# Patient Record
Sex: FEMALE | Race: WHITE | NOT HISPANIC OR LATINO | Employment: OTHER | URBAN - METROPOLITAN AREA
[De-identification: names, ages, dates, MRNs, and addresses within clinical notes are randomized per-mention and may not be internally consistent; named-entity substitution may affect disease eponyms.]

---

## 2017-02-15 ENCOUNTER — TELEPHONE (OUTPATIENT)
Dept: INTERNAL MEDICINE | Facility: CLINIC | Age: 82
End: 2017-02-15

## 2017-02-15 NOTE — TELEPHONE ENCOUNTER
"Pt's daughter needs FMLA paperwork completed for both her parents.    Please see forms in "forms to do box".    Needed ASAP.    Thanks.      "

## 2017-02-16 ENCOUNTER — LAB VISIT (OUTPATIENT)
Dept: LAB | Facility: HOSPITAL | Age: 82
End: 2017-02-16
Attending: INTERNAL MEDICINE
Payer: MEDICARE

## 2017-02-16 ENCOUNTER — TELEPHONE (OUTPATIENT)
Dept: INTERNAL MEDICINE | Facility: CLINIC | Age: 82
End: 2017-02-16

## 2017-02-16 ENCOUNTER — OFFICE VISIT (OUTPATIENT)
Dept: OPTOMETRY | Facility: CLINIC | Age: 82
End: 2017-02-16
Payer: MEDICARE

## 2017-02-16 DIAGNOSIS — R30.0 DYSURIA: Primary | ICD-10-CM

## 2017-02-16 DIAGNOSIS — Z13.5 SCREENING FOR GLAUCOMA: ICD-10-CM

## 2017-02-16 DIAGNOSIS — H52.4 PRESBYOPIA OU: ICD-10-CM

## 2017-02-16 DIAGNOSIS — R30.0 DYSURIA: ICD-10-CM

## 2017-02-16 DIAGNOSIS — H50.05 ESOTROPIA, ALTERNATING: Primary | ICD-10-CM

## 2017-02-16 DIAGNOSIS — Z96.1 PSEUDOPHAKIA OF BOTH EYES: ICD-10-CM

## 2017-02-16 LAB
BACTERIA #/AREA URNS AUTO: ABNORMAL /HPF
BILIRUB UR QL STRIP: NEGATIVE
CLARITY UR REFRACT.AUTO: CLEAR
COLOR UR AUTO: YELLOW
GLUCOSE UR QL STRIP: NEGATIVE
HGB UR QL STRIP: NEGATIVE
HYALINE CASTS UR QL AUTO: 1 /LPF
KETONES UR QL STRIP: NEGATIVE
LEUKOCYTE ESTERASE UR QL STRIP: ABNORMAL
MICROSCOPIC COMMENT: ABNORMAL
NITRITE UR QL STRIP: POSITIVE
PH UR STRIP: 7 [PH] (ref 5–8)
PROT UR QL STRIP: NEGATIVE
RBC #/AREA URNS AUTO: 1 /HPF (ref 0–4)
SP GR UR STRIP: 1.01 (ref 1–1.03)
SQUAMOUS #/AREA URNS AUTO: 1 /HPF
URN SPEC COLLECT METH UR: ABNORMAL
UROBILINOGEN UR STRIP-ACNC: NEGATIVE EU/DL
WBC #/AREA URNS AUTO: 35 /HPF (ref 0–5)

## 2017-02-16 PROCEDURE — 92014 COMPRE OPH EXAM EST PT 1/>: CPT | Mod: S$GLB,,, | Performed by: OPTOMETRIST

## 2017-02-16 PROCEDURE — 99999 PR PBB SHADOW E&M-EST. PATIENT-LVL II: CPT | Mod: PBBFAC,,, | Performed by: OPTOMETRIST

## 2017-02-16 PROCEDURE — 92015 DETERMINE REFRACTIVE STATE: CPT | Mod: S$GLB,,, | Performed by: OPTOMETRIST

## 2017-02-16 NOTE — TELEPHONE ENCOUNTER
----- Message from Carolyn Judd sent at 2/16/2017 10:01 AM CST -----  Contact: self  [phone]  Patient would like to know if nurse can set her up to have a urine sample done on today while she has a 3:15 appointment to see the eye  For a possible UTI

## 2017-02-16 NOTE — PROGRESS NOTES
HPI     DLS: 11/16/2015  Pt states no vision changes. +floaters occasionally  Denies flashes, diplopia, and headaches.    No gtts        Last edited by Sharon Harper on 2/16/2017  2:35 PM.     ROS     Positive for: Musculoskeletal, Cardiovascular, Eyes    Negative for: Constitutional, Gastrointestinal, Neurological, Skin,   Genitourinary, HENT, Endocrine, Respiratory, Psychiatric, Allergic/Imm,   Heme/Lymph    Last edited by Almas Kidd, OD on 2/16/2017  4:05 PM. (History)        Assessment /Plan     For exam results, see Encounter Report.    Esotropia, alternating - Both Eyes    Screening for glaucoma    Presbyopia OU    Pseudophakia of both eyes        1. Alt eso w prism in spex--pt happy w RX (dist only)  2. Mild pco OD sp multifocal iol OU/YAG OS    PLAN:    rtc 1 yr

## 2017-02-20 ENCOUNTER — TELEPHONE (OUTPATIENT)
Dept: INTERNAL MEDICINE | Facility: CLINIC | Age: 82
End: 2017-02-20

## 2017-02-20 LAB — BACTERIA UR CULT: NORMAL

## 2017-02-20 RX ORDER — CEPHALEXIN 500 MG/1
500 CAPSULE ORAL 3 TIMES DAILY
Qty: 15 CAPSULE | Refills: 0 | Status: SHIPPED | OUTPATIENT
Start: 2017-02-20 | End: 2017-02-25

## 2017-02-21 ENCOUNTER — TELEPHONE (OUTPATIENT)
Dept: INTERNAL MEDICINE | Facility: CLINIC | Age: 82
End: 2017-02-21

## 2017-02-21 NOTE — TELEPHONE ENCOUNTER
Advise urine does show infection. We do have culture result, antibiotic sent accordingly (cephalexin)

## 2017-02-21 NOTE — TELEPHONE ENCOUNTER
----- Message from Carolyn Judd sent at 2/21/2017  9:56 AM CST -----  Contact: self- [phone]  Patient would like to get test results.  Name of test (lab, mammo, etc.):  Urine test  Date of test:  2-16  Ordering provider:   Where was the test performed:  [facility]  Comments:

## 2017-03-01 RX ORDER — OMEPRAZOLE 40 MG/1
CAPSULE, DELAYED RELEASE ORAL
Qty: 90 CAPSULE | Refills: 3 | Status: SHIPPED | OUTPATIENT
Start: 2017-03-01 | End: 2017-11-29 | Stop reason: SDUPTHER

## 2017-03-06 ENCOUNTER — TELEPHONE (OUTPATIENT)
Dept: INTERNAL MEDICINE | Facility: CLINIC | Age: 82
End: 2017-03-06

## 2017-03-06 RX ORDER — SULFAMETHOXAZOLE AND TRIMETHOPRIM 800; 160 MG/1; MG/1
1 TABLET ORAL 2 TIMES DAILY
Qty: 2014 TABLET | Refills: 0 | Status: SHIPPED | OUTPATIENT
Start: 2017-03-06 | End: 2017-03-13

## 2017-03-06 NOTE — TELEPHONE ENCOUNTER
Keflex was chosen based on the culture result which showed sensitivity to keflex.  If her sx's are not improving the infection is also sensitive to bactrim griffin we can send that in--sent

## 2017-03-06 NOTE — TELEPHONE ENCOUNTER
----- Message from Ayad Kamara MA sent at 3/6/2017  9:11 AM CST -----  Contact: Lhdh-[phone]  Pt stated that she would like to speak with the Dr or the Assistant regarding the Medication cephALEXin (KEFLEX) 500 MG capsule. She stated that it is not working with her Symptoms. Please advise and call. Thanks!

## 2017-03-06 NOTE — TELEPHONE ENCOUNTER
Pt advises that Keflex is not working as she usually receives bactrim.    Please review and advise.    Thanks.

## 2017-03-07 ENCOUNTER — TELEPHONE (OUTPATIENT)
Dept: INTERNAL MEDICINE | Facility: CLINIC | Age: 82
End: 2017-03-07

## 2017-03-07 NOTE — TELEPHONE ENCOUNTER
Noted that Bactrim has been sent to the pharmacy.    Spoke with pt to advise.    Verbalized understanding.    However, the qty was 2014.    The pharmacy provided her with #10.    Please advise if she is to take for 5 or 10 days, #10 or #20.    Thanks.

## 2017-03-07 NOTE — TELEPHONE ENCOUNTER
----- Message from Concetta Little sent at 3/7/2017  9:09 AM CST -----  Contact: pt [phone]  Pt will like to know the status on a script,she is waiting on a call from the nurse from yesterday,she said the pharmacy called her and the script is not write,she said she want a call from [facility] today and not at the end of day,please advise

## 2017-03-10 ENCOUNTER — TELEPHONE (OUTPATIENT)
Dept: INTERNAL MEDICINE | Facility: CLINIC | Age: 82
End: 2017-03-10

## 2017-03-10 NOTE — TELEPHONE ENCOUNTER
See forms in forms to do box on desk.    Attached are the previous forms for reference.    Please review and advise upon completion.    Thanks.

## 2017-03-10 NOTE — TELEPHONE ENCOUNTER
----- Message from Nisha Mclaughlin sent at 3/10/2017 10:48 AM CST -----  Contact: Home: [phone]   She dropped off some forms to be filled out two weeks ago. She want to know if they have been completed and ready for ?

## 2017-04-11 ENCOUNTER — TELEPHONE (OUTPATIENT)
Dept: INTERNAL MEDICINE | Facility: CLINIC | Age: 82
End: 2017-04-11

## 2017-04-11 DIAGNOSIS — E78.5 DYSLIPIDEMIA: ICD-10-CM

## 2017-04-11 DIAGNOSIS — I10 ESSENTIAL HYPERTENSION: Primary | ICD-10-CM

## 2017-04-11 NOTE — TELEPHONE ENCOUNTER
----- Message from Lore De La Cruz sent at 4/11/2017 11:22 AM CDT -----  Contact: Self  Doctor appointment and lab have been scheduled.  Please link lab orders to the lab appointment.  Date of doctor appointment:  8/15  Physical or EP:  Physical   Date of lab appointment:  8/8  Comments:

## 2017-05-04 RX ORDER — OXYBUTYNIN CHLORIDE 10 MG/1
TABLET, EXTENDED RELEASE ORAL
Qty: 90 TABLET | Refills: 3 | Status: SHIPPED | OUTPATIENT
Start: 2017-05-04 | End: 2018-04-27 | Stop reason: SDUPTHER

## 2017-05-05 DIAGNOSIS — F41.9 ANXIETY: ICD-10-CM

## 2017-05-05 RX ORDER — ESCITALOPRAM OXALATE 5 MG/1
5 TABLET ORAL DAILY
Qty: 30 TABLET | Refills: 5 | Status: SHIPPED | OUTPATIENT
Start: 2017-05-05 | End: 2017-05-11 | Stop reason: SDUPTHER

## 2017-05-05 RX ORDER — ESCITALOPRAM OXALATE 5 MG/1
TABLET ORAL
Refills: 0 | OUTPATIENT
Start: 2017-05-05

## 2017-05-05 NOTE — TELEPHONE ENCOUNTER
----- Message from Angelita Hightower sent at 5/5/2017 11:30 AM CDT -----  Contact: Self [phone]  Pt would like to speak with the nurse regarding her medicine escitalopram oxalate (LEXAPRO) 5 MG  Was denied ,Please call

## 2017-05-09 ENCOUNTER — OFFICE VISIT (OUTPATIENT)
Dept: FAMILY MEDICINE | Facility: CLINIC | Age: 82
End: 2017-05-09
Payer: MEDICARE

## 2017-05-09 VITALS
HEART RATE: 71 BPM | WEIGHT: 147.69 LBS | OXYGEN SATURATION: 96 % | DIASTOLIC BLOOD PRESSURE: 74 MMHG | HEIGHT: 63 IN | BODY MASS INDEX: 26.17 KG/M2 | SYSTOLIC BLOOD PRESSURE: 126 MMHG

## 2017-05-09 DIAGNOSIS — E78.5 HYPERLIPIDEMIA, UNSPECIFIED HYPERLIPIDEMIA TYPE: Chronic | ICD-10-CM

## 2017-05-09 DIAGNOSIS — Z00.00 ENCOUNTER FOR PREVENTIVE HEALTH EXAMINATION: Primary | ICD-10-CM

## 2017-05-09 DIAGNOSIS — M81.0 OSTEOPOROSIS, UNSPECIFIED OSTEOPOROSIS TYPE, UNSPECIFIED PATHOLOGICAL FRACTURE PRESENCE: ICD-10-CM

## 2017-05-09 DIAGNOSIS — F41.9 ANXIETY: ICD-10-CM

## 2017-05-09 DIAGNOSIS — M47.819 ARTHRITIS OF FACET JOINTS AT MULTIPLE VERTEBRAL LEVELS: ICD-10-CM

## 2017-05-09 DIAGNOSIS — I70.0 AORTIC CALCIFICATION: ICD-10-CM

## 2017-05-09 DIAGNOSIS — I10 ESSENTIAL HYPERTENSION: Chronic | ICD-10-CM

## 2017-05-09 PROCEDURE — 99999 PR PBB SHADOW E&M-EST. PATIENT-LVL IV: CPT | Mod: PBBFAC,,, | Performed by: NURSE PRACTITIONER

## 2017-05-09 PROCEDURE — 3078F DIAST BP <80 MM HG: CPT | Mod: S$GLB,,, | Performed by: NURSE PRACTITIONER

## 2017-05-09 PROCEDURE — G0439 PPPS, SUBSEQ VISIT: HCPCS | Mod: S$GLB,,, | Performed by: NURSE PRACTITIONER

## 2017-05-09 PROCEDURE — 3074F SYST BP LT 130 MM HG: CPT | Mod: S$GLB,,, | Performed by: NURSE PRACTITIONER

## 2017-05-09 PROCEDURE — 99499 UNLISTED E&M SERVICE: CPT | Mod: S$GLB,,, | Performed by: NURSE PRACTITIONER

## 2017-05-09 RX ORDER — HYDROCHLOROTHIAZIDE 25 MG/1
TABLET ORAL
Qty: 90 TABLET | Refills: 3 | Status: SHIPPED | OUTPATIENT
Start: 2017-05-09 | End: 2018-04-27 | Stop reason: SDUPTHER

## 2017-05-09 RX ORDER — LOSARTAN POTASSIUM 50 MG/1
TABLET ORAL
Qty: 90 TABLET | Refills: 3 | Status: SHIPPED | OUTPATIENT
Start: 2017-05-09 | End: 2018-04-27 | Stop reason: SDUPTHER

## 2017-05-09 RX ORDER — PRAVASTATIN SODIUM 20 MG/1
TABLET ORAL
Qty: 90 TABLET | Refills: 3 | Status: SHIPPED | OUTPATIENT
Start: 2017-05-09 | End: 2018-04-27 | Stop reason: SDUPTHER

## 2017-05-09 NOTE — MR AVS SNAPSHOT
[facility]   [facility] [place] [address]  Phone: [phone]  Fax: [phone]                  Mine Wilkins   2017 2:00 PM   Office Visit    Description:  Female : 1935   Provider:  Gypsy Rodriguez NP   Department:  [facility]           Reason for Visit     Health Risk Assessment     Back Pain           Diagnoses this Visit        Comments    Encounter for preventive health examination    -  Primary            To Do List           Future Appointments        Provider Department Dept Phone    2017 3:40 PM Almas Quiles DO [facility] [phone]    2017 10:00 AM LAB, [place] [facility] [phone]    2017 10:30 AM SPECIMEN, [place] [facility] [phone]    8/15/2017 1:00 PM ZOEY Oswald MD [facility] [phone]      Goals (5 Years of Data)     None      Follow-Up and Disposition     Return in 3 months (on 8/15/2017) for annual visit with PCP, HRA visit in 1 year.      [facility]     [facility] -  Assistance  Unless otherwise directed by your provider, please contact [facility], our nurse care line that is available for  assistance.     Registered nurses in the [facility] provide: appointment scheduling, clinical advisement, health education, and other advisory services.  Call: [phone] (toll free)               Medications           Message regarding Medications     Verify the changes and/or additions to your medication regime listed below are the same as discussed with your clinician today.  If any of these changes or additions are incorrect, please notify your healthcare provider.        STOP taking these medications     MULTIVITAMIN WITH MINERALS (HAIR,SKIN & NAILS ORAL) Take by mouth once daily.           Verify that the below list of medications is an accurate representation of the  "medications you are currently taking.  If none reported, the list may be blank. If incorrect, please contact your healthcare provider. Carry this list with you in case of emergency.           Current Medications     alendronate (FOSAMAX) 70 MG tablet Take 1 tablet (70 mg total) by mouth every 7 days.    cetirizine (ZYRTEC) 10 MG tablet Take 10 mg by mouth once daily.    cholecalciferol, vitamin D3, 2,000 unit Tab Take by mouth. 1 Tablet Oral Every day    cranberry 400 mg Cap Take by mouth once daily.     escitalopram oxalate (LEXAPRO) 5 MG Tab Take 1 tablet (5 mg total) by mouth once daily.    hydrochlorothiazide (HYDRODIURIL) 25 MG tablet TAKE 1 TABLET ONE TIME DAILY    losartan (COZAAR) 50 MG tablet TAKE 1 TABLET ONE TIME DAILY    omeprazole (PRILOSEC) 40 MG capsule TAKE 1 CAPSULE EVERY MORNING  BEFORE  EATING    oxybutynin (DITROPAN-XL) 10 MG 24 hr tablet TAKE 1 TABLET EVERY DAY    pravastatin (PRAVACHOL) 20 MG tablet TAKE 1 TABLET EVERY EVENING           Clinical Reference Information           Your Vitals Were     BP Pulse Height Weight SpO2 BMI    126/74 71 5' 3" (1.6 m) 67 kg (147 lb 11.3 oz) 96% 26.17 kg/m2      Blood Pressure          Most Recent Value    BP  126/74      Allergies as of 5/9/2017     Ace Inhibitors    Codeine      Immunizations Administered on Date of Encounter - 5/9/2017     None      Instructions      Counseling and Referral of Other Preventative  (Italic type indicates deductible and co-insurance are waived)    Patient Name: Mine Wilkins  Today's Date: 5/9/2017      SERVICE LIMITATIONS RECOMMENDATION    Vaccines    · Pneumococcal (once after 65)    · Influenza (annually)    · Hepatitis B (if medium/high risk)    · Prevnar 13      Hepatitis B medium/high risk factors:       - End-stage renal disease       - Hemophiliacs who received Factor VII or         IX concentrates       - Clients of institutions for the mentally             retarded       - Persons who live in the same house as   "        a HepB carrier       - Homosexual men       - Illicit injectable drug abusers     Pneumococcal: Done, no repeat necessary     Influenza: Done, repeat in one year     Hepatitis B: N/A     Prevnar 13: Done, no repeat necessary    Mammogram (biennial age 50-74)  Annually (age 40 or over)  N/A    Pap (up to age 70 and after 70 if unknown history or abnormal study last 10 years)    N/A     The USPSTF recommends against screening for cervical cancer in women who have had a hysterectomy with removal of the cervix and who do not have a history of a high-grade precancerous lesion (cervical intraepithelial neoplasia [GUS] grade 2 or 3) or cervical cancer.     Colorectal cancer screening (to age 75)    · Fecal occult blood test (annual)  · Flexible sigmoidoscopy (5y)  · Screening colonoscopy (10y)  · Barium enema   Last done 06/09/2015, recommend to repeat every 10  years    Diabetes self-management training (no USPSTF recommendations)  Requires referral by treating physician for patient with diabetes or renal disease. 10 hours of initial DSMT sessions of no less than 30 minutes each in a continuous 12-month period. 2 hours of follow-up DSMT in subsequent years.  N/A    Bone mass measurements (age 65 & older, biennial)  Requires diagnosis related to osteoporosis or estrogen deficiency. Biennial benefit unless patient has history of long-term glucocorticoid  Last done 05/28/15, recommend to repeat every 3  years    Glaucoma screening (no USPSTF recommendation)  Diabetes mellitus, family history   , age 50 or over    American, age 65 or over  Done this year, repeat every year    Medical nutrition therapy for diabetes or renal disease (no recommended schedule)  Requires referral by treating physician for patient with diabetes or renal disease or kidney transplant within the past 3 years.  Can be provided in same year as diabetes self-management training (DSMT), and CMS recommends medical nutrition  therapy take place after DSMT. Up to 3 hours for initial year and 2 hours in subsequent years.  N/A    Cardiovascular screening blood tests (every 5 years)  · Fasting lipid panel  Order as a panel if possible  Done this year, repeat every year    Diabetes screening tests (at least every 3 years, Medicare covers annually or at 6-month intervals for prediabetic patients)  · Fasting blood sugar (FBS) or glucose tolerance test (GTT)  Patient must be diagnosed with one of the following:       - Hypertension       - Dyslipidemia       - Obesity (BMI 30kg/m2)       - Previous elevated impaired FBS or GTT       ... or any two of the following:       - Overweight (BMI 25 but <30)       - Family history of diabetes       - Age 65 or older       - History of gestational diabetes or birth of baby weighing more than 9 pounds  Done this year, repeat every year    Abdominal aortic aneurysm screening (once)  · Sonogram   Limited to patients who meet one of the following criteria:       - Men who are 65-75 years old and have smoked more than 100 cigarette in their lifetime       - Anyone with a family history of abdominal aortic aneurysm       - Anyone recommended for screening by the USPSTF  N/A    HIV screening (annually for increased risk patients)  · HIV-1 and HIV-2 by EIA, or MALORIE, rapid antibody test or oral mucosa transudate  Patients must be at increased risk for HIV infection per USPSTF guidelines or pregnant. Tests covered annually for patient at increased risk or as requested by the patient. Pregnant patients may receive up to 3 tests during pregnancy.  Risks discussed, screening is not recommended    Smoking cessation counseling (up to 8 sessions per year)  Patients must be asymptomatic of tobacco-related conditions to receive as a preventative service.  Non-smoker    Subsequent annual wellness visit  At least 12 months since last AWV  Return in one year     The following information is provided to all patients.  This  information is to help you find resources for any of the problems found today that may be affecting your health:                Living healthy guide: www.[facility].[place].[facility]      Understanding Diabetes: www.diabetes.org      Eating healthy: www.cdc.gov/healthyweight      CDC home safety checklist: www.cdc.gov/steadi/patient.html      Agency on Aging: www.goea.[place].[facility]      Alcoholics anonymous (AA): www.aa.org      Physical Activity: www.aisha.nih.gov/vs0czzv      Tobacco use: www.quitwithusla.org          Language Assistance Services     ATTENTION: Language assistance services are available, free of charge. Please call [phone].      ATENCIÓN: Si janela susie, tiene a lipscomb disposición servicios gratuitos de asistencia lingüística. Llame al [phone].     CHÚ Ý: N?u b?n nói Ti?ng Vi?t, có các d?ch v? h? tr? ngôn ng? mi?n phí dành cho b?n. G?i s? [phone].         [facility] complies with applicable Federal civil rights laws and does not discriminate on the basis of race, color, national origin, age, disability, or sex.

## 2017-05-10 PROBLEM — F41.9 ANXIETY: Status: ACTIVE | Noted: 2017-05-10

## 2017-05-10 NOTE — PROGRESS NOTES
"Mine Wilkins presented for a  Medicare AWV and comprehensive Health Risk Assessment today. The following components were reviewed and updated:    · Medical history  · Family History  · Social history  · Allergies and Current Medications  · Health Risk Assessment  · Health Maintenance  · Care Team     ** See Completed Assessments for Annual Wellness Visit within the encounter summary.**       The following assessments were completed:  · Living Situation  · CAGE  · Depression Screening  · Timed Get Up and Go  · Whisper Test  · Cognitive Function Screening  · Nutrition Screening  · ADL Screening  · PAQ Screening    Vitals:    05/09/17 1409   BP: 126/74   Pulse: 71   SpO2: 96%   Weight: 67 kg (147 lb 11.3 oz)   Height: 5' 3" (1.6 m)     Body mass index is 26.17 kg/(m^2).     Physical Exam   Constitutional: She is oriented to person, place, and time. She appears well-developed and well-nourished. No distress.   HENT:   Head: Normocephalic and atraumatic.   Eyes: EOM are normal. Pupils are equal, round, and reactive to light.   Neck: Neck supple. No JVD present. No tracheal deviation present.   Cardiovascular: Normal rate, regular rhythm, normal heart sounds and intact distal pulses.    No murmur heard.  Pulmonary/Chest: Effort normal and breath sounds normal. No respiratory distress. She has no wheezes. She has no rales.   Abdominal: Soft. Bowel sounds are normal. She exhibits no distension and no mass. There is no tenderness.   Musculoskeletal: Normal range of motion. She exhibits no edema or tenderness.   Neurological: She is alert and oriented to person, place, and time. Coordination normal.   Skin: Skin is warm and dry. No erythema. No pallor.   Psychiatric: She has a normal mood and affect. Her behavior is normal. Judgment and thought content normal. Cognition and memory are normal. She expresses no homicidal and no suicidal ideation.   Nursing note and vitals reviewed.        Diagnoses and health risks " identified today and associated recommendations/orders:    1. Encounter for preventive health examination    2. Essential hypertension  Chronic; stable on medication.  Followed by PCP.    3. Hyperlipidemia, unspecified hyperlipidemia type  Chronic; stable on medication.  Followed by PCP.    4. Aortic calcification  Chronic; stable.  Followed by PCP.    5. Anxiety  Chronic; stable on medication.  Followed by PCP.    6. Osteoporosis, unspecified osteoporosis type, unspecified pathological fracture presence  Chronic; stable on medication.  Followed by PCP.    7. 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  Seen on imaging dated 01/14/16.  Followed by Ortho.    8. BMI 26.0-26.9,adult      Provided Mine with a 5-10 year written screening schedule and personal prevention plan. Recommendations were developed using the USPSTF age appropriate recommendations. Education, counseling, and referrals were provided as needed. After Visit Summary printed and given to patient which includes a list of additional screenings\tests needed.    Return in 3 months (on 8/15/2017) for annual visit with PCP, HRA visit in 1 year.    Gypsy Rodriguez NP

## 2017-05-11 ENCOUNTER — OFFICE VISIT (OUTPATIENT)
Dept: INTERNAL MEDICINE | Facility: CLINIC | Age: 82
End: 2017-05-11
Payer: MEDICARE

## 2017-05-11 VITALS
DIASTOLIC BLOOD PRESSURE: 85 MMHG | BODY MASS INDEX: 26.13 KG/M2 | WEIGHT: 147.5 LBS | HEART RATE: 78 BPM | RESPIRATION RATE: 16 BRPM | SYSTOLIC BLOOD PRESSURE: 116 MMHG | TEMPERATURE: 98 F | HEIGHT: 63 IN

## 2017-05-11 DIAGNOSIS — F41.9 ANXIETY: Primary | ICD-10-CM

## 2017-05-11 PROCEDURE — 1157F ADVNC CARE PLAN IN RCRD: CPT | Mod: S$GLB,,, | Performed by: INTERNAL MEDICINE

## 2017-05-11 PROCEDURE — 99213 OFFICE O/P EST LOW 20 MIN: CPT | Mod: S$GLB,,, | Performed by: INTERNAL MEDICINE

## 2017-05-11 PROCEDURE — 3074F SYST BP LT 130 MM HG: CPT | Mod: S$GLB,,, | Performed by: INTERNAL MEDICINE

## 2017-05-11 PROCEDURE — 3079F DIAST BP 80-89 MM HG: CPT | Mod: S$GLB,,, | Performed by: INTERNAL MEDICINE

## 2017-05-11 PROCEDURE — 1126F AMNT PAIN NOTED NONE PRSNT: CPT | Mod: S$GLB,,, | Performed by: INTERNAL MEDICINE

## 2017-05-11 PROCEDURE — 99999 PR PBB SHADOW E&M-EST. PATIENT-LVL III: CPT | Mod: PBBFAC,,, | Performed by: INTERNAL MEDICINE

## 2017-05-11 PROCEDURE — 1159F MED LIST DOCD IN RCRD: CPT | Mod: S$GLB,,, | Performed by: INTERNAL MEDICINE

## 2017-05-11 PROCEDURE — 1160F RVW MEDS BY RX/DR IN RCRD: CPT | Mod: S$GLB,,, | Performed by: INTERNAL MEDICINE

## 2017-05-11 RX ORDER — ESCITALOPRAM OXALATE 5 MG/1
5 TABLET ORAL DAILY
Qty: 90 TABLET | Refills: 3 | Status: SHIPPED | OUTPATIENT
Start: 2017-05-11 | End: 2018-06-19 | Stop reason: SDUPTHER

## 2017-05-11 NOTE — PROGRESS NOTES
Subjective:       Patient ID: Mine Wilkins is a 82 y.o. female.    Chief Complaint: Medication Refill    HPI   Pt with anxiety is here requesting a refill of her Lexapro which is controlling her symptoms. No SE's from the medciation.   Review of Systems   Constitutional: Negative for activity change, appetite change, chills, diaphoresis, fatigue, fever and unexpected weight change.   HENT: Negative for postnasal drip, rhinorrhea, sinus pressure, sneezing, sore throat, trouble swallowing and voice change.    Respiratory: Negative for cough, shortness of breath and wheezing.    Cardiovascular: Negative for chest pain, palpitations and leg swelling.   Gastrointestinal: Negative for abdominal pain, blood in stool, constipation, diarrhea, nausea and vomiting.   Genitourinary: Negative for dysuria.   Musculoskeletal: Negative for arthralgias and myalgias.   Skin: Negative for rash and wound.   Allergic/Immunologic: Negative for environmental allergies and food allergies.   Hematological: Negative for adenopathy. Does not bruise/bleed easily.   Psychiatric/Behavioral: Negative for self-injury and suicidal ideas. The patient is nervous/anxious.        Objective:      Physical Exam   Constitutional: She is oriented to person, place, and time. She appears well-developed and well-nourished. No distress.   HENT:   Head: Normocephalic and atraumatic.   Nose: Nose normal.   Mouth/Throat: No oropharyngeal exudate.   Eyes: Conjunctivae and EOM are normal. Pupils are equal, round, and reactive to light. Right eye exhibits no discharge. Left eye exhibits no discharge. No scleral icterus.   Neck: Neck supple. No JVD present.   Cardiovascular: Normal rate, regular rhythm, normal heart sounds and intact distal pulses.    Pulmonary/Chest: Effort normal and breath sounds normal. No respiratory distress. She has no wheezes. She has no rales.   Abdominal: Soft. Bowel sounds are normal. There is no tenderness.   Musculoskeletal:  She exhibits no edema.   Lymphadenopathy:     She has no cervical adenopathy.   Neurological: She is alert and oriented to person, place, and time.   Skin: Skin is warm and dry. No rash noted. She is not diaphoretic. No pallor.   Psychiatric: She has a normal mood and affect. Her behavior is normal. Judgment and thought content normal.       Assessment:       1. Anxiety        Plan:    1. Controlled, refill Lexapro 5 mg daily

## 2017-08-10 ENCOUNTER — LAB VISIT (OUTPATIENT)
Dept: LAB | Facility: HOSPITAL | Age: 82
End: 2017-08-10
Attending: INTERNAL MEDICINE
Payer: MEDICARE

## 2017-08-10 DIAGNOSIS — I10 ESSENTIAL HYPERTENSION: ICD-10-CM

## 2017-08-10 DIAGNOSIS — E78.5 DYSLIPIDEMIA: ICD-10-CM

## 2017-08-10 LAB
ALBUMIN SERPL BCP-MCNC: 4 G/DL
ALP SERPL-CCNC: 55 U/L
ALT SERPL W/O P-5'-P-CCNC: 15 U/L
ANION GAP SERPL CALC-SCNC: 9 MMOL/L
AST SERPL-CCNC: 20 U/L
BASOPHILS # BLD AUTO: 0.04 K/UL
BASOPHILS NFR BLD: 0.8 %
BILIRUB SERPL-MCNC: 0.6 MG/DL
BUN SERPL-MCNC: 22 MG/DL
CALCIUM SERPL-MCNC: 10 MG/DL
CHLORIDE SERPL-SCNC: 100 MMOL/L
CHOLEST/HDLC SERPL: 3.4 {RATIO}
CO2 SERPL-SCNC: 26 MMOL/L
CREAT SERPL-MCNC: 0.9 MG/DL
DIFFERENTIAL METHOD: ABNORMAL
EOSINOPHIL # BLD AUTO: 0.1 K/UL
EOSINOPHIL NFR BLD: 2.6 %
ERYTHROCYTE [DISTWIDTH] IN BLOOD BY AUTOMATED COUNT: 14.5 %
EST. GFR  (AFRICAN AMERICAN): >60 ML/MIN/1.73 M^2
EST. GFR  (NON AFRICAN AMERICAN): >60 ML/MIN/1.73 M^2
GLUCOSE SERPL-MCNC: 87 MG/DL
HCT VFR BLD AUTO: 36.7 %
HDL/CHOLESTEROL RATIO: 29 %
HDLC SERPL-MCNC: 186 MG/DL
HDLC SERPL-MCNC: 54 MG/DL
HGB BLD-MCNC: 12.4 G/DL
LDLC SERPL CALC-MCNC: 117.4 MG/DL
LYMPHOCYTES # BLD AUTO: 1.8 K/UL
LYMPHOCYTES NFR BLD: 35 %
MCH RBC QN AUTO: 27.3 PG
MCHC RBC AUTO-ENTMCNC: 33.8 G/DL
MCV RBC AUTO: 81 FL
MONOCYTES # BLD AUTO: 0.5 K/UL
MONOCYTES NFR BLD: 9.1 %
NEUTROPHILS # BLD AUTO: 2.6 K/UL
NEUTROPHILS NFR BLD: 52.3 %
NONHDLC SERPL-MCNC: 132 MG/DL
PLATELET # BLD AUTO: 359 K/UL
PMV BLD AUTO: 10.6 FL
POTASSIUM SERPL-SCNC: 4.2 MMOL/L
PROT SERPL-MCNC: 7.4 G/DL
RBC # BLD AUTO: 4.54 M/UL
SODIUM SERPL-SCNC: 135 MMOL/L
TRIGL SERPL-MCNC: 73 MG/DL
TSH SERPL DL<=0.005 MIU/L-ACNC: 2.6 UIU/ML
WBC # BLD AUTO: 5.05 K/UL

## 2017-08-10 PROCEDURE — 84443 ASSAY THYROID STIM HORMONE: CPT

## 2017-08-10 PROCEDURE — 85025 COMPLETE CBC W/AUTO DIFF WBC: CPT

## 2017-08-10 PROCEDURE — 80061 LIPID PANEL: CPT

## 2017-08-10 PROCEDURE — 80053 COMPREHEN METABOLIC PANEL: CPT

## 2017-08-10 PROCEDURE — 36415 COLL VENOUS BLD VENIPUNCTURE: CPT | Mod: PO

## 2017-08-15 ENCOUNTER — OFFICE VISIT (OUTPATIENT)
Dept: INTERNAL MEDICINE | Facility: CLINIC | Age: 82
End: 2017-08-15
Payer: MEDICARE

## 2017-08-15 VITALS
BODY MASS INDEX: 25.58 KG/M2 | HEART RATE: 71 BPM | DIASTOLIC BLOOD PRESSURE: 82 MMHG | WEIGHT: 144.38 LBS | HEIGHT: 63 IN | TEMPERATURE: 99 F | SYSTOLIC BLOOD PRESSURE: 134 MMHG

## 2017-08-15 DIAGNOSIS — M81.0 OSTEOPOROSIS, UNSPECIFIED OSTEOPOROSIS TYPE, UNSPECIFIED PATHOLOGICAL FRACTURE PRESENCE: ICD-10-CM

## 2017-08-15 DIAGNOSIS — Z00.00 ENCOUNTER FOR PREVENTIVE HEALTH EXAMINATION: Primary | ICD-10-CM

## 2017-08-15 DIAGNOSIS — I35.0 AORTIC STENOSIS, MODERATE: ICD-10-CM

## 2017-08-15 DIAGNOSIS — I10 ESSENTIAL HYPERTENSION: ICD-10-CM

## 2017-08-15 DIAGNOSIS — E78.5 HYPERLIPIDEMIA, UNSPECIFIED HYPERLIPIDEMIA TYPE: ICD-10-CM

## 2017-08-15 PROCEDURE — 99499 UNLISTED E&M SERVICE: CPT | Mod: S$GLB,,, | Performed by: INTERNAL MEDICINE

## 2017-08-15 PROCEDURE — 99397 PER PM REEVAL EST PAT 65+ YR: CPT | Mod: S$GLB,,, | Performed by: INTERNAL MEDICINE

## 2017-08-15 PROCEDURE — 99999 PR PBB SHADOW E&M-EST. PATIENT-LVL III: CPT | Mod: PBBFAC,,, | Performed by: INTERNAL MEDICINE

## 2017-08-15 RX ORDER — ALENDRONATE SODIUM 70 MG/1
70 TABLET ORAL
Qty: 4 TABLET | Refills: 11
Start: 2017-08-15 | End: 2017-10-17 | Stop reason: SDUPTHER

## 2017-08-17 NOTE — PROGRESS NOTES
History of present illness:  82-year-old female who is in today for a general health assessment.    Current medication:  All medications are noted reviewed in the electronic medical record medication lists.    Review of systems:  General: no fever, chills, generalized body aches. No unexpected weight loss.  Eyes:  No visual disturbances.  HEENT:  No hoarseness, dysphagia, ear pain.  Respiratory:  No cough, no shortness of breath.  Cardiovascular: no chest pain, palpitations, cough, exertional limb pain. No edema.  GI: no nausea, vomiting.  No abdominal pain. No change in bowel habits.  No melena, no hematochezia.  : no dysuria. No change in the color or character of the urine. No urinary frequency.  Musculoskeletal: no joint pain or swelling.  Neurologic:  No focal neurological complaints.  No headaches.  Skin:  No rashes or other concerns.  Psych:  No emotional issues    Past medical history, past surgical history, family medical history is social histories are all noted reviewed the electronic medical record history sections.    Health screenings:  Colonoscopy June 2015.  Mammogram May 2015.  Bone densitometry May 2015  Eye exam February 2017.  She has had 23 and 13 Valent pneumococcal vaccines.    Physical examination:  GENERAL:  Alert, appropriately groomed, no acute distress.  VS: Blood pressure taken manually by this examiner 140/80.  EYES: sclerae white ,nonicteric. PERRL.  HEENT:  Normocephalic. Ear canals and tympanic membranes normal. Mouth and pharynx normal. No thyromegaly. Trachea midline and freely mobile.  LUNGS:  Clear to ascultation and normal to percussion.  CARDIOVASCULAR:  Normal heart sounds.  2/6 systolic murmur. Carotids full bilaterally without bruit.  Pedal pulses intact .  No abdominal bruit.  No peripheral extremity edema.  GI: the abdomen is soft, no distension. No masses , tenderness, organomegaly.  .  LYMPHATIC:  No axillary, inguinal , cervical adenopathy.  MUSCULOSKELETAL:  Range of  motion, stability and strength of the right and left upper and lower extremities normal. No swollen or tender joints  NEUROLOGIC:  DTR's normal. No gross motor or sensory deficits apparent, gait normal.  SKIN:  No rashes.   MS:  Alert, oriented , affect and mood all appropriate  Breast exam: Normal no masses or other abnormalities noted.    Data:  Lab data noted and reviewed from August 10, 2017.  Reasonable.    Impression:  82-year-old lady in reasonably good health.  Hypertension which is reasonably controlled.  Dyslipidemia on statin therapy.  Osteoporosis on bisphosphonate therapy since August 2016.  Moderate aortic stenosis asymptomatic  GERD controlled.    Plan:  Update 2-D echo with color-flow Doppler study.  Continue other pharmacologic regimens.  Discussed potential screening mammogram and she prefers to defer at this time.  Return to clinic in 6 months follow-up.  We will update bone densitometry in 2018.

## 2017-08-23 ENCOUNTER — CLINICAL SUPPORT (OUTPATIENT)
Dept: CARDIOLOGY | Facility: CLINIC | Age: 82
End: 2017-08-23
Payer: MEDICARE

## 2017-08-23 DIAGNOSIS — I35.0 AORTIC STENOSIS, MODERATE: ICD-10-CM

## 2017-08-23 LAB
AORTIC VALVE REGURGITATION: ABNORMAL
AORTIC VALVE STENOSIS: ABNORMAL
DIASTOLIC DYSFUNCTION: NO
ESTIMATED PA SYSTOLIC PRESSURE: 23.25
MITRAL VALVE REGURGITATION: ABNORMAL
RETIRED EF AND QEF - SEE NOTES: 55 (ref 55–65)
TRICUSPID VALVE REGURGITATION: ABNORMAL

## 2017-08-23 PROCEDURE — 93306 TTE W/DOPPLER COMPLETE: CPT | Mod: S$GLB,,, | Performed by: INTERNAL MEDICINE

## 2017-10-17 RX ORDER — ALENDRONATE SODIUM 70 MG/1
70 TABLET ORAL
Qty: 4 TABLET | Refills: 11 | Status: ON HOLD | OUTPATIENT
Start: 2017-10-17 | End: 2018-06-11

## 2017-10-17 NOTE — TELEPHONE ENCOUNTER
----- Message from Tete Diez sent at 10/16/2017  1:06 PM CDT -----  Contact: patient [phone]  Patient called [facility] to refill alendronate 70mg. Pt takes 1 weekly. Another doctor approved the last refill so [facility] is requiring a new rx from . Please take care of this tomorrow when  is here.

## 2017-10-19 ENCOUNTER — CLINICAL SUPPORT (OUTPATIENT)
Dept: FAMILY MEDICINE | Facility: CLINIC | Age: 82
End: 2017-10-19
Payer: MEDICARE

## 2017-10-19 DIAGNOSIS — Z23 NEED FOR PROPHYLACTIC VACCINATION AND INOCULATION AGAINST INFLUENZA: Primary | ICD-10-CM

## 2017-10-19 PROCEDURE — 90662 IIV NO PRSV INCREASED AG IM: CPT | Mod: S$GLB,,, | Performed by: FAMILY MEDICINE

## 2017-10-19 PROCEDURE — G0008 ADMIN INFLUENZA VIRUS VAC: HCPCS | Mod: S$GLB,,, | Performed by: FAMILY MEDICINE

## 2017-11-03 ENCOUNTER — HOSPITAL ENCOUNTER (OUTPATIENT)
Dept: RADIOLOGY | Facility: HOSPITAL | Age: 82
Discharge: HOME | End: 2017-11-03
Attending: INTERNAL MEDICINE
Payer: MEDICARE

## 2017-11-03 ENCOUNTER — TELEPHONE (OUTPATIENT)
Dept: INTERNAL MEDICINE | Facility: CLINIC | Age: 82
End: 2017-11-03

## 2017-11-03 ENCOUNTER — OFFICE VISIT (OUTPATIENT)
Dept: INTERNAL MEDICINE | Facility: CLINIC | Age: 82
End: 2017-11-03
Payer: MEDICARE

## 2017-11-03 VITALS
HEART RATE: 74 BPM | RESPIRATION RATE: 16 BRPM | SYSTOLIC BLOOD PRESSURE: 151 MMHG | TEMPERATURE: 99 F | DIASTOLIC BLOOD PRESSURE: 78 MMHG | BODY MASS INDEX: 25.58 KG/M2 | WEIGHT: 144.38 LBS | HEIGHT: 63 IN

## 2017-11-03 DIAGNOSIS — R05.9 COUGH: ICD-10-CM

## 2017-11-03 DIAGNOSIS — R05.9 COUGH: Primary | ICD-10-CM

## 2017-11-03 DIAGNOSIS — I10 HTN (HYPERTENSION), BENIGN: ICD-10-CM

## 2017-11-03 PROCEDURE — 71020 XR CHEST PA AND LATERAL: CPT | Mod: TC,PO

## 2017-11-03 PROCEDURE — 71020 XR CHEST PA AND LATERAL: CPT | Mod: 26,,, | Performed by: RADIOLOGY

## 2017-11-03 PROCEDURE — 99999 PR PBB SHADOW E&M-EST. PATIENT-LVL III: CPT | Mod: PBBFAC,,, | Performed by: INTERNAL MEDICINE

## 2017-11-03 PROCEDURE — 99214 OFFICE O/P EST MOD 30 MIN: CPT | Mod: S$GLB,,, | Performed by: INTERNAL MEDICINE

## 2017-11-03 RX ORDER — FLUTICASONE PROPIONATE 110 UG/1
1 AEROSOL, METERED RESPIRATORY (INHALATION) 2 TIMES DAILY
Qty: 12 G | Refills: 0 | Status: SHIPPED | OUTPATIENT
Start: 2017-11-03 | End: 2018-04-05

## 2017-11-03 NOTE — PROGRESS NOTES
History of present illness:  82-year-old lady in today because of a cough.  The patient reports several months of a persistent mild nonproductive cough.  She feels like there is a tickle in her throat.  Some slight hoarseness off and on.  No shortness of breath.  No wheezing.  No hemoptysis.  No chest pain.  Denies any ongoing sinus congestion and rhinorrhea etc.  No history of reactive airway disease.  No history of heart failure.  He does have history of reflux and is on omeprazole chronically.  She states in the past i.e. 67 years ago she had similar issues which was felt to be due to reflux.  She denies any ongoing reflux symptomatology this time.  No new medications.  She is on losartan and other medications as well.    Current medications:  All medications noted reviewed in the electronic medical record medication list.    Review of systems:  Constitutional: No fever chills body aches or unexpected weight changes.  HEENT: No dysphagia no ear pain.  See history of present illness.  Cardiovascular: No chest pain palpitations syncope presyncope edema or claudication.  GI: No nausea vomiting abdominal pain changes in bowel habits no GERD symptomatology.    Past medical history, past surgical history, family medical history and social histories are all noted reviewed the electronic medical record history section.    Physical examination:  General: Alert pleasant appropriately groomed lady no acute distress.  Vital signs:  HEENT: Normocephalic.  Mouth appears normal.  Ear canals and tympanic membranes normal.  Neck supple no masses no thyromegaly.  Lungs: Clear to auscultation and normal to percussion.  Cardiovascular: Regular rate and rhythm.  2/6 systolic murmur.  No JVD.  No peripheral extremity edema.    Data:  Chest x-ray today unremarkable.    Impression:  This lady has had a persistent mild nonproductive cough for couple of months, possibly inflammatory in nature versus other.    Plan:  Empiric trial of  inhaled corticosteroid for 1-2 weeks and advise if without resolution cough.  Schedule pulmonary function studies

## 2017-11-03 NOTE — TELEPHONE ENCOUNTER
----- Message from Nisha Mclaughlin sent at 11/3/2017  2:46 PM CDT -----  Contact: Cristobal Nye   [phone]  Pharmacy is calling to clarify an RX.  RX name:  Mometasone   What do they need to clarify:  Change it to Flovent   Comments:

## 2017-11-06 ENCOUNTER — HOSPITAL ENCOUNTER (OUTPATIENT)
Dept: PULMONOLOGY | Facility: CLINIC | Age: 82
Discharge: HOME | End: 2017-11-06
Payer: MEDICARE

## 2017-11-06 DIAGNOSIS — R05.9 COUGH: ICD-10-CM

## 2017-11-06 LAB
PRE FEV1 FVC: 80
PRE FEV1: 2.34
PRE FVC: 2.92
PREDICTED FEV1 FVC: 77
PREDICTED FEV1: 1.84
PREDICTED FVC: 2.45

## 2017-11-06 PROCEDURE — 94729 DIFFUSING CAPACITY: CPT | Mod: S$GLB,,, | Performed by: INTERNAL MEDICINE

## 2017-11-06 PROCEDURE — 94010 BREATHING CAPACITY TEST: CPT | Mod: S$GLB,,, | Performed by: INTERNAL MEDICINE

## 2017-11-09 ENCOUNTER — TELEPHONE (OUTPATIENT)
Dept: INTERNAL MEDICINE | Facility: CLINIC | Age: 82
End: 2017-11-09

## 2017-11-09 NOTE — TELEPHONE ENCOUNTER
Spoke with pt who advises that since she began the Flovent, she noted increased post nasal drip, congestion, sneezing, and cough.    Denies fever but noted a slight h/a. Her /84.    Tylenol helped some but the h/a was never alleviated.    Pt is not sure if she should continue the inhaler or if she picked up a virus.    Please review and advise results of PFTs as well.    Thanks.

## 2017-11-09 NOTE — TELEPHONE ENCOUNTER
The inhaler is not causing those sx's, suspect she has developed a viral URI.  Advise stop the inhaler for now.  tx uri sx's otc.  Advise of cough status over the next 2 weeks

## 2017-11-10 NOTE — TELEPHONE ENCOUNTER
Spoke with pt to advise of MD recommendations.    Verbalized understanding.    1. Please review and advise of PFT results.    Thanks.

## 2017-11-20 ENCOUNTER — TELEPHONE (OUTPATIENT)
Dept: INTERNAL MEDICINE | Facility: CLINIC | Age: 82
End: 2017-11-20

## 2017-11-20 DIAGNOSIS — R30.0 DYSURIA: Primary | ICD-10-CM

## 2017-11-20 NOTE — TELEPHONE ENCOUNTER
----- Message from Concetta Little sent at 11/20/2017  9:31 AM CST -----  Contact: pt [phone]  Pt would like a call from the nurse she said the coughing is easing up,but now she has a UTI and would like to do a urine test done on tomorrow in [place],please advise pt

## 2017-11-21 ENCOUNTER — TELEPHONE (OUTPATIENT)
Dept: INTERNAL MEDICINE | Facility: CLINIC | Age: 82
End: 2017-11-21

## 2017-11-21 NOTE — TELEPHONE ENCOUNTER
----- Message from Abel Ha sent at 11/21/2017  9:56 AM CST -----  Contact: Pt at [phone]  Pt said she was supposed to receive a call back regarding a UTI that she has and she never heard from anyone.

## 2017-11-22 ENCOUNTER — TELEPHONE (OUTPATIENT)
Dept: INTERNAL MEDICINE | Facility: CLINIC | Age: 82
End: 2017-11-22

## 2017-11-22 ENCOUNTER — OFFICE VISIT (OUTPATIENT)
Dept: INTERNAL MEDICINE | Facility: CLINIC | Age: 82
End: 2017-11-22
Payer: MEDICARE

## 2017-11-22 VITALS
BODY MASS INDEX: 25.78 KG/M2 | HEART RATE: 69 BPM | WEIGHT: 145.5 LBS | SYSTOLIC BLOOD PRESSURE: 125 MMHG | TEMPERATURE: 98 F | RESPIRATION RATE: 16 BRPM | DIASTOLIC BLOOD PRESSURE: 71 MMHG | HEIGHT: 63 IN

## 2017-11-22 DIAGNOSIS — R31.9 URINARY TRACT INFECTION WITH HEMATURIA, SITE UNSPECIFIED: ICD-10-CM

## 2017-11-22 DIAGNOSIS — R30.0 DYSURIA: Primary | ICD-10-CM

## 2017-11-22 DIAGNOSIS — N39.0 URINARY TRACT INFECTION WITH HEMATURIA, SITE UNSPECIFIED: ICD-10-CM

## 2017-11-22 LAB
BILIRUB SERPL-MCNC: NORMAL MG/DL
BLOOD URINE, POC: 50
COLOR, POC UA: YELLOW
GLUCOSE UR QL STRIP: NORMAL
KETONES UR QL STRIP: NORMAL
LEUKOCYTE ESTERASE URINE, POC: NORMAL
NITRITE, POC UA: NORMAL
PH, POC UA: 8
PROTEIN, POC: NORMAL
SPECIFIC GRAVITY, POC UA: 1005
UROBILINOGEN, POC UA: NORMAL

## 2017-11-22 PROCEDURE — 99212 OFFICE O/P EST SF 10 MIN: CPT | Mod: 25,S$GLB,, | Performed by: INTERNAL MEDICINE

## 2017-11-22 PROCEDURE — 87077 CULTURE AEROBIC IDENTIFY: CPT

## 2017-11-22 PROCEDURE — 87088 URINE BACTERIA CULTURE: CPT

## 2017-11-22 PROCEDURE — 81002 URINALYSIS NONAUTO W/O SCOPE: CPT | Mod: S$GLB,,, | Performed by: INTERNAL MEDICINE

## 2017-11-22 PROCEDURE — 87186 SC STD MICRODIL/AGAR DIL: CPT

## 2017-11-22 PROCEDURE — 99999 PR PBB SHADOW E&M-EST. PATIENT-LVL IV: CPT | Mod: PBBFAC,,, | Performed by: INTERNAL MEDICINE

## 2017-11-22 PROCEDURE — 87086 URINE CULTURE/COLONY COUNT: CPT

## 2017-11-22 RX ORDER — SULFAMETHOXAZOLE AND TRIMETHOPRIM 800; 160 MG/1; MG/1
1 TABLET ORAL 2 TIMES DAILY
Qty: 14 TABLET | Refills: 0 | Status: SHIPPED | OUTPATIENT
Start: 2017-11-22 | End: 2017-11-29

## 2017-11-22 NOTE — TELEPHONE ENCOUNTER
Spoke with pt who advises that she was able to get an appt with Dr. Collins today and her UTI is being taken care of.

## 2017-11-22 NOTE — PATIENT INSTRUCTIONS
1. Will culture the urine and let you know the results by phone.  2. High water intake.  3. Bactrim-DS twice a day for the urine infection.  4. Continue all regular meds.

## 2017-11-22 NOTE — PROGRESS NOTES
Subjective:       Patient ID: Mine Wilkins is a 82 y.o. female.    Chief Complaint: Urinary Tract Infection    Patient presents for urgent visit complaining of acute onset of dysuria associated with strong odor to the urine.  History of UTIs and she is suspicious.  No fever/chills.  No sign of blood in urine to her.  No flank pain.  Recent URI and still has a bit of a cough.      Urinary Tract Infection    Associated symptoms include frequency and urgency. Pertinent negatives include no chills, flank pain, hematuria, nausea or vomiting.     Review of Systems   Constitutional: Negative for chills and fever.   Respiratory: Positive for cough. Negative for chest tightness and shortness of breath.    Gastrointestinal: Negative for abdominal distention, diarrhea, nausea and vomiting.   Genitourinary: Positive for dysuria, frequency and urgency. Negative for difficulty urinating, flank pain and hematuria.       Objective:      Physical Exam   Constitutional: She appears well-developed and well-nourished. No distress.   Cardiovascular: Normal rate, regular rhythm and normal heart sounds.    Pulmonary/Chest: Effort normal and breath sounds normal. No respiratory distress.   Abdominal: Soft. Bowel sounds are normal. She exhibits no distension. There is no tenderness.   Vitals reviewed.        Dip urine in the office is positive for nitrites, leukocytes and trace blood.  Will treat, but culture as well.  Assessment:       1. Dysuria    2. Urinary tract infection with hematuria, site unspecified        Plan:   1. Will call urine culture results.  2. Bactrim-DS BID for 7 days.  3. High oral fluid intake.  4. Continue all regular meds.

## 2017-11-22 NOTE — TELEPHONE ENCOUNTER
----- Message from Beckie Hernandez sent at 11/22/2017 11:13 AM CST -----  Contact: self [phone]  Patient is returning a phone call.  Who left a message for the patient: Karla   Does patient know what this is regarding:  uti   Comments:

## 2017-11-24 ENCOUNTER — TELEPHONE (OUTPATIENT)
Dept: INTERNAL MEDICINE | Facility: CLINIC | Age: 82
End: 2017-11-24

## 2017-11-24 LAB — BACTERIA UR CULT: NORMAL

## 2017-11-24 NOTE — TELEPHONE ENCOUNTER
----- Message from Andrei Collins MD sent at 11/24/2017  2:38 PM CST -----  Please call patient and let her know that the bacteria in the urine culture are very sensitive to the Bactrim (sulfa).  Hopefully, she is feeling better.  She should complete the full course of antibiotic.

## 2017-11-29 RX ORDER — OMEPRAZOLE 40 MG/1
CAPSULE, DELAYED RELEASE ORAL
Qty: 90 CAPSULE | Refills: 3 | Status: SHIPPED | OUTPATIENT
Start: 2017-11-29 | End: 2019-03-12 | Stop reason: SDUPTHER

## 2017-12-07 ENCOUNTER — OFFICE VISIT (OUTPATIENT)
Dept: INTERNAL MEDICINE | Facility: CLINIC | Age: 82
End: 2017-12-07
Payer: MEDICARE

## 2017-12-07 VITALS
BODY MASS INDEX: 26.72 KG/M2 | RESPIRATION RATE: 16 BRPM | WEIGHT: 150.81 LBS | OXYGEN SATURATION: 98 % | TEMPERATURE: 99 F | DIASTOLIC BLOOD PRESSURE: 74 MMHG | HEART RATE: 95 BPM | HEIGHT: 63 IN | SYSTOLIC BLOOD PRESSURE: 128 MMHG

## 2017-12-07 DIAGNOSIS — N30.00 ACUTE CYSTITIS WITHOUT HEMATURIA: Primary | ICD-10-CM

## 2017-12-07 LAB
BILIRUB SERPL-MCNC: ABNORMAL MG/DL
BILIRUB UR QL STRIP: NEGATIVE
BLOOD URINE, POC: ABNORMAL
CLARITY UR REFRACT.AUTO: ABNORMAL
COLOR UR AUTO: YELLOW
COLOR, POC UA: YELLOW
GLUCOSE UR QL STRIP: NEGATIVE
GLUCOSE UR QL STRIP: NORMAL
HGB UR QL STRIP: NEGATIVE
KETONES UR QL STRIP: ABNORMAL
KETONES UR QL STRIP: NEGATIVE
LEUKOCYTE ESTERASE UR QL STRIP: ABNORMAL
LEUKOCYTE ESTERASE URINE, POC: ABNORMAL
MICROSCOPIC COMMENT: ABNORMAL
NITRITE UR QL STRIP: NEGATIVE
NITRITE, POC UA: ABNORMAL
PH UR STRIP: 7 [PH] (ref 5–8)
PH, POC UA: 7
PROT UR QL STRIP: NEGATIVE
PROTEIN, POC: ABNORMAL
SP GR UR STRIP: 1.01 (ref 1–1.03)
SPECIFIC GRAVITY, POC UA: 1
SQUAMOUS #/AREA URNS AUTO: 0 /HPF
URN SPEC COLLECT METH UR: ABNORMAL
UROBILINOGEN UR STRIP-ACNC: NEGATIVE EU/DL
UROBILINOGEN, POC UA: NORMAL
WBC #/AREA URNS AUTO: >100 /HPF (ref 0–5)

## 2017-12-07 PROCEDURE — 81001 URINALYSIS AUTO W/SCOPE: CPT

## 2017-12-07 PROCEDURE — 87088 URINE BACTERIA CULTURE: CPT

## 2017-12-07 PROCEDURE — 99213 OFFICE O/P EST LOW 20 MIN: CPT | Mod: 25,S$GLB,, | Performed by: INTERNAL MEDICINE

## 2017-12-07 PROCEDURE — 81002 URINALYSIS NONAUTO W/O SCOPE: CPT | Mod: S$GLB,,, | Performed by: INTERNAL MEDICINE

## 2017-12-07 PROCEDURE — 87077 CULTURE AEROBIC IDENTIFY: CPT

## 2017-12-07 PROCEDURE — 99999 PR PBB SHADOW E&M-EST. PATIENT-LVL IV: CPT | Mod: PBBFAC,,, | Performed by: INTERNAL MEDICINE

## 2017-12-07 PROCEDURE — 87086 URINE CULTURE/COLONY COUNT: CPT

## 2017-12-07 PROCEDURE — 87186 SC STD MICRODIL/AGAR DIL: CPT

## 2017-12-07 RX ORDER — SULFAMETHOXAZOLE AND TRIMETHOPRIM 800; 160 MG/1; MG/1
1 TABLET ORAL 2 TIMES DAILY
Qty: 14 TABLET | Refills: 0 | Status: SHIPPED | OUTPATIENT
Start: 2017-12-07 | End: 2017-12-11

## 2017-12-07 NOTE — PROGRESS NOTES
Subjective:       Patient ID: Mine Wilkins is a 82 y.o. female.    Chief Complaint: Urinary Tract Infection (poss uti)    HPI     82-year-old female here for evaluation of a possible UTI.  She has to urinate every hour.  She drinks a lot of water.  She usually gets a UTI and has foul smelling urine.  She reports that she feels bad.  She usually goes about every 2-4 hours.  She has no dysuria.  No hematuria.  No fevers or chills.    Review of Systems    Objective:      Physical Exam   Constitutional: She is oriented to person, place, and time. She appears well-developed and well-nourished.   HENT:   Head: Normocephalic and atraumatic.   Mouth/Throat: No oropharyngeal exudate.   Eyes: EOM are normal. Pupils are equal, round, and reactive to light. Right eye exhibits no discharge. Left eye exhibits no discharge. No scleral icterus.   Neck: Normal range of motion. Neck supple. No tracheal deviation present. No thyromegaly present.   Cardiovascular: Normal rate and regular rhythm.  Exam reveals no gallop and no friction rub.    Murmur heard.   Crescendo decrescendo systolic murmur is present with a grade of 3/6   Pulmonary/Chest: Effort normal and breath sounds normal. No respiratory distress. She has no wheezes. She has no rales. She exhibits no tenderness.   Abdominal: Soft. Bowel sounds are normal. She exhibits no distension and no mass. There is no tenderness. There is no rebound and no guarding.   Musculoskeletal: Normal range of motion. She exhibits no edema or tenderness.   Neurological: She is alert and oriented to person, place, and time.   Skin: Skin is warm and dry. No rash noted. No erythema. No pallor.   Psychiatric: She has a normal mood and affect. Her behavior is normal.   Vitals reviewed.      Assessment:       1. Acute cystitis without hematuria        Plan:       1.  POCT urine - + for infection.  Check UA, urine culture.  Bactrim DS BID x 7 days prescribed.

## 2017-12-10 LAB — BACTERIA UR CULT: NORMAL

## 2017-12-11 ENCOUNTER — TELEPHONE (OUTPATIENT)
Dept: INTERNAL MEDICINE | Facility: CLINIC | Age: 82
End: 2017-12-11

## 2017-12-11 RX ORDER — CIPROFLOXACIN 500 MG/1
500 TABLET ORAL EVERY 12 HOURS
Qty: 14 TABLET | Refills: 0 | Status: SHIPPED | OUTPATIENT
Start: 2017-12-11 | End: 2017-12-18

## 2017-12-11 NOTE — TELEPHONE ENCOUNTER
Called pt; informed of RX change from bactrim to cipro bid x 7 days. Informed pt to  new RX at pharmacy.  Pt expressed understanding and had no firther questions or comments.

## 2018-03-13 ENCOUNTER — OFFICE VISIT (OUTPATIENT)
Dept: OPTOMETRY | Facility: CLINIC | Age: 83
End: 2018-03-13
Payer: COMMERCIAL

## 2018-03-13 DIAGNOSIS — Z96.1 PSEUDOPHAKIA OF BOTH EYES: ICD-10-CM

## 2018-03-13 DIAGNOSIS — Z13.5 SCREENING FOR GLAUCOMA: ICD-10-CM

## 2018-03-13 DIAGNOSIS — H52.4 PRESBYOPIA: ICD-10-CM

## 2018-03-13 DIAGNOSIS — H50.05 ESOTROPIA, ALTERNATING: Primary | ICD-10-CM

## 2018-03-13 PROCEDURE — 92014 COMPRE OPH EXAM EST PT 1/>: CPT | Mod: S$GLB,,, | Performed by: OPTOMETRIST

## 2018-03-13 PROCEDURE — 99999 PR PBB SHADOW E&M-EST. PATIENT-LVL II: CPT | Mod: PBBFAC,,, | Performed by: OPTOMETRIST

## 2018-03-13 PROCEDURE — 92015 DETERMINE REFRACTIVE STATE: CPT | Mod: S$GLB,,, | Performed by: OPTOMETRIST

## 2018-03-13 NOTE — PROGRESS NOTES
HPI     DLS: 2/16/2017  Pt states no vision changes. +floaters occasionally.  Esotropia w prism in   spex--reports no diplopia  Denies flashes, diplopia, and headaches.    No gtts     Sp MF PC IOL OU/ YAG OS  Alt eso w prism in spex    Last edited by Almas Kidd, OD on 3/13/2018  1:40 PM. (History)        ROS     Positive for: Eyes (AET/ MFIOL OU)    Negative for: Constitutional, Gastrointestinal, Neurological, Skin,   Genitourinary, Musculoskeletal, HENT, Endocrine, Cardiovascular,   Respiratory, Psychiatric, Allergic/Imm, Heme/Lymph    Last edited by Almas Kidd, OD on 3/13/2018  1:40 PM. (History)        Assessment /Plan     For exam results, see Encounter Report.    Esotropia, alternating - Both Eyes    Pseudophakia of both eyes    Screening for glaucoma    Presbyopia        1. Alt eso w prism in spex--pt happy w RX (dist only)  2. Mild pco OD sp multifocal iol OU/YAG OS    PLAN:    rtc 1 yr

## 2018-04-02 ENCOUNTER — PES CALL (OUTPATIENT)
Dept: ADMINISTRATIVE | Facility: CLINIC | Age: 83
End: 2018-04-02

## 2018-04-05 ENCOUNTER — LAB VISIT (OUTPATIENT)
Dept: LAB | Facility: HOSPITAL | Age: 83
End: 2018-04-05
Attending: INTERNAL MEDICINE
Payer: MEDICARE

## 2018-04-05 ENCOUNTER — OFFICE VISIT (OUTPATIENT)
Dept: INTERNAL MEDICINE | Facility: CLINIC | Age: 83
End: 2018-04-05
Payer: MEDICARE

## 2018-04-05 VITALS
SYSTOLIC BLOOD PRESSURE: 124 MMHG | BODY MASS INDEX: 26.6 KG/M2 | RESPIRATION RATE: 16 BRPM | HEART RATE: 60 BPM | HEIGHT: 63 IN | DIASTOLIC BLOOD PRESSURE: 72 MMHG | WEIGHT: 150.13 LBS | TEMPERATURE: 99 F

## 2018-04-05 DIAGNOSIS — N30.00 ACUTE CYSTITIS WITHOUT HEMATURIA: Primary | ICD-10-CM

## 2018-04-05 DIAGNOSIS — N30.00 ACUTE CYSTITIS WITHOUT HEMATURIA: ICD-10-CM

## 2018-04-05 LAB
BACTERIA #/AREA URNS AUTO: ABNORMAL /HPF
BILIRUB UR QL STRIP: NEGATIVE
CLARITY UR REFRACT.AUTO: CLEAR
COLOR UR AUTO: ABNORMAL
GLUCOSE UR QL STRIP: NEGATIVE
HGB UR QL STRIP: ABNORMAL
KETONES UR QL STRIP: NEGATIVE
LEUKOCYTE ESTERASE UR QL STRIP: ABNORMAL
MICROSCOPIC COMMENT: ABNORMAL
NITRITE UR QL STRIP: NEGATIVE
PH UR STRIP: 6 [PH] (ref 5–8)
PROT UR QL STRIP: NEGATIVE
RBC #/AREA URNS AUTO: 1 /HPF (ref 0–4)
SP GR UR STRIP: 1 (ref 1–1.03)
URN SPEC COLLECT METH UR: ABNORMAL
UROBILINOGEN UR STRIP-ACNC: NEGATIVE EU/DL
WBC #/AREA URNS AUTO: 84 /HPF (ref 0–5)

## 2018-04-05 PROCEDURE — 99213 OFFICE O/P EST LOW 20 MIN: CPT | Mod: S$GLB,,, | Performed by: INTERNAL MEDICINE

## 2018-04-05 PROCEDURE — 87186 SC STD MICRODIL/AGAR DIL: CPT

## 2018-04-05 PROCEDURE — 87086 URINE CULTURE/COLONY COUNT: CPT

## 2018-04-05 PROCEDURE — 87088 URINE BACTERIA CULTURE: CPT

## 2018-04-05 PROCEDURE — 3078F DIAST BP <80 MM HG: CPT | Mod: CPTII,S$GLB,, | Performed by: INTERNAL MEDICINE

## 2018-04-05 PROCEDURE — 87077 CULTURE AEROBIC IDENTIFY: CPT

## 2018-04-05 PROCEDURE — 81001 URINALYSIS AUTO W/SCOPE: CPT

## 2018-04-05 PROCEDURE — 99999 PR PBB SHADOW E&M-EST. PATIENT-LVL III: CPT | Mod: PBBFAC,,, | Performed by: INTERNAL MEDICINE

## 2018-04-05 PROCEDURE — 3074F SYST BP LT 130 MM HG: CPT | Mod: CPTII,S$GLB,, | Performed by: INTERNAL MEDICINE

## 2018-04-05 RX ORDER — PHENAZOPYRIDINE HYDROCHLORIDE 100 MG/1
100 TABLET, FILM COATED ORAL 3 TIMES DAILY PRN
Qty: 9 TABLET | Refills: 0 | Status: SHIPPED | OUTPATIENT
Start: 2018-04-05 | End: 2018-04-13

## 2018-04-05 RX ORDER — CIPROFLOXACIN 500 MG/1
500 TABLET ORAL EVERY 12 HOURS
Qty: 6 TABLET | Refills: 0 | Status: SHIPPED | OUTPATIENT
Start: 2018-04-05 | End: 2018-04-09

## 2018-04-05 NOTE — PROGRESS NOTES
Subjective:       Patient ID: Mine Wilkins is a 82 y.o. female.    Chief Complaint: Urinary Tract Infection    HPI     82-year-old female with aortic calcification, arthritis of lumbar facet joints here for evaluation of a possible UTI.  Patient's recent UTI was Enterococcus faecalis.  It was sensitive to ampicillin, ciprofloxacin, Macrobid, tetracycline, vancomycin.  She can feel a pressure in her bladder.  She has urinary frequency.  No hematuria.  No fevers or chills.  This feels like the last time she had an infection.  She has been drinking a lot of water.  She has bladder spasms right now.    Review of Systems    Objective:      Physical Exam   Constitutional: She is oriented to person, place, and time. She appears well-developed and well-nourished.   HENT:   Head: Normocephalic and atraumatic.   Mouth/Throat: No oropharyngeal exudate.   Eyes: EOM are normal. Pupils are equal, round, and reactive to light. Right eye exhibits no discharge. Left eye exhibits no discharge. No scleral icterus.   Neck: Normal range of motion. Neck supple. No tracheal deviation present. No thyromegaly present.   Cardiovascular: Normal rate, regular rhythm and normal heart sounds.  Exam reveals no gallop and no friction rub.    No murmur heard.  Pulmonary/Chest: Effort normal and breath sounds normal. No respiratory distress. She has no wheezes. She has no rales. She exhibits no tenderness.   Abdominal: Soft. Bowel sounds are normal. She exhibits no distension and no mass. There is no tenderness. There is no rebound and no guarding.   Musculoskeletal: Normal range of motion. She exhibits no edema or tenderness.   Neurological: She is alert and oriented to person, place, and time.   Skin: Skin is warm and dry. No rash noted. No erythema. No pallor.   Psychiatric: She has a normal mood and affect. Her behavior is normal.   Vitals reviewed.      Assessment:       1. Acute cystitis without hematuria        Plan:       1.   Check urinalysis, urine culture.  Cipro 500 mg twice a day ×3 days.

## 2018-04-09 ENCOUNTER — TELEPHONE (OUTPATIENT)
Dept: INTERNAL MEDICINE | Facility: CLINIC | Age: 83
End: 2018-04-09

## 2018-04-09 LAB — BACTERIA UR CULT: NORMAL

## 2018-04-09 RX ORDER — NITROFURANTOIN 25; 75 MG/1; MG/1
100 CAPSULE ORAL 2 TIMES DAILY
Qty: 20 CAPSULE | Refills: 0 | Status: SHIPPED | OUTPATIENT
Start: 2018-04-09 | End: 2018-04-19

## 2018-04-13 ENCOUNTER — OFFICE VISIT (OUTPATIENT)
Dept: FAMILY MEDICINE | Facility: CLINIC | Age: 83
End: 2018-04-13
Payer: MEDICARE

## 2018-04-13 VITALS
HEIGHT: 63 IN | BODY MASS INDEX: 26.57 KG/M2 | SYSTOLIC BLOOD PRESSURE: 130 MMHG | WEIGHT: 149.94 LBS | HEART RATE: 85 BPM | DIASTOLIC BLOOD PRESSURE: 82 MMHG

## 2018-04-13 DIAGNOSIS — M47.819 ARTHRITIS OF FACET JOINTS AT MULTIPLE VERTEBRAL LEVELS: Chronic | ICD-10-CM

## 2018-04-13 DIAGNOSIS — K21.9 GASTROESOPHAGEAL REFLUX DISEASE, ESOPHAGITIS PRESENCE NOT SPECIFIED: ICD-10-CM

## 2018-04-13 DIAGNOSIS — I70.0 AORTIC CALCIFICATION: Chronic | ICD-10-CM

## 2018-04-13 DIAGNOSIS — E66.3 OVERWEIGHT (BMI 25.0-29.9): ICD-10-CM

## 2018-04-13 DIAGNOSIS — F41.9 ANXIETY: ICD-10-CM

## 2018-04-13 DIAGNOSIS — M81.0 OSTEOPOROSIS, UNSPECIFIED OSTEOPOROSIS TYPE, UNSPECIFIED PATHOLOGICAL FRACTURE PRESENCE: ICD-10-CM

## 2018-04-13 DIAGNOSIS — E78.5 HYPERLIPIDEMIA, UNSPECIFIED HYPERLIPIDEMIA TYPE: Chronic | ICD-10-CM

## 2018-04-13 DIAGNOSIS — D75.839 THROMBOCYTOSIS: ICD-10-CM

## 2018-04-13 DIAGNOSIS — Z00.00 ENCOUNTER FOR PREVENTIVE HEALTH EXAMINATION: Primary | ICD-10-CM

## 2018-04-13 DIAGNOSIS — I10 ESSENTIAL HYPERTENSION: Chronic | ICD-10-CM

## 2018-04-13 PROCEDURE — G0439 PPPS, SUBSEQ VISIT: HCPCS | Mod: S$GLB,,, | Performed by: NURSE PRACTITIONER

## 2018-04-13 PROCEDURE — 99999 PR PBB SHADOW E&M-EST. PATIENT-LVL IV: CPT | Mod: PBBFAC,,, | Performed by: NURSE PRACTITIONER

## 2018-04-13 PROCEDURE — 99499 UNLISTED E&M SERVICE: CPT | Mod: S$PBB,,, | Performed by: NURSE PRACTITIONER

## 2018-04-13 PROCEDURE — 3075F SYST BP GE 130 - 139MM HG: CPT | Mod: CPTII,S$GLB,, | Performed by: NURSE PRACTITIONER

## 2018-04-13 PROCEDURE — 3079F DIAST BP 80-89 MM HG: CPT | Mod: CPTII,S$GLB,, | Performed by: NURSE PRACTITIONER

## 2018-04-13 NOTE — PROGRESS NOTES
Mine Wilkins presented for a  Medicare AWV and comprehensive Health Risk Assessment today. The following components were reviewed and updated:    · Medical history  · Family History  · Social history  · Allergies and Current Medications  · Health Risk Assessment  · Health Maintenance  · Care Team     ** See Completed Assessments for Annual Wellness Visit within the encounter summary.**       The following assessments were completed:  · Living Situation  · CAGE  · Depression Screening  · Timed Get Up and Go  · Whisper Test  · Cognitive Function Screening  · Nutrition Screening  · ADL Screening  · PAQ Screening    Vitals:    04/13/18 1309   BP: 130/82   BP Location: Left arm   Patient Position: Sitting   BP Method: Medium (Manual)   Pulse: 85     Body mass index is 26.56 kg/m².     Physical Exam   Constitutional: She is oriented to person, place, and time. She appears well-developed and well-nourished. No distress.   HENT:   Head: Normocephalic and atraumatic.   Eyes: EOM are normal. Pupils are equal, round, and reactive to light.   Neck: Neck supple. No JVD present. No tracheal deviation present.   Cardiovascular: Normal rate, regular rhythm, normal heart sounds and intact distal pulses.    No murmur heard.  Pulmonary/Chest: Effort normal and breath sounds normal. No respiratory distress. She has no wheezes. She has no rales.   Abdominal: Soft. Bowel sounds are normal. She exhibits no distension and no mass. There is no tenderness.   Musculoskeletal: Normal range of motion. She exhibits no edema or tenderness.   Neurological: She is alert and oriented to person, place, and time. Coordination normal.   Skin: Skin is warm and dry. No erythema. No pallor.   Psychiatric: She has a normal mood and affect. Her behavior is normal. Judgment and thought content normal. Cognition and memory are normal. She expresses no homicidal and no suicidal ideation.   Nursing note and vitals reviewed.        Diagnoses and health risks  identified today and associated recommendations/orders:    1. Encounter for preventive health examination    2. Essential hypertension  Chronic; stable on medication.  Followed by PCP.    3. Hyperlipidemia, unspecified hyperlipidemia type  Chronic; stable on medication.  Followed by PCP.    4. Aortic calcification  Chronic; stable.  Followed by PCP.    5. Thrombocytosis  Chronic; stable.  Followed by PCP.    6. Gastroesophageal reflux disease, esophagitis presence not specified  Chronic; stable on medication.  Followed by PCP.    7. 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  Followed by PCP.    8. Osteoporosis, unspecified osteoporosis type, unspecified pathological fracture presence  Chronic; stable on medication.  Followed by PCP.    9. Anxiety  Chronic; stable on medication.  Followed by PCP.    10. Overweight (BMI 25.0-29.9)      Provided Mine with a 5-10 year written screening schedule and personal prevention plan. Recommendations were developed using the USPSTF age appropriate recommendations. Education, counseling, and referrals were provided as needed. After Visit Summary printed and given to patient which includes a list of additional screenings\tests needed.    Follow-up in about 4 months (around 8/15/2018) for annual visit with PCP, Annual Wellness Visit in 1 year.    Gypsy Rodriguez NP

## 2018-04-13 NOTE — PATIENT INSTRUCTIONS
Counseling and Referral of Other Preventative  (Italic type indicates deductible and co-insurance are waived)    Patient Name: Mine Wilkins  Today's Date: 4/13/2018    Health Maintenance       Date Due Completion Date    TETANUS VACCINE 04/16/1953 ---    DEXA SCAN 05/28/2018 5/28/2015    Lipid Panel 08/10/2022 8/10/2017        No orders of the defined types were placed in this encounter.    The following information is provided to all patients.  This information is to help you find resources for any of the problems found today that may be affecting your health:                Living healthy guide: www.[facility].[place].[facility]      Understanding Diabetes: www.diabetes.org      Eating healthy: www.cdc.gov/healthyweight      CDC home safety checklist: www.cdc.gov/steadi/patient.html      Agency on Aging: www.goea.[place].[facility]      Alcoholics anonymous (AA): www.aa.org      Physical Activity: www.aisha.nih.gov/pd6wdjf      Tobacco use: www.quitwithusla.org

## 2018-04-24 ENCOUNTER — TELEPHONE (OUTPATIENT)
Dept: INTERNAL MEDICINE | Facility: CLINIC | Age: 83
End: 2018-04-24

## 2018-04-24 DIAGNOSIS — E78.5 HYPERLIPIDEMIA, UNSPECIFIED HYPERLIPIDEMIA TYPE: ICD-10-CM

## 2018-04-24 DIAGNOSIS — Z00.00 ROUTINE GENERAL MEDICAL EXAMINATION AT A HEALTH CARE FACILITY: Primary | ICD-10-CM

## 2018-04-24 DIAGNOSIS — I10 ESSENTIAL HYPERTENSION, BENIGN: ICD-10-CM

## 2018-04-24 NOTE — TELEPHONE ENCOUNTER
----- Message from Carolyn Judd sent at 4/24/2018 12:31 PM CDT -----  Contact: patient  Doctor appointment and lab have been scheduled.  Please link lab orders to the lab appointment.  Date of doctor appointment:  8-29  Physical or EP:  physical  Date of lab appointment:  8-22  Comments:

## 2018-04-25 ENCOUNTER — OFFICE VISIT (OUTPATIENT)
Dept: DERMATOLOGY | Facility: CLINIC | Age: 83
End: 2018-04-25
Payer: MEDICARE

## 2018-04-25 VITALS — BODY MASS INDEX: 26.39 KG/M2 | WEIGHT: 149 LBS

## 2018-04-25 DIAGNOSIS — L72.0 MILIUM: ICD-10-CM

## 2018-04-25 DIAGNOSIS — D22.9 NEVUS OF MULTIPLE SITES: ICD-10-CM

## 2018-04-25 DIAGNOSIS — L82.1 SEBORRHEIC KERATOSES: Primary | ICD-10-CM

## 2018-04-25 PROCEDURE — 99213 OFFICE O/P EST LOW 20 MIN: CPT | Mod: S$GLB,,, | Performed by: DERMATOLOGY

## 2018-04-25 PROCEDURE — 99999 PR PBB SHADOW E&M-EST. PATIENT-LVL III: CPT | Mod: PBBFAC,,, | Performed by: DERMATOLOGY

## 2018-04-27 RX ORDER — PRAVASTATIN SODIUM 20 MG/1
TABLET ORAL
Qty: 90 TABLET | Refills: 3 | Status: SHIPPED | OUTPATIENT
Start: 2018-04-27 | End: 2019-05-17 | Stop reason: SDUPTHER

## 2018-04-27 RX ORDER — OXYBUTYNIN CHLORIDE 10 MG/1
TABLET, EXTENDED RELEASE ORAL
Qty: 90 TABLET | Refills: 3 | Status: SHIPPED | OUTPATIENT
Start: 2018-04-27 | End: 2019-05-17 | Stop reason: SDUPTHER

## 2018-04-27 RX ORDER — HYDROCHLOROTHIAZIDE 25 MG/1
TABLET ORAL
Qty: 90 TABLET | Refills: 3 | Status: ON HOLD | OUTPATIENT
Start: 2018-04-27 | End: 2018-05-23

## 2018-04-27 RX ORDER — LOSARTAN POTASSIUM 50 MG/1
TABLET ORAL
Qty: 90 TABLET | Refills: 3 | Status: ON HOLD | OUTPATIENT
Start: 2018-04-27 | End: 2018-06-11 | Stop reason: HOSPADM

## 2018-05-19 ENCOUNTER — HOSPITAL ENCOUNTER (INPATIENT)
Facility: HOSPITAL | Age: 83
LOS: 4 days | Discharge: NURSING, REHAB OR LONG-TERM CARE | DRG: 481 | End: 2018-05-23
Attending: HOSPITALIST | Admitting: HOSPITALIST
Payer: MEDICARE

## 2018-05-19 ENCOUNTER — ANESTHESIA EVENT (OUTPATIENT)
Dept: SURGERY | Facility: HOSPITAL | Age: 83
DRG: 481 | End: 2018-05-19
Payer: MEDICARE

## 2018-05-19 DIAGNOSIS — S72.141A DISPLACED INTERTROCHANTERIC FRACTURE OF RIGHT FEMUR, INITIAL ENCOUNTER FOR CLOSED FRACTURE: Primary | ICD-10-CM

## 2018-05-19 DIAGNOSIS — S72.001D ENCOUNTER FOR AFTERCARE FOR HEALING CLOSED TRAUMATIC FRACTURE OF RIGHT HIP: ICD-10-CM

## 2018-05-19 DIAGNOSIS — B95.2 URINARY TRACT INFECTION DUE TO ENTEROCOCCUS: ICD-10-CM

## 2018-05-19 DIAGNOSIS — K21.9 GASTROESOPHAGEAL REFLUX DISEASE WITHOUT ESOPHAGITIS: ICD-10-CM

## 2018-05-19 DIAGNOSIS — N39.0 URINARY TRACT INFECTION DUE TO ENTEROCOCCUS: ICD-10-CM

## 2018-05-19 DIAGNOSIS — I35.0 AORTIC VALVE STENOSIS, MODERATE: Chronic | ICD-10-CM

## 2018-05-19 DIAGNOSIS — F41.9 ANXIETY: ICD-10-CM

## 2018-05-19 DIAGNOSIS — I10 ESSENTIAL HYPERTENSION: ICD-10-CM

## 2018-05-19 DIAGNOSIS — S72.141D CLOSED DISPLACED INTERTROCHANTERIC FRACTURE OF RIGHT FEMUR WITH ROUTINE HEALING: ICD-10-CM

## 2018-05-19 DIAGNOSIS — E78.00 PURE HYPERCHOLESTEROLEMIA: ICD-10-CM

## 2018-05-19 DIAGNOSIS — E83.39 HYPOPHOSPHATEMIA: ICD-10-CM

## 2018-05-19 DIAGNOSIS — Z01.810 PRE-OPERATIVE CARDIOVASCULAR EXAMINATION: ICD-10-CM

## 2018-05-19 DIAGNOSIS — M80.00XD AGE-RELATED OSTEOPOROSIS WITH CURRENT PATHOLOGICAL FRACTURE WITH ROUTINE HEALING: ICD-10-CM

## 2018-05-19 PROBLEM — D75.839 THROMBOCYTOSIS: Status: RESOLVED | Noted: 2018-04-13 | Resolved: 2018-05-19

## 2018-05-19 LAB
25(OH)D3+25(OH)D2 SERPL-MCNC: 56 NG/ML
ABO + RH BLD: NORMAL
ALBUMIN SERPL BCP-MCNC: 3.8 G/DL
ALP SERPL-CCNC: 55 U/L
ALT SERPL W/O P-5'-P-CCNC: 18 U/L
ANION GAP SERPL CALC-SCNC: 11 MMOL/L
AST SERPL-CCNC: 24 U/L
BACTERIA #/AREA URNS AUTO: ABNORMAL /HPF
BASOPHILS # BLD AUTO: 0.05 K/UL
BASOPHILS NFR BLD: 0.4 %
BILIRUB SERPL-MCNC: 0.3 MG/DL
BILIRUB UR QL STRIP: NEGATIVE
BLD GP AB SCN CELLS X3 SERPL QL: NORMAL
BUN SERPL-MCNC: 24 MG/DL
CALCIUM SERPL-MCNC: 9.5 MG/DL
CHLORIDE SERPL-SCNC: 100 MMOL/L
CLARITY UR REFRACT.AUTO: CLEAR
CO2 SERPL-SCNC: 22 MMOL/L
COLOR UR AUTO: YELLOW
CREAT SERPL-MCNC: 0.9 MG/DL
DIFFERENTIAL METHOD: ABNORMAL
EOSINOPHIL # BLD AUTO: 0.2 K/UL
EOSINOPHIL NFR BLD: 1.3 %
ERYTHROCYTE [DISTWIDTH] IN BLOOD BY AUTOMATED COUNT: 14.2 %
EST. GFR  (AFRICAN AMERICAN): >60 ML/MIN/1.73 M^2
EST. GFR  (NON AFRICAN AMERICAN): 59.7 ML/MIN/1.73 M^2
ESTIMATED AVG GLUCOSE: 105 MG/DL
GLUCOSE SERPL-MCNC: 121 MG/DL
GLUCOSE UR QL STRIP: NEGATIVE
GRAM STN SPEC: NORMAL
GRAM STN SPEC: NORMAL
HBA1C MFR BLD HPLC: 5.3 %
HCT VFR BLD AUTO: 34.4 %
HGB BLD-MCNC: 11.5 G/DL
HGB UR QL STRIP: NEGATIVE
IMM GRANULOCYTES # BLD AUTO: 0.1 K/UL
IMM GRANULOCYTES NFR BLD AUTO: 0.8 %
INR PPP: 0.9
INR PPP: 1
KETONES UR QL STRIP: ABNORMAL
LEUKOCYTE ESTERASE UR QL STRIP: ABNORMAL
LYMPHOCYTES # BLD AUTO: 2.4 K/UL
LYMPHOCYTES NFR BLD: 17.9 %
MAGNESIUM SERPL-MCNC: 1.8 MG/DL
MCH RBC QN AUTO: 28 PG
MCHC RBC AUTO-ENTMCNC: 33.4 G/DL
MCV RBC AUTO: 84 FL
MICROSCOPIC COMMENT: ABNORMAL
MONOCYTES # BLD AUTO: 0.7 K/UL
MONOCYTES NFR BLD: 5.3 %
NEUTROPHILS # BLD AUTO: 9.7 K/UL
NEUTROPHILS NFR BLD: 74.3 %
NITRITE UR QL STRIP: NEGATIVE
NRBC BLD-RTO: 0 /100 WBC
PH UR STRIP: 6 [PH] (ref 5–8)
PHOSPHATE SERPL-MCNC: 2.7 MG/DL
PLATELET # BLD AUTO: 296 K/UL
PMV BLD AUTO: 10.6 FL
POTASSIUM SERPL-SCNC: 3.4 MMOL/L
PREALB SERPL-MCNC: 26 MG/DL
PROT SERPL-MCNC: 6.7 G/DL
PROT UR QL STRIP: NEGATIVE
PROTHROMBIN TIME: 10 SEC
PROTHROMBIN TIME: 10.4 SEC
RBC # BLD AUTO: 4.11 M/UL
RBC #/AREA URNS AUTO: 2 /HPF (ref 0–4)
SODIUM SERPL-SCNC: 133 MMOL/L
SP GR UR STRIP: 1.01 (ref 1–1.03)
TRANSFERRIN SERPL-MCNC: 208 MG/DL
URN SPEC COLLECT METH UR: ABNORMAL
UROBILINOGEN UR STRIP-ACNC: NEGATIVE EU/DL
WBC # BLD AUTO: 13.1 K/UL
WBC #/AREA URNS AUTO: 62 /HPF (ref 0–5)
YEAST UR QL AUTO: ABNORMAL

## 2018-05-19 PROCEDURE — 84466 ASSAY OF TRANSFERRIN: CPT

## 2018-05-19 PROCEDURE — 87186 SC STD MICRODIL/AGAR DIL: CPT

## 2018-05-19 PROCEDURE — 25000003 PHARM REV CODE 250: Performed by: PHYSICIAN ASSISTANT

## 2018-05-19 PROCEDURE — 83735 ASSAY OF MAGNESIUM: CPT

## 2018-05-19 PROCEDURE — 93010 ELECTROCARDIOGRAM REPORT: CPT | Mod: ,,, | Performed by: INTERNAL MEDICINE

## 2018-05-19 PROCEDURE — 85025 COMPLETE CBC W/AUTO DIFF WBC: CPT

## 2018-05-19 PROCEDURE — 87077 CULTURE AEROBIC IDENTIFY: CPT

## 2018-05-19 PROCEDURE — 25000003 PHARM REV CODE 250: Performed by: HOSPITALIST

## 2018-05-19 PROCEDURE — 85610 PROTHROMBIN TIME: CPT

## 2018-05-19 PROCEDURE — 63600175 PHARM REV CODE 636 W HCPCS: Performed by: PHYSICIAN ASSISTANT

## 2018-05-19 PROCEDURE — 82306 VITAMIN D 25 HYDROXY: CPT

## 2018-05-19 PROCEDURE — 84134 ASSAY OF PREALBUMIN: CPT

## 2018-05-19 PROCEDURE — 80053 COMPREHEN METABOLIC PANEL: CPT

## 2018-05-19 PROCEDURE — 87086 URINE CULTURE/COLONY COUNT: CPT

## 2018-05-19 PROCEDURE — 99223 1ST HOSP IP/OBS HIGH 75: CPT | Mod: ,,, | Performed by: HOSPITALIST

## 2018-05-19 PROCEDURE — 84100 ASSAY OF PHOSPHORUS: CPT

## 2018-05-19 PROCEDURE — 87088 URINE BACTERIA CULTURE: CPT

## 2018-05-19 PROCEDURE — 63600175 PHARM REV CODE 636 W HCPCS: Performed by: HOSPITALIST

## 2018-05-19 PROCEDURE — 99999 HC NO LEVEL OF SERVICE - ED ONLY: CPT

## 2018-05-19 PROCEDURE — 99223 1ST HOSP IP/OBS HIGH 75: CPT | Mod: GC,,, | Performed by: ORTHOPAEDIC SURGERY

## 2018-05-19 PROCEDURE — 87205 SMEAR GRAM STAIN: CPT

## 2018-05-19 PROCEDURE — 86850 RBC ANTIBODY SCREEN: CPT

## 2018-05-19 PROCEDURE — 25000003 PHARM REV CODE 250: Performed by: STUDENT IN AN ORGANIZED HEALTH CARE EDUCATION/TRAINING PROGRAM

## 2018-05-19 PROCEDURE — 12000002 HC ACUTE/MED SURGE SEMI-PRIVATE ROOM

## 2018-05-19 PROCEDURE — 81001 URINALYSIS AUTO W/SCOPE: CPT

## 2018-05-19 PROCEDURE — 85610 PROTHROMBIN TIME: CPT | Mod: 91

## 2018-05-19 PROCEDURE — 83036 HEMOGLOBIN GLYCOSYLATED A1C: CPT

## 2018-05-19 RX ORDER — CEFTRIAXONE 1 G/1
1 INJECTION, POWDER, FOR SOLUTION INTRAMUSCULAR; INTRAVENOUS
Status: DISCONTINUED | OUTPATIENT
Start: 2018-05-19 | End: 2018-05-22

## 2018-05-19 RX ORDER — ONDANSETRON 4 MG/1
4 TABLET, ORALLY DISINTEGRATING ORAL EVERY 6 HOURS PRN
Status: DISCONTINUED | OUTPATIENT
Start: 2018-05-19 | End: 2018-05-22

## 2018-05-19 RX ORDER — PRAVASTATIN SODIUM 20 MG/1
20 TABLET ORAL NIGHTLY
Status: DISCONTINUED | OUTPATIENT
Start: 2018-05-19 | End: 2018-05-23 | Stop reason: HOSPADM

## 2018-05-19 RX ORDER — ACETAMINOPHEN 10 MG/ML
1000 INJECTION, SOLUTION INTRAVENOUS EVERY 8 HOURS
Status: DISCONTINUED | OUTPATIENT
Start: 2018-05-19 | End: 2018-05-20

## 2018-05-19 RX ORDER — CETIRIZINE HYDROCHLORIDE 10 MG/1
10 TABLET ORAL DAILY
Status: DISCONTINUED | OUTPATIENT
Start: 2018-05-20 | End: 2018-05-23 | Stop reason: HOSPADM

## 2018-05-19 RX ORDER — OXYBUTYNIN CHLORIDE 10 MG/1
10 TABLET, EXTENDED RELEASE ORAL DAILY
Status: DISCONTINUED | OUTPATIENT
Start: 2018-05-20 | End: 2018-05-23 | Stop reason: HOSPADM

## 2018-05-19 RX ORDER — OXYCODONE HYDROCHLORIDE 5 MG/1
10 TABLET ORAL EVERY 6 HOURS PRN
Status: DISCONTINUED | OUTPATIENT
Start: 2018-05-19 | End: 2018-05-23 | Stop reason: HOSPADM

## 2018-05-19 RX ORDER — PANTOPRAZOLE SODIUM 40 MG/1
40 TABLET, DELAYED RELEASE ORAL DAILY
Status: DISCONTINUED | OUTPATIENT
Start: 2018-05-20 | End: 2018-05-23 | Stop reason: HOSPADM

## 2018-05-19 RX ORDER — OXYCODONE HYDROCHLORIDE 5 MG/1
5 TABLET ORAL EVERY 6 HOURS PRN
Status: DISCONTINUED | OUTPATIENT
Start: 2018-05-19 | End: 2018-05-23 | Stop reason: HOSPADM

## 2018-05-19 RX ORDER — LOSARTAN POTASSIUM 50 MG/1
50 TABLET ORAL DAILY
Status: DISCONTINUED | OUTPATIENT
Start: 2018-05-20 | End: 2018-05-23 | Stop reason: HOSPADM

## 2018-05-19 RX ORDER — ESCITALOPRAM OXALATE 5 MG/1
5 TABLET ORAL DAILY
Status: DISCONTINUED | OUTPATIENT
Start: 2018-05-20 | End: 2018-05-23 | Stop reason: HOSPADM

## 2018-05-19 RX ORDER — CHOLECALCIFEROL (VITAMIN D3) 25 MCG
2000 TABLET ORAL DAILY
Status: DISCONTINUED | OUTPATIENT
Start: 2018-05-20 | End: 2018-05-23 | Stop reason: HOSPADM

## 2018-05-19 RX ORDER — MORPHINE SULFATE 4 MG/ML
4 INJECTION, SOLUTION INTRAMUSCULAR; INTRAVENOUS EVERY 6 HOURS PRN
Status: DISCONTINUED | OUTPATIENT
Start: 2018-05-19 | End: 2018-05-23 | Stop reason: HOSPADM

## 2018-05-19 RX ORDER — ACETAMINOPHEN 325 MG/1
650 TABLET ORAL
Status: COMPLETED | OUTPATIENT
Start: 2018-05-19 | End: 2018-05-19

## 2018-05-19 RX ADMIN — CEFTRIAXONE SODIUM 1 G: 1 INJECTION, POWDER, FOR SOLUTION INTRAMUSCULAR; INTRAVENOUS at 08:05

## 2018-05-19 RX ADMIN — PRAVASTATIN SODIUM 20 MG: 20 TABLET ORAL at 08:05

## 2018-05-19 RX ADMIN — OXYCODONE HYDROCHLORIDE 10 MG: 5 TABLET ORAL at 10:05

## 2018-05-19 RX ADMIN — ACETAMINOPHEN 1000 MG: 10 INJECTION, SOLUTION INTRAVENOUS at 10:05

## 2018-05-19 RX ADMIN — ACETAMINOPHEN 650 MG: 325 TABLET ORAL at 06:05

## 2018-05-19 NOTE — ED TRIAGE NOTES
Patient states she fell at the grocery store and has right hip pain.  Patient states she landed on her bottom.  Patient has shortening to right leg.  Patient denies LOC.

## 2018-05-19 NOTE — ED PROVIDER NOTES
Encounter Date: 5/19/2018    SCRIBE #1 NOTE: I, Courtney Zuniga, am scribing for, and in the presence of,  Dr. Saavedra. I have scribed the following portions of the note - the EKG reading and the Resident attestation.       History     Chief Complaint   Patient presents with    Fall     fall from standing, denies LOC and head trauma. Right hip pain     Mine Wilkins is a 83 y.o. female with PMH aortic stenosis, GERD, HTN, HLD presents with right hip pain after fall.  She has other gross so when she fell over landing on her buttock and right hip.  She noticed immediate pain and was unable to stand or bear weight.  She is brought to the ER via EMS for evaluation.  She denies any numbness or paresthesias to her right leg.  She had no antecedent right hip pain. She also notes this pain to the right index finger with some bruising.    Denies head trauma, LOC, or pain/trauma to there extremities.          Review of patient's allergies indicates:   Allergen Reactions    Ace inhibitors      Other reaction(s): cough    Codeine      Other reaction(s): Nausea     Past Medical History:   Diagnosis Date    After-cataract of both eyes - Left Eye 10/11/2012    Anxiety     Aortic valve stenosis, moderate     AR (allergic rhinitis)     Cataract     Cholelithiasis     GERD (gastroesophageal reflux disease)     HTN (hypertension)     Hyperlipidemia     OP (osteoporosis)     Osteoarthritis     Strabismus     UI (urinary incontinence)      Past Surgical History:   Procedure Laterality Date    ADENOIDECTOMY      CATARACT EXTRACTION Bilateral     BOTH EYES MULTIFOCAL    COLONOSCOPY  2015    EYE SURGERY      FRACTURE SURGERY      HYSTERECTOMY      LUMBAR EPIDURAL INJECTION  02/04/2016    L4-l5    PARTIAL HYSTERECTOMY      SKIN GRAFT      left foot     TONSILLECTOMY      WRIST FRACTURE SURGERY Right 2009    YAG CAP      LEFT EYE     Family History   Problem Relation Age of Onset    Colon cancer  Brother     Dementia Brother     Hypertension Brother     Osteoporosis Mother     Hypertension Mother     Macular degeneration Mother     Cataracts Mother     Cancer Father     No Known Problems Daughter     Hypertension Brother     Dementia Brother     No Known Problems Daughter     Hypertension Maternal Grandmother     Stroke Maternal Grandmother     Breast cancer Maternal Grandmother     Melanoma Maternal Grandfather     Anesthesia problems Neg Hx     Broken bones Neg Hx     Clotting disorder Neg Hx     Collagen disease Neg Hx     Diabetes Neg Hx     Dislocations Neg Hx     Rheumatologic disease Neg Hx     Scoliosis Neg Hx     Severe sprains Neg Hx     Ovarian cancer Neg Hx     Amblyopia Neg Hx     Blindness Neg Hx     Glaucoma Neg Hx     Retinal detachment Neg Hx     Strabismus Neg Hx     Psoriasis Neg Hx     Lupus Neg Hx     Eczema Neg Hx     Acne Neg Hx      Social History   Substance Use Topics    Smoking status: Never Smoker    Smokeless tobacco: Never Used    Alcohol use Yes      Comment: maybe 6 drinks per year     Review of Systems   Constitutional: Negative for activity change, chills, diaphoresis, fatigue and fever.   HENT: Negative.    Eyes: Negative for photophobia and visual disturbance.   Respiratory: Negative for cough, chest tightness and shortness of breath.    Cardiovascular: Negative for chest pain and palpitations.   Gastrointestinal: Negative for abdominal pain, constipation, diarrhea, nausea and vomiting.   Genitourinary: Negative.    Musculoskeletal: Positive for arthralgias (right hip, rigth index finger) and gait problem. Negative for back pain, joint swelling, myalgias, neck pain and neck stiffness.   Skin: Negative for color change, pallor, rash and wound.   Neurological: Negative for dizziness, tremors, weakness, light-headedness, numbness and headaches.   Hematological: Negative.    Psychiatric/Behavioral: Negative.        Physical Exam     Initial  Vitals [05/19/18 1804]   BP Pulse Resp Temp SpO2   (!) 146/68 78 16 98.4 °F (36.9 °C) 100 %      MAP       94         Physical Exam    Nursing note and vitals reviewed.  Constitutional: She appears well-developed and well-nourished. She is not diaphoretic. No distress.   HENT:   Head: Normocephalic and atraumatic.   Nose: Nose normal.   Eyes: EOM are normal. Pupils are equal, round, and reactive to light.   Neck: Normal range of motion.   Cardiovascular: Normal rate, regular rhythm and intact distal pulses.   Murmur (systolic murmur) heard.  Pulmonary/Chest: Breath sounds normal. No respiratory distress.   Abdominal: Soft. There is no tenderness.   Musculoskeletal:   Pelvis: No TTP, Stable to direct anterior pressure over ASIS.    MSK:  RUE:  - ecchymoses to DIP of index finger with ttp  - AROM and PROM of the intact  - AIN/PIN/Radial/Median/Ulnar Nerves assessed in isolation without deficit  - SILT throughout  - Radial & Ulnar arteries palpated 2+  - Capillary Refill <3s    RLE:  - shortened and externally rotated  - ttp to greater trochanter  - pain with log roll  - no ROM hip/knee 2/2 hip pain  - TA/EHL/Gastroc/FHL assessed in isolation without deficit  - SILT throughout  - DP and PT palpated  2+  - Capillary Refill <3s     Neurological: She is alert and oriented to person, place, and time. She has normal strength and normal reflexes. She displays normal reflexes. No cranial nerve deficit or sensory deficit.   Skin: Skin is warm. Capillary refill takes less than 2 seconds. No rash noted. No erythema.   Psychiatric: She has a normal mood and affect. Her behavior is normal. Judgment and thought content normal.         ED Course   Procedures  Labs Reviewed   CBC W/ AUTO DIFFERENTIAL - Abnormal; Notable for the following:        Result Value    WBC 13.10 (*)     Hemoglobin 11.5 (*)     Hematocrit 34.4 (*)     Immature Granulocytes 0.8 (*)     Gran # (ANC) 9.7 (*)     Immature Grans (Abs) 0.10 (*)     Gran% 74.3 (*)      Lymph% 17.9 (*)     All other components within normal limits    Narrative:     red used instead of gold    COMPREHENSIVE METABOLIC PANEL - Abnormal; Notable for the following:     Sodium 133 (*)     Potassium 3.4 (*)     CO2 22 (*)     Glucose 121 (*)     BUN, Bld 24 (*)     eGFR if non  59.7 (*)     All other components within normal limits    Narrative:     red used instead of gold    PROTIME-INR    Narrative:     red used instead of gold    PREALBUMIN    Narrative:     red used instead of gold    TRANSFERRIN    Narrative:     red used instead of gold    URINALYSIS   VITAMIN D   HEMOGLOBIN A1C   MAGNESIUM   PHOSPHORUS   PROTIME-INR   URINALYSIS MICROSCOPIC   TYPE & SCREEN     EKG Readings: (Independently Interpreted)   Initial Reading: No STEMI. Rhythm: Normal Sinus Rhythm. Heart Rate: 86.   LVH          Medical Decision Making:   History:   Old Medical Records: I decided to obtain old medical records.  Independently Interpreted Test(s):   I have ordered and independently interpreted EKG Reading(s) - see prior notes  Clinical Tests:   Lab Tests: Ordered and Reviewed  Radiological Study: Ordered and Reviewed  Medical Tests: Ordered and Reviewed       APC / Resident Notes:   Mine Wilkins is a 83 y.o. female with right hip pain after fall from standing height.  She also has pain to the PIP of the right index finger.  Will obtain x-rays of the AP pelvis, right femur and right hand and re-evaluate.  Will give her Tylenol for pain.  Differential diagnosis includes hip fracture, hip contusion, dislocation, right index finger fracture versus contusion.    Update 1911  Xrays reviewed and show right intertrochanteric femur fracture.  Will admit to - and consult orthopedic surgery       Scribe Attestation:   Scribe #1: I performed the above scribed service and the documentation accurately describes the services I performed. I attest to the accuracy of the note.    Attending Attestation:    Physician Attestation Statement for Resident:  As the supervising MD   Physician Attestation Statement: I have personally seen and examined this patient.   I agree with the above history. -: 83 y.o. female who apparently fell. Was not syncopal but now c/o right hip pain. XR's preformed and she has hip fx.   As the supervising MD I agree with the above PE.    As the supervising MD I agree with the above treatment, course, plan, and disposition.   -: Pt was seen by ortho and be admitted to Internal Medicine hip fx service. Pt was stabilized in ED.                       Clinical Impression:   The primary encounter diagnosis was Displaced intertrochanteric fracture of right femur, initial encounter for closed fracture. A diagnosis of Pre-operative cardiovascular examination was also pertinent to this visit.    Disposition:   Disposition: Admitted  Condition: Stable

## 2018-05-20 ENCOUNTER — ANESTHESIA (OUTPATIENT)
Dept: SURGERY | Facility: HOSPITAL | Age: 83
DRG: 481 | End: 2018-05-20
Payer: MEDICARE

## 2018-05-20 LAB
BASOPHILS # BLD AUTO: 0.05 K/UL
BASOPHILS NFR BLD: 0.3 %
DIFFERENTIAL METHOD: ABNORMAL
EOSINOPHIL # BLD AUTO: 0 K/UL
EOSINOPHIL NFR BLD: 0.2 %
ERYTHROCYTE [DISTWIDTH] IN BLOOD BY AUTOMATED COUNT: 14.3 %
HCT VFR BLD AUTO: 31.4 %
HGB BLD-MCNC: 10.3 G/DL
IMM GRANULOCYTES # BLD AUTO: 0.12 K/UL
IMM GRANULOCYTES NFR BLD AUTO: 0.6 %
LYMPHOCYTES # BLD AUTO: 1.4 K/UL
LYMPHOCYTES NFR BLD: 7.1 %
MCH RBC QN AUTO: 27.2 PG
MCHC RBC AUTO-ENTMCNC: 32.8 G/DL
MCV RBC AUTO: 83 FL
MONOCYTES # BLD AUTO: 1.2 K/UL
MONOCYTES NFR BLD: 5.9 %
NEUTROPHILS # BLD AUTO: 16.7 K/UL
NEUTROPHILS NFR BLD: 85.9 %
NRBC BLD-RTO: 0 /100 WBC
PLATELET # BLD AUTO: 249 K/UL
PMV BLD AUTO: 10.5 FL
RBC # BLD AUTO: 3.79 M/UL
WBC # BLD AUTO: 19.41 K/UL

## 2018-05-20 PROCEDURE — 25000003 PHARM REV CODE 250: Performed by: HOSPITALIST

## 2018-05-20 PROCEDURE — 12000002 HC ACUTE/MED SURGE SEMI-PRIVATE ROOM

## 2018-05-20 PROCEDURE — D9220A PRA ANESTHESIA: Mod: CRNA,,, | Performed by: NURSE ANESTHETIST, CERTIFIED REGISTERED

## 2018-05-20 PROCEDURE — 37000008 HC ANESTHESIA 1ST 15 MINUTES: Performed by: ORTHOPAEDIC SURGERY

## 2018-05-20 PROCEDURE — 36415 COLL VENOUS BLD VENIPUNCTURE: CPT

## 2018-05-20 PROCEDURE — 76942 ECHO GUIDE FOR BIOPSY: CPT | Mod: 26,,, | Performed by: ANESTHESIOLOGY

## 2018-05-20 PROCEDURE — 36000711: Performed by: ORTHOPAEDIC SURGERY

## 2018-05-20 PROCEDURE — 27245 TREAT THIGH FRACTURE: CPT | Mod: RT,,, | Performed by: ORTHOPAEDIC SURGERY

## 2018-05-20 PROCEDURE — 63600175 PHARM REV CODE 636 W HCPCS

## 2018-05-20 PROCEDURE — 25000003 PHARM REV CODE 250: Performed by: STUDENT IN AN ORGANIZED HEALTH CARE EDUCATION/TRAINING PROGRAM

## 2018-05-20 PROCEDURE — 37000009 HC ANESTHESIA EA ADD 15 MINS: Performed by: ORTHOPAEDIC SURGERY

## 2018-05-20 PROCEDURE — 63600175 PHARM REV CODE 636 W HCPCS: Performed by: ANESTHESIOLOGY

## 2018-05-20 PROCEDURE — C1713 ANCHOR/SCREW BN/BN,TIS/BN: HCPCS | Performed by: ORTHOPAEDIC SURGERY

## 2018-05-20 PROCEDURE — 3E0T3BZ INTRODUCTION OF ANESTHETIC AGENT INTO PERIPHERAL NERVES AND PLEXI, PERCUTANEOUS APPROACH: ICD-10-PCS | Performed by: ORTHOPAEDIC SURGERY

## 2018-05-20 PROCEDURE — 27800517 HC TRAY,EPIDURAL-CONTINUOUS: Performed by: ANESTHESIOLOGY

## 2018-05-20 PROCEDURE — 36000710: Performed by: ORTHOPAEDIC SURGERY

## 2018-05-20 PROCEDURE — 25000003 PHARM REV CODE 250: Performed by: NURSE ANESTHETIST, CERTIFIED REGISTERED

## 2018-05-20 PROCEDURE — 63600175 PHARM REV CODE 636 W HCPCS: Performed by: STUDENT IN AN ORGANIZED HEALTH CARE EDUCATION/TRAINING PROGRAM

## 2018-05-20 PROCEDURE — 63600175 PHARM REV CODE 636 W HCPCS: Performed by: PHYSICIAN ASSISTANT

## 2018-05-20 PROCEDURE — 63600175 PHARM REV CODE 636 W HCPCS: Performed by: NURSE ANESTHETIST, CERTIFIED REGISTERED

## 2018-05-20 PROCEDURE — 76942 ECHO GUIDE FOR BIOPSY: CPT | Performed by: ANESTHESIOLOGY

## 2018-05-20 PROCEDURE — 99233 SBSQ HOSP IP/OBS HIGH 50: CPT | Mod: ,,, | Performed by: HOSPITALIST

## 2018-05-20 PROCEDURE — 27201423 OPTIME MED/SURG SUP & DEVICES STERILE SUPPLY: Performed by: ORTHOPAEDIC SURGERY

## 2018-05-20 PROCEDURE — 64999 UNLISTED PX NERVOUS SYSTEM: CPT | Mod: 59,RT,, | Performed by: ANESTHESIOLOGY

## 2018-05-20 PROCEDURE — 64448 NJX AA&/STRD FEM NRV NFS IMG: CPT | Performed by: ANESTHESIOLOGY

## 2018-05-20 PROCEDURE — 63600175 PHARM REV CODE 636 W HCPCS: Performed by: HOSPITALIST

## 2018-05-20 PROCEDURE — 71000033 HC RECOVERY, INTIAL HOUR: Performed by: ORTHOPAEDIC SURGERY

## 2018-05-20 PROCEDURE — C1769 GUIDE WIRE: HCPCS | Performed by: ORTHOPAEDIC SURGERY

## 2018-05-20 PROCEDURE — 71000039 HC RECOVERY, EACH ADD'L HOUR: Performed by: ORTHOPAEDIC SURGERY

## 2018-05-20 PROCEDURE — 0QS636Z REPOSITION RIGHT UPPER FEMUR WITH INTRAMEDULLARY INTERNAL FIXATION DEVICE, PERCUTANEOUS APPROACH: ICD-10-PCS | Performed by: ORTHOPAEDIC SURGERY

## 2018-05-20 PROCEDURE — 85025 COMPLETE CBC W/AUTO DIFF WBC: CPT

## 2018-05-20 PROCEDURE — 25000003 PHARM REV CODE 250: Performed by: PHYSICIAN ASSISTANT

## 2018-05-20 PROCEDURE — D9220A PRA ANESTHESIA: Mod: ANES,,, | Performed by: ANESTHESIOLOGY

## 2018-05-20 DEVICE — IMPLANTABLE DEVICE: Type: IMPLANTABLE DEVICE | Site: FEMUR | Status: FUNCTIONAL

## 2018-05-20 DEVICE — NAIL IM CANN 130 DEG 11X380 R: Type: IMPLANTABLE DEVICE | Site: FEMUR | Status: FUNCTIONAL

## 2018-05-20 DEVICE — ROD TFNA HELI BLADE 105MM STRL: Type: IMPLANTABLE DEVICE | Site: FEMUR | Status: FUNCTIONAL

## 2018-05-20 DEVICE — SCREW STRDRV REC T25 5X44 TTNM: Type: IMPLANTABLE DEVICE | Site: FEMUR | Status: FUNCTIONAL

## 2018-05-20 RX ORDER — FENTANYL CITRATE 50 UG/ML
INJECTION, SOLUTION INTRAMUSCULAR; INTRAVENOUS
Status: DISCONTINUED | OUTPATIENT
Start: 2018-05-20 | End: 2018-05-20

## 2018-05-20 RX ORDER — MIDAZOLAM HYDROCHLORIDE 1 MG/ML
1 INJECTION INTRAMUSCULAR; INTRAVENOUS EVERY 5 MIN PRN
Status: DISCONTINUED | OUTPATIENT
Start: 2018-05-20 | End: 2018-05-22

## 2018-05-20 RX ORDER — CEFAZOLIN SODIUM 1 G/3ML
2 INJECTION, POWDER, FOR SOLUTION INTRAMUSCULAR; INTRAVENOUS
Status: DISCONTINUED | OUTPATIENT
Start: 2018-05-20 | End: 2018-05-22

## 2018-05-20 RX ORDER — BISACODYL 10 MG
10 SUPPOSITORY, RECTAL RECTAL DAILY PRN
Status: DISCONTINUED | OUTPATIENT
Start: 2018-05-20 | End: 2018-05-23 | Stop reason: HOSPADM

## 2018-05-20 RX ORDER — CEFAZOLIN SODIUM 1 G/3ML
2 INJECTION, POWDER, FOR SOLUTION INTRAMUSCULAR; INTRAVENOUS
Status: DISCONTINUED | OUTPATIENT
Start: 2018-05-20 | End: 2018-05-20

## 2018-05-20 RX ORDER — RAMELTEON 8 MG/1
8 TABLET ORAL NIGHTLY PRN
Status: DISCONTINUED | OUTPATIENT
Start: 2018-05-20 | End: 2018-05-23 | Stop reason: HOSPADM

## 2018-05-20 RX ORDER — SODIUM CHLORIDE 9 MG/ML
3 INJECTION, SOLUTION INTRAMUSCULAR; INTRAVENOUS; SUBCUTANEOUS EVERY 8 HOURS PRN
Status: DISCONTINUED | OUTPATIENT
Start: 2018-05-20 | End: 2018-05-23 | Stop reason: HOSPADM

## 2018-05-20 RX ORDER — PREGABALIN 75 MG/1
75 CAPSULE ORAL NIGHTLY
Status: DISCONTINUED | OUTPATIENT
Start: 2018-05-20 | End: 2018-05-23 | Stop reason: HOSPADM

## 2018-05-20 RX ORDER — SODIUM CHLORIDE 9 MG/ML
INJECTION, SOLUTION INTRAVENOUS CONTINUOUS
Status: ACTIVE | OUTPATIENT
Start: 2018-05-20 | End: 2018-05-21

## 2018-05-20 RX ORDER — ROPIVACAINE HYDROCHLORIDE 2 MG/ML
10 INJECTION, SOLUTION EPIDURAL; INFILTRATION; PERINEURAL CONTINUOUS
Status: DISCONTINUED | OUTPATIENT
Start: 2018-05-20 | End: 2018-05-23 | Stop reason: HOSPADM

## 2018-05-20 RX ORDER — POLYETHYLENE GLYCOL 3350 17 G/17G
17 POWDER, FOR SOLUTION ORAL DAILY
Status: DISCONTINUED | OUTPATIENT
Start: 2018-05-20 | End: 2018-05-23 | Stop reason: HOSPADM

## 2018-05-20 RX ORDER — MUPIROCIN 20 MG/G
1 OINTMENT TOPICAL
Status: DISCONTINUED | OUTPATIENT
Start: 2018-05-20 | End: 2018-05-22

## 2018-05-20 RX ORDER — EPHEDRINE SULFATE 50 MG/ML
INJECTION, SOLUTION INTRAVENOUS
Status: DISCONTINUED | OUTPATIENT
Start: 2018-05-20 | End: 2018-05-20

## 2018-05-20 RX ORDER — METHOCARBAMOL 500 MG/1
1000 TABLET, FILM COATED ORAL EVERY 6 HOURS PRN
Status: DISCONTINUED | OUTPATIENT
Start: 2018-05-20 | End: 2018-05-23 | Stop reason: HOSPADM

## 2018-05-20 RX ORDER — PROPOFOL 10 MG/ML
VIAL (ML) INTRAVENOUS
Status: DISCONTINUED | OUTPATIENT
Start: 2018-05-20 | End: 2018-05-20

## 2018-05-20 RX ORDER — SUCCINYLCHOLINE CHLORIDE 20 MG/ML
INJECTION INTRAMUSCULAR; INTRAVENOUS
Status: DISCONTINUED | OUTPATIENT
Start: 2018-05-20 | End: 2018-05-20

## 2018-05-20 RX ORDER — ONDANSETRON 4 MG/1
4 TABLET, ORALLY DISINTEGRATING ORAL EVERY 12 HOURS PRN
Status: DISCONTINUED | OUTPATIENT
Start: 2018-05-20 | End: 2018-05-22

## 2018-05-20 RX ORDER — PHENYLEPHRINE HYDROCHLORIDE 10 MG/ML
INJECTION INTRAVENOUS
Status: DISCONTINUED | OUTPATIENT
Start: 2018-05-20 | End: 2018-05-20

## 2018-05-20 RX ORDER — ROPIVACAINE HYDROCHLORIDE 5 MG/ML
INJECTION, SOLUTION EPIDURAL; INFILTRATION; PERINEURAL
Status: DISPENSED
Start: 2018-05-20 | End: 2018-05-20

## 2018-05-20 RX ORDER — LIDOCAINE HYDROCHLORIDE 10 MG/ML
1 INJECTION, SOLUTION EPIDURAL; INFILTRATION; INTRACAUDAL; PERINEURAL
Status: DISCONTINUED | OUTPATIENT
Start: 2018-05-20 | End: 2018-05-22

## 2018-05-20 RX ORDER — ENOXAPARIN SODIUM 100 MG/ML
40 INJECTION SUBCUTANEOUS EVERY 24 HOURS
Status: DISCONTINUED | OUTPATIENT
Start: 2018-05-20 | End: 2018-05-23 | Stop reason: HOSPADM

## 2018-05-20 RX ORDER — ACETAMINOPHEN 500 MG
1000 TABLET ORAL EVERY 8 HOURS
Status: DISCONTINUED | OUTPATIENT
Start: 2018-05-20 | End: 2018-05-23 | Stop reason: HOSPADM

## 2018-05-20 RX ORDER — FENTANYL CITRATE 50 UG/ML
25 INJECTION, SOLUTION INTRAMUSCULAR; INTRAVENOUS EVERY 5 MIN PRN
Status: DISCONTINUED | OUTPATIENT
Start: 2018-05-20 | End: 2018-05-22

## 2018-05-20 RX ORDER — ONDANSETRON 2 MG/ML
4 INJECTION INTRAMUSCULAR; INTRAVENOUS EVERY 12 HOURS PRN
Status: DISCONTINUED | OUTPATIENT
Start: 2018-05-20 | End: 2018-05-20

## 2018-05-20 RX ORDER — ACETAMINOPHEN 500 MG
1000 TABLET ORAL EVERY 8 HOURS
Status: DISCONTINUED | OUTPATIENT
Start: 2018-05-20 | End: 2018-05-20

## 2018-05-20 RX ORDER — ROCURONIUM BROMIDE 10 MG/ML
INJECTION, SOLUTION INTRAVENOUS
Status: DISCONTINUED | OUTPATIENT
Start: 2018-05-20 | End: 2018-05-20

## 2018-05-20 RX ORDER — AMOXICILLIN 250 MG
1 CAPSULE ORAL 2 TIMES DAILY
Status: DISCONTINUED | OUTPATIENT
Start: 2018-05-20 | End: 2018-05-23 | Stop reason: HOSPADM

## 2018-05-20 RX ORDER — ACETAMINOPHEN 500 MG
1000 TABLET ORAL EVERY 6 HOURS
Status: DISCONTINUED | OUTPATIENT
Start: 2018-05-21 | End: 2018-05-20

## 2018-05-20 RX ORDER — ACETAMINOPHEN 10 MG/ML
1000 INJECTION, SOLUTION INTRAVENOUS ONCE
Status: COMPLETED | OUTPATIENT
Start: 2018-05-20 | End: 2018-05-20

## 2018-05-20 RX ORDER — CEFAZOLIN SODIUM 1 G/3ML
INJECTION, POWDER, FOR SOLUTION INTRAMUSCULAR; INTRAVENOUS
Status: DISCONTINUED | OUTPATIENT
Start: 2018-05-20 | End: 2018-05-20

## 2018-05-20 RX ORDER — LIDOCAINE HCL/PF 100 MG/5ML
SYRINGE (ML) INTRAVENOUS
Status: DISCONTINUED | OUTPATIENT
Start: 2018-05-20 | End: 2018-05-20

## 2018-05-20 RX ORDER — SODIUM CHLORIDE 0.9 % (FLUSH) 0.9 %
3 SYRINGE (ML) INJECTION
Status: DISCONTINUED | OUTPATIENT
Start: 2018-05-20 | End: 2018-05-22

## 2018-05-20 RX ORDER — ROPIVACAINE HYDROCHLORIDE 2 MG/ML
INJECTION, SOLUTION EPIDURAL; INFILTRATION; PERINEURAL
Status: COMPLETED
Start: 2018-05-20 | End: 2018-05-20

## 2018-05-20 RX ORDER — ONDANSETRON 8 MG/1
8 TABLET, ORALLY DISINTEGRATING ORAL EVERY 8 HOURS PRN
Status: DISCONTINUED | OUTPATIENT
Start: 2018-05-20 | End: 2018-05-23 | Stop reason: HOSPADM

## 2018-05-20 RX ADMIN — ROPIVACAINE HYDROCHLORIDE 10 ML/HR: 2 INJECTION, SOLUTION EPIDURAL; INFILTRATION at 11:05

## 2018-05-20 RX ADMIN — PROPOFOL 50 MG: 10 INJECTION, EMULSION INTRAVENOUS at 09:05

## 2018-05-20 RX ADMIN — ACETAMINOPHEN 1000 MG: 500 TABLET ORAL at 09:05

## 2018-05-20 RX ADMIN — PRAVASTATIN SODIUM 20 MG: 20 TABLET ORAL at 09:05

## 2018-05-20 RX ADMIN — CETIRIZINE HYDROCHLORIDE 10 MG: 10 TABLET, FILM COATED ORAL at 12:05

## 2018-05-20 RX ADMIN — PHENYLEPHRINE HYDROCHLORIDE 100 MCG: 10 INJECTION INTRAVENOUS at 08:05

## 2018-05-20 RX ADMIN — PHENYLEPHRINE HYDROCHLORIDE 100 MCG: 10 INJECTION INTRAVENOUS at 09:05

## 2018-05-20 RX ADMIN — FENTANYL CITRATE 25 MCG: 50 INJECTION, SOLUTION INTRAMUSCULAR; INTRAVENOUS at 08:05

## 2018-05-20 RX ADMIN — OXYCODONE HYDROCHLORIDE 5 MG: 5 TABLET ORAL at 06:05

## 2018-05-20 RX ADMIN — PHENYLEPHRINE HYDROCHLORIDE 100 MCG: 10 INJECTION INTRAVENOUS at 10:05

## 2018-05-20 RX ADMIN — EPHEDRINE SULFATE 5 MG: 50 INJECTION, SOLUTION INTRAMUSCULAR; INTRAVENOUS; SUBCUTANEOUS at 08:05

## 2018-05-20 RX ADMIN — PROPOFOL 40 MG: 10 INJECTION, EMULSION INTRAVENOUS at 10:05

## 2018-05-20 RX ADMIN — ACETAMINOPHEN 1000 MG: 10 INJECTION, SOLUTION INTRAVENOUS at 02:05

## 2018-05-20 RX ADMIN — EPHEDRINE SULFATE 5 MG: 50 INJECTION, SOLUTION INTRAMUSCULAR; INTRAVENOUS; SUBCUTANEOUS at 10:05

## 2018-05-20 RX ADMIN — CEFTRIAXONE SODIUM 1 G: 1 INJECTION, POWDER, FOR SOLUTION INTRAMUSCULAR; INTRAVENOUS at 11:05

## 2018-05-20 RX ADMIN — SODIUM CHLORIDE, SODIUM GLUCONATE, SODIUM ACETATE, POTASSIUM CHLORIDE, MAGNESIUM CHLORIDE, SODIUM PHOSPHATE, DIBASIC, AND POTASSIUM PHOSPHATE: .53; .5; .37; .037; .03; .012; .00082 INJECTION, SOLUTION INTRAVENOUS at 10:05

## 2018-05-20 RX ADMIN — PROPOFOL 120 MG: 10 INJECTION, EMULSION INTRAVENOUS at 08:05

## 2018-05-20 RX ADMIN — CEFAZOLIN 2 G: 330 INJECTION, POWDER, FOR SOLUTION INTRAMUSCULAR; INTRAVENOUS at 08:05

## 2018-05-20 RX ADMIN — FENTANYL CITRATE 50 MCG: 50 INJECTION, SOLUTION INTRAMUSCULAR; INTRAVENOUS at 10:05

## 2018-05-20 RX ADMIN — SUCCINYLCHOLINE CHLORIDE 120 MG: 20 INJECTION, SOLUTION INTRAMUSCULAR; INTRAVENOUS at 08:05

## 2018-05-20 RX ADMIN — ROCURONIUM BROMIDE 5 MG: 10 INJECTION, SOLUTION INTRAVENOUS at 08:05

## 2018-05-20 RX ADMIN — PHENYLEPHRINE HYDROCHLORIDE 200 MCG: 10 INJECTION INTRAVENOUS at 08:05

## 2018-05-20 RX ADMIN — STANDARDIZED SENNA CONCENTRATE AND DOCUSATE SODIUM 1 TABLET: 8.6; 5 TABLET, FILM COATED ORAL at 09:05

## 2018-05-20 RX ADMIN — ACETAMINOPHEN 1000 MG: 10 INJECTION, SOLUTION INTRAVENOUS at 06:05

## 2018-05-20 RX ADMIN — FENTANYL CITRATE 50 MCG: 50 INJECTION, SOLUTION INTRAMUSCULAR; INTRAVENOUS at 11:05

## 2018-05-20 RX ADMIN — OXYCODONE HYDROCHLORIDE 5 MG: 5 TABLET ORAL at 05:05

## 2018-05-20 RX ADMIN — PHENYLEPHRINE HYDROCHLORIDE 50 MCG: 10 INJECTION INTRAVENOUS at 09:05

## 2018-05-20 RX ADMIN — SODIUM CHLORIDE: 0.9 INJECTION, SOLUTION INTRAVENOUS at 06:05

## 2018-05-20 RX ADMIN — PANTOPRAZOLE SODIUM 40 MG: 40 TABLET, DELAYED RELEASE ORAL at 12:05

## 2018-05-20 RX ADMIN — SODIUM CHLORIDE 100 ML/HR: 0.9 INJECTION, SOLUTION INTRAVENOUS at 11:05

## 2018-05-20 RX ADMIN — EPHEDRINE SULFATE 5 MG: 50 INJECTION, SOLUTION INTRAMUSCULAR; INTRAVENOUS; SUBCUTANEOUS at 09:05

## 2018-05-20 RX ADMIN — PREGABALIN 75 MG: 75 CAPSULE ORAL at 09:05

## 2018-05-20 RX ADMIN — ROPIVACAINE HYDROCHLORIDE 10 ML/HR: 2 INJECTION, SOLUTION EPIDURAL; INFILTRATION at 09:05

## 2018-05-20 RX ADMIN — FENTANYL CITRATE 50 MCG: 50 INJECTION, SOLUTION INTRAMUSCULAR; INTRAVENOUS at 08:05

## 2018-05-20 RX ADMIN — EPHEDRINE SULFATE 25 MG: 50 INJECTION, SOLUTION INTRAMUSCULAR; INTRAVENOUS; SUBCUTANEOUS at 08:05

## 2018-05-20 RX ADMIN — SODIUM CHLORIDE, SODIUM GLUCONATE, SODIUM ACETATE, POTASSIUM CHLORIDE, MAGNESIUM CHLORIDE, SODIUM PHOSPHATE, DIBASIC, AND POTASSIUM PHOSPHATE: .53; .5; .37; .037; .03; .012; .00082 INJECTION, SOLUTION INTRAVENOUS at 08:05

## 2018-05-20 RX ADMIN — ENOXAPARIN SODIUM 40 MG: 100 INJECTION SUBCUTANEOUS at 05:05

## 2018-05-20 RX ADMIN — LIDOCAINE HYDROCHLORIDE 100 MG: 20 INJECTION, SOLUTION INTRAVENOUS at 08:05

## 2018-05-20 NOTE — ANESTHESIA PREPROCEDURE EVALUATION
05/19/2018  Pre-operative evaluation for Procedure(s) (LRB):  IM NAILING OF FEMUR- RIGHT- SYNTHES- LARGE C ARM DOOR SIDE- HANA TABLE  (Right)    Mine Wilkins is a 83 y.o. female with PMH of aortic stenosis (ERENDIRA = 1.26 cm2), HTN, GERD, osteoporosis, strabismus and HLD who is being evaluated for the procedure secondary to right intertrochanteric fracture.     LDA: LFA 18G  mayers    Prev airway: none on file     Drips: none     Patient Active Problem List   Diagnosis    Esotropia, alternating - Both Eyes    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)    Cystocele    Urethral hypermobility    Osteoarthritis    AR (allergic rhinitis)    HTN (hypertension)    Hyperlipidemia    OP (osteoporosis)    Aortic valve stenosis, moderate    Right-sided low back pain without sciatica    Lumbar radiculopathy    Nondisplaced fracture of proximal end of humerus    Aortic calcification    Left ventricular diastolic dysfunction with preserved systolic function    Decreased ROM of right shoulder    Arthritis of facet joints at multiple vertebral levels    Anxiety    Overweight (BMI 25.0-29.9)    GERD (gastroesophageal reflux disease)    Displaced intertrochanteric fracture of right femur, initial encounter for closed fracture       Review of patient's allergies indicates:   Allergen Reactions    Ace inhibitors      Other reaction(s): cough    Codeine      Other reaction(s): Nausea        No current facility-administered medications on file prior to encounter.      Current Outpatient Prescriptions on File Prior to Encounter   Medication Sig Dispense Refill    alendronate (FOSAMAX) 70 MG tablet Take 1 tablet (70 mg total) by mouth every 7 days. 4 tablet 11    cetirizine (ZYRTEC) 10 MG tablet Take 10 mg by mouth once daily.      cholecalciferol, vitamin D3, 2,000 unit Tab Take by mouth. 1  Tablet Oral Every day      cranberry 400 mg Cap Take by mouth once daily.       escitalopram oxalate (LEXAPRO) 5 MG Tab Take 1 tablet (5 mg total) by mouth once daily. 90 tablet 3    hydroCHLOROthiazide (HYDRODIURIL) 25 MG tablet TAKE 1 TABLET EVERY DAY 90 tablet 3    losartan (COZAAR) 50 MG tablet TAKE 1 TABLET EVERY DAY 90 tablet 3    omeprazole (PRILOSEC) 40 MG capsule TAKE 1 CAPSULE EVERY MORNING  BEFORE  EATING 90 capsule 3    oxybutynin (DITROPAN-XL) 10 MG 24 hr tablet TAKE 1 TABLET EVERY DAY 90 tablet 3    pravastatin (PRAVACHOL) 20 MG tablet TAKE 1 TABLET EVERY EVENING 90 tablet 3       Past Surgical History:   Procedure Laterality Date    ADENOIDECTOMY      CATARACT EXTRACTION Bilateral     BOTH EYES MULTIFOCAL    COLONOSCOPY  2015    EYE SURGERY      FRACTURE SURGERY      HYSTERECTOMY      LUMBAR EPIDURAL INJECTION  02/04/2016    L4-l5    PARTIAL HYSTERECTOMY      SKIN GRAFT      left foot     TONSILLECTOMY      WRIST FRACTURE SURGERY Right 2009    YAG CAP      LEFT EYE       Social History     Social History    Marital status:      Spouse name: N/A    Number of children: N/A    Years of education: N/A     Occupational History    Retired      Social History Main Topics    Smoking status: Never Smoker    Smokeless tobacco: Never Used    Alcohol use Yes      Comment: maybe 6 drinks per year    Drug use: No    Sexual activity: No     Other Topics Concern    Are You Pregnant Or Think You May Be? No    Breast-Feeding No     Social History Narrative    . Two grown daughters         Vital Signs Range (Last 24H):  Temp:  [36.9 °C (98.4 °F)]   Pulse:  [78-87]   Resp:  [16-24]   BP: (141-164)/(67-74)   SpO2:  [98 %-100 %]       CBC:   Recent Labs      05/19/18 1829   WBC  13.10*   RBC  4.11   HGB  11.5*   HCT  34.4*   PLT  296   MCV  84   MCH  28.0   MCHC  33.4       CMP:   Recent Labs      05/19/18 1829 05/19/18 1942   NA  133*   --    K  3.4*   --    CL  100   --     CO2  22*   --    BUN  24*   --    CREATININE  0.9   --    GLU  121*   --    MG   --   1.8   PHOS   --   2.7   CALCIUM  9.5   --    ALBUMIN  3.8   --    PROT  6.7   --    ALKPHOS  55   --    ALT  18   --    AST  24   --    BILITOT  0.3   --        INR  Recent Labs      05/19/18   1829  05/19/18   1942   INR  0.9  1.0           Diagnostic Studies:      EKG:  Normal sinus rhythm  Minimal voltage criteria for LVH, may be normal variant  Nonspecific ST abnormality  Prolonged QT  Abnormal ECG  When compared with ECG of 22-OCT-2008 09:05,  Premature ventricular complexes are no longer Present  T wave inversion now evident in Inferior leads  Nonspecific T wave abnormality now evident in Lateral leads    2D Echo:  CONCLUSIONS     1 - Normal left ventricular systolic function (EF 55-60%).     2 - Normal left ventricular diastolic function.     3 - Normal right ventricular systolic function .     4 - The estimated PA systolic pressure is 23 mmHg.     5 - Moderate aortic stenosis, ERENDIRA = 1.26 cm2, peak velocity = 3.64 m/s, mean gradient = 32 mmHg.     6 - Mild to moderate aortic regurgitation.     7 - Mild mitral regurgitation.     8 - Trivial to mild tricuspid regurgitation.     9 - No wall motion abnormalities.     This document has been electronically    SIGNED BY: Namita Noyola MD On: 08/23/2017 16:40        Anesthesia Evaluation    I have reviewed the Patient Summary Reports.     I have reviewed the Medications.     Review of Systems  Anesthesia Hx:  No problems with previous Anesthesia  History of prior surgery of interest to airway management or planning: Previous anesthesia: General Denies Family Hx of Anesthesia complications.   Denies Personal Hx of Anesthesia complications.   Social:  Non-Smoker    Cardiovascular:   Hypertension, well controlled    Pulmonary:  Pulmonary Normal    Hepatic/GI:   GERD    Endocrine:  Endocrine Normal        Physical Exam  General:  Well nourished    Airway/Jaw/Neck:  Airway  Findings: Mouth Opening: Small, but > 3cm Tongue: Normal  General Airway Assessment: Adult  Mallampati: III  Improves to II with phonation.  TM Distance: Normal, at least 6 cm  Jaw/Neck Findings:     Neck ROM: Normal ROM      Dental:  Dental Findings: In tact   Chest/Lungs:  Chest/Lungs Findings: Clear to auscultation, Normal Respiratory Rate     Heart/Vascular:  Heart Findings: Rate: Normal  Rhythm: Regular Rhythm  Sounds: Normal             Anesthesia Plan  Type of Anesthesia, risks & benefits discussed:  Anesthesia Type:  general, regional  Patient's Preference:   Intra-op Monitoring Plan: standard ASA monitors  Intra-op Monitoring Plan Comments:   Post Op Pain Control Plan: peripheral nerve block and multimodal analgesia  Post Op Pain Control Plan Comments:   Induction:   IV  Beta Blocker:  Patient is not currently on a Beta-Blocker (No further documentation required).       Informed Consent: Patient understands risks and agrees with Anesthesia plan.  Questions answered. Anesthesia consent signed with patient.  ASA Score: 3     Day of Surgery Review of History & Physical:    H&P update referred to the surgeon.         Ready For Surgery From Anesthesia Perspective.

## 2018-05-20 NOTE — ASSESSMENT & PLAN NOTE
--Comfortable overnight   --To OR today for operative fixation of right intertrochanteric femur fracture   --Pt marked and consented, case booked  --Pre-operative labs and imaging obtained in ED (UA, Type and Cross, PT-INR, CBC, BMP, PreAlbumin, CXR, EKG)   --Bed rest, mayers, NPO     Risks and complications were discussed including but not limited to the risks of anesthetic complications, infection, wound healing complications, non-union, mal-union, hardware failure, pain, stiffness, DVT, pulmonary embolism, perioperative medical risks (cardiac, pulmonary, renal, neurologic), and death among others were discussed. No guarantees were made and the patient and family elect to proceed. They fully understand the reported 30% mortality risk within the first year of surgery.

## 2018-05-20 NOTE — SUBJECTIVE & OBJECTIVE
Past Medical History:   Diagnosis Date    After-cataract of both eyes - Left Eye 10/11/2012    Anxiety     Aortic valve stenosis, moderate     AR (allergic rhinitis)     Cataract     Cholelithiasis     GERD (gastroesophageal reflux disease)     HTN (hypertension)     Hyperlipidemia     OP (osteoporosis)     Osteoarthritis     Strabismus     UI (urinary incontinence)        Past Surgical History:   Procedure Laterality Date    ADENOIDECTOMY      CATARACT EXTRACTION Bilateral     BOTH EYES MULTIFOCAL    COLONOSCOPY  2015    EYE SURGERY      FRACTURE SURGERY      HYSTERECTOMY      LUMBAR EPIDURAL INJECTION  02/04/2016    L4-l5    PARTIAL HYSTERECTOMY      SKIN GRAFT      left foot     TONSILLECTOMY      WRIST FRACTURE SURGERY Right 2009    YAG CAP      LEFT EYE       Review of patient's allergies indicates:   Allergen Reactions    Ace inhibitors      Other reaction(s): cough    Codeine      Other reaction(s): Nausea       Current Facility-Administered Medications   Medication    cefTRIAXone injection 1 g    [START ON 5/20/2018] cetirizine tablet 10 mg    [START ON 5/20/2018] escitalopram oxalate tablet 5 mg    [START ON 5/20/2018] losartan tablet 50 mg    [START ON 5/20/2018] oxybutynin 24 hr tablet 10 mg    [START ON 5/20/2018] pantoprazole EC tablet 40 mg    pravastatin tablet 20 mg    [START ON 5/20/2018] vitamin D 1000 units tablet 2,000 Units     Current Outpatient Prescriptions   Medication Sig    alendronate (FOSAMAX) 70 MG tablet Take 1 tablet (70 mg total) by mouth every 7 days.    cetirizine (ZYRTEC) 10 MG tablet Take 10 mg by mouth once daily.    cholecalciferol, vitamin D3, 2,000 unit Tab Take by mouth. 1 Tablet Oral Every day    cranberry 400 mg Cap Take by mouth once daily.     escitalopram oxalate (LEXAPRO) 5 MG Tab Take 1 tablet (5 mg total) by mouth once daily.    hydroCHLOROthiazide (HYDRODIURIL) 25 MG tablet TAKE 1 TABLET EVERY DAY    losartan (COZAAR) 50 MG  tablet TAKE 1 TABLET EVERY DAY    omeprazole (PRILOSEC) 40 MG capsule TAKE 1 CAPSULE EVERY MORNING  BEFORE  EATING    oxybutynin (DITROPAN-XL) 10 MG 24 hr tablet TAKE 1 TABLET EVERY DAY    pravastatin (PRAVACHOL) 20 MG tablet TAKE 1 TABLET EVERY EVENING     Family History     Problem Relation (Age of Onset)    Breast cancer Maternal Grandmother    Cancer Father    Cataracts Mother    Colon cancer Brother    Dementia Brother, Brother    Hypertension Brother, Mother, Brother, Maternal Grandmother    Macular degeneration Mother    Melanoma Maternal Grandfather    No Known Problems Daughter, Daughter    Osteoporosis Mother    Stroke Maternal Grandmother        Social History Main Topics    Smoking status: Never Smoker    Smokeless tobacco: Never Used    Alcohol use Yes      Comment: maybe 6 drinks per year    Drug use: No    Sexual activity: No     ROS     Constitutional: Negative for activity change, chills, diaphoresis, fatigue and fever.   HENT: Negative.    Eyes: Negative for photophobia and visual disturbance.   Respiratory: Negative for cough, chest tightness and shortness of breath.    Cardiovascular: Negative for chest pain and palpitations.   Gastrointestinal: Negative for abdominal pain, constipation, diarrhea, nausea and vomiting.   Genitourinary: Negative.    Musculoskeletal: Positive for arthralgias (right hip, rigth index finger) and gait problem. Negative for back pain, joint swelling, myalgias, neck pain and neck stiffness.   Skin: Negative for color change, pallor, rash and wound.   Neurological: Negative for dizziness, tremors, weakness, light-headedness, numbness and headaches.   Hematological: Negative.    Psychiatric/Behavioral: Negative.       Objective:     Vital Signs (Most Recent):  Temp: 98.4 °F (36.9 °C) (05/19/18 1917)  Pulse: 87 (05/19/18 2002)  Resp: 20 (05/19/18 2002)  BP: (!) 164/74 (05/19/18 2002)  SpO2: 99 % (05/19/18 2002) Vital Signs (24h Range):  Temp:  [98.4 °F (36.9 °C)]  "98.4 °F (36.9 °C)  Pulse:  [78-87] 87  Resp:  [16-20] 20  SpO2:  [99 %-100 %] 99 %  BP: (146-164)/(68-74) 164/74     Weight: 63.5 kg (140 lb)  Height: 5' 4" (162.6 cm)  Body mass index is 24.03 kg/m².    No intake or output data in the 24 hours ending 05/19/18 2029    Ortho/SPM Exam     Gen:  No acute distress  CV:  Peripherally well-perfused.  Pulses 2+ bilaterally.  Lungs:  Normal respiratory effort.  Abdomen:  Soft, non-tender, non-distended  Head/Neck:  Normocephalic.  Atraumatic. No TTP, AROM and PROM intact without pain  Neuro:  CN intact without deficit, SILT throughout B/L Upper & Lower Extremities    MSK:  RUE:  - ecchymoses to DIP over right index finger with ttp  - AROM and PROM of shoulder elbow and wrist intact  - AIN/PIN/Radial/Median/Ulnar Nerves assessed in isolation without deficit  - SILT throughout  - Radial & Ulnar arteries palpated 2+  - Capillary Refill <3s    RLE:  - shortened and externally rotated with ttp at right hip  - AROM and PROM not assessed due to fracture  - TA/EHL/Gastroc/FHL assessed in isolation without deficit  - SILT throughout  - DP and PT palpated  2+  - Capillary Refill <3s        Significant Labs:   CBC:   Recent Labs  Lab 05/19/18  1829   WBC 13.10*   HGB 11.5*   HCT 34.4*        All pertinent labs within the past 24 hours have been reviewed.    Significant Imaging: I have reviewed and interpreted all pertinent imaging results/findings.     Right displaced and comminuted intertrochanteric femur fracture   "

## 2018-05-20 NOTE — SUBJECTIVE & OBJECTIVE
"Principal Problem:Displaced intertrochanteric fracture of right femur, initial encounter for closed fracture    Principal Orthopedic Problem: Same    Interval History: Patient seen and examined at bedside.  No acute events overnight.  Pain controlled.  Ready for OR today.      Review of patient's allergies indicates:   Allergen Reactions    Ace inhibitors      Other reaction(s): cough    Codeine      Other reaction(s): Nausea       Current Facility-Administered Medications   Medication    0.9%  NaCl infusion    acetaminophen (10 mg/mL) injection 1,000 mg    acetaminophen tablet 1,000 mg    Followed by    [START ON 5/21/2018] acetaminophen tablet 1,000 mg    bisacodyl suppository 10 mg    cefTRIAXone injection 1 g    cetirizine tablet 10 mg    escitalopram oxalate tablet 5 mg    losartan tablet 50 mg    morphine injection 4 mg    ondansetron disintegrating tablet 4 mg    ondansetron injection 4 mg    oxybutynin 24 hr tablet 10 mg    oxyCODONE immediate release tablet 10 mg    oxyCODONE immediate release tablet 5 mg    pantoprazole EC tablet 40 mg    pravastatin tablet 20 mg    pregabalin capsule 75 mg    ramelteon tablet 8 mg    sodium chloride 0.9% injection 3 mL    vitamin D 1000 units tablet 2,000 Units     Objective:     Vital Signs (Most Recent):  Temp: 96 °F (35.6 °C) (05/20/18 0400)  Pulse: 70 (05/20/18 0400)  Resp: 18 (05/20/18 0400)  BP: (!) 149/71 (05/20/18 0400)  SpO2: 98 % (05/20/18 0400) Vital Signs (24h Range):  Temp:  [96 °F (35.6 °C)-99.2 °F (37.3 °C)] 96 °F (35.6 °C)  Pulse:  [70-87] 70  Resp:  [16-24] 18  SpO2:  [98 %-100 %] 98 %  BP: (138-179)/(64-77) 149/71     Weight: 63.5 kg (140 lb)  Height: 5' 4" (162.6 cm)  Body mass index is 24.03 kg/m².      Intake/Output Summary (Last 24 hours) at 05/20/18 0715  Last data filed at 05/20/18 0600   Gross per 24 hour   Intake                0 ml   Output              800 ml   Net             -800 ml       Ortho/SPM Exam   CV:  " Peripherally well-perfused.  Pulses 2+ bilaterally.  Lungs:  Normal respiratory effort.  Abdomen:  Soft, non-tender, non-distended  Head/Neck:  Normocephalic.  Atraumatic. No TTP, AROM and PROM intact without pain  Neuro:  CN intact without deficit, SILT throughout B/L Upper & Lower Extremities     MSK:  RUE:  - ecchymoses to DIP over right index finger with ttp  - AROM and PROM of shoulder elbow and wrist intact  - AIN/PIN/Radial/Median/Ulnar Nerves assessed in isolation without deficit  - SILT throughout  - Radial & Ulnar arteries palpated 2+  - Capillary Refill <3s     RLE:  - shortened and externally rotated with ttp at right hip  - AROM and PROM not assessed due to fracture  - TA/EHL/Gastroc/FHL assessed in isolation without deficit  - SILT throughout  - DP and PT palpated  2+  - Capillary Refill <3s         Significant Labs:   CBC:   Recent Labs  Lab 05/19/18  1829   WBC 13.10*   HGB 11.5*   HCT 34.4*        All pertinent labs within the past 24 hours have been reviewed.    Significant Imaging: I have reviewed and interpreted all pertinent imaging results/findings.

## 2018-05-20 NOTE — PROGRESS NOTES
Escort still not here to get patient so I took patient to room with surgery jayy De La Torre and notified patient's nurse upon return to patient's room no complaints from patient no distress noted.

## 2018-05-20 NOTE — ASSESSMENT & PLAN NOTE
--Admit to medicine hip fracture service for pre-operative clearance and medical evaluation  --To OR tomorrow for operative fixation of right intertrochanteric femur fracture   --Pt marked and consented, case booked  --Pre-operative labs and imaging obtained in ED (UA, Type and Cross, PT-INR, CBC, BMP, PreAlbumin, CXR, EKG)   --Bed rest, mayers, NPO at midnight     Risks and complications were discussed including but not limited to the risks of anesthetic complications, infection, wound healing complications, non-union, mal-union, hardware failure, pain, stiffness, DVT, pulmonary embolism, perioperative medical risks (cardiac, pulmonary, renal, neurologic), and death among others were discussed. No guarantees were made and the patient and family elect to proceed. They fully understand the reported 30% mortality risk within the first year of surgery.

## 2018-05-20 NOTE — ANESTHESIA PROCEDURE NOTES
Suprainguinal Fascia Iliaca Catheter    Patient location during procedure: pre-op   Block not for primary anesthetic.  Reason for block: at surgeon's request and post-op pain management   Post-op Pain Location: right hip pain  Start time: 5/21/2018 10:53 AM  Timeout: 5/21/2018 10:49 AM   End time: 5/21/2018 11:01 AM  Staffing  Anesthesiologist: ULISES MONTEJO  Performed: anesthesiologist   Preanesthetic Checklist  Completed: patient identified, site marked, surgical consent, pre-op evaluation, timeout performed, IV checked, risks and benefits discussed and monitors and equipment checked  Peripheral Block  Patient position: supine  Prep: ChloraPrep  Patient monitoring: cardiac monitor, heart rate, continuous pulse ox, continuous capnometry and frequent blood pressure checks  Block type: fascia iliaca (Supra Inguinal )  Laterality: right  Injection technique: continuous  Needle  Needle type: Tuohy   Needle gauge: 17 G  Needle length: 2 in  Needle localization: ultrasound guidance and anatomical landmarks   -ultrasound image captured on disc.  Assessment  Injection assessment: local visualized surrounding nerve, negative parasthesia and negative aspiration  Paresthesia pain: none  Heart rate change: no  Slow fractionated injection: yes  Medications:  Bolus administered: 40 mL of 0.25 ropivacaine  Epinephrine added: 3.75 mcg/mL (1/300,000)  Additional Notes  VSS. See [facility] nurse for vitals. Patient tolerated the block well.

## 2018-05-20 NOTE — PROGRESS NOTES
Patient admitted to recovery see [facility] for complete assessment pacu bcg's maintained safety measures verified patient instructed on pain scale. Also called for patient's xray and alea patient's blood also called patient's daughter and updated on patient location. Also carrying out orders. Yuliana cardoza called dr. Sood to clarify perineural order and she does want it at 10cc/hr will follow orders and monitor.

## 2018-05-20 NOTE — HPI
Mine Wilkins is a 83 y.o. female with PMH aortic stenosis who presents with right hip pain after fall.  She was buying groceries at the grocery store when she tripped and fell.  She noticed immediate pain and was unable to stand or bear weight.  She is brought to the ER via EMS for evaluation.  She denies any numbness or paresthesias to her right leg.  She had no antecedent right hip pain. She also notes this pain to the right index finger with some bruising.     Denies head trauma, LOC, or pain anywhere else.

## 2018-05-20 NOTE — PROGRESS NOTES
[facility]  Orthopedics  Progress Note    Patient Name: Mine Wilkins  MRN: [id number]  Admission Date: 5/19/2018  Hospital Length of Stay: 1 days  Attending Provider: Oscar zIaguirre MD  Primary Care Provider: MARVA Oswald MD  Follow-up For: Procedure(s) (LRB):  IM NAILING OF FEMUR- RIGHT- SYNTHES- LARGE C ARM DOOR SIDE- HANA TABLE  (Right)    Post-Operative Day:    Subjective:     Principal Problem:Displaced intertrochanteric fracture of right femur, initial encounter for closed fracture    Principal Orthopedic Problem: Same    Interval History: Patient seen and examined at bedside.  No acute events overnight.  Pain controlled.  Ready for OR today.      Review of patient's allergies indicates:   Allergen Reactions    Ace inhibitors      Other reaction(s): cough    Codeine      Other reaction(s): Nausea       Current Facility-Administered Medications   Medication    0.9%  NaCl infusion    acetaminophen (10 mg/mL) injection 1,000 mg    acetaminophen tablet 1,000 mg    Followed by    [START ON 5/21/2018] acetaminophen tablet 1,000 mg    bisacodyl suppository 10 mg    cefTRIAXone injection 1 g    cetirizine tablet 10 mg    escitalopram oxalate tablet 5 mg    losartan tablet 50 mg    morphine injection 4 mg    ondansetron disintegrating tablet 4 mg    ondansetron injection 4 mg    oxybutynin 24 hr tablet 10 mg    oxyCODONE immediate release tablet 10 mg    oxyCODONE immediate release tablet 5 mg    pantoprazole EC tablet 40 mg    pravastatin tablet 20 mg    pregabalin capsule 75 mg    ramelteon tablet 8 mg    sodium chloride 0.9% injection 3 mL    vitamin D 1000 units tablet 2,000 Units     Objective:     Vital Signs (Most Recent):  Temp: 96 °F (35.6 °C) (05/20/18 0400)  Pulse: 70 (05/20/18 0400)  Resp: 18 (05/20/18 0400)  BP: (!) 149/71 (05/20/18 0400)  SpO2: 98 % (05/20/18 0400) Vital Signs (24h Range):  Temp:  [96 °F (35.6 °C)-99.2 °F (37.3 °C)] 96 °F (35.6  "°C)  Pulse:  [70-87] 70  Resp:  [16-24] 18  SpO2:  [98 %-100 %] 98 %  BP: (138-179)/(64-77) 149/71     Weight: 63.5 kg (140 lb)  Height: 5' 4" (162.6 cm)  Body mass index is 24.03 kg/m².      Intake/Output Summary (Last 24 hours) at 05/20/18 0715  Last data filed at 05/20/18 0600   Gross per 24 hour   Intake                0 ml   Output              800 ml   Net             -800 ml       Ortho/SPM Exam   CV:  Peripherally well-perfused.  Pulses 2+ bilaterally.  Lungs:  Normal respiratory effort.  Abdomen:  Soft, non-tender, non-distended  Head/Neck:  Normocephalic.  Atraumatic. No TTP, AROM and PROM intact without pain  Neuro:  CN intact without deficit, SILT throughout B/L Upper & Lower Extremities     MSK:  RUE:  - ecchymoses to DIP over right index finger with ttp  - AROM and PROM of shoulder elbow and wrist intact  - AIN/PIN/Radial/Median/Ulnar Nerves assessed in isolation without deficit  - SILT throughout  - Radial & Ulnar arteries palpated 2+  - Capillary Refill <3s     RLE:  - shortened and externally rotated with ttp at right hip  - AROM and PROM not assessed due to fracture  - TA/EHL/Gastroc/FHL assessed in isolation without deficit  - SILT throughout  - DP and PT palpated  2+  - Capillary Refill <3s         Significant Labs:   CBC:   Recent Labs  Lab 05/19/18  1829   WBC 13.10*   HGB 11.5*   HCT 34.4*        All pertinent labs within the past 24 hours have been reviewed.    Significant Imaging: I have reviewed and interpreted all pertinent imaging results/findings.    Assessment/Plan:     * Displaced intertrochanteric fracture of right femur, initial encounter for closed fracture    --Comfortable overnight   --Received ceftriaxone for leukocytosis with dirty UA no growth on cultures to date  --To OR today for operative fixation of right intertrochanteric femur fracture   --Pt marked and consented, case booked  --Pre-operative labs and imaging obtained in ED (UA, Type and Cross, PT-INR, CBC, BMP, " PreAlbumin, CXR, EKG)   --Bed rest, mayers, NPO     Risks and complications were discussed including but not limited to the risks of anesthetic complications, infection, wound healing complications, non-union, mal-union, hardware failure, pain, stiffness, DVT, pulmonary embolism, perioperative medical risks (cardiac, pulmonary, renal, neurologic), and death among others were discussed. No guarantees were made and the patient and family elect to proceed. They fully understand the reported 30% mortality risk within the first year of surgery.                 Quique Bernal MD  Orthopedics  [facility]

## 2018-05-20 NOTE — H&P
Hospital Medicine  History and Physical      Patient Name: Mine Wilkins  MRN:  [id number]  Hospital Medicine Team: [facility] MED  Yisel Hernandez MD  Date of Admission:  5/19/2018     Principal Problem:  Displaced intertrochanteric fracture of right femur, initial encounter for closed fracture   Primary care Physician: MARVA Oswald MD      History of Present Illness:    Ms. Mine Wilkins is a 83 y.o. female with moderate aortic stenosis and osteoporosis on PO Vitamin D who presents to the ED via EMS after having a fall at [employer].  She was standing in the market line when she lost her balance and fell on her left hip.  She doesn't think she hit her head, because it doesn't hurt.  After the event, she was unable to stand or bear weight on the right leg.  She denies using any blood thinners.  She denies any chest pain or SOB.  She claims to be compliant with all of her medications.  XR done in the ED showed a displaced intertrochanteric closed fracture of right femur.  She was evaluated by Ortho who plan to take her to the OR allison for operative fixation.    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  Needs assistance in finding a short term placement or sitter for the  while the patient gets treatment for her fracture.    Review of Systems   Constitutional: Negative for chills, fatigue, fever.   HENT: Negative for sore throat, trouble swallowing.    Eyes: Negative for photophobia, visual disturbance.   Respiratory: Negative for cough, shortness of breath.    Cardiovascular: Negative for chest pain, palpitations, leg swelling.   Gastrointestinal: Negative for abdominal pain, constipation, diarrhea, nausea, vomiting.   Endocrine: Negative for cold intolerance, heat intolerance.   Genitourinary: Negative for dysuria, frequency.   Musculoskeletal: + hip pain  Skin: Negative for rash, wound, erythema    Neurological: Negative for dizziness, syncope, weakness, light-headedness.   Psychiatric/Behavioral: Negative for confusion, hallucinations, anxiety  All other systems reviewed and are negative.      Past Medical History: Patient has a past medical history of After-cataract of both eyes - Left Eye (10/11/2012); Anxiety; Aortic valve stenosis, moderate; AR (allergic rhinitis); Cataract; Cholelithiasis; GERD (gastroesophageal reflux disease); HTN (hypertension); Hyperlipidemia; OP (osteoporosis); Osteoarthritis; Strabismus; and UI (urinary incontinence).    Past Surgical History: Patient has a past surgical history that includes YAG CAP; Tonsillectomy; Partial hysterectomy; Cataract extraction (Bilateral); Skin graft; Eye surgery; Hysterectomy; Colonoscopy (2015); Lumbar epidural injection (02/04/2016); Wrist fracture surgery (Right, 2009); Adenoidectomy; and Fracture surgery.    Social History: Patient reports that she has never smoked. She has never used smokeless tobacco. She reports that she drinks alcohol. She reports that she does not use drugs.    Family History: family history includes Breast cancer in her maternal grandmother; Cancer in her father; Cataracts in her mother; Colon cancer in her brother; Dementia in her brother and brother; Hypertension in her brother, brother, maternal grandmother, and mother; Macular degeneration in her mother; Melanoma in her maternal grandfather; No Known Problems in her daughter and daughter; Osteoporosis in her mother; Stroke in her maternal grandmother.    Medications: Scheduled Meds:   [START ON 5/20/2018] cetirizine  10 mg Oral Daily    [START ON 5/20/2018] escitalopram oxalate  5 mg Oral Daily    [START ON 5/20/2018] losartan  50 mg Oral Daily    [START ON 5/20/2018] oxybutynin  10 mg Oral Daily    [START ON 5/20/2018] pantoprazole  40 mg Oral Daily    pravastatin  20 mg Oral QHS    [START ON 5/20/2018] vitamin D  2,000 Units Oral Daily     Continuous  Infusions:  PRN Meds:.    Allergies: Patient is allergic to ace inhibitors and codeine.    Physical Exam:    Vital Signs (Most Recent):  Temp: 98.4 °F (36.9 °C) (05/19/18 1917)  Pulse: 87 (05/19/18 2002)  Resp: 20 (05/19/18 2002)  BP: (!) 164/74 (05/19/18 2002)  SpO2: 99 % (05/19/18 2002) Vital Signs Range (Last 24H):  Temp:  [98.4 °F (36.9 °C)]   Pulse:  [78-87]   Resp:  [16-20]   BP: (146-164)/(68-74)   SpO2:  [99 %-100 %]    Body mass index is 24.03 kg/m².     Constitutional: Appears well-developed and well-nourished.   Head: Normocephalic and atraumatic.   Mouth/Throat: Oropharynx is clear and moist.   Eyes: EOM are normal. Pupils are equal, round, and reactive to light. No scleral icterus.   Neck: Normal range of motion. Neck supple.   Cardiovascular: Normal rate and regular rhythm. Systolic ejection murmur  Pulmonary/Chest: Effort normal and breath sounds normal. No respiratory distress. No wheezes, rales, or rhonchi  Abdominal: Soft. Bowel sounds are normal.  No distension or tenderness  Musculoskeletal: RLE shortened and externally rotated.  Sensation intact  Neurological: Alert and oriented to person, place, and time.   Skin: Skin is warm and dry.   Psychiatric: Normal mood and affect. Behavior is normal.   Vitals reviewed.      Recent Labs  Lab 05/19/18 1829   WBC 13.10*   HGB 11.5*   HCT 34.4*            Recent Labs  Lab 05/19/18 1829   *   K 3.4*      CO2 22*   BUN 24*   CREATININE 0.9   *   CALCIUM 9.5       Recent Labs  Lab 05/19/18 1829 05/19/18 1942   ALKPHOS 55  --    ALT 18  --    AST 24  --    ALBUMIN 3.8  --    PROT 6.7  --    BILITOT 0.3  --    INR 0.9 1.0      No results for input(s): POCTGLUCOSE in the last 168 hours.      Surgical Risk Assessment:    Active Cardiac Issues:  Active decompensated heart failure? No   Unstable angina?  No   Significant uncontrolled arrhythmias? No   Severe valvular heart disease-Aortic or Mitral Stenosis? No   Recent MI or coronary  revascularization < 30 days? No     Cardiac Risk Factors:  High risk surgery? No   History of CAD/ischemic heart disease? No   History of cerebrovascular disease? No   History of compensated heart failure? No   Type 2 diabetes requiring insulin? No   Serum Creatinine > 2? No   Total cardiac risk factors 0     Functional mets >4    < 4 METs -unable to walk > 2 blocks on level ground without stopping due to symptoms  - eating, dressing, toileting, walking indoors, light housework. POOR   > 4 METs -climbing > 1 flight of stairs without stopping  -walking up hill > 1-2 blocks  -scrubbing floors  -moving furniture  - golf, bowling, dancing or tennis  -running short distance MODERATE to EXCELLENT       Assessment and Plan:    Ms. Mine Wilkins is a 83 y.o. female who presented to [facility] on 5/19/2018 with a right femur fracture.    Active Hospital Problems    Diagnosis  POA    *Displaced intertrochanteric fracture of right femur, initial encounter for closed fracture [S72.141A]  Yes    GERD (gastroesophageal reflux disease) [K21.9]  Yes    Aortic valve stenosis, moderate [I35.0]  Yes     Chronic    Hyperlipidemia [E78.5]  Yes     Chronic    HTN (hypertension) [I10]  Yes     Chronic    OP (osteoporosis) [M81.0]  Yes      Resolved Hospital Problems    Diagnosis Date Resolved POA   No resolved problems to display.       Displaced intertrochanteric closed fracture of right femur  · Orthopedics consulted.  Plan to go to the OR on 5/20.  Ok to proceed to Surgery.  · DVT prophylaxis for 4 weeks post-op  · PT/OT to start on POD 1  · Villavicencio to be removed on POD 2  · Pain control    Perioperative Risk Assessment:  Patient is at low risk for perioperative cardiopulmonary complications.  Recommend proceed to surgery as planned.     Moderate Aortic Stenosis  · Chronic and stable  · Last echo with moderate AS  · No reports of chest pain or SOB    Osteoporosis  · Chronic issue  · Continue Vitamin D    Essential  HTN  · Chronic and stable  · Hold HCTZ for electrolyte disturbances  · Continue Losartan 50mg PO daily    HLD  · Chronic and stable  · Continue Pravastatin 20mg PO daily    GERD  · Chronic and stable  · Continue Pantoprazole 40mg PO daily    Acute Cystitis without Hematuria  · UA dirty  · F/u urine gram stain and culture - likely 2/2 E. Coli, Klebsiella, Enterobactericeae, Enterococcus  · Start Ceftriaxone 1g IV q24h (start date 5/19)    Anxiety  · Chronic and stable  · Continue Escitalopram 5mg PO daily     Diet:  NPO for surgery  GI PPx:    DVT PPx:   No pharmacologic prophylaxis for now since upcoming surgery; SCDs; start Lovenox/heparin/home anticoagulation on POD 1 as above  Goals of Care:  Full Code    Disposition:  Placement after surgery when ok with Ortho  Discharge Needs:  SW to assist in finding short term placement for  who is unable to care for himself - and patient is the primary caretaker.    Yisel Hernandez MD  [facility]  Cell:  [phone]  Spectra:  [phone]  Pager:  [phone]

## 2018-05-20 NOTE — OP NOTE
[facility]   OPERATIVE REPORT   ORTHOPAEDIC SURGERY   PROVIDER: DR. VALERIE MCGHEE    PATIENT INFORMATION   Mine Wilkins 83 y.o. female 1935   MRN: [id number]   LOCATION: [facility]     DATE OF PROCEDURE: 5/20/2018     PREOPERATIVE DIAGNOSES:   Right femur intertrochanteric fracture with reverse oblique extension, unstable    POSTOPERATIVE DIAGNOSES:   Right femur intertrochanteric fracture with reverse oblique extension, unstable    PROCEDURES PERFORMED:   Right femur cephalomedullary nailing    Surgeon(s) and Role:     * Jason Waggoner MD - Resident - Assisting     * Michael Joseph Casale, MD - Resident - Assisting     * Loi Blanco MD - Resident - Observing     * Quique Bernal MD - Resident - Assisting     * SIDNEY Mcghee MD - Primary    ANESTHESIA: General    ESTIMATED BLOOD LOSS: 200 mL    IMPLANTS:   Implant Name Type Inv. Item Serial No.  Lot No. LRB No. Used   NAIL IM ANN-MARIE 130 DEG [id number] R - [id number]  NAIL IM ANN-MARIE 130 DEG [id number] R  [employer] [id number] Right 1   WIRE GUIDE 3.2MM 400MM - [id number]  WIRE GUIDE 3.2MM 400MM  [id number]  Right 2   LAURE TFNA MAMADOU BLADE 105MM STRL - [id number]  LAURE TFNA MAMADOU BLADE 105MM STRL  [employer] [id number] Right 1   SCREW STRDRV REC T25 5X44 TTNM - [id number]  SCREW STRDRV REC T25 5X44 TTNM  SYNTHES [id number] Right 1   5.0mm Ti Locking Screw w/T25 Stardrive 52mm       SYNTHES [id number] Right 1      SPECIMENS:   Specimen (12h ago through future)    None        COMPLICATIONS: None.     INTRAOPERATIVE COUNTS: Correct.     PROPHYLACTIC IV ANTIBIOTICS: Given per [facility] Protocol.    INDICATIONS:  The patient is a 83 y.o. who fell previously.  Clinical evaluation was consistent with hip pathology and radiographs revealed an unstable intertrochanteric hip fracture.  She was admitted to the hospital where preoperative medical clearance was obtained and it was recommended at this time to proceed with intramedullary nailing.   Risks and complications were discussed including, but not limited to risks of anesthetic complications, infections, wound healing complications, nonunion, malunion, hardware failure, pain, stiffness, DVT, pulmonary embolism and death among others and she elected to proceed.    DESCRIPTION OF PROCEDURE:  The patient was taken Operating Room where anesthesia was administered by the Anesthesia Department.  She was then placed in the fracture table and all superficial neurovascular structures were well padded.  The right lower extremity was then sterilely prepped and draped in the normal fashion after fluroscopy was used to confirm adequate reduction. Appropriate femoral rotation was noted clinically and with assessment of the medial calcar cortical profile.     A 4 cm longitudinal incision was made just proximal to the greater trochanter.  The subcutaneous tissue and gluteal fascia were incised.  A threaded guide pin was then placed in the tip of the greater trochanter and extended and introduced into the proximal femur under AP and lateral fluoroscopy.  The Synthes one-step reamer was then used to ream proximally. A ball-tipped guide jose was then placed through the entry site down to the physeal scar of the femur.  This was measured at 380 mm and then was reamed with a 12 mm reamer.  A Synthes long TFNA size 380 mm x 11 mm was then passed over the guidewire, which was subsequently removed.  The proximal interlocking device was then placed and a 3-cm longitudinal incision was made over the lateral aspect of the proximal thigh and the fascia pamella was incised.  A threaded guide pin was then placed through the lateral cortex of the femur through the femoral neck and into the femoral head and measured between a 95 and 100 mm and over reamed.  A 100 mm helical blade was then placed under fluoroscopy and satisfactory position was noted on AP and lateral fluoroscopy and the setscrew was then set. The proximal interlocking  device was then removed.    Distal interlocking was done with a single lateral to medial interlocking screw through a 1 cm incision under fluoroscopic guidance. An additional distal interlock was placed in the mid dynamization oblong hole.  All wounds were then thoroughly irrigated.  The proximal IT band layer was closed with 0 Vicryl. Subcutaneous tissues were closed with interrupted inverted of #3-0 Vicryl.  Skin was approximated using 3-0 Nylon.  Sterile dressings were applied.  Anesthesia was reversed and she was returned to the Postanesthesia Care Unit in stable condition.    As the attending surgeon I was physically present for the key/critical portions of the procedure.

## 2018-05-20 NOTE — TRANSFER OF CARE
"Anesthesia Transfer of Care Note    Patient: Mine Wilkins    Procedure(s) Performed: Procedure(s) (LRB):  IM NAILING OF FEMUR- RIGHT  (Right)    Patient location: PACU    Transport from OR: Transported from OR on 6-10 L/min O2 by face mask with adequate spontaneous ventilation    Post pain: adequate analgesia    Post assessment: no apparent anesthetic complications    Post vital signs: stable    Level of consciousness: awake    Nausea/Vomiting: no nausea/vomiting    Complications: none    Transfer of care protocol was followed      Last vitals:   Visit Vitals  /76 (BP Location: Right arm, Patient Position: Lying)   Pulse 75   Temp 36.4 °C (97.5 °F) (Oral)   Resp 16   Ht 5' 4" (1.626 m)   Wt 63.5 kg (140 lb)   SpO2 95%   Breastfeeding? No   BMI 24.03 kg/m²     "

## 2018-05-20 NOTE — CONSULTS
[facility]  Orthopedics  Consult Note    Patient Name: Mine Wilkins  MRN: [id number]  Admission Date: 5/19/2018  Hospital Length of Stay: 0 days  Attending Provider: Oscar Izaguirre MD  Primary Care Provider: MARVA Oswald MD    Patient information was obtained from patient and ER records.     Inpatient consult to Orthopedic Surgery  Consult performed by: RAMA TRACY  Consult ordered by: HERIBRETO SNELL        Subjective:     Principal Problem:Displaced intertrochanteric fracture of right femur, initial encounter for closed fracture    Chief Complaint:   Chief Complaint   Patient presents with    Fall     fall from standing, denies LOC and head trauma. Right hip pain        HPI: Mine Wilkins is a 83 y.o. female with PMH aortic stenosis who presents with right hip pain after fall.  She was buying groceries at the grocery store when she tripped and fell.  She noticed immediate pain and was unable to stand or bear weight.  She is brought to the ER via EMS for evaluation.  She denies any numbness or paresthesias to her right leg.  She had no antecedent right hip pain. She also notes this pain to the right index finger with some bruising.     Denies head trauma, LOC, or pain anywhere else.        Past Medical History:   Diagnosis Date    After-cataract of both eyes - Left Eye 10/11/2012    Anxiety     Aortic valve stenosis, moderate     AR (allergic rhinitis)     Cataract     Cholelithiasis     GERD (gastroesophageal reflux disease)     HTN (hypertension)     Hyperlipidemia     OP (osteoporosis)     Osteoarthritis     Strabismus     UI (urinary incontinence)        Past Surgical History:   Procedure Laterality Date    ADENOIDECTOMY      CATARACT EXTRACTION Bilateral     BOTH EYES MULTIFOCAL    COLONOSCOPY  2015    EYE SURGERY      FRACTURE SURGERY      HYSTERECTOMY      LUMBAR EPIDURAL INJECTION  02/04/2016    L4-l5    PARTIAL HYSTERECTOMY      SKIN  GRAFT      left foot     TONSILLECTOMY      WRIST FRACTURE SURGERY Right 2009    YAG CAP      LEFT EYE       Review of patient's allergies indicates:   Allergen Reactions    Ace inhibitors      Other reaction(s): cough    Codeine      Other reaction(s): Nausea       Current Facility-Administered Medications   Medication    cefTRIAXone injection 1 g    [START ON 5/20/2018] cetirizine tablet 10 mg    [START ON 5/20/2018] escitalopram oxalate tablet 5 mg    [START ON 5/20/2018] losartan tablet 50 mg    [START ON 5/20/2018] oxybutynin 24 hr tablet 10 mg    [START ON 5/20/2018] pantoprazole EC tablet 40 mg    pravastatin tablet 20 mg    [START ON 5/20/2018] vitamin D 1000 units tablet 2,000 Units     Current Outpatient Prescriptions   Medication Sig    alendronate (FOSAMAX) 70 MG tablet Take 1 tablet (70 mg total) by mouth every 7 days.    cetirizine (ZYRTEC) 10 MG tablet Take 10 mg by mouth once daily.    cholecalciferol, vitamin D3, 2,000 unit Tab Take by mouth. 1 Tablet Oral Every day    cranberry 400 mg Cap Take by mouth once daily.     escitalopram oxalate (LEXAPRO) 5 MG Tab Take 1 tablet (5 mg total) by mouth once daily.    hydroCHLOROthiazide (HYDRODIURIL) 25 MG tablet TAKE 1 TABLET EVERY DAY    losartan (COZAAR) 50 MG tablet TAKE 1 TABLET EVERY DAY    omeprazole (PRILOSEC) 40 MG capsule TAKE 1 CAPSULE EVERY MORNING  BEFORE  EATING    oxybutynin (DITROPAN-XL) 10 MG 24 hr tablet TAKE 1 TABLET EVERY DAY    pravastatin (PRAVACHOL) 20 MG tablet TAKE 1 TABLET EVERY EVENING     Family History     Problem Relation (Age of Onset)    Breast cancer Maternal Grandmother    Cancer Father    Cataracts Mother    Colon cancer Brother    Dementia Brother, Brother    Hypertension Brother, Mother, Brother, Maternal Grandmother    Macular degeneration Mother    Melanoma Maternal Grandfather    No Known Problems Daughter, Daughter    Osteoporosis Mother    Stroke Maternal Grandmother        Social History Main  "Topics    Smoking status: Never Smoker    Smokeless tobacco: Never Used    Alcohol use Yes      Comment: maybe 6 drinks per year    Drug use: No    Sexual activity: No     ROS     Constitutional: Negative for activity change, chills, diaphoresis, fatigue and fever.   HENT: Negative.    Eyes: Negative for photophobia and visual disturbance.   Respiratory: Negative for cough, chest tightness and shortness of breath.    Cardiovascular: Negative for chest pain and palpitations.   Gastrointestinal: Negative for abdominal pain, constipation, diarrhea, nausea and vomiting.   Genitourinary: Negative.    Musculoskeletal: Positive for arthralgias (right hip, rigth index finger) and gait problem. Negative for back pain, joint swelling, myalgias, neck pain and neck stiffness.   Skin: Negative for color change, pallor, rash and wound.   Neurological: Negative for dizziness, tremors, weakness, light-headedness, numbness and headaches.   Hematological: Negative.    Psychiatric/Behavioral: Negative.       Objective:     Vital Signs (Most Recent):  Temp: 98.4 °F (36.9 °C) (05/19/18 1917)  Pulse: 87 (05/19/18 2002)  Resp: 20 (05/19/18 2002)  BP: (!) 164/74 (05/19/18 2002)  SpO2: 99 % (05/19/18 2002) Vital Signs (24h Range):  Temp:  [98.4 °F (36.9 °C)] 98.4 °F (36.9 °C)  Pulse:  [78-87] 87  Resp:  [16-20] 20  SpO2:  [99 %-100 %] 99 %  BP: (146-164)/(68-74) 164/74     Weight: 63.5 kg (140 lb)  Height: 5' 4" (162.6 cm)  Body mass index is 24.03 kg/m².    No intake or output data in the 24 hours ending 05/19/18 2029    Ortho/SPM Exam     Gen:  No acute distress  CV:  Peripherally well-perfused.  Pulses 2+ bilaterally.  Lungs:  Normal respiratory effort.  Abdomen:  Soft, non-tender, non-distended  Head/Neck:  Normocephalic.  Atraumatic. No TTP, AROM and PROM intact without pain  Neuro:  CN intact without deficit, SILT throughout B/L Upper & Lower Extremities    MSK:  RUE:  - ecchymoses to DIP over right index finger with ttp  - AROM " and PROM of shoulder elbow and wrist intact  - AIN/PIN/Radial/Median/Ulnar Nerves assessed in isolation without deficit  - SILT throughout  - Radial & Ulnar arteries palpated 2+  - Capillary Refill <3s    RLE:  - shortened and externally rotated with ttp at right hip  - AROM and PROM not assessed due to fracture  - TA/EHL/Gastroc/FHL assessed in isolation without deficit  - SILT throughout  - DP and PT palpated  2+  - Capillary Refill <3s        Significant Labs:   CBC:   Recent Labs  Lab 05/19/18  1829   WBC 13.10*   HGB 11.5*   HCT 34.4*        All pertinent labs within the past 24 hours have been reviewed.    Significant Imaging: I have reviewed and interpreted all pertinent imaging results/findings.     Right displaced and comminuted intertrochanteric femur fracture     Assessment/Plan:     * Displaced intertrochanteric fracture of right femur, initial encounter for closed fracture    --Admit to medicine hip fracture service for pre-operative clearance and medical evaluation  --To OR tomorrow for operative fixation of right intertrochanteric femur fracture   --Pt marked and consented, case booked  --Pre-operative labs and imaging obtained in ED (UA, Type and Cross, PT-INR, CBC, BMP, PreAlbumin, CXR, EKG)   --Bed rest, mayers, NPO at midnight     Risks and complications were discussed including but not limited to the risks of anesthetic complications, infection, wound healing complications, non-union, mal-union, hardware failure, pain, stiffness, DVT, pulmonary embolism, perioperative medical risks (cardiac, pulmonary, renal, neurologic), and death among others were discussed. No guarantees were made and the patient and family elect to proceed. They fully understand the reported 30% mortality risk within the first year of surgery.               Thank you for your consult. I will follow-up with patient. Please contact us if you have any additional questions.    Quique Bernal MD  Orthopedics  [facility]

## 2018-05-20 NOTE — NURSING TRANSFER
Nursing Transfer Note      5/20/2018     Transfer To: 541b    Transfer via bed    Transfer with escort    Transported by escort    Medicines sent: iv fluids and perineural    Chart send with patient: Yes    Notified: daughter and floor nurse    Patient reassessed at: see epic (date, time)

## 2018-05-20 NOTE — NURSING
Spoke with YOEL Castillo to inform him that patient doesn't have any VTE orders.  He stated that patient is pre-op and that she doesn't need anything that could cause more bleeding during surgery.  Nurse recommended FCDs/SCDs but was told that the oncoming could make that decision.

## 2018-05-21 ENCOUNTER — TELEPHONE (OUTPATIENT)
Dept: SPORTS MEDICINE | Facility: CLINIC | Age: 83
End: 2018-05-21

## 2018-05-21 LAB
ANION GAP SERPL CALC-SCNC: 7 MMOL/L
BASOPHILS # BLD AUTO: 0.02 K/UL
BASOPHILS NFR BLD: 0.2 %
BUN SERPL-MCNC: 16 MG/DL
CALCIUM SERPL-MCNC: 8.2 MG/DL
CHLORIDE SERPL-SCNC: 100 MMOL/L
CO2 SERPL-SCNC: 25 MMOL/L
CREAT SERPL-MCNC: 0.8 MG/DL
DIFFERENTIAL METHOD: ABNORMAL
EOSINOPHIL # BLD AUTO: 0 K/UL
EOSINOPHIL NFR BLD: 0.1 %
ERYTHROCYTE [DISTWIDTH] IN BLOOD BY AUTOMATED COUNT: 14.6 %
EST. GFR  (AFRICAN AMERICAN): >60 ML/MIN/1.73 M^2
EST. GFR  (NON AFRICAN AMERICAN): >60 ML/MIN/1.73 M^2
GLUCOSE SERPL-MCNC: 136 MG/DL
HCT VFR BLD AUTO: 25.1 %
HGB BLD-MCNC: 8.3 G/DL
IMM GRANULOCYTES # BLD AUTO: 0.04 K/UL
IMM GRANULOCYTES NFR BLD AUTO: 0.4 %
LYMPHOCYTES # BLD AUTO: 0.9 K/UL
LYMPHOCYTES NFR BLD: 8.7 %
MAGNESIUM SERPL-MCNC: 1.8 MG/DL
MCH RBC QN AUTO: 27.6 PG
MCHC RBC AUTO-ENTMCNC: 33.1 G/DL
MCV RBC AUTO: 83 FL
MONOCYTES # BLD AUTO: 0.7 K/UL
MONOCYTES NFR BLD: 6.9 %
NEUTROPHILS # BLD AUTO: 8.2 K/UL
NEUTROPHILS NFR BLD: 83.7 %
NRBC BLD-RTO: 0 /100 WBC
PHOSPHATE SERPL-MCNC: 2 MG/DL
PLATELET # BLD AUTO: 209 K/UL
PMV BLD AUTO: 11.2 FL
POTASSIUM SERPL-SCNC: 3.7 MMOL/L
RBC # BLD AUTO: 3.01 M/UL
SODIUM SERPL-SCNC: 132 MMOL/L
WBC # BLD AUTO: 9.85 K/UL

## 2018-05-21 PROCEDURE — 97535 SELF CARE MNGMENT TRAINING: CPT

## 2018-05-21 PROCEDURE — 25000003 PHARM REV CODE 250: Performed by: STUDENT IN AN ORGANIZED HEALTH CARE EDUCATION/TRAINING PROGRAM

## 2018-05-21 PROCEDURE — 63600175 PHARM REV CODE 636 W HCPCS: Performed by: STUDENT IN AN ORGANIZED HEALTH CARE EDUCATION/TRAINING PROGRAM

## 2018-05-21 PROCEDURE — 63600175 PHARM REV CODE 636 W HCPCS: Performed by: HOSPITALIST

## 2018-05-21 PROCEDURE — 36415 COLL VENOUS BLD VENIPUNCTURE: CPT

## 2018-05-21 PROCEDURE — 85025 COMPLETE CBC W/AUTO DIFF WBC: CPT

## 2018-05-21 PROCEDURE — 12000002 HC ACUTE/MED SURGE SEMI-PRIVATE ROOM

## 2018-05-21 PROCEDURE — 25000003 PHARM REV CODE 250: Performed by: HOSPITALIST

## 2018-05-21 PROCEDURE — 99231 SBSQ HOSP IP/OBS SF/LOW 25: CPT | Mod: ,,, | Performed by: ANESTHESIOLOGY

## 2018-05-21 PROCEDURE — 86580 TB INTRADERMAL TEST: CPT | Performed by: HOSPITALIST

## 2018-05-21 PROCEDURE — 84100 ASSAY OF PHOSPHORUS: CPT

## 2018-05-21 PROCEDURE — 99233 SBSQ HOSP IP/OBS HIGH 50: CPT | Mod: ,,, | Performed by: HOSPITALIST

## 2018-05-21 PROCEDURE — 97161 PT EVAL LOW COMPLEX 20 MIN: CPT

## 2018-05-21 PROCEDURE — 97116 GAIT TRAINING THERAPY: CPT

## 2018-05-21 PROCEDURE — 63600175 PHARM REV CODE 636 W HCPCS: Performed by: ANESTHESIOLOGY

## 2018-05-21 PROCEDURE — 83735 ASSAY OF MAGNESIUM: CPT

## 2018-05-21 PROCEDURE — 25000003 PHARM REV CODE 250: Performed by: PHYSICIAN ASSISTANT

## 2018-05-21 PROCEDURE — 80048 BASIC METABOLIC PNL TOTAL CA: CPT

## 2018-05-21 PROCEDURE — 97165 OT EVAL LOW COMPLEX 30 MIN: CPT

## 2018-05-21 RX ORDER — OXYCODONE HYDROCHLORIDE 5 MG/1
15 TABLET ORAL ONCE
Status: COMPLETED | OUTPATIENT
Start: 2018-05-21 | End: 2018-05-21

## 2018-05-21 RX ADMIN — ACETAMINOPHEN 1000 MG: 500 TABLET ORAL at 10:05

## 2018-05-21 RX ADMIN — OXYCODONE HYDROCHLORIDE 5 MG: 5 TABLET ORAL at 12:05

## 2018-05-21 RX ADMIN — CEFTRIAXONE SODIUM 1 G: 1 INJECTION, POWDER, FOR SOLUTION INTRAMUSCULAR; INTRAVENOUS at 10:05

## 2018-05-21 RX ADMIN — ENOXAPARIN SODIUM 40 MG: 100 INJECTION SUBCUTANEOUS at 06:05

## 2018-05-21 RX ADMIN — ROPIVACAINE HYDROCHLORIDE 10 ML/HR: 2 INJECTION, SOLUTION EPIDURAL; INFILTRATION at 06:05

## 2018-05-21 RX ADMIN — OXYCODONE HYDROCHLORIDE 10 MG: 5 TABLET ORAL at 05:05

## 2018-05-21 RX ADMIN — PANTOPRAZOLE SODIUM 40 MG: 40 TABLET, DELAYED RELEASE ORAL at 09:05

## 2018-05-21 RX ADMIN — OXYCODONE HYDROCHLORIDE 5 MG: 5 TABLET ORAL at 09:05

## 2018-05-21 RX ADMIN — ACETAMINOPHEN 1000 MG: 500 TABLET ORAL at 05:05

## 2018-05-21 RX ADMIN — CETIRIZINE HYDROCHLORIDE 10 MG: 10 TABLET, FILM COATED ORAL at 09:05

## 2018-05-21 RX ADMIN — Medication 5 UNITS: at 11:05

## 2018-05-21 RX ADMIN — PREGABALIN 75 MG: 75 CAPSULE ORAL at 09:05

## 2018-05-21 RX ADMIN — PRAVASTATIN SODIUM 20 MG: 20 TABLET ORAL at 09:05

## 2018-05-21 RX ADMIN — ESCITALOPRAM 5 MG: 5 TABLET, FILM COATED ORAL at 09:05

## 2018-05-21 RX ADMIN — OXYBUTYNIN CHLORIDE 10 MG: 10 TABLET, FILM COATED, EXTENDED RELEASE ORAL at 09:05

## 2018-05-21 RX ADMIN — STANDARDIZED SENNA CONCENTRATE AND DOCUSATE SODIUM 1 TABLET: 8.6; 5 TABLET, FILM COATED ORAL at 09:05

## 2018-05-21 RX ADMIN — ACETAMINOPHEN 1000 MG: 500 TABLET ORAL at 02:05

## 2018-05-21 RX ADMIN — VITAMIN D, TAB 1000IU (100/BT) 2000 UNITS: 25 TAB at 09:05

## 2018-05-21 RX ADMIN — OXYCODONE HYDROCHLORIDE 15 MG: 5 TABLET ORAL at 03:05

## 2018-05-21 RX ADMIN — ROPIVACAINE HYDROCHLORIDE 10 ML/HR: 2 INJECTION, SOLUTION EPIDURAL; INFILTRATION at 07:05

## 2018-05-21 NOTE — PROGRESS NOTES
Progress Note   Hospital Medicine         Patient Name: Mine Wilkins  MRN:  [id number]  [facility] Medicine Team: [facility] Oscar Izaguirre MD  Date of Admission:  5/19/2018     Length of Stay:  LOS: 1 day   Expected Discharge Date: 5/21/2018  Principal Problem:  Displaced intertrochanteric fracture of right femur, initial encounter for closed fracture       Subjective:     Interval History/Overnight Events:  No acute issues over night; plan for OR today    Review of Systems   Constitutional: Negative for chills, fatigue, fever.   HENT: Negative for sore throat, trouble swallowing.    Eyes: Negative for photophobia, visual disturbance.   Respiratory: Negative for cough, shortness of breath.    Cardiovascular: Negative for chest pain, palpitations, leg swelling.   Gastrointestinal: Negative for abdominal pain, constipation, diarrhea, nausea, vomiting.   Endocrine: Negative for cold intolerance, heat intolerance.   Genitourinary: Negative for dysuria, frequency.   Musculoskeletal: Negative for arthralgias, myalgias.   Skin: Negative for rash, wound, erythema   Neurological: Negative for dizziness, syncope, weakness, light-headedness.   Psychiatric/Behavioral: Negative for confusion, hallucinations, anxiety  All other systems reviewed and are negative.    Objective:     Temp:  [96 °F (35.6 °C)-99.2 °F (37.3 °C)]   Pulse:  [70-89]   Resp:  [13-24]   BP: (109-179)/(55-77)   SpO2:  [92 %-100 %]       Physical Exam:  Constitutional: Appears well-developed and well-nourished.   Head: Normocephalic and atraumatic.   Mouth/Throat: Oropharynx is clear and moist.   Eyes: EOM are normal. Pupils are equal, round, and reactive to light. No scleral icterus.   Neck: Normal range of motion. Neck supple.   Cardiovascular: Normal rate and regular rhythm.  No murmur heard.  Pulmonary/Chest: Effort normal and breath sounds normal. No respiratory distress. No wheezes, rales, or rhonchi  Abdominal: Soft. Bowel sounds are normal.   No distension or tenderness  Musculoskeletal: Normal range of motion. No edema.   Neurological: Alert and oriented to person, place, and time.   Skin: Skin is warm and dry.   Psychiatric: Normal mood and affect. Behavior is normal.       Recent Labs  Lab 05/19/18 1829 05/20/18  1054   WBC 13.10* 19.41*   HGB 11.5* 10.3*   HCT 34.4* 31.4*    249       Recent Labs  Lab 05/19/18 1829 05/19/18 1942   *  --    K 3.4*  --      --    CO2 22*  --    BUN 24*  --    CREATININE 0.9  --    *  --    CALCIUM 9.5  --    MG  --  1.8   PHOS  --  2.7       Recent Labs  Lab 05/19/18 1829 05/19/18 1942   ALKPHOS 55  --    ALT 18  --    AST 24  --    ALBUMIN 3.8  --    PROT 6.7  --    BILITOT 0.3  --    INR 0.9 1.0     No results for input(s): POCTGLUCOSE in the last 168 hours.     acetaminophen  1,000 mg Oral Q8H    cefTRIAXone (ROCEPHIN) IVPB  1 g Intravenous Q24H    cetirizine  10 mg Oral Daily    enoxaparin  40 mg Subcutaneous Daily    escitalopram oxalate  5 mg Oral Daily    losartan  50 mg Oral Daily    oxybutynin  10 mg Oral Daily    pantoprazole  40 mg Oral Daily    polyethylene glycol  17 g Oral Daily    pravastatin  20 mg Oral QHS    pregabalin  75 mg Oral QHS    senna-docusate 8.6-50 mg  1 tablet Oral BID    vitamin D  2,000 Units Oral Daily       Assessment and Plan     Ms. Mine Wilkins is a 83 y.o. female who presented to [facility] on 5/19/2018 with     Hospital Course:    Ms. Mine Wilkins was admitted to Hospital Medicine for management of     Active Hospital Problems    Diagnosis  POA    *Displaced intertrochanteric fracture of right femur, initial encounter for closed fracture [S72.141A]  Yes    GERD (gastroesophageal reflux disease) [K21.9]  Yes    Anxiety [F41.9]  Yes    Aortic valve stenosis, moderate [I35.0]  Yes     Chronic    Hyperlipidemia [E78.5]  Yes     Chronic    HTN (hypertension) [I10]  Yes     Chronic    OP (osteoporosis) [M81.0]  Yes       Resolved Hospital Problems    Diagnosis Date Resolved POA   No resolved problems to display.        Displaced intertrochanteric closed fracture of right femur  · Orthopedics consulted.  Plan to go to the OR on 5/20.  Ok to proceed to Surgery.  · DVT prophylaxis for 4 weeks post-op  · PT/OT to start on POD 1  · Villavicencio to be removed on POD 2  · Pain control        Moderate Aortic Stenosis  · Chronic and stable  · Last echo with moderate AS  · No reports of chest pain or SOB     Osteoporosis  · Chronic issue  · Continue Vitamin D     Essential HTN  · Chronic and stable  · Hold HCTZ for electrolyte disturbances  · Continue Losartan 50mg PO daily     HLD  · Chronic and stable  · Continue Pravastatin 20mg PO daily     GERD  · Chronic and stable  · Continue Pantoprazole 40mg PO daily     Acute Cystitis without Hematuria  · UA dirty  · F/u urine gram stain and culture - likely 2/2 E. Coli, Klebsiella, Enterobactericeae, Enterococcus  · Start Ceftriaxone 1g IV q24h (start date 5/19)     Anxiety  · Chronic and stable  · Continue Escitalopram 5mg PO daily     Diet:  regular diet  GI PPx:    DVT PPx:   No pharmacologic prophylaxis for now since upcoming surgery; SCDs; start Lovenox/heparin/home anticoagulation on POD 1 as above  Goals of Care:  Full Code     Disposition:  Placement after surgery when ok with Ortho  Discharge Needs:  SW to assist in finding short term placement for  who is unable to care for himself - and patient is the primary caretaker.       Oscar Izaguirre MD  Medical Director [facility] Medicine  Spectra:  [phone]  Pager: [phone]

## 2018-05-21 NOTE — PLAN OF CARE
POD 1 s/p IMN femur. PT/OT ordered to eval and treat. PT/OT recs pending. Patient currently lives with her spouse. Patient's daughter is present at the bedside. Patient has good family support. CM completed discharge assessment and planning with patient. Patient and family verbalized understanding. All questions and concerns addressed. SW and CM will continue to follow for any additional needs. Plan A to discharge to SNF as soon as medically stable. Plan B to discharge home with home health.     Patient requested OSNF.    PCP: MARVA Oswald MD    Pharmacy:   [facility] - [place], [place] - [address] [place] [address]  [address] [place] [address]  [place] [address]  Phone: [phone] Fax: [phone]    [facility] - [place], [place] - [address]  [address] [address]  Phone: [phone] Fax: [phone]    Payor: [employer] MANAGED MEDICARE / Plan: [employer] MEDICARE HMO / Product Type: Capitation /      05/21/18 1005   Discharge Assessment   Assessment Type Discharge Planning Assessment   Confirmed/corrected address and phone number on facesheet? Yes   Assessment information obtained from? Patient;Caregiver;Medical Record   Expected Length of Stay (days) 4   Communicated expected length of stay with patient/caregiver yes   Prior to hospitilization cognitive status: Alert/Oriented   Prior to hospitalization functional status: Independent   Current cognitive status: Alert/Oriented   Current Functional Status: Assistive Equipment;Needs Assistance   Lives With spouse   Able to Return to Prior Arrangements yes   Is patient able to care for self after discharge? Yes   Who are your caregiver(s) and their phone number(s)? daughter- Justine Lucas [phone]; relative- Raffi Pulliam [phone]   Patient's perception of discharge disposition skilled nursing facility   Readmission Within The Last 30 Days no previous admission in last 30 days   Patient currently being followed by outpatient  case management? No   Patient currently receives any other outside agency services? No   Equipment Currently Used at Home none   Do you have any problems affording any of your prescribed medications? No   Is the patient taking medications as prescribed? yes   Does the patient have transportation home? Yes   Transportation Available family or friend will provide   Does the patient receive services at the [facility]? No   Discharge Plan A Skilled Nursing Facility   Discharge Plan B Home Health;Home with family   Patient/Family In Agreement With Plan yes

## 2018-05-21 NOTE — PLAN OF CARE
Problem: Occupational Therapy Goal  Goal: Occupational Therapy Goal  Goals to be met by: 7 days      Patient will increase functional independence with ADLs by performing:    UE Dressing with Supervision.  LE Dressing with Minimal Assistance and Assistive Devices as needed.  Grooming while standing at sink with Supervision.  Toileting from toilet with Stand-by Assistance for hygiene and clothing management.   Supine to sit with Stand-by Assistance.  Stand pivot transfers with Supervision.  Toilet transfer to toilet with Supervision.    Outcome: Ongoing (interventions implemented as appropriate)  OT eval complete. Pt tolerated session well. Pt's functional outcomes established today based on her presenting functional level    Comments: Cont OT POC

## 2018-05-21 NOTE — PLAN OF CARE
Problem: Patient Care Overview  Goal: Plan of Care Review  Outcome: Ongoing (interventions implemented as appropriate)  Pt aaox4, vs stable, no falls or injuries. Fall precautions in place w/ personal items near by. Pain level being monitor and control by medication. No signs of infection or distress. Worked with PT/OT. Call light in reach; will continue to monitor pt.

## 2018-05-21 NOTE — PROGRESS NOTES
[facility]  Orthopedics  Progress Note    Patient Name: Mine Wilkins  MRN: [id number]  Admission Date: 5/19/2018  Hospital Length of Stay: 2 days  Attending Provider: Oscar Izaguirre MD  Primary Care Provider: MARVA Oswald MD  Follow-up For: Procedure(s) (LRB):  IM NAILING OF FEMUR- RIGHT  (Right)    Post-Operative Day: 1 Day Post-Op  Subjective:     Principal Problem:Displaced intertrochanteric fracture of right femur, initial encounter for closed fracture    Principal Orthopedic Problem: same    Interval History: The patient was seen and examined this morning at the bedside. Patient reports no acute issues overnight.  Pain well controlled.  Has yet to mobilize since surgery yesterday.     Review of patient's allergies indicates:   Allergen Reactions    Ace inhibitors      Other reaction(s): cough    Codeine      Other reaction(s): Nausea       Current Facility-Administered Medications   Medication    0.9%  NaCl infusion    acetaminophen tablet 1,000 mg    bisacodyl suppository 10 mg    ceFAZolin injection 2 g    cefTRIAXone injection 1 g    cetirizine tablet 10 mg    enoxaparin injection 40 mg    escitalopram oxalate tablet 5 mg    fentaNYL injection 25 mcg    fentaNYL injection 25 mcg    lidocaine (PF) 10 mg/ml (1%) injection 10 mg    losartan tablet 50 mg    methocarbamol tablet 1,000 mg    midazolam (VERSED) 1 mg/mL injection 1 mg    morphine injection 4 mg    mupirocin 2 % ointment 1 g    ondansetron disintegrating tablet 4 mg    ondansetron disintegrating tablet 4 mg    ondansetron disintegrating tablet 8 mg    oxybutynin 24 hr tablet 10 mg    oxyCODONE immediate release tablet 10 mg    oxyCODONE immediate release tablet 5 mg    pantoprazole EC tablet 40 mg    polyethylene glycol packet 17 g    pravastatin tablet 20 mg    pregabalin capsule 75 mg    ramelteon tablet 8 mg    ropivacaine (PF) 2 mg/ml (0.2%) infusion    senna-docusate 8.6-50 mg per  "tablet 1 tablet    sodium chloride 0.9% flush 3 mL    sodium chloride 0.9% injection 3 mL    tranexamic acid (CYKLOKAPRON) 3,000 mg in sodium chloride 0.9% 100 mL    vitamin D 1000 units tablet 2,000 Units     Objective:     Vital Signs (Most Recent):  Temp: 99.4 °F (37.4 °C) (05/21/18 0500)  Pulse: 84 (05/21/18 0500)  Resp: 17 (05/21/18 0500)  BP: (!) 94/53 (05/21/18 0500)  SpO2: 96 % (05/21/18 0500) Vital Signs (24h Range):  Temp:  [97.2 °F (36.2 °C)-99.4 °F (37.4 °C)] 99.4 °F (37.4 °C)  Pulse:  [72-89] 84  Resp:  [13-18] 17  SpO2:  [92 %-100 %] 96 %  BP: ()/(53-76) 94/53     Weight: 63.5 kg (140 lb)  Height: 5' 4" (162.6 cm)  Body mass index is 24.03 kg/m².      Intake/Output Summary (Last 24 hours) at 05/21/18 0624  Last data filed at 05/21/18 0430   Gross per 24 hour   Intake          3344.83 ml   Output             2075 ml   Net          1269.83 ml       Ortho/SPM Exam    Awake/alert/oriented x3, No acute distress, Afebrile, Vital signs stable  Good inspiratory effort with unlaboured breathing  Dressings c/d/i  NVI in operative limb      Significant Labs:   BMP:   Recent Labs  Lab 05/21/18  0333   *   *   K 3.7      CO2 25   BUN 16   CREATININE 0.8   CALCIUM 8.2*   MG 1.8     CBC:   Recent Labs  Lab 05/19/18  1829 05/20/18  1054 05/21/18  0333   WBC 13.10* 19.41* 9.85   HGB 11.5* 10.3* 8.3*   HCT 34.4* 31.4* 25.1*    249 209     All pertinent labs within the past 24 hours have been reviewed.      Assessment/Plan:     * Displaced intertrochanteric fracture of right femur, initial encounter for closed fracture    Mine Wilkins is a 83 y.o. female s/p r CMN 5/20/2018    - PT/OT: WBAT  - post op abx  - DVT ppx: lovenox  - dc riana Lanier MD  Orthopedics  [facility]  "

## 2018-05-21 NOTE — SUBJECTIVE & OBJECTIVE
Principal Problem:Displaced intertrochanteric fracture of right femur, initial encounter for closed fracture    Principal Orthopedic Problem: same    Interval History: The patient was seen and examined this morning at the bedside. Patient reports no acute issues overnight.  Pain well controlled.  Has yet to mobilize since surgery yesterday.     Review of patient's allergies indicates:   Allergen Reactions    Ace inhibitors      Other reaction(s): cough    Codeine      Other reaction(s): Nausea       Current Facility-Administered Medications   Medication    0.9%  NaCl infusion    acetaminophen tablet 1,000 mg    bisacodyl suppository 10 mg    ceFAZolin injection 2 g    cefTRIAXone injection 1 g    cetirizine tablet 10 mg    enoxaparin injection 40 mg    escitalopram oxalate tablet 5 mg    fentaNYL injection 25 mcg    fentaNYL injection 25 mcg    lidocaine (PF) 10 mg/ml (1%) injection 10 mg    losartan tablet 50 mg    methocarbamol tablet 1,000 mg    midazolam (VERSED) 1 mg/mL injection 1 mg    morphine injection 4 mg    mupirocin 2 % ointment 1 g    ondansetron disintegrating tablet 4 mg    ondansetron disintegrating tablet 4 mg    ondansetron disintegrating tablet 8 mg    oxybutynin 24 hr tablet 10 mg    oxyCODONE immediate release tablet 10 mg    oxyCODONE immediate release tablet 5 mg    pantoprazole EC tablet 40 mg    polyethylene glycol packet 17 g    pravastatin tablet 20 mg    pregabalin capsule 75 mg    ramelteon tablet 8 mg    ropivacaine (PF) 2 mg/ml (0.2%) infusion    senna-docusate 8.6-50 mg per tablet 1 tablet    sodium chloride 0.9% flush 3 mL    sodium chloride 0.9% injection 3 mL    tranexamic acid (CYKLOKAPRON) 3,000 mg in sodium chloride 0.9% 100 mL    vitamin D 1000 units tablet 2,000 Units     Objective:     Vital Signs (Most Recent):  Temp: 99.4 °F (37.4 °C) (05/21/18 0500)  Pulse: 84 (05/21/18 0500)  Resp: 17 (05/21/18 0500)  BP: (!) 94/53 (05/21/18  "0500)  SpO2: 96 % (05/21/18 0500) Vital Signs (24h Range):  Temp:  [97.2 °F (36.2 °C)-99.4 °F (37.4 °C)] 99.4 °F (37.4 °C)  Pulse:  [72-89] 84  Resp:  [13-18] 17  SpO2:  [92 %-100 %] 96 %  BP: ()/(53-76) 94/53     Weight: 63.5 kg (140 lb)  Height: 5' 4" (162.6 cm)  Body mass index is 24.03 kg/m².      Intake/Output Summary (Last 24 hours) at 05/21/18 0624  Last data filed at 05/21/18 0430   Gross per 24 hour   Intake          3344.83 ml   Output             2075 ml   Net          1269.83 ml       Ortho/SPM Exam    Awake/alert/oriented x3, No acute distress, Afebrile, Vital signs stable  Good inspiratory effort with unlaboured breathing  Dressings c/d/i  NVI in operative limb      Significant Labs:   BMP:   Recent Labs  Lab 05/21/18  0333   *   *   K 3.7      CO2 25   BUN 16   CREATININE 0.8   CALCIUM 8.2*   MG 1.8     CBC:   Recent Labs  Lab 05/19/18  1829 05/20/18  1054 05/21/18  0333   WBC 13.10* 19.41* 9.85   HGB 11.5* 10.3* 8.3*   HCT 34.4* 31.4* 25.1*    249 209     All pertinent labs within the past 24 hours have been reviewed.    "

## 2018-05-21 NOTE — ASSESSMENT & PLAN NOTE
Mine Shasha Wilkins is a 83 y.o. female s/p r CMN 5/20/2018    - PT/OT: WBAT  - post op abx  - DVT ppx: lovenox  - hien staples

## 2018-05-21 NOTE — TELEPHONE ENCOUNTER
LVM. Scheduled pt f/u appt and instructed pt to call us back if date does not work for her.     ----- Message from Bertha Leone sent at 5/21/2018  2:51 PM CDT -----  F/u with Sonia    ----- Message -----  From: Jason Waggoner MD  Sent: 5/20/2018  11:14 AM  To: [facility] Mcclellan Staff    Please schedule patient 2 week follow-up for R hip fx s/p CMN on 5/20/18 with Dr Scott or a sports PA. Thank you.

## 2018-05-21 NOTE — ANESTHESIA POST-OP PAIN MANAGEMENT
Acute Pain Service Progress Note    Mine Wilkins is a 83 y.o., female, [id number].    Surgery:  IM NAILING OF FEMUR- RIGHT  (Right)    Post Op Day #: 1    Catheter type: perineural  SIFI    Infusion type: Ropivacaine 0.2%  10 basal    Problem List:    Active Hospital Problems    Diagnosis  POA    *Displaced intertrochanteric fracture of right femur, initial encounter for closed fracture [S72.141A]  Yes    GERD (gastroesophageal reflux disease) [K21.9]  Yes    Anxiety [F41.9]  Yes    Aortic valve stenosis, moderate [I35.0]  Yes     Chronic    Hyperlipidemia [E78.5]  Yes     Chronic    HTN (hypertension) [I10]  Yes     Chronic    OP (osteoporosis) [M81.0]  Yes      Resolved Hospital Problems    Diagnosis Date Resolved POA   No resolved problems to display.       Subjective:     General appearance of alert, oriented, no complaints   Pain with rest: 1    Numbers   Pain with movement: NA- has not worked with PT yet   Side Effects    1. Pruritis No    2. Nausea No    3. Motor Blockade No, 1=Ability to bend knees and ankles    4. Sedation No, 1=awake and alert    Objective:     Catheter site clean, dry, intact        Vitals   Vitals:    05/21/18 0500   BP: (!) 94/53   Pulse: 84   Resp: 17   Temp: 37.4 °C (99.4 °F)        Labs    Admission on 05/19/2018   Component Date Value Ref Range Status    WBC 05/19/2018 13.10* 3.90 - 12.70 K/uL Final    RBC 05/19/2018 4.11  4.00 - 5.40 M/uL Final    Hemoglobin 05/19/2018 11.5* 12.0 - 16.0 g/dL Final    Hematocrit 05/19/2018 34.4* 37.0 - 48.5 % Final    MCV 05/19/2018 84  82 - 98 fL Final    MCH 05/19/2018 28.0  27.0 - 31.0 pg Final    MCHC 05/19/2018 33.4  32.0 - 36.0 g/dL Final    RDW 05/19/2018 14.2  11.5 - 14.5 % Final    Platelets 05/19/2018 296  150 - 350 K/uL Final    MPV 05/19/2018 10.6  9.2 - 12.9 fL Final    Immature Granulocytes 05/19/2018 0.8* 0.0 - 0.5 % Final    Gran # (ANC) 05/19/2018 9.7* 1.8 - 7.7 K/uL Final    Immature Grans (Abs)  05/19/2018 0.10* 0.00 - 0.04 K/uL Final    Lymph # 05/19/2018 2.4  1.0 - 4.8 K/uL Final    Mono # 05/19/2018 0.7  0.3 - 1.0 K/uL Final    Eos # 05/19/2018 0.2  0.0 - 0.5 K/uL Final    Baso # 05/19/2018 0.05  0.00 - 0.20 K/uL Final    nRBC 05/19/2018 0  0 /100 WBC Final    Gran% 05/19/2018 74.3* 38.0 - 73.0 % Final    Lymph% 05/19/2018 17.9* 18.0 - 48.0 % Final    Mono% 05/19/2018 5.3  4.0 - 15.0 % Final    Eosinophil% 05/19/2018 1.3  0.0 - 8.0 % Final    Basophil% 05/19/2018 0.4  0.0 - 1.9 % Final    Differential Method 05/19/2018 Automated   Final    Sodium 05/19/2018 133* 136 - 145 mmol/L Final    Potassium 05/19/2018 3.4* 3.5 - 5.1 mmol/L Final    Chloride 05/19/2018 100  95 - 110 mmol/L Final    CO2 05/19/2018 22* 23 - 29 mmol/L Final    Glucose 05/19/2018 121* 70 - 110 mg/dL Final    BUN, Bld 05/19/2018 24* 8 - 23 mg/dL Final    Creatinine 05/19/2018 0.9  0.5 - 1.4 mg/dL Final    Calcium 05/19/2018 9.5  8.7 - 10.5 mg/dL Final    Total Protein 05/19/2018 6.7  6.0 - 8.4 g/dL Final    Albumin 05/19/2018 3.8  3.5 - 5.2 g/dL Final    Total Bilirubin 05/19/2018 0.3  0.1 - 1.0 mg/dL Final    Alkaline Phosphatase 05/19/2018 55  55 - 135 U/L Final    AST 05/19/2018 24  10 - 40 U/L Final    ALT 05/19/2018 18  10 - 44 U/L Final    Anion Gap 05/19/2018 11  8 - 16 mmol/L Final    eGFR if African American 05/19/2018 >60.0  >60 mL/min/1.73 m^2 Final    eGFR if non  05/19/2018 59.7* >60 mL/min/1.73 m^2 Final    Prothrombin Time 05/19/2018 10.0  9.0 - 12.5 sec Final    INR 05/19/2018 0.9  0.8 - 1.2 Final    Prealbumin 05/19/2018 26  20 - 43 mg/dL Final    Transferrin 05/19/2018 208  200 - 375 mg/dL Final    Specimen UA 05/19/2018 Urine, Catheterized   Final    Color, UA 05/19/2018 Yellow  Yellow, Straw, [place] Final    Appearance, UA 05/19/2018 Clear  Clear Final    pH, UA 05/19/2018 6.0  5.0 - 8.0 Final    Specific Gravity, UA 05/19/2018 1.010  1.005 - 1.030 Final     Protein, UA 05/19/2018 Negative  Negative Final    Glucose, UA 05/19/2018 Negative  Negative Final    Ketones, UA 05/19/2018 Trace* Negative Final    Bilirubin (UA) 05/19/2018 Negative  Negative Final    Occult Blood UA 05/19/2018 Negative  Negative Final    Nitrite, UA 05/19/2018 Negative  Negative Final    Urobilinogen, UA 05/19/2018 Negative  <2.0 EU/dL Final    Leukocytes, UA 05/19/2018 2+* Negative Final    Group & Rh 05/19/2018 A POS   Final    Indirect Marjorie 05/19/2018 NEG   Final    Vit D, 25-Hydroxy 05/19/2018 56  30 - 96 ng/mL Final    Hemoglobin A1C 05/19/2018 5.3  4.0 - 5.6 % Final    Estimated Avg Glucose 05/19/2018 105  68 - 131 mg/dL Final    Magnesium 05/19/2018 1.8  1.6 - 2.6 mg/dL Final    Phosphorus 05/19/2018 2.7  2.7 - 4.5 mg/dL Final    Prothrombin Time 05/19/2018 10.4  9.0 - 12.5 sec Final    INR 05/19/2018 1.0  0.8 - 1.2 Final    RBC, UA 05/19/2018 2  0 - 4 /hpf Final    WBC, UA 05/19/2018 62* 0 - 5 /hpf Final    Bacteria, UA 05/19/2018 Few* None-Occ /hpf Final    Yeast, UA 05/19/2018 Few* None Final    Microscopic Comment 05/19/2018 SEE COMMENT   Final    Gram Stain Result 05/19/2018 Rare WBC's   Final    Gram Stain Result 05/19/2018 Rare Gram positive cocci   Final    Urine Culture, Routine 05/19/2018    Preliminary                    Value:ENTEROCOCCUS SPECIES  >100,000 cfu/ml  Identification and susceptibility pending      WBC 05/20/2018 19.41* 3.90 - 12.70 K/uL Final    RBC 05/20/2018 3.79* 4.00 - 5.40 M/uL Final    Hemoglobin 05/20/2018 10.3* 12.0 - 16.0 g/dL Final    Hematocrit 05/20/2018 31.4* 37.0 - 48.5 % Final    MCV 05/20/2018 83  82 - 98 fL Final    MCH 05/20/2018 27.2  27.0 - 31.0 pg Final    MCHC 05/20/2018 32.8  32.0 - 36.0 g/dL Final    RDW 05/20/2018 14.3  11.5 - 14.5 % Final    Platelets 05/20/2018 249  150 - 350 K/uL Final    MPV 05/20/2018 10.5  9.2 - 12.9 fL Final    Immature Granulocytes 05/20/2018 0.6* 0.0 - 0.5 % Final    Gran #  (ANC) 05/20/2018 16.7* 1.8 - 7.7 K/uL Final    Immature Grans (Abs) 05/20/2018 0.12* 0.00 - 0.04 K/uL Final    Lymph # 05/20/2018 1.4  1.0 - 4.8 K/uL Final    Mono # 05/20/2018 1.2* 0.3 - 1.0 K/uL Final    Eos # 05/20/2018 0.0  0.0 - 0.5 K/uL Final    Baso # 05/20/2018 0.05  0.00 - 0.20 K/uL Final    nRBC 05/20/2018 0  0 /100 WBC Final    Gran% 05/20/2018 85.9* 38.0 - 73.0 % Final    Lymph% 05/20/2018 7.1* 18.0 - 48.0 % Final    Mono% 05/20/2018 5.9  4.0 - 15.0 % Final    Eosinophil% 05/20/2018 0.2  0.0 - 8.0 % Final    Basophil% 05/20/2018 0.3  0.0 - 1.9 % Final    Differential Method 05/20/2018 Automated   Final    WBC 05/21/2018 9.85  3.90 - 12.70 K/uL Final    RBC 05/21/2018 3.01* 4.00 - 5.40 M/uL Final    Hemoglobin 05/21/2018 8.3* 12.0 - 16.0 g/dL Final    Hematocrit 05/21/2018 25.1* 37.0 - 48.5 % Final    MCV 05/21/2018 83  82 - 98 fL Final    MCH 05/21/2018 27.6  27.0 - 31.0 pg Final    MCHC 05/21/2018 33.1  32.0 - 36.0 g/dL Final    RDW 05/21/2018 14.6* 11.5 - 14.5 % Final    Platelets 05/21/2018 209  150 - 350 K/uL Final    MPV 05/21/2018 11.2  9.2 - 12.9 fL Final    Immature Granulocytes 05/21/2018 0.4  0.0 - 0.5 % Final    Gran # (ANC) 05/21/2018 8.2* 1.8 - 7.7 K/uL Final    Immature Grans (Abs) 05/21/2018 0.04  0.00 - 0.04 K/uL Final    Lymph # 05/21/2018 0.9* 1.0 - 4.8 K/uL Final    Mono # 05/21/2018 0.7  0.3 - 1.0 K/uL Final    Eos # 05/21/2018 0.0  0.0 - 0.5 K/uL Final    Baso # 05/21/2018 0.02  0.00 - 0.20 K/uL Final    nRBC 05/21/2018 0  0 /100 WBC Final    Gran% 05/21/2018 83.7* 38.0 - 73.0 % Final    Lymph% 05/21/2018 8.7* 18.0 - 48.0 % Final    Mono% 05/21/2018 6.9  4.0 - 15.0 % Final    Eosinophil% 05/21/2018 0.1  0.0 - 8.0 % Final    Basophil% 05/21/2018 0.2  0.0 - 1.9 % Final    Differential Method 05/21/2018 Automated   Final    Sodium 05/21/2018 132* 136 - 145 mmol/L Final    Potassium 05/21/2018 3.7  3.5 - 5.1 mmol/L Final    Chloride 05/21/2018 100   95 - 110 mmol/L Final    CO2 05/21/2018 25  23 - 29 mmol/L Final    Glucose 05/21/2018 136* 70 - 110 mg/dL Final    BUN, Bld 05/21/2018 16  8 - 23 mg/dL Final    Creatinine 05/21/2018 0.8  0.5 - 1.4 mg/dL Final    Calcium 05/21/2018 8.2* 8.7 - 10.5 mg/dL Final    Anion Gap 05/21/2018 7* 8 - 16 mmol/L Final    eGFR if African American 05/21/2018 >60.0  >60 mL/min/1.73 m^2 Final    eGFR if non African American 05/21/2018 >60.0  >60 mL/min/1.73 m^2 Final    Magnesium 05/21/2018 1.8  1.6 - 2.6 mg/dL Final    Phosphorus 05/21/2018 2.0* 2.7 - 4.5 mg/dL Final        Meds   Current Facility-Administered Medications   Medication Dose Route Frequency Provider Last Rate Last Dose    acetaminophen tablet 1,000 mg  1,000 mg Oral Q8H Jason Waggoner MD   1,000 mg at 05/21/18 0558    bisacodyl suppository 10 mg  10 mg Rectal Daily PRN Quique Bernal MD        ceFAZolin injection 2 g  2 g Intravenous On Call Procedure Quique Bernal MD        cefTRIAXone injection 1 g  1 g Intravenous Q24H Yisel Hernandez MD   1 g at 05/20/18 2317    cetirizine tablet 10 mg  10 mg Oral Daily Yisel Hernandez MD   10 mg at 05/20/18 1219    enoxaparin injection 40 mg  40 mg Subcutaneous Daily Jason Waggoner MD   40 mg at 05/20/18 1700    escitalopram oxalate tablet 5 mg  5 mg Oral Daily Yisel Hernandez MD        fentaNYL injection 25 mcg  25 mcg Intravenous Q5 Min PRN Quique Bernal MD        fentaNYL injection 25 mcg  25 mcg Intravenous Q5 Min PRN Sandra Sood MD        lidocaine (PF) 10 mg/ml (1%) injection 10 mg  1 mL Intradermal On Call Procedure Quique Bernal MD        losartan tablet 50 mg  50 mg Oral Daily Yisel Hernandez MD        methocarbamol tablet 1,000 mg  1,000 mg Oral Q6H PRN Quique Bernal MD        midazolam (VERSED) 1 mg/mL injection 1 mg  1 mg Intravenous Q5 Min PRN Quique Bernal MD        morphine injection 4 mg  4 mg Intravenous Q6H PRN Shahram Corea PA-C         mupirocin 2 % ointment 1 g  1 g Nasal On Call Procedure Quique Bernal MD        ondansetron disintegrating tablet 4 mg  4 mg Oral Q6H PRN Shahram Corea PA-C        ondansetron disintegrating tablet 4 mg  4 mg Oral Q12H PRN Teresa Gaffney MD        ondansetron disintegrating tablet 8 mg  8 mg Oral Q8H PRN Quique Bernal MD        oxybutynin 24 hr tablet 10 mg  10 mg Oral Daily Yisel Hernandez MD        oxyCODONE immediate release tablet 10 mg  10 mg Oral Q6H PRN Shahram Corea PA-C   10 mg at 05/21/18 0558    oxyCODONE immediate release tablet 5 mg  5 mg Oral Q6H PRN Shahram Corea PA-C   5 mg at 05/20/18 1720    pantoprazole EC tablet 40 mg  40 mg Oral Daily Yisel Hernandez MD   40 mg at 05/20/18 1218    polyethylene glycol packet 17 g  17 g Oral Daily Quique Bernal MD        pravastatin tablet 20 mg  20 mg Oral QHS Yisel Hernandez MD   20 mg at 05/20/18 2155    pregabalin capsule 75 mg  75 mg Oral QHS Quique Bernal MD   75 mg at 05/20/18 2155    ramelteon tablet 8 mg  8 mg Oral Nightly PRN Quique Bernal MD        ropivacaine (PF) 2 mg/ml (0.2%) infusion  10 mL/hr Perineural Continuous Sandra Sood MD 10 mL/hr at 05/20/18 2155 10 mL/hr at 05/20/18 2155    senna-docusate 8.6-50 mg per tablet 1 tablet  1 tablet Oral BID Quique Bernal MD   1 tablet at 05/20/18 2154    sodium chloride 0.9% flush 3 mL  3 mL Intravenous PRN Sandra Sood MD        sodium chloride 0.9% injection 3 mL  3 mL Intravenous Q8H PRN Quique Bernal MD        tranexamic acid (CYKLOKAPRON) 3,000 mg in sodium chloride 0.9% 100 mL  3,000 mg Irrigation On Call Procedure Quique Bernal MD        vitamin D 1000 units tablet 2,000 Units  2,000 Units Oral Daily Yisel Hernandez MD            Anticoagulant dose:lovenox 40    Assessment:     Pain control adequate    Plan:     Patient doing well, continue present treatment.   Pain very well controled; received oxy 10 this AM in  anticipation of working with PT. Occasionally has very brief spasm like sensation that passes quickly and has not required medication.   Continue PNC; Dispo pending working with PT    Evaluator Beverley Knox

## 2018-05-21 NOTE — PLAN OF CARE
Problem: Physical Therapy Goal  Goal: Physical Therapy Goal  Goals to be met by: 18     Patient will increase functional independence with mobility by performin. Supine to sit with Set-up [place]  2. Sit to supine with Set-up [place]  3. Sit to stand transfer with Supervision  4. Bed to chair transfer with Stand-by Assistance using Rolling Walker  5. Gait  x 100 feet with CGA using Rolling Walker.   6. Lower extremity exercise program x20-30 reps per handout, with assistance as needed.       Outcome: Ongoing (interventions implemented as appropriate)  PT eval complete. Pt tolerated session well today and demonstrates appropriate mobility s/p IM nailing of (R) femur. Pt with no LOB during ambulation and was able to walk to door and back using RW. Pt tolerated sitting exercises well with no c/o pain or discomfort. Pt will cont to benefit from skilled therapy services and is unsafe to return home at this time as she has no assistance. Pt appropriate for SNF placement upon d/c.

## 2018-05-21 NOTE — PLAN OF CARE
12:55 PM  SW received notification that pt will need SNF. Pt's preference is [facility]. Faxed referral. CM placed consult. Will f/u as needed.      Merced Baker LMSW   [facility]  Ext [phone]

## 2018-05-21 NOTE — PT/OT/SLP EVAL
Occupational Therapy   Evaluation    Name: Mine Wilkins  MRN: [id number]  Admitting Diagnosis:  Displaced intertrochanteric fracture of right femur, initial encounter for closed fracture 1 Day Post-Op    Recommendations:     Discharge Recommendations: nursing facility, skilled  Discharge Equipment Recommendations:  walker, rolling  Barriers to discharge:  Decreased caregiver support    History:     Occupational Profile:  Living Environment: Pt lives with her  in a [facility] w/ no RAFAT; pt uses a tub/shower combo. 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     Past Medical History:   Diagnosis Date    After-cataract of both eyes - Left Eye 10/11/2012    Anxiety     Aortic valve stenosis, moderate     AR (allergic rhinitis)     Cataract     Cholelithiasis     GERD (gastroesophageal reflux disease)     HTN (hypertension)     Hyperlipidemia     OP (osteoporosis)     Osteoarthritis     Strabismus     UI (urinary incontinence)        Past Surgical History:   Procedure Laterality Date    ADENOIDECTOMY      CATARACT EXTRACTION Bilateral     BOTH EYES MULTIFOCAL    COLONOSCOPY  2015    EYE SURGERY      FRACTURE SURGERY      HYSTERECTOMY      LUMBAR EPIDURAL INJECTION  02/04/2016    L4-l5    PARTIAL HYSTERECTOMY      SKIN GRAFT      left foot     TONSILLECTOMY      WRIST FRACTURE SURGERY Right 2009    YAG CAP      LEFT EYE       Subjective     Chief Complaint: limited mobility and endurance  Patient/Family stated goals: to return to PLOF  Communicated with: RN prior to session.  Pain/Comfort:  · Pain Rating  1: 0/10  · Location - Side 1: Right  · Location - Orientation 1: generalized  · Location 1: hip  · Pain Addressed 1: Reposition, Distraction, Cessation of Activity  · Pain Rating Post-Intervention 1: 3/10    Patients cultural, spiritual, Mu-ism conflicts given the current situation: none stated     Objective:     Patient found with: FCD, perineural catheter    General Precautions: Standard, fall   Orthopedic Precautions:RLE weight bearing as tolerated   Braces: N/A     Occupational Performance:    Bed Mobility:    · Patient completed Scooting/Bridging with stand by assistance  · Patient completed Supine to Sit with minimum assistance and with leg lift    Functional Mobility/Transfers:  · Patient completed Sit <> Stand Transfer with minimum assistance  with  rolling walker   · Patient completed Bed <> Chair Transfer using Stand Pivot technique with contact guard assistance with rolling walker  · Functional Mobility: Pt ambulated household distance w/ CGA and RW; no signs of LOB or SOB noted. Pt required verbal cues for RW management and gait sequence. Refer to PT note for further mobility details.     Activities of Daily Living:  · UB Dressing: minimum assistance to don back gown 2/2 limited R shoulder ROM  · LB Dressing: total assistance to don socks sitting EOB    Cognitive/Visual Perceptual:  Cognitive/Psychosocial Skills:     -       Oriented to: Person, Place, Time and Situation   -       Follows Commands/attention:Follows multistep  commands  -       Communication: clear/fluent    Physical Exam:  Upper Extremity Range of Motion:     -       Right Upper Extremity: WFL except shoulder flexion limited by previous injury   -       Left Upper Extremity: WFL  Upper Extremity Strength:    -       Right Upper Extremity: WFL  -       Left Upper Extremity: WFL   Strength:    -       Right Upper Extremity: WFL  -       Left Upper Extremity: WFL  Fine Motor Coordination:    -       Intact    Patient left up in  "chair with all lines intact and call button in reach    [facility] 6 Click: Self-care  [facility] Total Score: 16    Treatment & Education:  - OT role/POC  - Importance of OOB activity to maximize recovery   - Safety w/ functional mobility   Education:    Assessment:     Mine Wilkins is a 83 y.o. female with a medical diagnosis of Displaced intertrochanteric fracture of right femur, initial encounter for closed fracture.  She presents with the following performance deficits affecting function: weakness, impaired endurance, impaired self care skills, impaired functional mobilty, gait instability, decreased lower extremity function, pain, orthopedic precautions, decreased ROM, impaired balance.      OT eval complete. Pt tolerated session well. Pt is motivated and participates well w/ therapy. Pt has good BUE strength and ROM w/ limited R shoulder flexion. Pt reports her only limitations w/ her RUE is carrying heavy objects. Pt reported a slight pain increase w/ functional mobility but had notable limited endurance. Pt is a good candidate to continue her therapy at SNF 2/2 decreased caregiver support at home. Pt will continue to benefit from acute OT to address the above listed impairments.     Rehab Prognosis:  Good; patient would benefit from acute skilled OT services to address these deficits and reach maximum level of function.         Clinical Decision Makin.  OT Low:  "Pt evaluation falls under low complexity for evaluation coding due to performance deficits noted in 1-3 areas as stated above and 0 co-morbities affecting current functional status. Data obtained from problem focused assessments. No modifications or assistance was required for completion of evaluation. Only brief occupational profile and history review completed."     Plan:     Patient to be seen daily to address the above listed problems via self-care/home management, therapeutic activities, therapeutic exercises  · Plan of Care Expires: " 06/20/18  · Plan of Care Reviewed with: patient    This Plan of care has been discussed with the patient who was involved in its development and understands and is in agreement with the identified goals and treatment plan    GOALS:    Occupational Therapy Goals        Problem: Occupational Therapy Goal    Goal Priority Disciplines Outcome Interventions   Occupational Therapy Goal     OT, PT/OT Ongoing (interventions implemented as appropriate)    Description:  Goals to be met by: 7 days      Patient will increase functional independence with ADLs by performing:    UE Dressing with Supervision.  LE Dressing with Minimal Assistance and Assistive Devices as needed.  Grooming while standing at sink with Supervision.  Toileting from toilet with Stand-by Assistance for hygiene and clothing management.   Supine to sit with Stand-by Assistance.  Stand pivot transfers with Supervision.  Toilet transfer to toilet with Supervision.                      Time Tracking:     OT Date of Treatment: 05/21/18  OT Start Time: 0912  OT Stop Time: 0939  OT Total Time (min): 27 min    Billable Minutes:Evaluation 10  Self Care/Home Management 17    Maria Isabel Linares OT  5/21/2018

## 2018-05-21 NOTE — PT/OT/SLP EVAL
"Physical Therapy Evaluation    Patient Name:  Mine Wilkins   MRN:  [id number]    Recommendations:     Discharge Recommendations:  nursing facility, skilled   Discharge Equipment Recommendations: walker, rolling   Barriers to discharge: Decreased caregiver support    Assessment:     Mine Wilkins is a 83 y.o. female admitted with a medical diagnosis of Displaced intertrochanteric fracture of right femur, initial encounter for closed fracture.  She presents with the following impairments/functional limitations:  weakness, impaired endurance, impaired self care skills, impaired balance, gait instability, impaired functional mobilty, decreased lower extremity function, pain, impaired joint extensibility.    -PT eval complete. Pt tolerated session well today and demonstrates appropriate mobility s/p IM nailing of (R) femur. Pt with no LOB during ambulation and was able to walk to door and back using RW. Pt tolerated sitting exercises well with no c/o pain or discomfort. Pt will cont to benefit from skilled therapy services and is unsafe to return home at this time as she has no assistance. Pt appropriate for SNF placement upon d/c.    Rehab Prognosis:  Good; patient would benefit from acute skilled PT services to address these deficits and reach maximum level of function.      Recent Surgery: Procedure(s) (LRB):  IM NAILING OF FEMUR- RIGHT  (Right) 1 Day Post-Op    Plan:     During this hospitalization, patient to be seen daily to address the above listed problems via gait training, therapeutic activities, neuromuscular re-education, therapeutic exercises  · Plan of Care Expires:  06/21/18   Plan of Care Reviewed with: patient    Subjective     Communicated with nsg prior to session.  Patient found supine upon PT entry to room, agreeable to evaluation.      Chief Complaint: impaired functional mobility and (R) LE weakness  Patient comments/goals: "I love exercises"  Pain/Comfort:  · Pain Rating 1: " 0/10  · Location - Side 1: Right  · Location - Orientation 1: generalized  · Location 1: hip  · Pain Addressed 1: Reposition, Distraction, Cessation of Activity  · Pain Rating Post-Intervention 1: 3/10    Patients cultural, spiritual, Buddhist conflicts given the current situation: none stated    Living Environment:  -Pt lives with her  in a H with no RAFAT. Pt has a tub/shower combo with a shower chair. Pt is the caregiver for her  who is disabled and pt has no 24/7 (A).  Prior to admission, patients level of function was (I).  Patient has the following equipment: shower chair, bedside commode, hospital bed, walker, rolling.  DME owned (not currently used): .  Upon discharge, patient will have assistance from undetermined.    Objective:     Patient found with: FCD, perineural catheter     General Precautions: Standard, fall   Orthopedic Precautions:RLE weight bearing as tolerated   Braces: N/A     Exams:  · Sensation:    · -       Intact  light/touch (B) LE  · RLE ROM: WFL except limits into end-range knee ext and hip flexion 2/2 pain  · RLE Strength: Deficits: 4/5 at knee, hip  · LLE ROM: WFL  · LLE Strength: WFL    Functional Mobility:  · Bed Mobility:     · Scooting: contact guard assistance  · Supine to Sit: contact guard assistance  · Transfers:     · Sit to Stand:  minimum assistance x1 trial from EOB with rolling walker  · Gait: 25' x1 trial using RW with CGA. Pt cued for upright posture and gait sequencing.    AM-PAC 6 CLICK MOBILITY  Total Score:18       Therapeutic Activities and Exercises:   -Pt performed THR protocol exercises x30 reps of:  AAlexandra RIOS  B. GS  C. QS    -Pt educated on:  A. PT POC and role of PT  B. Importance of OOB activity to improve functional outcomes after surgery  C. Maintaining WBing status on affected LE during mobility  D. DME mgmt and t/f sequencing  E. Gait sequencing  F. Performing HEP to reduce the risk of developing blood clots       Patient left up in chair  with all lines intact, call button in reach and nsg notified.    GOALS:    Physical Therapy Goals        Problem: Physical Therapy Goal    Goal Priority Disciplines Outcome Goal Variances Interventions   Physical Therapy Goal     PT/OT, PT Ongoing (interventions implemented as appropriate)     Description:  Goals to be met by: 18     Patient will increase functional independence with mobility by performin. Supine to sit with Set-up Dickey  2. Sit to supine with Set-up Dickey  3. Sit to stand transfer with Supervision  4. Bed to chair transfer with Stand-by Assistance using Rolling Walker  5. Gait  x 100 feet with CGA using Rolling Walker.   6. Lower extremity exercise program x20-30 reps per handout, with assistance as needed.                        History:     Past Medical History:   Diagnosis Date    After-cataract of both eyes - Left Eye 10/11/2012    Anxiety     Aortic valve stenosis, moderate     AR (allergic rhinitis)     Cataract     Cholelithiasis     GERD (gastroesophageal reflux disease)     HTN (hypertension)     Hyperlipidemia     OP (osteoporosis)     Osteoarthritis     Strabismus     UI (urinary incontinence)        Past Surgical History:   Procedure Laterality Date    ADENOIDECTOMY      CATARACT EXTRACTION Bilateral     BOTH EYES MULTIFOCAL    COLONOSCOPY  2015    EYE SURGERY      FRACTURE SURGERY      HYSTERECTOMY      LUMBAR EPIDURAL INJECTION  2016    L4-l5    PARTIAL HYSTERECTOMY      SKIN GRAFT      left foot     TONSILLECTOMY      WRIST FRACTURE SURGERY Right 2009    YAG CAP      LEFT EYE       Clinical Decision Making:     History  Co-morbidities and personal factors that may impact the plan of care Examination  Body Structures and Functions, activity limitations and participation restrictions that may impact the plan of care Clinical Presentation   Decision Making/ Complexity Score   Co-morbidities:   [] Time since onset of injury /  illness / exacerbation  [] Status of current condition  []Patient's cognitive status and safety concerns    [] Multiple Medical Problems (see med hx)  Personal Factors:   [] Patient's age  [] Prior Level of function   [] Patient's home situation (environment and family support)  [] Patient's level of motivation  [] Expected progression of patient      HISTORY:(criteria)    [x] 16175 - no personal factors/history    [] 75679 - has 1-2 personal factor/comorbidity     [] 04918 - has >3 personal factor/comorbidity     Body Regions:  [] Objective examination findings  [] Head     []  Neck  [] Trunk   [] Upper Extremity  [x] Lower Extremity    Body Systems:  [] For communication ability, affect, cognition, language, and learning style: the assessment of the ability to make needs known, consciousness, orientation (person, place, and time), expected emotional /behavioral responses, and learning preferences (eg, learning barriers, education  needs)  [x] For the neuromuscular system: a general assessment of gross coordinated movement (eg, balance, gait, locomotion, transfers, and transitions) and motor function  (motor control and motor learning)  [x] For the musculoskeletal system: the assessment of gross symmetry, gross range of motion, gross strength, height, and weight  [x] For the integumentary system: the assessment of pliability(texture), presence of scar formation, skin color, and skin integrity  [] For cardiovascular/pulmonary system: the assessment of heart rate, respiratory rate, blood pressure, and edema     Activity limitations:    [] Patient's cognitive status and saf ety concerns          [] Status of current condition      [] Weight bearing restriction  [] Cardiopulmunary Restriction    Participation Restrictions:   [] Goals and goal agreement with the patient     [] Rehab potential (prognosis) and probable outcome      Examination of Body System: (criteria)    [] 43672 - addressing 1-2 elements    [x] 09021 -  addressing a total of 3 or more elements     [] 33218 -  Addressing a total of 4 or more elements         Clinical Presentation: (criteria)  Stable - 37002     On examination of body system using standardized tests and measures patient presents with 1-2 elements from any of the following: body structures and functions, activity limitations, and/or participation restrictions.  Leading to a clinical presentation that is considered stable and/or uncomplicated                              Clinical Decision Making  (Eval Complexity):  Low- 28278     Time Tracking:     PT Received On: 05/21/18  PT Start Time: 0912     PT Stop Time: 0940  PT Total Time (min): 28 min     Billable Minutes: Evaluation 15 and Gait Training 13      Hadley Santana, PT  05/21/2018

## 2018-05-22 PROBLEM — N39.0 URINARY TRACT INFECTION DUE TO ENTEROCOCCUS: Status: ACTIVE | Noted: 2018-05-22

## 2018-05-22 PROBLEM — B95.2 URINARY TRACT INFECTION DUE TO ENTEROCOCCUS: Status: ACTIVE | Noted: 2018-05-22

## 2018-05-22 PROBLEM — S72.001D ENCOUNTER FOR AFTERCARE FOR HEALING CLOSED TRAUMATIC FRACTURE OF RIGHT HIP: Status: ACTIVE | Noted: 2018-05-22

## 2018-05-22 PROBLEM — S72.141D CLOSED DISPLACED INTERTROCHANTERIC FRACTURE OF RIGHT FEMUR WITH ROUTINE HEALING: Status: ACTIVE | Noted: 2018-05-19

## 2018-05-22 PROBLEM — E83.39 HYPOPHOSPHATEMIA: Status: ACTIVE | Noted: 2018-05-22

## 2018-05-22 LAB
ANION GAP SERPL CALC-SCNC: 7 MMOL/L
BACTERIA UR CULT: NORMAL
BASOPHILS # BLD AUTO: 0.05 K/UL
BASOPHILS NFR BLD: 0.5 %
BUN SERPL-MCNC: 13 MG/DL
CALCIUM SERPL-MCNC: 8.6 MG/DL
CHLORIDE SERPL-SCNC: 103 MMOL/L
CO2 SERPL-SCNC: 24 MMOL/L
CREAT SERPL-MCNC: 0.7 MG/DL
DIFFERENTIAL METHOD: ABNORMAL
EOSINOPHIL # BLD AUTO: 0.2 K/UL
EOSINOPHIL NFR BLD: 2.3 %
ERYTHROCYTE [DISTWIDTH] IN BLOOD BY AUTOMATED COUNT: 14.6 %
EST. GFR  (AFRICAN AMERICAN): >60 ML/MIN/1.73 M^2
EST. GFR  (NON AFRICAN AMERICAN): >60 ML/MIN/1.73 M^2
GLUCOSE SERPL-MCNC: 109 MG/DL
HCT VFR BLD AUTO: 27 %
HGB BLD-MCNC: 9 G/DL
IMM GRANULOCYTES # BLD AUTO: 0.06 K/UL
IMM GRANULOCYTES NFR BLD AUTO: 0.6 %
LYMPHOCYTES # BLD AUTO: 1 K/UL
LYMPHOCYTES NFR BLD: 9.5 %
MAGNESIUM SERPL-MCNC: 1.9 MG/DL
MCH RBC QN AUTO: 28 PG
MCHC RBC AUTO-ENTMCNC: 33.3 G/DL
MCV RBC AUTO: 84 FL
MONOCYTES # BLD AUTO: 0.8 K/UL
MONOCYTES NFR BLD: 7.7 %
NEUTROPHILS # BLD AUTO: 8.2 K/UL
NEUTROPHILS NFR BLD: 79.4 %
NRBC BLD-RTO: 0 /100 WBC
PHOSPHATE SERPL-MCNC: 1.7 MG/DL
PLATELET # BLD AUTO: 234 K/UL
PMV BLD AUTO: 11.1 FL
POTASSIUM SERPL-SCNC: 3.8 MMOL/L
RBC # BLD AUTO: 3.21 M/UL
SODIUM SERPL-SCNC: 134 MMOL/L
WBC # BLD AUTO: 10.26 K/UL

## 2018-05-22 PROCEDURE — 25000003 PHARM REV CODE 250: Performed by: HOSPITALIST

## 2018-05-22 PROCEDURE — 84100 ASSAY OF PHOSPHORUS: CPT

## 2018-05-22 PROCEDURE — 83735 ASSAY OF MAGNESIUM: CPT

## 2018-05-22 PROCEDURE — 99232 SBSQ HOSP IP/OBS MODERATE 35: CPT | Mod: ,,, | Performed by: INTERNAL MEDICINE

## 2018-05-22 PROCEDURE — 25000003 PHARM REV CODE 250: Performed by: INTERNAL MEDICINE

## 2018-05-22 PROCEDURE — 25000003 PHARM REV CODE 250: Performed by: STUDENT IN AN ORGANIZED HEALTH CARE EDUCATION/TRAINING PROGRAM

## 2018-05-22 PROCEDURE — 25000003 PHARM REV CODE 250: Performed by: PHYSICIAN ASSISTANT

## 2018-05-22 PROCEDURE — 97116 GAIT TRAINING THERAPY: CPT

## 2018-05-22 PROCEDURE — 97110 THERAPEUTIC EXERCISES: CPT

## 2018-05-22 PROCEDURE — 80048 BASIC METABOLIC PNL TOTAL CA: CPT

## 2018-05-22 PROCEDURE — 99231 SBSQ HOSP IP/OBS SF/LOW 25: CPT | Mod: ,,, | Performed by: ANESTHESIOLOGY

## 2018-05-22 PROCEDURE — 63600175 PHARM REV CODE 636 W HCPCS: Performed by: STUDENT IN AN ORGANIZED HEALTH CARE EDUCATION/TRAINING PROGRAM

## 2018-05-22 PROCEDURE — 36415 COLL VENOUS BLD VENIPUNCTURE: CPT

## 2018-05-22 PROCEDURE — 12000002 HC ACUTE/MED SURGE SEMI-PRIVATE ROOM

## 2018-05-22 PROCEDURE — 97535 SELF CARE MNGMENT TRAINING: CPT

## 2018-05-22 PROCEDURE — 63600175 PHARM REV CODE 636 W HCPCS: Performed by: ANESTHESIOLOGY

## 2018-05-22 PROCEDURE — 85025 COMPLETE CBC W/AUTO DIFF WBC: CPT

## 2018-05-22 RX ORDER — SODIUM,POTASSIUM PHOSPHATES 280-250MG
2 POWDER IN PACKET (EA) ORAL ONCE
Status: COMPLETED | OUTPATIENT
Start: 2018-05-22 | End: 2018-05-22

## 2018-05-22 RX ORDER — CIPROFLOXACIN 500 MG/1
500 TABLET ORAL EVERY 12 HOURS
Status: DISCONTINUED | OUTPATIENT
Start: 2018-05-22 | End: 2018-05-23 | Stop reason: HOSPADM

## 2018-05-22 RX ADMIN — ENOXAPARIN SODIUM 40 MG: 100 INJECTION SUBCUTANEOUS at 05:05

## 2018-05-22 RX ADMIN — CETIRIZINE HYDROCHLORIDE 10 MG: 10 TABLET, FILM COATED ORAL at 09:05

## 2018-05-22 RX ADMIN — OXYBUTYNIN CHLORIDE 10 MG: 10 TABLET, FILM COATED, EXTENDED RELEASE ORAL at 09:05

## 2018-05-22 RX ADMIN — ACETAMINOPHEN 1000 MG: 500 TABLET ORAL at 06:05

## 2018-05-22 RX ADMIN — ROPIVACAINE HYDROCHLORIDE 10 ML/HR: 2 INJECTION, SOLUTION EPIDURAL; INFILTRATION at 02:05

## 2018-05-22 RX ADMIN — VITAMIN D, TAB 1000IU (100/BT) 2000 UNITS: 25 TAB at 09:05

## 2018-05-22 RX ADMIN — STANDARDIZED SENNA CONCENTRATE AND DOCUSATE SODIUM 1 TABLET: 8.6; 5 TABLET, FILM COATED ORAL at 09:05

## 2018-05-22 RX ADMIN — PREGABALIN 75 MG: 75 CAPSULE ORAL at 09:05

## 2018-05-22 RX ADMIN — CIPROFLOXACIN HYDROCHLORIDE 500 MG: 500 TABLET, FILM COATED ORAL at 09:05

## 2018-05-22 RX ADMIN — POLYETHYLENE GLYCOL 3350 17 G: 17 POWDER, FOR SOLUTION ORAL at 09:05

## 2018-05-22 RX ADMIN — POTASSIUM & SODIUM PHOSPHATES POWDER PACK 280-160-250 MG 2 PACKET: 280-160-250 PACK at 05:05

## 2018-05-22 RX ADMIN — PANTOPRAZOLE SODIUM 40 MG: 40 TABLET, DELAYED RELEASE ORAL at 09:05

## 2018-05-22 RX ADMIN — ACETAMINOPHEN 1000 MG: 500 TABLET ORAL at 02:05

## 2018-05-22 RX ADMIN — LOSARTAN POTASSIUM 50 MG: 50 TABLET, FILM COATED ORAL at 09:05

## 2018-05-22 RX ADMIN — ACETAMINOPHEN 1000 MG: 500 TABLET ORAL at 10:05

## 2018-05-22 RX ADMIN — PRAVASTATIN SODIUM 20 MG: 20 TABLET ORAL at 09:05

## 2018-05-22 RX ADMIN — ROPIVACAINE HYDROCHLORIDE 10 ML/HR: 2 INJECTION, SOLUTION EPIDURAL; INFILTRATION at 05:05

## 2018-05-22 RX ADMIN — OXYCODONE HYDROCHLORIDE 5 MG: 5 TABLET ORAL at 06:05

## 2018-05-22 RX ADMIN — ESCITALOPRAM 5 MG: 5 TABLET, FILM COATED ORAL at 09:05

## 2018-05-22 NOTE — ADDENDUM NOTE
Addendum  created 05/22/18 0914 by Jaquelin Henriquez MD    Charge Capture section accepted, Cosign clinical note with attestation

## 2018-05-22 NOTE — PROGRESS NOTES
Progress Note   Hospital Medicine         Patient Name: Mine Wilkins  MRN:  [id number]  [facility] Medicine Team: [facility] MED  Oscar Izaguirre MD  Date of Admission:  5/19/2018     Length of Stay:  LOS: 2 days   Expected Discharge Date: 5/24/2018  Principal Problem:  Displaced intertrochanteric fracture of right femur, initial encounter for closed fracture       Subjective:     Interval History/Overnight Events:  Patient is POD 1, complaining of uncontrolled pain of right hip, will give an extra dose of oxycodone 15 mg now; PT recs SNF;     Review of Systems   Constitutional: Negative for chills, fatigue, fever.   HENT: Negative for sore throat, trouble swallowing.    Eyes: Negative for photophobia, visual disturbance.   Respiratory: Negative for cough, shortness of breath.    Cardiovascular: Negative for chest pain, palpitations, leg swelling.   Gastrointestinal: Negative for abdominal pain, constipation, diarrhea, nausea, vomiting.   Endocrine: Negative for cold intolerance, heat intolerance.   Genitourinary: Negative for dysuria, frequency.   Musculoskeletal: Negative for arthralgias, myalgias.   Skin: Negative for rash, wound, erythema   Neurological: Negative for dizziness, syncope, weakness, light-headedness.   Psychiatric/Behavioral: Negative for confusion, hallucinations, anxiety  All other systems reviewed and are negative.    Objective:     Temp:  [97.9 °F (36.6 °C)-99.5 °F (37.5 °C)]   Pulse:  [80-84]   Resp:  [14-18]   BP: ()/(53-67)   SpO2:  [90 %-96 %]       Physical Exam:  Constitutional: Appears well-developed and well-nourished.   Head: Normocephalic and atraumatic.   Mouth/Throat: Oropharynx is clear and moist.   Eyes: EOM are normal. Pupils are equal, round, and reactive to light. No scleral icterus.   Neck: Normal range of motion. Neck supple.   Cardiovascular: Normal rate and regular rhythm.  No murmur heard.  Pulmonary/Chest: Effort normal and breath sounds normal. No respiratory  distress. No wheezes, rales, or rhonchi  Abdominal: Soft. Bowel sounds are normal.  No distension or tenderness  Musculoskeletal: Normal range of motion. No edema.   Neurological: Alert and oriented to person, place, and time.   Skin: Skin is warm and dry.   Psychiatric: Normal mood and affect. Behavior is normal.       Recent Labs  Lab 05/19/18 1829 05/20/18  1054 05/21/18  0333   WBC 13.10* 19.41* 9.85   HGB 11.5* 10.3* 8.3*   HCT 34.4* 31.4* 25.1*    249 209       Recent Labs  Lab 05/19/18 1829 05/19/18 1942 05/21/18  0333   *  --  132*   K 3.4*  --  3.7     --  100   CO2 22*  --  25   BUN 24*  --  16   CREATININE 0.9  --  0.8   *  --  136*   CALCIUM 9.5  --  8.2*   MG  --  1.8 1.8   PHOS  --  2.7 2.0*       Recent Labs  Lab 05/19/18 1829 05/19/18 1942   ALKPHOS 55  --    ALT 18  --    AST 24  --    ALBUMIN 3.8  --    PROT 6.7  --    BILITOT 0.3  --    INR 0.9 1.0     No results for input(s): POCTGLUCOSE in the last 168 hours.     acetaminophen  1,000 mg Oral Q8H    cefTRIAXone (ROCEPHIN) IVPB  1 g Intravenous Q24H    cetirizine  10 mg Oral Daily    enoxaparin  40 mg Subcutaneous Daily    escitalopram oxalate  5 mg Oral Daily    losartan  50 mg Oral Daily    oxybutynin  10 mg Oral Daily    pantoprazole  40 mg Oral Daily    polyethylene glycol  17 g Oral Daily    pravastatin  20 mg Oral QHS    pregabalin  75 mg Oral QHS    senna-docusate 8.6-50 mg  1 tablet Oral BID    vitamin D  2,000 Units Oral Daily       Assessment and Plan     Ms. Mine Wilkins is a 83 y.o. female who presented to [facility] on 5/19/2018 with     Hospital Course:    Ms. Mine Wilkins was admitted to Hospital Medicine for management of     Active Hospital Problems    Diagnosis  POA    *Displaced intertrochanteric fracture of right femur, initial encounter for closed fracture [S72.141A]  Yes    GERD (gastroesophageal reflux disease) [K21.9]  Yes    Anxiety [F41.9]  Yes    Aortic  valve stenosis, moderate [I35.0]  Yes     Chronic    Hyperlipidemia [E78.5]  Yes     Chronic    HTN (hypertension) [I10]  Yes     Chronic    OP (osteoporosis) [M81.0]  Yes      Resolved Hospital Problems    Diagnosis Date Resolved POA   No resolved problems to display.        Displaced intertrochanteric closed fracture of right femur  · POD # 1  · DVT prophylaxis for 4 weeks post-op to start today  · PT/OT - recs SNF  · Villavicencio to be removed on POD 2  · Pain control        Moderate Aortic Stenosis  · Chronic and stable  · Last echo with moderate AS  · No reports of chest pain or SOB     Osteoporosis  · Chronic issue  · Continue Vitamin D     Essential HTN  · Chronic and stable  · Hold HCTZ for electrolyte disturbances  · Continue Losartan 50mg PO daily     HLD  · Chronic and stable  · Continue Pravastatin 20mg PO daily     GERD  · Chronic and stable  · Continue Pantoprazole 40mg PO daily     Acute Cystitis without Hematuria  · UA dirty  · F/u urine gram stain and culture - likely 2/2 E. Coli, Klebsiella, Enterobactericeae, Enterococcus  · Start Ceftriaxone 1g IV q24h (start date 5/19)     Anxiety  · Chronic and stable  · Continue Escitalopram 5mg PO daily     Diet:  regular diet  GI PPx:    DVT PPx:   lovenox  Goals of Care:  Full Code     Disposition:  PT recs SNF, likely in the next two days       Oscar Izaguirre MD  Medical Director [facility] Medicine  Spectra:  [id number]  Pager: [phone]

## 2018-05-22 NOTE — ASSESSMENT & PLAN NOTE
Mine Shasha Franky is a 83 y.o. female s/p r CMN 5/20/2018    - PT/OT: WBAT  - post op abx - dc'd  - DVT ppx: lovenox    Dispo: leave aquacel dressings in place for 2 weeks until clinic visit.  Please call with questions

## 2018-05-22 NOTE — ANESTHESIA POSTPROCEDURE EVALUATION
"Anesthesia Post Evaluation    Patient: Mine Wilkins    Procedure(s) Performed: Procedure(s) (LRB):  IM NAILING OF FEMUR- RIGHT  (Right)    Final Anesthesia Type: general  Patient location during evaluation: floor  Patient participation: Yes- Able to Participate  Level of consciousness: awake and alert  Post-procedure vital signs: reviewed and stable  Pain management: adequate  Airway patency: patent  PONV status at discharge: No PONV  Anesthetic complications: no      Cardiovascular status: blood pressure returned to baseline  Respiratory status: unassisted  Hydration status: euvolemic  Follow-up not needed.        Visit Vitals  /63 (BP Location: Right arm, Patient Position: Lying)   Pulse 87   Temp (!) 38.1 °C (100.5 °F)   Resp 18   Ht 5' 4" (1.626 m)   Wt 63.5 kg (139 lb 15.9 oz)   SpO2 (!) 93%   Breastfeeding? No   BMI 24.03 kg/m²       Pain/Dorene Score: Pain Assessment Performed: Yes (5/21/2018  8:27 PM)  Presence of Pain: denies (5/21/2018  8:27 PM)  Pain Rating Prior to Med Admin: 4 (5/21/2018 10:12 PM)  Dorene Score: 10 (5/20/2018  4:28 PM)      "

## 2018-05-22 NOTE — ASSESSMENT & PLAN NOTE
Encounter for aftercare for healing closed traumatic fracture of right hip  Patient is POD # 2.    · Patient reports no pain in right hip.   · Plan is to continue PT/OT for gait training and strengthening and restoration of ADL's. Patient is FWB/WBAT: right lower extremity as per Orthopedic recommendations.   · Plan to continue Lovenox 40 mg subcutaneous daily for total of 28 days post-op for DVT prophylaxis after hip fracture surgery.   · Orthopedics is following and managing surgical site.   · Perineural pain catheter in place for pain control and being managed by Anesthesia Pain Service.   · PT/OT recommending short term Skilled nursing facility and patient wants [facility] and referral sent. Patient medically ready for discharge on 5/23 so plan on discharge to [facility] on 5/23 if bed available.

## 2018-05-22 NOTE — HOSPITAL COURSE
5/19 - Patient admitted to [facility] Medicine Team H: Hip Fracture team and started on Hip Fracture Pathway with Orthopedic surgery consult for hip fracture. Patient was seen and evaluated by Orthopedic surgery who recommended operative repair of hip fracture. Patient noted to have positive U/A on admit consistent with UTI and started on IV Rocephin 1 gram daily to treat and urine culture sent.  5/20 - Patient was medically optimized prior to surgery and was taken to OR after optimization on 5/20/2018. Patient underwent right hip cephalomedullary nail fixation by Dr. Shahram Scott. Post-op patient WBAT to the right lower extremity as per Orthopedics recommendation. Patient placed on Lovenox 40 mg subcutaneous daily and ELKIN/SCD's for DVT prophylaxis post-op and will need for total of 28 days. Perineural pain catheter placed by Anesthesia Pain Service with continuous infusion of Ropivacaine to help with pain control post-op and Anesthesia Pain Service managing while patient in the hospital. PT/OT consulted post-op and evaluated patient.   5/21 - Patient progressing well with PT and OT and recommended skilled nursing facility placement. Patient wishes [facility] and consult placed.   5/22 - Patient with mild fever on 5/21 up to 100.5 F. Urine culture from admit grew > 100,000 Enterococcus Faecalis resistant to IV Rocephin and patient switched from Rocephin to oral Cipro 500 mg po BID for 7 days to treat UTI as Cipro was sensitive. Fever likely related to fact that IV Rocephin not effective in treating UTI as not sensitive to E. Faecalis.   5/23/18 - Patient discharged to [facility] in stable condition. She had bowel movement prior to discharge. Patient to follow up with Ortho in clinic as scheduled.

## 2018-05-22 NOTE — PROGRESS NOTES
[facility] Hospital Medicine  Progress Note    Patient Name: Mine Wilkins  MRN: [id number]  [facility] Medicine Service: H  Admission Date: 5/19/2018  Length of Stay: 3 days  Expected Discharge Date: 5/24/2018  Attending Physician: Meena La MD   Primary Care Provider: MARVA Oswald MD    Subjective:     2 Days Post-Op from right hip cephalomedullary nail fixation for fracture     Orthopedic Surgeon: Dr. Shahram Scott  Weight Bearing Status: WBAT right lower extremity    Follow-up Visit For: Closed displaced intertrochanteric fracture of right femur with routine healing    HPI:  Ms. Mine Wilkins is a 83 y.o. female with moderate aortic stenosis, frequent UTIs and osteoporosis on oral Vitamin D and Fosamax to treat who presents to the ED via EMS after having a fall at [employer].  She was standing in the market line when she lost her balance and fell on her left hip.  She doesn't think she hit her head, because it doesn't hurt.  After the event, she was unable to stand or bear weight on the right leg.  She denies using any blood thinners.  She denies any chest pain or SOB.  She claims to be compliant with all of her medications.  XR done in the ED showed a displaced intertrochanteric closed fracture of right femur.  She was evaluated by Ortho who plan to take her to the OR [facility] for operative fixation.     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 Needs assistance in finding a short term placement or sitter for the  while the patient gets treatment for her fracture.    Interval History: Patient with mild fever to 100.5 F yesterday afternoon. Patient denies any cough, SOB, runny nose or sinus or chest congestion. Patient reports pain in right leg well controlled. Patient reports she ambulated 54 feet with PT this am. Patient hoping to be discharged to [facility] for rehab.      Review of Systems   Constitutional: Positive for fever. Negative for chills.   Respiratory: Negative for cough and shortness of breath.    Cardiovascular: Negative for chest pain and leg swelling.   Gastrointestinal: Negative for abdominal pain, constipation, nausea and vomiting.   Genitourinary: Positive for urgency. Negative for dysuria.   Musculoskeletal: Negative for arthralgias and back pain.   Skin: Negative for rash.   Neurological: Negative for dizziness and light-headedness.   Psychiatric/Behavioral: Negative for confusion.     Objective:     Vital Signs (Most Recent):  Temp: 98.6 °F (37 °C) (05/22/18 1207)  Pulse: 81 (05/22/18 1207)  Resp: 16 (05/22/18 1207)  BP: 138/73 (05/22/18 1207)  SpO2: 96 % (05/22/18 1207) Vital Signs (24h Range):  Temp:  [98.6 °F (37 °C)-100.5 °F (38.1 °C)] 98.6 °F (37 °C)  Pulse:  [80-87] 81  Resp:  [16-18] 16  SpO2:  [90 %-96 %] 96 %  BP: (122-160)/(63-80) 138/73     Weight: 63.5 kg (139 lb 15.9 oz)  Body mass index is 24.03 kg/m².    Intake/Output Summary (Last 24 hours) at 05/22/18 1557  Last data filed at 05/22/18 0700   Gross per 24 hour   Intake              120 ml   Output              500 ml   Net             -380 ml      Physical Exam   Constitutional: She is oriented to person, place, and time. She appears well-developed and well-nourished. No distress.   HENT:   Mouth/Throat: Oropharynx is clear and moist.   Eyes: Conjunctivae are normal.   Cardiovascular: Normal rate and regular rhythm.  Exam reveals no gallop.    Murmur heard.   Crescendo decrescendo systolic murmur is present with a grade of 3/6   Pulmonary/Chest: Effort normal and breath sounds normal. She has no wheezes.   Abdominal: Soft. Bowel sounds are normal. She exhibits no distension. There is no tenderness. There is no guarding.   Musculoskeletal: She exhibits no edema.   Neurological: She is alert and oriented to person, place, and time.   Skin: Skin is warm. No erythema.   Surgical dressing to right  hip and thigh area C/D/I   Psychiatric: She has a normal mood and affect. Her behavior is normal. Thought content normal.   Nursing note and vitals reviewed.      Significant Labs:   CBC:   Recent Labs  Lab 05/21/18  0333 05/22/18  0504   WBC 9.85 10.26   HGB 8.3* 9.0*   HCT 25.1* 27.0*    234     CMP:   Recent Labs  Lab 05/21/18  0333 05/22/18  0504   * 134*   K 3.7 3.8    103   CO2 25 24   * 109   BUN 16 13   CREATININE 0.8 0.7   CALCIUM 8.2* 8.6*   ANIONGAP 7* 7*   EGFRNONAA >60.0 >60.0     Urine Culture, Routine   Date Value Ref Range Status   05/19/2018 ENTEROCOCCUS FAECALIS  >100,000 cfu/ml    Final      Lab Results   Component Value Date    [id number] 56 05/19/2018     Significant Imaging: I have reviewed all pertinent imaging results/findings within the past 24 hours.    ASSESSMENT/PLAN:     * Closed displaced intertrochanteric fracture of right femur with routine healing s/p IM nail on 5/20/2018    Encounter for aftercare for healing closed traumatic fracture of right hip  Patient is POD # 2.    · Patient reports no pain in right hip.   · Plan is to continue PT/OT for gait training and strengthening and restoration of ADL's. Patient is FWB/WBAT: right lower extremity as per Orthopedic recommendations.   · Plan to continue Lovenox 40 mg subcutaneous daily for total of 28 days post-op for DVT prophylaxis after hip fracture surgery.   · Orthopedics is following and managing surgical site.   · Perineural pain catheter in place for pain control and being managed by Anesthesia Pain Service.   · PT/OT recommending short term Skilled nursing facility and patient wants [facility] and referral sent. Patient medically ready for discharge on 5/23 so plan on discharge to [facility] on 5/23 if bed available.            Urinary tract infection due to Enterococcus faecalis    · Present on admit. Patient received IV Rocephin 1 gram daily since admit on 5/19.   · Final Urine culture grew >  100,000 with Enterococcus faecalis on 5/22 and IV Rocephin not sensitive so patient switched to oral Cipro 500 mg po BID on 5/22 and plan to treat for 7 days with end date of 5/29/2018.   · Patient currently with no urinary symptoms but history of frequent UTIs.           Essential hypertension    · Patient's blood pressure is controlled here in the hospital over past 24 hours.   · Goal for blood pressure is SBP < 150 and DBP < 90 as patient > or = 60 years of age with no diabetes or advanced kidney disease based on JNC 8 guidelines.   · Continue current treatment regimen of Losartan 50 mg po daily (home med) to treat.  · Plan is to monitor patient's blood pressure routinely while patient is hospitalized.             Age-related osteoporosis with current pathological fracture with routine healing    Patient admitted with hip fracture related to osteoporosis.   · Patient needs treatment of osteoporosis as patient has fragility fracture of hip that is associated with osteoporosis.  · Patient's Vitamin D level is adequate so no additional Vitamin D replacement therapy is needed.  · Patient will need outpatient DEXA scan and further evaluation for additional treatment of osteoporosis.   · Will refer patient to Orthopedic Fracture [facility] on discharge for further evaluation and management of osteoporosis.           Aortic valve stenosis, moderate    Patient asymptomatic. Patient followed by her primary care physician and gets 2 D echo every 2 years to follow and EF and ERENDIRA ha been stable.           Hypophosphatemia    Will treat with Neutra-Phos 2 packets po for 1 dose(s) today and recheck level in the am to monitor.        Gastroesophageal reflux disease without esophagitis    Controlled. Patient on Omeprazole as outpatient and will switch to Pantoprazole 40 mg po daily while in hospital to treat.           Pure hypercholesterolemia    Controlled. Continue Pravachol 20 mg po daily to treat as patient on as outpatient.            Anxiety    Controlled. Continue Lexapro 5 mg po daily to treat as patient taking as outpatient.               Anticipated Disposition: Skilled Nursing Facility    Time spent in care of patient (Greater than 1/2 spent in direct face-to-face contact): 20 minutes      Meena La MD  Department of Hospital Medicine   [facility]

## 2018-05-22 NOTE — PLAN OF CARE
Problem: Occupational Therapy Goal  Goal: Occupational Therapy Goal  Goals to be met by: 7 days      Patient will increase functional independence with ADLs by performing:    UE Dressing with Supervision.  LE Dressing with Minimal Assistance and Assistive Devices as needed. - In progress  Grooming while standing at sink with Supervision.  - Met (5/22)  Toileting from toilet with Stand-by Assistance for hygiene and clothing management.  - Met (5/22)  Supine to sit with Stand-by Assistance.  Stand pivot transfers with Supervision.   - Met (5/22)  Toilet transfer to toilet with Supervision.   - Met (5/22)     Outcome: Ongoing (interventions implemented as appropriate)  Pt is currently progressing well towards all acute functional outcomes     Comments: Cont OT POC

## 2018-05-22 NOTE — ASSESSMENT & PLAN NOTE
Controlled. Patient on Omeprazole as outpatient and will switch to Pantoprazole 40 mg po daily while in hospital to treat.

## 2018-05-22 NOTE — PT/OT/SLP PROGRESS
"Physical Therapy Treatment    Patient Name:  Mine Wilkins   MRN:  [id number]    Recommendations:     Discharge Recommendations:  nursing facility, skilled   Discharge Equipment Recommendations: walker, rolling   Barriers to discharge: Decreased caregiver support    Assessment:     Mine Wilkins is a 83 y.o. female admitted with a medical diagnosis of Closed displaced intertrochanteric fracture of right femur with routine healing.  She presents with the following impairments/functional limitations:  weakness, impaired functional mobilty, impaired self care skills, impaired balance, gait instability, decreased lower extremity function, pain, edema, impaired joint extensibility.    -Pt tolerated session well today with no complications. Pt with no LOB during ambulation and was able to increase her gait distance. Pt also able to tolerate more exercises with less pain. Pt will cont to benefit from skilled therapy services and remains appropriate for SNF placement to maximize functional independence.    Rehab Prognosis:  Good; patient would benefit from acute skilled PT services to address these deficits and reach maximum level of function.      Recent Surgery: Procedure(s) (LRB):  IM NAILING OF FEMUR- RIGHT  (Right) 2 Days Post-Op    Plan:     During this hospitalization, patient to be seen daily to address the above listed problems via gait training, therapeutic activities, therapeutic exercises, neuromuscular re-education  · Plan of Care Expires:  06/21/18   Plan of Care Reviewed with: patient    Subjective     Communicated with nsg prior to session.  Patient found supine upon PT entry to room, agreeable to treatment.      Chief Complaint: impaired functional mobility  Patient comments/goals: "I did my exercises last night in bed"  Pain/Comfort:  · Pain Rating 1: 3/10  · Location - Side 1: Right  · Location - Orientation 1: generalized  · Location 1: hip  · Pain Addressed 1: Pre-medicate for " activity, Cessation of Activity  · Pain Rating Post-Intervention 1: 0/10    Patients cultural, spiritual, Uatsdin conflicts given the current situation: none stated    Objective:     Patient found with: FCD, perineural catheter     General Precautions: Standard, fall   Orthopedic Precautions:RLE weight bearing as tolerated   Braces: N/A     Functional Mobility:  · Bed Mobility:     · Scooting: stand by assistance  · Supine to Sit: contact guard assistance  · Transfers:     · Sit to Stand:  contact guard assistance with rolling walker  · Gait: 56' x1 trial using RW with CGA. Pt cued to keep head upright.      AM-PAC 6 CLICK MOBILITY  Turning over in bed (including adjusting bedclothes, sheets and blankets)?: 3  Sitting down on and standing up from a chair with arms (e.g., wheelchair, bedside commode, etc.): 3  Moving from lying on back to sitting on the side of the bed?: 3  Moving to and from a bed to a chair (including a wheelchair)?: 3  Need to walk in hospital room?: 3  Climbing 3-5 steps with a railing?: 3  Total Score: 18       Therapeutic Activities and Exercises:   -Pt performed TKR protocol exercises x15 reps of:  A. AP  B. GS  C. QS  D. Heel slides-AAROM  E. Hip abd/add-AAROM  F. SLR-AAROM    -Pt educated on:  A. PT POC and role of PT  B. Importance of OOB activity to improve functional outcomes after surgery  C. Maintaining WBing status on affected LE during mobility  D. DME mgmt and t/f sequencing  E. Gait sequencing  F. Performing HEP to reduce the risk of developing blood clots     Patient left up in chair with all lines intact, call button in reach and nsg notified..    GOALS:    Physical Therapy Goals        Problem: Physical Therapy Goal    Goal Priority Disciplines Outcome Goal Variances Interventions   Physical Therapy Goal     PT/OT, PT Ongoing (interventions implemented as appropriate)     Description:  Goals to be met by: 5/21/18     Patient will increase functional independence with mobility  by performin. Supine to sit with Set-up [place]  2. Sit to supine with Set-up [place]  3. Sit to stand transfer with Supervision  4. Bed to chair transfer with Stand-by Assistance using Rolling Walker  5. Gait  x 100 feet with CGA using Rolling Walker.   6. Lower extremity exercise program x20-30 reps per handout, with assistance as needed.                        Time Tracking:     PT Received On: 18  PT Start Time: 1032     PT Stop Time: 1100  PT Total Time (min): 28 min     Billable Minutes: Gait Training 14 and Therapeutic Exercise 14    Treatment Type: Treatment  PT/PTA: PT           Hadley Santana, PT  2018

## 2018-05-22 NOTE — PLAN OF CARE
Problem: Physical Therapy Goal  Goal: Physical Therapy Goal  Goals to be met by: 18     Patient will increase functional independence with mobility by performin. Supine to sit with Set-up [place]  2. Sit to supine with Set-up [place]  3. Sit to stand transfer with Supervision  4. Bed to chair transfer with Stand-by Assistance using Rolling Walker  5. Gait  x 100 feet with CGA using Rolling Walker.   6. Lower extremity exercise program x20-30 reps per handout, with assistance as needed.       Outcome: Ongoing (interventions implemented as appropriate)  Pt tolerated session well today with no complications. Pt with no LOB during ambulation and was able to increase her gait distance. Pt also able to tolerate more exercises with less pain. Pt will cont to benefit from skilled therapy services and remains appropriate for SNF placement to maximize functional independence.

## 2018-05-22 NOTE — ASSESSMENT & PLAN NOTE
Patient admitted with hip fracture related to osteoporosis.   · Patient needs treatment of osteoporosis as patient has fragility fracture of hip that is associated with osteoporosis.  · Patient's Vitamin D level is adequate so no additional Vitamin D replacement therapy is needed.  · Patient will need outpatient DEXA scan and further evaluation for additional treatment of osteoporosis.   · Will refer patient to Orthopedic Fracture Clinic on discharge for further evaluation and management of osteoporosis.

## 2018-05-22 NOTE — ASSESSMENT & PLAN NOTE
Patient asymptomatic. Patient followed by her primary care physician and gets 2 D echo every 2 years to follow and EF and ERENDIRA ha been stable.

## 2018-05-22 NOTE — SUBJECTIVE & OBJECTIVE
Interval History: Patient with mild fever to 100.5 F yesterday afternoon. Patient denies any cough, SOB, runny nose or sinus or chest congestion. Patient reports pain in right leg well controlled. Patient reports she ambulated 54 feet with PT this am. Patient hoping to be discharged to [facility] for rehab.     Review of Systems   Constitutional: Positive for fever. Negative for chills.   Respiratory: Negative for cough and shortness of breath.    Cardiovascular: Negative for chest pain and leg swelling.   Gastrointestinal: Negative for abdominal pain, constipation, nausea and vomiting.   Genitourinary: Positive for urgency. Negative for dysuria.   Musculoskeletal: Negative for arthralgias and back pain.   Skin: Negative for rash.   Neurological: Negative for dizziness and light-headedness.   Psychiatric/Behavioral: Negative for confusion.     Objective:     Vital Signs (Most Recent):  Temp: 98.6 °F (37 °C) (05/22/18 1207)  Pulse: 81 (05/22/18 1207)  Resp: 16 (05/22/18 1207)  BP: 138/73 (05/22/18 1207)  SpO2: 96 % (05/22/18 1207) Vital Signs (24h Range):  Temp:  [98.6 °F (37 °C)-100.5 °F (38.1 °C)] 98.6 °F (37 °C)  Pulse:  [80-87] 81  Resp:  [16-18] 16  SpO2:  [90 %-96 %] 96 %  BP: (122-160)/(63-80) 138/73     Weight: 63.5 kg (139 lb 15.9 oz)  Body mass index is 24.03 kg/m².    Intake/Output Summary (Last 24 hours) at 05/22/18 1557  Last data filed at 05/22/18 0700   Gross per 24 hour   Intake              120 ml   Output              500 ml   Net             -380 ml      Physical Exam   Constitutional: She is oriented to person, place, and time. She appears well-developed and well-nourished. No distress.   HENT:   Mouth/Throat: Oropharynx is clear and moist.   Eyes: Conjunctivae are normal.   Cardiovascular: Normal rate and regular rhythm.  Exam reveals no gallop.    Murmur heard.   Crescendo decrescendo systolic murmur is present with a grade of 3/6   Pulmonary/Chest: Effort normal and breath sounds normal. She  has no wheezes.   Abdominal: Soft. Bowel sounds are normal. She exhibits no distension. There is no tenderness. There is no guarding.   Musculoskeletal: She exhibits no edema.   Neurological: She is alert and oriented to person, place, and time.   Skin: Skin is warm. No erythema.   Surgical dressing to right hip and thigh area C/D/I   Psychiatric: She has a normal mood and affect. Her behavior is normal. Thought content normal.   Nursing note and vitals reviewed.      Significant Labs:   CBC:   Recent Labs  Lab 05/21/18  0333 05/22/18  0504   WBC 9.85 10.26   HGB 8.3* 9.0*   HCT 25.1* 27.0*    234     CMP:   Recent Labs  Lab 05/21/18  0333 05/22/18  0504   * 134*   K 3.7 3.8    103   CO2 25 24   * 109   BUN 16 13   CREATININE 0.8 0.7   CALCIUM 8.2* 8.6*   ANIONGAP 7* 7*   EGFRNONAA >60.0 >60.0     Urine Culture, Routine   Date Value Ref Range Status   05/19/2018 ENTEROCOCCUS FAECALIS  >100,000 cfu/ml    Final      Lab Results   Component Value Date    [id number] 56 05/19/2018     Significant Imaging: I have reviewed all pertinent imaging results/findings within the past 24 hours.

## 2018-05-22 NOTE — PROGRESS NOTES
[facility]  Orthopedics  Progress Note    Patient Name: Mine Wilkins  MRN: [id number]  Admission Date: 5/19/2018  Hospital Length of Stay: 3 days  Attending Provider: Oscar Izaguirre MD  Primary Care Provider: MARVA Oswald MD  Follow-up For: Procedure(s) (LRB):  IM NAILING OF FEMUR- RIGHT  (Right)    Post-Operative Day: 2 Days Post-Op  Subjective:     Principal Problem:Displaced intertrochanteric fracture of right femur, initial encounter for closed fracture    Principal Orthopedic Problem: same    Interval History: The patient was seen and examined this morning at the bedside. Patient reports no acute issues overnight.  Pain well controlled.  Worked with PT and mobilized well.    Review of patient's allergies indicates:   Allergen Reactions    Ace inhibitors      Other reaction(s): cough    Codeine      Other reaction(s): Nausea       Current Facility-Administered Medications   Medication    acetaminophen tablet 1,000 mg    bisacodyl suppository 10 mg    ceFAZolin injection 2 g    cefTRIAXone injection 1 g    cetirizine tablet 10 mg    enoxaparin injection 40 mg    escitalopram oxalate tablet 5 mg    fentaNYL injection 25 mcg    fentaNYL injection 25 mcg    lidocaine (PF) 10 mg/ml (1%) injection 10 mg    losartan tablet 50 mg    methocarbamol tablet 1,000 mg    midazolam (VERSED) 1 mg/mL injection 1 mg    morphine injection 4 mg    mupirocin 2 % ointment 1 g    ondansetron disintegrating tablet 4 mg    ondansetron disintegrating tablet 4 mg    ondansetron disintegrating tablet 8 mg    oxybutynin 24 hr tablet 10 mg    oxyCODONE immediate release tablet 10 mg    oxyCODONE immediate release tablet 5 mg    pantoprazole EC tablet 40 mg    polyethylene glycol packet 17 g    pravastatin tablet 20 mg    pregabalin capsule 75 mg    ramelteon tablet 8 mg    ropivacaine (PF) 2 mg/ml (0.2%) infusion    senna-docusate 8.6-50 mg per tablet 1 tablet    sodium chloride  "0.9% flush 3 mL    sodium chloride 0.9% injection 3 mL    tranexamic acid (CYKLOKAPRON) 3,000 mg in sodium chloride 0.9% 100 mL    vitamin D 1000 units tablet 2,000 Units     Objective:     Vital Signs (Most Recent):  Temp: 98.6 °F (37 °C) (05/22/18 0429)  Pulse: 80 (05/22/18 0429)  Resp: 18 (05/22/18 0429)  BP: (!) 160/80 (05/22/18 0429)  SpO2: (!) 92 % (05/22/18 0429) Vital Signs (24h Range):  Temp:  [97.9 °F (36.6 °C)-100.5 °F (38.1 °C)] 98.6 °F (37 °C)  Pulse:  [80-87] 80  Resp:  [14-18] 18  SpO2:  [90 %-93 %] 92 %  BP: (101-160)/(58-80) 160/80     Weight: 63.5 kg (139 lb 15.9 oz)  Height: 5' 4" (162.6 cm)  Body mass index is 24.03 kg/m².      Intake/Output Summary (Last 24 hours) at 05/22/18 0613  Last data filed at 05/21/18 1500   Gross per 24 hour   Intake              720 ml   Output              200 ml   Net              520 ml       Ortho/SPM Exam      Awake/alert/oriented x3, No acute distress, Afebrile, Vital signs stable  Good inspiratory effort with unlaboured breathing  Dressings c/d/i  NVI in operative limb      Significant Labs:   BMP:     Recent Labs  Lab 05/21/18  0333   *   *   K 3.7      CO2 25   BUN 16   CREATININE 0.8   CALCIUM 8.2*   MG 1.8     CBC:     Recent Labs  Lab 05/20/18  1054 05/21/18  0333 05/22/18  0504   WBC 19.41* 9.85 10.26   HGB 10.3* 8.3* 9.0*   HCT 31.4* 25.1* 27.0*    209 234     All pertinent labs within the past 24 hours have been reviewed.      Assessment/Plan:     * Displaced intertrochanteric fracture of right femur, initial encounter for closed fracture    Mine Wilkins is a 83 y.o. female s/p r CMN 5/20/2018    - PT/OT: WBAT  - post op abx - dc'd  - DVT ppx: lovenox    Dispo: leave aquacel dressings in place for 2 weeks until clinic visit.  Please call with questions              Almas Lanier MD  Orthopedics  [facility]  "

## 2018-05-22 NOTE — SUBJECTIVE & OBJECTIVE
Principal Problem:Displaced intertrochanteric fracture of right femur, initial encounter for closed fracture    Principal Orthopedic Problem: same    Interval History: The patient was seen and examined this morning at the bedside. Patient reports no acute issues overnight.  Pain well controlled.  Worked with PT and mobilized well.    Review of patient's allergies indicates:   Allergen Reactions    Ace inhibitors      Other reaction(s): cough    Codeine      Other reaction(s): Nausea       Current Facility-Administered Medications   Medication    acetaminophen tablet 1,000 mg    bisacodyl suppository 10 mg    ceFAZolin injection 2 g    cefTRIAXone injection 1 g    cetirizine tablet 10 mg    enoxaparin injection 40 mg    escitalopram oxalate tablet 5 mg    fentaNYL injection 25 mcg    fentaNYL injection 25 mcg    lidocaine (PF) 10 mg/ml (1%) injection 10 mg    losartan tablet 50 mg    methocarbamol tablet 1,000 mg    midazolam (VERSED) 1 mg/mL injection 1 mg    morphine injection 4 mg    mupirocin 2 % ointment 1 g    ondansetron disintegrating tablet 4 mg    ondansetron disintegrating tablet 4 mg    ondansetron disintegrating tablet 8 mg    oxybutynin 24 hr tablet 10 mg    oxyCODONE immediate release tablet 10 mg    oxyCODONE immediate release tablet 5 mg    pantoprazole EC tablet 40 mg    polyethylene glycol packet 17 g    pravastatin tablet 20 mg    pregabalin capsule 75 mg    ramelteon tablet 8 mg    ropivacaine (PF) 2 mg/ml (0.2%) infusion    senna-docusate 8.6-50 mg per tablet 1 tablet    sodium chloride 0.9% flush 3 mL    sodium chloride 0.9% injection 3 mL    tranexamic acid (CYKLOKAPRON) 3,000 mg in sodium chloride 0.9% 100 mL    vitamin D 1000 units tablet 2,000 Units     Objective:     Vital Signs (Most Recent):  Temp: 98.6 °F (37 °C) (05/22/18 0429)  Pulse: 80 (05/22/18 0429)  Resp: 18 (05/22/18 0429)  BP: (!) 160/80 (05/22/18 0429)  SpO2: (!) 92 % (05/22/18 0429) Vital  "Signs (24h Range):  Temp:  [97.9 °F (36.6 °C)-100.5 °F (38.1 °C)] 98.6 °F (37 °C)  Pulse:  [80-87] 80  Resp:  [14-18] 18  SpO2:  [90 %-93 %] 92 %  BP: (101-160)/(58-80) 160/80     Weight: 63.5 kg (139 lb 15.9 oz)  Height: 5' 4" (162.6 cm)  Body mass index is 24.03 kg/m².      Intake/Output Summary (Last 24 hours) at 05/22/18 0613  Last data filed at 05/21/18 1500   Gross per 24 hour   Intake              720 ml   Output              200 ml   Net              520 ml       Ortho/SPM Exam      Awake/alert/oriented x3, No acute distress, Afebrile, Vital signs stable  Good inspiratory effort with unlaboured breathing  Dressings c/d/i  NVI in operative limb      Significant Labs:   BMP:     Recent Labs  Lab 05/21/18  0333   *   *   K 3.7      CO2 25   BUN 16   CREATININE 0.8   CALCIUM 8.2*   MG 1.8     CBC:     Recent Labs  Lab 05/20/18  1054 05/21/18  0333 05/22/18  0504   WBC 19.41* 9.85 10.26   HGB 10.3* 8.3* 9.0*   HCT 31.4* 25.1* 27.0*    209 234     All pertinent labs within the past 24 hours have been reviewed.    "

## 2018-05-22 NOTE — PT/OT/SLP PROGRESS
Occupational Therapy   Treatment    Name: Mine Wilkins  MRN: [id number]  Admitting Diagnosis:  Closed displaced intertrochanteric fracture of right femur with routine healing  2 Days Post-Op    Recommendations:     Discharge Recommendations: nursing facility, skilled  Discharge Equipment Recommendations:  walker, rolling  Barriers to discharge:  Decreased caregiver support    Subjective     Communicated with: RN prior to session.  Pain/Comfort:  · Pain Rating 1: 0/10  · Location - Side 1: Right  · Location - Orientation 1: generalized  · Location 1: hip  · Pain Rating Post-Intervention 1: 0/10    Patients cultural, spiritual, [employer] conflicts given the current situation: none stated     Objective:     Patient found with: FCD, perineural catheter    General Precautions: Standard, fall   Orthopedic Precautions:RLE weight bearing as tolerated   Braces: N/A     Occupational Performance:    Bed Mobility:    · Pt UIC    Functional Mobility/Transfers:  · Patient completed Sit <> Stand Transfer with supervision  with  rolling walker   · Patient completed Toilet Transfer Stand Pivot technique with supervision with  rolling walker  · Functional Mobility: Pt ambulated household distance w/ Supervision and RW for increased stability - no signs of LOB or SOB noted.     Activities of Daily Living:  · Grooming: modified independence brushing teeth and combing hair while standing at sink w/ RW  · Bathing: supervision standing at sink bathing LB and buttocks region   · LB Dressing: stand by assistance and moderate assistance : SBA to doff B socks but Mod A to don B socks   · Toileting: supervision for sharri-hygiene and clothing management at toilet    Patient left up in chair with all lines intact and call button in reach    [facility] 6 Click: Self-care  [facility] Total Score: 21    Treatment & Education:  - OT POC  - Importance of OOB activity to maximize recovery   - Safety w/ functional mobility   - Compensatory strategies to  facilitate performance and completion of LB dressing   Education:    Assessment:     Mine Wilkins is a 83 y.o. female with a medical diagnosis of Closed displaced intertrochanteric fracture of right femur with routine healing.  She presents with the following performance deficits affecting function:  weakness, impaired endurance, impaired self care skills, impaired balance, impaired functional mobilty, gait instability, decreased lower extremity function, orthopedic precautions.       Pt tolerated session well. Pt remains highly motivated to maximize her recovery and participate well w/ therapy. Pt continues to have minimal difficulty w/ functional mobility and LB self-care but presents with improved functional skills achieving multiple acute OT goals during session. Pt will benefit from education on the utilization of AD to facilitate LB dressing in order to increase her functional independence. Pt w/ improved standing tolerance while performing self-care activities for prolonged period of time w/out c/o pain or SOB. Pt will continue to benefit from acute OT to address the above listed impairments.    Rehab Prognosis:  Good; patient would benefit from acute skilled OT services to address these deficits and reach maximum level of function.       Plan:     Patient to be seen daily to address the above listed problems via self-care/home management, therapeutic activities, therapeutic exercises  · Plan of Care Expires: 06/20/18  · Plan of Care Reviewed with: patient    This Plan of care has been discussed with the patient who was involved in its development and understands and is in agreement with the identified goals and treatment plan    GOALS:    Occupational Therapy Goals        Problem: Occupational Therapy Goal    Goal Priority Disciplines Outcome Interventions   Occupational Therapy Goal     OT, PT/OT Ongoing (interventions implemented as appropriate)    Description:  Goals to be met by: 7 days       Patient will increase functional independence with ADLs by performing:    UE Dressing with Supervision.  LE Dressing with Minimal Assistance and Assistive Devices as needed. - In progress  Grooming while standing at sink with Supervision.  - Met (5/22)  Toileting from toilet with Stand-by Assistance for hygiene and clothing management.  - Met (5/22)  Supine to sit with Stand-by Assistance.  Stand pivot transfers with Supervision.   - Met (5/22)  Toilet transfer to toilet with Supervision.   - Met (5/22)                       Time Tracking:     OT Date of Treatment: 05/22/18  OT Start Time: 1139  OT Stop Time: 1204  OT Total Time (min): 25 min    Billable Minutes:Self Care/Home Management 25    Maria Isabel Linares OT  5/22/2018

## 2018-05-22 NOTE — ASSESSMENT & PLAN NOTE
Will treat with Neutra-Phos 2 packets po for 1 dose(s) today and recheck level in the am to monitor.

## 2018-05-22 NOTE — ASSESSMENT & PLAN NOTE
· Patient's blood pressure is controlled here in the hospital over past 24 hours.   · Goal for blood pressure is SBP < 150 and DBP < 90 as patient > or = 60 years of age with no diabetes or advanced kidney disease based on JNC 8 guidelines.   · Continue current treatment regimen of Losartan 50 mg po daily (home med) to treat.  · Plan is to monitor patient's blood pressure routinely while patient is hospitalized.

## 2018-05-22 NOTE — HPI
Ms. Mine Wilkins is a 83 y.o. female with moderate aortic stenosis, frequent UTIs and osteoporosis on oral Vitamin D and Fosamax to treat who presents to the ED via EMS after having a fall at [email or web address].  She was standing in the market line when she lost her balance and fell on her left hip.  She doesn't think she hit her head, because it doesn't hurt.  After the event, she was unable to stand or bear weight on the right leg.  She denies using any blood thinners.  She denies any chest pain or SOB.  She claims to be compliant with all of her medications.  XR done in the ED showed a displaced intertrochanteric closed fracture of right femur.  She was evaluated by Ortho who plan to take her to the OR allison for operative fixation.     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 Needs assistance in finding a short term placement or sitter for the  while the patient gets treatment for her fracture.

## 2018-05-22 NOTE — ASSESSMENT & PLAN NOTE
· Present on admit. Patient received IV Rocephin 1 gram daily since admit on 5/19.   · Final Urine culture grew > 100,000 with Enterococcus faecalis on 5/22 and IV Rocephin not sensitive so patient switched to oral Cipro 500 mg po BID on 5/22 and plan to treat for 7 days with end date of 5/29/2018.   · Patient currently with no urinary symptoms but history of frequent UTIs.

## 2018-05-22 NOTE — ANESTHESIA POST-OP PAIN MANAGEMENT
Acute Pain Service Progress Note    Mine Wilkins is a 83 y.o., female, [id number].  Surgery:  IM NAILING OF FEMUR- RIGHT  (Right)     Post Op Day #: 2     Catheter type: perineural  SIFI     Infusion type: Ropivacaine 0.2%  10 basal    Problem List:    Active Hospital Problems    Diagnosis  POA    *Closed displaced intertrochanteric fracture of right femur with routine healing s/p IM nail on 5/20/2018 [S72.141D]  Not Applicable    Encounter for aftercare for healing closed traumatic fracture of right hip [S72.001D]  Not Applicable    Gastroesophageal reflux disease without esophagitis [K21.9]  Yes    Anxiety [F41.9]  Yes    Aortic valve stenosis, moderate [I35.0]  Yes     Chronic    Pure hypercholesterolemia [E78.00]  Yes    Essential hypertension [I10]  Yes    Age-related osteoporosis with current pathological fracture with routine healing [M80.00XD]  Not Applicable      Resolved Hospital Problems    Diagnosis Date Resolved POA   No resolved problems to display.       Subjective:                 General appearance of alert, oriented, no complaints              Pain with rest: 1    Numbers              Pain with movement:5 Numbers               Side Effects                          1. Pruritis No                          2. Nausea No                          3. Motor Blockade No, 1=Ability to bend knees and ankles                          4. Sedation No, 1=awake and alert     Objective:                 Catheter site clean, dry, intact      Vitals   Vitals:    05/22/18 0429   BP: (!) 160/80   Pulse: 80   Resp: 18   Temp: 37 °C (98.6 °F)        Labs    Admission on 05/19/2018   Component Date Value Ref Range Status    WBC 05/19/2018 13.10* 3.90 - 12.70 K/uL Final    RBC 05/19/2018 4.11  4.00 - 5.40 M/uL Final    Hemoglobin 05/19/2018 11.5* 12.0 - 16.0 g/dL Final    Hematocrit 05/19/2018 34.4* 37.0 - 48.5 % Final    MCV 05/19/2018 84  82 - 98 fL Final    MCH 05/19/2018 28.0  27.0 - 31.0 pg Final     MCHC 05/19/2018 33.4  32.0 - 36.0 g/dL Final    RDW 05/19/2018 14.2  11.5 - 14.5 % Final    Platelets 05/19/2018 296  150 - 350 K/uL Final    MPV 05/19/2018 10.6  9.2 - 12.9 fL Final    Immature Granulocytes 05/19/2018 0.8* 0.0 - 0.5 % Final    Gran # (ANC) 05/19/2018 9.7* 1.8 - 7.7 K/uL Final    Immature Grans (Abs) 05/19/2018 0.10* 0.00 - 0.04 K/uL Final    Lymph # 05/19/2018 2.4  1.0 - 4.8 K/uL Final    Mono # 05/19/2018 0.7  0.3 - 1.0 K/uL Final    Eos # 05/19/2018 0.2  0.0 - 0.5 K/uL Final    Baso # 05/19/2018 0.05  0.00 - 0.20 K/uL Final    nRBC 05/19/2018 0  0 /100 WBC Final    Gran% 05/19/2018 74.3* 38.0 - 73.0 % Final    Lymph% 05/19/2018 17.9* 18.0 - 48.0 % Final    Mono% 05/19/2018 5.3  4.0 - 15.0 % Final    Eosinophil% 05/19/2018 1.3  0.0 - 8.0 % Final    Basophil% 05/19/2018 0.4  0.0 - 1.9 % Final    Differential Method 05/19/2018 Automated   Final    Sodium 05/19/2018 133* 136 - 145 mmol/L Final    Potassium 05/19/2018 3.4* 3.5 - 5.1 mmol/L Final    Chloride 05/19/2018 100  95 - 110 mmol/L Final    CO2 05/19/2018 22* 23 - 29 mmol/L Final    Glucose 05/19/2018 121* 70 - 110 mg/dL Final    BUN, Bld 05/19/2018 24* 8 - 23 mg/dL Final    Creatinine 05/19/2018 0.9  0.5 - 1.4 mg/dL Final    Calcium 05/19/2018 9.5  8.7 - 10.5 mg/dL Final    Total Protein 05/19/2018 6.7  6.0 - 8.4 g/dL Final    Albumin 05/19/2018 3.8  3.5 - 5.2 g/dL Final    Total Bilirubin 05/19/2018 0.3  0.1 - 1.0 mg/dL Final    Alkaline Phosphatase 05/19/2018 55  55 - 135 U/L Final    AST 05/19/2018 24  10 - 40 U/L Final    ALT 05/19/2018 18  10 - 44 U/L Final    Anion Gap 05/19/2018 11  8 - 16 mmol/L Final    eGFR if African American 05/19/2018 >60.0  >60 mL/min/1.73 m^2 Final    eGFR if non  05/19/2018 59.7* >60 mL/min/1.73 m^2 Final    Prothrombin Time 05/19/2018 10.0  9.0 - 12.5 sec Final    INR 05/19/2018 0.9  0.8 - 1.2 Final    Prealbumin 05/19/2018 26  20 - 43 mg/dL Final     Transferrin 05/19/2018 208  200 - 375 mg/dL Final    Specimen UA 05/19/2018 Urine, Catheterized   Final    Color, UA 05/19/2018 Yellow  Yellow, Straw, [place] Final    Appearance, UA 05/19/2018 Clear  Clear Final    pH, UA 05/19/2018 6.0  5.0 - 8.0 Final    Specific Gravity, UA 05/19/2018 1.010  1.005 - 1.030 Final    Protein, UA 05/19/2018 Negative  Negative Final    Glucose, UA 05/19/2018 Negative  Negative Final    Ketones, UA 05/19/2018 Trace* Negative Final    Bilirubin (UA) 05/19/2018 Negative  Negative Final    Occult Blood UA 05/19/2018 Negative  Negative Final    Nitrite, UA 05/19/2018 Negative  Negative Final    Urobilinogen, UA 05/19/2018 Negative  <2.0 EU/dL Final    Leukocytes, UA 05/19/2018 2+* Negative Final    Group & Rh 05/19/2018 A POS   Final    Indirect Marjorie 05/19/2018 NEG   Final    Vit D, 25-Hydroxy 05/19/2018 56  30 - 96 ng/mL Final    Hemoglobin A1C 05/19/2018 5.3  4.0 - 5.6 % Final    Estimated Avg Glucose 05/19/2018 105  68 - 131 mg/dL Final    Magnesium 05/19/2018 1.8  1.6 - 2.6 mg/dL Final    Phosphorus 05/19/2018 2.7  2.7 - 4.5 mg/dL Final    Prothrombin Time 05/19/2018 10.4  9.0 - 12.5 sec Final    INR 05/19/2018 1.0  0.8 - 1.2 Final    RBC, UA 05/19/2018 2  0 - 4 /hpf Final    WBC, UA 05/19/2018 62* 0 - 5 /hpf Final    Bacteria, UA 05/19/2018 Few* None-Occ /hpf Final    Yeast, UA 05/19/2018 Few* None Final    Microscopic Comment 05/19/2018 SEE COMMENT   Final    Gram Stain Result 05/19/2018 Rare WBC's   Final    Gram Stain Result 05/19/2018 Rare Gram positive cocci   Final    Urine Culture, Routine 05/19/2018    Final                    Value:ENTEROCOCCUS FAECALIS  >100,000 cfu/ml      WBC 05/20/2018 19.41* 3.90 - 12.70 K/uL Final    RBC 05/20/2018 3.79* 4.00 - 5.40 M/uL Final    Hemoglobin 05/20/2018 10.3* 12.0 - 16.0 g/dL Final    Hematocrit 05/20/2018 31.4* 37.0 - 48.5 % Final    MCV 05/20/2018 83  82 - 98 fL Final    MCH 05/20/2018 27.2  27.0 -  31.0 pg Final    MCHC 05/20/2018 32.8  32.0 - 36.0 g/dL Final    RDW 05/20/2018 14.3  11.5 - 14.5 % Final    Platelets 05/20/2018 249  150 - 350 K/uL Final    MPV 05/20/2018 10.5  9.2 - 12.9 fL Final    Immature Granulocytes 05/20/2018 0.6* 0.0 - 0.5 % Final    Gran # (ANC) 05/20/2018 16.7* 1.8 - 7.7 K/uL Final    Immature Grans (Abs) 05/20/2018 0.12* 0.00 - 0.04 K/uL Final    Lymph # 05/20/2018 1.4  1.0 - 4.8 K/uL Final    Mono # 05/20/2018 1.2* 0.3 - 1.0 K/uL Final    Eos # 05/20/2018 0.0  0.0 - 0.5 K/uL Final    Baso # 05/20/2018 0.05  0.00 - 0.20 K/uL Final    nRBC 05/20/2018 0  0 /100 WBC Final    Gran% 05/20/2018 85.9* 38.0 - 73.0 % Final    Lymph% 05/20/2018 7.1* 18.0 - 48.0 % Final    Mono% 05/20/2018 5.9  4.0 - 15.0 % Final    Eosinophil% 05/20/2018 0.2  0.0 - 8.0 % Final    Basophil% 05/20/2018 0.3  0.0 - 1.9 % Final    Differential Method 05/20/2018 Automated   Final    WBC 05/21/2018 9.85  3.90 - 12.70 K/uL Final    RBC 05/21/2018 3.01* 4.00 - 5.40 M/uL Final    Hemoglobin 05/21/2018 8.3* 12.0 - 16.0 g/dL Final    Hematocrit 05/21/2018 25.1* 37.0 - 48.5 % Final    MCV 05/21/2018 83  82 - 98 fL Final    MCH 05/21/2018 27.6  27.0 - 31.0 pg Final    MCHC 05/21/2018 33.1  32.0 - 36.0 g/dL Final    RDW 05/21/2018 14.6* 11.5 - 14.5 % Final    Platelets 05/21/2018 209  150 - 350 K/uL Final    MPV 05/21/2018 11.2  9.2 - 12.9 fL Final    Immature Granulocytes 05/21/2018 0.4  0.0 - 0.5 % Final    Gran # (ANC) 05/21/2018 8.2* 1.8 - 7.7 K/uL Final    Immature Grans (Abs) 05/21/2018 0.04  0.00 - 0.04 K/uL Final    Lymph # 05/21/2018 0.9* 1.0 - 4.8 K/uL Final    Mono # 05/21/2018 0.7  0.3 - 1.0 K/uL Final    Eos # 05/21/2018 0.0  0.0 - 0.5 K/uL Final    Baso # 05/21/2018 0.02  0.00 - 0.20 K/uL Final    nRBC 05/21/2018 0  0 /100 WBC Final    Gran% 05/21/2018 83.7* 38.0 - 73.0 % Final    Lymph% 05/21/2018 8.7* 18.0 - 48.0 % Final    Mono% 05/21/2018 6.9  4.0 - 15.0 % Final     Eosinophil% 05/21/2018 0.1  0.0 - 8.0 % Final    Basophil% 05/21/2018 0.2  0.0 - 1.9 % Final    Differential Method 05/21/2018 Automated   Final    Sodium 05/21/2018 132* 136 - 145 mmol/L Final    Potassium 05/21/2018 3.7  3.5 - 5.1 mmol/L Final    Chloride 05/21/2018 100  95 - 110 mmol/L Final    CO2 05/21/2018 25  23 - 29 mmol/L Final    Glucose 05/21/2018 136* 70 - 110 mg/dL Final    BUN, Bld 05/21/2018 16  8 - 23 mg/dL Final    Creatinine 05/21/2018 0.8  0.5 - 1.4 mg/dL Final    Calcium 05/21/2018 8.2* 8.7 - 10.5 mg/dL Final    Anion Gap 05/21/2018 7* 8 - 16 mmol/L Final    eGFR if African American 05/21/2018 >60.0  >60 mL/min/1.73 m^2 Final    eGFR if non African American 05/21/2018 >60.0  >60 mL/min/1.73 m^2 Final    Magnesium 05/21/2018 1.8  1.6 - 2.6 mg/dL Final    Phosphorus 05/21/2018 2.0* 2.7 - 4.5 mg/dL Final    WBC 05/22/2018 10.26  3.90 - 12.70 K/uL Final    RBC 05/22/2018 3.21* 4.00 - 5.40 M/uL Final    Hemoglobin 05/22/2018 9.0* 12.0 - 16.0 g/dL Final    Hematocrit 05/22/2018 27.0* 37.0 - 48.5 % Final    MCV 05/22/2018 84  82 - 98 fL Final    MCH 05/22/2018 28.0  27.0 - 31.0 pg Final    MCHC 05/22/2018 33.3  32.0 - 36.0 g/dL Final    RDW 05/22/2018 14.6* 11.5 - 14.5 % Final    Platelets 05/22/2018 234  150 - 350 K/uL Final    MPV 05/22/2018 11.1  9.2 - 12.9 fL Final    Immature Granulocytes 05/22/2018 0.6* 0.0 - 0.5 % Final    Gran # (ANC) 05/22/2018 8.2* 1.8 - 7.7 K/uL Final    Immature Grans (Abs) 05/22/2018 0.06* 0.00 - 0.04 K/uL Final    Lymph # 05/22/2018 1.0  1.0 - 4.8 K/uL Final    Mono # 05/22/2018 0.8  0.3 - 1.0 K/uL Final    Eos # 05/22/2018 0.2  0.0 - 0.5 K/uL Final    Baso # 05/22/2018 0.05  0.00 - 0.20 K/uL Final    nRBC 05/22/2018 0  0 /100 WBC Final    Gran% 05/22/2018 79.4* 38.0 - 73.0 % Final    Lymph% 05/22/2018 9.5* 18.0 - 48.0 % Final    Mono% 05/22/2018 7.7  4.0 - 15.0 % Final    Eosinophil% 05/22/2018 2.3  0.0 - 8.0 % Final    Basophil%  05/22/2018 0.5  0.0 - 1.9 % Final    Differential Method 05/22/2018 Automated   Final    Sodium 05/22/2018 134* 136 - 145 mmol/L Final    Potassium 05/22/2018 3.8  3.5 - 5.1 mmol/L Final    Chloride 05/22/2018 103  95 - 110 mmol/L Final    CO2 05/22/2018 24  23 - 29 mmol/L Final    Glucose 05/22/2018 109  70 - 110 mg/dL Final    BUN, Bld 05/22/2018 13  8 - 23 mg/dL Final    Creatinine 05/22/2018 0.7  0.5 - 1.4 mg/dL Final    Calcium 05/22/2018 8.6* 8.7 - 10.5 mg/dL Final    Anion Gap 05/22/2018 7* 8 - 16 mmol/L Final    eGFR if African American 05/22/2018 >60.0  >60 mL/min/1.73 m^2 Final    eGFR if non African American 05/22/2018 >60.0  >60 mL/min/1.73 m^2 Final    Magnesium 05/22/2018 1.9  1.6 - 2.6 mg/dL Final    Phosphorus 05/22/2018 1.7* 2.7 - 4.5 mg/dL Final        Meds   Current Facility-Administered Medications   Medication Dose Route Frequency Provider Last Rate Last Dose    acetaminophen tablet 1,000 mg  1,000 mg Oral Q8H Jason Waggoner MD   1,000 mg at 05/22/18 0625    bisacodyl suppository 10 mg  10 mg Rectal Daily PRN Quique Bernal MD        ceFAZolin injection 2 g  2 g Intravenous On Call Procedure Quique Bernal MD        cefTRIAXone injection 1 g  1 g Intravenous Q24H Yisel Hernandez MD   1 g at 05/21/18 2207    cetirizine tablet 10 mg  10 mg Oral Daily Yisel Hernandez MD   10 mg at 05/21/18 0901    enoxaparin injection 40 mg  40 mg Subcutaneous Daily Jason Waggoner MD   40 mg at 05/21/18 1851    escitalopram oxalate tablet 5 mg  5 mg Oral Daily Yisel Hernandez MD   5 mg at 05/21/18 0900    fentaNYL injection 25 mcg  25 mcg Intravenous Q5 Min PRN Quique Bernal MD        fentaNYL injection 25 mcg  25 mcg Intravenous Q5 Min PRN Sandra Sood MD        lidocaine (PF) 10 mg/ml (1%) injection 10 mg  1 mL Intradermal On Call Procedure Quique Bernal MD        losartan tablet 50 mg  50 mg Oral Daily Yisel Hernandez MD   Stopped at 05/21/18  0902    methocarbamol tablet 1,000 mg  1,000 mg Oral Q6H PRN Quique Bernal MD        midazolam (VERSED) 1 mg/mL injection 1 mg  1 mg Intravenous Q5 Min PRN Quique Bernal MD        morphine injection 4 mg  4 mg Intravenous Q6H PRN Shahram Corea PA-C        mupirocin 2 % ointment 1 g  1 g Nasal On Call Procedure Quique Bernal MD        ondansetron disintegrating tablet 4 mg  4 mg Oral Q6H PRN Shahram Corea PA-C        ondansetron disintegrating tablet 4 mg  4 mg Oral Q12H PRN Teresa Gaffney MD        ondansetron disintegrating tablet 8 mg  8 mg Oral Q8H PRN Quique Bernal MD        oxybutynin 24 hr tablet 10 mg  10 mg Oral Daily Yisel Hernandez MD   10 mg at 05/21/18 0902    oxyCODONE immediate release tablet 10 mg  10 mg Oral Q6H PRN Shahram Corea PA-C   10 mg at 05/21/18 0558    oxyCODONE immediate release tablet 5 mg  5 mg Oral Q6H PRN Shahram Corea PA-C   5 mg at 05/22/18 0626    pantoprazole EC tablet 40 mg  40 mg Oral Daily Yisel Hernandez MD   40 mg at 05/21/18 0902    polyethylene glycol packet 17 g  17 g Oral Daily Quique Bernal MD        pravastatin tablet 20 mg  20 mg Oral QHS Yisel Hernandez MD   20 mg at 05/21/18 2138    pregabalin capsule 75 mg  75 mg Oral QHS Quique Bernal MD   75 mg at 05/21/18 2138    ramelteon tablet 8 mg  8 mg Oral Nightly PRN Quique Bernal MD        ropivacaine (PF) 2 mg/ml (0.2%) infusion  10 mL/hr Perineural Continuous Sandra Sood MD 10 mL/hr at 05/22/18 0524 10 mL/hr at 05/22/18 0524    senna-docusate 8.6-50 mg per tablet 1 tablet  1 tablet Oral BID Quique Bernal MD   1 tablet at 05/21/18 2138    sodium chloride 0.9% flush 3 mL  3 mL Intravenous PRN Sandra Sood MD        sodium chloride 0.9% injection 3 mL  3 mL Intravenous Q8H PRN Quique Bernal MD        tranexamic acid (CYKLOKAPRON) 3,000 mg in sodium chloride 0.9% 100 mL  3,000 mg Irrigation On Call Procedure Quique Bernal MD         vitamin D 1000 units tablet 2,000 Units  2,000 Units Oral Daily Yisel Hernandez MD   2,000 Units at 05/21/18 0901        Anticoagulant dose lovenox 40    Assessment:     Pain control adequate    Plan:     Patient doing well, continue present treatment.   Doing well; worked with PT yesterday with some pain but tolerable. Planning for SNF this week, likely [facility]. Not requiring many PRNs. Will continue PNC today; can possibly go to OSNF with [facility] pending timing.    Evaluator Beverley Knox

## 2018-05-23 ENCOUNTER — HOSPITAL ENCOUNTER (INPATIENT)
Facility: HOSPITAL | Age: 83
LOS: 19 days | Discharge: HOME HEALTH | DRG: 560 | End: 2018-06-11
Attending: INTERNAL MEDICINE | Admitting: INTERNAL MEDICINE
Payer: MEDICARE

## 2018-05-23 VITALS
BODY MASS INDEX: 23.9 KG/M2 | SYSTOLIC BLOOD PRESSURE: 126 MMHG | RESPIRATION RATE: 17 BRPM | OXYGEN SATURATION: 96 % | DIASTOLIC BLOOD PRESSURE: 59 MMHG | WEIGHT: 140 LBS | HEART RATE: 83 BPM | TEMPERATURE: 99 F | HEIGHT: 64 IN

## 2018-05-23 DIAGNOSIS — K11.7 XEROSTOMIA: ICD-10-CM

## 2018-05-23 DIAGNOSIS — I51.89 LEFT VENTRICULAR DIASTOLIC DYSFUNCTION WITH PRESERVED SYSTOLIC FUNCTION: ICD-10-CM

## 2018-05-23 DIAGNOSIS — I10 ESSENTIAL HYPERTENSION: ICD-10-CM

## 2018-05-23 DIAGNOSIS — S72.141D CLOSED DISPLACED INTERTROCHANTERIC FRACTURE OF RIGHT FEMUR WITH ROUTINE HEALING: ICD-10-CM

## 2018-05-23 DIAGNOSIS — N39.0 URINARY TRACT INFECTION DUE TO ENTEROCOCCUS: ICD-10-CM

## 2018-05-23 DIAGNOSIS — S72.141D CLOSED DISPLACED INTERTROCHANTERIC FRACTURE OF RIGHT FEMUR WITH ROUTINE HEALING: Primary | ICD-10-CM

## 2018-05-23 DIAGNOSIS — E78.00 PURE HYPERCHOLESTEROLEMIA: ICD-10-CM

## 2018-05-23 DIAGNOSIS — I35.0 AORTIC VALVE STENOSIS, MODERATE: Chronic | ICD-10-CM

## 2018-05-23 DIAGNOSIS — B95.2 URINARY TRACT INFECTION DUE TO ENTEROCOCCUS: ICD-10-CM

## 2018-05-23 DIAGNOSIS — J30.9 ALLERGIC RHINITIS, UNSPECIFIED SEASONALITY, UNSPECIFIED TRIGGER: ICD-10-CM

## 2018-05-23 DIAGNOSIS — N39.46 MIXED INCONTINENCE URGE AND STRESS (MALE)(FEMALE): ICD-10-CM

## 2018-05-23 DIAGNOSIS — M80.00XD AGE-RELATED OSTEOPOROSIS WITH CURRENT PATHOLOGICAL FRACTURE WITH ROUTINE HEALING: ICD-10-CM

## 2018-05-23 DIAGNOSIS — S72.141A DISPLACED INTERTROCHANTERIC FRACTURE OF RIGHT FEMUR, INITIAL ENCOUNTER FOR CLOSED FRACTURE: ICD-10-CM

## 2018-05-23 DIAGNOSIS — K21.9 GASTROESOPHAGEAL REFLUX DISEASE WITHOUT ESOPHAGITIS: ICD-10-CM

## 2018-05-23 DIAGNOSIS — E83.39 HYPOPHOSPHATEMIA: ICD-10-CM

## 2018-05-23 LAB
ANION GAP SERPL CALC-SCNC: 7 MMOL/L
BASOPHILS # BLD AUTO: 0.04 K/UL
BASOPHILS NFR BLD: 0.4 %
BUN SERPL-MCNC: 11 MG/DL
CALCIUM SERPL-MCNC: 8.6 MG/DL
CHLORIDE SERPL-SCNC: 103 MMOL/L
CO2 SERPL-SCNC: 25 MMOL/L
CREAT SERPL-MCNC: 0.7 MG/DL
DIFFERENTIAL METHOD: ABNORMAL
EOSINOPHIL # BLD AUTO: 0.4 K/UL
EOSINOPHIL NFR BLD: 4.6 %
ERYTHROCYTE [DISTWIDTH] IN BLOOD BY AUTOMATED COUNT: 14.6 %
EST. GFR  (AFRICAN AMERICAN): >60 ML/MIN/1.73 M^2
EST. GFR  (NON AFRICAN AMERICAN): >60 ML/MIN/1.73 M^2
GLUCOSE SERPL-MCNC: 103 MG/DL
HCT VFR BLD AUTO: 25.2 %
HGB BLD-MCNC: 8.2 G/DL
IMM GRANULOCYTES # BLD AUTO: 0.05 K/UL
IMM GRANULOCYTES NFR BLD AUTO: 0.5 %
LYMPHOCYTES # BLD AUTO: 1.5 K/UL
LYMPHOCYTES NFR BLD: 15.9 %
MAGNESIUM SERPL-MCNC: 2.1 MG/DL
MCH RBC QN AUTO: 27.5 PG
MCHC RBC AUTO-ENTMCNC: 32.5 G/DL
MCV RBC AUTO: 85 FL
MONOCYTES # BLD AUTO: 0.8 K/UL
MONOCYTES NFR BLD: 8.2 %
NEUTROPHILS # BLD AUTO: 6.5 K/UL
NEUTROPHILS NFR BLD: 70.4 %
NRBC BLD-RTO: 0 /100 WBC
PHOSPHATE SERPL-MCNC: 2.1 MG/DL
PLATELET # BLD AUTO: 268 K/UL
PMV BLD AUTO: 10.4 FL
POTASSIUM SERPL-SCNC: 4.1 MMOL/L
RBC # BLD AUTO: 2.98 M/UL
SODIUM SERPL-SCNC: 135 MMOL/L
TB INDURATION 48 - 72 HR READ: 0 MM
WBC # BLD AUTO: 9.27 K/UL

## 2018-05-23 PROCEDURE — 25000003 PHARM REV CODE 250: Performed by: HOSPITALIST

## 2018-05-23 PROCEDURE — 94799 UNLISTED PULMONARY SVC/PX: CPT

## 2018-05-23 PROCEDURE — 11000004 HC SNF PRIVATE

## 2018-05-23 PROCEDURE — 97110 THERAPEUTIC EXERCISES: CPT

## 2018-05-23 PROCEDURE — 83735 ASSAY OF MAGNESIUM: CPT

## 2018-05-23 PROCEDURE — 25000003 PHARM REV CODE 250: Performed by: STUDENT IN AN ORGANIZED HEALTH CARE EDUCATION/TRAINING PROGRAM

## 2018-05-23 PROCEDURE — 97530 THERAPEUTIC ACTIVITIES: CPT

## 2018-05-23 PROCEDURE — 97116 GAIT TRAINING THERAPY: CPT

## 2018-05-23 PROCEDURE — 99239 HOSP IP/OBS DSCHRG MGMT >30: CPT | Mod: ,,, | Performed by: INTERNAL MEDICINE

## 2018-05-23 PROCEDURE — 63600175 PHARM REV CODE 636 W HCPCS: Performed by: PHYSICIAN ASSISTANT

## 2018-05-23 PROCEDURE — 99231 SBSQ HOSP IP/OBS SF/LOW 25: CPT | Mod: ,,, | Performed by: ANESTHESIOLOGY

## 2018-05-23 PROCEDURE — 80048 BASIC METABOLIC PNL TOTAL CA: CPT

## 2018-05-23 PROCEDURE — 25000003 PHARM REV CODE 250: Performed by: PHYSICIAN ASSISTANT

## 2018-05-23 PROCEDURE — 84100 ASSAY OF PHOSPHORUS: CPT

## 2018-05-23 PROCEDURE — 36415 COLL VENOUS BLD VENIPUNCTURE: CPT

## 2018-05-23 PROCEDURE — 97535 SELF CARE MNGMENT TRAINING: CPT

## 2018-05-23 PROCEDURE — 25000003 PHARM REV CODE 250: Performed by: INTERNAL MEDICINE

## 2018-05-23 PROCEDURE — 85025 COMPLETE CBC W/AUTO DIFF WBC: CPT

## 2018-05-23 PROCEDURE — 63600175 PHARM REV CODE 636 W HCPCS: Performed by: ANESTHESIOLOGY

## 2018-05-23 RX ORDER — SODIUM,POTASSIUM PHOSPHATES 280-250MG
2 POWDER IN PACKET (EA) ORAL ONCE
Status: COMPLETED | OUTPATIENT
Start: 2018-05-23 | End: 2018-05-23

## 2018-05-23 RX ORDER — BISACODYL 10 MG
10 SUPPOSITORY, RECTAL RECTAL ONCE
Status: DISCONTINUED | OUTPATIENT
Start: 2018-05-23 | End: 2018-05-23 | Stop reason: HOSPADM

## 2018-05-23 RX ORDER — LOSARTAN POTASSIUM 50 MG/1
50 TABLET ORAL DAILY
Status: DISCONTINUED | OUTPATIENT
Start: 2018-05-24 | End: 2018-06-04

## 2018-05-23 RX ORDER — BISACODYL 10 MG
10 SUPPOSITORY, RECTAL RECTAL DAILY PRN
Status: CANCELLED | OUTPATIENT
Start: 2018-05-23

## 2018-05-23 RX ORDER — OXYCODONE HYDROCHLORIDE 5 MG/1
5 TABLET ORAL EVERY 6 HOURS PRN
Status: CANCELLED | OUTPATIENT
Start: 2018-05-23

## 2018-05-23 RX ORDER — AMOXICILLIN 250 MG
1 CAPSULE ORAL 2 TIMES DAILY
Status: CANCELLED | OUTPATIENT
Start: 2018-05-23

## 2018-05-23 RX ORDER — ENOXAPARIN SODIUM 100 MG/ML
40 INJECTION SUBCUTANEOUS EVERY 24 HOURS
Status: CANCELLED | OUTPATIENT
Start: 2018-05-23

## 2018-05-23 RX ORDER — PRAVASTATIN SODIUM 20 MG/1
20 TABLET ORAL NIGHTLY
Status: DISCONTINUED | OUTPATIENT
Start: 2018-05-23 | End: 2018-06-11 | Stop reason: HOSPADM

## 2018-05-23 RX ORDER — OXYBUTYNIN CHLORIDE 10 MG/1
10 TABLET, EXTENDED RELEASE ORAL DAILY
Status: DISCONTINUED | OUTPATIENT
Start: 2018-05-24 | End: 2018-06-11 | Stop reason: HOSPADM

## 2018-05-23 RX ORDER — CALCIUM CARBONATE 200(500)MG
500 TABLET,CHEWABLE ORAL 2 TIMES DAILY PRN
Status: DISCONTINUED | OUTPATIENT
Start: 2018-05-23 | End: 2018-06-11 | Stop reason: HOSPADM

## 2018-05-23 RX ORDER — ONDANSETRON 8 MG/1
8 TABLET, ORALLY DISINTEGRATING ORAL EVERY 8 HOURS PRN
Status: DISCONTINUED | OUTPATIENT
Start: 2018-05-23 | End: 2018-06-11 | Stop reason: HOSPADM

## 2018-05-23 RX ORDER — CIPROFLOXACIN 500 MG/1
500 TABLET ORAL EVERY 12 HOURS
Status: CANCELLED | OUTPATIENT
Start: 2018-05-23 | End: 2018-05-29

## 2018-05-23 RX ORDER — ENOXAPARIN SODIUM 100 MG/ML
40 INJECTION SUBCUTANEOUS EVERY 24 HOURS
Status: DISCONTINUED | OUTPATIENT
Start: 2018-05-23 | End: 2018-06-11 | Stop reason: HOSPADM

## 2018-05-23 RX ORDER — CIPROFLOXACIN 500 MG/1
500 TABLET ORAL EVERY 12 HOURS
Status: COMPLETED | OUTPATIENT
Start: 2018-05-23 | End: 2018-05-29

## 2018-05-23 RX ORDER — OXYCODONE HYDROCHLORIDE 5 MG/1
5 TABLET ORAL EVERY 6 HOURS PRN
Status: DISCONTINUED | OUTPATIENT
Start: 2018-05-23 | End: 2018-06-11 | Stop reason: HOSPADM

## 2018-05-23 RX ORDER — LOSARTAN POTASSIUM 50 MG/1
50 TABLET ORAL DAILY
Status: CANCELLED | OUTPATIENT
Start: 2018-05-24

## 2018-05-23 RX ORDER — POLYETHYLENE GLYCOL 3350 17 G/17G
17 POWDER, FOR SOLUTION ORAL DAILY
Status: DISCONTINUED | OUTPATIENT
Start: 2018-05-24 | End: 2018-06-11 | Stop reason: HOSPADM

## 2018-05-23 RX ORDER — CETIRIZINE HYDROCHLORIDE 10 MG/1
10 TABLET ORAL DAILY
Status: CANCELLED | OUTPATIENT
Start: 2018-05-24

## 2018-05-23 RX ORDER — RAMELTEON 8 MG/1
8 TABLET ORAL NIGHTLY PRN
Status: DISCONTINUED | OUTPATIENT
Start: 2018-05-23 | End: 2018-06-11 | Stop reason: HOSPADM

## 2018-05-23 RX ORDER — ACETAMINOPHEN 325 MG/1
650 TABLET ORAL EVERY 6 HOURS PRN
Status: CANCELLED | OUTPATIENT
Start: 2018-05-23

## 2018-05-23 RX ORDER — PREGABALIN 75 MG/1
75 CAPSULE ORAL NIGHTLY
Status: CANCELLED | OUTPATIENT
Start: 2018-05-23

## 2018-05-23 RX ORDER — AMOXICILLIN 250 MG
1 CAPSULE ORAL 2 TIMES DAILY
Status: DISCONTINUED | OUTPATIENT
Start: 2018-05-23 | End: 2018-06-11 | Stop reason: HOSPADM

## 2018-05-23 RX ORDER — ESCITALOPRAM OXALATE 5 MG/1
5 TABLET ORAL DAILY
Status: CANCELLED | OUTPATIENT
Start: 2018-05-24

## 2018-05-23 RX ORDER — PRAVASTATIN SODIUM 20 MG/1
20 TABLET ORAL NIGHTLY
Status: CANCELLED | OUTPATIENT
Start: 2018-05-23

## 2018-05-23 RX ORDER — CHOLECALCIFEROL (VITAMIN D3) 25 MCG
2000 TABLET ORAL DAILY
Status: CANCELLED | OUTPATIENT
Start: 2018-05-24

## 2018-05-23 RX ORDER — ACETAMINOPHEN 325 MG/1
650 TABLET ORAL EVERY 6 HOURS PRN
Status: DISCONTINUED | OUTPATIENT
Start: 2018-05-23 | End: 2018-05-23 | Stop reason: SDUPTHER

## 2018-05-23 RX ORDER — ONDANSETRON 8 MG/1
8 TABLET, ORALLY DISINTEGRATING ORAL EVERY 8 HOURS PRN
Status: CANCELLED | OUTPATIENT
Start: 2018-05-23

## 2018-05-23 RX ORDER — RAMELTEON 8 MG/1
8 TABLET ORAL NIGHTLY PRN
Status: CANCELLED | OUTPATIENT
Start: 2018-05-23

## 2018-05-23 RX ORDER — CHOLECALCIFEROL (VITAMIN D3) 25 MCG
2000 TABLET ORAL DAILY
Status: DISCONTINUED | OUTPATIENT
Start: 2018-05-24 | End: 2018-06-11 | Stop reason: HOSPADM

## 2018-05-23 RX ORDER — ACETAMINOPHEN 325 MG/1
650 TABLET ORAL EVERY 6 HOURS PRN
Status: DISCONTINUED | OUTPATIENT
Start: 2018-05-23 | End: 2018-06-11 | Stop reason: HOSPADM

## 2018-05-23 RX ORDER — BISACODYL 10 MG
10 SUPPOSITORY, RECTAL RECTAL DAILY PRN
Status: DISCONTINUED | OUTPATIENT
Start: 2018-05-23 | End: 2018-06-11 | Stop reason: HOSPADM

## 2018-05-23 RX ORDER — PANTOPRAZOLE SODIUM 40 MG/1
40 TABLET, DELAYED RELEASE ORAL DAILY
Status: CANCELLED | OUTPATIENT
Start: 2018-05-24

## 2018-05-23 RX ORDER — PANTOPRAZOLE SODIUM 40 MG/1
40 TABLET, DELAYED RELEASE ORAL DAILY
Status: DISCONTINUED | OUTPATIENT
Start: 2018-05-24 | End: 2018-06-11 | Stop reason: HOSPADM

## 2018-05-23 RX ORDER — CETIRIZINE HYDROCHLORIDE 5 MG/1
10 TABLET ORAL DAILY
Status: DISCONTINUED | OUTPATIENT
Start: 2018-05-24 | End: 2018-06-11 | Stop reason: HOSPADM

## 2018-05-23 RX ORDER — PREGABALIN 75 MG/1
75 CAPSULE ORAL NIGHTLY
Status: DISCONTINUED | OUTPATIENT
Start: 2018-05-23 | End: 2018-06-11 | Stop reason: HOSPADM

## 2018-05-23 RX ORDER — OXYBUTYNIN CHLORIDE 10 MG/1
10 TABLET, EXTENDED RELEASE ORAL DAILY
Status: CANCELLED | OUTPATIENT
Start: 2018-05-24

## 2018-05-23 RX ORDER — CALCIUM CARBONATE 200(500)MG
500 TABLET,CHEWABLE ORAL 2 TIMES DAILY PRN
Status: CANCELLED | OUTPATIENT
Start: 2018-05-23

## 2018-05-23 RX ORDER — ESCITALOPRAM OXALATE 5 MG/1
5 TABLET ORAL DAILY
Status: DISCONTINUED | OUTPATIENT
Start: 2018-05-24 | End: 2018-05-24

## 2018-05-23 RX ORDER — POLYETHYLENE GLYCOL 3350 17 G/17G
17 POWDER, FOR SOLUTION ORAL DAILY
Status: CANCELLED | OUTPATIENT
Start: 2018-05-24

## 2018-05-23 RX ADMIN — OXYBUTYNIN CHLORIDE 10 MG: 10 TABLET, FILM COATED, EXTENDED RELEASE ORAL at 10:05

## 2018-05-23 RX ADMIN — PANTOPRAZOLE SODIUM 40 MG: 40 TABLET, DELAYED RELEASE ORAL at 10:05

## 2018-05-23 RX ADMIN — VITAMIN D, TAB 1000IU (100/BT) 2000 UNITS: 25 TAB at 10:05

## 2018-05-23 RX ADMIN — POLYETHYLENE GLYCOL 3350 17 G: 17 POWDER, FOR SOLUTION ORAL at 10:05

## 2018-05-23 RX ADMIN — CETIRIZINE HYDROCHLORIDE 10 MG: 10 TABLET, FILM COATED ORAL at 10:05

## 2018-05-23 RX ADMIN — PRAVASTATIN SODIUM 20 MG: 20 TABLET ORAL at 09:05

## 2018-05-23 RX ADMIN — ACETAMINOPHEN 1000 MG: 500 TABLET ORAL at 06:05

## 2018-05-23 RX ADMIN — ENOXAPARIN SODIUM 40 MG: 100 INJECTION SUBCUTANEOUS at 06:05

## 2018-05-23 RX ADMIN — CIPROFLOXACIN HYDROCHLORIDE 500 MG: 500 TABLET, FILM COATED ORAL at 09:05

## 2018-05-23 RX ADMIN — CIPROFLOXACIN HYDROCHLORIDE 500 MG: 500 TABLET, FILM COATED ORAL at 10:05

## 2018-05-23 RX ADMIN — OXYCODONE HYDROCHLORIDE 5 MG: 5 TABLET ORAL at 07:05

## 2018-05-23 RX ADMIN — PREGABALIN 75 MG: 75 CAPSULE ORAL at 09:05

## 2018-05-23 RX ADMIN — POTASSIUM & SODIUM PHOSPHATES POWDER PACK 280-160-250 MG 2 PACKET: 280-160-250 PACK at 10:05

## 2018-05-23 RX ADMIN — ACETAMINOPHEN 1000 MG: 500 TABLET ORAL at 01:05

## 2018-05-23 RX ADMIN — ROPIVACAINE HYDROCHLORIDE 10 ML/HR: 2 INJECTION, SOLUTION EPIDURAL; INFILTRATION at 02:05

## 2018-05-23 RX ADMIN — LOSARTAN POTASSIUM 50 MG: 50 TABLET, FILM COATED ORAL at 10:05

## 2018-05-23 NOTE — PLAN OF CARE
12:47 PM  SW received notification that pt has been approved for OSNF. Informed PA.     Merced Baker, LIV   [facility]  Ext [phone]

## 2018-05-23 NOTE — ADDENDUM NOTE
Addendum  created 05/23/18 0954 by Jaquelin Henriquez MD    Charge Capture section accepted, Cosign clinical note with attestation

## 2018-05-23 NOTE — ASSESSMENT & PLAN NOTE
- Patient asymptomatic. Patient followed by her primary care physician and gets 2 D echo every 2 years to follow and EF and ERENDIRA ha been stable.

## 2018-05-23 NOTE — ASSESSMENT & PLAN NOTE
- Controlled. Patient on Omeprazole as outpatient and will switch to Pantoprazole 40 mg po daily while in hospital to treat.

## 2018-05-23 NOTE — PLAN OF CARE
2:02 PM  SW received notification from Ayana with OSNF that nurse can call report to [phone].     Arranged transportation with [facility]. [facility] will arrive at 3:00PM.     Informed RN, Janeth.     Merced Baker, LIV   [facility]  Ext [phone]

## 2018-05-23 NOTE — PROGRESS NOTES
Called OS[facility] to give report on pt. [facility] nurse will call when ready for report. Informed that  is at 3:00pm.    1451- [facility] nurse called & report given. Will continue to monitor pt until transport arrives.

## 2018-05-23 NOTE — ADDENDUM NOTE
Addendum  created 05/23/18 1056 by Beverley Knox MD    Anesthesia Event edited, Anesthesia Intra LDAs edited, LDA properties accepted

## 2018-05-23 NOTE — PLAN OF CARE
Problem: Patient Care Overview  Goal: Plan of Care Review  Outcome: Ongoing (interventions implemented as appropriate)  Pt is AAOx4. VSS. No falls/injury as pt calls for assistance when needed. Currently sitting up in chair. No s/s of skin breakdown noted. No complaints of pain from patient- scheduled tylenol given. PNC discontinued this AM. Cipro antibiotic given. Bed in lowest position. Call light within reach. Will continue to monitor.

## 2018-05-23 NOTE — ASSESSMENT & PLAN NOTE
· Patient's blood pressure is controlled here in the hospital over past 24 hours.   · Goal for blood pressure is SBP < 150 and DBP < 90 as patient > or = 60 years of age with no diabetes or advanced kidney disease based on JNC 8 guidelines.   · Continue current treatment regimen of Losartan 50 mg po daily (home med) to treat.  · Patient previously taking HCTZ and held on admission with controlled BP well in hospital. Recommend to discontinue HCTZ on discharge as patient endorses tendency to have dehydration. Follow up with PCP regarding ongoing BP control.   · Plan is to monitor patient's blood pressure routinely while patient is hospitalized.

## 2018-05-23 NOTE — ASSESSMENT & PLAN NOTE
· Present on admit. Patient received IV Rocephin 1 gram daily since admit on 5/19.   · Final Urine culture grew > 100,000 with Enterococcus faecalis on 5/22 and IV Rocephin not sensitive so patient switched to oral Cipro 500 mg po BID on 5/22 and plan to treat for 7 days with end date of 5/29/2018.   · Patient currently with no urinary symptoms but history of frequent UTIs.   · Per patient, she has recurrent UTI and has discussed with PCP discontinuation of HCTZ to prevent dehydration that may be contributing. (See plan for HTN).

## 2018-05-23 NOTE — ANESTHESIA POST-OP PAIN MANAGEMENT
Acute Pain Service Progress Note    Mine Wilkins is a 83 y.o., female, [id number].    Surgery:  IM NAILING OF FEMUR- RIGHT  (Right)     Post Op Day #: 3     Catheter type: perineural  SIFI     Infusion type: Ropivacaine 0.2%  10 basal    Problem List:    Active Hospital Problems    Diagnosis  POA    *Closed displaced intertrochanteric fracture of right femur with routine healing s/p IM nail on 5/20/2018 [S72.141D]  Not Applicable    Encounter for aftercare for healing closed traumatic fracture of right hip [S72.001D]  Not Applicable    Urinary tract infection due to Enterococcus faecalis [N39.0, B95.2]  Yes    Hypophosphatemia [E83.39]  Yes    Gastroesophageal reflux disease without esophagitis [K21.9]  Yes    Anxiety [F41.9]  Yes    Aortic valve stenosis, moderate [I35.0]  Yes     Chronic    Pure hypercholesterolemia [E78.00]  Yes    Essential hypertension [I10]  Yes    Age-related osteoporosis with current pathological fracture with routine healing [M80.00XD]  Not Applicable      Resolved Hospital Problems    Diagnosis Date Resolved POA   No resolved problems to display.     Subjective:                 General appearance of alert, oriented, no complaints              Pain with rest: 1    Numbers              Pain with movement:5 Numbers               Side Effects                          1. Pruritis No                          2. Nausea No                          3. Motor Blockade No, 1=Ability to bend knees and ankles                          4. Sedation No, 1=awake and alert     Objective:                 Catheter site clean, dry, intact      Vitals   Vitals:    05/23/18 0623   BP: (!) 178/84   Pulse: 85   Resp: 18   Temp: 37 °C (98.6 °F)        Labs    Admission on 05/19/2018   No results displayed because visit has over 200 results.           Meds   Current Facility-Administered Medications   Medication Dose Route Frequency Provider Last Rate Last Dose    acetaminophen tablet 1,000 mg   1,000 mg Oral Q8H Jason Waggoner MD   1,000 mg at 05/23/18 0620    bisacodyl suppository 10 mg  10 mg Rectal Daily PRN Quique Bernal MD        cetirizine tablet 10 mg  10 mg Oral Daily Yisel Hernandez MD   10 mg at 05/22/18 0936    ciprofloxacin HCl tablet 500 mg  500 mg Oral Q12H Meena La MD   500 mg at 05/22/18 2145    enoxaparin injection 40 mg  40 mg Subcutaneous Daily Jason Waggoner MD   40 mg at 05/22/18 1725    escitalopram oxalate tablet 5 mg  5 mg Oral Daily Yisel Hernandez MD   5 mg at 05/22/18 0936    losartan tablet 50 mg  50 mg Oral Daily Yiesl Hernandez MD   50 mg at 05/22/18 0936    methocarbamol tablet 1,000 mg  1,000 mg Oral Q6H PRN Quique Bernal MD        morphine injection 4 mg  4 mg Intravenous Q6H PRN Shahram Corea PA-C        ondansetron disintegrating tablet 8 mg  8 mg Oral Q8H PRN Quique Bernal MD        oxybutynin 24 hr tablet 10 mg  10 mg Oral Daily Yisel Hernandez MD   10 mg at 05/22/18 0936    oxyCODONE immediate release tablet 10 mg  10 mg Oral Q6H PRN Shahram Corea PA-C   10 mg at 05/21/18 0558    oxyCODONE immediate release tablet 5 mg  5 mg Oral Q6H PRN Shahram Corea PA-C   5 mg at 05/23/18 0707    pantoprazole EC tablet 40 mg  40 mg Oral Daily Yisel Hernandez MD   40 mg at 05/22/18 0936    polyethylene glycol packet 17 g  17 g Oral Daily Quique Bernal MD   17 g at 05/22/18 0900    pravastatin tablet 20 mg  20 mg Oral QHS Yisel Hernandez MD   20 mg at 05/22/18 2145    pregabalin capsule 75 mg  75 mg Oral QHS Quique Bernal MD   75 mg at 05/22/18 2145    ramelteon tablet 8 mg  8 mg Oral Nightly PRN Quique Bernal MD        ropivacaine (PF) 2 mg/ml (0.2%) infusion  10 mL/hr Perineural Continuous Sandra Sood MD 10 mL/hr at 05/23/18 0203 10 mL/hr at 05/23/18 0203    senna-docusate 8.6-50 mg per tablet 1 tablet  1 tablet Oral BID Quique Bernal MD   1 tablet at 05/22/18 2179    sodium chloride  0.9% injection 3 mL  3 mL Intravenous Q8H PRN Quique Bernal MD        vitamin D 1000 units tablet 2,000 Units  2,000 Units Oral Daily Yisel Hernandez MD   2,000 Units at 05/22/18 0936        Anticoagulant dose lovenox 40    Assessment:     Pain control adequate    Plan:     Patient doing well, continue present treatment.   PNC paused this AM; pain continues to be excellently controlled. Given Oxy 5 this morning in preparation of PNC pause and PT. Plan to re-evaluate later this AM and possibly pull PNC.      Evaluator Beverley Knox

## 2018-05-23 NOTE — CONSULTS
OSNF consult approved for admit to SNF today Will need discharge re-admit Gen SNF orders prior to transfer. Spoke to daughter concerning patient going home to take care of spouse . Informed her that patient would not be able to take care of him at discharge. Daughter in agreement will have neighbor who is taking care of him now there. Daughter will go to home everyday after work to check on them. Daughter states her father does not need as much care as her mother was doing. Father is accutually independent with all ADL and ambulating with walker. Neighbor will be cooking for them and making sure  takes his medication. Daughter  in agreement with OSNF admit today.

## 2018-05-23 NOTE — PROGRESS NOTES
Patient admitted via w/c , aaox4, ndn, respirations unlabored at room air.vital signs stable, oral temperature 99.3, patient transfered to bed per this nurse. Patient instructed on fall precautions, call light, this unit, pt's rights and to always call prn to prevent falls or injuries, patient verbalized understanding. Call light in reach. Rt hip x3 incisions covered with aquacel dressing intact, only midle aquacel has a dot of sanguineous drainage visible to Aquacel dressing. And mild bruising present to rue and rt hip. Sacral and heels intact.

## 2018-05-23 NOTE — ASSESSMENT & PLAN NOTE
Encounter for aftercare for healing closed traumatic fracture of right hip  Patient is POD # 3  · Patient reports no pain in right hip.   · Plan is to continue PT/OT for gait training and strengthening and restoration of ADL's. Patient is FWB/WBAT: right lower extremity as per Orthopedic recommendations.   · Plan to continue Lovenox 40 mg subcutaneous daily for total of 28 days post-op for DVT prophylaxis after hip fracture surgery.   · Orthopedics is following and managing surgical site. Will arrange outpatient follow up in clinic.   · PT/OT recommending short term Skilled nursing facility and patient wants [facility] and referral sent. Patient discharged to [facility] 5/23/18 in stable condition.

## 2018-05-23 NOTE — PT/OT/SLP PROGRESS
Physical Therapy Treatment    Patient Name:  Mine Wilkins   MRN:  [id number]    Recommendations:     Discharge Recommendations:  nursing facility, skilled   Discharge Equipment Recommendations: walker, rolling   Barriers to discharge: Decreased caregiver support    Assessment:     Mine Wilkins is a 83 y.o. female admitted with a medical diagnosis of Closed displaced intertrochanteric fracture of right femur with routine healing.  She presents with the following impairments/functional limitations:  weakness, impaired self care skills, gait instability, impaired balance, impaired functional mobilty, decreased lower extremity function, decreased ROM, edema, pain, impaired joint extensibility.    -Pt tolerated session well today with no complications. Pt demonstrated increased gait distance and improved tolerance to supine exercises. Pt with no LOB during ambulation and t/f's using RW. Pt remains appropriate for SNF placement 2/2 decreased assistance at home.    Rehab Prognosis:  Good; patient would benefit from acute skilled PT services to address these deficits and reach maximum level of function.      Recent Surgery: Procedure(s) (LRB):  IM NAILING OF FEMUR- RIGHT  (Right) 3 Days Post-Op    Plan:     During this hospitalization, patient to be seen daily to address the above listed problems via gait training, therapeutic activities, therapeutic exercises, neuromuscular re-education  · Plan of Care Expires:  06/21/18   Plan of Care Reviewed with: patient    Subjective     Communicated with nsg prior to session.  Patient found supine upon PT entry to room, agreeable to treatment.      Chief Complaint: impaired functional mobility and (R) LE weakness  Patient comments/goals: return home to [facility]  Pain/Comfort:  · Pain Rating 1: 2/10  · Location - Side 1: Right  · Location - Orientation 1: generalized  · Location 1: hip  · Pain Addressed 1: Pre-medicate for activity, Cessation of Activity  · Pain Rating  Post-Intervention 1: 0/10    Patients cultural, spiritual, Rastafarian conflicts given the current situation: none stated    Objective:     Patient found with: FCD, perineural catheter     General Precautions: Standard, fall   Orthopedic Precautions:RLE weight bearing as tolerated   Braces: N/A     Functional Mobility:  · Bed Mobility:     · Scooting: stand by assistance  · Supine to Sit: stand by assistance  · Sit to Supine: contact guard assistance  · Transfers:     · Sit to Stand:  contact guard assistance with rolling walker  · Gait: 85' x1 trial using RW with CGA. Pt cued for upright posture.      AM-PAC 6 CLICK MOBILITY  Turning over in bed (including adjusting bedclothes, sheets and blankets)?: 4  Sitting down on and standing up from a chair with arms (e.g., wheelchair, bedside commode, etc.): 4  Moving from lying on back to sitting on the side of the bed?: 4  Moving to and from a bed to a chair (including a wheelchair)?: 4  Need to walk in hospital room?: 3  Climbing 3-5 steps with a railing?: 3  Total Score: 22       Therapeutic Activities and Exercises:   -Pt performed TKR protocol exercises x15 reps of:  A. AP  B. GS  C. QS  D. SAQ-AAROM  E. Heel slides-AAROM  F. LAQ-AAROM    -Pt educated on:  A. PT POC and role of PT  B. Importance of OOB activity to improve functional outcomes after surgery  C. Maintaining WBing status on affected LE during mobility  D. DME mgmt and t/f sequencing  E. Gait sequencing  F. Performing HEP to reduce the risk of developing blood clots         Patient left up in chair with all lines intact, call button in reach and nsg notified..    GOALS:    Physical Therapy Goals        Problem: Physical Therapy Goal    Goal Priority Disciplines Outcome Goal Variances Interventions   Physical Therapy Goal     PT/OT, PT Ongoing (interventions implemented as appropriate)     Description:  Goals to be met by: 5/21/18     Patient will increase functional independence with mobility by  performin. Supine to sit with Set-up [place]  2. Sit to supine with Set-up [place]  3. Sit to stand transfer with Supervision  4. Bed to chair transfer with Stand-by Assistance using Rolling Walker  5. Gait  x 100 feet with CGA using Rolling Walker.   6. Lower extremity exercise program x20-30 reps per handout, with assistance as needed.                        Time Tracking:     PT Received On: 18  PT Start Time: 909     PT Stop Time: 948  PT Total Time (min): 39 min     Billable Minutes: Gait Training 15, Therapeutic Activity 9 and Therapeutic Exercise 15    Treatment Type: Treatment  PT/PTA: PT           Hadley Santana, PT  2018

## 2018-05-23 NOTE — PT/OT/SLP PROGRESS
Occupational Therapy   Treatment/ Discharge Summary    Name: Mine Wilkins  MRN: [id number]  Admitting Diagnosis:  Closed displaced intertrochanteric fracture of right femur with routine healing  3 Days Post-Op    Recommendations:     Discharge Recommendations: nursing facility, skilled  Discharge Equipment Recommendations:  walker, rolling  Barriers to discharge:  Decreased caregiver support    Subjective     Communicated with: RN prior to session.  Pain/Comfort:  · Pain Rating 1: 0/10  · Location - Side 1: Right  · Location - Orientation 1: generalized  · Location 1: hip  · Pain Rating Post-Intervention 1: 0/10    Patients cultural, spiritual, Hoahaoism conflicts given the current situation: none stated     Objective:     Patient found with: FCD    General Precautions: Standard, fall   Orthopedic Precautions:RLE weight bearing as tolerated   Braces: N/A     Occupational Performance:    Bed Mobility:    · Pt UIC      Functional Mobility/Transfers:  · Patient completed Sit <> Stand Transfer with supervision  with  rolling walker   · Patient completed Toilet Transfer Stand Pivot technique with supervision with  rolling walker  · Functional Mobility: Pt ambulated household distance w/ Supervision and RW - no signs of LOB or SOB noted. Pt maintains good WB thru RLE and RW management     Activities of Daily Living:  · Grooming: modified independence brushing teeth and combing hair standing at sink   · Bathing: supervision cleaning and drying self standing at sink. Pt utilized sponge cloth to bathe self  · UB Dressing: modified independence donning pullover shirt in standing   · LB Dressing: stand by assistance donning underwear, pj pants, and socks sitting UIC then progressing to standing to complete tasks. Pt w/ou AD for pants but utilized AD for sock  · Toileting: modified independence for sharri-hygiene and clothing management at toilet    Patient left up in chair with all lines intact and call button in  reach    [facility] 6 Click: Self-care   [facility] Total Score: 24    Treatment & Education:  - OT POC & d/c from acute OT services - pt verbalized understanding and expressed no further questions or concerns  - Safe hand placement for functional transfers  - Compensatory strategy for LB dressing to facilitate task and promote functional independence  - Deep breathing during activity   Education:    Assessment:     Mine Wilkins is a 83 y.o. female with a medical diagnosis of Closed displaced intertrochanteric fracture of right femur with routine healing.  She presents with thw following performance deficits affecting function:  impaired endurance, impaired self care skills, impaired functional mobilty, gait instability, orthopedic precautions, decreased lower extremity function.      Pt tolerated session well. Pt has successfully achieved all functional outcomes and is safe to d/c from acute OT. Pt is motivated to optimize her recovery and works well w/ therapy. Pt will benefit from continued therapy at SNF to increase her strength/endurance and maximize her functional independence for a safe return home.     Rehab Prognosis:  Good; patient would benefit from acute skilled OT services to address these deficits and reach maximum level of function.       Plan:      D/c from acute OT - please reconsult if anything changes    · Plan of Care Expires: 06/20/18  · Plan of Care Reviewed with: patient    This Plan of care has been discussed with the patient who was involved in its development and understands and is in agreement with the identified goals and treatment plan    GOALS:    Occupational Therapy Goals     Not on file          Multidisciplinary Problems (Resolved)        Problem: Occupational Therapy Goal    Goal Priority Disciplines Outcome Interventions   Occupational Therapy Goal   (Resolved)     OT, PT/OT Outcome(s) achieved    Description:  Goals to be met by: 7 days      Patient will increase functional  independence with ADLs by performing:    UE Dressing with Supervision. - Met (5/23)  LE Dressing with Minimal Assistance and Assistive Devices as needed. -- Met (5/23)  Grooming while standing at sink with Supervision.  - Met (5/22)  Toileting from toilet with Stand-by Assistance for hygiene and clothing management.  - Met (5/22)  Supine to sit with Stand-by Assistance.  Stand pivot transfers with Supervision.   - Met (5/22)  Toilet transfer to toilet with Supervision.   - Met (5/22)                        Time Tracking:     OT Date of Treatment: 05/23/18  OT Start Time: 1335  OT Stop Time: 1413  OT Total Time (min): 38 min    Billable Minutes:Self Care/Home Management 30  Therapeutic Activity 8    Maria Isabel Linares, OT  5/23/2018

## 2018-05-23 NOTE — ASSESSMENT & PLAN NOTE
- phos 2.1 on 5/23/18. Will treat with Neutra-Phos 2 packets po for 1 dose(s) today  - patient encouraged to increase PO intake  - follow up routine lab monitoring on discharge

## 2018-05-23 NOTE — PLAN OF CARE
Problem: Occupational Therapy Goal  Goal: Occupational Therapy Goal  Goals to be met by: 7 days      Patient will increase functional independence with ADLs by performing:    UE Dressing with Supervision. - Met (5/23)  LE Dressing with Minimal Assistance and Assistive Devices as needed. -- Met (5/23)  Grooming while standing at sink with Supervision.  - Met (5/22)  Toileting from toilet with Stand-by Assistance for hygiene and clothing management.  - Met (5/22)  Supine to sit with Stand-by Assistance.  Stand pivot transfers with Supervision.   - Met (5/22)  Toilet transfer to toilet with Supervision.   - Met (5/22)      Outcome: Outcome(s) achieved Date Met: 05/23/18  Pt has successfully achieved all functional outcomes and is safe to d/c from acute OT at this time. Pt will benefit from continuing therapy at SNF once d/c'ed from hospital.     Comments: D/c from acute OT

## 2018-05-23 NOTE — PLAN OF CARE
POD 3 s/p IMN femur. PT/OT recommended SNF placement. Plans to discharge patient to [facility] today. Patient verbalized understanding. All questions and concerns addressed. SW and CM will continue to follow for any additional needs.    Future Appointments  Date Time Provider Department Center   6/5/2018 8:45 AM SIDNEY Scott MD [facility] SPORTS [place]   8/22/2018 9:30 AM LAB, STEFAN KENH LAB [place]   8/22/2018 10:00 AM SPEC[facility] SPECLAB [place]   8/29/2018 11:00 AM ZOEY Oswald MD [facility] [place]      05/23/18 9945   Final Note   Assessment Type Final Discharge Note   Discharge Disposition SNF  ([facility])   Hospital Follow Up  Appt(s) scheduled? Yes   Discharge plans and expectations educations in teach back method with documentation complete? Yes

## 2018-05-23 NOTE — DISCHARGE SUMMARY
[facility] Hospital Medicine  Discharge Summary      Patient Name: Mine Wilkins  MRN: [id number]  Admission Date: 5/19/2018  Hospital Length of Stay: 4 days  Discharge Date and Time:  05/23/2018 2:29 PM  Attending Physician: Meena La MD   Discharging Provider: Tammi Rodriguez PA-C  Primary Care Provider: MARVA Oswald MD  [facility] Medicine Team: [facility]OS[facility] MED H Tammi Rodriguez PA-C    HPI:   Ms. Mine Wilkins is a 83 y.o. female with moderate aortic stenosis, frequent UTIs and osteoporosis on oral Vitamin D and Fosamax to treat who presents to the ED via EMS after having a fall at [employer].  She was standing in the market line when she lost her balance and fell on her left hip.  She doesn't think she hit her head, because it doesn't hurt.  After the event, she was unable to stand or bear weight on the right leg.  She denies using any blood thinners.  She denies any chest pain or SOB.  She claims to be compliant with all of her medications.  XR done in the ED showed a displaced intertrochanteric closed fracture of right femur.  She was evaluated by Ortho who plan to take her to the OR allison for operative fixation.     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 Needs assistance in finding a short term placement or sitter for the  while the patient gets treatment for her fracture.    Procedure(s) (LRB):  IM NAILING OF FEMUR- RIGHT  (Right)      Hospital Course:   5/19 - Patient admitted to [facility] Hospital Medicine Team H: Hip Fracture team and started on Hip Fracture Pathway with Orthopedic surgery consult for hip fracture. Patient was seen and evaluated by Orthopedic surgery who recommended operative repair of hip fracture. Patient noted to have positive U/A on admit consistent with UTI and started on IV Rocephin 1 gram daily to treat and urine culture sent.  5/20 - Patient was  medically optimized prior to surgery and was taken to OR after optimization on 5/20/2018. Patient underwent right hip cephalomedullary nail fixation by Dr. Shahram Scott. Post-op patient WBAT to the right lower extremity as per Orthopedics recommendation. Patient placed on Lovenox 40 mg subcutaneous daily and ELKIN/SCD's for DVT prophylaxis post-op and will need for total of 28 days. Perineural pain catheter placed by Anesthesia Pain Service with continuous infusion of Ropivacaine to help with pain control post-op and Anesthesia Pain Service managing while patient in the hospital. PT/OT consulted post-op and evaluated patient.   5/21 - Patient progressing well with PT and OT and recommended skilled nursing facility placement. Patient wishes [facility] and consult placed.   5/22 - Patient with mild fever on 5/21 up to 100.5 F. Urine culture from admit grew > 100,000 Enterococcus Faecalis resistant to IV Rocephin and patient switched from Rocephin to oral Cipro 500 mg po BID for 7 days to treat UTI as Cipro was sensitive. Fever likely related to fact that IV Rocephin not effective in treating UTI as not sensitive to E. Faecalis.   5/23/18 - Patient discharged to [facility] in stable condition. She had bowel movement prior to discharge. Patient to follow up with Ortho in clinic as scheduled.      Consults:   Consults         Status Ordering Provider     Inpatient consult to Orthopedic Surgery  Once     Provider:  (Not yet assigned)    Completed HERIBERTO SNELL     Inpatient consult to [facility]  Once     Provider:  (Not yet assigned)    Acknowledged BEVERLY JORDAN     Inpatient consult to Social Work/Case Management  Once     Provider:  (Not yet assigned)    Acknowledged RAMA TRACY Closed displaced intertrochanteric fracture of right femur with routine healing s/p IM nail on 5/20/2018    Encounter for aftercare for healing closed traumatic fracture of right hip  Patient is POD # 3  · Patient  reports no pain in right hip.   · Plan is to continue PT/OT for gait training and strengthening and restoration of ADL's. Patient is FWB/WBAT: right lower extremity as per Orthopedic recommendations.   · Plan to continue Lovenox 40 mg subcutaneous daily for total of 28 days post-op for DVT prophylaxis after hip fracture surgery.   · Orthopedics is following and managing surgical site. Will arrange outpatient follow up in clinic.   · PT/OT recommending short term Skilled nursing facility and patient wants [facility] and referral sent. Patient discharged to [facility] 5/23/18 in stable condition.        Hypophosphatemia    - phos 2.1 on 5/23/18. Will treat with Neutra-Phos 2 packets po for 1 dose(s) today  - patient encouraged to increase PO intake  - follow up routine lab monitoring on discharge        Urinary tract infection due to Enterococcus faecalis    · Present on admit. Patient received IV Rocephin 1 gram daily since admit on 5/19.   · Final Urine culture grew > 100,000 with Enterococcus faecalis on 5/22 and IV Rocephin not sensitive so patient switched to oral Cipro 500 mg po BID on 5/22 and plan to treat for 7 days with end date of 5/29/2018.   · Patient currently with no urinary symptoms but history of frequent UTIs.   · Per patient, she has recurrent UTI and has discussed with PCP discontinuation of HCTZ to prevent dehydration that may be contributing. (See plan for HTN).         Gastroesophageal reflux disease without esophagitis    - Controlled. Patient on Omeprazole as outpatient and will switch to Pantoprazole 40 mg po daily while in hospital to treat.         Anxiety    - Controlled. Continue Lexapro 5 mg po daily to treat as patient taking as outpatient.         Aortic valve stenosis, moderate    - Patient asymptomatic. Patient followed by her primary care physician and gets 2 D echo every 2 years to follow and EF and ERENDIRA ha been stable.         Age-related osteoporosis with current pathological  fracture with routine healing    Patient admitted with hip fracture related to osteoporosis.   · Patient needs treatment of osteoporosis as patient has fragility fracture of hip that is associated with osteoporosis.  · Patient's Vitamin D level is adequate so no additional Vitamin D replacement therapy is needed.  · Patient will need outpatient DEXA scan and further evaluation for additional treatment of osteoporosis.   · Will refer patient to Orthopedic Fracture [facility] on discharge for further evaluation and management of osteoporosis.         Pure hypercholesterolemia    - Controlled. Continue Pravachol 20 mg po daily to treat as patient on as outpatient.         Essential hypertension    · Patient's blood pressure is controlled here in the hospital over past 24 hours.   · Goal for blood pressure is SBP < 150 and DBP < 90 as patient > or = 60 years of age with no diabetes or advanced kidney disease based on JNC 8 guidelines.   · Continue current treatment regimen of Losartan 50 mg po daily (home med) to treat.  · Patient previously taking HCTZ and held on admission with controlled BP well in hospital. Recommend to discontinue HCTZ on discharge as patient endorses tendency to have dehydration. Follow up with PCP regarding ongoing BP control.   · Plan is to monitor patient's blood pressure routinely while patient is hospitalized.           Final Active Diagnoses:    Diagnosis Date Noted POA    PRINCIPAL PROBLEM:  Closed displaced intertrochanteric fracture of right femur with routine healing s/p IM nail on 5/20/2018 [S72.141D] 05/19/2018 Not Applicable    Encounter for aftercare for healing closed traumatic fracture of right hip [S72.001D] 05/22/2018 Not Applicable    Urinary tract infection due to Enterococcus faecalis [N39.0, B95.2] 05/22/2018 Yes    Hypophosphatemia [E83.39] 05/22/2018 Yes    Gastroesophageal reflux disease without esophagitis [K21.9] 04/13/2018 Yes    Anxiety [F41.9] 05/10/2017 Yes     Aortic valve stenosis, moderate [I35.0]  Yes     Chronic    Pure hypercholesterolemia [E78.00]  Yes    Essential hypertension [I10]  Yes    Age-related osteoporosis with current pathological fracture with routine healing [M80.00XD]  Not Applicable      Problems Resolved During this Admission:    Diagnosis Date Noted Date Resolved POA       Discharged Condition: good    Disposition: Skilled Nursing Facility    Follow Up:     Future Appointments  Date Time Provider Department Center   6/5/2018 8:45 AM SIDNEY Scott MD [facility] SPORTS [place]   8/22/2018 9:30 AM LAB, STEFAN GAL LAB [place]   8/22/2018 10:00 AM SPECIMEN, [place] GAL SPECLAB [place]   8/29/2018 11:00 AM ZOEY Oswald MD [facility] [place]        Patient Instructions:   No discharge procedures on file.    Significant Diagnostic Studies: Labs:   BMP:   Recent Labs  Lab 05/22/18  0504 05/23/18  0501    103   * 135*   K 3.8 4.1    103   CO2 24 25   BUN 13 11   CREATININE 0.7 0.7   CALCIUM 8.6* 8.6*   MG 1.9 2.1   , CBC   Recent Labs  Lab 05/22/18  0504 05/23/18  0501   WBC 10.26 9.27   HGB 9.0* 8.2*   HCT 27.0* 25.2*    268    and All labs within the past 24 hours have been reviewed  Microbiology:   Urine Culture    Lab Results   Component Value Date    LABURIN ENTEROCOCCUS FAECALIS  >100,000 cfu/ml   05/19/2018       Pending Diagnostic Studies:     None         Medications:  Transfer Medications (for Discharge Readmit only):   Current Facility-Administered Medications   Medication Dose Route Frequency Provider Last Rate Last Dose    acetaminophen tablet 1,000 mg  1,000 mg Oral Q8H Jason Waggoner MD   1,000 mg at 05/23/18 1314    bisacodyl suppository 10 mg  10 mg Rectal Daily PRN Quique Bernal MD        bisacodyl suppository 10 mg  10 mg Rectal Once Tammi Rodriguez PA-C        cetirizine tablet 10 mg  10 mg Oral Daily Yisel Hernandez MD   10 mg at 05/23/18 1005    ciprofloxacin HCl tablet 500 mg   500 mg Oral Q12H Meena La MD   500 mg at 05/23/18 1006    enoxaparin injection 40 mg  40 mg Subcutaneous Daily Jason Clifton Waggoner MD   40 mg at 05/22/18 1725    escitalopram oxalate tablet 5 mg  5 mg Oral Daily Yisel Hernandez MD   5 mg at 05/22/18 0936    losartan tablet 50 mg  50 mg Oral Daily Yisel Hernandez MD   50 mg at 05/23/18 1006    methocarbamol tablet 1,000 mg  1,000 mg Oral Q6H PRN Quique Bernal MD        morphine injection 4 mg  4 mg Intravenous Q6H PRN Shahram Corea PA-C        ondansetron disintegrating tablet 8 mg  8 mg Oral Q8H PRN Quique Bernal MD        oxybutynin 24 hr tablet 10 mg  10 mg Oral Daily Yisel Hernandez MD   10 mg at 05/23/18 1006    oxyCODONE immediate release tablet 10 mg  10 mg Oral Q6H PRN Shahram Corea PA-C   10 mg at 05/21/18 0558    oxyCODONE immediate release tablet 5 mg  5 mg Oral Q6H PRN Shahram Corea PA-C   5 mg at 05/23/18 0707    pantoprazole EC tablet 40 mg  40 mg Oral Daily Yisel Hernandez MD   40 mg at 05/23/18 1006    polyethylene glycol packet 17 g  17 g Oral Daily Quique Bernal MD   17 g at 05/23/18 1004    pravastatin tablet 20 mg  20 mg Oral QHS Yisel Hernandez MD   20 mg at 05/22/18 2145    pregabalin capsule 75 mg  75 mg Oral QHS Quique Bernal MD   75 mg at 05/22/18 2145    ramelteon tablet 8 mg  8 mg Oral Nightly PRN Quique Bernal MD        ropivacaine (PF) 2 mg/ml (0.2%) infusion  10 mL/hr Perineural Continuous Sandra Sood MD 10 mL/hr at 05/23/18 0203 10 mL/hr at 05/23/18 0203    senna-docusate 8.6-50 mg per tablet 1 tablet  1 tablet Oral BID Quique Bernal MD   1 tablet at 05/22/18 2145    sodium chloride 0.9% injection 3 mL  3 mL Intravenous Q8H PRN Quique Bernal MD        vitamin D 1000 units tablet 2,000 Units  2,000 Units Oral Daily Yisel Hernandez MD   2,000 Units at 05/23/18 1006       Indwelling Lines/Drains at time of discharge:   Lines/Drains/Airways          No matching  active lines, drains, or airways          Time spent on the discharge of patient: 34 minutes  Patient was seen and examined on the date of discharge and determined to be suitable for discharge.         Tammi Rodriguez PA-C  Department of Hospital Medicine  [facility]

## 2018-05-24 ENCOUNTER — PATIENT OUTREACH (OUTPATIENT)
Dept: ADMINISTRATIVE | Facility: CLINIC | Age: 83
End: 2018-05-24

## 2018-05-24 PROBLEM — K11.7 XEROSTOMIA: Status: ACTIVE | Noted: 2018-05-24

## 2018-05-24 LAB
ANION GAP SERPL CALC-SCNC: 8 MMOL/L
BASOPHILS # BLD AUTO: 0.04 K/UL
BASOPHILS NFR BLD: 0.4 %
BUN SERPL-MCNC: 12 MG/DL
CALCIUM SERPL-MCNC: 8.8 MG/DL
CHLORIDE SERPL-SCNC: 103 MMOL/L
CO2 SERPL-SCNC: 25 MMOL/L
CREAT SERPL-MCNC: 0.7 MG/DL
DIFFERENTIAL METHOD: ABNORMAL
EOSINOPHIL # BLD AUTO: 0.2 K/UL
EOSINOPHIL NFR BLD: 2 %
ERYTHROCYTE [DISTWIDTH] IN BLOOD BY AUTOMATED COUNT: 14.7 %
EST. GFR  (AFRICAN AMERICAN): >60 ML/MIN/1.73 M^2
EST. GFR  (NON AFRICAN AMERICAN): >60 ML/MIN/1.73 M^2
GLUCOSE SERPL-MCNC: 104 MG/DL
HCT VFR BLD AUTO: 25.6 %
HGB BLD-MCNC: 8.4 G/DL
IMM GRANULOCYTES # BLD AUTO: 0.05 K/UL
IMM GRANULOCYTES NFR BLD AUTO: 0.5 %
LYMPHOCYTES # BLD AUTO: 1.4 K/UL
LYMPHOCYTES NFR BLD: 15.2 %
MAGNESIUM SERPL-MCNC: 1.9 MG/DL
MCH RBC QN AUTO: 27.5 PG
MCHC RBC AUTO-ENTMCNC: 32.8 G/DL
MCV RBC AUTO: 84 FL
MONOCYTES # BLD AUTO: 0.9 K/UL
MONOCYTES NFR BLD: 9.3 %
NEUTROPHILS # BLD AUTO: 6.8 K/UL
NEUTROPHILS NFR BLD: 72.6 %
NRBC BLD-RTO: 0 /100 WBC
PHOSPHATE SERPL-MCNC: 2.5 MG/DL
PLATELET # BLD AUTO: 328 K/UL
PMV BLD AUTO: 10.6 FL
POTASSIUM SERPL-SCNC: 3.6 MMOL/L
RBC # BLD AUTO: 3.06 M/UL
SODIUM SERPL-SCNC: 136 MMOL/L
WBC # BLD AUTO: 9.43 K/UL

## 2018-05-24 PROCEDURE — 25000003 PHARM REV CODE 250: Performed by: PHYSICIAN ASSISTANT

## 2018-05-24 PROCEDURE — 97165 OT EVAL LOW COMPLEX 30 MIN: CPT

## 2018-05-24 PROCEDURE — 11000004 HC SNF PRIVATE

## 2018-05-24 PROCEDURE — 36415 COLL VENOUS BLD VENIPUNCTURE: CPT

## 2018-05-24 PROCEDURE — 80048 BASIC METABOLIC PNL TOTAL CA: CPT

## 2018-05-24 PROCEDURE — 63600175 PHARM REV CODE 636 W HCPCS: Performed by: PHYSICIAN ASSISTANT

## 2018-05-24 PROCEDURE — 97530 THERAPEUTIC ACTIVITIES: CPT

## 2018-05-24 PROCEDURE — 85025 COMPLETE CBC W/AUTO DIFF WBC: CPT

## 2018-05-24 PROCEDURE — 84100 ASSAY OF PHOSPHORUS: CPT

## 2018-05-24 PROCEDURE — 97116 GAIT TRAINING THERAPY: CPT

## 2018-05-24 PROCEDURE — 83735 ASSAY OF MAGNESIUM: CPT

## 2018-05-24 PROCEDURE — 25000003 PHARM REV CODE 250: Performed by: INTERNAL MEDICINE

## 2018-05-24 PROCEDURE — 97535 SELF CARE MNGMENT TRAINING: CPT

## 2018-05-24 PROCEDURE — 97802 MEDICAL NUTRITION INDIV IN: CPT

## 2018-05-24 PROCEDURE — 97110 THERAPEUTIC EXERCISES: CPT

## 2018-05-24 PROCEDURE — 97161 PT EVAL LOW COMPLEX 20 MIN: CPT

## 2018-05-24 PROCEDURE — 99305 1ST NF CARE MODERATE MDM 35: CPT | Mod: AI,,, | Performed by: INTERNAL MEDICINE

## 2018-05-24 RX ORDER — POTASSIUM CHLORIDE 750 MG/1
30 CAPSULE, EXTENDED RELEASE ORAL ONCE
Status: COMPLETED | OUTPATIENT
Start: 2018-05-24 | End: 2018-05-24

## 2018-05-24 RX ORDER — ESCITALOPRAM OXALATE 5 MG/1
5 TABLET ORAL NIGHTLY
Status: DISCONTINUED | OUTPATIENT
Start: 2018-05-25 | End: 2018-06-11 | Stop reason: HOSPADM

## 2018-05-24 RX ORDER — SODIUM,POTASSIUM PHOSPHATES 280-250MG
1 POWDER IN PACKET (EA) ORAL
Status: DISPENSED | OUTPATIENT
Start: 2018-05-24 | End: 2018-05-25

## 2018-05-24 RX ADMIN — OXYCODONE HYDROCHLORIDE 5 MG: 5 TABLET ORAL at 09:05

## 2018-05-24 RX ADMIN — POTASSIUM & SODIUM PHOSPHATES POWDER PACK 280-160-250 MG 1 PACKET: 280-160-250 PACK at 01:05

## 2018-05-24 RX ADMIN — POTASSIUM & SODIUM PHOSPHATES POWDER PACK 280-160-250 MG 1 PACKET: 280-160-250 PACK at 04:05

## 2018-05-24 RX ADMIN — PANTOPRAZOLE SODIUM 40 MG: 40 TABLET, DELAYED RELEASE ORAL at 09:05

## 2018-05-24 RX ADMIN — POTASSIUM CHLORIDE 30 MEQ: 750 CAPSULE, EXTENDED RELEASE ORAL at 01:05

## 2018-05-24 RX ADMIN — PRAVASTATIN SODIUM 20 MG: 20 TABLET ORAL at 10:05

## 2018-05-24 RX ADMIN — CIPROFLOXACIN HYDROCHLORIDE 500 MG: 500 TABLET, FILM COATED ORAL at 10:05

## 2018-05-24 RX ADMIN — ENOXAPARIN SODIUM 40 MG: 100 INJECTION SUBCUTANEOUS at 04:05

## 2018-05-24 RX ADMIN — PREGABALIN 75 MG: 75 CAPSULE ORAL at 10:05

## 2018-05-24 RX ADMIN — Medication 2000 UNITS: at 09:05

## 2018-05-24 RX ADMIN — LOSARTAN POTASSIUM 50 MG: 50 TABLET, FILM COATED ORAL at 09:05

## 2018-05-24 RX ADMIN — CIPROFLOXACIN HYDROCHLORIDE 500 MG: 500 TABLET, FILM COATED ORAL at 09:05

## 2018-05-24 RX ADMIN — OXYBUTYNIN CHLORIDE 10 MG: 10 TABLET, FILM COATED, EXTENDED RELEASE ORAL at 09:05

## 2018-05-24 RX ADMIN — CETIRIZINE HYDROCHLORIDE 10 MG: 5 TABLET, FILM COATED ORAL at 09:05

## 2018-05-24 NOTE — ASSESSMENT & PLAN NOTE
Likely due to oxybutynin which has been effective in controlling urinary symptoms and patient wishes to continue  Will order artificial saliva before meals

## 2018-05-24 NOTE — ASSESSMENT & PLAN NOTE
Chronic and stable  Continue therapy with losartan  -will continue to monitor and adjust regimen as necessary

## 2018-05-24 NOTE — PLAN OF CARE
Problem: Occupational Therapy Goal  Goal: Occupational Therapy Goal  Goals to be met by: 14 days     Patient will increase functional independence with ADLs by performing:    UE Dressing with Modified [place].  LE Dressing with Modified [place] /c AE.  Grooming while standing at sink with Modified [place].  Toileting from bedside commode with Modified [place] for hygiene and clothing management.   Bathing with Stand-by Assistance.  Supine to sit with Modified [place] /c HOB flat and no handrails.  Stand pivot transfers with Modified [place].  Toilet transfer to bedside commode with Modified [place].    Outcome: Ongoing (interventions implemented as appropriate)  Patient's goals are set.   SLAVA Yarbrough  5/24/2018

## 2018-05-24 NOTE — SUBJECTIVE & OBJECTIVE
Past Medical History:   Diagnosis Date    After-cataract of both eyes - Left Eye 10/11/2012    Anxiety     Aortic calcification 8/10/2016    Aortic valve stenosis, moderate     AR (allergic rhinitis)     Arthritis of facet joints at multiple vertebral levels 1/14/2016    Seen on lumbar MRI dated 01/14/16    Cataract     Cholelithiasis     Closed displaced intertrochanteric fracture of right femur with routine healing s/p IM nail on 5/20/2018 5/19/2018    Cystocele 12/1/2013    Esotropia, alternating - Both Eyes 10/11/2012    Essential hypertension     Gastroesophageal reflux disease without esophagitis 4/13/2018    Left ventricular diastolic dysfunction with preserved systolic function 8/10/2016    Lumbar radiculopathy 1/25/2016    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    Nondisplaced fracture of proximal end of humerus 8/7/2016    Osteoarthritis     Overweight (BMI 25.0-29.9) 4/13/2018    Pure hypercholesterolemia     Right-sided low back pain without sciatica 12/10/2015    Strabismus     Urethral hypermobility 12/1/2013       Past Surgical History:   Procedure Laterality Date    ADENOIDECTOMY      CATARACT EXTRACTION Bilateral     BOTH EYES MULTIFOCAL    COLONOSCOPY  2015    EYE SURGERY      FRACTURE SURGERY      HYSTERECTOMY      INTERTROCHANTERIC HIP FRACTURE SURGERY Right 05/20/2018    IM nail    LUMBAR EPIDURAL INJECTION  02/04/2016    L4-l5    PARTIAL HYSTERECTOMY      SKIN GRAFT      left foot     TONSILLECTOMY      WRIST FRACTURE SURGERY Right 2009    YAG CAP      LEFT EYE       Review of patient's allergies indicates:   Allergen Reactions    Ace inhibitors      Other reaction(s): cough    Codeine      Other reaction(s): Nausea       Current Facility-Administered Medications on File Prior to Encounter   Medication    [DISCONTINUED] acetaminophen tablet 1,000 mg    [DISCONTINUED] bisacodyl suppository 10 mg    [DISCONTINUED] bisacodyl suppository  10 mg    [DISCONTINUED] cetirizine tablet 10 mg    [DISCONTINUED] ciprofloxacin HCl tablet 500 mg    [DISCONTINUED] enoxaparin injection 40 mg    [DISCONTINUED] escitalopram oxalate tablet 5 mg    [DISCONTINUED] losartan tablet 50 mg    [DISCONTINUED] methocarbamol tablet 1,000 mg    [DISCONTINUED] morphine injection 4 mg    [DISCONTINUED] ondansetron disintegrating tablet 8 mg    [DISCONTINUED] oxybutynin 24 hr tablet 10 mg    [DISCONTINUED] oxyCODONE immediate release tablet 10 mg    [DISCONTINUED] oxyCODONE immediate release tablet 5 mg    [DISCONTINUED] pantoprazole EC tablet 40 mg    [DISCONTINUED] polyethylene glycol packet 17 g    [DISCONTINUED] pravastatin tablet 20 mg    [DISCONTINUED] pregabalin capsule 75 mg    [DISCONTINUED] ramelteon tablet 8 mg    [DISCONTINUED] ropivacaine (PF) 2 mg/ml (0.2%) infusion    [DISCONTINUED] senna-docusate 8.6-50 mg per tablet 1 tablet    [DISCONTINUED] sodium chloride 0.9% injection 3 mL    [DISCONTINUED] vitamin D 1000 units tablet 2,000 Units     Current Outpatient Prescriptions on File Prior to Encounter   Medication Sig    alendronate (FOSAMAX) 70 MG tablet Take 1 tablet (70 mg total) by mouth every 7 days.    cetirizine (ZYRTEC) 10 MG tablet Take 10 mg by mouth once daily.    cholecalciferol, vitamin D3, 2,000 unit Tab Take by mouth. 1 Tablet Oral Every day    cranberry 400 mg Cap Take by mouth once daily.     escitalopram oxalate (LEXAPRO) 5 MG Tab Take 1 tablet (5 mg total) by mouth once daily.    losartan (COZAAR) 50 MG tablet TAKE 1 TABLET EVERY DAY    omeprazole (PRILOSEC) 40 MG capsule TAKE 1 CAPSULE EVERY MORNING  BEFORE  EATING    oxybutynin (DITROPAN-XL) 10 MG 24 hr tablet TAKE 1 TABLET EVERY DAY    pravastatin (PRAVACHOL) 20 MG tablet TAKE 1 TABLET EVERY EVENING     Family History     Problem Relation (Age of Onset)    Breast cancer Maternal Grandmother    Cancer Father    Cataracts Mother    Colon cancer Brother    Dementia  Brother, Brother    Hypertension Brother, Mother, Brother, Maternal Grandmother    Macular degeneration Mother    Melanoma Maternal Grandfather    No Known Problems Daughter, Daughter    Osteoporosis Mother    Stroke Maternal Grandmother        Social History Main Topics    Smoking status: Never Smoker    Smokeless tobacco: Never Used    Alcohol use Yes      Comment: maybe 6 drinks per year    Drug use: No    Sexual activity: No     Review of Systems   Constitutional: no fever or chills  Eyes: no visual changes  ENT: no nasal congestion or sore throat  Respiratory: no cough or shortness of breath  Cardiovascular: no chest pain or palpitations  Gastrointestinal: no nausea or vomiting, no abdominal pain; last BM: 5/24  Genitourinary: no hematuria or dysuria  Integument/Breast: no rash or pruritis  Hematologic/Lymphatic: no easy bruising or lymphadenopathy  Allergy/Immunology: no postnasal drip  Musculoskeletal: + right hip pain  Neurological: no seizures or tremors  Behavioral/Psych: no auditory or visual hallucinations  Endocrine: no heat or cold intolerance      Objective:     Vital Signs (Most Recent):  Temp: 99.4 °F (37.4 °C) (05/24/18 0732)  Pulse: 89 (05/24/18 0732)  Resp: 19 (05/24/18 0732)  BP: 124/83 (05/24/18 0732)  SpO2: 98 % (05/24/18 0732) Vital Signs (24h Range):  Temp:  [98.3 °F (36.8 °C)-99.4 °F (37.4 °C)] 99.4 °F (37.4 °C)  Pulse:  [70-89] 89  Resp:  [17-19] 19  SpO2:  [96 %-98 %] 98 %  BP: (124-136)/(59-83) 124/83     Weight: 69.3 kg (152 lb 12.5 oz)  Body mass index is 27.06 kg/m².    Physical Exam  General: well developed, well nourished, no distress  HENT: Head:normocephalic, atraumatic. Ears:bilateral external ear canals normal. Nose: Nares normal. Septum midline. Mucosa normal. Throat: lips, mucosa, and tongue normal and no throat erythema.  Eyes: conjunctivae/corneas clear.  EOM normal.  Neck: supple, symmetrical, trachea midline, no JVD and thyroid not enlarged.   Lungs:  clear to  auscultation bilaterally and normal respiratory effort  Cardiovascular: Heart: regular rate and rhythm, S1, S2 normal, 3/6 systolic murmur, no click, rub or gallop. Chest Wall: no tenderness. Extremities: no cyanosis, no edema, no clubbing. Pulses: 2+ and symmetric.  Abdomen/Rectal: Abdomen: soft, non-tender non-distended; bowel sounds normal; no masses,  no organomegaly.   Skin: Skin color, texture, turgor normal. Aquacel to right hip X2 both with scant drainage and adherent.   Musculoskeletal:no clubbing, cyanosis  Lymph Nodes: No cervical or supraclavicular adenopathy  Neurologic: Normal strength and tone except right left not tested. No focal numbness or weakness  Psych/Behavioral:  Alert and oriented, appropriate affect.    Significant Labs:     Recent Labs  Lab 05/22/18  0504 05/23/18  0501 05/24/18  0537   WBC 10.26 9.27 9.43   HGB 9.0* 8.2* 8.4*   HCT 27.0* 25.2* 25.6*    268 328       Recent Labs  Lab 05/19/18  1829  05/22/18  0504 05/23/18  0501 05/24/18  0536   *  < > 134* 135* 136   K 3.4*  < > 3.8 4.1 3.6     < > 103 103 103   CO2 22*  < > 24 25 25   BUN 24*  < > 13 11 12   CREATININE 0.9  < > 0.7 0.7 0.7   CALCIUM 9.5  < > 8.6* 8.6* 8.8   PROT 6.7  --   --   --   --    BILITOT 0.3  --   --   --   --    ALKPHOS 55  --   --   --   --    ALT 18  --   --   --   --    AST 24  --   --   --   --    < > = values in this interval not displayed.

## 2018-05-24 NOTE — ASSESSMENT & PLAN NOTE
Patient will need to f/u with Ortho fracture clinic  Continue therapy with Vit D supplement and dietary calcium

## 2018-05-24 NOTE — ASSESSMENT & PLAN NOTE
Continue supplementation with phos packets  Potassium at low normal so will supplement as well  Will continue to monitor electrolytes with twice weekly labs

## 2018-05-24 NOTE — HOSPITAL COURSE
The patient was admitted at [facility] from 5/19 to 5/23/2018.    5/25/18  Patient seen at bedside, she reports she has a tightness across her chest which is worse with movement and deep breathing.  She denies n/v, sob or pain radiation, no diaphoresis.  She reports her pain medication helps to control this pain.  She has minimal pain to her right leg.  She reports she lives with her spouse who depends on her.  She has 2 daughters and will likely go to live with one of them while she recovers and put her  into a NH for that time.  She reports she is eating, drinking, voiding and having BM's without issues.    5/30/18  Patient seen at bedside, she reports cough is better today.  Still with pain across her chest, worse with movement and taking deep breaths.  Pain is also reproducible when palpating the chest.  Patient denies trauma to her chest.  She admits she is not using her IS as previously instructed or TCDB and explained the importance of doing so.  She is asking if she can have Aleve to help with her pain as she found it helped better than Tylenol.  Told her would call Ortho team to see if they are ok due to risk of bleed with NSAIDs.  Called Ortho office, message left with .    6/7/18  Patient seen at bedside, she has no complaints, reports pain is well controlled.  States cough at night has improved with nasal spray and the MS pain in her chest is improving.  She is looking forward to being able to shower.    6/11/18  Patient seen prior to discharge, she reports feeling good, pain is well controlled.  States she is ready to go home.

## 2018-05-24 NOTE — PT/OT/SLP EVAL
PhysicalTherapy   Evaluation    Mine Wilkins   MRN: [id number]     PT Received On: 05/24/18  PT Start Time: 1310     PT Stop Time: 1405    PT Total Time (min): 55 min       Billable Minutes:  Evaluation 15, Gait Training 15, Therapeutic Activity 10 and Therapeutic Exercise 15=eval+40    Diagnosis: Closed displaced intertrochanteric fracture of right femur with routine healing  Past Medical History:   Diagnosis Date    After-cataract of both eyes - Left Eye 10/11/2012    Anxiety     Aortic calcification 8/10/2016    Aortic valve stenosis, moderate     AR (allergic rhinitis)     Arthritis of facet joints at multiple vertebral levels 1/14/2016    Seen on lumbar MRI dated 01/14/16    Cataract     Cholelithiasis     Closed displaced intertrochanteric fracture of right femur with routine healing s/p IM nail on 5/20/2018 5/19/2018    Cystocele 12/1/2013    Esotropia, alternating - Both Eyes 10/11/2012    Essential hypertension     Gastroesophageal reflux disease without esophagitis 4/13/2018    Left ventricular diastolic dysfunction with preserved systolic function 8/10/2016    Lumbar radiculopathy 1/25/2016    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    Nondisplaced fracture of proximal end of humerus 8/7/2016    Osteoarthritis     Overweight (BMI 25.0-29.9) 4/13/2018    Pure hypercholesterolemia     Right-sided low back pain without sciatica 12/10/2015    Strabismus     Urethral hypermobility 12/1/2013      Past Surgical History:   Procedure Laterality Date    ADENOIDECTOMY      CATARACT EXTRACTION Bilateral     BOTH EYES MULTIFOCAL    COLONOSCOPY  2015    EYE SURGERY      FRACTURE SURGERY      HYSTERECTOMY      INTERTROCHANTERIC HIP FRACTURE SURGERY Right 05/20/2018    IM nail    LUMBAR EPIDURAL INJECTION  02/04/2016    L4-l5    PARTIAL HYSTERECTOMY      SKIN GRAFT      left foot     TONSILLECTOMY      WRIST FRACTURE SURGERY Right 2009    YAG CAP      LEFT  EYE         General Precautions: Standard, fall (delirium)  Orthopedic Precautions: RLE weight bearing as tolerated   Braces: N/A         Patient History:  Lives With: spouse  Living Arrangements: house  Home Accessibility: stairs to enter home  Number of Stairs to Enter Home: 1  Transportation Available: family or friend will provide  Living Environment Comment: Lives w/ spouse in a 1 SH w/ th RAFAT. Pt is CG to disabled house (dementia). PTA independent  Equipment Currently Used at Home: none (spouse has DME including bath bench pt could use.)  DME owned (not currently used): bath bench    Previous Level of Function:  Ambulation Skills: independent  Transfer Skills: independent  ADL Skills: independent  Work/Leisure Activity: independent    Subjective:  Communicated with patient prior to session.  Agreeable to therapy  Chief Complaint: concern about her 's care, making plans for him  Patient goals: return home to [facility] and be able to care for  again    Pain/Comfort  Pain Rating 1: 2/10  Location - Side 1: Right  Location - Orientation 1: generalized  Location 1: hip  Pain Addressed 1: Pre-medicate for activity, Reposition, Distraction  Pain Rating Post-Intervention 1: 3/10  Pain Rating 2: 1/10  Location - Orientation 2: anterior  Location 2: chest  Pain Addressed 2: Distraction (pt describes as burning)  Pain Rating Post-Intervention 2: 0/10    Objective:  Patient found seated in bedside chair with      Cognitive Exam:  Oriented to: Person, Place, Time and Situation  Follows Commands/attention: Follows two-step commands  Communication: clear/fluent  Safety awareness/insight to disability: intact    Physical Exam:  Postural examination/scapula alignment:    -       No postural abnormalities identified    Skin integrity: Visible skin intact  Edema: Mild RLE      Sensation:      -       Intactlight touch LEs    Upper Extremity Range of Motion:  Right Upper Extremity: WFL  Left Upper Extremity: WFL    Upper  Extremity Strength:  Right Upper Extremity: WFL  Left Upper Extremity: WFL    Lower Extremity Range of Motion:  Right Lower Extremity: WFL except hip limited s/p sx  Left Lower Extremity: WFL    Lower Extremity Strength:  Right Lower Extremity: Deficits: HF2-/5, knee 4-/5, ankle 4-/5  Left Lower Extremity: WFL     Fine motor coordination:      Gross motor coordination: WFL    Functional Status:  MDS G  Bed Mobility Functional Status: mod(A)-Max(A)  Transfer Functional Status: CGA-Min (A)  Walk in Room Functional Status: CGA-Min (A)  Walk in Corridor Functional Status: CGA-Min (A)  Locomotion on Unit Functional Status: CGA-Min (A)  Locomotion Off Unit Functional Status: total(A)  Eval Only: Number of L/E limb <4/5 MMT: 1  Moving from seated to standing position: Not steady, only able to stabilize with staff assistance  Walking (with assistive device if used): Not steady, only able to stabilize with staff assistance  Turning around and facing the opposite direction while walking: Not steady, only able to stabilize with staff assistance  Surface-to-surface transfer (transfer between bed and chair or wheelchair): Not steady, only able to stabilize with staff assistance         AM-PAC 6 CLICK MOBILITY  Total Score:16    Bed Mobility:  Sit>Supine:mod assist for BLEs into bed; min assist RLE onto mat  Supine>Sit: w/ min assist for RLE off mat, cues for technique, trunk elevation    Transfers:  Sit<>Stand: w/ RW and CGA from chair, w/c x2 trials, mat  Stand Pivot Transfer: w/c<>mat w/ RW and CGA; w/c>bed w/ rW and CGA  Cues for technique. WBAT RLE    Gait:  Amb w/ RW and CGA 15 feet in room to w/c; after seated rest break, amb w/ RW and CGA 78 feet, slow pace, antalgic gait w/ decreased weight acceptance on RLE and step-to gait pattern.       Advanced Gait:  Stairs: not performed  Curb Step: NPT    Wheelchair Mobility:  Patient propels w/c w/ BUEs 55 feet w/ SBA, limited by fatigue     Therex:  Quad sets,   Glute sets,    Ankle  Pumps,   hip abduction/adduction,  heelslides,   SAQ,   LAQ   x20 reps w/ assist as needed      Balance:  Sits EOM, EOB w/ SBA  Stands w/ RW and SBA    Additional Treatment:  Patient educated on PT POC  TRansfer and gait technique and safety w/ RW  LE exercises    Patient left supine with call button in reach.    Assessment:  Mine Wilkins is a 83 y.o. female with a medical diagnosis of Closed displaced intertrochanteric fracture of right femur with routine healing. She is WBAT RLE. Patient is progressing well and overall CGA for gait and transfers w/ RW. She required mod assist for bed mobility. 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 Patient's dtr will be able to provide light assistance to patient at time of discharge home. .    Rehab identified problem list/impairments: weakness, impaired endurance, impaired functional mobilty, gait instability, impaired balance, decreased lower extremity function, pain, edema, orthopedic precautions    Rehab potential is good.    Activity tolerance: Good    Discharge recommendations: home with home health     Barriers to discharge: Decreased caregiver support, Inaccessible home environment    Equipment recommendations: walker, rolling, bedside commode     GOALS:    Physical Therapy Goals        Problem: Physical Therapy Goal    Goal Priority Disciplines Outcome Goal Variances Interventions   Physical Therapy Goal     PT/OT, PT      Description:  Goals to be met by: 14 days     Patient will increase functional independence with mobility by performin. Supine to sit with Modified [place]  2. Sit to supine with Modified [place]  3. Sit to stand transfer with Supervision  4. Bed to chair transfer with Supervision using Rolling Walker  5. Gait  x 150 feet with Supervision using Rolling Walker.   6. Wheelchair propulsion x100 feet with Supervision using  bilateral upper extremities  7. Ascend/Descend 4 inch curb step with Stand-by Assistance using Rolling Walker.  8. Stand for 3 minutes with Stand-by Assistance using Rolling Walker and perform an activity  9. Lower extremity exercise program x20 reps per handout, with assistance as needed                      PLAN:    Patient to be seen 5 x/week  to address the above listed problems via gait training, therapeutic exercises, therapeutic activities, wheelchair management/training  Plan of Care Expires: 06/23/18    Sari Contreras, PT 5/24/2018

## 2018-05-24 NOTE — PT/OT/SLP EVAL
Occupational Therapy  Evaluation/Treatment    Mine Wilkins   MRN: [id number]   Admitting Diagnosis: Closed displaced intertrochanteric fracture of right femur with routine healing     OT Date of Treatment: 05/24/18   OT Start Time: 1030  OT Stop Time: 1115  OT Total Time (min): 45 min    Billable Minutes:  Evaluation 15  Self Care/Home Management 30    Diagnosis: Closed displaced intertrochanteric fracture of right femur with routine healing    Past Medical History:   Diagnosis Date    After-cataract of both eyes - Left Eye 10/11/2012    Anxiety     Aortic calcification 8/10/2016    Aortic valve stenosis, moderate     AR (allergic rhinitis)     Arthritis of facet joints at multiple vertebral levels 1/14/2016    Seen on lumbar MRI dated 01/14/16    Cataract     Cholelithiasis     Closed displaced intertrochanteric fracture of right femur with routine healing s/p IM nail on 5/20/2018 5/19/2018    Cystocele 12/1/2013    Esotropia, alternating - Both Eyes 10/11/2012    Essential hypertension     Gastroesophageal reflux disease without esophagitis 4/13/2018    Left ventricular diastolic dysfunction with preserved systolic function 8/10/2016    Lumbar radiculopathy 1/25/2016    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    Nondisplaced fracture of proximal end of humerus 8/7/2016    Osteoarthritis     Overweight (BMI 25.0-29.9) 4/13/2018    Pure hypercholesterolemia     Right-sided low back pain without sciatica 12/10/2015    Strabismus     Urethral hypermobility 12/1/2013      Past Surgical History:   Procedure Laterality Date    ADENOIDECTOMY      CATARACT EXTRACTION Bilateral     BOTH EYES MULTIFOCAL    COLONOSCOPY  2015    EYE SURGERY      FRACTURE SURGERY      HYSTERECTOMY      INTERTROCHANTERIC HIP FRACTURE SURGERY Right 05/20/2018    IM nail    LUMBAR EPIDURAL INJECTION  02/04/2016    L4-l5    PARTIAL HYSTERECTOMY      SKIN GRAFT      left foot      TONSILLECTOMY      WRIST FRACTURE SURGERY Right 2009    YAG CAP      LEFT EYE         General Precautions: Standard, fall  Orthopedic Precautions: RLE weight bearing as tolerated  Braces: N/A          Patient History:  Lives With: spouse  Living Arrangements: house  Home Accessibility: stairs to enter home  Number of Stairs to Enter Home: 1  Transportation Available: family or friend will provide  Living Environment Comment: Patient lives with spouse in a 1 SH with 1 RAFAT to enter. Patient has a tub/shower combo and does have a bench (patient did not use it). 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 Patient reports being (I) PTA for ADLs and functional mobility  Equipment Currently Used at Home: none (Patient has equipment for spouse, but none for herself)    Prior level of function:   Bed Mobility/Transfers: independent  Grooming: independent  Bathing: independent  Upper Body Dressing: independent  Lower Body Dressing: independent  Toileting: independent  Home Management Skills: independent        Dominant hand: right    Subjective:  Communicated with patient prior to session.    Chief Complaint: no c/o at this time  Patient/Family stated goals: to go home    Pain/Comfort  Pain Rating 1: 4/10  Location - Side 1: Right  Location - Orientation 1: generalized  Location 1: hip  Pain Addressed 1: Reposition, Distraction  Pain Rating Post-Intervention 1: 4/10    Objective:        Cognitive Exam:  Oriented to: Person, Place, Time and Situation  Follows Commands/attention: Follows multistep  commands  Communication: clear/fluent  Memory:  No Deficits noted  Safety awareness/insight to disability: intact  Coping skills/emotional control: Appropriate to situation    Visual/perceptual:  Intact    Physical Exam:  Postural examination/scapula alignment:    -       No postural abnormalities identified  Skin integrity: Visible skin intact  Edema: None noted      Sensation:      -       Intact    Upper Extremity  Range of Motion:  Right Upper Extremity: limited shoulder flexion  Left Upper Extremity: WFL    Upper Extremity Strength:  Right Upper Extremity: WFL  Left Upper Extremity: WFL   Strength: WFL    Fine motor coordination:      -       Intact    Gross motor coordination: WFL    Functional Status:  MDS G  Bed Mobility Functional Status: CGA supine>sit  Transfer Functional Status: CGA bed>toilet using RW with functional ambulation; toilet>w/c using RW; w/c>bedside chair using RW /c functional ambulation  Walk in Room Functional Status: CGA using RW  Dressing Functional Status: [place] and donning pullover shirt with setup; Donning pants with max A   Eating Functional Status: (I)  Toilet Use Functional Status: Min (A)   Personal Hygiene Functional Status: S oral care and washing face sitting in w/c at sink  Bathing Functional Status: min A for spongebath at sink from w/c level  Eval Only: Number of U/E limb <4/5 MMT: 0         AMPAC 6 Click:  AMPAC Total Score: 18    Additional Treatment:  Safety, ADL retraining, functional mobility training, discharge planning    Patient left up in chair with call button in reach and all needs met.     Assessment:  Mine Wilkins is a 83 y.o. female with a medical diagnosis of Closed displaced intertrochanteric fracture of right femur with routine healing and presents with the deficits listed below. Patient will benefit from skilled OT services to achieve maximal independence prior to discharge. Low complexity evaluation due to impaired self care skills, impaired functional mobilty, decreased lower extremity function.    Rehab identified problem list/impairments: weakness, impaired endurance, impaired self care skills, impaired functional mobilty, impaired balance, decreased lower extremity function, orthopedic precautions, pain    Rehab potential is good    Activity tolerance: Good    Discharge recommendations: nursing facility, skilled     Barriers to discharge:   Decreased caregiver support    Equipment recommendations: walker, rolling     GOALS:    Occupational Therapy Goals        Problem: Occupational Therapy Goal    Goal Priority Disciplines Outcome Interventions   Occupational Therapy Goal     OT, PT/OT Ongoing (interventions implemented as appropriate)    Description:  Goals to be met by: 8 days     Patient will increase functional independence with ADLs by performing:    UE Dressing with Modified [place].  LE Dressing with Modified [place] /c AE.  Grooming while standing at sink with Modified [place].  Toileting from bedside commode with Modified [place] for hygiene and clothing management.   Bathing with Stand-by Assistance.  Supine to sit with Modified [place] /c HOB flat and no handrails.  Stand pivot transfers with Modified [place].  Toilet transfer to bedside commode with Modified [place].                      PLAN: Patient to be seen 5 x/week to address the above listed problems via self-care/home management, therapeutic activities, therapeutic exercises  Plan of Care expires: 06/24/18  Plan of Care reviewed with: patient    SLAVA Yarbrough  05/24/2018

## 2018-05-24 NOTE — PLAN OF CARE
Problem: Physical Therapy Goal  Goal: Physical Therapy Goal  Goals to be met by: 14 days     Patient will increase functional independence with mobility by performin. Supine to sit with Modified [place]  2. Sit to supine with Modified [place]  3. Sit to stand transfer with Supervision  4. Bed to chair transfer with Supervision using Rolling Walker  5. Gait  x 150 feet with Supervision using Rolling Walker.   6. Wheelchair propulsion x100 feet with Supervision using bilateral upper extremities  7. Ascend/Descend 4 inch curb step with Stand-by Assistance using Rolling Walker.  8. Stand for 3 minutes with Stand-by Assistance using Rolling Walker and perform an activity  9. Lower extremity exercise program x20 reps per handout, with assistance as needed    PT jose carney. Sari Contreras, PT 2018

## 2018-05-24 NOTE — PLAN OF CARE
Problem: Patient Care Overview  Goal: Plan of Care Review  Outcome: Ongoing (interventions implemented as appropriate)   05/24/18 0016   Coping/Psychosocial   Plan Of Care Reviewed With patient       Problem: Fall Risk (Adult)  Goal: Identify Related Risk Factors and Signs and Symptoms  Related risk factors and signs and symptoms are identified upon initiation of Human Response Clinical Practice Guideline (CPG)   Outcome: Ongoing (interventions implemented as appropriate)   05/24/18 0016   Fall Risk   Related Risk Factors (Fall Risk) fatigue/slow reaction;fear of falling

## 2018-05-24 NOTE — H&P
[facility] [place] [facility] - Skilled Nursing Care  Department of [facility] Medicine  History & Physical    Patient Name: Mine Wilkins  MRN: [id number]  Code Status: Prior  Admission Date: 5/23/2018  Attending Physician: Charissa Robin MD   Primary Care Provider: MARVA Oswald MD    Subjective:     Principal Problem:Closed displaced intertrochanteric fracture of right femur with routine healing     HPI: Chief Complaint/Reason for Admission: Closed displaced intertrochanteric fracture of right femur with routine healing    History of Present Illness:  Patient is a 83 y.o. female with osteoporosis, moderate AS, HTN who presents to SNF after hospitalization for fall with resultant right hip fracture requiring right CMN by Dr. Scott on 5/20. The patient was treated for UTI with cipro.  She complains of pain to her right hip rating it as 3/10, exacerbated by walking and relieved with oxycodone.  She has also been experiencing chest soreness with movement and denies pressure, dyspnea or radiation of pain.  She also complains of long-standing dry mouth.  HCTZ was discontinued with no change in symptoms.  She has tried OTC mouthwashes without relief.       The patient has been admitted to SNF for ongoing PT/OT due to insufficient progress to go home safely from the hospital.    Records from last hospital stay reviewed and summarized above.     Past Medical History:   Diagnosis Date    After-cataract of both eyes - Left Eye 10/11/2012    Anxiety     Aortic calcification 8/10/2016    Aortic valve stenosis, moderate     AR (allergic rhinitis)     Arthritis of facet joints at multiple vertebral levels 1/14/2016    Seen on lumbar MRI dated 01/14/16    Cataract     Cholelithiasis     Closed displaced intertrochanteric fracture of right femur with routine healing s/p IM nail on 5/20/2018 5/19/2018    Cystocele 12/1/2013    Esotropia, alternating - Both Eyes 10/11/2012    Essential hypertension     Gastroesophageal reflux  disease without esophagitis 4/13/2018    Left ventricular diastolic dysfunction with preserved systolic function 8/10/2016    Lumbar radiculopathy 1/25/2016    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    Nondisplaced fracture of proximal end of humerus 8/7/2016    Osteoarthritis     Overweight (BMI 25.0-29.9) 4/13/2018    Pure hypercholesterolemia     Right-sided low back pain without sciatica 12/10/2015    Strabismus     Urethral hypermobility 12/1/2013       Past Surgical History:   Procedure Laterality Date    ADENOIDECTOMY      CATARACT EXTRACTION Bilateral     BOTH EYES MULTIFOCAL    COLONOSCOPY  2015    EYE SURGERY      FRACTURE SURGERY      HYSTERECTOMY      INTERTROCHANTERIC HIP FRACTURE SURGERY Right 05/20/2018    IM nail    LUMBAR EPIDURAL INJECTION  02/04/2016    L4-l5    PARTIAL HYSTERECTOMY      SKIN GRAFT      left foot     TONSILLECTOMY      WRIST FRACTURE SURGERY Right 2009    YAG CAP      LEFT EYE       Review of patient's allergies indicates:   Allergen Reactions    Ace inhibitors      Other reaction(s): cough    Codeine      Other reaction(s): Nausea       Current Facility-Administered Medications on File Prior to Encounter   Medication    [DISCONTINUED] acetaminophen tablet 1,000 mg    [DISCONTINUED] bisacodyl suppository 10 mg    [DISCONTINUED] bisacodyl suppository 10 mg    [DISCONTINUED] cetirizine tablet 10 mg    [DISCONTINUED] ciprofloxacin HCl tablet 500 mg    [DISCONTINUED] enoxaparin injection 40 mg    [DISCONTINUED] escitalopram oxalate tablet 5 mg    [DISCONTINUED] losartan tablet 50 mg    [DISCONTINUED] methocarbamol tablet 1,000 mg    [DISCONTINUED] morphine injection 4 mg    [DISCONTINUED] ondansetron disintegrating tablet 8 mg    [DISCONTINUED] oxybutynin 24 hr tablet 10 mg    [DISCONTINUED] oxyCODONE immediate release tablet 10 mg    [DISCONTINUED] oxyCODONE immediate release tablet 5 mg    [DISCONTINUED] pantoprazole EC  tablet 40 mg    [DISCONTINUED] polyethylene glycol packet 17 g    [DISCONTINUED] pravastatin tablet 20 mg    [DISCONTINUED] pregabalin capsule 75 mg    [DISCONTINUED] ramelteon tablet 8 mg    [DISCONTINUED] ropivacaine (PF) 2 mg/ml (0.2%) infusion    [DISCONTINUED] senna-docusate 8.6-50 mg per tablet 1 tablet    [DISCONTINUED] sodium chloride 0.9% injection 3 mL    [DISCONTINUED] vitamin D 1000 units tablet 2,000 Units     Current Outpatient Prescriptions on File Prior to Encounter   Medication Sig    alendronate (FOSAMAX) 70 MG tablet Take 1 tablet (70 mg total) by mouth every 7 days.    cetirizine (ZYRTEC) 10 MG tablet Take 10 mg by mouth once daily.    cholecalciferol, vitamin D3, 2,000 unit Tab Take by mouth. 1 Tablet Oral Every day    cranberry 400 mg Cap Take by mouth once daily.     escitalopram oxalate (LEXAPRO) 5 MG Tab Take 1 tablet (5 mg total) by mouth once daily.    losartan (COZAAR) 50 MG tablet TAKE 1 TABLET EVERY DAY    omeprazole (PRILOSEC) 40 MG capsule TAKE 1 CAPSULE EVERY MORNING  BEFORE  EATING    oxybutynin (DITROPAN-XL) 10 MG 24 hr tablet TAKE 1 TABLET EVERY DAY    pravastatin (PRAVACHOL) 20 MG tablet TAKE 1 TABLET EVERY EVENING     Family History     Problem Relation (Age of Onset)    Breast cancer Maternal Grandmother    Cancer Father    Cataracts Mother    Colon cancer Brother    Dementia Brother, Brother    Hypertension Brother, Mother, Brother, Maternal Grandmother    Macular degeneration Mother    Melanoma Maternal Grandfather    No Known Problems Daughter, Daughter    Osteoporosis Mother    Stroke Maternal Grandmother        Social History Main Topics    Smoking status: Never Smoker    Smokeless tobacco: Never Used    Alcohol use Yes      Comment: maybe 6 drinks per year    Drug use: No    Sexual activity: No     Review of Systems   Constitutional: no fever or chills  Eyes: no visual changes  ENT: no nasal congestion or sore throat  Respiratory: no cough or  shortness of breath  Cardiovascular: no chest pain or palpitations  Gastrointestinal: no nausea or vomiting, no abdominal pain; last BM: 5/24  Genitourinary: no hematuria or dysuria  Integument/Breast: no rash or pruritis  Hematologic/Lymphatic: no easy bruising or lymphadenopathy  Allergy/Immunology: no postnasal drip  Musculoskeletal: + right hip pain  Neurological: no seizures or tremors  Behavioral/Psych: no auditory or visual hallucinations  Endocrine: no heat or cold intolerance      Objective:     Vital Signs (Most Recent):  Temp: 99.4 °F (37.4 °C) (05/24/18 0732)  Pulse: 89 (05/24/18 0732)  Resp: 19 (05/24/18 0732)  BP: 124/83 (05/24/18 0732)  SpO2: 98 % (05/24/18 0732) Vital Signs (24h Range):  Temp:  [98.3 °F (36.8 °C)-99.4 °F (37.4 °C)] 99.4 °F (37.4 °C)  Pulse:  [70-89] 89  Resp:  [17-19] 19  SpO2:  [96 %-98 %] 98 %  BP: (124-136)/(59-83) 124/83     Weight: 69.3 kg (152 lb 12.5 oz)  Body mass index is 27.06 kg/m².    Physical Exam  General: well developed, well nourished, no distress  HENT: Head:normocephalic, atraumatic. Ears:bilateral external ear canals normal. Nose: Nares normal. Septum midline. Mucosa normal. Throat: lips, mucosa, and tongue normal and no throat erythema.  Eyes: conjunctivae/corneas clear.  EOM normal.  Neck: supple, symmetrical, trachea midline, no JVD and thyroid not enlarged.   Lungs:  clear to auscultation bilaterally and normal respiratory effort  Cardiovascular: Heart: regular rate and rhythm, S1, S2 normal, 3/6 systolic murmur, no click, rub or gallop. Chest Wall: no tenderness. Extremities: no cyanosis, no edema, no clubbing. Pulses: 2+ and symmetric.  Abdomen/Rectal: Abdomen: soft, non-tender non-distended; bowel sounds normal; no masses,  no organomegaly.   Skin: Skin color, texture, turgor normal. Aquacel to right hip X2 both with scant drainage and adherent.   Musculoskeletal:no clubbing, cyanosis  Lymph Nodes: No cervical or supraclavicular adenopathy  Neurologic:  Normal strength and tone except right left not tested. No focal numbness or weakness  Psych/Behavioral:  Alert and oriented, appropriate affect.    Significant Labs:     Recent Labs  Lab 05/22/18  0504 05/23/18  0501 05/24/18  0537   WBC 10.26 9.27 9.43   HGB 9.0* 8.2* 8.4*   HCT 27.0* 25.2* 25.6*    268 328       Recent Labs  Lab 05/19/18  1829  05/22/18  0504 05/23/18  0501 05/24/18  0536   *  < > 134* 135* 136   K 3.4*  < > 3.8 4.1 3.6     < > 103 103 103   CO2 22*  < > 24 25 25   BUN 24*  < > 13 11 12   CREATININE 0.9  < > 0.7 0.7 0.7   CALCIUM 9.5  < > 8.6* 8.6* 8.8   PROT 6.7  --   --   --   --    BILITOT 0.3  --   --   --   --    ALKPHOS 55  --   --   --   --    ALT 18  --   --   --   --    AST 24  --   --   --   --    < > = values in this interval not displayed.      Assessment/Plan:     * Closed displaced intertrochanteric fracture of right femur with routine healing s/p IM nail on 5/20/2018    -continue PT/OT to increase ambulation, ADL perfomance and endurance  -continue WBAT  -continue oxycodone for pain control prn  -continue lovenox for DVT prophylaxis for 4 weeks after joint surgery  -Ortho NP to assess surgical wound and remove dressing as indicated; will notify Ortho team for any leakage or excessive drainage evident on Aquacel dressing  -continue pregabalin to control pain; discontinue on discharge from SNF  -continue senokot-s and miralax to prevent constipation; hold for > 3 stools in 24 hours or loose stools  -keep appointment with ortho as scheduled    Future Appointments  Date Time Provider Department Center   6/5/2018 8:45 AM SIDNEY Scott MD [facility] [place]   8/22/2018 9:30 AM LAB, STEFAN KENH LAB [place]   8/22/2018 10:00 AM SPECIMEN, MARGO SANTO SPECLAB [place]   8/29/2018 11:00 AM ZOEY Oswald MD [facility] [place]               Age-related osteoporosis with current pathological fracture with routine healing    Patient will need to f/u with Ortho  fracture clinic  Continue therapy with Vit D supplement and dietary calcium        Urinary tract infection due to Enterococcus faecalis    Complete therapy with cipro to end date of 5/29        Xerostomia    Likely due to oxybutynin which has been effective in controlling urinary symptoms and patient wishes to continue  Will order artificial saliva before meals        Hypophosphatemia    Continue supplementation with phos packets  Potassium at low normal so will supplement as well  Will continue to monitor electrolytes with twice weekly labs        Gastroesophageal reflux disease without esophagitis    Continue therapy with pantoprazole  Chronic and stable        Left ventricular diastolic dysfunction with preserved systolic function    Continue losartan therapy  Low Na diet   monitor daily weights and diurese for 3-4 pound gain or symptoms indicative of volume overload        Aortic valve stenosis, moderate    Chronic and stable  Avoid hypotension and monitor for volume overload.         Pure hypercholesterolemia    Chronic and stable  Continue therapy with pravastatin        Essential hypertension    Chronic and stable  Continue therapy with losartan  -will continue to monitor and adjust regimen as necessary        AR (allergic rhinitis)    Continue chronic therapy with cetirizine  Symptoms currently controlled.         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)    Chronic with effective therapy with oxybutynin which will be continued          I certify that SNF services are required to be given on an inpatient basis because Mine Wilkins's needs for skilled nursing care and/or skilled rehabilitation are required on a daily basis and such services can only practically be provided in a skilled nursing facility setting and are for an ongoing condition for which she received inpatient care in the hospital.     Future Appointments  Date Time Provider Department Center   6/5/2018 8:45 AM SIDNEY Omalley  MD [facility] [place]   8/22/2018 9:30 AM LABSTEFAN LAB [place]   8/22/2018 10:00 AM SPECIMEN, MARGO SANTO SPECLAB [place]   8/29/2018 11:00 AM [facility]       Patient's care plan and discharge planning will be discussed by the SNF team in IDT meeting weekly. Medications to be reviewed and discussed with the SNF unit clinical pharmacist.      Charissa Robin MD  Department of Hospital Medicine  [facility] [place] [facility] - Skilled Nursing Care

## 2018-05-24 NOTE — CLINICAL REVIEW
Clinical Pharmacy Chart Review Note      Admit Date: 5/23/2018   LOS: 1 day       Mine Wilkins is a 83 y.o. female admitted to SNF for PT/OT after hospitalization for closed displaced intertrochanteric fracture of right femur with routine healing s/p IM nail on 5/20/2018.    Active Hospital Problems    Diagnosis  POA    *Closed displaced intertrochanteric fracture of right femur with routine healing s/p IM nail on 5/20/2018 [S72.141D]  Not Applicable    Xerostomia [R68.2]  Yes    Hypophosphatemia [E83.39]  Yes    Age-related osteoporosis with current pathological fracture with routine healing [M80.00XD]  Not Applicable    Aortic valve stenosis, moderate [I35.0]  Yes     Chronic    AR (allergic rhinitis) [J30.9]  Yes      Resolved Hospital Problems    Diagnosis Date Resolved POA   No resolved problems to display.     Review of patient's allergies indicates:   Allergen Reactions    Ace inhibitors      Other reaction(s): cough    Codeine      Other reaction(s): Nausea     Patient Active Problem List    Diagnosis Date Noted    Xerostomia 05/24/2018    Displaced intertrochanteric fracture of right femur, initial encounter for closed fracture 05/23/2018    Encounter for aftercare for healing closed traumatic fracture of right hip 05/22/2018    Urinary tract infection due to Enterococcus faecalis 05/22/2018    Hypophosphatemia 05/22/2018    Closed displaced intertrochanteric fracture of right femur with routine healing s/p IM nail on 5/20/2018 05/19/2018    Overweight (BMI 25.0-29.9) 04/13/2018    Gastroesophageal reflux disease without esophagitis 04/13/2018    Anxiety 05/10/2017    Aortic calcification 08/10/2016    Left ventricular diastolic dysfunction with preserved systolic function 08/10/2016    Lumbar radiculopathy 01/25/2016    Arthritis of facet joints at multiple vertebral levels 01/14/2016    Right-sided low back pain without sciatica 12/10/2015    Osteoarthritis     AR  (allergic rhinitis)     Essential hypertension     Pure hypercholesterolemia     Age-related osteoporosis with current pathological fracture with routine healing     Aortic valve stenosis, moderate     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) 12/01/2013    Cystocele 12/01/2013    Urethral hypermobility 12/01/2013    Esotropia, alternating - Both Eyes 10/11/2012       Scheduled Meds:    cetirizine  10 mg Oral Daily    ciprofloxacin HCl  500 mg Oral Q12H    enoxaparin  40 mg Subcutaneous Daily    escitalopram oxalate  5 mg Oral Daily    losartan  50 mg Oral Daily    oxybutynin  10 mg Oral Daily    pantoprazole  40 mg Oral Daily    polyethylene glycol  17 g Oral Daily    potassium, sodium phosphates  1 packet Oral QID (AC & HS)    pravastatin  20 mg Oral QHS    pregabalin  75 mg Oral QHS    senna-docusate 8.6-50 mg  1 tablet Oral BID    vitamin D  2,000 Units Oral Daily     Continuous Infusions:   PRN Meds: acetaminophen, bisacodyl, calcium carbonate, ondansetron, oxyCODONE, ramelteon    OBJECTIVE:     Vital Signs (Last 24H)  Temp:  [98.3 °F (36.8 °C)-99.4 °F (37.4 °C)]   Pulse:  [70-89]   Resp:  [18-19]   BP: (124-136)/(62-83)   SpO2:  [96 %-98 %]     Laboratory:  CBC:   Recent Labs  Lab 05/22/18  0504 05/23/18  0501 05/24/18  0537   WBC 10.26 9.27 9.43   RBC 3.21* 2.98* 3.06*   HGB 9.0* 8.2* 8.4*   HCT 27.0* 25.2* 25.6*    268 328   MCV 84 85 84   MCH 28.0 27.5 27.5   MCHC 33.3 32.5 32.8     BMP:   Recent Labs  Lab 05/22/18  0504 05/23/18  0501 05/24/18  0536    103 104   * 135* 136   K 3.8 4.1 3.6    103 103   CO2 24 25 25   BUN 13 11 12   CREATININE 0.7 0.7 0.7   CALCIUM 8.6* 8.6* 8.8   MG 1.9 2.1 1.9     CMP:   Recent Labs  Lab 05/19/18  1829  05/22/18  0504 05/23/18  0501 05/24/18  0536   *  < > 109 103 104   CALCIUM 9.5  < > 8.6* 8.6* 8.8   ALBUMIN 3.8  --   --   --   --    PROT 6.7  --   --   --   --    *  < > 134* 135* 136   K 3.4*  < > 3.8  4.1 3.6   CO2 22*  < > 24 25 25     < > 103 103 103   BUN 24*  < > 13 11 12   CREATININE 0.9  < > 0.7 0.7 0.7   ALKPHOS 55  --   --   --   --    ALT 18  --   --   --   --    AST 24  --   --   --   --    BILITOT 0.3  --   --   --   --    < > = values in this interval not displayed.  LFTs:   Recent Labs  Lab 05/19/18 1829   ALT 18   AST 24   ALKPHOS 55   BILITOT 0.3   PROT 6.7   ALBUMIN 3.8     Coagulation:   Recent Labs  Lab 05/19/18 1829 05/19/18 1942   INR 0.9 1.0     Cardiac markers: No results for input(s): CKMB, TROPONINT, MYOGLOBIN in the last 168 hours.  ABGs: No results for input(s): PH, PCO2, PO2, HCO3, POCSATURATED, BE in the last 168 hours.  Microbiology Results (last 7 days)     ** No results found for the last 168 hours. **        Specimen (12h ago through future)    None          Recent Labs  Lab 05/19/18 1942   COLORU Yellow   SPECGRAV 1.010   PHUR 6.0   PROTEINUA Negative   BACTERIA Few*   NITRITE Negative   LEUKOCYTESUR 2+*   UROBILINOGEN Negative     Others:   Recent Labs  Lab 05/19/18 1942   [id number] 56         ASSESSMENT/PLAN:     Active Hospital Problems    Diagnosis    *Closed displaced intertrochanteric fracture of right femur with routine healing s/p IM nail on 5/20/2018   --PT/OT: Pregabalin 75 mg nightly; APAP 650 mg q6hrs prn pain 1-3/10; Oxycodone 5 mg q6hrs prn pain 4-6/10  --bowel regimen for constipation; hold for loose or frequent stools: Miralax daily; Senna-docusate twice daily; Bisacodyl 10 mg supp daily prn  --DVT prophylaxis: Lovenox 40 mg daily  5/24/2018 Estimated Creatinine Clearance: 57.9 mL/min (based on SCr of 0.7 mg/dL).       Xerostomia  --Likely due to Oxybutynin, pt wishes to continue  --Saliva substitute three times daily before meal and prn mouth dyness      Hypophosphatemia   --Potassium, sodium phosphates packet before meals and nightly  Lab Results   Component Value Date    CALCIUM 8.8 05/24/2018    PHOS 2.5 (L) 05/24/2018         Age-related  osteoporosis with current pathological fracture with routine healing   --Vitamin D 2000u daily and dietary calcium      Left ventricular diastolic dysfunction with preserved systolic function  **Monitor weights  --Low Na diet  --Diurese for 3-4 pounds gain or symptoms indicative of volume overload  Wt Readings from Last 1 Encounters:   05/28/18 0500 67.7 kg (149 lb 4 oz)   05/27/18 0623 67.9 kg (149 lb 12.8 oz)   05/24/18 1957 69.3 kg (152 lb 12.5 oz)   05/23/18 1600 69.3 kg (152 lb 12.5 oz)           AR (allergic rhinitis)   --Cetirizine 10 mg daily      Essential Hypertension  **Monitor BP  --Losartan 50 mg daily  (monitor BMP)  BP Readings from Last 3 Encounters:   05/24/18 124/83   05/23/18 (!) 126/59   04/13/18 130/82      BMP  Lab Results   Component Value Date     05/24/2018    K 3.6 05/24/2018     05/24/2018    CO2 25 05/24/2018    BUN 12 05/24/2018    CREATININE 0.7 05/24/2018    CALCIUM 8.8 05/24/2018    ANIONGAP 8 05/24/2018    ESTGFRAFRICA >60.0 05/24/2018    EGFRNONAA >60.0 05/24/2018              Anxiety  --Escitalopram 5 mg daily  Monitor: mental status for depression, suicide ideation, anxiety, serotonin syndrome, hyponatremia         Mixed urge and stress incontinence  --Oxybutynin ER 10 mg daily         Hypercholesterolemia  --Pravastatin 40 mg daily  Lab Results   Component Value Date    CHOL 186 08/10/2017    CHOL 181 06/13/2016    CHOL 166 05/13/2015     Lab Results   Component Value Date    HDL 54 08/10/2017    HDL 59 06/13/2016    HDL 55 05/13/2015     Lab Results   Component Value Date    LDLCALC 117.4 08/10/2017    LDLCALC 108.4 06/13/2016    LDLCALC 100.0 05/13/2015     Lab Results   Component Value Date    TRIG 73 08/10/2017    TRIG 68 06/13/2016    TRIG 55 05/13/2015     Lab Results   Component Value Date    CHOLHDL 29.0 08/10/2017    CHOLHDL 32.6 06/13/2016    CHOLHDL 33.1 05/13/2015     Lab Results   Component Value Date    ALT 18 05/19/2018    AST 24 05/19/2018     ALKPHOS 55 05/19/2018    BILITOT 0.3 05/19/2018            Gastroesophageal reflux disease  --Pantoprazole 40 mg daily             I have reviewed the medications in compliance with CMS Regulation F329 of the [place] Appendix PP.      Josefina Maynard, Pharm. D.  Clinical Pharmacist  [facility]

## 2018-05-24 NOTE — ASSESSMENT & PLAN NOTE
-continue PT/OT to increase ambulation, ADL perfomance and endurance  -continue WBAT  -continue oxycodone for pain control prn  -continue lovenox for DVT prophylaxis for 4 weeks after joint surgery  -Ortho NP to assess surgical wound and remove dressing as indicated; will notify Ortho team for any leakage or excessive drainage evident on Aquacel dressing  -continue pregabalin to control pain; discontinue on discharge from SNF  -continue senokot-s and miralax to prevent constipation; hold for > 3 stools in 24 hours or loose stools  -keep appointment with ortho as scheduled    Future Appointments  Date Time Provider Department Center   6/5/2018 8:45 AM SIDNEY Scott MD [facility] [place]   8/22/2018 9:30 AM LAB, STEFAN KEN[facility] LAB [place]   8/22/2018 10:00 AM MARGO KAPADIA SPECLAB [place]   8/29/2018 11:00 AM ZOEY Oswald MD [facility] [place]

## 2018-05-24 NOTE — ASSESSMENT & PLAN NOTE
Continue losartan therapy  Low Na diet   monitor daily weights and diurese for 3-4 pound gain or symptoms indicative of volume overload

## 2018-05-25 ENCOUNTER — TELEPHONE (OUTPATIENT)
Dept: INTERNAL MEDICINE | Facility: CLINIC | Age: 83
End: 2018-05-25

## 2018-05-25 PROBLEM — R07.89 MUSCULAR CHEST PAIN: Status: ACTIVE | Noted: 2018-05-25

## 2018-05-25 LAB — TROPONIN I SERPL DL<=0.01 NG/ML-MCNC: 0.01 NG/ML

## 2018-05-25 PROCEDURE — 97116 GAIT TRAINING THERAPY: CPT

## 2018-05-25 PROCEDURE — 99309 SBSQ NF CARE MODERATE MDM 30: CPT | Mod: ,,, | Performed by: NURSE PRACTITIONER

## 2018-05-25 PROCEDURE — 63600175 PHARM REV CODE 636 W HCPCS: Performed by: PHYSICIAN ASSISTANT

## 2018-05-25 PROCEDURE — A9155 ARTIFICIAL SALIVA: HCPCS | Performed by: INTERNAL MEDICINE

## 2018-05-25 PROCEDURE — 97535 SELF CARE MNGMENT TRAINING: CPT

## 2018-05-25 PROCEDURE — 36415 COLL VENOUS BLD VENIPUNCTURE: CPT

## 2018-05-25 PROCEDURE — 97110 THERAPEUTIC EXERCISES: CPT

## 2018-05-25 PROCEDURE — 84484 ASSAY OF TROPONIN QUANT: CPT

## 2018-05-25 PROCEDURE — 11000004 HC SNF PRIVATE

## 2018-05-25 PROCEDURE — 63600175 PHARM REV CODE 636 W HCPCS: Performed by: INTERNAL MEDICINE

## 2018-05-25 PROCEDURE — 25000003 PHARM REV CODE 250: Performed by: PHYSICIAN ASSISTANT

## 2018-05-25 PROCEDURE — 25000003 PHARM REV CODE 250: Performed by: INTERNAL MEDICINE

## 2018-05-25 PROCEDURE — 97530 THERAPEUTIC ACTIVITIES: CPT

## 2018-05-25 RX ADMIN — Medication 10 ML: at 04:05

## 2018-05-25 RX ADMIN — Medication 10 ML: at 12:05

## 2018-05-25 RX ADMIN — PREGABALIN 75 MG: 75 CAPSULE ORAL at 09:05

## 2018-05-25 RX ADMIN — OXYCODONE HYDROCHLORIDE 5 MG: 5 TABLET ORAL at 12:05

## 2018-05-25 RX ADMIN — PANTOPRAZOLE SODIUM 40 MG: 40 TABLET, DELAYED RELEASE ORAL at 09:05

## 2018-05-25 RX ADMIN — OXYBUTYNIN CHLORIDE 10 MG: 10 TABLET, FILM COATED, EXTENDED RELEASE ORAL at 09:05

## 2018-05-25 RX ADMIN — OXYCODONE HYDROCHLORIDE 5 MG: 5 TABLET ORAL at 09:05

## 2018-05-25 RX ADMIN — ACETAMINOPHEN 650 MG: 325 TABLET ORAL at 12:05

## 2018-05-25 RX ADMIN — CETIRIZINE HYDROCHLORIDE 10 MG: 5 TABLET, FILM COATED ORAL at 09:05

## 2018-05-25 RX ADMIN — ACETAMINOPHEN 650 MG: 325 TABLET ORAL at 09:05

## 2018-05-25 RX ADMIN — ESCITALOPRAM 5 MG: 5 TABLET, FILM COATED ORAL at 09:05

## 2018-05-25 RX ADMIN — STANDARDIZED SENNA CONCENTRATE AND DOCUSATE SODIUM 1 TABLET: 8.6; 5 TABLET, FILM COATED ORAL at 09:05

## 2018-05-25 RX ADMIN — PRAVASTATIN SODIUM 20 MG: 20 TABLET ORAL at 09:05

## 2018-05-25 RX ADMIN — LOSARTAN POTASSIUM 50 MG: 50 TABLET, FILM COATED ORAL at 09:05

## 2018-05-25 RX ADMIN — CIPROFLOXACIN HYDROCHLORIDE 500 MG: 500 TABLET, FILM COATED ORAL at 09:05

## 2018-05-25 RX ADMIN — POTASSIUM & SODIUM PHOSPHATES POWDER PACK 280-160-250 MG 1 PACKET: 280-160-250 PACK at 06:05

## 2018-05-25 RX ADMIN — Medication 2000 UNITS: at 09:05

## 2018-05-25 RX ADMIN — ENOXAPARIN SODIUM 40 MG: 100 INJECTION SUBCUTANEOUS at 04:05

## 2018-05-25 NOTE — PLAN OF CARE
Problem: Patient Care Overview  Goal: Plan of Care Review  Outcome: Revised  Repositions independently, no new skin breakdowns noted. Rt hip x 3 surgical aquacel drsg dry and intact. Afebrile. Ciprofloxacin PO scheduled. Monitored for pain and safety. Safety maintained. Pain meds effective

## 2018-05-25 NOTE — ASSESSMENT & PLAN NOTE
Continue supplementation with phos packets  Potassium at low normal so will supplement as well  Will continue to monitor electrolytes with twice weekly labs    5/25/18  Phos level on labs obtained yesterday was low.  MD replacing with neutra phos.  Repeat labs on Monday.

## 2018-05-25 NOTE — CONSULTS
"  [facility] - Skilled Nursing Care  Adult Nutrition  Consult Note    SUMMARY     Recommendations    Recommendation/Intervention: Cotinue cardiac diet. Pt wishes her diet changed to Regular, patient refuses oral supplement but agrees to order milk for meals or for HS snack. RD to follow    Goals: PO to meet 85% of EEN and EPN in one week  Nutrition Goal Status: new  Communication of RD Recs: other (comment) (care plans)  Reason for Assessment    Reason for Assessment: consult  Diagnosis:  (sp femur fx with pinning)  Relevant Medical History: GERD, UTI's , Osteoporosis, HTN  Interdisciplinary Rounds: attended  General Information Comments: PO 50-75% does not like meat much, no yogurt, no sausage gerardo ,no tuna  Nutrition Discharge Planning: DC on     Nutrition Risk Screen    Nutrition Risk Screen: no indicators present    Nutrition/Diet History    Patient Reported Diet/Restrictions/Preferences: general  Food Preferences: no yogurt  refuses oral supplements, does not like sweets  Do you have any cultural, spiritual, Scientologist conflicts, given your current situation?: none  Food Allergies: NKFA    Anthropometrics      Height Method: Stated  Height: 5' 3" (160 cm)  Height (inches): 63 in  Weight Method: Standard Scale  Weight: 69.3 kg (152 lb 12.5 oz)  Weight (lb): 152.78 lb  Ideal Body Weight (IBW), Female: 115 lb  % Ideal Body Weight, Female (lb): 132.85 lb  BMI (Calculated): 27.1  BMI Grade: 25 - 29.9 - overweight  Weight Loss:  (denies wt changes)       Lab/Procedures/Meds    Pertinent Labs Reviewed: reviewed  Pertinent Labs Comments: PO4 2.5, Mg 1.9  Pertinent Medications Reviewed: reviewed  Pertinent Medications Comments: statin, Abx, pantoprazole, senna-docusate, KPO4, KCl, Vit D,     Physical Findings/Assessment    Overall Physical Appearance: nourished, loss of muscle mass, mild  Oral/Mouth Cavity: WDL  Skin: incision(s)    Estimated/Assessed Needs  Pt reports stable wieght  Weight Used For Calorie Calculations: " 69.3 kg (152 lb 12.5 oz)  Energy Calorie Requirements (kcal): 1458  Energy Need Method: Atkins-St Jeor (x 1.3 (PAL))  Protein Requirements: 84  Weight Used For Protein Calculations: 69.3 kg (152 lb 12.5 oz) (x 1.2kg)  Fluid Requirements (mL): or per MD  Fluid Need Method: RDA Method  RDA Method (mL): 1458         Nutrition Prescription Ordered  Cardiac  Current Diet Order: Cardiac  Nutrition Order Comments: no tuna no sausage gerardo   Oral Nutrition Supplement: none    Evaluation of Received Nutrient/Fluid Intake    Energy Calories Required: meeting needs  Protein Required: meeting needs  Fluid Required: meeting needs  Tolerance: tolerating  % Intake of Estimated Energy Needs: 50 - 75 %  % Meal Intake: 50 - 75 %    Nutrition Risk  mild  Level of Risk/Frequency of Follow-up: low     Assessment and Plan    No nutrition diagnosis at this time    Monitor and Evaluation    Food and Nutrient Intake: food and beverage intake  Food and Nutrient Adminstration: diet order  Anthropometric Measurements: weight change  Biochemical Data, Medical Tests and Procedures: electrolyte and renal panel, gastrointestinal profile, inflammatory profile, glucose/endocrine profile  Nutrition-Focused Physical Findings: overall appearance     Nutrition Follow-Up    RD Follow-up?: Yes

## 2018-05-25 NOTE — ASSESSMENT & PLAN NOTE
Continue chronic therapy with cetirizine  Symptoms currently controlled.     5/25/18  Chronic, no complaints, continue cetirizine as ordered.

## 2018-05-25 NOTE — ASSESSMENT & PLAN NOTE
Chronic and stable  Continue therapy with losartan  -will continue to monitor and adjust regimen as necessary    5/25/18  BP Readings from Last 3 Encounters:   05/25/18 134/63   05/23/18 (!) 126/59   04/13/18 130/82     BP is stable, continue losartan and monitor.

## 2018-05-25 NOTE — TELEPHONE ENCOUNTER
----- Message from Nisha Mclaughlin sent at 5/23/2018 10:02 AM CDT -----  Contact: Self Call  [phone]  Patient is admitted to [facility] but she would like to speak with you about her medication HCTZ.

## 2018-05-25 NOTE — ASSESSMENT & PLAN NOTE
Complete therapy with cipro to end date of 5/29 5/25/18  No reported issues today.  Continue Cipro therapy as ordered.

## 2018-05-25 NOTE — ASSESSMENT & PLAN NOTE
Chronic and stable  Avoid hypotension and monitor for volume overload.     5/25/18  No acute issues, continue to monitor.

## 2018-05-25 NOTE — ASSESSMENT & PLAN NOTE
Patient will need to f/u with Ortho fracture clinic  Continue therapy with Vit D supplement and dietary calcium.    5/25/18  Pain is adequately controlled.  Continue OxyIR PRN as ordered for pain and Lyrica for neuropathy.  Continue Calcium and vitamin D and follow up with Ortho in fracture clinic.

## 2018-05-25 NOTE — ASSESSMENT & PLAN NOTE
5/25/18  Patient reports tightness across her chest which is worse with movement and deep breathing.  She has no N/V, SOB or diaphoresis.  Patient is reproducible with arm activity.  Pain is likely MS and not Cardiac in nature.  Will check troponin x 1 but in meantime encouraged patient to use her IS, and TCDB.  She can use Tylenol as ordered for pain control.

## 2018-05-25 NOTE — ASSESSMENT & PLAN NOTE
Likely due to oxybutynin which has been effective in controlling urinary symptoms and patient wishes to continue  Will order artificial saliva before meals    5/25/18  No acute issues today.  Agree, Oxybutynin could be a factor.  Continue saliva substitute.

## 2018-05-25 NOTE — ASSESSMENT & PLAN NOTE
Chronic and stable  Continue therapy with pravastatin.    5/25/18  Chronic, continue home statin therapy.

## 2018-05-25 NOTE — PROGRESS NOTES
05/25/2018  11:02 AM  Discharge Planning Assessment     Payor: [employer] MEDICARE / Plan: [employer] MEDICARE HMO / Product Type: Capitation /     Expected length of stay:  [] 7 days   [] 10 days  [x] 14 days   [] 21 days   [] > 30 days    Communicated expected length of stay with patient/caregiver:  [x] Yes   [] No    Anticipated discharge date:  6/5/2018    Assessment information obtained from:  [x] Patient   [] Caregiver     Arrived from:   [x] Home   [] Assisted Living    [] Nursing Home   [] SNF   [] Rehab  [] LTACH   [] Group Home   [] Foster Care   [] Psych   [] Shelter   [] Homeless   [] Transfer  [] Correctional Facility  [] Name of Facility:      Patient currently lives with:    [] Alone   [x] Spouse   [] Daughter   [] Son   [] Grandparents   []  Parents   [] Siblings   [] Friends   [] Domestic Partner   [] Facility Resident      [] Foster Home    [] Other:       Extended Emergency Contact Information  Primary Emergency Contact: Justine Lucas   [facility]  Home Phone: [phone]  Mobile Phone: [phone]  Relation: Daughter  Secondary Emergency Contact: Raffi Pulliam   [facility]  Home Phone: [phone]  Mobile Phone: [phone]  Relation: Relative     Prior to hospitalization cognitive status:   [x] Alert/Oriented  [] No Deficits [] Risk of Harm to Self/Others   [] Not Oriented to Person   [] Not Oriented to Place   [] Not Oriented to Time   [] Coma/Sedated/Intubated  [] Judgement Impaired    []  Unable to Assess   [] Inappropriate Behavior  [] Infant/Toddler    Prior to hospitalization functional status:   [x] Independent   [] Assistive Equipment   [] Assistive Person    [] Completely Dependent  [] Infant/Toddler/Child Appropriate   [] Infant/Toddler/Child Delayed    []  Adolescent     Current cognitive status:   [x] Alert/Oriented  [] No Deficits [] Risk of Harm to Self/Others   [] Not Oriented to Person   [] Not Oriented to Place   [] Not Oriented to Time   []  Coma/Sedated/Intubated  [] Judgement Impaired    []  Unable to Assess   [] Inappropriate Behavior  [] Infant/Toddler    Current functional status:     [] Independent   [x] Assistive Equipment   [x] Assistive Person     [] Completely Dependent   [] Infant/Toddler/Child Appropriate   [] Infant/Toddler/Child Delayed     [] Adolescent     Capacity to Care for Self:   Is patient able to return to prior living arrangements after discharge: [x] Yes  [] No     Is patient able to care for self after discharge?   [] Yes   [x] No     [] Pediatric     Does the patient have family/friends to assist after discharge?:  [x] Yes   [] No    [] N/A   Comments:  Daughter      Patient/caregiver perception of discharge disposition:   [] Home   [x] Home with Family  [x] Home Health   [] SNF   [] Rehab   [] LTAC    []  New Nursing Home Placement  [] Return to Nursing Home    [] Shelter     [] Assisted Living  [] Foster Home   [] Other:      Readmit:   Has patient doug in the hospital in the last 30 days? [] Yes   [x] No     If YES, was patient admitted for the same reason?  [] Yes   [] No       Home Health:   Patient currently receives home health services?:   [] Yes   [x] No     Patient previously received home health services and would like to resume services if necessary   [] Yes   [x] No     DME:   Patient currently uses DME:   [] Yes   [x] No       List of equipment currently used:     [] Wheelchair   [] Standard Walker  [] Rolling Walker  []  Rollator    [] Oxygen    [] Portable oxygen   [] Nebulizer    [] Apnea Monitor    [] Crutches  [] Hospital Bed   []  Lift Device   [] Scooter [] Cane     [] Prosthesis   []  BSC   [] Tub Bench   [] Catheter Supplies    [] Ostomy Supplies   [] Trach Supplies     [] Suction Machine        [] Home Vent    [] Bipap   [] Other:         Medications:    Can the patient afford all prescribed medications?  [x] Yes   [] No     If NO, what medication:       Is the patient taking medications as prescribed?     [x] Yes   [] No    Financial Concerns:   Does the patient have any financial concerns?   [] Yes   [x] No      If YES, what are the concerns:      Transportation:   Does the patient have transportation to healthcare appointments?   [x] Yes   [] No     If YES, what means of transportation does the patient have?   [] Car   [x] Family/Friend  [] Bicycle   [] Motorcycle   [] Public Transportation [] Ambulance[] Wheelchair van   [] Name of Provider    Dialysis:   Does the patient currently receive dialysis?   [] Yes   [x] No     P Miguel Oswald MD   [address]  [place] [address]  [phone]         APS/CPS involved in the case:  [] Yes   [x] No   If YES, name of :     If YES, phone number of :        Discharge Plan A:  [x] Home   [] Home with Family  [x] Home Health   [] SNF   [] Rehab   [] LTAC   []  New Nursing Home Placement  [] Return to Nursing Home    [] Assisted Living    [] Shelter     [] Private Duty Nursing   [] Foster Home    [] Psych    [] Early Steps  [] WIC       [] Home Hospice   [] Inpatient Hospice   [] Other    Discharge Plan B:  [] Home   [x] Home with Family  [x] Home Health   [] SNF   [] Rehab   [] LTAC  []  New Nursing Home Placement   [] Return to  Nursing Home    [] Assisted Living   [] Shelter  [] Private Duty Nursing   [] Foster Home     [] Psych     [] Early Steps  [] WIC    [] Home Hospice     [] Inpatient Hospice   [] Other     [x] Patient and family in agreement with discharge plan.    Rounded with ANGEL Madsen. Patient AAO. Discharge plan A is to return home alone. Discharge plan B is to stay with daughter in [place]. Informed patient therapy recommends a 2 week SNF stay and that discharge date would be 6/5/2018. Patient stated understanding to discharge date. Discharge date written on dry erase board in room. Patient stated her  used [facility] and she wants to use them also. CM and SW will continue to follow for any additional needs.  Radha  Joana PARTIDA,  Skilled  [id number]

## 2018-05-25 NOTE — TELEPHONE ENCOUNTER
Spoke with pt who advises that when she was inpatient, she was advised the recurrent UTIs may have been due to the HCTZ.    But a summary was to be sent to Dr. Oswald to advise.    Pt is currently inpatient rehab.

## 2018-05-25 NOTE — ASSESSMENT & PLAN NOTE
Continue therapy with pantoprazole  Chronic and stable    5/25/18  Chronic, continue PPI as ordered.

## 2018-05-25 NOTE — ASSESSMENT & PLAN NOTE
Continue losartan therapy  Low Na diet   monitor daily weights and diurese for 3-4 pound gain or symptoms indicative of volume overload    5/25/18  Appears compensated, no CP or SOB.  Continue ARB as ordered.  Monitor weights and treat with diuretics for weight gain.  Wt Readings from Last 1 Encounters:   05/24/18 1957 69.3 kg (152 lb 12.5 oz)   05/23/18 1600 69.3 kg (152 lb 12.5 oz)

## 2018-05-25 NOTE — PROGRESS NOTES
[facility]  Department of Hospital Medicine  Progress Note    Patient Name: Mine Wilkins  MRN: [id number]  Code Status: Prior  Admission Date: 5/23/2018  Length of Stay: 2 days  Attending Physician: Charissa Robin MD  Primary Care Provider: MARVA Oswald MD    Subjective:     Principal Problem:Closed displaced intertrochanteric fracture of right femur with routine healing    HPI:  Chief Complaint/Reason for Admission: Closed displaced intertrochanteric fracture of right femur with routine healing    History of Present Illness:  Patient is a 83 y.o. female with osteoporosis, moderate AS, HTN who presents to SNF after hospitalization for fall with resultant right hip fracture requiring right CMN by Dr. Scott on 5/20. The patient was treated for UTI with cipro.  She complains of pain to her right hip rating it as 3/10, exacerbated by walking and relieved with oxycodone.  She has also been experiencing chest soreness with movement and denies pressure, dyspnea or radiation of pain.  She also complains of long-standing dry mouth.  HCTZ was discontinued with no change in symptoms.  She has tried OTC mouthwashes without relief.       The patient has been admitted to SNF for ongoing PT/OT due to insufficient progress to go home safely from the hospital.    Records from last hospital stay reviewed and summarized above.     Interval History:   [facility]    5/25/18  Patient seen at bedside, she reports she has a tightness across her chest which is worse with movement and deep breathing.  She denies n/v, sob or pain radiation, no diaphoresis.  She reports her pain medication helps to control this pain.  She has minimal pain to her right leg.  She reports she lives with her spouse who depends on her.  She has 2 daughters and will likely go to live with one of them while she recovers and put her  into a NH for that time.  She reports  she is eating, drinking, voiding and having BM's without issues.        Review of Systems   Constitutional: Negative for appetite change, fatigue and fever.   Respiratory: Negative for cough and shortness of breath.    Cardiovascular: Negative for chest pain, palpitations and leg swelling.   Gastrointestinal: Negative for abdominal pain, constipation, diarrhea, nausea and vomiting.   Endocrine: Negative for polydipsia, polyphagia and polyuria.   Genitourinary: Negative for dysuria and frequency.   Musculoskeletal: Positive for arthralgias and myalgias.   Skin: Negative for rash.   Psychiatric/Behavioral: Negative for confusion and hallucinations.     Objective:     Vital Signs (Most Recent):  Temp: 98.6 °F (37 °C) (05/25/18 0836)  Pulse: 85 (05/25/18 0836)  Resp: 18 (05/25/18 0836)  BP: 134/63 (05/25/18 0836)  SpO2: 98 % (05/25/18 0836) Vital Signs (24h Range):  Temp:  [97.9 °F (36.6 °C)-98.6 °F (37 °C)] 98.6 °F (37 °C)  Pulse:  [73-85] 85  Resp:  [18] 18  SpO2:  [95 %-98 %] 98 %  BP: (134)/(60-63) 134/63     Weight: 69.3 kg (152 lb 12.5 oz)  Body mass index is 27.06 kg/m².    Intake/Output Summary (Last 24 hours) at 05/25/18 1125  Last data filed at 05/25/18 0830   Gross per 24 hour   Intake              720 ml   Output                0 ml   Net              720 ml      Physical Exam   Constitutional: She is oriented to person, place, and time. She appears well-developed and well-nourished.   Cardiovascular: Normal rate, regular rhythm and intact distal pulses.    Murmur heard.  Pulmonary/Chest: Effort normal and breath sounds normal. No respiratory distress.   Abdominal: Soft. Normal appearance and bowel sounds are normal. She exhibits no distension. There is no tenderness.   Musculoskeletal: Normal range of motion.   Neurological: She is alert and oriented to person, place, and time. She has normal strength.   Skin: Skin is warm, dry and intact. Capillary refill takes less than 2 seconds. No erythema.   Surgical  aquacel dressing to her right hip, lateral aspect of right knee.  There is no redness or drainage present.   Psychiatric: She has a normal mood and affect.       Significant Labs: All pertinent labs within the past 24 hours have been reviewed.    Significant Imaging: n/a    Assessment/Plan:      * Closed displaced intertrochanteric fracture of right femur with routine healing s/p IM nail on 5/20/2018    -continue PT/OT to increase ambulation, ADL perfomance and endurance  -continue WBAT  -continue oxycodone for pain control prn  -continue lovenox for DVT prophylaxis for 4 weeks after joint surgery  -Ortho NP to assess surgical wound and remove dressing as indicated; will notify Ortho team for any leakage or excessive drainage evident on Aquacel dressing  -continue pregabalin to control pain; discontinue on discharge from [facility]  -continue senokot-s and miralax to prevent constipation; hold for > 3 stools in 24 hours or loose stools  -keep appointment with ortho as scheduled    Future Appointments  Date Time Provider Department Center   6/5/2018 8:45 AM SIDNEY cSott MD [facility] SPORTS [place]   8/22/2018 9:30 AM LAB, STEFAN KENH LAB [place]   8/22/2018 10:00 AM SPECIMEN, MANUELAButler GAL SPECLAB [place]   8/29/2018 11:00 AM ZOEY Oswald MD [facility] IM [place]     5/25/18  As above.  Pain well controlled.  Continue Lovenox for DVT prevention.  Follow up with Ortho.          Muscular chest pain    5/25/18  Patient reports tightness across her chest which is worse with movement and deep breathing.  She has no N/V, SOB or diaphoresis.  Patient is reproducible with arm activity.  Pain is likely MS and not Cardiac in nature.  Will check troponin x 1 but in meantime encouraged patient to use her IS, and TCDB.  She can use Tylenol as ordered for pain control.        Xerostomia    Likely due to oxybutynin which has been effective in controlling urinary symptoms and patient wishes to continue  Will order artificial saliva  before meals    5/25/18  No acute issues today.  Agree, Oxybutynin could be a factor.  Continue saliva substitute.        Hypophosphatemia    Continue supplementation with phos packets  Potassium at low normal so will supplement as well  Will continue to monitor electrolytes with twice weekly labs    5/25/18  Phos level on labs obtained yesterday was low.  MD replacing with neutra phos.  Repeat labs on Monday.        Urinary tract infection due to Enterococcus faecalis    Complete therapy with cipro to end date of 5/29 5/25/18  No reported issues today.  Continue Cipro therapy as ordered.        Gastroesophageal reflux disease without esophagitis    Continue therapy with pantoprazole  Chronic and stable    5/25/18  Chronic, continue PPI as ordered.        Left ventricular diastolic dysfunction with preserved systolic function    Continue losartan therapy  Low Na diet   monitor daily weights and diurese for 3-4 pound gain or symptoms indicative of volume overload    5/25/18  Appears compensated, no CP or SOB.  Continue ARB as ordered.  Monitor weights and treat with diuretics for weight gain.  Wt Readings from Last 1 Encounters:   05/24/18 1957 69.3 kg (152 lb 12.5 oz)   05/23/18 1600 69.3 kg (152 lb 12.5 oz)             Aortic valve stenosis, moderate    Chronic and stable  Avoid hypotension and monitor for volume overload.     5/25/18  No acute issues, continue to monitor.        Age-related osteoporosis with current pathological fracture with routine healing    Patient will need to f/u with Ortho fracture clinic  Continue therapy with Vit D supplement and dietary calcium.    5/25/18  Pain is adequately controlled.  Continue OxyIR PRN as ordered for pain and Lyrica for neuropathy.  Continue Calcium and vitamin D and follow up with Ortho in fracture clinic.        Pure hypercholesterolemia    Chronic and stable  Continue therapy with pravastatin.    5/25/18  Chronic, continue home statin therapy.        Essential  hypertension    Chronic and stable  Continue therapy with losartan  -will continue to monitor and adjust regimen as necessary    5/25/18  BP Readings from Last 3 Encounters:   05/25/18 134/63   05/23/18 (!) 126/59   04/13/18 130/82     BP is stable, continue losartan and monitor.        AR (allergic rhinitis)    Continue chronic therapy with cetirizine  Symptoms currently controlled.     5/25/18  Chronic, no complaints, continue cetirizine as ordered.        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            Cale Mcgregor NP  Department of Hospital Medicine  [facility]

## 2018-05-25 NOTE — SUBJECTIVE & OBJECTIVE
Interval History:   The patient was admitted at [facility] from 5/19 to 5/23/2018.    5/25/18  Patient seen at bedside, she reports she has a tightness across her chest which is worse with movement and deep breathing.  She denies n/v, sob or pain radiation, no diaphoresis.  She reports her pain medication helps to control this pain.  She has minimal pain to her right leg.  She reports she lives with her spouse who depends on her.  She has 2 daughters and will likely go to live with one of them while she recovers and put her  into a NH for that time.  She reports she is eating, drinking, voiding and having BM's without issues.        Review of Systems   Constitutional: Negative for appetite change, fatigue and fever.   Respiratory: Negative for cough and shortness of breath.    Cardiovascular: Negative for chest pain, palpitations and leg swelling.   Gastrointestinal: Negative for abdominal pain, constipation, diarrhea, nausea and vomiting.   Endocrine: Negative for polydipsia, polyphagia and polyuria.   Genitourinary: Negative for dysuria and frequency.   Musculoskeletal: Positive for arthralgias and myalgias.   Skin: Negative for rash.   Psychiatric/Behavioral: Negative for confusion and hallucinations.     Objective:     Vital Signs (Most Recent):  Temp: 98.6 °F (37 °C) (05/25/18 0836)  Pulse: 85 (05/25/18 0836)  Resp: 18 (05/25/18 0836)  BP: 134/63 (05/25/18 0836)  SpO2: 98 % (05/25/18 0836) Vital Signs (24h Range):  Temp:  [97.9 °F (36.6 °C)-98.6 °F (37 °C)] 98.6 °F (37 °C)  Pulse:  [73-85] 85  Resp:  [18] 18  SpO2:  [95 %-98 %] 98 %  BP: (134)/(60-63) 134/63     Weight: 69.3 kg (152 lb 12.5 oz)  Body mass index is 27.06 kg/m².    Intake/Output Summary (Last 24 hours) at 05/25/18 1125  Last data filed at 05/25/18 0830   Gross per 24 hour   Intake              720 ml   Output                0 ml   Net              720 ml      Physical Exam   Constitutional: She is oriented to person, place, and time.  She appears well-developed and well-nourished.   Cardiovascular: Normal rate, regular rhythm and intact distal pulses.    Murmur heard.  Pulmonary/Chest: Effort normal and breath sounds normal. No respiratory distress.   Abdominal: Soft. Normal appearance and bowel sounds are normal. She exhibits no distension. There is no tenderness.   Musculoskeletal: Normal range of motion.   Neurological: She is alert and oriented to person, place, and time. She has normal strength.   Skin: Skin is warm, dry and intact. Capillary refill takes less than 2 seconds. No erythema.   Surgical aquacel dressing to her right hip, lateral aspect of right knee.  There is no redness or drainage present.   Psychiatric: She has a normal mood and affect.       Significant Labs: All pertinent labs within the past 24 hours have been reviewed.    Significant Imaging: n/a

## 2018-05-25 NOTE — PLAN OF CARE
Problem: Occupational Therapy Goal  Goal: Occupational Therapy Goal  Goals to be met by: 14 days     Patient will increase functional independence with ADLs by performing:    UE Dressing with Modified [place].  LE Dressing with Modified [place] /c AE.  Grooming while standing at sink with Modified [place].  Toileting from bedside commode with Modified [place] for hygiene and clothing management.   Bathing with Stand-by Assistance.  Supine to sit with Modified [place] /c HOB flat and no handrails.  Stand pivot transfers with Modified [place].  Toilet transfer to bedside commode with Modified [place].      Outcome: Ongoing (interventions implemented as appropriate)  Patient's goals are appropriate.   SLAVA Yarbrough  5/25/2018

## 2018-05-25 NOTE — PT/OT/SLP PROGRESS
"Physical Therapy  Treatment    Mine Wilkins   MRN: [id number]   Admitting Diagnosis: Closed displaced intertrochanteric fracture of right femur with routine healing    PT Received On: 05/25/18  Total Time (min): 45       Billable Minutes:45    Gait Training 15, Therapeutic Activity 15 and Therapeutic Exercise 15    Treatment Type: Treatment  PT/PTA: PTA     PTA Visit Number: 1       General Precautions: Standard, fall (delirium)  Orthopedic Precautions: RLE weight bearing as tolerated   Braces: N/A         Subjective:  "I can try" pt agreeable to therapy earlier than scheduled time      Pain/Comfort  Pain Rating 1: 6/10 (0 at rest, 6 with mobility)  Location - Side 1: Right  Location - Orientation 1: generalized  Location 1: hip  Pain Addressed 1: Reposition, Distraction, Cessation of Activity, Nurse notified (med requested)  Pain Rating Post-Intervention 1: 6/10    Objective:  Patient found with:  (in bed)       Functional Status:  MDS G  Bed Mobility Functional Status: CGA-Min (A)  Transfer Functional Status: CGA-Min (A)  Walk in Corridor Functional Status: CGA-Min (A)  Locomotion on Unit Functional Status: S-SBA          AM-PAC 6 CLICK MOBILITY  Total Score:17    Bed Mobility:  Supine>Sit: min A with     Transfers:  Sit<>Stand: CGA with RW  Stand Pivot Transfer: CGA with RW EOB>WC>BSC post session    Gait:  Amb with RW CGA ~ 85 ft slow ailyn, step to gait pattern     Wheelchair Mobility:  Patient propels w/c ~85 ft with SBA vcs for tech negotiating turns    Therex:  2x10 reps AP,GS,SAQ,hip flex    Patient left up in chair with call button in reach, nsg present and belongings in reach.    Assessment:  Mine Wilkins is a 83 y.o. female with a medical diagnosis of Closed displaced intertrochanteric fracture of right femur with routine healing.  Pt tolerated well, pt would continue to benefit from skilled PT services to improve overall functional mobility, strength and endurance.  .    Rehab " identified problem list/impairments: weakness, impaired endurance, impaired functional mobilty, gait instability, impaired balance, decreased lower extremity function, pain, edema, orthopedic precautions    Rehab potential is good.    Activity tolerance: Fair    Discharge recommendations: home with home health     Barriers to discharge: Decreased caregiver support, Inaccessible home environment    Equipment recommendations: walker, rolling, bedside commode     GOALS:    Physical Therapy Goals        Problem: Physical Therapy Goal    Goal Priority Disciplines Outcome Goal Variances Interventions   Physical Therapy Goal     PT/OT, PT Ongoing (interventions implemented as appropriate)     Description:  Goals to be met by: 14 days     Patient will increase functional independence with mobility by performin. Supine to sit with Modified [place]  2. Sit to supine with Modified [place]  3. Sit to stand transfer with Supervision  4. Bed to chair transfer with Supervision using Rolling Walker  5. Gait  x 150 feet with Supervision using Rolling Walker.   6. Wheelchair propulsion x100 feet with Supervision using bilateral upper extremities  7. Ascend/Descend 4 inch curb step with Stand-by Assistance using Rolling Walker.  8. Stand for 3 minutes with Stand-by Assistance using Rolling Walker and perform an activity  9. Lower extremity exercise program x20 reps per handout, with assistance as needed                      PLAN:    Patient to be seen 5 x/week  to address the above listed problems via gait training, therapeutic exercises, therapeutic activities, wheelchair management/training  Plan of Care expires: 18  Plan of Care reviewed with: patient    Valerie Jessica, PTA  2018

## 2018-05-25 NOTE — ASSESSMENT & PLAN NOTE
-continue PT/OT to increase ambulation, ADL perfomance and endurance  -continue WBAT  -continue oxycodone for pain control prn  -continue lovenox for DVT prophylaxis for 4 weeks after joint surgery  -Ortho NP to assess surgical wound and remove dressing as indicated; will notify Ortho team for any leakage or excessive drainage evident on Aquacel dressing  -continue pregabalin to control pain; discontinue on discharge from SNF  -continue senokot-s and miralax to prevent constipation; hold for > 3 stools in 24 hours or loose stools  -keep appointment with ortho as scheduled    Future Appointments  Date Time Provider Department Center   6/5/2018 8:45 AM SIDNEY Scott MD [facility] SPORTS [place]   8/22/2018 9:30 AM LAB, STEFAN KENH LAB [place]   8/22/2018 10:00 AM MARGO KAPADIA SPECLAB [place]   8/29/2018 11:00 AM ZOEY Oswald MD [facility] [place]     5/25/18  As above.  Pain well controlled.  Continue Lovenox for DVT prevention.  Follow up with Ortho.

## 2018-05-25 NOTE — ASSESSMENT & PLAN NOTE
Chronic with effective therapy with oxybutynin which will be continued    5/25/18  Chronic, no issues, continue Oxybutynin as ordered.

## 2018-05-25 NOTE — PLAN OF CARE
Problem: Physical Therapy Goal  Goal: Physical Therapy Goal  Goals to be met by: 14 days     Patient will increase functional independence with mobility by performin. Supine to sit with Modified [place]  2. Sit to supine with Modified [place]  3. Sit to stand transfer with Supervision  4. Bed to chair transfer with Supervision using Rolling Walker  5. Gait  x 150 feet with Supervision using Rolling Walker.   6. Wheelchair propulsion x100 feet with Supervision using bilateral upper extremities  7. Ascend/Descend 4 inch curb step with Stand-by Assistance using Rolling Walker.  8. Stand for 3 minutes with Stand-by Assistance using Rolling Walker and perform an activity  9. Lower extremity exercise program x20 reps per handout, with assistance as needed     Outcome: Ongoing (interventions implemented as appropriate)  Goals remain appropriate

## 2018-05-25 NOTE — PT/OT/SLP PROGRESS
Occupational Therapy  Treatment    Mine Wilkins   MRN: [id number]   Admitting Diagnosis: Closed displaced intertrochanteric fracture of right femur with routine healing    OT Date of Treatment: 05/25/18  Total Time (min): 45 min    Billable Minutes:  Self Care/Home Management 30 and Therapeutic Exercise 15    General Precautions: Standard, fall  Orthopedic Precautions: RLE weight bearing as tolerated  Braces: N/A         Subjective:  Communicated with patient prior to session.    Pain/Comfort  Pain Rating 1: 6/10  Location - Side 1: Right  Location - Orientation 1: generalized  Location 1: hip  Pain Addressed 1: Reposition, Distraction, Cessation of Activity  Pain Rating Post-Intervention 1: 6/10    Objective:       Functional Status:  MDS G  Bed Mobility Functional Status: mod(A) sit>supine  Transfer Functional Status: CGA bedside chair>BSC over toilet using RW /c functional ambulation; BSC over toilet>w/c stand pivot using RW; w/c>bed stand pivot  Walk in Room Functional Status: CGA  Toilet Use Functional Status: CGA  Personal Hygiene Functional Status: S oral care and washing face       AMPA 6 Click:  AMPA Total Score: 18    OT Exercises: UE Ergometer 15 min for improving endurance to increase independence with ADLs.    Patient left HOB elevated in bed with call button in reach and all needs met.     ASSESSMENT:  Mine Wilkins is a 83 y.o. female with a medical diagnosis of Closed displaced intertrochanteric fracture of right femur with routine healing and presents with the deficits listed below. Patient tolerated treatment session and was motivated to complete tasks. Patient continues to benefit from skilled OT services to achieve maximal independence.    Rehab identified problem list/impairments: weakness, impaired endurance, impaired self care skills, impaired functional mobilty, impaired balance, decreased lower extremity function, orthopedic precautions, pain    Rehab potential is  good    Activity tolerance: Good    Discharge recommendations: nursing facility, skilled     Barriers to discharge:  Decreased Caregiver support     Equipment recommendations: walker, rolling     GOALS:    Occupational Therapy Goals        Problem: Occupational Therapy Goal    Goal Priority Disciplines Outcome Interventions   Occupational Therapy Goal     OT, PT/OT Ongoing (interventions implemented as appropriate)    Description:  Goals to be met by: 14 days     Patient will increase functional independence with ADLs by performing:    UE Dressing with Modified [place].  LE Dressing with Modified [place] /c AE.  Grooming while standing at sink with Modified [place].  Toileting from bedside commode with Modified [place] for hygiene and clothing management.   Bathing with Stand-by Assistance.  Supine to sit with Modified [place] /c HOB flat and no handrails.  Stand pivot transfers with Modified [place].  Toilet transfer to bedside commode with Modified [place].                       Plan:  Patient to be seen 5 x/week to address the above listed problems via self-care/home management, therapeutic activities, therapeutic exercises  Plan of Care expires: 06/24/18  Plan of Care reviewed with: patient    SLAVA Yarbrough  05/25/2018

## 2018-05-26 PROCEDURE — 25000003 PHARM REV CODE 250: Performed by: PHYSICIAN ASSISTANT

## 2018-05-26 PROCEDURE — 97110 THERAPEUTIC EXERCISES: CPT

## 2018-05-26 PROCEDURE — 11000004 HC SNF PRIVATE

## 2018-05-26 PROCEDURE — A9155 ARTIFICIAL SALIVA: HCPCS | Performed by: INTERNAL MEDICINE

## 2018-05-26 PROCEDURE — 63600175 PHARM REV CODE 636 W HCPCS: Performed by: INTERNAL MEDICINE

## 2018-05-26 PROCEDURE — 63600175 PHARM REV CODE 636 W HCPCS: Performed by: PHYSICIAN ASSISTANT

## 2018-05-26 PROCEDURE — 97530 THERAPEUTIC ACTIVITIES: CPT

## 2018-05-26 PROCEDURE — 97535 SELF CARE MNGMENT TRAINING: CPT

## 2018-05-26 PROCEDURE — 25000003 PHARM REV CODE 250: Performed by: INTERNAL MEDICINE

## 2018-05-26 RX ADMIN — PRAVASTATIN SODIUM 20 MG: 20 TABLET ORAL at 08:05

## 2018-05-26 RX ADMIN — OXYCODONE HYDROCHLORIDE 5 MG: 5 TABLET ORAL at 10:05

## 2018-05-26 RX ADMIN — PREGABALIN 75 MG: 75 CAPSULE ORAL at 08:05

## 2018-05-26 RX ADMIN — CIPROFLOXACIN HYDROCHLORIDE 500 MG: 500 TABLET, FILM COATED ORAL at 08:05

## 2018-05-26 RX ADMIN — ESCITALOPRAM 5 MG: 5 TABLET, FILM COATED ORAL at 08:05

## 2018-05-26 RX ADMIN — LOSARTAN POTASSIUM 50 MG: 50 TABLET, FILM COATED ORAL at 08:05

## 2018-05-26 RX ADMIN — ENOXAPARIN SODIUM 40 MG: 100 INJECTION SUBCUTANEOUS at 05:05

## 2018-05-26 RX ADMIN — Medication 10 ML: at 11:05

## 2018-05-26 RX ADMIN — PANTOPRAZOLE SODIUM 40 MG: 40 TABLET, DELAYED RELEASE ORAL at 08:05

## 2018-05-26 RX ADMIN — Medication 2000 UNITS: at 08:05

## 2018-05-26 RX ADMIN — Medication 10 ML: at 06:05

## 2018-05-26 RX ADMIN — OXYBUTYNIN CHLORIDE 10 MG: 10 TABLET, FILM COATED, EXTENDED RELEASE ORAL at 08:05

## 2018-05-26 RX ADMIN — STANDARDIZED SENNA CONCENTRATE AND DOCUSATE SODIUM 1 TABLET: 8.6; 5 TABLET, FILM COATED ORAL at 08:05

## 2018-05-26 RX ADMIN — CETIRIZINE HYDROCHLORIDE 10 MG: 5 TABLET, FILM COATED ORAL at 08:05

## 2018-05-26 RX ADMIN — Medication 10 ML: at 04:05

## 2018-05-26 NOTE — PROGRESS NOTES
Dr. Robin paged per charge and results given to her per charge nurse.pt. legs elevated as ordered per charge.will continue to monitor pt.

## 2018-05-26 NOTE — PLAN OF CARE
Problem: Fall Risk (Adult)  Goal: Absence of Falls  Patient will demonstrate the desired outcomes by discharge/transition of care.   Outcome: Ongoing (interventions implemented as appropriate)  Pt. had no falls at this time.will continue to monitor.

## 2018-05-26 NOTE — PLAN OF CARE
Problem: Occupational Therapy Goal  Goal: Occupational Therapy Goal  Goals to be met by: 14 days     Patient will increase functional independence with ADLs by performing:    UE Dressing with Modified [place].  LE Dressing with Modified [place] /c AE.  Grooming while standing at sink with Modified [place].  Toileting from bedside commode with Modified [place] for hygiene and clothing management.   Bathing with Stand-by Assistance.  Supine to sit with Modified [place] /c HOB flat and no handrails.  Stand pivot transfers with Modified [place].  Toilet transfer to bedside commode with Modified [place].      Outcome: Ongoing (interventions implemented as appropriate)  Nadeem Jackson OTR/L      5/26/2018

## 2018-05-26 NOTE — PROGRESS NOTES
While in therapy pt. c/o of being light headed therapist took pt. b/p it was 89/52  and he waited  and repeated b/p it was 92/47.he returned pt. to her room .charge nurse in to see pt.pt. b/p 82/50  p 86 bwhile up in chair taken per charge.pt. assisted back in bed per charge nurse and she repeated pt. b/p 94/50 p 80.charge nurse will notify DR. Robin.will continue to monitor pt.

## 2018-05-26 NOTE — PT/OT/SLP PROGRESS
Occupational Therapy  Treatment    Mine Wilkins   MRN: [id number]   Admitting Diagnosis: Closed displaced intertrochanteric fracture of right femur with routine healing    OT Date of Treatment: 05/26/18  Total Time (min): 58 min    Billable Minutes:  Self Care/Home Management 20, Therapeutic Activity 15 and Therapeutic Exercise 23    General Precautions: Standard, fall  Orthopedic Precautions: RLE weight bearing as tolerated  Braces: N/A         Subjective:  Communicated with nurse prior to session.      Pain/Comfort  Pain Rating 1: 4/10  Location - Side 1: Right  Location - Orientation 1: generalized  Location 1: hip  Pain Addressed 1: Reposition, Distraction  Pain Rating Post-Intervention 1: 4/10    Objective:  Patient found with:  (no lines)    Functional Status:  MDS G  Bed Mobility Functional Status: CGA- (supine to sitting EOB)    Transfer Functional Status: S-SBA (sit to standing transition performed with SBA EOB and W/C to W. SPT from EOB to W/C SBA with RW to steady.)    Walk in Corridor Functional Status: S-SBA (Pt ambulated from her room toward therapy gym 101 with RW )    Dressing Functional Status:  S-SBA (UBD needing set up only. LBD donning pants and socks needing sock aide and reacher and SBA with RW to stand.)    Personal Hygiene Functional Status: Mod(I) (seated to groom.)      Moving from seated to standing position: Not steady, but able to stabilize without staff assistance  Walking (with assistive device if used): Not steady, only able to stabilize with staff assistance  Turning around and facing the opposite direction while walking: Not steady, only able to stabilize with staff assistance  Surface-to-surface transfer (transfer between bed and chair or wheelchair): Not steady, only able to stabilize with staff assistance           AMPA 6 Click:  AMPAC Total Score: 19    OT Exercises: UE Ergometer performed UE UBE with mod resistance for 15 minutes. UE exercises performed to increase  functional endurance and strength.  Strengthening required in order increase independence when performing self care tasks, functional ambulation , functional standing activities as well as when performing functional tasks.      Additional Treatment:  - Pt completed functional mobility, transfers and self care tasks as listed above.  - OT received POC with Patient    Pt worked on functional standing activity consisting of standing with RW  while reaching in all planes , crossing of midline and reaching to varying heights to facilitate (B) wt shifting and stability in standing to increase (I)ce when performing self care, and functional activities with standing component.  Pt tolerated up to 3 Min. 59 sec In standing with  SBA and RW  to steady.    Patient left up in chair with call button in reach, nurse notified and nurse present    ASSESSMENT:  Mine Wilkins is a 83 y.o. female with a medical diagnosis of Closed displaced intertrochanteric fracture of right femur with routine healing .Pt tolerated Tx but experienced episode of light headedness while standing and after sitting BP was taken in (L) UE measuring 89/52 and 92/47 in (R) UE. Pt reported decrease in lightheadedness after sitting for ~ 5 minutes with nurse alerted and checking Pt's BP again. Otherwise, Pt tolerated Tx performing self care tasks and exercises well.  Pt would continue to benefit from OT intervention to further her functional (I)ce.    Rehab identified problem list/impairments: weakness, impaired endurance, impaired self care skills, impaired functional mobilty, impaired balance, decreased lower extremity function, orthopedic precautions, pain    Rehab potential is good    Activity tolerance: Good    Discharge recommendations: nursing facility, skilled     Barriers to discharge:       Equipment recommendations: walker, rolling     GOALS:    Occupational Therapy Goals        Problem: Occupational Therapy Goal    Goal Priority  Disciplines Outcome Interventions   Occupational Therapy Goal     OT, PT/OT Ongoing (interventions implemented as appropriate)    Description:  Goals to be met by: 14 days     Patient will increase functional independence with ADLs by performing:    UE Dressing with Modified [place].  LE Dressing with Modified [place] /c AE.  Grooming while standing at sink with Modified [place].  Toileting from bedside commode with Modified [place] for hygiene and clothing management.   Bathing with Stand-by Assistance.  Supine to sit with Modified [place] /c HOB flat and no handrails.  Stand pivot transfers with Modified [place].  Toilet transfer to bedside commode with Modified [place].                       Plan:  Patient to be seen 5 x/week to address the above listed problems via self-care/home management, therapeutic activities, therapeutic exercises  Plan of Care expires: 06/24/18  Plan of Care reviewed with: patient    Nadeem Jackson OTR/L  05/26/2018

## 2018-05-27 PROCEDURE — 25000003 PHARM REV CODE 250: Performed by: INTERNAL MEDICINE

## 2018-05-27 PROCEDURE — 63600175 PHARM REV CODE 636 W HCPCS: Performed by: INTERNAL MEDICINE

## 2018-05-27 PROCEDURE — 97116 GAIT TRAINING THERAPY: CPT

## 2018-05-27 PROCEDURE — 63600175 PHARM REV CODE 636 W HCPCS: Performed by: PHYSICIAN ASSISTANT

## 2018-05-27 PROCEDURE — 11000004 HC SNF PRIVATE

## 2018-05-27 PROCEDURE — 97530 THERAPEUTIC ACTIVITIES: CPT

## 2018-05-27 PROCEDURE — 97110 THERAPEUTIC EXERCISES: CPT

## 2018-05-27 PROCEDURE — 25000003 PHARM REV CODE 250: Performed by: PHYSICIAN ASSISTANT

## 2018-05-27 PROCEDURE — A9155 ARTIFICIAL SALIVA: HCPCS | Performed by: INTERNAL MEDICINE

## 2018-05-27 RX ADMIN — CIPROFLOXACIN HYDROCHLORIDE 500 MG: 500 TABLET, FILM COATED ORAL at 08:05

## 2018-05-27 RX ADMIN — STANDARDIZED SENNA CONCENTRATE AND DOCUSATE SODIUM 1 TABLET: 8.6; 5 TABLET, FILM COATED ORAL at 09:05

## 2018-05-27 RX ADMIN — POLYETHYLENE GLYCOL 3350 17 G: 17 POWDER, FOR SOLUTION ORAL at 09:05

## 2018-05-27 RX ADMIN — PRAVASTATIN SODIUM 20 MG: 20 TABLET ORAL at 08:05

## 2018-05-27 RX ADMIN — PREGABALIN 75 MG: 75 CAPSULE ORAL at 08:05

## 2018-05-27 RX ADMIN — Medication 10 ML: at 11:05

## 2018-05-27 RX ADMIN — PANTOPRAZOLE SODIUM 40 MG: 40 TABLET, DELAYED RELEASE ORAL at 09:05

## 2018-05-27 RX ADMIN — STANDARDIZED SENNA CONCENTRATE AND DOCUSATE SODIUM 1 TABLET: 8.6; 5 TABLET, FILM COATED ORAL at 08:05

## 2018-05-27 RX ADMIN — ESCITALOPRAM 5 MG: 5 TABLET, FILM COATED ORAL at 08:05

## 2018-05-27 RX ADMIN — LOSARTAN POTASSIUM 50 MG: 50 TABLET, FILM COATED ORAL at 09:05

## 2018-05-27 RX ADMIN — ENOXAPARIN SODIUM 40 MG: 100 INJECTION SUBCUTANEOUS at 05:05

## 2018-05-27 RX ADMIN — Medication 2000 UNITS: at 09:05

## 2018-05-27 RX ADMIN — CETIRIZINE HYDROCHLORIDE 10 MG: 5 TABLET, FILM COATED ORAL at 09:05

## 2018-05-27 RX ADMIN — Medication 10 ML: at 05:05

## 2018-05-27 RX ADMIN — CIPROFLOXACIN HYDROCHLORIDE 500 MG: 500 TABLET, FILM COATED ORAL at 09:05

## 2018-05-27 RX ADMIN — ACETAMINOPHEN 650 MG: 325 TABLET ORAL at 10:05

## 2018-05-27 RX ADMIN — OXYBUTYNIN CHLORIDE 10 MG: 10 TABLET, FILM COATED, EXTENDED RELEASE ORAL at 09:05

## 2018-05-27 NOTE — PLAN OF CARE
Problem: Patient Care Overview  Goal: Plan of Care Review  Outcome: Ongoing (interventions implemented as appropriate)   05/26/18 231   Coping/Psychosocial   Plan Of Care Reviewed With patient       Problem: Fall Risk (Adult)  Goal: Identify Related Risk Factors and Signs and Symptoms  Related risk factors and signs and symptoms are identified upon initiation of Human Response Clinical Practice Guideline (CPG)   Outcome: Ongoing (interventions implemented as appropriate)   05/26/18 6318   Fall Risk   Related Risk Factors (Fall Risk) fatigue/slow reaction;fear of falling;gait/mobility problems

## 2018-05-27 NOTE — PLAN OF CARE
Problem: Physical Therapy Goal  Goal: Physical Therapy Goal  Goals to be met by: 14 days     Patient will increase functional independence with mobility by performin. Supine to sit with Modified [place]  2. Sit to supine with Modified [place]  3. Sit to stand transfer with Supervision  4. Bed to chair transfer with Supervision using Rolling Walker  5. Gait  x 150 feet with Supervision using Rolling Walker.   6. Wheelchair propulsion x100 feet with Supervision using bilateral upper extremities  7. Ascend/Descend 4 inch curb step with Stand-by Assistance using Rolling Walker.  8. Stand for 3 minutes with Stand-by Assistance using Rolling Walker and perform an activity  9. Lower extremity exercise program x20 reps per handout, with assistance as needed     Outcome: Ongoing (interventions implemented as appropriate)  Goal remain appropriate

## 2018-05-27 NOTE — PT/OT/SLP PROGRESS
"Physical Therapy  Treatment    Mine Wilkins   MRN: [id number]   Admitting Diagnosis: Closed displaced intertrochanteric fracture of right femur with routine healing    PT Received On: 05/27/18  Total Time (min): 47       Billable Minutes:47    Gait Training 15, Therapeutic Activity 15 and Therapeutic Exercise 17    Treatment Type: Treatment  PT/PTA: PTA     PTA Visit Number: 2       General Precautions: Standard, fall (delirium)  Orthopedic Precautions: RLE weight bearing as tolerated   Braces: N/A         Subjective:  "much better today" issues with BP yesterday, ed on S/S of low BP, strategies to A with low BP      Pain/Comfort  Pain Rating 1: 5/10 (0 at rest, 4-5 with mobility)  Location - Side 1: Right  Location - Orientation 1: generalized  Location 1: hip  Pain Addressed 1: Reposition, Distraction, Cessation of Activity, Nurse notified (decline tylenol untill after session)  Pain Rating Post-Intervention 1: 5/10    Objective:  Patient found  in bed       Functional Status:  MDS G  Bed Mobility Functional Status: S-SBA  Transfer Functional Status: S-SBA  Walk in Corridor Functional Status: S-SBA          AM-PAC 6 CLICK MOBILITY  Total Score:17    Bed Mobility:  Supine>Sit: SBA with HOB elev and rail, rest break at EOB    Transfers:  Sit<>Stand: SBA with RW  Stand Pivot Transfer: SBA with RW EOB>WC    Gait:  Amb with RW SBA ~ 95 ft and 58 ft with seated rest break, vcs for tech to normalize gait pattern       Therex:  2x10 reps AP,GS,LAQ,hip flex    Patient left up in bed side chair with call button in reach and belonging in reach.    Assessment:  Mine Wilkins is a 83 y.o. female with a medical diagnosis of Closed displaced intertrochanteric fracture of right femur with routine healing.  Pt tolerated well, progressing well with overall functional mobility, pt would continue to benefit from skilled PT services to improve overall functional mobility, strength and endurance.  .    Rehab " identified problem list/impairments: weakness, impaired endurance, impaired functional mobilty, gait instability, impaired balance, decreased lower extremity function, pain, edema, orthopedic precautions    Rehab potential is good.    Activity tolerance: Fair    Discharge recommendations: home with home health     Barriers to discharge: Decreased caregiver support, Inaccessible home environment    Equipment recommendations: walker, rolling, bedside commode     GOALS:    Physical Therapy Goals        Problem: Physical Therapy Goal    Goal Priority Disciplines Outcome Goal Variances Interventions   Physical Therapy Goal     PT/OT, PT Ongoing (interventions implemented as appropriate)     Description:  Goals to be met by: 14 days     Patient will increase functional independence with mobility by performin. Supine to sit with Modified [place]  2. Sit to supine with Modified [place]  3. Sit to stand transfer with Supervision  4. Bed to chair transfer with Supervision using Rolling Walker  5. Gait  x 150 feet with Supervision using Rolling Walker.   6. Wheelchair propulsion x100 feet with Supervision using bilateral upper extremities  7. Ascend/Descend 4 inch curb step with Stand-by Assistance using Rolling Walker.  8. Stand for 3 minutes with Stand-by Assistance using Rolling Walker and perform an activity  9. Lower extremity exercise program x20 reps per handout, with assistance as needed                      PLAN:    Patient to be seen 5 x/week  to address the above listed problems via gait training, therapeutic exercises, therapeutic activities, wheelchair management/training  Plan of Care expires: 18  Plan of Care reviewed with: patient    Valerie Rodriguezjose, PTA  2018

## 2018-05-27 NOTE — PLAN OF CARE
Problem: Fall Risk (Adult)  Goal: Absence of Falls  Patient will demonstrate the desired outcomes by discharge/transition of care.   Outcome: Ongoing (interventions implemented as appropriate)  PT had no falls this  shift.will continue to monitor

## 2018-05-28 LAB
ANION GAP SERPL CALC-SCNC: 7 MMOL/L
BASOPHILS # BLD AUTO: 0.06 K/UL
BASOPHILS NFR BLD: 0.6 %
BUN SERPL-MCNC: 17 MG/DL
CALCIUM SERPL-MCNC: 9.3 MG/DL
CHLORIDE SERPL-SCNC: 102 MMOL/L
CO2 SERPL-SCNC: 24 MMOL/L
CREAT SERPL-MCNC: 0.7 MG/DL
DIFFERENTIAL METHOD: ABNORMAL
EOSINOPHIL # BLD AUTO: 0.3 K/UL
EOSINOPHIL NFR BLD: 2.6 %
ERYTHROCYTE [DISTWIDTH] IN BLOOD BY AUTOMATED COUNT: 15.1 %
EST. GFR  (AFRICAN AMERICAN): >60 ML/MIN/1.73 M^2
EST. GFR  (NON AFRICAN AMERICAN): >60 ML/MIN/1.73 M^2
GLUCOSE SERPL-MCNC: 103 MG/DL
HCT VFR BLD AUTO: 26.4 %
HGB BLD-MCNC: 8.6 G/DL
IMM GRANULOCYTES # BLD AUTO: 0.1 K/UL
IMM GRANULOCYTES NFR BLD AUTO: 1 %
LYMPHOCYTES # BLD AUTO: 1.7 K/UL
LYMPHOCYTES NFR BLD: 17 %
MAGNESIUM SERPL-MCNC: 1.9 MG/DL
MCH RBC QN AUTO: 27.3 PG
MCHC RBC AUTO-ENTMCNC: 32.6 G/DL
MCV RBC AUTO: 84 FL
MONOCYTES # BLD AUTO: 1 K/UL
MONOCYTES NFR BLD: 9.7 %
NEUTROPHILS # BLD AUTO: 6.8 K/UL
NEUTROPHILS NFR BLD: 69.1 %
NRBC BLD-RTO: 0 /100 WBC
PHOSPHATE SERPL-MCNC: 3.3 MG/DL
PLATELET # BLD AUTO: 515 K/UL
PMV BLD AUTO: 10 FL
POTASSIUM SERPL-SCNC: 4.5 MMOL/L
RBC # BLD AUTO: 3.15 M/UL
SODIUM SERPL-SCNC: 133 MMOL/L
WBC # BLD AUTO: 9.89 K/UL

## 2018-05-28 PROCEDURE — 63600175 PHARM REV CODE 636 W HCPCS: Performed by: PHYSICIAN ASSISTANT

## 2018-05-28 PROCEDURE — 11000004 HC SNF PRIVATE

## 2018-05-28 PROCEDURE — 25000003 PHARM REV CODE 250: Performed by: INTERNAL MEDICINE

## 2018-05-28 PROCEDURE — 97530 THERAPEUTIC ACTIVITIES: CPT

## 2018-05-28 PROCEDURE — 83735 ASSAY OF MAGNESIUM: CPT

## 2018-05-28 PROCEDURE — 97116 GAIT TRAINING THERAPY: CPT

## 2018-05-28 PROCEDURE — 97110 THERAPEUTIC EXERCISES: CPT

## 2018-05-28 PROCEDURE — 63600175 PHARM REV CODE 636 W HCPCS: Performed by: INTERNAL MEDICINE

## 2018-05-28 PROCEDURE — 84100 ASSAY OF PHOSPHORUS: CPT

## 2018-05-28 PROCEDURE — 80048 BASIC METABOLIC PNL TOTAL CA: CPT

## 2018-05-28 PROCEDURE — 85025 COMPLETE CBC W/AUTO DIFF WBC: CPT

## 2018-05-28 PROCEDURE — 25000003 PHARM REV CODE 250: Performed by: PHYSICIAN ASSISTANT

## 2018-05-28 PROCEDURE — 36415 COLL VENOUS BLD VENIPUNCTURE: CPT

## 2018-05-28 PROCEDURE — A9155 ARTIFICIAL SALIVA: HCPCS | Performed by: INTERNAL MEDICINE

## 2018-05-28 RX ADMIN — ACETAMINOPHEN 650 MG: 325 TABLET ORAL at 10:05

## 2018-05-28 RX ADMIN — Medication 10 ML: at 04:05

## 2018-05-28 RX ADMIN — PREGABALIN 75 MG: 75 CAPSULE ORAL at 09:05

## 2018-05-28 RX ADMIN — Medication 10 ML: at 11:05

## 2018-05-28 RX ADMIN — OXYBUTYNIN CHLORIDE 10 MG: 10 TABLET, FILM COATED, EXTENDED RELEASE ORAL at 08:05

## 2018-05-28 RX ADMIN — CIPROFLOXACIN HYDROCHLORIDE 500 MG: 500 TABLET, FILM COATED ORAL at 09:05

## 2018-05-28 RX ADMIN — Medication 2000 UNITS: at 08:05

## 2018-05-28 RX ADMIN — ESCITALOPRAM 5 MG: 5 TABLET, FILM COATED ORAL at 09:05

## 2018-05-28 RX ADMIN — Medication 10 ML: at 05:05

## 2018-05-28 RX ADMIN — STANDARDIZED SENNA CONCENTRATE AND DOCUSATE SODIUM 1 TABLET: 8.6; 5 TABLET, FILM COATED ORAL at 08:05

## 2018-05-28 RX ADMIN — PANTOPRAZOLE SODIUM 40 MG: 40 TABLET, DELAYED RELEASE ORAL at 08:05

## 2018-05-28 RX ADMIN — ENOXAPARIN SODIUM 40 MG: 100 INJECTION SUBCUTANEOUS at 04:05

## 2018-05-28 RX ADMIN — POLYETHYLENE GLYCOL 3350 17 G: 17 POWDER, FOR SOLUTION ORAL at 08:05

## 2018-05-28 RX ADMIN — CETIRIZINE HYDROCHLORIDE 10 MG: 5 TABLET, FILM COATED ORAL at 08:05

## 2018-05-28 RX ADMIN — CIPROFLOXACIN HYDROCHLORIDE 500 MG: 500 TABLET, FILM COATED ORAL at 08:05

## 2018-05-28 RX ADMIN — LOSARTAN POTASSIUM 50 MG: 50 TABLET, FILM COATED ORAL at 08:05

## 2018-05-28 RX ADMIN — PRAVASTATIN SODIUM 20 MG: 20 TABLET ORAL at 09:05

## 2018-05-28 NOTE — PLAN OF CARE
Problem: Patient Care Overview  Goal: Plan of Care Review  Outcome: Ongoing (interventions implemented as appropriate)   05/28/18 0039   Coping/Psychosocial   Plan Of Care Reviewed With patient       Problem: Fall Risk (Adult)  Goal: Identify Related Risk Factors and Signs and Symptoms  Related risk factors and signs and symptoms are identified upon initiation of Human Response Clinical Practice Guideline (CPG)   Outcome: Ongoing (interventions implemented as appropriate)   05/28/18 0039   Fall Risk   Related Risk Factors (Fall Risk) fatigue/slow reaction;fear of falling;gait/mobility problems

## 2018-05-28 NOTE — PT/OT/SLP PROGRESS
Occupational Therapy  Treatment    Mine Wilkins   MRN: [id number]   Admitting Diagnosis: Closed displaced intertrochanteric fracture of right femur with routine healing    OT Date of Treatment: 05/28/18  Total Time (min): 45 min    Billable Minutes:  Therapeutic Activity 15 and Therapeutic Exercise 30    General Precautions: Standard, fall  Orthopedic Precautions: RLE weight bearing as tolerated  Braces: N/A         Subjective:  Communicated with patient prior to session.    Pain/Comfort  Pain Rating 1:  (patient did not rate)  Location - Side 1: Right  Location - Orientation 1: generalized  Location 1: hip  Pain Addressed 1: Reposition, Distraction  Pain Rating Post-Intervention 1:  (patient did not rate)    Objective:       Functional Status:  MDS G  Bed Mobility Functional Status: Min (A) sit>supine  Transfer Functional Status: S w/c>bed stand pivot       [facility] 6 Click:  [facility] Total Score: 19    OT Exercises: UE Ergometer 15 min for improving endurance to increase independence with ADLs.     Patient performed B UE ROM Exercises using 2# dowel jose 3 x 10 for improving strength and endurance to increase independence with ADLs.     Additional Treatment:  Patient performed a functional bimanual tasks folding cloths with FM component using clothespins while standing with S for activity tolerance and balance in order to stand for household tasks.      Patient left HOB elevated with call button in reach and all needs met.     ASSESSMENT:  Mine Wilkins is a 83 y.o. female with a medical diagnosis of Closed displaced intertrochanteric fracture of right femur with routine healing and presents with the deficits listed below. Patient tolerated treatment session and was motivated to complete tasks. Patient continues to benefit from skilled OT services to achieve maximal independence.    Rehab identified problem list/impairments: weakness, impaired endurance, impaired self care skills, impaired functional  mobilty, impaired balance, decreased lower extremity function, orthopedic precautions, pain    Rehab potential is good    Activity tolerance: Good    Discharge recommendations: nursing facility, skilled     Barriers to discharge:  Decreased Caregiver support    Equipment recommendations: walker, rolling     GOALS:    Occupational Therapy Goals        Problem: Occupational Therapy Goal    Goal Priority Disciplines Outcome Interventions   Occupational Therapy Goal     OT, PT/OT Ongoing (interventions implemented as appropriate)    Description:  Goals to be met by: 14 days     Patient will increase functional independence with ADLs by performing:    UE Dressing with Modified [place].  LE Dressing with Modified [place] /c AE.  Grooming while standing at sink with Modified [place].  Toileting from bedside commode with Modified [place] for hygiene and clothing management.   Bathing with Stand-by Assistance.  Supine to sit with Modified [place] /c HOB flat and no handrails.  Stand pivot transfers with Modified [place].  Toilet transfer to bedside commode with Modified [place].                       Plan:  Patient to be seen 5 x/week to address the above listed problems via self-care/home management, therapeutic activities, therapeutic exercises  Plan of Care expires: 06/24/18  Plan of Care reviewed with: patient    SLAVA Yarbrough  05/28/2018

## 2018-05-28 NOTE — PT/OT/SLP PROGRESS
"Physical Therapy  Treatment    Mine Wilkins   MRN: [id number]   Admitting Diagnosis: Closed displaced intertrochanteric fracture of right femur with routine healing    PT Received On: 05/28/18  Total Time (min): 45       Billable Minutes:45    Gait Training 20, Therapeutic Activity 10 and Therapeutic Exercise 15    Treatment Type: Treatment  PT/PTA: PTA     PTA Visit Number: 3       General Precautions: Standard, fall (delirium)  Orthopedic Precautions: RLE weight bearing as tolerated   Braces: N/A         Subjective:  "I'm ready, feeling good"      Pain/Comfort  Pain Rating 1: 5/10 (2 at rest 5 with mobility)  Location - Side 1: Right  Location - Orientation 1: generalized  Location 1: hip  Pain Addressed 1: Reposition, Distraction, Cessation of Activity, Nurse notified (requested tylenol post session)    Objective:  Patient found in wc    Functional Status:  MDS G  Transfer Functional Status: S-SBA  Walk in Corridor Functional Status: S-SBA          AM-PAC 6 CLICK MOBILITY  Total Score:17    Transfers:  Sit<>Stand: SBA with RW  Stand Pivot Transfer: SBA with RW BSC<>WC ~ 5 ft      Gait:  Amb with RW SBA ~ 107 ft and 123 ft with seated rest break vcs for tech to normalize gait pattern, step through gait pattern with slight pause, dec WB RLE/antalgic gait    Advanced Gait:  Curb Step: asc/amber 4" curb with RW CGA vcs for safety/tech    Therex:  2x15 reps AP,GS,LAQ,hip flex    Patient left up in chair with call button in reach, nsg notified for tylenol and belongings in reach, BLE elev in recliner.    Assessment:  Mine Wilkins is a 83 y.o. female with a medical diagnosis of Closed displaced intertrochanteric fracture of right femur with routine healing. Pt tolerated well, able to perform curb today with CGA and inc gait distance/endurance, pt would continue to benefit from skilled PT services to improve overall functional mobility, strength and endurance.      Rehab identified problem " list/impairments: weakness, impaired endurance, impaired functional mobilty, gait instability, impaired balance, decreased lower extremity function, pain, edema, orthopedic precautions    Rehab potential is good.    Activity tolerance: Fair    Discharge recommendations: home with home health     Barriers to discharge: Decreased caregiver support, Inaccessible home environment    Equipment recommendations: walker, rolling, bedside commode     GOALS:    Physical Therapy Goals        Problem: Physical Therapy Goal    Goal Priority Disciplines Outcome Goal Variances Interventions   Physical Therapy Goal     PT/OT, PT Ongoing (interventions implemented as appropriate)     Description:  Goals to be met by: 14 days     Patient will increase functional independence with mobility by performin. Supine to sit with Modified [place]  2. Sit to supine with Modified [place]  3. Sit to stand transfer with Supervision  4. Bed to chair transfer with Supervision using Rolling Walker  5. Gait  x 150 feet with Supervision using Rolling Walker.   6. Wheelchair propulsion x100 feet with Supervision using bilateral upper extremities  7. Ascend/Descend 4 inch curb step with Stand-by Assistance using Rolling Walker.  8. Stand for 3 minutes with Stand-by Assistance using Rolling Walker and perform an activity  9. Lower extremity exercise program x20 reps per handout, with assistance as needed                      PLAN:    Patient to be seen 5 x/week  to address the above listed problems via gait training, therapeutic exercises, therapeutic activities, wheelchair management/training  Plan of Care expires: 18  Plan of Care reviewed with: patient    Valerie Jessica, PTA  2018

## 2018-05-28 NOTE — PLAN OF CARE
Problem: Occupational Therapy Goal  Goal: Occupational Therapy Goal  Goals to be met by: 14 days     Patient will increase functional independence with ADLs by performing:    UE Dressing with Modified [place].  LE Dressing with Modified [place] /c AE.  Grooming while standing at sink with Modified [place].  Toileting from bedside commode with Modified [place] for hygiene and clothing management.   Bathing with Stand-by Assistance.  Supine to sit with Modified [place] /c HOB flat and no handrails.  Stand pivot transfers with Modified [place].  Toilet transfer to bedside commode with Modified [place].      Outcome: Ongoing (interventions implemented as appropriate)  Patient's goals are appropriate.   SLAVA Yarbrough  5/28/2018

## 2018-05-28 NOTE — PLAN OF CARE
Problem: Physical Therapy Goal  Goal: Physical Therapy Goal  Goals to be met by: 14 days     Patient will increase functional independence with mobility by performin. Supine to sit with Modified [place]  2. Sit to supine with Modified [place]  3. Sit to stand transfer with Supervision  4. Bed to chair transfer with Supervision using Rolling Walker  5. Gait  x 150 feet with Supervision using Rolling Walker.   6. Wheelchair propulsion x100 feet with Supervision using bilateral upper extremities  7. Ascend/Descend 4 inch curb step with Stand-by Assistance using Rolling Walker.  8. Stand for 3 minutes with Stand-by Assistance using Rolling Walker and perform an activity  9. Lower extremity exercise program x20 reps per handout, with assistance as needed     Outcome: Ongoing (interventions implemented as appropriate)  Goals remain appropriate

## 2018-05-29 PROCEDURE — 97116 GAIT TRAINING THERAPY: CPT

## 2018-05-29 PROCEDURE — 25000003 PHARM REV CODE 250: Performed by: PHYSICIAN ASSISTANT

## 2018-05-29 PROCEDURE — 63600175 PHARM REV CODE 636 W HCPCS: Performed by: PHYSICIAN ASSISTANT

## 2018-05-29 PROCEDURE — 25000242 PHARM REV CODE 250 ALT 637 W/ HCPCS: Performed by: NURSE PRACTITIONER

## 2018-05-29 PROCEDURE — 63600175 PHARM REV CODE 636 W HCPCS: Performed by: INTERNAL MEDICINE

## 2018-05-29 PROCEDURE — 11000004 HC SNF PRIVATE

## 2018-05-29 PROCEDURE — 97110 THERAPEUTIC EXERCISES: CPT

## 2018-05-29 PROCEDURE — 97530 THERAPEUTIC ACTIVITIES: CPT

## 2018-05-29 PROCEDURE — 25000003 PHARM REV CODE 250: Performed by: INTERNAL MEDICINE

## 2018-05-29 PROCEDURE — 25000003 PHARM REV CODE 250: Performed by: NURSE PRACTITIONER

## 2018-05-29 PROCEDURE — A9155 ARTIFICIAL SALIVA: HCPCS | Performed by: INTERNAL MEDICINE

## 2018-05-29 PROCEDURE — 97803 MED NUTRITION INDIV SUBSEQ: CPT | Performed by: DIETITIAN, REGISTERED

## 2018-05-29 RX ORDER — FLUTICASONE PROPIONATE 50 MCG
2 SPRAY, SUSPENSION (ML) NASAL DAILY
Status: DISCONTINUED | OUTPATIENT
Start: 2018-05-29 | End: 2018-06-11 | Stop reason: HOSPADM

## 2018-05-29 RX ORDER — GUAIFENESIN 600 MG/1
600 TABLET, EXTENDED RELEASE ORAL 2 TIMES DAILY
Status: DISCONTINUED | OUTPATIENT
Start: 2018-05-29 | End: 2018-06-11 | Stop reason: HOSPADM

## 2018-05-29 RX ADMIN — PRAVASTATIN SODIUM 20 MG: 20 TABLET ORAL at 08:05

## 2018-05-29 RX ADMIN — FLUTICASONE PROPIONATE 100 MCG: 50 SPRAY, METERED NASAL at 04:05

## 2018-05-29 RX ADMIN — OXYBUTYNIN CHLORIDE 10 MG: 10 TABLET, FILM COATED, EXTENDED RELEASE ORAL at 09:05

## 2018-05-29 RX ADMIN — CETIRIZINE HYDROCHLORIDE 10 MG: 5 TABLET, FILM COATED ORAL at 09:05

## 2018-05-29 RX ADMIN — ACETAMINOPHEN 650 MG: 325 TABLET ORAL at 10:05

## 2018-05-29 RX ADMIN — Medication 10 ML: at 11:05

## 2018-05-29 RX ADMIN — CIPROFLOXACIN HYDROCHLORIDE 500 MG: 500 TABLET, FILM COATED ORAL at 09:05

## 2018-05-29 RX ADMIN — Medication 10 ML: at 04:05

## 2018-05-29 RX ADMIN — PREGABALIN 75 MG: 75 CAPSULE ORAL at 08:05

## 2018-05-29 RX ADMIN — PANTOPRAZOLE SODIUM 40 MG: 40 TABLET, DELAYED RELEASE ORAL at 09:05

## 2018-05-29 RX ADMIN — LOSARTAN POTASSIUM 50 MG: 50 TABLET, FILM COATED ORAL at 09:05

## 2018-05-29 RX ADMIN — GUAIFENESIN 600 MG: 600 TABLET, EXTENDED RELEASE ORAL at 08:05

## 2018-05-29 RX ADMIN — ESCITALOPRAM 5 MG: 5 TABLET, FILM COATED ORAL at 08:05

## 2018-05-29 RX ADMIN — Medication 2000 UNITS: at 09:05

## 2018-05-29 RX ADMIN — ENOXAPARIN SODIUM 40 MG: 100 INJECTION SUBCUTANEOUS at 04:05

## 2018-05-29 NOTE — PLAN OF CARE
Problem: Physical Therapy Goal  Goal: Physical Therapy Goal  Goals to be met by: 14 days     Patient will increase functional independence with mobility by performin. Supine to sit with Modified [place]  2. Sit to supine with Modified [place]  3. Sit to stand transfer with Supervision met  4. Bed to chair transfer with Supervision using Rolling Walker met  5. Gait  x 150 feet with Supervision using Rolling Walker.   6. Wheelchair propulsion x100 feet with Supervision using bilateral upper extremities  7. Ascend/Descend 4 inch curb step with Stand-by Assistance using Rolling Walker.  8. Stand for 3 minutes with Stand-by Assistance using Rolling Walker and perform an activity met  9. Lower extremity exercise program x20 reps per handout, with assistance as needed     Outcome: Ongoing (interventions implemented as appropriate)  Goals remain appropriate

## 2018-05-29 NOTE — PROGRESS NOTES
05/29/2018  9:50 AM    Discharge Planning-Met with patient in room to inform of new discharge date of 6/11/2018. Discharge date written on dry erase board in room. Patient stated understanding to discharge date.  Radha Bernard RN, CM Skilled  [id number]

## 2018-05-29 NOTE — PLAN OF CARE
Problem: Patient Care Overview  Goal: Plan of Care Review  Outcome: Ongoing (interventions implemented as appropriate)   05/29/18 0012   Coping/Psychosocial   Plan Of Care Reviewed With patient       Problem: Fall Risk (Adult)  Goal: Identify Related Risk Factors and Signs and Symptoms  Related risk factors and signs and symptoms are identified upon initiation of Human Response Clinical Practice Guideline (CPG)   Outcome: Ongoing (interventions implemented as appropriate)   05/29/18 0012   Fall Risk   Related Risk Factors (Fall Risk) fatigue/slow reaction;fear of falling;gait/mobility problems

## 2018-05-29 NOTE — PT/OT/SLP PROGRESS
Occupational Therapy  Treatment    Mine Wilkins   MRN: [id number]   Admitting Diagnosis: Closed displaced intertrochanteric fracture of right femur with routine healing    OT Date of Treatment: 05/29/18  Total Time (min): 55 min    Billable Minutes:  Therapeutic Activity 30 and Therapeutic Exercise 25    General Precautions: Standard, fall  Orthopedic Precautions: RLE weight bearing as tolerated  Braces: N/A         Subjective:  Communicated with patient prior to session.    Pain/Comfort  Pain Rating 1: 4/10  Location - Side 1: Right  Location - Orientation 1: generalized  Location 1: hip  Pain Addressed 1: Distraction, Reposition  Pain Rating Post-Intervention 1: 4/10    Objective:       Functional Status:  MDS G  Bed Mobility Functional Status: Min (A) sit>supine  Transfer Functional Status: S w/c>bed stand pivot           AMPA 6 Click:  [facility] Total Score: 19    OT Exercises: Patient performed B UE ROM exercises using 2# dowel jose 2 x 10 in all planes and joints focusing to improve strength and endurance to increase independence with ADLs.     Additional Treatment:  Patient performed a visual perception activity using spatial relations board while standing for activity tolerance /c S in order to perform functional household tasks.    Patient performed a visual perception activity using small pegs and pegboard while standing with S for activity tolerance in order to increase independence and perform dressing and personal hygiene tasks in standing.    Patient left HOB elevated with call button in reach and all needs met.     ASSESSMENT:  Mine Wilkins is a 83 y.o. female with a medical diagnosis of Closed displaced intertrochanteric fracture of right femur with routine healing and presents the deficits listed below. Patient tolerated treatment session and was motivated to complete tasks. Patient continues to benefit from skilled OT services to achieve maximal independence.    Rehab identified  problem list/impairments: weakness, impaired endurance, impaired self care skills, impaired functional mobilty, impaired balance, decreased lower extremity function, orthopedic precautions, pain    Rehab potential is good    Activity tolerance: Good    Discharge recommendations: nursing facility, skilled     Barriers to discharge:       Equipment recommendations: walker, rolling     GOALS:    Occupational Therapy Goals        Problem: Occupational Therapy Goal    Goal Priority Disciplines Outcome Interventions   Occupational Therapy Goal     OT, PT/OT Ongoing (interventions implemented as appropriate)    Description:  Goals to be met by: 14 days     Patient will increase functional independence with ADLs by performing:    UE Dressing with Modified [place].  LE Dressing with Modified [place] /c AE.  Grooming while standing at sink with Modified [place].  Toileting from bedside commode with Modified [place] for hygiene and clothing management.   Bathing with Stand-by Assistance.  Supine to sit with Modified [place] /c HOB flat and no handrails.  Stand pivot transfers with Modified [place].  Toilet transfer to bedside commode with Modified [place].                       Plan:  Patient to be seen 5 x/week to address the above listed problems via self-care/home management, therapeutic activities, therapeutic exercises  Plan of Care expires: 06/24/18  Plan of Care reviewed with: patient    SLAVA Yarbrough  05/29/2018

## 2018-05-29 NOTE — PLAN OF CARE
Problem: Occupational Therapy Goal  Goal: Occupational Therapy Goal  Goals to be met by: 14 days     Patient will increase functional independence with ADLs by performing:    UE Dressing with Modified [place].  LE Dressing with Modified [place] /c AE.  Grooming while standing at sink with Modified [place].  Toileting from bedside commode with Modified [place] for hygiene and clothing management.   Bathing with Stand-by Assistance.  Supine to sit with Modified [place] /c HOB flat and no handrails.  Stand pivot transfers with Modified [place].  Toilet transfer to bedside commode with Modified [place].      Outcome: Ongoing (interventions implemented as appropriate)  Patient's goals are appropriate.   SLAVA Yarbrough  5/29/2018

## 2018-05-29 NOTE — PROGRESS NOTES
" [facility] - Skilled Nursing Care  Adult Nutrition  Progress Note    SUMMARY       Recommendations    Recommendation/Intervention: continue cardiac diet, encouraged intakes  Goals: PO to meet 85% of EEN and EPN in one week  Nutrition Goal Status: progressing towards goal  Communication of RD Recs: other (comment) (care plans)    Reason for Assessment    Reason for Assessment: RD follow-up  Diagnosis:  (sp femur fx with pinning)  Relevant Medical History: GERD, UTI's , Osteoporosis, HTN  Interdisciplinary Rounds: did not attend  General Information Comments: Pt reports appetite better and bowels are regulating  Nutrition Discharge Planning: DC on     Nutrition/Diet History    Patient Reported Diet/Restrictions/Preferences: general  Food Preferences: no yogurt  refuses oral supplements, does not like sweets  Do you have any cultural, spiritual, Mormon conflicts, given your current situation?: none  Food Allergies: NKFA    Anthropometrics    Temp: 98.3 °F (36.8 °C)  Height Method: Stated  Height: 5' 3" (160 cm)  Height (inches): 63 in  Weight Method: Standard Scale  Weight: 67.7 kg (149 lb 4 oz)  Weight (lb): 149.25 lb  Ideal Body Weight (IBW), Female: 115 lb  % Ideal Body Weight, Female (lb): 132.85 lb  BMI (Calculated): 27.1  BMI Grade: 25 - 29.9 - overweight  Weight Loss:  (denies wt changes)       Lab/Procedures/Meds    Pertinent Labs Reviewed: reviewed  Pertinent Labs Comments: na 133  Pertinent Medications Reviewed: reviewed  Pertinent Medications Comments: pantoprazole, senna, vit D    Physical Findings/Assessment    Overall Physical Appearance: nourished, loss of muscle mass  Oral/Mouth Cavity: WDL  Skin: incision(s)    Estimated/Assessed Needs    Weight Used For Calorie Calculations: 69.3 kg (152 lb 12.5 oz)  Energy Calorie Requirements (kcal): 1458  Energy Need Method: Alexander-St Jeor (x 1.3 (PAL))  Protein Requirements: 84  Weight Used For Protein Calculations: 69.3 kg (152 lb 12.5 oz) (x 1.2kg)  Fluid " Requirements (mL): or per MD  Fluid Need Method: RDA Method  RDA Method (mL): 1458         Nutrition Prescription Ordered    Current Diet Order: Cardiac  Nutrition Order Comments: no tuna no sausage gerardo   Oral Nutrition Supplement: none    Evaluation of Received Nutrient/Fluid Intake    Energy Calories Required: meeting needs  Protein Required: meeting needs  Fluid Required: meeting needs  Tolerance: tolerating  % Intake of Estimated Energy Needs: 75 - 100 %  % Meal Intake: 75 - 100 %    Nutrition Risk    Level of Risk/Frequency of Follow-up:  (follow-up x1 wk)     Assessment and Plan  No nutrition dx at this time - pt eating well, wt stable     Monitor and Evaluation    Food and Nutrient Intake: food and beverage intake  Food and Nutrient Adminstration: diet order  Anthropometric Measurements: weight change  Biochemical Data, Medical Tests and Procedures: electrolyte and renal panel, gastrointestinal profile, inflammatory profile, glucose/endocrine profile  Nutrition-Focused Physical Findings: overall appearance     Nutrition Follow-Up    RD Follow-up?: Yes

## 2018-05-29 NOTE — PT/OT/SLP PROGRESS
"Physical Therapy  Treatment    Mine Wilkins   MRN: [id number]   Admitting Diagnosis: Closed displaced intertrochanteric fracture of right femur with routine healing    PT Received On: 05/29/18  Total Time (min): 54       Billable Minutes:54    Gait Training 15, Therapeutic Activity 24 and Therapeutic Exercise 15    Treatment Type: Treatment  PT/PTA: PTA     PTA Visit Number: 4       General Precautions: Standard, fall (delirium)  Orthopedic Precautions: RLE weight bearing as tolerated   Braces: N/A         Subjective:  Communicated with nsg post session re pts request for tylenol  "feel OK" rough morning, bathroom,breakfast issues " my chest ms are still sore, NP checked  Me its not my heart, its the ms"    Pain/Comfort  Pain Rating 1: 4/10 (not much at rest, 4 with mobility and stiffness)  Location - Side 1: Right  Location - Orientation 1: generalized  Location 1: hip  Pain Addressed 1: Reposition, Distraction, Cessation of Activity, Nurse notified (requested tylenol)  Pain Rating Post-Intervention 1: 4/10    Objective:  Patient found in bed     Functional Status:  MDS G  Bed Mobility Functional Status: S-SBA  Transfer Functional Status: S-SBA  Walk in Corridor Functional Status: S-SBA          AM-PAC 6 CLICK MOBILITY  Total Score:17    Bed Mobility:  Sit>Supine:S with vcs inc time HOB flat no rail pt manages RLE with UEs  Supine>Sit: S with vcs for tech, inc time HOB flat no rails pt manage RLE with UE    Transfers:  Sit<>Stand: with RW S  Stand Pivot Transfer: S with RW EOB>WC> to BSC post session ~ 8 ft    Gait:  Amb with RW SBA/S ~115 ft no LOB step through gait pattern with slight pause in advancing RW    Advanced Gait:  Curb Step:asc/amber 4" curb with RW vcs for safety/tech, asc with CGA, amber with SBA    Therex:  2x15 reps AP,GS,LAQ,hip flex    Balance:  Dyn standing bal act with uni lat/no UE support tossing bean bags into small basket place ~ 8 ft away no LOB ~ 3:33 sec    Patient left up in chair " with call button in reach and belonging in reach.    Assessment:  Mine Wilkins is a 83 y.o. female with a medical diagnosis of Closed displaced intertrochanteric fracture of right femur with routine healing.  Pt tolerated well, pt would continue to benefit from skilled PT services to improve overall functional mobility, strength and endurance.  .    Rehab identified problem list/impairments: weakness, impaired endurance, impaired functional mobilty, gait instability, impaired balance, decreased lower extremity function, pain, edema, orthopedic precautions    Rehab potential is good.    Activity tolerance: Fair    Discharge recommendations: home with home health     Barriers to discharge: Decreased caregiver support, Inaccessible home environment    Equipment recommendations: walker, rolling, bedside commode     GOALS:    Physical Therapy Goals        Problem: Physical Therapy Goal    Goal Priority Disciplines Outcome Goal Variances Interventions   Physical Therapy Goal     PT/OT, PT Ongoing (interventions implemented as appropriate)     Description:  Goals to be met by: 14 days     Patient will increase functional independence with mobility by performin. Supine to sit with Modified [place]  2. Sit to supine with Modified [place]  3. Sit to stand transfer with Supervision met  4. Bed to chair transfer with Supervision using Rolling Walker met  5. Gait  x 150 feet with Supervision using Rolling Walker.   6. Wheelchair propulsion x100 feet with Supervision using bilateral upper extremities  7. Ascend/Descend 4 inch curb step with Stand-by Assistance using Rolling Walker.  8. Stand for 3 minutes with Stand-by Assistance using Rolling Walker and perform an activity met  9. Lower extremity exercise program x20 reps per handout, with assistance as needed                       PLAN:    Patient to be seen 5 x/week  to address the above listed problems via gait training, therapeutic exercises,  therapeutic activities, wheelchair management/training  Plan of Care expires: 06/23/18  Plan of Care reviewed with: patient    Valerie Lopez, PTA  05/29/2018

## 2018-05-30 PROBLEM — N39.0 URINARY TRACT INFECTION DUE TO ENTEROCOCCUS: Status: RESOLVED | Noted: 2018-05-22 | Resolved: 2018-05-30

## 2018-05-30 PROBLEM — E83.39 HYPOPHOSPHATEMIA: Status: RESOLVED | Noted: 2018-05-22 | Resolved: 2018-05-30

## 2018-05-30 PROBLEM — B95.2 URINARY TRACT INFECTION DUE TO ENTEROCOCCUS: Status: RESOLVED | Noted: 2018-05-22 | Resolved: 2018-05-30

## 2018-05-30 PROCEDURE — 25000003 PHARM REV CODE 250: Performed by: PHYSICIAN ASSISTANT

## 2018-05-30 PROCEDURE — 97530 THERAPEUTIC ACTIVITIES: CPT

## 2018-05-30 PROCEDURE — 25000003 PHARM REV CODE 250: Performed by: INTERNAL MEDICINE

## 2018-05-30 PROCEDURE — 97110 THERAPEUTIC EXERCISES: CPT

## 2018-05-30 PROCEDURE — 11000004 HC SNF PRIVATE

## 2018-05-30 PROCEDURE — 99900058 HC 022 PAID UNDER SNF PPS

## 2018-05-30 PROCEDURE — 97116 GAIT TRAINING THERAPY: CPT

## 2018-05-30 PROCEDURE — 63600175 PHARM REV CODE 636 W HCPCS: Performed by: INTERNAL MEDICINE

## 2018-05-30 PROCEDURE — 63600175 PHARM REV CODE 636 W HCPCS: Performed by: PHYSICIAN ASSISTANT

## 2018-05-30 PROCEDURE — A9155 ARTIFICIAL SALIVA: HCPCS | Performed by: INTERNAL MEDICINE

## 2018-05-30 PROCEDURE — 99309 SBSQ NF CARE MODERATE MDM 30: CPT | Mod: ,,, | Performed by: NURSE PRACTITIONER

## 2018-05-30 PROCEDURE — 25000003 PHARM REV CODE 250: Performed by: NURSE PRACTITIONER

## 2018-05-30 RX ADMIN — Medication 10 ML: at 12:05

## 2018-05-30 RX ADMIN — OXYBUTYNIN CHLORIDE 10 MG: 10 TABLET, FILM COATED, EXTENDED RELEASE ORAL at 09:05

## 2018-05-30 RX ADMIN — FLUTICASONE PROPIONATE 100 MCG: 50 SPRAY, METERED NASAL at 09:05

## 2018-05-30 RX ADMIN — Medication 10 ML: at 04:05

## 2018-05-30 RX ADMIN — STANDARDIZED SENNA CONCENTRATE AND DOCUSATE SODIUM 1 TABLET: 8.6; 5 TABLET, FILM COATED ORAL at 09:05

## 2018-05-30 RX ADMIN — Medication 2000 UNITS: at 09:05

## 2018-05-30 RX ADMIN — PANTOPRAZOLE SODIUM 40 MG: 40 TABLET, DELAYED RELEASE ORAL at 09:05

## 2018-05-30 RX ADMIN — PRAVASTATIN SODIUM 20 MG: 20 TABLET ORAL at 09:05

## 2018-05-30 RX ADMIN — LOSARTAN POTASSIUM 50 MG: 50 TABLET, FILM COATED ORAL at 09:05

## 2018-05-30 RX ADMIN — ESCITALOPRAM 5 MG: 5 TABLET, FILM COATED ORAL at 09:05

## 2018-05-30 RX ADMIN — PREGABALIN 75 MG: 75 CAPSULE ORAL at 09:05

## 2018-05-30 RX ADMIN — GUAIFENESIN 600 MG: 600 TABLET, EXTENDED RELEASE ORAL at 09:05

## 2018-05-30 RX ADMIN — ENOXAPARIN SODIUM 40 MG: 100 INJECTION SUBCUTANEOUS at 04:05

## 2018-05-30 RX ADMIN — ACETAMINOPHEN 650 MG: 325 TABLET ORAL at 09:05

## 2018-05-30 RX ADMIN — CETIRIZINE HYDROCHLORIDE 10 MG: 5 TABLET, FILM COATED ORAL at 09:05

## 2018-05-30 NOTE — PROGRESS NOTES
[facility] [place] - Skilled Nursing Care  Department of Hospital Medicine  Progress Note    Patient Name: Mine Wilkins  MRN: [id number]  Code Status: Full Code  Admission Date: 5/23/2018  Length of Stay: 7 days  Attending Physician: Armen Hansen MD  Primary Care Provider: MARVA Oswald MD    Subjective:     Principal Problem:Closed displaced intertrochanteric fracture of right femur with routine healing    HPI:  Chief Complaint/Reason for Admission: Closed displaced intertrochanteric fracture of right femur with routine healing    History of Present Illness:  Patient is a 83 y.o. female with osteoporosis, moderate AS, HTN who presents to SNF after hospitalization for fall with resultant right hip fracture requiring right CMN by Dr. Scott on 5/20. The patient was treated for UTI with cipro.  She complains of pain to her right hip rating it as 3/10, exacerbated by walking and relieved with oxycodone.  She has also been experiencing chest soreness with movement and denies pressure, dyspnea or radiation of pain.  She also complains of long-standing dry mouth.  HCTZ was discontinued with no change in symptoms.  She has tried OTC mouthwashes without relief.       The patient has been admitted to SNF for ongoing PT/OT due to insufficient progress to go home safely from the hospital.    Records from last hospital stay reviewed and summarized above.     Interval History:   [facility]    5/25/18  Patient seen at bedside, she reports she has a tightness across her chest which is worse with movement and deep breathing.  She denies n/v, sob or pain radiation, no diaphoresis.  She reports her pain medication helps to control this pain.  She has minimal pain to her right leg.  She reports she lives with her spouse who depends on her.  She has 2 daughters and will likely go to live with one of them while she recovers and put her  into a NH for that time.  She reports  she is eating, drinking, voiding and having BM's without issues.    5/30/18  Patient seen at bedside, she reports cough is better today.  Still with pain across her chest, worse with movement and taking deep breaths.  Pain is also reproducible when palpating the chest.  Patient denies trauma to her chest.  She admits she is not using her IS as previously instructed or TCDB and explained the importance of doing so.  She is asking if she can have Aleve to help with her pain as she found it helped better than Tylenol.  Told her would call Ortho team to see if they are ok due to risk of bleed with NSAIDs.  Called Ortho office, message left with .        Review of Systems   Constitutional: Negative for appetite change and fever.   Respiratory: Positive for cough. Negative for shortness of breath.    Cardiovascular: Negative for chest pain, palpitations and leg swelling.   Gastrointestinal: Negative for abdominal pain, constipation, diarrhea, nausea and vomiting.   Endocrine: Negative for polyphagia.   Genitourinary: Negative for dysuria.   Musculoskeletal: Positive for arthralgias and myalgias.   Skin: Negative for rash.     Objective:     Vital Signs (Most Recent):  Temp: 97.9 °F (36.6 °C) (05/30/18 0740)  Pulse: 77 (05/30/18 0740)  Resp: 18 (05/30/18 0740)  BP: 113/78 (05/30/18 0740)  SpO2: 98 % (05/30/18 0740) Vital Signs (24h Range):  Temp:  [97 °F (36.1 °C)-97.9 °F (36.6 °C)] 97.9 °F (36.6 °C)  Pulse:  [77-78] 77  Resp:  [18] 18  SpO2:  [97 %-98 %] 98 %  BP: (113)/(56-78) 113/78     Weight: 67.7 kg (149 lb 4 oz)  Body mass index is 26.44 kg/m².  No intake or output data in the 24 hours ending 05/30/18 1117   Physical Exam   Constitutional: She is oriented to person, place, and time. She appears well-developed and well-nourished.   Cardiovascular: Normal rate, regular rhythm and intact distal pulses.    Murmur heard.  Pulmonary/Chest: Effort normal and breath sounds normal. No respiratory distress.    Abdominal: Soft. Normal appearance and bowel sounds are normal. She exhibits no distension. There is no tenderness.   Musculoskeletal: Normal range of motion.   TTP of rib cage   Neurological: She is alert and oriented to person, place, and time. She has normal strength.   Skin: Skin is warm, dry and intact. Capillary refill takes less than 2 seconds. No erythema.   Surgical aquacel dressing to her right hip, lateral aspect of right knee.  There is no redness or drainage present.       Significant Labs: BMP: No results for input(s): GLU, NA, K, CL, CO2, BUN, CREATININE, CALCIUM, MG in the last 48 hours.  CBC: No results for input(s): WBC, HGB, HCT, PLT in the last 48 hours.    Significant Imaging: n/a    Assessment/Plan:      * Closed displaced intertrochanteric fracture of right femur with routine healing s/p IM nail on 5/20/2018    -continue PT/OT to increase ambulation, ADL perfomance and endurance  -continue WBAT  -continue oxycodone for pain control prn  -continue lovenox for DVT prophylaxis for 4 weeks after joint surgery  -Ortho NP to assess surgical wound and remove dressing as indicated; will notify Ortho team for any leakage or excessive drainage evident on Aquacel dressing  -continue pregabalin to control pain; discontinue on discharge from SNF  -continue senokot-s and miralax to prevent constipation; hold for > 3 stools in 24 hours or loose stools  -keep appointment with ortho as scheduled    Future Appointments  Date Time Provider Department Center   6/5/2018 8:45 AM SIDNEY Scott MD [facility] SPORTS [place]   8/22/2018 9:30 AM LAB, STEFAN KEN[facility] LAB [place]   8/22/2018 10:00 AM AMRGO KAPADIA SPECLAB [place]   8/29/2018 11:00 AM ZOEY Oswald MD [facility] IM [place]     5/25/18  As above.  Pain well controlled.  Continue Lovenox for DVT prevention.  Follow up with Ortho.    5/30/18  Plan as above, follow up with Ortho.  Ortho called to see if ok to use Aleve to aid in pain management.     Continue Lovenox for dvt ppx.          Muscular chest pain    5/25/18  Patient reports tightness across her chest which is worse with movement and deep breathing.  She has no N/V, SOB or diaphoresis.  Patient is reproducible with arm activity.  Pain is likely MS and not Cardiac in nature.  Will check troponin x 1 but in meantime encouraged patient to use her IS, and TCDB.  She can use Tylenol as ordered for pain control.    5/30/18  Troponin was normal.  Pain is reproducible.  Encourage use of IS and TCBD.  Has Tylenol ordered which she states helps.  Called Ortho to see if ok to use Aleve, message left await call back.        Gastroesophageal reflux disease without esophagitis    Continue therapy with pantoprazole  Chronic and stable    5/25/18  Chronic, continue PPI as ordered.    5/30/18  Currently on PPI.  Has a cough but feel this is more post nasal drip as cough improved with flonase.        Left ventricular diastolic dysfunction with preserved systolic function    Continue losartan therapy  Low Na diet   monitor daily weights and diurese for 3-4 pound gain or symptoms indicative of volume overload    5/25/18  Appears compensated, no CP or SOB.  Continue ARB as ordered.  Monitor weights and treat with diuretics for weight gain.  Wt Readings from Last 1 Encounters:   05/28/18 0500 67.7 kg (149 lb 4 oz)   05/27/18 0623 67.9 kg (149 lb 12.8 oz)   05/24/18 1957 69.3 kg (152 lb 12.5 oz)   05/23/18 1600 69.3 kg (152 lb 12.5 oz)     5/30/18  No weights in last 2 days.  Currently on ARB.  No edema present, Breath sounds clear.  Appears compensated.  Will ask nursing team to weigh in AM.        Age-related osteoporosis with current pathological fracture with routine healing    Patient will need to f/u with Ortho fracture clinic  Continue therapy with Vit D supplement and dietary calcium.    5/25/18  Pain is adequately controlled.  Continue OxyIR PRN as ordered for pain and Lyrica for neuropathy.  Continue Calcium and  vitamin D and follow up with Ortho in fracture clinic.    5/30/18  Pain to hip area is controlled with OxyIR PRN and Lyrica, continue as ordered.  Continue calcium and vitamin D supplements.        Essential hypertension    Chronic and stable  Continue therapy with losartan  -will continue to monitor and adjust regimen as necessary    5/25/18  BP Readings from Last 3 Encounters:   05/30/18 113/78   05/23/18 (!) 126/59   04/13/18 130/82     BP is stable, continue losartan and monitor.    5/30/18  BP is stable, continue losartan as ordered.        AR (allergic rhinitis)    Continue chronic therapy with cetirizine  Symptoms currently controlled.     5/25/18  Chronic, no complaints, continue cetirizine as ordered.    5/30/18  Cough has improved with addition of Mucinex and fluticasone.    Continue cetirizine as ordered.            Cale Mcgregor NP  Department of Hospital Medicine  [facility] - [facility]illed Nursing Care

## 2018-05-30 NOTE — ASSESSMENT & PLAN NOTE
Continue losartan therapy  Low Na diet   monitor daily weights and diurese for 3-4 pound gain or symptoms indicative of volume overload    5/25/18  Appears compensated, no CP or SOB.  Continue ARB as ordered.  Monitor weights and treat with diuretics for weight gain.  Wt Readings from Last 1 Encounters:   05/28/18 0500 67.7 kg (149 lb 4 oz)   05/27/18 0623 67.9 kg (149 lb 12.8 oz)   05/24/18 1957 69.3 kg (152 lb 12.5 oz)   05/23/18 1600 69.3 kg (152 lb 12.5 oz)     5/30/18  No weights in last 2 days.  Currently on ARB.  No edema present, Breath sounds clear.  Appears compensated.  Will ask nursing team to weigh in AM.

## 2018-05-30 NOTE — PLAN OF CARE
Problem: Occupational Therapy Goal  Goal: Occupational Therapy Goal  Goals to be met by: 14 days     Patient will increase functional independence with ADLs by performing:    UE Dressing with Modified [place].  LE Dressing with Modified [place] /c AE.  Grooming while standing at sink with Modified [place].  Toileting from bedside commode with Modified [place] for hygiene and clothing management.   Bathing with Stand-by Assistance.  Supine to sit with Modified [place] /c HOB flat and no handrails.  Stand pivot transfers with Modified [place].  Toilet transfer to bedside commode with Modified [place].      Outcome: Ongoing (interventions implemented as appropriate)  .

## 2018-05-30 NOTE — PT/OT/SLP PROGRESS
"Physical Therapy  Treatment    Mine Wilkins   MRN: [id number]   Admitting Diagnosis: Closed displaced intertrochanteric fracture of right femur with routine healing    PT Received On: 05/30/18  Total Time (min): 53       Billable Minutes:53    Gait Training 15, Therapeutic Activity 23 and Therapeutic Exercise 15    Treatment Type: Treatment  PT/PTA: PTA     PTA Visit Number: 5       General Precautions: Standard, fall (delirium)  Orthopedic Precautions: RLE weight bearing as tolerated   Braces: N/A         Subjective:  "tired of sitting here, stiff"      Pain/Comfort  Pain Rating 1: 4/10 (1 at rest 4 with mobiltiy)  Location - Side 1: Right  Location - Orientation 1: generalized  Location 1: hip  Pain Addressed 1: Reposition, Distraction, Cessation of Activity, Nurse notified (requested meds at start of session, nsg arrived post session) ed on importance of pain control and to try and take tylenol prior to session vs post to improve tolerance, pt agreeable  Pain Rating Post-Intervention 1: 4/10    Objective:   Patient found with:  (in wc)       Functional Status:  MDS G  Transfer Functional Status: S-SBA  Walk in Corridor Functional Status: S-SBA  Locomotion on Unit Functional Status: S-SBA          AM-PAC 6 CLICK MOBILITY  Total Score:17      Transfers:  Sit<>Stand: with RW S  Stand Pivot Transfer: S with RW      Gait:  Amb with RW SBA/S ~ 120 ft x 2 trials seated rest break, step through gait pattern however slight pause with advancing    Advanced Gait:  Curb Step: asc/amber 4" curb with RW CGA to asc (vcs for tech to inc BUE support to off load RLE) amber SBA    Wheelchair Mobility:  Patient propels w/c 120 ft S    Therex:  2x15 reps AP,GS,LAQ,hip flex    Balance:  Dyn standing bal/aditi activity tossing bean bags with RUE, uni lat support on RW into target holes on board ~8 ft away for 1:52 sec    Patient left up in chair with call button in reach and belonging in reach.    Assessment:  Mine Wilkins " "is a 83 y.o. female with a medical diagnosis of Closed displaced intertrochanteric fracture of right femur with routine healing.  Pt tolerated well, continues to have some difficulty ascending 4" curb 2* to limited acceptance on RLE during transition, pt would continue to benefit from skilled PT services to improve overall functional mobility, strength and endurance.  .    Rehab identified problem list/impairments: weakness, impaired endurance, impaired functional mobilty, gait instability, impaired balance, decreased lower extremity function, pain, edema, orthopedic precautions    Rehab potential is good.    Activity tolerance: Fair    Discharge recommendations: home with home health     Barriers to discharge: Decreased caregiver support, Inaccessible home environment    Equipment recommendations: walker, rolling, bedside commode     GOALS:    Physical Therapy Goals        Problem: Physical Therapy Goal    Goal Priority Disciplines Outcome Goal Variances Interventions   Physical Therapy Goal     PT/OT, PT Ongoing (interventions implemented as appropriate)     Description:  Goals to be met by: 14 days     Patient will increase functional independence with mobility by performin. Supine to sit with Modified [place]  2. Sit to supine with Modified [place]  3. Sit to stand transfer with Supervision met  4. Bed to chair transfer with Supervision using Rolling Walker met  5. Gait  x 150 feet with Supervision using Rolling Walker.   6. Wheelchair propulsion x100 feet with Supervision using bilateral upper extremities  7. Ascend/Descend 4 inch curb step with Stand-by Assistance using Rolling Walker.  8. Stand for 3 minutes with Stand-by Assistance using Rolling Walker and perform an activity met  9. Lower extremity exercise program x20 reps per handout, with assistance as needed                       PLAN:    Patient to be seen 5 x/week  to address the above listed problems via gait training, therapeutic " exercises, therapeutic activities, wheelchair management/training  Plan of Care expires: 06/23/18  Plan of Care reviewed with: patient    Valeriejazz Rodriguezjose, PTA  05/30/2018

## 2018-05-30 NOTE — ASSESSMENT & PLAN NOTE
Continue therapy with pantoprazole  Chronic and stable    5/25/18  Chronic, continue PPI as ordered.    5/30/18  Currently on PPI.  Has a cough but feel this is more post nasal drip as cough improved with flonase.

## 2018-05-30 NOTE — PLAN OF CARE
Problem: Patient Care Overview  Goal: Plan of Care Review  Outcome: Ongoing (interventions implemented as appropriate)   05/30/18 1545   Coping/Psychosocial   Plan Of Care Reviewed With patient       Problem: Fall Risk (Adult)  Goal: Absence of Falls  Patient will demonstrate the desired outcomes by discharge/transition of care.   Outcome: Ongoing (interventions implemented as appropriate)   05/30/18 1545   Fall Risk (Adult)   Absence of Falls making progress toward outcome       Problem: Pressure Ulcer Risk (Quintin Scale) (Adult,Obstetrics,Pediatric)  Goal: Skin Integrity  Patient will demonstrate the desired outcomes by discharge/transition of care.   Outcome: Ongoing (interventions implemented as appropriate)   05/30/18 1545   Pressure Ulcer Risk (Quintin Scale) (Adult,Obstetrics,Pediatric)   Skin Integrity making progress toward outcome       Comments: Patient monitored every 1 to 2 hours for pain and safety.  Safety maintained.  Patient instructed to call for assistance.Call  Light and persoanl items in reach.

## 2018-05-30 NOTE — PLAN OF CARE
Problem: Physical Therapy Goal  Goal: Physical Therapy Goal  Goals to be met by: 14 days     Patient will increase functional independence with mobility by performin. Supine to sit with Modified [place]  2. Sit to supine with Modified [place]  3. Sit to stand transfer with Supervision met  4. Bed to chair transfer with Supervision using Rolling Walker met  5. Gait  x 150 feet with Supervision using Rolling Walker.   6. Wheelchair propulsion x100 feet with Supervision using bilateral upper extremities  7. Ascend/Descend 4 inch curb step with Stand-by Assistance using Rolling Walker.  8. Stand for 3 minutes with Stand-by Assistance using Rolling Walker and perform an activity met  9. Lower extremity exercise program x20 reps per handout, with assistance as needed      Outcome: Ongoing (interventions implemented as appropriate)  Goals remain appropriate

## 2018-05-30 NOTE — ASSESSMENT & PLAN NOTE
Continue chronic therapy with cetirizine  Symptoms currently controlled.     5/25/18  Chronic, no complaints, continue cetirizine as ordered.    5/30/18  Cough has improved with addition of Mucinex and fluticasone.    Continue cetirizine as ordered.

## 2018-05-30 NOTE — PT/OT/SLP PROGRESS
Occupational Therapy  Treatment    Mine Wilkins   MRN: [id number]   Admitting Diagnosis: Closed displaced intertrochanteric fracture of right femur with routine healing    OT Date of Treatment: 05/30/18  Total Time (min): 53 min    Billable Minutes:  Therapeutic Activity 43 and Therapeutic Exercise 10    General Precautions: Standard, fall  Orthopedic Precautions: RLE weight bearing as tolerated  Braces: N/A         Subjective:  Communicated with nsg prior to session  My chest is sore and hip in hurting a little bit    Pain/Comfort  Pain Rating 1: 4/10  Location - Side 1: Bilateral  Location - Orientation 1: generalized  Location 1:  (hip and chest area)  Pain Addressed 1: Reposition, Distraction  Pain Rating Post-Intervention 1: 4/10    Objective:   Pt. Seated up in recliner chair    Functional Status:  MDS G  Transfer Functional Status: S-SBA recliner <> w/c with SPT and from w/c with AD for bal and cues for safety  Dressing Functional Status: 1: S-SBA with use of AE for LB dressing with pants/socks with donning/doffing  Moving from seated to standing position: Not steady, but able to stabilize without staff assistance  Walking (with assistive device if used): Not steady, but able to stabilize without staff assistance  Turning around and facing the opposite direction while walking: Not steady, but able to stabilize without staff assistance  Moving on and off the toilet: Not steady, but able to stabilize without staff assistance  Surface-to-surface transfer (transfer between bed and chair or wheelchair): Not steady, only able to stabilize with staff assistance           AMPA 6 Click:  AMPAC Total Score: 19    Additional Treatment:  Pt. With standing act on this day with task. Pt. With CGA/SBA for balance aspects with task with no AD Pt. With visual perception task on this day Pt. Able to stand for 10 min to complete task with no seated rest break inbetween minor complaints on pain during standing  Pt. With  1# dowel activity with shd flex, bicep curls horz adb/add and forward flex motion to increase BUE ROM and strength. Pt. Stated she has soreness from general movement but np and already aware and blood work already performed       Patient left up in chair with all lines intact and call button in reach    ASSESSMENT:  Mine Wilkins is a 83 y.o. female with a medical diagnosis of Closed displaced intertrochanteric fracture of right femur with routine healing Pt. participated well with session on this day.Pt demos physical deficits with balance  functional mobility, UB strength, endurance  level of functional indep with daily tasks and activities and selfcare skills .Pt. Will continue to benefit from continued OT to progress towards goals      Rehab identified problem list/impairments: weakness, impaired endurance, impaired self care skills, impaired functional mobilty, impaired balance, decreased lower extremity function, orthopedic precautions, pain    Rehab potential is fair    Activity tolerance: Fair    Discharge recommendations: nursing facility, skilled     Barriers to discharge:       Equipment recommendations: walker, rolling     GOALS:    Occupational Therapy Goals        Problem: Occupational Therapy Goal    Goal Priority Disciplines Outcome Interventions   Occupational Therapy Goal     OT, PT/OT Ongoing (interventions implemented as appropriate)    Description:  Goals to be met by: 14 days     Patient will increase functional independence with ADLs by performing:    UE Dressing with Modified [place].  LE Dressing with Modified [place] /c AE.  Grooming while standing at sink with Modified [place].  Toileting from bedside commode with Modified [place] for hygiene and clothing management.   Bathing with Stand-by Assistance.  Supine to sit with Modified [place] /c HOB flat and no handrails.  Stand pivot transfers with Modified [place].  Toilet transfer to bedside commode  with Modified [place].                       Plan:  Patient to be seen 5 x/week to address the above listed problems via self-care/home management, therapeutic activities, therapeutic exercises  Plan of Care expires: 06/24/18  Plan of Care reviewed with: patient  The [facility] and [facility] have collaborated and discussed the patient's status, treatment plan and progress toward established goals.   SARAH Pearce 5/30/2018

## 2018-05-30 NOTE — ASSESSMENT & PLAN NOTE
5/25/18  Patient reports tightness across her chest which is worse with movement and deep breathing.  She has no N/V, SOB or diaphoresis.  Patient is reproducible with arm activity.  Pain is likely MS and not Cardiac in nature.  Will check troponin x 1 but in meantime encouraged patient to use her IS, and TCDB.  She can use Tylenol as ordered for pain control.    5/30/18  Troponin was normal.  Pain is reproducible.  Encourage use of IS and TCBD.  Has Tylenol ordered which she states helps.  Called Ortho to see if ok to use Aleve, message left await call back.

## 2018-05-30 NOTE — ASSESSMENT & PLAN NOTE
Chronic and stable  Continue therapy with losartan  -will continue to monitor and adjust regimen as necessary    5/25/18  BP Readings from Last 3 Encounters:   05/30/18 113/78   05/23/18 (!) 126/59   04/13/18 130/82     BP is stable, continue losartan and monitor.    5/30/18  BP is stable, continue losartan as ordered.

## 2018-05-30 NOTE — PLAN OF CARE
Problem: Patient Care Overview  Goal: Plan of Care Review  FALL PRECAUTION MAINTAINED. AQUACEL DRESSING INTACT TO RIGHT LEG.

## 2018-05-30 NOTE — ASSESSMENT & PLAN NOTE
Patient will need to f/u with Ortho fracture clinic  Continue therapy with Vit D supplement and dietary calcium.    5/25/18  Pain is adequately controlled.  Continue OxyIR PRN as ordered for pain and Lyrica for neuropathy.  Continue Calcium and vitamin D and follow up with Ortho in fracture clinic.    5/30/18  Pain to hip area is controlled with OxyIR PRN and Lyrica, continue as ordered.  Continue calcium and vitamin D supplements.

## 2018-05-30 NOTE — PLAN OF CARE
Problem: Patient Care Overview  Goal: Plan of Care Review  Outcome: Ongoing (interventions implemented as appropriate)   05/30/18 0001   Coping/Psychosocial   Plan Of Care Reviewed With patient       Problem: Fall Risk (Adult)  Goal: Identify Related Risk Factors and Signs and Symptoms  Related risk factors and signs and symptoms are identified upon initiation of Human Response Clinical Practice Guideline (CPG)   Outcome: Ongoing (interventions implemented as appropriate)   05/30/18 0001   Fall Risk   Related Risk Factors (Fall Risk) fatigue/slow reaction;fear of falling;gait/mobility problems

## 2018-05-30 NOTE — SUBJECTIVE & OBJECTIVE
Interval History:   The patient was admitted at [facility] from 5/19 to 5/23/2018.    5/25/18  Patient seen at bedside, she reports she has a tightness across her chest which is worse with movement and deep breathing.  She denies n/v, sob or pain radiation, no diaphoresis.  She reports her pain medication helps to control this pain.  She has minimal pain to her right leg.  She reports she lives with her spouse who depends on her.  She has 2 daughters and will likely go to live with one of them while she recovers and put her  into a NH for that time.  She reports she is eating, drinking, voiding and having BM's without issues.    5/30/18  Patient seen at bedside, she reports cough is better today.  Still with pain across her chest, worse with movement and taking deep breaths.  Pain is also reproducible when palpating the chest.  Patient denies trauma to her chest.  She admits she is not using her IS as previously instructed or TCDB and explained the importance of doing so.  She is asking if she can have Aleve to help with her pain as she found it helped better than Tylenol.  Told her would call Ortho team to see if they are ok due to risk of bleed with NSAIDs.  Called Ortho office, message left with .        Review of Systems   Constitutional: Negative for appetite change and fever.   Respiratory: Positive for cough. Negative for shortness of breath.    Cardiovascular: Negative for chest pain, palpitations and leg swelling.   Gastrointestinal: Negative for abdominal pain, constipation, diarrhea, nausea and vomiting.   Endocrine: Negative for polyphagia.   Genitourinary: Negative for dysuria.   Musculoskeletal: Positive for arthralgias and myalgias.   Skin: Negative for rash.     Objective:     Vital Signs (Most Recent):  Temp: 97.9 °F (36.6 °C) (05/30/18 0740)  Pulse: 77 (05/30/18 0740)  Resp: 18 (05/30/18 0740)  BP: 113/78 (05/30/18 0740)  SpO2: 98 % (05/30/18 0740) Vital Signs (24h  Range):  Temp:  [97 °F (36.1 °C)-97.9 °F (36.6 °C)] 97.9 °F (36.6 °C)  Pulse:  [77-78] 77  Resp:  [18] 18  SpO2:  [97 %-98 %] 98 %  BP: (113)/(56-78) 113/78     Weight: 67.7 kg (149 lb 4 oz)  Body mass index is 26.44 kg/m².  No intake or output data in the 24 hours ending 05/30/18 1117   Physical Exam   Constitutional: She is oriented to person, place, and time. She appears well-developed and well-nourished.   Cardiovascular: Normal rate, regular rhythm and intact distal pulses.    Murmur heard.  Pulmonary/Chest: Effort normal and breath sounds normal. No respiratory distress.   Abdominal: Soft. Normal appearance and bowel sounds are normal. She exhibits no distension. There is no tenderness.   Musculoskeletal: Normal range of motion.   TTP of rib cage   Neurological: She is alert and oriented to person, place, and time. She has normal strength.   Skin: Skin is warm, dry and intact. Capillary refill takes less than 2 seconds. No erythema.   Surgical aquacel dressing to her right hip, lateral aspect of right knee.  There is no redness or drainage present.       Significant Labs: BMP: No results for input(s): GLU, NA, K, CL, CO2, BUN, CREATININE, CALCIUM, MG in the last 48 hours.  CBC: No results for input(s): WBC, HGB, HCT, PLT in the last 48 hours.    Significant Imaging: n/a

## 2018-05-30 NOTE — ASSESSMENT & PLAN NOTE
-continue PT/OT to increase ambulation, ADL perfomance and endurance  -continue WBAT  -continue oxycodone for pain control prn  -continue lovenox for DVT prophylaxis for 4 weeks after joint surgery  -Ortho NP to assess surgical wound and remove dressing as indicated; will notify Ortho team for any leakage or excessive drainage evident on Aquacel dressing  -continue pregabalin to control pain; discontinue on discharge from SNF  -continue senokot-s and miralax to prevent constipation; hold for > 3 stools in 24 hours or loose stools  -keep appointment with ortho as scheduled    Future Appointments  Date Time Provider Department Center   6/5/2018 8:45 AM SIDNEY Scott MD [facility] SPORTS [place]   8/22/2018 9:30 AM LAB, STEFAN [facility] LAB [place]   8/22/2018 10:00 AM SPE[facility] SPECLAB [place]   8/29/2018 11:00 AM ZOEY Oswald MD [facility] IM [place]     5/25/18  As above.  Pain well controlled.  Continue Lovenox for DVT prevention.  Follow up with Ortho.    5/30/18  Plan as above, follow up with Ortho.  Ortho called to see if ok to use Aleve to aid in pain management.    Continue Lovenox for dvt ppx.

## 2018-05-31 LAB
ANION GAP SERPL CALC-SCNC: 7 MMOL/L
BASOPHILS # BLD AUTO: 0.06 K/UL
BASOPHILS NFR BLD: 0.7 %
BUN SERPL-MCNC: 22 MG/DL
CALCIUM SERPL-MCNC: 9.1 MG/DL
CHLORIDE SERPL-SCNC: 104 MMOL/L
CO2 SERPL-SCNC: 24 MMOL/L
CREAT SERPL-MCNC: 0.8 MG/DL
DIFFERENTIAL METHOD: ABNORMAL
EOSINOPHIL # BLD AUTO: 0.2 K/UL
EOSINOPHIL NFR BLD: 2 %
ERYTHROCYTE [DISTWIDTH] IN BLOOD BY AUTOMATED COUNT: 15.7 %
EST. GFR  (AFRICAN AMERICAN): >60 ML/MIN/1.73 M^2
EST. GFR  (NON AFRICAN AMERICAN): >60 ML/MIN/1.73 M^2
GLUCOSE SERPL-MCNC: 104 MG/DL
HCT VFR BLD AUTO: 25.8 %
HGB BLD-MCNC: 8.5 G/DL
IMM GRANULOCYTES # BLD AUTO: 0.08 K/UL
IMM GRANULOCYTES NFR BLD AUTO: 0.9 %
LYMPHOCYTES # BLD AUTO: 1.5 K/UL
LYMPHOCYTES NFR BLD: 17.9 %
MAGNESIUM SERPL-MCNC: 2.2 MG/DL
MCH RBC QN AUTO: 28.1 PG
MCHC RBC AUTO-ENTMCNC: 32.9 G/DL
MCV RBC AUTO: 85 FL
MONOCYTES # BLD AUTO: 0.9 K/UL
MONOCYTES NFR BLD: 10.3 %
NEUTROPHILS # BLD AUTO: 5.7 K/UL
NEUTROPHILS NFR BLD: 68.2 %
NRBC BLD-RTO: 0 /100 WBC
PHOSPHATE SERPL-MCNC: 3.5 MG/DL
PLATELET # BLD AUTO: 566 K/UL
PMV BLD AUTO: 9.4 FL
POTASSIUM SERPL-SCNC: 4.3 MMOL/L
RBC # BLD AUTO: 3.03 M/UL
SODIUM SERPL-SCNC: 135 MMOL/L
WBC # BLD AUTO: 8.43 K/UL

## 2018-05-31 PROCEDURE — A9155 ARTIFICIAL SALIVA: HCPCS | Performed by: INTERNAL MEDICINE

## 2018-05-31 PROCEDURE — 80048 BASIC METABOLIC PNL TOTAL CA: CPT

## 2018-05-31 PROCEDURE — 63600175 PHARM REV CODE 636 W HCPCS: Performed by: PHYSICIAN ASSISTANT

## 2018-05-31 PROCEDURE — 97530 THERAPEUTIC ACTIVITIES: CPT

## 2018-05-31 PROCEDURE — 97116 GAIT TRAINING THERAPY: CPT

## 2018-05-31 PROCEDURE — 25000003 PHARM REV CODE 250: Performed by: NURSE PRACTITIONER

## 2018-05-31 PROCEDURE — 84100 ASSAY OF PHOSPHORUS: CPT

## 2018-05-31 PROCEDURE — 11000004 HC SNF PRIVATE

## 2018-05-31 PROCEDURE — 25000003 PHARM REV CODE 250: Performed by: INTERNAL MEDICINE

## 2018-05-31 PROCEDURE — 97110 THERAPEUTIC EXERCISES: CPT

## 2018-05-31 PROCEDURE — 63600175 PHARM REV CODE 636 W HCPCS: Performed by: INTERNAL MEDICINE

## 2018-05-31 PROCEDURE — 36415 COLL VENOUS BLD VENIPUNCTURE: CPT

## 2018-05-31 PROCEDURE — 85025 COMPLETE CBC W/AUTO DIFF WBC: CPT

## 2018-05-31 PROCEDURE — 83735 ASSAY OF MAGNESIUM: CPT

## 2018-05-31 PROCEDURE — 25000003 PHARM REV CODE 250: Performed by: PHYSICIAN ASSISTANT

## 2018-05-31 RX ADMIN — ENOXAPARIN SODIUM 40 MG: 100 INJECTION SUBCUTANEOUS at 04:05

## 2018-05-31 RX ADMIN — Medication 2000 UNITS: at 09:05

## 2018-05-31 RX ADMIN — STANDARDIZED SENNA CONCENTRATE AND DOCUSATE SODIUM 1 TABLET: 8.6; 5 TABLET, FILM COATED ORAL at 09:05

## 2018-05-31 RX ADMIN — Medication 10 ML: at 09:05

## 2018-05-31 RX ADMIN — GUAIFENESIN 600 MG: 600 TABLET, EXTENDED RELEASE ORAL at 09:05

## 2018-05-31 RX ADMIN — PANTOPRAZOLE SODIUM 40 MG: 40 TABLET, DELAYED RELEASE ORAL at 09:05

## 2018-05-31 RX ADMIN — Medication 10 ML: at 11:05

## 2018-05-31 RX ADMIN — CETIRIZINE HYDROCHLORIDE 10 MG: 5 TABLET, FILM COATED ORAL at 09:05

## 2018-05-31 RX ADMIN — PREGABALIN 75 MG: 75 CAPSULE ORAL at 09:05

## 2018-05-31 RX ADMIN — ESCITALOPRAM 5 MG: 5 TABLET, FILM COATED ORAL at 09:05

## 2018-05-31 RX ADMIN — ACETAMINOPHEN 650 MG: 325 TABLET ORAL at 05:05

## 2018-05-31 RX ADMIN — ACETAMINOPHEN 650 MG: 325 TABLET ORAL at 12:05

## 2018-05-31 RX ADMIN — OXYBUTYNIN CHLORIDE 10 MG: 10 TABLET, FILM COATED, EXTENDED RELEASE ORAL at 09:05

## 2018-05-31 RX ADMIN — FLUTICASONE PROPIONATE 100 MCG: 50 SPRAY, METERED NASAL at 09:05

## 2018-05-31 RX ADMIN — PRAVASTATIN SODIUM 20 MG: 20 TABLET ORAL at 09:05

## 2018-05-31 NOTE — PLAN OF CARE
Problem: Occupational Therapy Goal  Goal: Occupational Therapy Goal  Goals to be met by: 14 days     Patient will increase functional independence with ADLs by performing:    UE Dressing with Modified [place].  LE Dressing with Modified [place] /c AE.  Grooming while standing at sink with Modified [place].  Toileting from bedside commode with Modified [place] for hygiene and clothing management.   Bathing with Stand-by Assistance.  Supine to sit with Modified [place] /c HOB flat and no handrails.  Stand pivot transfers with Modified [place].  Toilet transfer to bedside commode with Modified [place].      Outcome: Ongoing (interventions implemented as appropriate)  .

## 2018-05-31 NOTE — PT/OT/SLP PROGRESS
Physical Therapy  Treatment    Mine Wilkins   MRN: [id number]   Admitting Diagnosis: Closed displaced intertrochanteric fracture of right femur with routine healing    PT Received On: 05/31/18  Total Time (min): 60  Billable Minutes:  Gait Training 15, Therapeutic Activity 15 and Therapeutic Exercise 30    Treatment Type: Treatment  PT/PTA: PT     PTA Visit Number: 0       General Precautions: Standard, fall  Orthopedic Precautions: RLE weight bearing as tolerated   Braces: N/A         Subjective:  Communicated with pt prior to session.   Agreeable to PT services.    Pain/Comfort  Pain Rating 1: 3/10  Location - Side 1: Right  Location - Orientation 1: lateral  Location 1: hip  Pain Addressed 1: Reposition  Pain Rating Post-Intervention 1: 3/10    Objective:  Patient found seated in bedside chair. with         Functional Status:  MDS G  Bed Mobility Functional Status: CGA-Min (A)  Transfer Functional Status: S-SBA  Walk in Room Functional Status: S-SBA  Walk in Corridor Functional Status: S-SBA  Locomotion on Unit Functional Status: S-SBA  Moving from seated to standing position: Not steady, but able to stabilize without staff assistance  Walking (with assistive device if used): Not steady, but able to stabilize without staff assistance  Turning around and facing the opposite direction while walking: Not steady, but able to stabilize without staff assistance  Moving on and off the toilet: Not steady, but able to stabilize without staff assistance  Surface-to-surface transfer (transfer between bed and chair or wheelchair): Not steady, but able to stabilize without staff assistance          AM-PAC 6 CLICK MOBILITY  Total Score:17    Bed Mobility:  Sit>Supine:Ge for mgmt of RLE  Supine>Sit: CGA    Transfers:  Sit<>Stand: SBA  Stand Pivot Transfer: SBA    Gait:  Amb 180 feet with use of a rolling with step-to gait pattern for first half and step-through gait pattern for second half- required supervision. Uses  her rolling walker substantially for UE support due to pain in RLE.    Therex (x20 reps):  LAQ  Hip flexion  Ankle pumps  Hip adduction isometrics with TA isometric contraction  Hip abduction with red theraband   SAQ  Hip abduction  Heel slides  Quad sets  Gluteal sets  SLR      Balance:  Requires UE support on stable surface and SBA for standing balance    Additional Treatment:  NuStep to improve Le strength/endurance (work load 1).    Patient left up in chair with call button in reach.    Assessment:  Mine Wilkins is a 83 y.o. female with a medical diagnosis of Closed displaced intertrochanteric fracture of right femur with routine healing.  Ms. Wilkins was able to ambulate 180 feet with supervision using a rolling walker but definitely requires UE support on a stable surface while standing (especially during transfers). Thus she will benefit from cnoitnued PT services.    Rehab identified problem list/impairments: weakness, impaired endurance, impaired functional mobilty, gait instability, impaired balance, decreased lower extremity function, pain, edema, orthopedic precautions    Rehab potential is good.    Activity tolerance: Good    Discharge recommendations: home with home health     Barriers to discharge: Decreased caregiver support, Inaccessible home environment    Equipment recommendations: walker, rolling, bedside commode     GOALS:    Physical Therapy Goals        Problem: Physical Therapy Goal    Goal Priority Disciplines Outcome Goal Variances Interventions   Physical Therapy Goal     PT/OT, PT Ongoing (interventions implemented as appropriate)     Description:  Goals to be met by: 14 days     Patient will increase functional independence with mobility by performin. Supine to sit with Modified [place]  2. Sit to supine with Modified [place]  3. Sit to stand transfer with Supervision met  4. Bed to chair transfer with Supervision using Rolling Walker met  5. Gait  x 150  feet with Supervision using Rolling Walker. Met (5/31/2018)  6. Wheelchair propulsion x100 feet with Supervision using bilateral upper extremities  7. Ascend/Descend 4 inch curb step with Stand-by Assistance using Rolling Walker.  8. Stand for 3 minutes with Stand-by Assistance using Rolling Walker and perform an activity met  9. Lower extremity exercise program x20 reps per handout, with assistance as needed                        PLAN:    Patient to be seen 5 x/week  to address the above listed problems via gait training, therapeutic exercises, therapeutic activities, wheelchair management/training  Plan of Care expires: 06/23/18  Plan of Care reviewed with: patient    Francia J Annie, PT  05/31/2018

## 2018-05-31 NOTE — PLAN OF CARE
Problem: Patient Care Overview  Goal: Plan of Care Review  Outcome: Ongoing (interventions implemented as appropriate)   05/31/18 0305   Coping/Psychosocial   Plan Of Care Reviewed With patient       Problem: Fall Risk (Adult)  Goal: Identify Related Risk Factors and Signs and Symptoms  Related risk factors and signs and symptoms are identified upon initiation of Human Response Clinical Practice Guideline (CPG)   Outcome: Ongoing (interventions implemented as appropriate)   05/31/18 0305   Fall Risk   Related Risk Factors (Fall Risk) age-related changes;gait/mobility problems;fear of falling

## 2018-05-31 NOTE — PLAN OF CARE
Problem: Physical Therapy Goal  Goal: Physical Therapy Goal  Goals to be met by: 14 days     Patient will increase functional independence with mobility by performin. Supine to sit with Modified [place]  2. Sit to supine with Modified [place]  3. Sit to stand transfer with Supervision met  4. Bed to chair transfer with Supervision using Rolling Walker met  5. Gait  x 150 feet with Supervision using Rolling Walker. Met (2018)  6. Wheelchair propulsion x100 feet with Supervision using bilateral upper extremities  7. Ascend/Descend 4 inch curb step with Stand-by Assistance using Rolling Walker.  8. Stand for 3 minutes with Stand-by Assistance using Rolling Walker and perform an activity met  9. Lower extremity exercise program x20 reps per handout, with assistance as needed      Outcome: Ongoing (interventions implemented as appropriate)  Met one goal today.

## 2018-05-31 NOTE — PLAN OF CARE
Problem: Patient Care Overview  Goal: Plan of Care Review  Outcome: Ongoing (interventions implemented as appropriate)  POC reviewed with patient.  Interventions documented on Flow sheet and on MAR.

## 2018-05-31 NOTE — PT/OT/SLP PROGRESS
Occupational Therapy  Treatment    Mine Wilkins   MRN: [id number]   Admitting Diagnosis: Closed displaced intertrochanteric fracture of right femur with routine healing    OT Date of Treatment: 05/31/18  Total Time (min): 53 min    Billable Minutes:  Self Care/Home Management 23 and Therapeutic Activity 30    General Precautions: Standard, fall  Orthopedic Precautions: RLE weight bearing as tolerated  Braces: N/A         Subjective:  Communicated with nsg prior to session.  I am felling better today    Pain/Comfort  Pain Rating 1: 4/10  Location - Side 1: Bilateral  Location - Orientation 1: generalized    Objective:   Pt. Supine on arrival    Functional Status:  MDS G  Bed Mobility Functional Status: CGA-Min (A) supine to sit  Transfer Functional Status: S-SBA from EOB with RW   Walk in Room Functional Status: S-SBA to inroom bath and back to room  Dressing Functional Status: 1: S-SBA foe UE dressing with robe management and retrival  Eating Functional Status: mod(I) - (I)   Toilet Use Functional Status: S-SBA from standard toilet and use of grab bars  Personal Hygiene Functional Status: mod(I) - (I) standing at sink for hand/hair and oral care  Moving from seated to standing position: Not steady, but able to stabilize without staff assistance  Walking (with assistive device if used): Not steady, but able to stabilize without staff assistance  Turning around and facing the opposite direction while walking: Not steady, but able to stabilize without staff assistance  Moving on and off the toilet: Not steady, but able to stabilize without staff assistance  Surface-to-surface transfer (transfer between bed and chair or wheelchair): Not steady, but able to stabilize without staff assistance           AMPA 6 Click:  AMPAC Total Score: 19    OT Exercises: UE Ergometer 10 min    Additional Treatment:  Pt. With standing act on this day with task. Pt. With  CGA/SBA for balance aspects with task with no AD to  increase standing bal and tolerance   Pt. With 1# dowel activity with shd flex, bicep curls horz adb/add and forward flex motion to increase BUE ROM and strength.  Pt. Also with w/c propulsion gym<> room with (S)      Patient left up in chair with all lines intact and call button in reach    ASSESSMENT:  Mine Wilkins is a 83 y.o. female with a medical diagnosis of Closed displaced intertrochanteric fracture of right femur with routine healing Pt. participated well with session on this day.Pt demos physical deficits with balance  functional mobility, UB strength, endurance  level of functional indep with daily tasks and activities and selfcare skills .Pt. Will continue to benefit from continued OT to progress towards goals  .    Rehab identified problem list/impairments: weakness, impaired endurance, impaired self care skills, impaired functional mobilty, impaired balance, decreased lower extremity function, orthopedic precautions, pain    Rehab potential is fair    Activity tolerance: Fair    Discharge recommendations: nursing facility, skilled     Barriers to discharge:       Equipment recommendations: walker, rolling     GOALS:    Occupational Therapy Goals        Problem: Occupational Therapy Goal    Goal Priority Disciplines Outcome Interventions   Occupational Therapy Goal     OT, PT/OT Ongoing (interventions implemented as appropriate)    Description:  Goals to be met by: 14 days     Patient will increase functional independence with ADLs by performing:    UE Dressing with Modified [place].  LE Dressing with Modified [place] /c AE.  Grooming while standing at sink with Modified [place].  Toileting from bedside commode with Modified [place] for hygiene and clothing management.   Bathing with Stand-by Assistance.  Supine to sit with Modified [place] /c HOB flat and no handrails.  Stand pivot transfers with Modified [place].  Toilet transfer to bedside commode with  Modified [place].                   Plan:  Patient to be seen 5 x/week to address the above listed problems via self-care/home management, therapeutic activities, therapeutic exercises  Plan of Care expires: 06/24/18  Plan of Care reviewed with: patient    SARAH Pearce  05/31/2018

## 2018-06-01 PROCEDURE — 25000003 PHARM REV CODE 250: Performed by: INTERNAL MEDICINE

## 2018-06-01 PROCEDURE — 97530 THERAPEUTIC ACTIVITIES: CPT

## 2018-06-01 PROCEDURE — 97116 GAIT TRAINING THERAPY: CPT

## 2018-06-01 PROCEDURE — 11000004 HC SNF PRIVATE

## 2018-06-01 PROCEDURE — 25000003 PHARM REV CODE 250: Performed by: NURSE PRACTITIONER

## 2018-06-01 PROCEDURE — 25000003 PHARM REV CODE 250: Performed by: PHYSICIAN ASSISTANT

## 2018-06-01 PROCEDURE — A9155 ARTIFICIAL SALIVA: HCPCS | Performed by: INTERNAL MEDICINE

## 2018-06-01 PROCEDURE — 97110 THERAPEUTIC EXERCISES: CPT

## 2018-06-01 PROCEDURE — 63600175 PHARM REV CODE 636 W HCPCS: Performed by: INTERNAL MEDICINE

## 2018-06-01 PROCEDURE — 63600175 PHARM REV CODE 636 W HCPCS: Performed by: PHYSICIAN ASSISTANT

## 2018-06-01 RX ADMIN — Medication 10 ML: at 04:06

## 2018-06-01 RX ADMIN — GUAIFENESIN 600 MG: 600 TABLET, EXTENDED RELEASE ORAL at 08:06

## 2018-06-01 RX ADMIN — PANTOPRAZOLE SODIUM 40 MG: 40 TABLET, DELAYED RELEASE ORAL at 08:06

## 2018-06-01 RX ADMIN — ACETAMINOPHEN 650 MG: 325 TABLET ORAL at 05:06

## 2018-06-01 RX ADMIN — Medication 2000 UNITS: at 08:06

## 2018-06-01 RX ADMIN — ESCITALOPRAM 5 MG: 5 TABLET, FILM COATED ORAL at 08:06

## 2018-06-01 RX ADMIN — OXYBUTYNIN CHLORIDE 10 MG: 10 TABLET, FILM COATED, EXTENDED RELEASE ORAL at 08:06

## 2018-06-01 RX ADMIN — CETIRIZINE HYDROCHLORIDE 10 MG: 5 TABLET, FILM COATED ORAL at 08:06

## 2018-06-01 RX ADMIN — ACETAMINOPHEN 650 MG: 325 TABLET ORAL at 07:06

## 2018-06-01 RX ADMIN — PRAVASTATIN SODIUM 20 MG: 20 TABLET ORAL at 08:06

## 2018-06-01 RX ADMIN — STANDARDIZED SENNA CONCENTRATE AND DOCUSATE SODIUM 1 TABLET: 8.6; 5 TABLET, FILM COATED ORAL at 08:06

## 2018-06-01 RX ADMIN — Medication 10 ML: at 12:06

## 2018-06-01 RX ADMIN — PREGABALIN 75 MG: 75 CAPSULE ORAL at 08:06

## 2018-06-01 RX ADMIN — FLUTICASONE PROPIONATE 100 MCG: 50 SPRAY, METERED NASAL at 08:06

## 2018-06-01 RX ADMIN — ACETAMINOPHEN 650 MG: 325 TABLET ORAL at 12:06

## 2018-06-01 RX ADMIN — ENOXAPARIN SODIUM 40 MG: 100 INJECTION SUBCUTANEOUS at 04:06

## 2018-06-01 NOTE — PT/OT/SLP PROGRESS
Physical Therapy  Treatment    Mine Wilkins   MRN: [id number]   Admitting Diagnosis: Closed displaced intertrochanteric fracture of right femur with routine healing    PT Received On: 06/01/18  Total Time (min): 53       Billable Minutes:  Gait Training 25, Therapeutic Activity 15 and Therapeutic Exercise 13    Treatment Type: Treatment  PT/PTA: PTA     PTA Visit Number: 1       General Precautions: Standard, fall  Orthopedic Precautions: RLE weight bearing as tolerated   Braces: N/A         Subjective:  Communicated with NSG prior to session.  Pt agreeable to therapy.     Pain/Comfort  Pain Rating 1: 3/10  Location - Side 1: Right  Location - Orientation 1: lateral  Location 1: hip  Pain Addressed 1: Pre-medicate for activity, Reposition, Distraction  Pain Rating Post-Intervention 1: 3/10    Objective:  Patient found sitting up in bedside chair in room.          Functional Status:  MDS G  Bed Mobility Functional Status: CGA-Min (A)  Transfer Functional Status: S-SBA  Walk in Room Functional Status: S-SBA  Walk in Corridor Functional Status: S-SBA  Locomotion on Unit Functional Status: S-SBA  Moving from seated to standing position: Not steady, but able to stabilize without staff assistance  Walking (with assistive device if used): Not steady, but able to stabilize without staff assistance  Turning around and facing the opposite direction while walking: Not steady, but able to stabilize without staff assistance  Surface-to-surface transfer (transfer between bed and chair or wheelchair): Not steady, but able to stabilize without staff assistance          AM-PAC 6 CLICK MOBILITY  Total Score:17    Bed Mobility:  Sit>Supine: Min A for RLE mgmt on mat. Pt performs multiple trials and can lay to supine with SBA and use of leg .   Supine>Sit: SBA on mat.     Transfers:  Sit<>Stand: Supervision with and without RW, from bedside chair, w/c, and mat.   Stand Pivot Transfer: SBA with RW, bedside chair <> w/c and  "w/c <> mat.     Gait:  Amb: Pt ambulated 185 ft, 200 ft, and 50 x 2 trials with RW and SBA. Pt ambulates with step-to pattern and self corrects  To step-through pattern. Pt given occasional cues to prompt continued step-through pattern and improved mgmt of RW to facilitate improved pattern. Seated rest after all trials.     Advanced Gait:  Curb Step: Pt asc/dec 4" curb x 2 trials with RW and SBA. Pt cued for proper sequence and safety.     Wheelchair Mobility:  Patient propels w/c 120 ft with Supervision.      Therex:  Pt performs nu-step x 13 minutes with BUE/BLE and device set to pts weight and workload of 3.     Balance:  Pt with no LOB with all mobility this session.     Additional Treatment:  Pt educated on method to improve patient independence with bed mobility.   Pt educated on improving gait mechanics and reducing weight bearing through BUE.     Patient left up in chair in room with call button in reach.    Assessment:  Mine Wilkins is a 83 y.o. female with a medical diagnosis of Closed displaced intertrochanteric fracture of right femur with routine healing.  Pt tolerated tx well with focus on gait training, transfers, bed mobility and therex. Pt progressing well and is at a SBA level for most OOB mobility. Pt remains limited with bed mobility d/t difficulty elevating RLE to bed/mat surface. Pt remains highly motivated and focus on improving impairments. Pt will continue to benefit from skilled therapy to improve impairments listed below.     Rehab identified problem list/impairments: weakness, impaired endurance, impaired functional mobilty, gait instability, impaired balance, decreased lower extremity function, pain, edema, orthopedic precautions    Rehab potential is good.    Activity tolerance: Good    Discharge recommendations: home with home health     Barriers to discharge: Decreased caregiver support, Inaccessible home environment    Equipment recommendations: walker, rolling, bedside " commode     GOALS:    Physical Therapy Goals        Problem: Physical Therapy Goal    Goal Priority Disciplines Outcome Goal Variances Interventions   Physical Therapy Goal     PT/OT, PT Ongoing (interventions implemented as appropriate)     Description:  Goals to be met by: 14 days     Patient will increase functional independence with mobility by performin. Supine to sit with Modified [place]  2. Sit to supine with Modified [place]  3. Sit to stand transfer with Supervision met  4. Bed to chair transfer with Supervision using Rolling Walker met  5. Gait  x 150 feet with Supervision using Rolling Walker. Met (2018)  6. Wheelchair propulsion x100 feet with Supervision using bilateral upper extremities - met   7. Ascend/Descend 4 inch curb step with Stand-by Assistance using Rolling Walker. -   8. Stand for 3 minutes with Stand-by Assistance using Rolling Walker and perform an activity met  9. Lower extremity exercise program x20 reps per handout, with assistance as needed                         PLAN:    Patient to be seen 5 x/week  to address the above listed problems via gait training, therapeutic exercises, therapeutic activities, wheelchair management/training  Plan of Care expires: 18  Plan of Care reviewed with: patient    Ozzie Banks, PTA  2018

## 2018-06-01 NOTE — PLAN OF CARE
Problem: Physical Therapy Goal  Goal: Physical Therapy Goal  Goals to be met by: 14 days     Patient will increase functional independence with mobility by performin. Supine to sit with Modified [place]  2. Sit to supine with Modified [place]  3. Sit to stand transfer with Supervision met  4. Bed to chair transfer with Supervision using Rolling Walker met  5. Gait  x 150 feet with Supervision using Rolling Walker. Met (2018)  6. Wheelchair propulsion x100 feet with Supervision using bilateral upper extremities - met   7. Ascend/Descend 4 inch curb step with Stand-by Assistance using Rolling Walker. -   8. Stand for 3 minutes with Stand-by Assistance using Rolling Walker and perform an activity met  9. Lower extremity exercise program x20 reps per handout, with assistance as needed       Outcome: Ongoing (interventions implemented as appropriate)  2 goals met this day. Goals remain appropriate.

## 2018-06-01 NOTE — PLAN OF CARE
Problem: Occupational Therapy Goal  Goal: Occupational Therapy Goal  Goals to be met by: 14 days     Patient will increase functional independence with ADLs by performing:    UE Dressing with Modified [place].  LE Dressing with Modified [place] /c AE.  Grooming while standing at sink with Modified [place].  Toileting from bedside commode with Modified [place] for hygiene and clothing management.   Bathing with Stand-by Assistance.  Supine to sit with Modified [place] /c HOB flat and no handrails.  Stand pivot transfers with Modified [place].  Toilet transfer to bedside commode with Modified [place].      Outcome: Ongoing (interventions implemented as appropriate)  Patient's goals are appropriate.   SLAVA Yarbrough  6/1/2018

## 2018-06-01 NOTE — PT/OT/SLP PROGRESS
Occupational Therapy  Treatment    Mine Wilkins   MRN: [id number]   Admitting Diagnosis: Closed displaced intertrochanteric fracture of right femur with routine healing    OT Date of Treatment: 06/01/18  Total Time (min): 45 min    Billable Minutes:  Therapeutic Activity 23 and Therapeutic Exercise 22    General Precautions: Standard, fall  Orthopedic Precautions: RLE weight bearing as tolerated  Braces: N/A         Subjective:  Communicated with patient prior to session.    Pain/Comfort  Pain Rating 1: 3/10  Location - Side 1: Right  Location - Orientation 1: generalized  Location 1: hip  Pain Addressed 1: Reposition, Distraction, Cessation of Activity  Pain Rating Post-Intervention 1: 3/10    Objective:       Functional Status:  MDS G  Transfer Functional Status: S bedside chair>w/c stand pivot       AMPA 6 Click:  [facility] Total Score: 19    OT Exercises: Patient performed B UE ROM exercises using 2# dowel jose 3 x 10 in all planes and joints focusing to improve strength and endurance to increase independence with ADLs.     Additional Treatment:  Patient performed a visual motor/visual perception activity for eye hand coordination, reaching, crossing midline while standing with CGA for activity tolerance and balance using a balloon.     Patient performed a functional bimanual task folding towels standing with S at table top for activity tolerance in order to complete household task.    Patient left up in chair with  eating lunch.    ASSESSMENT:  Mine Wilkins is a 83 y.o. female with a medical diagnosis of Closed displaced intertrochanteric fracture of right femur with routine healing and presents with the deficits listed below. Patient tolerated treatment session and was motivated to complete tasks. Patient did need seated rest breaks during standing tasks. Patient continues to benefit from skilled OT services to achieve maximal independence.    Rehab identified problem  list/impairments: weakness, impaired endurance, impaired self care skills, impaired functional mobilty, impaired balance, decreased lower extremity function, orthopedic precautions, pain    Rehab potential is good    Activity tolerance: Good    Discharge recommendations: nursing facility, skilled     Barriers to discharge:  Decreased caregiver support     Equipment recommendations: walker, rolling     GOALS:    Occupational Therapy Goals        Problem: Occupational Therapy Goal    Goal Priority Disciplines Outcome Interventions   Occupational Therapy Goal     OT, PT/OT Ongoing (interventions implemented as appropriate)    Description:  Goals to be met by: 14 days     Patient will increase functional independence with ADLs by performing:    UE Dressing with Modified [place].  LE Dressing with Modified [place] /c AE.  Grooming while standing at sink with Modified [place].  Toileting from bedside commode with Modified [place] for hygiene and clothing management.   Bathing with Stand-by Assistance.  Supine to sit with Modified [place] /c HOB flat and no handrails.  Stand pivot transfers with Modified [place].  Toilet transfer to bedside commode with Modified [place].                       Plan:  Patient to be seen 5 x/week to address the above listed problems via self-care/home management, therapeutic activities, therapeutic exercises  Plan of Care expires: 06/24/18  Plan of Care reviewed with: patient    SLAVA Yarbrough  06/01/2018

## 2018-06-01 NOTE — PLAN OF CARE
Problem: Patient Care Overview  Goal: Plan of Care Review  Outcome: Ongoing (interventions implemented as appropriate)   06/01/18 0415   Coping/Psychosocial   Plan Of Care Reviewed With patient       Problem: Fall Risk (Adult)  Goal: Identify Related Risk Factors and Signs and Symptoms  Related risk factors and signs and symptoms are identified upon initiation of [employer] Clinical Practice Guideline (CPG)   Outcome: Ongoing (interventions implemented as appropriate)   06/01/18 0415   Fall Risk   Related Risk Factors (Fall Risk) gait/mobility problems;fear of falling

## 2018-06-02 PROCEDURE — 97110 THERAPEUTIC EXERCISES: CPT

## 2018-06-02 PROCEDURE — 25000003 PHARM REV CODE 250: Performed by: NURSE PRACTITIONER

## 2018-06-02 PROCEDURE — 97530 THERAPEUTIC ACTIVITIES: CPT

## 2018-06-02 PROCEDURE — 25000003 PHARM REV CODE 250: Performed by: INTERNAL MEDICINE

## 2018-06-02 PROCEDURE — 11000004 HC SNF PRIVATE

## 2018-06-02 PROCEDURE — A9155 ARTIFICIAL SALIVA: HCPCS | Performed by: INTERNAL MEDICINE

## 2018-06-02 PROCEDURE — 63600175 PHARM REV CODE 636 W HCPCS: Performed by: INTERNAL MEDICINE

## 2018-06-02 PROCEDURE — 63600175 PHARM REV CODE 636 W HCPCS: Performed by: PHYSICIAN ASSISTANT

## 2018-06-02 PROCEDURE — 25000003 PHARM REV CODE 250: Performed by: PHYSICIAN ASSISTANT

## 2018-06-02 PROCEDURE — 97116 GAIT TRAINING THERAPY: CPT

## 2018-06-02 RX ADMIN — CETIRIZINE HYDROCHLORIDE 10 MG: 5 TABLET, FILM COATED ORAL at 09:06

## 2018-06-02 RX ADMIN — ACETAMINOPHEN 650 MG: 325 TABLET ORAL at 05:06

## 2018-06-02 RX ADMIN — FLUTICASONE PROPIONATE 100 MCG: 50 SPRAY, METERED NASAL at 09:06

## 2018-06-02 RX ADMIN — GUAIFENESIN 600 MG: 600 TABLET, EXTENDED RELEASE ORAL at 09:06

## 2018-06-02 RX ADMIN — ESCITALOPRAM 5 MG: 5 TABLET, FILM COATED ORAL at 10:06

## 2018-06-02 RX ADMIN — PANTOPRAZOLE SODIUM 40 MG: 40 TABLET, DELAYED RELEASE ORAL at 09:06

## 2018-06-02 RX ADMIN — PRAVASTATIN SODIUM 20 MG: 20 TABLET ORAL at 10:06

## 2018-06-02 RX ADMIN — Medication 2000 UNITS: at 09:06

## 2018-06-02 RX ADMIN — ACETAMINOPHEN 650 MG: 325 TABLET ORAL at 09:06

## 2018-06-02 RX ADMIN — Medication 10 ML: at 05:06

## 2018-06-02 RX ADMIN — ENOXAPARIN SODIUM 40 MG: 100 INJECTION SUBCUTANEOUS at 05:06

## 2018-06-02 RX ADMIN — GUAIFENESIN 600 MG: 600 TABLET, EXTENDED RELEASE ORAL at 10:06

## 2018-06-02 RX ADMIN — STANDARDIZED SENNA CONCENTRATE AND DOCUSATE SODIUM 1 TABLET: 8.6; 5 TABLET, FILM COATED ORAL at 09:06

## 2018-06-02 RX ADMIN — Medication 10 ML: at 11:06

## 2018-06-02 RX ADMIN — OXYBUTYNIN CHLORIDE 10 MG: 10 TABLET, FILM COATED, EXTENDED RELEASE ORAL at 09:06

## 2018-06-02 RX ADMIN — Medication 10 ML: at 06:06

## 2018-06-02 RX ADMIN — STANDARDIZED SENNA CONCENTRATE AND DOCUSATE SODIUM 1 TABLET: 8.6; 5 TABLET, FILM COATED ORAL at 10:06

## 2018-06-02 RX ADMIN — LOSARTAN POTASSIUM 50 MG: 50 TABLET, FILM COATED ORAL at 09:06

## 2018-06-02 RX ADMIN — PREGABALIN 75 MG: 75 CAPSULE ORAL at 10:06

## 2018-06-02 NOTE — PT/OT/SLP PROGRESS
Physical Therapy  Treatment    Mine Wilkins   MRN: [id number]   Admitting Diagnosis: Closed displaced intertrochanteric fracture of right femur with routine healing    PT Received On: 06/02/18  Total Time (min): 55       Billable Minutes:  Gait Training 25, Therapeutic Activity 15 and Therapeutic Exercise 15    Treatment Type: Treatment  PT/PTA: PTA     PTA Visit Number: 2       General Precautions: Standard, fall  Orthopedic Precautions: RLE weight bearing as tolerated   Braces: N/A         Subjective:  Communicated with NSG prior to session.  Pt agreeable to therapy.     Pain/Comfort  Pain Rating 1: 1/10  Location - Side 1: Right  Location - Orientation 1: generalized  Location 1: hip  Pain Addressed 1: Reposition, Distraction  Pain Rating Post-Intervention 1: 1/10    Objective:  Patient found sitting up in bedside chair in room.          Functional Status:  MDS G  Bed Mobility Functional Status: S-SBA  Transfer Functional Status: S-SBA  Walk in Room Functional Status: S-SBA  Walk in Corridor Functional Status: S-SBA  Moving from seated to standing position: Not steady, but able to stabilize without staff assistance  Walking (with assistive device if used): Not steady, but able to stabilize without staff assistance  Turning around and facing the opposite direction while walking: Not steady, but able to stabilize without staff assistance  Surface-to-surface transfer (transfer between bed and chair or wheelchair): Not steady, but able to stabilize without staff assistance          AM-PAC 6 CLICK MOBILITY  Total Score:17    Bed Mobility:  Sit>Supine: SBA with pt managing RLE by assisting with LLE. Pt provided cues to improve assist technique. Performed x 3 trials.   Supine>Sit: Mod-I, increased time.     Transfers:  Sit<>Stand: Supervision with RW, from bedside chair, w/c, mat, and nu-step  Stand Pivot Transfer: Supervision with RW, bedside chair <> w/c, nu-step>w/c, and w/c <> mat.     Gait:  Amb: Pt  "ambulated 200 ft x 2 trials and 50 ft and 35 ft with RW and SBA. Pt with step-through gait and slightly shortened step length. Pt cued for improved RW use to facilitate gait pattern and mechanics.  Pt cued to focus on mechanics rather than increasing ailyn/distance.     Advanced Gait:  Curb Step: Pt asc/dec 4" curb x 2 trials with RW and SBA. Pt cued for sequence and AD mgmt.     Therex:  Pt performs nu-step x 15 minutes with BUE/BLE and device set to pts weight and workload 3.     Balance:  Pt with no LOB with all mobility this session.     Patient left up in chair with call button in reach.    Assessment:  Mine Wilkins is a 83 y.o. female with a medical diagnosis of Closed displaced intertrochanteric fracture of right femur with routine healing.  Pt tolerated tx well with focus on gait training, transfers, therex. Pt progressing well and performs sit>supine with no physical assistance needed this day. Pt will continue to benefit from skilled therapy to improve impairments listed below.     Rehab identified problem list/impairments: weakness, impaired endurance, impaired functional mobilty, gait instability, impaired balance, decreased lower extremity function, pain, edema, orthopedic precautions    Rehab potential is good.    Activity tolerance: Good    Discharge recommendations: home with home health     Barriers to discharge: Decreased caregiver support, Inaccessible home environment    Equipment recommendations: walker, rolling, bedside commode     GOALS:    Physical Therapy Goals        Problem: Physical Therapy Goal    Goal Priority Disciplines Outcome Goal Variances Interventions   Physical Therapy Goal     PT/OT, PT Ongoing (interventions implemented as appropriate)     Description:  Goals to be met by: 14 days     Patient will increase functional independence with mobility by performin. Supine to sit with Modified [place]  2. Sit to supine with Modified [place]  3. Sit to " stand transfer with Supervision met  4. Bed to chair transfer with Supervision using Rolling Walker met  5. Gait  x 150 feet with Supervision using Rolling Walker. Met (5/31/2018)  6. Wheelchair propulsion x100 feet with Supervision using bilateral upper extremities - met 6/1  7. Ascend/Descend 4 inch curb step with Stand-by Assistance using Rolling Walker. - 6/1  8. Stand for 3 minutes with Stand-by Assistance using Rolling Walker and perform an activity met  9. Lower extremity exercise program x20 reps per handout, with assistance as needed                         PLAN:    Patient to be seen 5 x/week  to address the above listed problems via gait training, therapeutic exercises, therapeutic activities, wheelchair management/training  Plan of Care expires: 06/23/18  Plan of Care reviewed with: patient    Ozzie Banks, PTA  06/02/2018

## 2018-06-02 NOTE — PLAN OF CARE
Problem: Patient Care Overview  Goal: Plan of Care Review  FALL PRECAUTION MAINTAINED.  PAIN MEDICATION ADMINISTERED AS REQUESTED BY PATIENT WITH EFFECTIVE RESULTS.

## 2018-06-02 NOTE — PLAN OF CARE
Problem: Occupational Therapy Goal  Goal: Occupational Therapy Goal  Goals to be met by: 14 days     Patient will increase functional independence with ADLs by performing:    UE Dressing with Modified [place].  LE Dressing with Modified [place] /c AE.  Grooming while standing at sink with Modified [place].  Toileting from bedside commode with Modified [place] for hygiene and clothing management.   Bathing with Stand-by Assistance.  Supine to sit with Modified [place] /c HOB flat and no handrails.  Stand pivot transfers with Modified [place].  Toilet transfer to bedside commode with Modified [place].      Outcome: Ongoing (interventions implemented as appropriate)  Patient's goals are appropriate.   SLAVA Yarbrough  6/2/2018

## 2018-06-02 NOTE — PLAN OF CARE
Problem: Physical Therapy Goal  Goal: Physical Therapy Goal  Goals to be met by: 14 days     Patient will increase functional independence with mobility by performin. Supine to sit with Modified [place]  2. Sit to supine with Modified [place]  3. Sit to stand transfer with Supervision met  4. Bed to chair transfer with Supervision using Rolling Walker met  5. Gait  x 150 feet with Supervision using Rolling Walker. Met (2018)  6. Wheelchair propulsion x100 feet with Supervision using bilateral upper extremities - met   7. Ascend/Descend 4 inch curb step with Stand-by Assistance using Rolling Walker. -   8. Stand for 3 minutes with Stand-by Assistance using Rolling Walker and perform an activity met  9. Lower extremity exercise program x20 reps per handout, with assistance as needed        Outcome: Ongoing (interventions implemented as appropriate)  Goals remain appropriate.

## 2018-06-02 NOTE — PLAN OF CARE
Problem: Fall Risk (Adult)  Goal: Absence of Falls  Patient will demonstrate the desired outcomes by discharge/transition of care.   Outcome: Ongoing (interventions implemented as appropriate)   06/01/18 2000   Fall Risk (Adult)   Absence of Falls making progress toward outcome

## 2018-06-03 PROCEDURE — A9155 ARTIFICIAL SALIVA: HCPCS | Performed by: INTERNAL MEDICINE

## 2018-06-03 PROCEDURE — 25000003 PHARM REV CODE 250: Performed by: INTERNAL MEDICINE

## 2018-06-03 PROCEDURE — 11000004 HC SNF PRIVATE

## 2018-06-03 PROCEDURE — 25000003 PHARM REV CODE 250: Performed by: NURSE PRACTITIONER

## 2018-06-03 PROCEDURE — 25000003 PHARM REV CODE 250: Performed by: PHYSICIAN ASSISTANT

## 2018-06-03 PROCEDURE — 63600175 PHARM REV CODE 636 W HCPCS: Performed by: PHYSICIAN ASSISTANT

## 2018-06-03 PROCEDURE — 63600175 PHARM REV CODE 636 W HCPCS: Performed by: INTERNAL MEDICINE

## 2018-06-03 RX ADMIN — ESCITALOPRAM 5 MG: 5 TABLET, FILM COATED ORAL at 09:06

## 2018-06-03 RX ADMIN — Medication 10 ML: at 04:06

## 2018-06-03 RX ADMIN — LOSARTAN POTASSIUM 50 MG: 50 TABLET, FILM COATED ORAL at 09:06

## 2018-06-03 RX ADMIN — ACETAMINOPHEN 650 MG: 325 TABLET ORAL at 06:06

## 2018-06-03 RX ADMIN — OXYBUTYNIN CHLORIDE 10 MG: 10 TABLET, FILM COATED, EXTENDED RELEASE ORAL at 09:06

## 2018-06-03 RX ADMIN — PREGABALIN 75 MG: 75 CAPSULE ORAL at 09:06

## 2018-06-03 RX ADMIN — CETIRIZINE HYDROCHLORIDE 10 MG: 5 TABLET, FILM COATED ORAL at 09:06

## 2018-06-03 RX ADMIN — ACETAMINOPHEN 650 MG: 325 TABLET ORAL at 04:06

## 2018-06-03 RX ADMIN — GUAIFENESIN 600 MG: 600 TABLET, EXTENDED RELEASE ORAL at 09:06

## 2018-06-03 RX ADMIN — STANDARDIZED SENNA CONCENTRATE AND DOCUSATE SODIUM 1 TABLET: 8.6; 5 TABLET, FILM COATED ORAL at 09:06

## 2018-06-03 RX ADMIN — PRAVASTATIN SODIUM 20 MG: 20 TABLET ORAL at 09:06

## 2018-06-03 RX ADMIN — Medication 10 ML: at 11:06

## 2018-06-03 RX ADMIN — Medication 10 ML: at 06:06

## 2018-06-03 RX ADMIN — FLUTICASONE PROPIONATE 100 MCG: 50 SPRAY, METERED NASAL at 09:06

## 2018-06-03 RX ADMIN — ENOXAPARIN SODIUM 40 MG: 100 INJECTION SUBCUTANEOUS at 04:06

## 2018-06-03 RX ADMIN — PANTOPRAZOLE SODIUM 40 MG: 40 TABLET, DELAYED RELEASE ORAL at 01:06

## 2018-06-03 RX ADMIN — Medication 2000 UNITS: at 09:06

## 2018-06-03 NOTE — PLAN OF CARE
Problem: Patient Care Overview  Goal: Plan of Care Review  Outcome: Revised  Repositions independently, no new skin breakdowns noted. Rt hip surgical aquacel dry and in place x 3 sites. Afebrile. Monitored for pain and safety. Safety maintained. Denies pain.

## 2018-06-04 LAB
ANION GAP SERPL CALC-SCNC: 9 MMOL/L
BASOPHILS # BLD AUTO: 0.04 K/UL
BASOPHILS NFR BLD: 0.6 %
BUN SERPL-MCNC: 19 MG/DL
CALCIUM SERPL-MCNC: 9.2 MG/DL
CHLORIDE SERPL-SCNC: 105 MMOL/L
CO2 SERPL-SCNC: 22 MMOL/L
CREAT SERPL-MCNC: 0.7 MG/DL
DIFFERENTIAL METHOD: ABNORMAL
EOSINOPHIL # BLD AUTO: 0.2 K/UL
EOSINOPHIL NFR BLD: 2.4 %
ERYTHROCYTE [DISTWIDTH] IN BLOOD BY AUTOMATED COUNT: 15.5 %
EST. GFR  (AFRICAN AMERICAN): >60 ML/MIN/1.73 M^2
EST. GFR  (NON AFRICAN AMERICAN): >60 ML/MIN/1.73 M^2
GLUCOSE SERPL-MCNC: 98 MG/DL
HCT VFR BLD AUTO: 28.6 %
HGB BLD-MCNC: 9 G/DL
IMM GRANULOCYTES # BLD AUTO: 0.06 K/UL
IMM GRANULOCYTES NFR BLD AUTO: 1 %
LYMPHOCYTES # BLD AUTO: 1.4 K/UL
LYMPHOCYTES NFR BLD: 22.3 %
MAGNESIUM SERPL-MCNC: 2.2 MG/DL
MCH RBC QN AUTO: 27.4 PG
MCHC RBC AUTO-ENTMCNC: 31.5 G/DL
MCV RBC AUTO: 87 FL
MONOCYTES # BLD AUTO: 0.5 K/UL
MONOCYTES NFR BLD: 8.2 %
NEUTROPHILS # BLD AUTO: 4.1 K/UL
NEUTROPHILS NFR BLD: 65.5 %
NRBC BLD-RTO: 0 /100 WBC
PHOSPHATE SERPL-MCNC: 3.2 MG/DL
PLATELET # BLD AUTO: 659 K/UL
PMV BLD AUTO: 9.5 FL
POTASSIUM SERPL-SCNC: 4.2 MMOL/L
RBC # BLD AUTO: 3.28 M/UL
SODIUM SERPL-SCNC: 136 MMOL/L
WBC # BLD AUTO: 6.22 K/UL

## 2018-06-04 PROCEDURE — 97110 THERAPEUTIC EXERCISES: CPT

## 2018-06-04 PROCEDURE — 97116 GAIT TRAINING THERAPY: CPT

## 2018-06-04 PROCEDURE — 36415 COLL VENOUS BLD VENIPUNCTURE: CPT

## 2018-06-04 PROCEDURE — 97535 SELF CARE MNGMENT TRAINING: CPT

## 2018-06-04 PROCEDURE — 84100 ASSAY OF PHOSPHORUS: CPT

## 2018-06-04 PROCEDURE — 85025 COMPLETE CBC W/AUTO DIFF WBC: CPT

## 2018-06-04 PROCEDURE — 63600175 PHARM REV CODE 636 W HCPCS: Performed by: INTERNAL MEDICINE

## 2018-06-04 PROCEDURE — 25000003 PHARM REV CODE 250: Performed by: NURSE PRACTITIONER

## 2018-06-04 PROCEDURE — A9155 ARTIFICIAL SALIVA: HCPCS | Performed by: INTERNAL MEDICINE

## 2018-06-04 PROCEDURE — 11000004 HC SNF PRIVATE

## 2018-06-04 PROCEDURE — 63600175 PHARM REV CODE 636 W HCPCS: Performed by: PHYSICIAN ASSISTANT

## 2018-06-04 PROCEDURE — 25000003 PHARM REV CODE 250: Performed by: PHYSICIAN ASSISTANT

## 2018-06-04 PROCEDURE — 25000003 PHARM REV CODE 250: Performed by: INTERNAL MEDICINE

## 2018-06-04 PROCEDURE — 97530 THERAPEUTIC ACTIVITIES: CPT

## 2018-06-04 PROCEDURE — 80048 BASIC METABOLIC PNL TOTAL CA: CPT

## 2018-06-04 PROCEDURE — 83735 ASSAY OF MAGNESIUM: CPT

## 2018-06-04 RX ORDER — LOSARTAN POTASSIUM 25 MG/1
25 TABLET ORAL DAILY
Status: DISCONTINUED | OUTPATIENT
Start: 2018-06-05 | End: 2018-06-11 | Stop reason: HOSPADM

## 2018-06-04 RX ADMIN — OXYBUTYNIN CHLORIDE 10 MG: 10 TABLET, FILM COATED, EXTENDED RELEASE ORAL at 09:06

## 2018-06-04 RX ADMIN — ACETAMINOPHEN 650 MG: 325 TABLET ORAL at 06:06

## 2018-06-04 RX ADMIN — ACETAMINOPHEN 650 MG: 325 TABLET ORAL at 08:06

## 2018-06-04 RX ADMIN — ACETAMINOPHEN 650 MG: 325 TABLET ORAL at 12:06

## 2018-06-04 RX ADMIN — PANTOPRAZOLE SODIUM 40 MG: 40 TABLET, DELAYED RELEASE ORAL at 09:06

## 2018-06-04 RX ADMIN — GUAIFENESIN 600 MG: 600 TABLET, EXTENDED RELEASE ORAL at 09:06

## 2018-06-04 RX ADMIN — FLUTICASONE PROPIONATE 100 MCG: 50 SPRAY, METERED NASAL at 09:06

## 2018-06-04 RX ADMIN — Medication 2000 UNITS: at 09:06

## 2018-06-04 RX ADMIN — Medication 10 ML: at 06:06

## 2018-06-04 RX ADMIN — ESCITALOPRAM 5 MG: 5 TABLET, FILM COATED ORAL at 08:06

## 2018-06-04 RX ADMIN — ENOXAPARIN SODIUM 40 MG: 100 INJECTION SUBCUTANEOUS at 04:06

## 2018-06-04 RX ADMIN — PRAVASTATIN SODIUM 20 MG: 20 TABLET ORAL at 08:06

## 2018-06-04 RX ADMIN — Medication 10 ML: at 12:06

## 2018-06-04 RX ADMIN — STANDARDIZED SENNA CONCENTRATE AND DOCUSATE SODIUM 1 TABLET: 8.6; 5 TABLET, FILM COATED ORAL at 08:06

## 2018-06-04 RX ADMIN — Medication 10 ML: at 04:06

## 2018-06-04 RX ADMIN — PREGABALIN 75 MG: 75 CAPSULE ORAL at 09:06

## 2018-06-04 RX ADMIN — GUAIFENESIN 600 MG: 600 TABLET, EXTENDED RELEASE ORAL at 08:06

## 2018-06-04 RX ADMIN — CETIRIZINE HYDROCHLORIDE 10 MG: 5 TABLET, FILM COATED ORAL at 09:06

## 2018-06-04 NOTE — PLAN OF CARE
Problem: Physical Therapy Goal  Goal: Physical Therapy Goal  Goals to be met by: 14 days     Patient will increase functional independence with mobility by performin. Supine to sit with Modified [place]  2. Sit to supine with Modified [place]  3. Sit to stand transfer with Supervision met  4. Bed to chair transfer with Supervision using Rolling Walker met  5. Gait  x 150 feet with Supervision using Rolling Walker. Met (2018)  6. Wheelchair propulsion x100 feet with Supervision using bilateral upper extremities - met   7. Ascend/Descend 4 inch curb step with Stand-by Assistance using Rolling Walker. -   8. Stand for 3 minutes with Stand-by Assistance using Rolling Walker and perform an activity met  9. Lower extremity exercise program x20 reps per handout, with assistance as needed        Outcome: Ongoing (interventions implemented as appropriate)  Goals remain appropriate.

## 2018-06-04 NOTE — PT/OT/SLP PROGRESS
Physical Therapy  Treatment    Mine Wilkins   MRN: [id number]   Admitting Diagnosis: Closed displaced intertrochanteric fracture of right femur with routine healing    PT Received On: 06/04/18  Total Time (min): 45       Billable Minutes:  Gait Training 15, Therapeutic Activity 15 and Therapeutic Exercise 15    Treatment Type: Treatment  PT/PTA: PTA     PTA Visit Number: 3       General Precautions: Standard, fall  Orthopedic Precautions: RLE weight bearing as tolerated   Braces: N/A         Subjective:  Communicated with NSG prior to session.  Pt agreeable to therapy.     Pain/Comfort  Pain Rating 1: 4/10  Location - Side 1: Right  Location - Orientation 1: generalized  Location 1: hip  Pain Addressed 1: Reposition, Distraction  Pain Rating Post-Intervention 1: 4/10    Objective:  Patient found sitting up in w/c in lunch group.        Functional Status:  MDS G  Bed Mobility Functional Status: S-SBA  Transfer Functional Status: S-SBA  Walk in Room Functional Status: S-SBA  Walk in Corridor Functional Status: S-SBA  Locomotion on Unit Functional Status: S-SBA  Locomotion Off Unit Functional Status: S-SBA  Moving from seated to standing position: Not steady, but able to stabilize without staff assistance  Walking (with assistive device if used): Not steady, but able to stabilize without staff assistance  Turning around and facing the opposite direction while walking: Not steady, but able to stabilize without staff assistance  Surface-to-surface transfer (transfer between bed and chair or wheelchair): Not steady, but able to stabilize without staff assistance          AM-PAC 6 CLICK MOBILITY  Total Score:17    Bed Mobility:  Sit<>Supine: Supervision with pt managing on RLE with LLE assist, performed on pts hospital bed with HOB flat, rails lowered, and bed in lowest position.     Transfers:  Sit<>Stand: Supervision with RW, from w/c and nu-step  Stand Pivot Transfer: Supervision with RW, w/c > bedside chair and  "w/c <> nu-step.     Gait:  Amb: Pt ambulated 200 ft, 50 ft,  and 165 ft with RW and SBA. Pt cued for step-through pattern and performs this pattern consistently after first 50 ft of her first gait trial. Pt with seated rests after each trial.     Advanced Gait:  Curb Step: Pt asc/dec 4" curb x 2 trials with RW and SBA. Pt cued for proper sequence and safety.     Wheelchair Mobility:  Patient propels w/c 150 ft with BUE and Supervision.      Therex:  Pt performs nu-step x 15 minutes with BUE/BLE and device set to pts weight and workload 3. Pt encouraged to increase SPM for increased cardiovascular challenge.     Balance:  Pt with no LOB with all mobility this session.       Patient left up in chair with call button in reach.    Assessment:  Mine Wilkins is a 83 y.o. female with a medical diagnosis of Closed displaced intertrochanteric fracture of right femur with routine healing.  Pt tolerated session well with focus on gait training, transfers, and therex. Pt progressing well with all mobility and tolerating increased activity prior to fatigue. Pt will continue to benefit from skilled therapy to improve impairments listed below.     Rehab identified problem list/impairments: weakness, impaired endurance, impaired functional mobilty, gait instability, impaired balance, decreased lower extremity function, pain, edema, orthopedic precautions    Rehab potential is good.    Activity tolerance: Good    Discharge recommendations: home with home health     Barriers to discharge: Decreased caregiver support, Inaccessible home environment    Equipment recommendations: walker, rolling, bedside commode     GOALS:    Physical Therapy Goals        Problem: Physical Therapy Goal    Goal Priority Disciplines Outcome Goal Variances Interventions   Physical Therapy Goal     PT/OT, PT Ongoing (interventions implemented as appropriate)     Description:  Goals to be met by: 14 days     Patient will increase functional " independence with mobility by performin. Supine to sit with Modified [place]  2. Sit to supine with Modified [place]  3. Sit to stand transfer with Supervision met  4. Bed to chair transfer with Supervision using Rolling Walker met  5. Gait  x 150 feet with Supervision using Rolling Walker. Met (2018)  6. Wheelchair propulsion x100 feet with Supervision using bilateral upper extremities - met   7. Ascend/Descend 4 inch curb step with Stand-by Assistance using Rolling Walker. -   8. Stand for 3 minutes with Stand-by Assistance using Rolling Walker and perform an activity met  9. Lower extremity exercise program x20 reps per handout, with assistance as needed                         PLAN:    Patient to be seen 5 x/week  to address the above listed problems via gait training, therapeutic exercises, therapeutic activities, wheelchair management/training  Plan of Care expires: 18  Plan of Care reviewed with: patient    Ozzie Banks, PTA  2018

## 2018-06-04 NOTE — PLAN OF CARE
Problem: Occupational Therapy Goal  Goal: Occupational Therapy Goal  Goals to be met by: 14 days     Patient will increase functional independence with ADLs by performing:    UE Dressing with Modified [place].  LE Dressing with Modified [place] /c AE.  Grooming while standing at sink with Modified [place].  Toileting from bedside commode with Modified [place] for hygiene and clothing management.   Bathing with Stand-by Assistance.  Supine to sit with Modified [place] /c HOB flat and no handrails.  Stand pivot transfers with Modified [place].  Toilet transfer to bedside commode with Modified [place].      Outcome: Ongoing (interventions implemented as appropriate)  Progressing. SLAVA Duong  6/4/2018

## 2018-06-04 NOTE — PT/OT/SLP PROGRESS
Occupational Therapy  Treatment/BID    Mine Wilkins   MRN: [id number]   Admitting Diagnosis: Closed displaced intertrochanteric fracture of right femur with routine healing    OT Date of Treatment: 06/04/18  Total Time (min): 85 min    Billable Minutes:  Self Care/Home Management 10, Therapeutic Activity 45 and Therapeutic Exercise 30    General Precautions: Standard, fall  Orthopedic Precautions: RLE weight bearing as tolerated  Braces: N/A         Subjective:  Communicated with pt prior to session.      Pain/Comfort  Pain Rating 1: 3/10  Location - Side 1: Right  Location 1: hip  Pain Addressed 1: Cessation of Activity, Reposition  Pain Rating Post-Intervention 1: 4/10    Objective:  Patient found with:  (seated in bedside chair)    Functional Status:  MDS G  Bed Mobility Functional Status: S-SBA (supine to and from sit on mat)  Transfer Functional Status: Sup to stand from bedside chair with RW 2x and walk across room to w/c with sup; pt stood from w/c several times throughout sessions; pt t/f to and from w/c and mat  Walk in Room Functional Status: S-SBA  Locomotion Off Unit Functional Status: S-SBA  Dressing Functional Status: 1: S-SBA--pt doffed and donned socks seated in w/c--increased time and attempts   Moving from seated to standing position: Not steady, but able to stabilize without staff assistance  Walking (with assistive device if used): Not steady, but able to stabilize without staff assistance  Surface-to-surface transfer (transfer between bed and chair or wheelchair): Not steady, but able to stabilize without staff assistance           AMPA 6 Click:  AMPAC Total Score: 20    OT Exercises: UE Ergometer 15 min to increase functional endurance for ADLs    Additional Treatment:  Pt performed 1# dumbels x 15 reps through all planes and joints seated  Pt stood at table x 10 min with sup to increase standing tolerance for household tasks  Pt propelled w/c to and from room and gym 2x with sup for  direction  Pt performed bridging on mat 15 x 2  Pt performed lateral trunk flexion seated on mat to increase core strength for ADls 10reps x 2  Pt performed sit to stand from w/c 5x with RW    Patient left up in chair  In lunch room    ASSESSMENT:  Pt tolerated tx and demonstrated increased indep with LBD, t/f;s functional mobiltiy in room and on unit. Pt cont to have increased pain;however, pt cont to progress toward goals and is very motivated.  Pt cont to benefit from OT to address limitations and increase functional indep and safety to return to PLOF    Rehab identified problem list/impairments: weakness, impaired endurance, impaired self care skills, impaired functional mobilty, impaired balance, decreased lower extremity function, orthopedic precautions, pain    Rehab potential is good    Activity tolerance: Good    Discharge recommendations: nursing facility, skilled     Barriers to discharge:       Equipment recommendations: walker, rolling     GOALS:    Occupational Therapy Goals        Problem: Occupational Therapy Goal    Goal Priority Disciplines Outcome Interventions   Occupational Therapy Goal     OT, PT/OT Ongoing (interventions implemented as appropriate)    Description:  Goals to be met by: 14 days     Patient will increase functional independence with ADLs by performing:    UE Dressing with Modified [place].  LE Dressing with Modified [place] /c AE.  Grooming while standing at sink with Modified [place].  Toileting from bedside commode with Modified [place] for hygiene and clothing management.   Bathing with Stand-by Assistance.  Supine to sit with Modified [place] /c HOB flat and no handrails.  Stand pivot transfers with Modified [place].  Toilet transfer to bedside commode with Modified [place].                       Plan:  Patient to be seen 5 x/week to address the above listed problems via self-care/home management, therapeutic activities, therapeutic  exercises  Plan of Care expires: 06/24/18  Plan of Care reviewed with: patient    SLAVA Duong  06/04/2018

## 2018-06-05 ENCOUNTER — OFFICE VISIT (OUTPATIENT)
Dept: SPORTS MEDICINE | Facility: CLINIC | Age: 83
End: 2018-06-05
Payer: MEDICARE

## 2018-06-05 VITALS
SYSTOLIC BLOOD PRESSURE: 114 MMHG | BODY MASS INDEX: 25.69 KG/M2 | HEIGHT: 63 IN | DIASTOLIC BLOOD PRESSURE: 67 MMHG | WEIGHT: 145 LBS | HEART RATE: 88 BPM

## 2018-06-05 DIAGNOSIS — Z47.89 SURGICAL AFTERCARE, MUSCULOSKELETAL SYSTEM: Primary | ICD-10-CM

## 2018-06-05 PROCEDURE — 97110 THERAPEUTIC EXERCISES: CPT

## 2018-06-05 PROCEDURE — 25000003 PHARM REV CODE 250: Performed by: NURSE PRACTITIONER

## 2018-06-05 PROCEDURE — 99900058 HC 022 PAID UNDER SNF PPS

## 2018-06-05 PROCEDURE — 11000004 HC SNF PRIVATE

## 2018-06-05 PROCEDURE — 97530 THERAPEUTIC ACTIVITIES: CPT

## 2018-06-05 PROCEDURE — 99999 PR PBB SHADOW E&M-EST. PATIENT-LVL III: CPT | Mod: PBBFAC,,, | Performed by: ORTHOPAEDIC SURGERY

## 2018-06-05 PROCEDURE — 97803 MED NUTRITION INDIV SUBSEQ: CPT | Performed by: DIETITIAN, REGISTERED

## 2018-06-05 PROCEDURE — 25000003 PHARM REV CODE 250: Performed by: INTERNAL MEDICINE

## 2018-06-05 PROCEDURE — 99024 POSTOP FOLLOW-UP VISIT: CPT | Mod: S$GLB,,, | Performed by: ORTHOPAEDIC SURGERY

## 2018-06-05 PROCEDURE — 97116 GAIT TRAINING THERAPY: CPT

## 2018-06-05 PROCEDURE — 25000003 PHARM REV CODE 250: Performed by: PHYSICIAN ASSISTANT

## 2018-06-05 PROCEDURE — 63600175 PHARM REV CODE 636 W HCPCS: Performed by: PHYSICIAN ASSISTANT

## 2018-06-05 PROCEDURE — A9155 ARTIFICIAL SALIVA: HCPCS | Performed by: INTERNAL MEDICINE

## 2018-06-05 PROCEDURE — 63600175 PHARM REV CODE 636 W HCPCS: Performed by: INTERNAL MEDICINE

## 2018-06-05 RX ADMIN — Medication 2000 UNITS: at 11:06

## 2018-06-05 RX ADMIN — Medication 10 ML: at 11:06

## 2018-06-05 RX ADMIN — ESCITALOPRAM 5 MG: 5 TABLET, FILM COATED ORAL at 08:06

## 2018-06-05 RX ADMIN — ACETAMINOPHEN 650 MG: 325 TABLET ORAL at 02:06

## 2018-06-05 RX ADMIN — GUAIFENESIN 600 MG: 600 TABLET, EXTENDED RELEASE ORAL at 08:06

## 2018-06-05 RX ADMIN — FLUTICASONE PROPIONATE 100 MCG: 50 SPRAY, METERED NASAL at 11:06

## 2018-06-05 RX ADMIN — ACETAMINOPHEN 650 MG: 325 TABLET ORAL at 08:06

## 2018-06-05 RX ADMIN — Medication 10 ML: at 05:06

## 2018-06-05 RX ADMIN — OXYBUTYNIN CHLORIDE 10 MG: 10 TABLET, FILM COATED, EXTENDED RELEASE ORAL at 11:06

## 2018-06-05 RX ADMIN — LOSARTAN POTASSIUM 25 MG: 25 TABLET, FILM COATED ORAL at 11:06

## 2018-06-05 RX ADMIN — CETIRIZINE HYDROCHLORIDE 10 MG: 5 TABLET, FILM COATED ORAL at 11:06

## 2018-06-05 RX ADMIN — POLYETHYLENE GLYCOL 3350 17 G: 17 POWDER, FOR SOLUTION ORAL at 11:06

## 2018-06-05 RX ADMIN — ACETAMINOPHEN 650 MG: 325 TABLET ORAL at 05:06

## 2018-06-05 RX ADMIN — STANDARDIZED SENNA CONCENTRATE AND DOCUSATE SODIUM 1 TABLET: 8.6; 5 TABLET, FILM COATED ORAL at 09:06

## 2018-06-05 RX ADMIN — ENOXAPARIN SODIUM 40 MG: 100 INJECTION SUBCUTANEOUS at 05:06

## 2018-06-05 RX ADMIN — PRAVASTATIN SODIUM 20 MG: 20 TABLET ORAL at 08:06

## 2018-06-05 RX ADMIN — PANTOPRAZOLE SODIUM 40 MG: 40 TABLET, DELAYED RELEASE ORAL at 11:06

## 2018-06-05 RX ADMIN — PREGABALIN 75 MG: 75 CAPSULE ORAL at 08:06

## 2018-06-05 RX ADMIN — GUAIFENESIN 600 MG: 600 TABLET, EXTENDED RELEASE ORAL at 11:06

## 2018-06-05 NOTE — PROGRESS NOTES
Pt. Returned to facility from ortho appt per [facility] [facility] via w/c.pt. sutures to rt, hip removed per MD.

## 2018-06-05 NOTE — PROGRESS NOTES
" [facility] - Skilled Nursing Care  Adult Nutrition  Progress Note    SUMMARY       Recommendations    Recommendation/Intervention: Continue cardiac diet, encouraged intakes, took more food preferences  Goals: PO to meet 85% of EEN and EPN in one week  Nutrition Goal Status: progressing towards goal  Communication of RD Recs: other (comment) (care plans)    Reason for Assessment    Reason for Assessment: RD follow-up  Diagnosis:  (sp femur fx with pinning)  Relevant Medical History: GERD, UTI's , Osteoporosis, HTN  Interdisciplinary Rounds: did not attend  General Information Comments: PT reports appetite fairly good  Nutrition Discharge Planning: DC on cardiac diet    Nutrition/Diet History    Patient Reported Diet/Restrictions/Preferences: general  Food Preferences: no yogurt, likes coffee with all meals  Do you have any cultural, spiritual, Protestant conflicts, given your current situation?: none  Food Allergies: NKFA    Anthropometrics    Temp: 98.9 °F (37.2 °C)  Height Method: Stated  Height: 5' 3" (160 cm)  Height (inches): 63 in  Weight Method: Standard Scale  Weight: 66.1 kg (145 lb 11.6 oz)  Weight (lb): 145.73 lb  Ideal Body Weight (IBW), Female: 115 lb  % Ideal Body Weight, Female (lb): 125.39 lb  BMI (Calculated): 25.6  BMI Grade: 25 - 29.9 - overweight  Weight Loss:  (denies wt changes)       Lab/Procedures/Meds    Pertinent Labs Reviewed: reviewed  Pertinent Labs Comments: H/H 9/28.6  Pertinent Medications Reviewed: reviewed  Pertinent Medications Comments: pantoprazole, senna, vit D    Physical Findings/Assessment    Overall Physical Appearance: nourished, loss of muscle mass  Tubes:  (n/a)  Oral/Mouth Cavity: WDL  Skin: incision(s)    Estimated/Assessed Needs    Weight Used For Calorie Calculations: 69.3 kg (152 lb 12.5 oz)  Energy Calorie Requirements (kcal): 1458  Energy Need Method: Ford-St Jeor (x 1.3 (PAL))  Protein Requirements: 84  Weight Used For Protein Calculations: 69.3 kg (152 lb 12.5 " oz) (x 1.2kg)  Fluid Requirements (mL): or per MD  Fluid Need Method: RDA Method  RDA Method (mL): 1458      Nutrition Prescription Ordered    Current Diet Order: Cardiac  Nutrition Order Comments: no tuna no sausage gerardo   Oral Nutrition Supplement: none    Evaluation of Received Nutrient/Fluid Intake    I/O: LBM: 6/3  Energy Calories Required: meeting needs  Protein Required: meeting needs  Fluid Required: meeting needs  Tolerance: tolerating  % Intake of Estimated Energy Needs: 75 - 100 %  % Meal Intake: 50 - 75 %    Nutrition Risk    Level of Risk/Frequency of Follow-up:  (follow-up x1 wk)     Assessment and Plan    No Nutrition dx at this time - pt eating well on avg, wt stable, took more food preferences     Monitor and Evaluation    Food and Nutrient Intake: food and beverage intake  Food and Nutrient Adminstration: diet order  Anthropometric Measurements: weight change  Biochemical Data, Medical Tests and Procedures: electrolyte and renal panel, gastrointestinal profile, inflammatory profile, glucose/endocrine profile  Nutrition-Focused Physical Findings: overall appearance     Nutrition Follow-Up    RD Follow-up?: Yes

## 2018-06-05 NOTE — PLAN OF CARE
Problem: Occupational Therapy Goal  Goal: Occupational Therapy Goal  Goals to be met by: 14 days     Patient will increase functional independence with ADLs by performing:    UE Dressing with Modified [place].  LE Dressing with Modified [place] /c AE.  Grooming while standing at sink with Modified [place].  Toileting from bedside commode with Modified [place] for hygiene and clothing management.   Bathing with Stand-by Assistance.  Supine to sit with Modified [place] /c HOB flat and no handrails.  Stand pivot transfers with Modified [place].  Toilet transfer to bedside commode with Modified [place].      Outcome: Ongoing (interventions implemented as appropriate)  Progressing. SLAVA Duong  6/5/2018

## 2018-06-05 NOTE — PROGRESS NOTES
S:Mine Wilkins presents for post-operative evaluation.     DATE OF PROCEDURE: 5/20/2018     PROCEDURES PERFORMED:   Right femur cephalomedullary nailing    Mine Wilkins reports to be doing well 2wk s/p the above mentioned procedure. Denies fevers, chills, night sweats, chest pain, difficulty breathing, calf pain or tenderness. Taking Lovenox daily for DVT prophylaxis. Currently inpatient at [facility], plans return home on 6/11/18. PT daily and reports that she is progressing well. Ambulating with a walker at the facility. States no significant pain. Has d/c'd pain medication. Taking tylenol as needed. Pleased thus far.      O: RLE The incisions are healing well. No signs of infection. Sutures were removed. No significant pain or unusual tenderness. Negative log roll. Passive flexion to 90 degrees with mild pain. No sig blade prominence laterally on exam. Non-tender over blade incision.    Radiographs: s/p CMN. Interval varus collapse and settling at the fracture site. Blade has backed out from IM nail with collapse of fx; no cut out.     A/P: The patient is doing well clinically. Fx appears to have settled to a stable position. Discussed continued physical therapy and weightbearing to tolerance.  Lovenox for a full 6 weeks postop for DVT prophylaxis.  All questions answered.  I'll see her back in 4 weeks for repeat clinical and radiographic check.

## 2018-06-05 NOTE — PLAN OF CARE
Problem: Fall Risk (Adult)  Goal: Absence of Falls  Patient will demonstrate the desired outcomes by discharge/transition of care.   Outcome: Ongoing (interventions implemented as appropriate)  Pt had no falls at this time .will continue to monitor.

## 2018-06-05 NOTE — PT/OT/SLP PROGRESS
"Physical Therapy  Treatment    Mine Wilkins   MRN: [id number]   Admitting Diagnosis: Closed displaced intertrochanteric fracture of right femur with routine healing    PT Received On: 06/05/18  Total Time (min): 106       Billable Minutes:  Gait Training 38, Therapeutic Activity 32 and Therapeutic Exercise 36    Treatment Type: Treatment  PT/PTA: PTA     PTA Visit Number: 4       General Precautions: Standard, fall  Orthopedic Precautions: RLE weight bearing as tolerated   Braces: N/A         Subjective:  Communicated with NSG prior to session.  Pt agreeable to therapy. "I know I missed my morning session, but I'll work hard to make it up after lunch today if I can."    Pain/Comfort  Pain Rating 1: 4/10  Location - Side 1: Right  Location - Orientation 1: generalized  Location 1: hip  Pain Addressed 1: Reposition, Distraction  Pain Rating Post-Intervention 1: 4/10    Objective:  Pt seen for BID sessions both in PM with short break and OT session between.   Patient found sitting up in w/c in lunch group for first session and up in w/c in gym with OT for second session.      Pt noted with some fatigue and c/o of soreness this day. Pt was away at appts all morning and had therapy for most of the afternoon.        Functional Status:  MDS G  Bed Mobility Functional Status: S-SBA  Transfer Functional Status: S-SBA  Walk in Room Functional Status: S-SBA  Walk in Corridor Functional Status: S-SBA  Locomotion on Unit Functional Status: S-SBA  Locomotion Off Unit Functional Status: S-SBA  Moving from seated to standing position: Not steady, but able to stabilize without staff assistance  Walking (with assistive device if used): Not steady, but able to stabilize without staff assistance  Turning around and facing the opposite direction while walking: Not steady, but able to stabilize without staff assistance  Surface-to-surface transfer (transfer between bed and chair or wheelchair): Not steady, but able to stabilize " "without staff assistance          AM-PAC 6 CLICK MOBILITY  Total Score:17    Bed Mobility: performed x 3 trials on mat with small wedge for comfort to elevate pts head and torso.   Sit<>Supine: Supervision with pt assisting RLE to/from mat with LLE assist.     Transfers:  Sit<>Stand: Supervision with RW, from w/c, nu-step, and mat.  Stand Pivot Transfer: Supervision with RW, from w/c <> mat, w/c>toilet, and w/c > bedside chair.    Gait:  Amb: Pt ambulates 200 ft x 3 trials, 50 ft, 20 ft x 2 trials and 180 ft with RW and Supervision. Pt stable throughout gait and manages RW well. Pt with seated rests after all gait trials. Pt continues to improve with step-through gait pattern, with pt requiring occasional cues to continue as pt begins to shorten steps as she fatigues.     Advanced Gait:  Curb Step: Pt asc/dec 4" curb x 2 trials with RW and SBA. Pt manages walker well and remains stable. Pt closely guarded d/t pacing with pt taking her time with step.     Wheelchair Mobility:  Patient propels w/c 150 ft with BUE and Mod-I.      Therex:  Pt performs seated/supine BLE therex: AP, GS, HS, QS, Hip ABD/ADD, SAQ, LAQ, Seated Marching x 30 reps. Increased rest breaks with pt fatigued/sore in late session.   Pt performs nu-step x 15 minutes with BUE/BLE and device set to pts weight and workload 3.     Balance:  Pt stands for dynamic standing activities, first throwing bean bags with RUE and maintaining balance without RW support for ~ 10 minutes. Pt challenged to throwq bean bags at 3 separate distances to challenge balance with increased UE movement. Pt then stands and catches/throws a volleyball with help of assistant and PTA guarding pt. RW in front of pt but not used. Purpose of task was to test pts stepping strategy to catch the volleyball when it was thrown out of reach. Pt not stepping at this time and reports she doesn't feel stable/strong enough step without support of UE's and RW.     Additional Treatment:  Pt " educated on improving gait mechanics, posture, and overall safety.   Pt assisted to commode where pt manages clothing and brief, sitting to commode in pts bathroom. Pt is left with call bell string and instructed to call for assistance when finished, with PCT notified to assist pt.     Patient left on commode as noted above for first session, and left sitting in bedside chair after second session.     Assessment:  Mine Wilkins is a 83 y.o. female with a medical diagnosis of Closed displaced intertrochanteric fracture of right femur with routine healing.  Pt tolerated session well for BID treatments, both in PM. Tx focused on gait, balance, transfers, and therex. Pt tolerates sessions well but is limited in second session with pt c/o fatigue and some soreness in RLE. Pt will continue to benefit from skilled therapy to improve impairments listed below.     Rehab identified problem list/impairments: weakness, impaired endurance, impaired functional mobilty, gait instability, impaired balance, decreased lower extremity function, pain, edema, orthopedic precautions    Rehab potential is good.    Activity tolerance: Good    Discharge recommendations: home with home health     Barriers to discharge: Decreased caregiver support, Inaccessible home environment    Equipment recommendations: walker, rolling, bedside commode     GOALS:    Physical Therapy Goals        Problem: Physical Therapy Goal    Goal Priority Disciplines Outcome Goal Variances Interventions   Physical Therapy Goal     PT/OT, PT Ongoing (interventions implemented as appropriate)     Description:  Goals to be met by: 14 days     Patient will increase functional independence with mobility by performin. Supine to sit with Modified [place]  2. Sit to supine with Modified [place]  3. Sit to stand transfer with Supervision met  4. Bed to chair transfer with Supervision using Rolling Walker met  5. Gait  x 150 feet with Supervision  using Rolling Walker. Met (5/31/2018)  6. Wheelchair propulsion x100 feet with Supervision using bilateral upper extremities - met 6/1  7. Ascend/Descend 4 inch curb step with Stand-by Assistance using Rolling Walker. - 6/1  8. Stand for 3 minutes with Stand-by Assistance using Rolling Walker and perform an activity met  9. Lower extremity exercise program x20 reps per handout, with assistance as needed                         PLAN:    Patient to be seen 5 x/week  to address the above listed problems via gait training, therapeutic exercises, therapeutic activities, wheelchair management/training  Plan of Care expires: 06/23/18  Plan of Care reviewed with: patient    Ozzie Banks, PTA  06/05/2018

## 2018-06-05 NOTE — PT/OT/SLP PROGRESS
"Occupational Therapy  Treatment    Mine Wilkins   MRN: [id number]   Admitting Diagnosis: Closed displaced intertrochanteric fracture of right femur with routine healing    OT Date of Treatment: 06/05/18  Total Time (min): 53 min    Billable Minutes:  Therapeutic Activity 23 and Therapeutic Exercise 30    General Precautions: Standard, fall  Orthopedic Precautions: RLE weight bearing as tolerated  Braces: N/A         Subjective:  Communicated with pt prior to session.  "I can take a shower tomorrow"    Pain/Comfort  Pain Rating 1: 4/10  Location - Side 1: Right  Location 1: hip  Pain Addressed 1: Cessation of Activity, Nurse notified, Reposition  Pain Rating Post-Intervention 1: 6/10    Objective:  Patient found with:  (seated in w/c)    Functional Status:  MDS G  Transfer Functional Status: S-SBA  Locomotion Off Unit Functional Status: S-SBA           AMPA 6 Click:  AMPA Total Score: 21    OT Exercises: UE Ergometer 15 min to increase functional endurance for ADLs  Rowing red theraband 15reps x 3 sets to increase UE strength and posture for ADLs    Additional Treatment:  Pt performed 1# dumb bells seated with sup for technique x 15 reps x 2 sets through all planes and joints  Pt stood from w/c with sup at counter to perform a FM counter task and then to reach and place objects in and out of upper cabinets and lower drawers to simulate household tasks x 10 min    Pt propelled w/c with B LEs forward and reverse to increase strength of hamstrings and quads for standing tasks and endurance x 10 min    Patient left up in chair with PT in gym    ASSESSMENT:  Mine Wilkins is a 83 y.o. female with a medical diagnosis of Closed displaced intertrochanteric fracture of right femur with routine healing and tolerated tx;however, with increased pain this date. Pt is very motivated and participates with all tasks. Pt is progressing toward goals and cont to benefit from OT to address limitaitons and increase " functional indep and safety to return to PLOF.    Rehab identified problem list/impairments: weakness, impaired endurance, impaired self care skills, impaired functional mobilty, impaired balance, decreased lower extremity function, orthopedic precautions, pain    Rehab potential is good    Activity tolerance: Good    Discharge recommendations: nursing facility, skilled     Barriers to discharge:       Equipment recommendations: walker, rolling     GOALS:    Occupational Therapy Goals        Problem: Occupational Therapy Goal    Goal Priority Disciplines Outcome Interventions   Occupational Therapy Goal     OT, PT/OT Ongoing (interventions implemented as appropriate)    Description:  Goals to be met by: 14 days     Patient will increase functional independence with ADLs by performing:    UE Dressing with Modified [place].  LE Dressing with Modified [place] /c AE.  Grooming while standing at sink with Modified [place].  Toileting from bedside commode with Modified [place] for hygiene and clothing management.   Bathing with Stand-by Assistance.  Supine to sit with Modified [place] /c HOB flat and no handrails.  Stand pivot transfers with Modified [place].  Toilet transfer to bedside commode with Modified [place].                       Plan:  Patient to be seen 5 x/week to address the above listed problems via self-care/home management, therapeutic activities, therapeutic exercises  Plan of Care expires: 06/24/18  Plan of Care reviewed with: patient    SLAVA Duong  06/05/2018

## 2018-06-05 NOTE — PLAN OF CARE
Problem: Patient Care Overview  Goal: Plan of Care Review  Outcome: Ongoing (interventions implemented as appropriate)   06/05/18 0106   Coping/Psychosocial   Plan Of Care Reviewed With patient       Problem: Fall Risk (Adult)  Intervention: Patient Rounds   06/05/18 0106   Safety Interventions   Patient Rounds bed in low position;bed wheels locked;call light in reach;ID band on;clutter free environment maintained;placement of personal items at bedside;toileting offered;visualized patient       Goal: Identify Related Risk Factors and Signs and Symptoms  Related risk factors and signs and symptoms are identified upon initiation of [facility] [employer] Clinical Practice Guideline (CPG)   Outcome: Ongoing (interventions implemented as appropriate)   06/05/18 0106   Fall Risk   Related Risk Factors (Fall Risk) age-related changes;history of falls;fatigue/slow reaction;gait/mobility problems   Signs and Symptoms (Fall Risk) presence of risk factors

## 2018-06-05 NOTE — PLAN OF CARE
Problem: Physical Therapy Goal  Goal: Physical Therapy Goal  Goals to be met by: 14 days     Patient will increase functional independence with mobility by performin. Supine to sit with Modified [place]  2. Sit to supine with Modified [place]  3. Sit to stand transfer with Supervision met  4. Bed to chair transfer with Supervision using Rolling Walker met  5. Gait  x 150 feet with Supervision using Rolling Walker. Met (2018)  6. Wheelchair propulsion x100 feet with Supervision using bilateral upper extremities - met   7. Ascend/Descend 4 inch curb step with Stand-by Assistance using Rolling Walker. -   8. Stand for 3 minutes with Stand-by Assistance using Rolling Walker and perform an activity met  9. Lower extremity exercise program x20 reps per handout, with assistance as needed        Outcome: Ongoing (interventions implemented as appropriate)  Goals remain appropriate.

## 2018-06-06 PROCEDURE — 97116 GAIT TRAINING THERAPY: CPT

## 2018-06-06 PROCEDURE — A9155 ARTIFICIAL SALIVA: HCPCS | Performed by: INTERNAL MEDICINE

## 2018-06-06 PROCEDURE — 63600175 PHARM REV CODE 636 W HCPCS: Performed by: PHYSICIAN ASSISTANT

## 2018-06-06 PROCEDURE — 97110 THERAPEUTIC EXERCISES: CPT

## 2018-06-06 PROCEDURE — 25000003 PHARM REV CODE 250: Performed by: NURSE PRACTITIONER

## 2018-06-06 PROCEDURE — 63600175 PHARM REV CODE 636 W HCPCS: Performed by: INTERNAL MEDICINE

## 2018-06-06 PROCEDURE — 25000003 PHARM REV CODE 250: Performed by: PHYSICIAN ASSISTANT

## 2018-06-06 PROCEDURE — 25000003 PHARM REV CODE 250: Performed by: INTERNAL MEDICINE

## 2018-06-06 PROCEDURE — 97530 THERAPEUTIC ACTIVITIES: CPT

## 2018-06-06 PROCEDURE — 11000004 HC SNF PRIVATE

## 2018-06-06 RX ADMIN — ACETAMINOPHEN 650 MG: 325 TABLET ORAL at 05:06

## 2018-06-06 RX ADMIN — STANDARDIZED SENNA CONCENTRATE AND DOCUSATE SODIUM 1 TABLET: 8.6; 5 TABLET, FILM COATED ORAL at 09:06

## 2018-06-06 RX ADMIN — ESCITALOPRAM 5 MG: 5 TABLET, FILM COATED ORAL at 09:06

## 2018-06-06 RX ADMIN — Medication 2000 UNITS: at 09:06

## 2018-06-06 RX ADMIN — POLYETHYLENE GLYCOL 3350 17 G: 17 POWDER, FOR SOLUTION ORAL at 09:06

## 2018-06-06 RX ADMIN — GUAIFENESIN 600 MG: 600 TABLET, EXTENDED RELEASE ORAL at 09:06

## 2018-06-06 RX ADMIN — Medication 10 ML: at 04:06

## 2018-06-06 RX ADMIN — Medication 10 ML: at 10:06

## 2018-06-06 RX ADMIN — PRAVASTATIN SODIUM 20 MG: 20 TABLET ORAL at 09:06

## 2018-06-06 RX ADMIN — LOSARTAN POTASSIUM 25 MG: 25 TABLET, FILM COATED ORAL at 09:06

## 2018-06-06 RX ADMIN — OXYBUTYNIN CHLORIDE 10 MG: 10 TABLET, FILM COATED, EXTENDED RELEASE ORAL at 09:06

## 2018-06-06 RX ADMIN — ACETAMINOPHEN 650 MG: 325 TABLET ORAL at 11:06

## 2018-06-06 RX ADMIN — FLUTICASONE PROPIONATE 100 MCG: 50 SPRAY, METERED NASAL at 09:06

## 2018-06-06 RX ADMIN — PANTOPRAZOLE SODIUM 40 MG: 40 TABLET, DELAYED RELEASE ORAL at 09:06

## 2018-06-06 RX ADMIN — CETIRIZINE HYDROCHLORIDE 10 MG: 5 TABLET, FILM COATED ORAL at 09:06

## 2018-06-06 RX ADMIN — PREGABALIN 75 MG: 75 CAPSULE ORAL at 09:06

## 2018-06-06 RX ADMIN — ENOXAPARIN SODIUM 40 MG: 100 INJECTION SUBCUTANEOUS at 04:06

## 2018-06-06 RX ADMIN — Medication 10 ML: at 06:06

## 2018-06-06 NOTE — PT/OT/SLP PROGRESS
Physical Therapy  Treatment    Mine Wilkins   MRN: [id number]   Admitting Diagnosis: Closed displaced intertrochanteric fracture of right femur with routine healing    PT Received On: 06/06/18  Total Time (min): 53       Billable Minutes:  Gait Training 23 and Therapeutic Exercise 30    Treatment Type: Treatment  PT/PTA: PT     PTA Visit Number: 0       General Precautions: Standard, fall  Orthopedic Precautions: RLE weight bearing as tolerated   Braces: N/A  Subjective:  Communicated with nurse prior to session.  Pt states she cares for her  at home.   Pain/Comfort  Pain Rating 1: 3/10  Location - Side 1: Right  Location - Orientation 1: generalized  Location 1: hip  Pain Addressed 1: Reposition  Pain Rating Post-Intervention 1: 4/10    Objective:  Patient found with:  (sitting in bedside chair in room)     Functional Status:  MDS G  Bed Mobility Functional Status: S-SBA  Transfer Functional Status: S-SBA  Walk in Room Functional Status: S-SBA  Walk in Corridor Functional Status: S-SBA  Locomotion on Unit Functional Status: S-SBA  Locomotion Off Unit Functional Status: S-SBA  Eval Only: Number of L/E limb <4/5 MMT: 1  Moving from seated to standing position: Not steady, but able to stabilize without staff assistance  Walking (with assistive device if used): Not steady, but able to stabilize without staff assistance  Turning around and facing the opposite direction while walking: Not steady, but able to stabilize without staff assistance  Surface-to-surface transfer (transfer between bed and chair or wheelchair): Not steady, but able to stabilize without staff assistance    AM-PAC 6 CLICK MOBILITY  Total Score:18    Bed Mobility:  Sit>Supine:supervision; used L LE to lift the R LE  Supine>Sit: supervision; used L LE to lift the R LE    Transfers:  Sit<>Stand: supervision; 3 trials from w/c and 1 trial from mat  Stand Pivot Transfer: w/c to bedside chair with no AD with SBA    Gait:  Amb 110ft x 2  trials then 200ft with RW with supervision with mild flexed trunk and step to gait.     Advanced Gait:  Stairs: up/down 4 steps with B UE rail support with CGA  Curb Step: up/down 4 inch step with RW with SBA x 2 trials    Wheelchair Mobility:  Patient propels w/c 60ft with B UE on level surface for B UE strengthening with supervision     Therex:  Pt educated in and given copy of HEP. Pt performed B LE exs : (in supine) abd/add, heel slides, ankle pumps, short arc quads, and quad sets x 15 reps.(in sitting) knee extension and hip flexion x 15 reps. Pt stood for 2 min 20 sec with RW in front (pt did not touch RW during activity) and hit the beach ball which was thrown in different planes to R/L/up/down    Patient left up in chair with call button in reach and nurse notified.    Assessment:  Mine Wilkins is a 83 y.o. female with a medical diagnosis of Closed displaced intertrochanteric fracture of right femur with routine healing.  Pt is progressing with functional mobility. Pt limited by pain and fatigue. Pt is motivated to progress with mobility.    Rehab identified problem list/impairments: weakness, impaired endurance, impaired functional mobilty, gait instability, impaired balance, decreased lower extremity function, pain, edema, orthopedic precautions    Rehab potential is good.    Activity tolerance: Good    Discharge recommendations: home with home health     Barriers to discharge: Decreased caregiver support, Inaccessible home environment    Equipment recommendations: walker, rolling, bedside commode     GOALS:    Physical Therapy Goals        Problem: Physical Therapy Goal    Goal Priority Disciplines Outcome Goal Variances Interventions   Physical Therapy Goal     PT/OT, PT Ongoing (interventions implemented as appropriate)     Description:  Goals to be met by: 14 days     Patient will increase functional independence with mobility by performin. Supine to sit with Modified [place]  2.  Sit to supine with Modified [place]  3. Sit to stand transfer with Supervision met  Revised goal: sit to stand with RW with mod independent-not met  4. Bed to chair transfer with Supervision using Rolling Walker met  5. Gait  x 150 feet with Supervision using Rolling Walker. Met (5/31/2018)  Revised goal: gait x 250ft with RW with mod independent-not met  6. Wheelchair propulsion x100 feet with Supervision using bilateral upper extremities - met 6/1  7. Ascend/Descend 4 inch curb step with Stand-by Assistance using Rolling Walker. - met 6/1  Revised goal: up/down 4 inch step with RW with supervision-not met  8. Stand for 3 minutes with Stand-by Assistance using Rolling Walker and perform an activity- met  9. Lower extremity exercise program x20 reps per handout, with assistance as needed                          PLAN:    Patient to be seen 5 x/week  to address the above listed problems via gait training, therapeutic exercises, therapeutic activities, wheelchair management/training  Plan of Care expires: 06/23/18  Plan of Care reviewed with: patient    Josefina Leong, PT  06/06/2018

## 2018-06-06 NOTE — PLAN OF CARE
Problem: Physical Therapy Goal  Goal: Physical Therapy Goal  Goals to be met by: 14 days     Patient will increase functional independence with mobility by performin. Supine to sit with Modified [place]  2. Sit to supine with Modified [place]  3. Sit to stand transfer with Supervision met  Revised goal: sit to stand with RW with mod independent-not met  4. Bed to chair transfer with Supervision using Rolling Walker met  5. Gait  x 150 feet with Supervision using Rolling Walker. Met (2018)  Revised goal: gait x 250ft with RW with mod independent-not met  6. Wheelchair propulsion x100 feet with Supervision using bilateral upper extremities - met   7. Ascend/Descend 4 inch curb step with Stand-by Assistance using Rolling Walker. - met   Revised goal: up/down 4 inch step with RW with supervision-not met  8. Stand for 3 minutes with Stand-by Assistance using Rolling Walker and perform an activity- met  9. Lower extremity exercise program x20 reps per handout, with assistance as needed        Outcome: Ongoing (interventions implemented as appropriate)  Pt's goals revised as needed and pt will continue to benefit from skilled PT services to work towards improved functional mobility including: bed mobility, transfers, up/down steps, and gait.   Josefina Leong, PT  2018

## 2018-06-06 NOTE — PLAN OF CARE
Problem: Occupational Therapy Goal  Goal: Occupational Therapy Goal  Goals to be met by: 14 days     Patient will increase functional independence with ADLs by performing:    UE Dressing with Modified [place].  LE Dressing with Modified [place] /c AE.  Grooming while standing at sink with Modified [place].  Toileting from bedside commode with Modified [place] for hygiene and clothing management. --met  Bathing with Stand-by Assistance.  Supine to sit with Modified [place] /c HOB flat and no handrails.  Stand pivot transfers with Modified [place].--met  Toilet transfer to bedside commode with Modified [place].--met      Outcome: Ongoing (interventions implemented as appropriate)  Progressing. SLAVA Duong  6/6/2018

## 2018-06-06 NOTE — PT/OT/SLP PROGRESS
"Occupational Therapy  Treatment    Mine Wilkins   MRN: [id number]   Admitting Diagnosis: Closed displaced intertrochanteric fracture of right femur with routine healing    OT Date of Treatment: 06/06/18  Total Time (min): 55 min    Billable Minutes:  Therapeutic Activity 15 and Therapeutic Exercise 40    General Precautions: Standard, fall  Orthopedic Precautions: RLE weight bearing as tolerated  Braces: N/A         Subjective:  Communicated with pt prior to session.  "I need  A shower tomorrow"    Pain/Comfort  Pain Rating 1: 4/10  Location - Side 1: Right  Location 1: hip  Pain Addressed 1: Pre-medicate for activity, Reposition  Pain Rating Post-Intervention 1: 4/10    Objective:  Patient found with:  (seated in w/c)    Functional Status:  MDS G  Transfer Functional Status: mod(I) - from w/c with RW  Walk in Corridor Functional Status: Sup with RW  Locomotion on Unit Functional Status: Sup to ambulate around gym with RW-->200ft           AMPA 6 Click:  [facility] Total Score: 21    OT Exercises: UE Ergometer 15 min to increase functional endurance for ADLs    Additional Treatment:  Pt performed Nustep to increase functional endurance and LE strength for ADLs  Pt performed functional mobility tasks with RW and dynamic standing tasks moving objects from the table to a lower surface to increase standing tolerance and balance for household tasks     Patient left up in chair with call button in reach    ASSESSMENT:  Pt tolerated tx and demonstrated increased indep with standing tasks, balance and endurance. Pt with tolerable pain and increased motivation to participate. Pt cont to benefit from OT to address limitations and increase functional indep and safety to return to PLOF    Rehab identified problem list/impairments: weakness, impaired endurance, impaired self care skills, impaired functional mobilty, impaired balance, decreased lower extremity function, orthopedic precautions, pain    Rehab potential is " good    Activity tolerance: Good    Discharge recommendations: nursing facility, skilled     Barriers to discharge:   None    Equipment recommendations: walker, rolling     GOALS:    Occupational Therapy Goals        Problem: Occupational Therapy Goal    Goal Priority Disciplines Outcome Interventions   Occupational Therapy Goal     OT, PT/OT Ongoing (interventions implemented as appropriate)    Description:  Goals to be met by: 14 days     Patient will increase functional independence with ADLs by performing:    UE Dressing with Modified [place].  LE Dressing with Modified [place] /c AE.  Grooming while standing at sink with Modified [place].  Toileting from bedside commode with Modified [place] for hygiene and clothing management. --met  Bathing with Stand-by Assistance.  Supine to sit with Modified [place] /c HOB flat and no handrails.  Stand pivot transfers with Modified [place].--met  Toilet transfer to bedside commode with Modified [place].--met                        Plan:  Patient to be seen 5 x/week to address the above listed problems via self-care/home management, therapeutic activities, therapeutic exercises  Plan of Care expires: 06/24/18  Plan of Care reviewed with: patient    SLAVA Duong  06/06/2018

## 2018-06-07 PROBLEM — R07.89 MUSCULAR CHEST PAIN: Status: RESOLVED | Noted: 2018-05-25 | Resolved: 2018-06-07

## 2018-06-07 LAB
ANION GAP SERPL CALC-SCNC: 6 MMOL/L
BASOPHILS # BLD AUTO: 0.04 K/UL
BASOPHILS NFR BLD: 0.7 %
BUN SERPL-MCNC: 17 MG/DL
CALCIUM SERPL-MCNC: 9.5 MG/DL
CHLORIDE SERPL-SCNC: 106 MMOL/L
CO2 SERPL-SCNC: 25 MMOL/L
CREAT SERPL-MCNC: 0.7 MG/DL
DIFFERENTIAL METHOD: ABNORMAL
EOSINOPHIL # BLD AUTO: 0.2 K/UL
EOSINOPHIL NFR BLD: 2.6 %
ERYTHROCYTE [DISTWIDTH] IN BLOOD BY AUTOMATED COUNT: 15.4 %
EST. GFR  (AFRICAN AMERICAN): >60 ML/MIN/1.73 M^2
EST. GFR  (NON AFRICAN AMERICAN): >60 ML/MIN/1.73 M^2
GLUCOSE SERPL-MCNC: 99 MG/DL
HCT VFR BLD AUTO: 27.2 %
HGB BLD-MCNC: 8.8 G/DL
IMM GRANULOCYTES # BLD AUTO: 0.02 K/UL
IMM GRANULOCYTES NFR BLD AUTO: 0.4 %
LYMPHOCYTES # BLD AUTO: 1.5 K/UL
LYMPHOCYTES NFR BLD: 27 %
MAGNESIUM SERPL-MCNC: 2.1 MG/DL
MCH RBC QN AUTO: 27.7 PG
MCHC RBC AUTO-ENTMCNC: 32.4 G/DL
MCV RBC AUTO: 86 FL
MONOCYTES # BLD AUTO: 0.6 K/UL
MONOCYTES NFR BLD: 11 %
NEUTROPHILS # BLD AUTO: 3.3 K/UL
NEUTROPHILS NFR BLD: 58.3 %
NRBC BLD-RTO: 0 /100 WBC
PHOSPHATE SERPL-MCNC: 3.2 MG/DL
PLATELET # BLD AUTO: 610 K/UL
PMV BLD AUTO: 9.9 FL
POTASSIUM SERPL-SCNC: 4.3 MMOL/L
RBC # BLD AUTO: 3.18 M/UL
SODIUM SERPL-SCNC: 137 MMOL/L
WBC # BLD AUTO: 5.71 K/UL

## 2018-06-07 PROCEDURE — 85025 COMPLETE CBC W/AUTO DIFF WBC: CPT

## 2018-06-07 PROCEDURE — 97110 THERAPEUTIC EXERCISES: CPT

## 2018-06-07 PROCEDURE — 25000003 PHARM REV CODE 250: Performed by: NURSE PRACTITIONER

## 2018-06-07 PROCEDURE — 97535 SELF CARE MNGMENT TRAINING: CPT

## 2018-06-07 PROCEDURE — A9155 ARTIFICIAL SALIVA: HCPCS | Performed by: INTERNAL MEDICINE

## 2018-06-07 PROCEDURE — 97116 GAIT TRAINING THERAPY: CPT

## 2018-06-07 PROCEDURE — 25000003 PHARM REV CODE 250: Performed by: PHYSICIAN ASSISTANT

## 2018-06-07 PROCEDURE — 80048 BASIC METABOLIC PNL TOTAL CA: CPT

## 2018-06-07 PROCEDURE — 84100 ASSAY OF PHOSPHORUS: CPT

## 2018-06-07 PROCEDURE — 99307 SBSQ NF CARE SF MDM 10: CPT | Mod: ,,, | Performed by: NURSE PRACTITIONER

## 2018-06-07 PROCEDURE — 25000003 PHARM REV CODE 250: Performed by: INTERNAL MEDICINE

## 2018-06-07 PROCEDURE — 63600175 PHARM REV CODE 636 W HCPCS: Performed by: PHYSICIAN ASSISTANT

## 2018-06-07 PROCEDURE — 11000004 HC SNF PRIVATE

## 2018-06-07 PROCEDURE — 63600175 PHARM REV CODE 636 W HCPCS: Performed by: INTERNAL MEDICINE

## 2018-06-07 PROCEDURE — 83735 ASSAY OF MAGNESIUM: CPT

## 2018-06-07 PROCEDURE — 36415 COLL VENOUS BLD VENIPUNCTURE: CPT

## 2018-06-07 RX ADMIN — CETIRIZINE HYDROCHLORIDE 10 MG: 5 TABLET, FILM COATED ORAL at 08:06

## 2018-06-07 RX ADMIN — ENOXAPARIN SODIUM 40 MG: 100 INJECTION SUBCUTANEOUS at 05:06

## 2018-06-07 RX ADMIN — FLUTICASONE PROPIONATE 100 MCG: 50 SPRAY, METERED NASAL at 08:06

## 2018-06-07 RX ADMIN — GUAIFENESIN 600 MG: 600 TABLET, EXTENDED RELEASE ORAL at 08:06

## 2018-06-07 RX ADMIN — ACETAMINOPHEN 650 MG: 325 TABLET ORAL at 10:06

## 2018-06-07 RX ADMIN — STANDARDIZED SENNA CONCENTRATE AND DOCUSATE SODIUM 1 TABLET: 8.6; 5 TABLET, FILM COATED ORAL at 10:06

## 2018-06-07 RX ADMIN — Medication 10 ML: at 05:06

## 2018-06-07 RX ADMIN — Medication 2000 UNITS: at 08:06

## 2018-06-07 RX ADMIN — PRAVASTATIN SODIUM 20 MG: 20 TABLET ORAL at 10:06

## 2018-06-07 RX ADMIN — Medication 10 ML: at 11:06

## 2018-06-07 RX ADMIN — POLYETHYLENE GLYCOL 3350 17 G: 17 POWDER, FOR SOLUTION ORAL at 08:06

## 2018-06-07 RX ADMIN — ACETAMINOPHEN 650 MG: 325 TABLET ORAL at 06:06

## 2018-06-07 RX ADMIN — Medication 10 ML: at 06:06

## 2018-06-07 RX ADMIN — ESCITALOPRAM 5 MG: 5 TABLET, FILM COATED ORAL at 10:06

## 2018-06-07 RX ADMIN — PREGABALIN 75 MG: 75 CAPSULE ORAL at 10:06

## 2018-06-07 RX ADMIN — GUAIFENESIN 600 MG: 600 TABLET, EXTENDED RELEASE ORAL at 10:06

## 2018-06-07 RX ADMIN — STANDARDIZED SENNA CONCENTRATE AND DOCUSATE SODIUM 1 TABLET: 8.6; 5 TABLET, FILM COATED ORAL at 08:06

## 2018-06-07 RX ADMIN — OXYBUTYNIN CHLORIDE 10 MG: 10 TABLET, FILM COATED, EXTENDED RELEASE ORAL at 08:06

## 2018-06-07 RX ADMIN — PANTOPRAZOLE SODIUM 40 MG: 40 TABLET, DELAYED RELEASE ORAL at 08:06

## 2018-06-07 RX ADMIN — ACETAMINOPHEN 650 MG: 325 TABLET ORAL at 02:06

## 2018-06-07 NOTE — PT/OT/SLP PROGRESS
"Physical Therapy  Treatment    Mine Wilkins   MRN: [id number]   Admitting Diagnosis: Closed displaced intertrochanteric fracture of right femur with routine healing    PT Received On: 06/07/18  Total Time (min): 56       Billable Minutes:  Gait Training 15 and Therapeutic Exercise 41    Treatment Type: Treatment  PT/PTA: PT     PTA Visit Number: 0       General Precautions: Standard, fall  Orthopedic Precautions: RLE weight bearing as tolerated   Braces: N/A     Subjective:  Communicated with nurse prior to session. "I'm in a little pain today"    Pain/Comfort  Pain Rating 1: 7/10  Location - Side 1: Right  Location - Orientation 1: anterior  Location 1: hip  Pain Addressed 1: Pre-medicate for activity, Cessation of Activity, Reposition  Pain Rating Post-Intervention 1: 7/10    Objective:  Patient found with:  (up in w/c in activity room)     Functional Status:  MDS G  Bed Mobility Functional Status: S-SBA  Transfer Functional Status: S-SBA  Walk in Room Functional Status: S-SBA  Walk in Corridor Functional Status: S-SBA  Locomotion on Unit Functional Status: S-SBA  Locomotion Off Unit Functional Status: S-SBA  Moving from seated to standing position: Not steady, but able to stabilize without staff assistance  Walking (with assistive device if used): Not steady, but able to stabilize without staff assistance  Turning around and facing the opposite direction while walking: Not steady, but able to stabilize without staff assistance  Surface-to-surface transfer (transfer between bed and chair or wheelchair): Not steady, but able to stabilize without staff assistance      AM-PAC 6 CLICK MOBILITY  Total Score:18    Bed Mobility: on mat  Sit>Supine: mod I (increased time)  Supine>Sit: min A; at the end of the session, the pt c/o pain and needed assist to lift her trunk from the mat; nurse notified and states she will bring pt pain meds    Transfers:  Sit<>Stand: SBA x 3 trials with RW; to/from w/c, to/from mat, " and to/from x-ride    Gait:  Amb 126 ft then 97 ft with SBA with RW. Pt ambulated decreased step length, decreased gait speed, and impaired foot clearance in swing phase.    Advanced Gait:  Stairs: up/down 4 steps with B handrails with SBA  Curb Step: up/down 4 inch curb step with SBA with RW     Therex:  Sitting edge of mat (x20 reps):  Hip flexion  Knee extension  Ankle pumps    Supine on mat (x 20 reps):  Heel slides  Abduction/adduction  SAQ over bolster  Bridges over bolster    X-ride (seated elliptical) x 15 minutes     Patient left up in chair with call button in reach.    Assessment:  Mine Wilkins is a 83 y.o. female with a medical diagnosis of Closed displaced intertrochanteric fracture of right femur with routine healing.  Pt presents with impaired R LE ROM and strength, pain with exercise, and decreased functional mobility.    Rehab identified problem list/impairments: weakness, impaired endurance, impaired functional mobilty, gait instability, impaired self care skills, decreased upper extremity function, pain    Rehab potential is good.    Activity tolerance: Good    Discharge recommendations: home health PT     Barriers to discharge: Decreased caregiver support    Equipment recommendations: walker, rolling     GOALS:    Physical Therapy Goals        Problem: Physical Therapy Goal    Goal Priority Disciplines Outcome Goal Variances Interventions   Physical Therapy Goal     PT/OT, PT Ongoing (interventions implemented as appropriate)     Description:  Goals to be met by: 14 days     Patient will increase functional independence with mobility by performin. Supine to sit with Modified [place]  2. Sit to supine with Modified [place]  3. Sit to stand transfer with Supervision met  Revised goal: sit to stand with RW with mod independent-not met  4. Bed to chair transfer with Supervision using Rolling Walker met  5. Gait  x 150 feet with Supervision using Rolling Walker. Met  (5/31/2018)  Revised goal: gait x 250ft with RW with mod independent-not met  6. Wheelchair propulsion x100 feet with Supervision using bilateral upper extremities - met 6/1  7. Ascend/Descend 4 inch curb step with Stand-by Assistance using Rolling Walker. - met 6/1  Revised goal: up/down 4 inch step with RW with supervision-not met  8. Stand for 3 minutes with Stand-by Assistance using Rolling Walker and perform an activity- met  9. Lower extremity exercise program x20 reps per handout, with assistance as needed                        PLAN:    Patient to be seen  (5-6x/wk)  to address the above listed problems via gait training, therapeutic activities, therapeutic exercises  Plan of Care expires: 06/23/18  Plan of Care reviewed with: patient    Zev Farmer, [facility]  06/07/2018   I certify that I was present in the room directing the student in service delivery and guiding them using my skilled judgment. As the co-signing therapist I have reviewed the students documentation and am responsible for the treatment, assessment, and plan.     Josefina Leong PT 6/7/18

## 2018-06-07 NOTE — ASSESSMENT & PLAN NOTE
Continue losartan therapy  Low Na diet   monitor daily weights and diurese for 3-4 pound gain or symptoms indicative of volume overload    5/25/18  Appears compensated, no CP or SOB.  Continue ARB as ordered.  Monitor weights and treat with diuretics for weight gain.  Wt Readings from Last 1 Encounters:   06/06/18 0529 66.6 kg (146 lb 13.2 oz)   06/05/18 0505 66.1 kg (145 lb 11.6 oz)   06/04/18 0600 65.8 kg (145 lb 1 oz)   06/03/18 0601 65.4 kg (144 lb 3.2 oz)   06/02/18 1201 66.2 kg (145 lb 15.1 oz)   06/01/18 0500 67.9 kg (149 lb 11.1 oz)   05/31/18 0700 67.2 kg (148 lb 2.4 oz)   05/28/18 0500 67.7 kg (149 lb 4 oz)   05/27/18 0623 67.9 kg (149 lb 12.8 oz)   05/24/18 1957 69.3 kg (152 lb 12.5 oz)   05/23/18 1600 69.3 kg (152 lb 12.5 oz)     5/30/18  No weights in last 2 days.  Currently on ARB.  No edema present, Breath sounds clear.  Appears compensated.  Will ask nursing team to weigh in AM.    6/7/18  Weight is stable  Continue ARB as ordered.  Appears compensated.

## 2018-06-07 NOTE — PLAN OF CARE
Problem: Physical Therapy Goal  Goal: Physical Therapy Goal  Goals to be met by: 14 days     Patient will increase functional independence with mobility by performin. Supine to sit with Modified [place]  2. Sit to supine with Modified [place]  3. Sit to stand transfer with Supervision met  Revised goal: sit to stand with RW with mod independent-not met  4. Bed to chair transfer with Supervision using Rolling Walker met  5. Gait  x 150 feet with Supervision using Rolling Walker. Met (2018)  Revised goal: gait x 250ft with RW with mod independent-not met  6. Wheelchair propulsion x100 feet with Supervision using bilateral upper extremities - met   7. Ascend/Descend 4 inch curb step with Stand-by Assistance using Rolling Walker. - met   Revised goal: up/down 4 inch step with RW with supervision-not met  8. Stand for 3 minutes with Stand-by Assistance using Rolling Walker and perform an activity- met  9. Lower extremity exercise program x20 reps per handout, with assistance as needed         Outcome: Ongoing (interventions implemented as appropriate)  Pt's goals remain appropriate and pt will continue to benefit from skilled PT services to work towards improved functional mobility including: bed mobility, transfers, up/down curb step, and gait.   Zev Farmer, SPT  2018    I certify that I was present in the room directing the student in service delivery and guiding them using my skilled judgment. As the co-signing therapist I have reviewed the students documentation and am responsible for the treatment, assessment, and plan.     Josefina Leong PT 18

## 2018-06-07 NOTE — ASSESSMENT & PLAN NOTE
Chronic and stable  Avoid hypotension and monitor for volume overload.     5/25/18  No acute issues, continue to monitor.    6/7/18  No acute issues, will need to follow up with Treating provider after discharge.

## 2018-06-07 NOTE — PROGRESS NOTES
[facility] [place] [facility] - Skilled Nursing Care  Department of Hospital Medicine  Progress Note    Patient Name: Mine Wilkins  MRN: [id number]  Code Status: Full Code  Admission Date: 5/23/2018  Length of Stay: 15 days  Attending Physician: Charissa Robin MD  Primary Care Provider: MARVA Oswald MD    Subjective:     Principal Problem:Closed displaced intertrochanteric fracture of right femur with routine healing    HPI:  Chief Complaint/Reason for Admission: Closed displaced intertrochanteric fracture of right femur with routine healing    History of Present Illness:  Patient is a 83 y.o. female with osteoporosis, moderate AS, HTN who presents to SNF after hospitalization for fall with resultant right hip fracture requiring right CMN by Dr. Scott on 5/20. The patient was treated for UTI with cipro.  She complains of pain to her right hip rating it as 3/10, exacerbated by walking and relieved with oxycodone.  She has also been experiencing chest soreness with movement and denies pressure, dyspnea or radiation of pain.  She also complains of long-standing dry mouth.  HCTZ was discontinued with no change in symptoms.  She has tried OTC mouthwashes without relief.       The patient has been admitted to SNF for ongoing PT/OT due to insufficient progress to go home safely from the hospital.    Records from last hospital stay reviewed and summarized above.     Interval History:   [facility]    5/25/18  Patient seen at bedside, she reports she has a tightness across her chest which is worse with movement and deep breathing.  She denies n/v, sob or pain radiation, no diaphoresis.  She reports her pain medication helps to control this pain.  She has minimal pain to her right leg.  She reports she lives with her spouse who depends on her.  She has 2 daughters and will likely go to live with one of them while she recovers and put her  into a NH for that time.  She  reports she is eating, drinking, voiding and having BM's without issues.    5/30/18  Patient seen at bedside, she reports cough is better today.  Still with pain across her chest, worse with movement and taking deep breaths.  Pain is also reproducible when palpating the chest.  Patient denies trauma to her chest.  She admits she is not using her IS as previously instructed or TCDB and explained the importance of doing so.  She is asking if she can have Aleve to help with her pain as she found it helped better than Tylenol.  Told her would call Ortho team to see if they are ok due to risk of bleed with NSAIDs.  Called Ortho office, message left with .    6/7/18  Patient seen at bedside, she has no complaints, reports pain is well controlled.  States cough at night has improved with nasal spray and the MS pain in her chest is improving.  She is looking forward to being able to shower.        Review of Systems   Constitutional: Negative for fever.   Respiratory: Negative for cough and shortness of breath.    Cardiovascular: Negative for chest pain, palpitations and leg swelling.   Gastrointestinal: Negative for abdominal pain, constipation, diarrhea, nausea and vomiting.   Genitourinary: Negative for dysuria.   Musculoskeletal: Negative for arthralgias and myalgias.     Objective:     Vital Signs (Most Recent):  Temp: 99.2 °F (37.3 °C) (06/06/18 2044)  Pulse: 76 (06/06/18 2044)  Resp: 18 (06/06/18 2044)  BP: (!) 103/53 (06/06/18 2044)  SpO2: 98 % (06/06/18 2044) Vital Signs (24h Range):  Temp:  [99.2 °F (37.3 °C)] 99.2 °F (37.3 °C)  Pulse:  [76] 76  Resp:  [18] 18  SpO2:  [98 %] 98 %  BP: (103)/(53) 103/53     Weight: 66.6 kg (146 lb 13.2 oz)  Body mass index is 26.01 kg/m².  No intake or output data in the 24 hours ending 06/07/18 1028   Physical Exam   Constitutional: She is oriented to person, place, and time. She appears well-developed and well-nourished.   Cardiovascular: Normal rate, regular rhythm  and intact distal pulses.    Murmur heard.  Pulmonary/Chest: Effort normal and breath sounds normal. No respiratory distress.   Abdominal: Soft. Normal appearance and bowel sounds are normal. She exhibits no distension. There is no tenderness.   Musculoskeletal: Normal range of motion.   Neurological: She is alert and oriented to person, place, and time. She has normal strength.   Skin: Skin is warm, dry and intact. Capillary refill takes less than 2 seconds. No erythema.       Significant Labs:   BMP:   Recent Labs  Lab 06/07/18  0533   GLU 99      K 4.3      CO2 25   BUN 17   CREATININE 0.7   CALCIUM 9.5   MG 2.1     CBC:   Recent Labs  Lab 06/07/18  0533   WBC 5.71   HGB 8.8*   HCT 27.2*   *       Significant Imaging: n/a    Assessment/Plan:      * Closed displaced intertrochanteric fracture of right femur with routine healing s/p IM nail on 5/20/2018    -continue PT/OT to increase ambulation, ADL perfomance and endurance  -continue WBAT  -continue oxycodone for pain control prn  -continue lovenox for DVT prophylaxis for 4 weeks after joint surgery  -Ortho NP to assess surgical wound and remove dressing as indicated; will notify Ortho team for any leakage or excessive drainage evident on Aquacel dressing  -continue pregabalin to control pain; discontinue on discharge from SNF  -continue senokot-s and miralax to prevent constipation; hold for > 3 stools in 24 hours or loose stools  -keep appointment with ortho as scheduled    Future Appointments  Date Time Provider Department Center   7/5/2018 1:30 PM SIDNEY Scott MD [facility] SPORTS [place]   8/22/2018 9:30 AM LAB, STEFAN [facility]EN[facility] LAB [place]   8/22/2018 10:00 AM SPE[facility] SPECLAB [place]   8/29/2018 11:00 AM ZOEY Oswald MD [facility] IM [place]     5/25/18  As above.  Pain well controlled.  Continue Lovenox for DVT prevention.  Follow up with Ortho.    5/30/18  Plan as above, follow up with Ortho.  Ortho called to see if ok to  use Aleve to aid in pain management.    Continue Lovenox for dvt ppx.    6/7/18  Follow up with Ortho in 4 weeks.  Continue Lovenox for dvt ppx.  Doing well in therapy.        Left ventricular diastolic dysfunction with preserved systolic function    Continue losartan therapy  Low Na diet   monitor daily weights and diurese for 3-4 pound gain or symptoms indicative of volume overload    5/25/18  Appears compensated, no CP or SOB.  Continue ARB as ordered.  Monitor weights and treat with diuretics for weight gain.  Wt Readings from Last 1 Encounters:   06/06/18 0529 66.6 kg (146 lb 13.2 oz)   06/05/18 0505 66.1 kg (145 lb 11.6 oz)   06/04/18 0600 65.8 kg (145 lb 1 oz)   06/03/18 0601 65.4 kg (144 lb 3.2 oz)   06/02/18 1201 66.2 kg (145 lb 15.1 oz)   06/01/18 0500 67.9 kg (149 lb 11.1 oz)   05/31/18 0700 67.2 kg (148 lb 2.4 oz)   05/28/18 0500 67.7 kg (149 lb 4 oz)   05/27/18 0623 67.9 kg (149 lb 12.8 oz)   05/24/18 1957 69.3 kg (152 lb 12.5 oz)   05/23/18 1600 69.3 kg (152 lb 12.5 oz)     5/30/18  No weights in last 2 days.  Currently on ARB.  No edema present, Breath sounds clear.  Appears compensated.  Will ask nursing team to weigh in AM.    6/7/18  Weight is stable  Continue ARB as ordered.  Appears compensated.        Aortic valve stenosis, moderate    Chronic and stable  Avoid hypotension and monitor for volume overload.     5/25/18  No acute issues, continue to monitor.    6/7/18  No acute issues, will need to follow up with Treating provider after discharge.        Age-related osteoporosis with current pathological fracture with routine healing    Patient will need to f/u with Ortho fracture clinic  Continue therapy with Vit D supplement and dietary calcium.    5/25/18  Pain is adequately controlled.  Continue OxyIR PRN as ordered for pain and Lyrica for neuropathy.  Continue Calcium and vitamin D and follow up with Ortho in fracture clinic.    5/30/18  Pain to hip area is controlled with OxyIR PRN and  Lyrica, continue as ordered.  Continue calcium and vitamin D supplements.    6/7/18  Pain is well controlled with OxyIR PRN.  Will stop Lyrica at discharge.  Continue calcium and vitamin D supplements.  Seen by Ortho recently, sutures have been removed.        Essential hypertension    Chronic and stable  Continue therapy with losartan  -will continue to monitor and adjust regimen as necessary    5/25/18  BP Readings from Last 3 Encounters:   06/06/18 (!) 103/53   06/05/18 114/67   05/23/18 (!) 126/59     BP is stable, continue losartan and monitor.    5/30/18  BP is stable, continue losartan as ordered.    6/7/18  BP is stable, continue Losartan as ordered.        AR (allergic rhinitis)    Continue chronic therapy with cetirizine  Symptoms currently controlled.     5/25/18  Chronic, no complaints, continue cetirizine as ordered.    5/30/18  Cough has improved with addition of Mucinex and fluticasone.    Continue cetirizine as ordered.    6/7/18  Cough has resolved.  Continue cetirizine and fluticasone as ordered.            Cale Mcgregor, ANGEL  Department of Hospital Medicine  [facility] [place] [facility] - Skilled Nursing Care

## 2018-06-07 NOTE — SUBJECTIVE & OBJECTIVE
Interval History:   The patient was admitted at [facility] from 5/19 to 5/23/2018.    5/25/18  Patient seen at bedside, she reports she has a tightness across her chest which is worse with movement and deep breathing.  She denies n/v, sob or pain radiation, no diaphoresis.  She reports her pain medication helps to control this pain.  She has minimal pain to her right leg.  She reports she lives with her spouse who depends on her.  She has 2 daughters and will likely go to live with one of them while she recovers and put her  into a NH for that time.  She reports she is eating, drinking, voiding and having BM's without issues.    5/30/18  Patient seen at bedside, she reports cough is better today.  Still with pain across her chest, worse with movement and taking deep breaths.  Pain is also reproducible when palpating the chest.  Patient denies trauma to her chest.  She admits she is not using her IS as previously instructed or TCDB and explained the importance of doing so.  She is asking if she can have Aleve to help with her pain as she found it helped better than Tylenol.  Told her would call Ortho team to see if they are ok due to risk of bleed with NSAIDs.  Called Ortho office, message left with .    6/7/18  Patient seen at bedside, she has no complaints, reports pain is well controlled.  States cough at night has improved with nasal spray and the MS pain in her chest is improving.  She is looking forward to being able to shower.        Review of Systems   Constitutional: Negative for fever.   Respiratory: Negative for cough and shortness of breath.    Cardiovascular: Negative for chest pain, palpitations and leg swelling.   Gastrointestinal: Negative for abdominal pain, constipation, diarrhea, nausea and vomiting.   Genitourinary: Negative for dysuria.   Musculoskeletal: Negative for arthralgias and myalgias.     Objective:     Vital Signs (Most Recent):  Temp: 99.2 °F (37.3 °C) (06/06/18  2044)  Pulse: 76 (06/06/18 2044)  Resp: 18 (06/06/18 2044)  BP: (!) 103/53 (06/06/18 2044)  SpO2: 98 % (06/06/18 2044) Vital Signs (24h Range):  Temp:  [99.2 °F (37.3 °C)] 99.2 °F (37.3 °C)  Pulse:  [76] 76  Resp:  [18] 18  SpO2:  [98 %] 98 %  BP: (103)/(53) 103/53     Weight: 66.6 kg (146 lb 13.2 oz)  Body mass index is 26.01 kg/m².  No intake or output data in the 24 hours ending 06/07/18 1028   Physical Exam   Constitutional: She is oriented to person, place, and time. She appears well-developed and well-nourished.   Cardiovascular: Normal rate, regular rhythm and intact distal pulses.    Murmur heard.  Pulmonary/Chest: Effort normal and breath sounds normal. No respiratory distress.   Abdominal: Soft. Normal appearance and bowel sounds are normal. She exhibits no distension. There is no tenderness.   Musculoskeletal: Normal range of motion.   Neurological: She is alert and oriented to person, place, and time. She has normal strength.   Skin: Skin is warm, dry and intact. Capillary refill takes less than 2 seconds. No erythema.       Significant Labs:   BMP:   Recent Labs  Lab 06/07/18  0533   GLU 99      K 4.3      CO2 25   BUN 17   CREATININE 0.7   CALCIUM 9.5   MG 2.1     CBC:   Recent Labs  Lab 06/07/18  0533   WBC 5.71   HGB 8.8*   HCT 27.2*   *       Significant Imaging: n/a

## 2018-06-07 NOTE — ASSESSMENT & PLAN NOTE
Patient will need to f/u with Ortho fracture clinic  Continue therapy with Vit D supplement and dietary calcium.    5/25/18  Pain is adequately controlled.  Continue OxyIR PRN as ordered for pain and Lyrica for neuropathy.  Continue Calcium and vitamin D and follow up with Ortho in fracture clinic.    5/30/18  Pain to hip area is controlled with OxyIR PRN and Lyrica, continue as ordered.  Continue calcium and vitamin D supplements.    6/7/18  Pain is well controlled with OxyIR PRN.  Will stop Lyrica at discharge.  Continue calcium and vitamin D supplements.  Seen by Ortho recently, sutures have been removed.

## 2018-06-07 NOTE — ASSESSMENT & PLAN NOTE
Chronic and stable  Continue therapy with losartan  -will continue to monitor and adjust regimen as necessary    5/25/18  BP Readings from Last 3 Encounters:   06/06/18 (!) 103/53   06/05/18 114/67   05/23/18 (!) 126/59     BP is stable, continue losartan and monitor.    5/30/18  BP is stable, continue losartan as ordered.    6/7/18  BP is stable, continue Losartan as ordered.

## 2018-06-07 NOTE — ASSESSMENT & PLAN NOTE
Continue chronic therapy with cetirizine  Symptoms currently controlled.     5/25/18  Chronic, no complaints, continue cetirizine as ordered.    5/30/18  Cough has improved with addition of Mucinex and fluticasone.    Continue cetirizine as ordered.    6/7/18  Cough has resolved.  Continue cetirizine and fluticasone as ordered.

## 2018-06-07 NOTE — ASSESSMENT & PLAN NOTE
-continue PT/OT to increase ambulation, ADL perfomance and endurance  -continue WBAT  -continue oxycodone for pain control prn  -continue lovenox for DVT prophylaxis for 4 weeks after joint surgery  -Ortho NP to assess surgical wound and remove dressing as indicated; will notify Ortho team for any leakage or excessive drainage evident on Aquacel dressing  -continue pregabalin to control pain; discontinue on discharge from SNF  -continue senokot-s and miralax to prevent constipation; hold for > 3 stools in 24 hours or loose stools  -keep appointment with ortho as scheduled    Future Appointments  Date Time Provider Department Center   7/5/2018 1:30 PM SIDNEY Scott MD [facility] SPORTS [place]   8/22/2018 9:30 AM LAB, STEFAN KENH LAB [place]   8/22/2018 10:00 AM SPE[facility] SPECLAB [place]   8/29/2018 11:00 AM ZOEY Oswald MD [facility] IM [place]     5/25/18  As above.  Pain well controlled.  Continue Lovenox for DVT prevention.  Follow up with Ortho.    5/30/18  Plan as above, follow up with Ortho.  Ortho called to see if ok to use Aleve to aid in pain management.    Continue Lovenox for dvt ppx.    6/7/18  Follow up with Ortho in 4 weeks.  Continue Lovenox for dvt ppx.  Doing well in therapy.

## 2018-06-07 NOTE — PT/OT/SLP PROGRESS
Occupational Therapy  Treatment    Mine Wilkins   MRN: [id number]   Admitting Diagnosis: Closed displaced intertrochanteric fracture of right femur with routine healing    OT Date of Treatment: 06/07/18  Total Time (min): 45 min    Billable Minutes:  Self Care/Home Management 30 and Therapeutic Exercise 15    General Precautions: Standard, fall  Orthopedic Precautions: RLE weight bearing as tolerated  Braces: N/A         Subjective:  Communicated with patient prior to session.    Pain/Comfort  Pain Rating 1: 7/10  Location - Side 1: Right  Location - Orientation 1: anterior  Location 1: hip  Pain Addressed 1: Reposition, Distraction  Pain Rating Post-Intervention 1: 7/10    Objective:       Functional Status:  MDS G  Transfer Functional Status: S using RW bedside chair>BSC using RW; BSC<>shower bench using RW; BSC>bedside chair using RW  Walk in Room Functional Status: S using RW  Dressing Functional Status: [place] and donning pullover shirt with mod (I) (patient was able clothing from closet); [place] and donning socks and pants with min A  Toilet Use Functional Status: S  Personal Hygiene Functional Status: mod(I) hair care and washing face  Bathing Functional Status: S in shower       AMPA 6 Click:  [facility] Total Score: 21    OT Exercises: Patient performed B UE ROM Exercises (chest press, front rows, and back rows) using 1# free weights for improve strength and endurance to increase independence with ADLs.     Patient left up in chair with call button in reach and all needs met.     ASSESSMENT:  Mine Wilkins is a 83 y.o. female with a medical diagnosis of Closed displaced intertrochanteric fracture of right femur with routine healing and presents with the deficits listed below. Patient tolerated treatment session and was motivated to complete tasks. Patient continues to benefit from skilled OT services to achieve maximal independence.    Rehab identified problem list/impairments: weakness,  impaired endurance, impaired self care skills, impaired functional mobilty, gait instability, impaired balance, pain, decreased lower extremity function    Rehab potential is good    Activity tolerance: Good    Discharge recommendations: home with home health     Barriers to discharge: Decreased caregiver support     Equipment recommendations: walker, rolling     GOALS:    Occupational Therapy Goals        Problem: Occupational Therapy Goal    Goal Priority Disciplines Outcome Interventions   Occupational Therapy Goal     OT, PT/OT Ongoing (interventions implemented as appropriate)    Description:  Goals to be met by: 14 days     Patient will increase functional independence with ADLs by performing:    UE Dressing with Modified [place]. Met  LE Dressing with Modified [place] /c AE.  Grooming while standing at sink with Modified [place].  Toileting from bedside commode with Modified [place] for hygiene and clothing management. --met  Bathing with Stand-by Assistance. Met  Supine to sit with Modified [place] /c HOB flat and no handrails.  Stand pivot transfers with Modified [place].--met  Toilet transfer to bedside commode with Modified [place].--met                         Plan:  Patient to be seen 5 x/week to address the above listed problems via self-care/home management, therapeutic activities, therapeutic exercises  Plan of Care expires: 06/24/18  Plan of Care reviewed with: patient    SLAVA Yarbrough  06/07/2018

## 2018-06-07 NOTE — PLAN OF CARE
Problem: Occupational Therapy Goal  Goal: Occupational Therapy Goal  Goals to be met by: 14 days     Patient will increase functional independence with ADLs by performing:    UE Dressing with Modified [place]. Met  LE Dressing with Modified [place] /c AE.  Grooming while standing at sink with Modified [place].  Toileting from bedside commode with Modified [place] for hygiene and clothing management. --met  Bathing with Stand-by Assistance. Met  Supine to sit with Modified [place] /c HOB flat and no handrails.  Stand pivot transfers with Modified [place].--met  Toilet transfer to bedside commode with Modified [place].--met       Outcome: Ongoing (interventions implemented as appropriate)  Patient's goals are appropriate.   SLAVA Yarbrough  6/7/2018

## 2018-06-08 PROCEDURE — 25000003 PHARM REV CODE 250: Performed by: PHYSICIAN ASSISTANT

## 2018-06-08 PROCEDURE — A9155 ARTIFICIAL SALIVA: HCPCS | Performed by: INTERNAL MEDICINE

## 2018-06-08 PROCEDURE — 97116 GAIT TRAINING THERAPY: CPT

## 2018-06-08 PROCEDURE — 63600175 PHARM REV CODE 636 W HCPCS: Performed by: PHYSICIAN ASSISTANT

## 2018-06-08 PROCEDURE — 63600175 PHARM REV CODE 636 W HCPCS: Performed by: INTERNAL MEDICINE

## 2018-06-08 PROCEDURE — 11000004 HC SNF PRIVATE

## 2018-06-08 PROCEDURE — 97110 THERAPEUTIC EXERCISES: CPT

## 2018-06-08 PROCEDURE — 25000003 PHARM REV CODE 250: Performed by: INTERNAL MEDICINE

## 2018-06-08 PROCEDURE — 25000003 PHARM REV CODE 250: Performed by: NURSE PRACTITIONER

## 2018-06-08 RX ADMIN — Medication 10 ML: at 06:06

## 2018-06-08 RX ADMIN — ESCITALOPRAM 5 MG: 5 TABLET, FILM COATED ORAL at 09:06

## 2018-06-08 RX ADMIN — ACETAMINOPHEN 650 MG: 325 TABLET ORAL at 06:06

## 2018-06-08 RX ADMIN — STANDARDIZED SENNA CONCENTRATE AND DOCUSATE SODIUM 1 TABLET: 8.6; 5 TABLET, FILM COATED ORAL at 09:06

## 2018-06-08 RX ADMIN — OXYBUTYNIN CHLORIDE 10 MG: 10 TABLET, FILM COATED, EXTENDED RELEASE ORAL at 09:06

## 2018-06-08 RX ADMIN — PANTOPRAZOLE SODIUM 40 MG: 40 TABLET, DELAYED RELEASE ORAL at 09:06

## 2018-06-08 RX ADMIN — ACETAMINOPHEN 650 MG: 325 TABLET ORAL at 12:06

## 2018-06-08 RX ADMIN — PREGABALIN 75 MG: 75 CAPSULE ORAL at 09:06

## 2018-06-08 RX ADMIN — FLUTICASONE PROPIONATE 100 MCG: 50 SPRAY, METERED NASAL at 09:06

## 2018-06-08 RX ADMIN — ENOXAPARIN SODIUM 40 MG: 100 INJECTION SUBCUTANEOUS at 05:06

## 2018-06-08 RX ADMIN — Medication 2000 UNITS: at 09:06

## 2018-06-08 RX ADMIN — CETIRIZINE HYDROCHLORIDE 10 MG: 5 TABLET, FILM COATED ORAL at 09:06

## 2018-06-08 RX ADMIN — Medication 10 ML: at 11:06

## 2018-06-08 RX ADMIN — GUAIFENESIN 600 MG: 600 TABLET, EXTENDED RELEASE ORAL at 09:06

## 2018-06-08 RX ADMIN — PRAVASTATIN SODIUM 20 MG: 20 TABLET ORAL at 09:06

## 2018-06-08 RX ADMIN — Medication 10 ML: at 05:06

## 2018-06-08 NOTE — PLAN OF CARE
Problem: Physical Therapy Goal  Goal: Physical Therapy Goal  Goals to be met by: 14 days     Patient will increase functional independence with mobility by performin. Supine to sit with Modified [place]- met 18  2. Sit to supine with Modified [place]- met 18  3. Sit to stand transfer with Supervision met  Revised goal: sit to stand with RW with mod independent-not met  4. Bed to chair transfer with Supervision using Rolling Walker met  5. Gait  x 150 feet with Supervision using Rolling Walker. Met (2018)  Revised goal: gait x 250ft with RW with mod independent-not met  6. Wheelchair propulsion x100 feet with Supervision using bilateral upper extremities - met   7. Ascend/Descend 4 inch curb step with Stand-by Assistance using Rolling Walker. - met   Revised goal: up/down 4 inch step with RW with supervision-not met  8. Stand for 3 minutes with Stand-by Assistance using Rolling Walker and perform an activity- met  9. Lower extremity exercise program x20 reps per handout, with assistance as needed         Pt's goals remain appropriate and pt will continue to benefit from skilled PT services to work towards improved functional mobility including: bed mobility, transfers, up/down steps, up/down curb step, and gait.   Zev Farmer, SPT  2018    I certify that I was present in the room directing the student in service delivery and guiding them using my skilled judgment. As the co-signing therapist I have reviewed the students documentation and am responsible for the treatment, assessment, and plan.     Josefina Leong PT 18

## 2018-06-08 NOTE — PLAN OF CARE
Problem: Patient Care Overview  Goal: Plan of Care Review  Outcome: Revised  Repositions independently, no new skin breakdowns noted. Rt hip approximated, CHAD. Afebrile. Monitored for pain and safety. Safety maintained. Pain meds effective

## 2018-06-08 NOTE — TREATMENT PLAN
"Rehab Services' DME recommendations    Mine Wilkins  MRN: [id number]    [x] Walker Adult (5'4"-6"6")    Accessories N/A    Wheels Yes    [x] 3 in 1 commode Standard    [x] Home health PT and OT    SLAVA Yarbrough 6/8/2018          "

## 2018-06-08 NOTE — PT/OT/SLP PROGRESS
"Physical Therapy  Treatment    Mine Wilkins   MRN: [id number]   Admitting Diagnosis: Closed displaced intertrochanteric fracture of right femur with routine healing    PT Received On: 06/08/18  Total Time (min): 50       Billable Minutes:  Gait Training 15 and Therapeutic Exercise 35    Treatment Type: Treatment  PT/PTA: PT     PTA Visit Number: 0       General Precautions: Standard, fall  Orthopedic Precautions: N/A   Braces: N/A     Subjective:  Communicated with nurse prior to session. "I'm not in as much pain today"    Pain/Comfort  Pain Rating 1: 3/10  Location - Side 1: Right  Location - Orientation 1: anterior  Location 1: hip  Pain Addressed 1: Pre-medicate for activity, Cessation of Activity  Pain Rating Post-Intervention 1: 3/10    Objective:  Patient found with:  (up in chair). Talked with pt about discharge plans. Pt wishes to go home with  vs going to daughter's house; talked about her DME needs upon D/C     Functional Status:  MDS G  Bed Mobility Functional Status: mod(I) - (I)  Transfer Functional Status: S-SBA  Walk in Room Functional Status: S-SBA  Walk in Corridor Functional Status: S-SBA  Locomotion on Unit Functional Status: S-SBA  Locomotion Off Unit Functional Status: S-SBA  Moving from seated to standing position: Not steady, but able to stabilize without staff assistance  Walking (with assistive device if used): Not steady, but able to stabilize without staff assistance  Turning around and facing the opposite direction while walking: Not steady, but able to stabilize without staff assistance  Surface-to-surface transfer (transfer between bed and chair or wheelchair): Not steady, but able to stabilize without staff assistance      AM-PAC 6 CLICK MOBILITY  Total Score:19    Bed Mobility: on mat   Sit>Supine: mod I (increased time)  Supine>Sit: mod I (increased time)    Transfers:  Sit<>Stand: SBA with RW; x 4 trials; to/from bedside chair, to/from w/c; to/from mat; to/from " x-ride  Stand Pivot Transfer: SBA from w/c to bedside chair     Gait:  Amb 219 ft then 20 ft then 35 ft with supervision with RW; Pt ambulates with decreased step length, impaired foot clearance in swing phase, and decreased gait speed.     Therex:  Sitting edge of mat (x 20 reps):  Hip flexion  Knee extension    Supine on mat (x 20 reps):  Heel slides  Abduction/adduction  SAQ over bolster  Ankle pumps over bolster  Bridges over bolster   Pt performed B LE exs on x-ride x 15 min on level 1    Patient left up in chair with call button in reach.    Assessment:  Mine Wilkins is a 83 y.o. female with a medical diagnosis of Closed displaced intertrochanteric fracture of right femur with routine healing.  Pt presents with impaired LE strength, limited R LE ROM, and decreased functional mobility.    Rehab identified problem list/impairments: weakness, impaired endurance, impaired self care skills, impaired functional mobilty, gait instability, decreased lower extremity function, pain    Rehab potential is good.    Activity tolerance: Good    Discharge recommendations: home health PT     Barriers to discharge: Decreased caregiver support    Equipment recommendations: walker, rolling, bedside commode     GOALS:    Physical Therapy Goals        Problem: Physical Therapy Goal    Goal Priority Disciplines Outcome Goal Variances Interventions   Physical Therapy Goal     PT/OT, PT Ongoing (interventions implemented as appropriate)     Description:  Goals to be met by: 14 days     Patient will increase functional independence with mobility by performin. Supine to sit with Modified [place]- met 18  2. Sit to supine with Modified [place]- met 18  3. Sit to stand transfer with Supervision met  Revised goal: sit to stand with RW with mod independent-not met  4. Bed to chair transfer with Supervision using Rolling Walker met  5. Gait  x 150 feet with Supervision using Rolling Walker. Met  (5/31/2018)  Revised goal: gait x 250ft with RW with mod independent-not met  6. Wheelchair propulsion x100 feet with Supervision using bilateral upper extremities - met 6/1  7. Ascend/Descend 4 inch curb step with Stand-by Assistance using Rolling Walker. - met 6/1  Revised goal: up/down 4 inch step with RW with supervision-not met  8. Stand for 3 minutes with Stand-by Assistance using Rolling Walker and perform an activity- met  9. Lower extremity exercise program x20 reps per handout, with assistance as needed                       PLAN:    Patient to be seen  (5-6x/wk)  to address the above listed problems via gait training, therapeutic exercises  Plan of Care expires: 06/23/18  Plan of Care reviewed with: patient    Zev Farmer, [facility]  06/08/2018   I certify that I was present in the room directing the student in service delivery and guiding them using my skilled judgment. As the co-signing therapist I have reviewed the students documentation and am responsible for the treatment, assessment, and plan.     Josefina Leong PT 6/8/18

## 2018-06-08 NOTE — PT/OT/SLP PROGRESS
Occupational Therapy  Treatment    Mine Wilkins   MRN: [id number]   Admitting Diagnosis: Closed displaced intertrochanteric fracture of right femur with routine healing    OT Date of Treatment: 06/08/18  Total Time (min): 45 min    Billable Minutes:  Therapeutic Exercise 45    General Precautions: Standard, fall  Orthopedic Precautions: RLE weight bearing as tolerated  Braces: N/A         Subjective:  Communicated with patient prior to session.    Pain/Comfort  Pain Rating 1: 3/10  Location - Side 1: Right  Location - Orientation 1: anterior  Location 1: hip  Pain Addressed 1: Distraction, Reposition  Pain Rating Post-Intervention 1: 3/10    Objective:       Functional Status:  MDS G  Transfer Functional Status: S bedside chair>w/c using RW  Walk in Room Functional Status: S using RW       [facility] 6 Click:  [facility] Total Score: 21    OT Exercises: UE Ergometer 15 min for improving endurance to increase independence with ADLs.     Additional Treatment:  Patient was provided a dowel exercise HEP with handout. Patient performed the B UE ROM in all planes and joints 3 x 10 focusing to improve strength and endurance to increase independence with ADLs.     Patient propelled w/c through hallways and doorways with mod (I) from rehab gym to activity room.     Patient left up in chair with eating lunch with     ASSESSMENT:  Mine Wilkins is a 83 y.o. female with a medical diagnosis of Closed displaced intertrochanteric fracture of right femur with routine healing and presents with the deficits listed below. Patient tolerated treatment session and was motivated to complete tasks. Patient continues to benefit from skilled OT services to achieve maximal independence.    Rehab identified problem list/impairments: weakness, impaired endurance, impaired self care skills, impaired functional mobilty, gait instability, impaired balance, pain, decreased lower extremity function    Rehab potential is  good    Activity tolerance: Good    Discharge recommendations: home with home health     Barriers to discharge: Decreased caregiver support     Equipment recommendations: walker, rolling     GOALS:    Occupational Therapy Goals        Problem: Occupational Therapy Goal    Goal Priority Disciplines Outcome Interventions   Occupational Therapy Goal     OT, PT/OT Ongoing (interventions implemented as appropriate)    Description:  Goals to be met by: 14 days     Patient will increase functional independence with ADLs by performing:    UE Dressing with Modified [place]. Met  LE Dressing with Modified [place] /c AE.  Grooming while standing at sink with Modified [place].  Toileting from bedside commode with Modified [place] for hygiene and clothing management. --met  Bathing with Stand-by Assistance. Met  Supine to sit with Modified [place] /c HOB flat and no handrails.  Stand pivot transfers with Modified [place].--met  Toilet transfer to bedside commode with Modified [place].--met                         Plan:  Patient to be seen 5 x/week to address the above listed problems via self-care/home management, therapeutic activities, therapeutic exercises  Plan of Care expires: 06/24/18  Plan of Care reviewed with: patient    SLAVA Yarbrough  06/08/2018

## 2018-06-08 NOTE — PLAN OF CARE
Problem: Occupational Therapy Goal  Goal: Occupational Therapy Goal  Goals to be met by: 14 days     Patient will increase functional independence with ADLs by performing:    UE Dressing with Modified [place]. Met  LE Dressing with Modified [place] /c AE.  Grooming while standing at sink with Modified [place].  Toileting from bedside commode with Modified [place] for hygiene and clothing management. --met  Bathing with Stand-by Assistance. Met  Supine to sit with Modified [place] /c HOB flat and no handrails.  Stand pivot transfers with Modified [place].--met  Toilet transfer to bedside commode with Modified [place].--met        Outcome: Ongoing (interventions implemented as appropriate)  Patient's goals are appropriate.   SLAVA Yarbrough  6/8/2018

## 2018-06-09 PROCEDURE — 25000003 PHARM REV CODE 250: Performed by: INTERNAL MEDICINE

## 2018-06-09 PROCEDURE — 63600175 PHARM REV CODE 636 W HCPCS: Performed by: PHYSICIAN ASSISTANT

## 2018-06-09 PROCEDURE — 11000004 HC SNF PRIVATE

## 2018-06-09 PROCEDURE — 63600175 PHARM REV CODE 636 W HCPCS: Performed by: INTERNAL MEDICINE

## 2018-06-09 PROCEDURE — 25000003 PHARM REV CODE 250: Performed by: NURSE PRACTITIONER

## 2018-06-09 PROCEDURE — A9155 ARTIFICIAL SALIVA: HCPCS | Performed by: INTERNAL MEDICINE

## 2018-06-09 PROCEDURE — 25000003 PHARM REV CODE 250: Performed by: PHYSICIAN ASSISTANT

## 2018-06-09 RX ADMIN — Medication 10 ML: at 04:06

## 2018-06-09 RX ADMIN — CETIRIZINE HYDROCHLORIDE 10 MG: 5 TABLET, FILM COATED ORAL at 09:06

## 2018-06-09 RX ADMIN — PREGABALIN 75 MG: 75 CAPSULE ORAL at 09:06

## 2018-06-09 RX ADMIN — GUAIFENESIN 600 MG: 600 TABLET, EXTENDED RELEASE ORAL at 09:06

## 2018-06-09 RX ADMIN — LOSARTAN POTASSIUM 25 MG: 25 TABLET, FILM COATED ORAL at 09:06

## 2018-06-09 RX ADMIN — STANDARDIZED SENNA CONCENTRATE AND DOCUSATE SODIUM 1 TABLET: 8.6; 5 TABLET, FILM COATED ORAL at 09:06

## 2018-06-09 RX ADMIN — ENOXAPARIN SODIUM 40 MG: 100 INJECTION SUBCUTANEOUS at 04:06

## 2018-06-09 RX ADMIN — Medication 10 ML: at 07:06

## 2018-06-09 RX ADMIN — ESCITALOPRAM 5 MG: 5 TABLET, FILM COATED ORAL at 09:06

## 2018-06-09 RX ADMIN — FLUTICASONE PROPIONATE 100 MCG: 50 SPRAY, METERED NASAL at 09:06

## 2018-06-09 RX ADMIN — PANTOPRAZOLE SODIUM 40 MG: 40 TABLET, DELAYED RELEASE ORAL at 09:06

## 2018-06-09 RX ADMIN — OXYBUTYNIN CHLORIDE 10 MG: 10 TABLET, FILM COATED, EXTENDED RELEASE ORAL at 09:06

## 2018-06-09 RX ADMIN — ACETAMINOPHEN 650 MG: 325 TABLET ORAL at 12:06

## 2018-06-09 RX ADMIN — Medication 2000 UNITS: at 09:06

## 2018-06-09 RX ADMIN — ACETAMINOPHEN 650 MG: 325 TABLET ORAL at 05:06

## 2018-06-09 RX ADMIN — PRAVASTATIN SODIUM 20 MG: 20 TABLET ORAL at 09:06

## 2018-06-09 NOTE — PLAN OF CARE
Problem: Fall Risk (Adult)  Goal: Absence of Falls  Patient will demonstrate the desired outcomes by discharge/transition of care.   Outcome: Ongoing (interventions implemented as appropriate)  Pt had no falls at this time.will continue to monitor.

## 2018-06-10 PROCEDURE — 25000003 PHARM REV CODE 250: Performed by: NURSE PRACTITIONER

## 2018-06-10 PROCEDURE — 25000003 PHARM REV CODE 250: Performed by: INTERNAL MEDICINE

## 2018-06-10 PROCEDURE — 63600175 PHARM REV CODE 636 W HCPCS: Performed by: INTERNAL MEDICINE

## 2018-06-10 PROCEDURE — 25000003 PHARM REV CODE 250: Performed by: PHYSICIAN ASSISTANT

## 2018-06-10 PROCEDURE — 11000004 HC SNF PRIVATE

## 2018-06-10 PROCEDURE — 63600175 PHARM REV CODE 636 W HCPCS: Performed by: PHYSICIAN ASSISTANT

## 2018-06-10 PROCEDURE — A9155 ARTIFICIAL SALIVA: HCPCS | Performed by: INTERNAL MEDICINE

## 2018-06-10 RX ADMIN — CETIRIZINE HYDROCHLORIDE 10 MG: 5 TABLET, FILM COATED ORAL at 09:06

## 2018-06-10 RX ADMIN — GUAIFENESIN 600 MG: 600 TABLET, EXTENDED RELEASE ORAL at 09:06

## 2018-06-10 RX ADMIN — Medication 2000 UNITS: at 09:06

## 2018-06-10 RX ADMIN — OXYBUTYNIN CHLORIDE 10 MG: 10 TABLET, FILM COATED, EXTENDED RELEASE ORAL at 09:06

## 2018-06-10 RX ADMIN — FLUTICASONE PROPIONATE 100 MCG: 50 SPRAY, METERED NASAL at 10:06

## 2018-06-10 RX ADMIN — PREGABALIN 75 MG: 75 CAPSULE ORAL at 10:06

## 2018-06-10 RX ADMIN — PANTOPRAZOLE SODIUM 40 MG: 40 TABLET, DELAYED RELEASE ORAL at 09:06

## 2018-06-10 RX ADMIN — Medication 10 ML: at 04:06

## 2018-06-10 RX ADMIN — STANDARDIZED SENNA CONCENTRATE AND DOCUSATE SODIUM 1 TABLET: 8.6; 5 TABLET, FILM COATED ORAL at 09:06

## 2018-06-10 RX ADMIN — ENOXAPARIN SODIUM 40 MG: 100 INJECTION SUBCUTANEOUS at 04:06

## 2018-06-10 RX ADMIN — PRAVASTATIN SODIUM 20 MG: 20 TABLET ORAL at 10:06

## 2018-06-10 RX ADMIN — ACETAMINOPHEN 650 MG: 325 TABLET ORAL at 05:06

## 2018-06-10 RX ADMIN — ACETAMINOPHEN 650 MG: 325 TABLET ORAL at 10:06

## 2018-06-10 RX ADMIN — ESCITALOPRAM 5 MG: 5 TABLET, FILM COATED ORAL at 10:06

## 2018-06-10 RX ADMIN — ACETAMINOPHEN 650 MG: 325 TABLET ORAL at 12:06

## 2018-06-10 RX ADMIN — Medication 10 ML: at 05:06

## 2018-06-10 RX ADMIN — GUAIFENESIN 600 MG: 600 TABLET, EXTENDED RELEASE ORAL at 10:06

## 2018-06-10 NOTE — PLAN OF CARE
Problem: Patient Care Overview  Goal: Plan of Care Review  Outcome: Ongoing (interventions implemented as appropriate)  Pressure Ulcer Risk: Encouraged patient to turn throughout the night to help minimize  risk of pressure ulcer. Encouraged patient to call for staff assistance if she needs assistance to turn . Patient verbalized understanding. Will maintain safety precautions and follow up as needed.

## 2018-06-10 NOTE — PT/OT/SLP PROGRESS
"Physical Therapy  Treatment    Mine Wilkins   MRN: [id number]   Admitting Diagnosis: Closed displaced intertrochanteric fracture of right femur with routine healing    PT Received On: 06/10/18  Total Time (min): 53       Billable Minutes:  Gait Training 15, Therapeutic Activity 15 and Therapeutic Exercise 23    Treatment Type: Treatment  PT/PTA: PT     PTA Visit Number: 0       General Precautions: Standard, fall  Orthopedic Precautions: N/A   Braces: N/A         Subjective:  Communicated with pt prior to session.  "That hip still hurts."    Pain/Comfort  Pain Rating 1: 3/10  Location - Side 1: Right  Location - Orientation 1: anterior  Location 1: hip  Pain Addressed 1: Distraction  Pain Rating Post-Intervention 1: 3/10    Objective:  Patient found seated in bedside chair.         Functional Status:  MDS G  Bed Mobility Functional Status: mod(I) - (I)  Transfer Functional Status: S-SBA  Walk in Room Functional Status: S-SBA  Walk in Corridor Functional Status: S-SBA  Locomotion on Unit Functional Status: S-SBA  Locomotion Off Unit Functional Status: S-SBA  Moving from seated to standing position: Not steady, but able to stabilize without staff assistance  Walking (with assistive device if used): Not steady, but able to stabilize without staff assistance  Turning around and facing the opposite direction while walking: Not steady, but able to stabilize without staff assistance  Moving on and off the toilet: Not steady, but able to stabilize without staff assistance  Surface-to-surface transfer (transfer between bed and chair or wheelchair): Not steady, but able to stabilize without staff assistance          AM-PAC 6 CLICK MOBILITY  Total Score:23    Bed Mobility:  Sit>Supine:Mod I   Supine>Sit: Mod I    Transfers:  Sit<>Stand: Mod I  Stand Pivot Transfer:  Mod I with RW    Gait:  Amb 150 feet x 2 trials (non-consecutively) with use of rolling walker, Mod I.   Notes: Began with a step-to gait pattern but was " cued to perform step-through. Was able to do so with a slight limp/decreased stance time on RLE due to pain, per pt report. Was given a cue to extend trunk during stance phase of RLE, as she as a tendency to flex her trunk at that time.    Therex (goal- strengthen structures around R hip):  · Isometrics: hip adduction + TA contraction + Kegel (5 second hold, 10 reps); quad sets (x15 reps), hamstring sets (x15 reps), gluteal sets (x30 reps)  · TA isometric + hip flexion in supine x 20 reps    Additional Treatment:  Completed 15 minutes on the the X-ride to improve U/LE strength and endurance.    Patient left up in chair with call button in reach.    Assessment:  Mine Wilkins is a 83 y.o. female with a medical diagnosis of Closed displaced intertrochanteric fracture of right femur with routine healing.  Ms. Wilkins has reached a modified independence level of mobility with use of her rolling walker. She still presents with a slight limp, but this is due to pain in her R hip. If she continues her exercises to promote stability around her R hip joint, this pain will likely subside.    Rehab identified problem list/impairments: weakness, impaired endurance, impaired self care skills, impaired functional mobilty, gait instability, decreased lower extremity function, pain    Rehab potential is good.    Activity tolerance: Good    Discharge recommendations: home health PT     Barriers to discharge: Decreased caregiver support    Equipment recommendations: walker, rolling, bedside commode     GOALS:    Physical Therapy Goals        Problem: Physical Therapy Goal    Goal Priority Disciplines Outcome Goal Variances Interventions   Physical Therapy Goal     PT/OT, PT Ongoing (interventions implemented as appropriate)     Description:  Goals to be met by: 14 days     Patient will increase functional independence with mobility by performin. Supine to sit with Modified [place]- met 18  2. Sit to  supine with Modified [place]- met 6/8/18  3. Sit to stand transfer with Supervision met  Revised goal: sit to stand with RW with mod independent-met (6/10/2018)  4. Bed to chair transfer with Supervision using Rolling Walker met  5. Gait  x 150 feet with Supervision using Rolling Walker. Met (5/31/2018)  Revised goal: gait x 250ft with RW with mod independent-met (6/10/2018)  6. Wheelchair propulsion x100 feet with Supervision using bilateral upper extremities - met 6/1  7. Ascend/Descend 4 inch curb step with Stand-by Assistance using Rolling Walker. - met 6/1  Revised goal: up/down 4 inch step with RW with supervision-not met  8. Stand for 3 minutes with Stand-by Assistance using Rolling Walker and perform an activity- met  9. Lower extremity exercise program x20 reps per handout, with assistance as needed                            PLAN:    Patient to be seen  (5-6x/wk)  to address the above listed problems via gait training, therapeutic exercises  Plan of Care expires: 06/23/18  Plan of Care reviewed with: patient    Francia Valencia, PT  06/10/2018

## 2018-06-10 NOTE — PLAN OF CARE
Problem: Physical Therapy Goal  Goal: Physical Therapy Goal  Goals to be met by: 14 days     Patient will increase functional independence with mobility by performin. Supine to sit with Modified [place]- met 18  2. Sit to supine with Modified [place]- met 18  3. Sit to stand transfer with Supervision met  Revised goal: sit to stand with RW with mod independent-met (6/10/2018)  4. Bed to chair transfer with Supervision using Rolling Walker met  5. Gait  x 150 feet with Supervision using Rolling Walker. Met (2018)  Revised goal: gait x 250ft with RW with mod independent-met (6/10/2018)  6. Wheelchair propulsion x100 feet with Supervision using bilateral upper extremities - met   7. Ascend/Descend 4 inch curb step with Stand-by Assistance using Rolling Walker. - met   Revised goal: up/down 4 inch step with RW with supervision-not met  8. Stand for 3 minutes with Stand-by Assistance using Rolling Walker and perform an activity- met  9. Lower extremity exercise program x20 reps per handout, with assistance as needed          Outcome: Ongoing (interventions implemented as appropriate)  Met two goals.

## 2018-06-11 VITALS
TEMPERATURE: 97 F | SYSTOLIC BLOOD PRESSURE: 132 MMHG | OXYGEN SATURATION: 98 % | DIASTOLIC BLOOD PRESSURE: 72 MMHG | RESPIRATION RATE: 18 BRPM | WEIGHT: 142.88 LBS | HEIGHT: 63 IN | HEART RATE: 78 BPM | BODY MASS INDEX: 25.32 KG/M2

## 2018-06-11 LAB
ANION GAP SERPL CALC-SCNC: 7 MMOL/L
BASOPHILS # BLD AUTO: 0.05 K/UL
BASOPHILS NFR BLD: 1 %
BUN SERPL-MCNC: 18 MG/DL
CALCIUM SERPL-MCNC: 9.5 MG/DL
CHLORIDE SERPL-SCNC: 107 MMOL/L
CO2 SERPL-SCNC: 25 MMOL/L
CREAT SERPL-MCNC: 0.8 MG/DL
DIFFERENTIAL METHOD: ABNORMAL
EOSINOPHIL # BLD AUTO: 0.1 K/UL
EOSINOPHIL NFR BLD: 2.7 %
ERYTHROCYTE [DISTWIDTH] IN BLOOD BY AUTOMATED COUNT: 15.1 %
EST. GFR  (AFRICAN AMERICAN): >60 ML/MIN/1.73 M^2
EST. GFR  (NON AFRICAN AMERICAN): >60 ML/MIN/1.73 M^2
GLUCOSE SERPL-MCNC: 88 MG/DL
HCT VFR BLD AUTO: 29.1 %
HGB BLD-MCNC: 9.3 G/DL
IMM GRANULOCYTES # BLD AUTO: 0.02 K/UL
IMM GRANULOCYTES NFR BLD AUTO: 0.4 %
LYMPHOCYTES # BLD AUTO: 1.7 K/UL
LYMPHOCYTES NFR BLD: 32.9 %
MAGNESIUM SERPL-MCNC: 2 MG/DL
MCH RBC QN AUTO: 27.8 PG
MCHC RBC AUTO-ENTMCNC: 32 G/DL
MCV RBC AUTO: 87 FL
MONOCYTES # BLD AUTO: 0.5 K/UL
MONOCYTES NFR BLD: 10.1 %
NEUTROPHILS # BLD AUTO: 2.8 K/UL
NEUTROPHILS NFR BLD: 52.9 %
NRBC BLD-RTO: 0 /100 WBC
PHOSPHATE SERPL-MCNC: 4 MG/DL
PLATELET # BLD AUTO: 548 K/UL
PMV BLD AUTO: 10.3 FL
POTASSIUM SERPL-SCNC: 4.4 MMOL/L
RBC # BLD AUTO: 3.35 M/UL
SODIUM SERPL-SCNC: 139 MMOL/L
WBC # BLD AUTO: 5.26 K/UL

## 2018-06-11 PROCEDURE — 36415 COLL VENOUS BLD VENIPUNCTURE: CPT

## 2018-06-11 PROCEDURE — 80048 BASIC METABOLIC PNL TOTAL CA: CPT

## 2018-06-11 PROCEDURE — 25000003 PHARM REV CODE 250: Performed by: NURSE PRACTITIONER

## 2018-06-11 PROCEDURE — 99315 NF DSCHRG MGMT 30 MIN/LESS: CPT | Mod: ,,, | Performed by: INTERNAL MEDICINE

## 2018-06-11 PROCEDURE — 97535 SELF CARE MNGMENT TRAINING: CPT

## 2018-06-11 PROCEDURE — 97530 THERAPEUTIC ACTIVITIES: CPT

## 2018-06-11 PROCEDURE — 63600175 PHARM REV CODE 636 W HCPCS: Performed by: INTERNAL MEDICINE

## 2018-06-11 PROCEDURE — 25000003 PHARM REV CODE 250: Performed by: PHYSICIAN ASSISTANT

## 2018-06-11 PROCEDURE — 84100 ASSAY OF PHOSPHORUS: CPT

## 2018-06-11 PROCEDURE — 85025 COMPLETE CBC W/AUTO DIFF WBC: CPT

## 2018-06-11 PROCEDURE — A9155 ARTIFICIAL SALIVA: HCPCS | Performed by: INTERNAL MEDICINE

## 2018-06-11 PROCEDURE — 97110 THERAPEUTIC EXERCISES: CPT

## 2018-06-11 PROCEDURE — 83735 ASSAY OF MAGNESIUM: CPT

## 2018-06-11 RX ORDER — LOSARTAN POTASSIUM 25 MG/1
25 TABLET ORAL DAILY
Qty: 90 TABLET | Refills: 0 | Status: SHIPPED | OUTPATIENT
Start: 2018-06-11 | End: 2018-08-29 | Stop reason: DRUGHIGH

## 2018-06-11 RX ORDER — ALENDRONATE SODIUM 70 MG/1
70 TABLET ORAL
Qty: 4 TABLET | Refills: 11
Start: 2018-06-11 | End: 2018-08-29 | Stop reason: SDUPTHER

## 2018-06-11 RX ORDER — ACETAMINOPHEN 325 MG/1
650 TABLET ORAL EVERY 6 HOURS PRN
Refills: 0 | COMMUNITY
Start: 2018-06-11 | End: 2023-11-05

## 2018-06-11 RX ORDER — AMOXICILLIN 250 MG
1 CAPSULE ORAL 2 TIMES DAILY
COMMUNITY
Start: 2018-06-11 | End: 2019-01-03

## 2018-06-11 RX ORDER — ENOXAPARIN SODIUM 100 MG/ML
40 INJECTION SUBCUTANEOUS DAILY
Qty: 3.2 ML | Refills: 0 | Status: SHIPPED | OUTPATIENT
Start: 2018-06-11 | End: 2018-06-19

## 2018-06-11 RX ORDER — FLUTICASONE PROPIONATE 50 MCG
2 SPRAY, SUSPENSION (ML) NASAL NIGHTLY
Qty: 1 BOTTLE | Refills: 1 | Status: SHIPPED | OUTPATIENT
Start: 2018-06-11 | End: 2021-07-01 | Stop reason: SDUPTHER

## 2018-06-11 RX ADMIN — LOSARTAN POTASSIUM 25 MG: 25 TABLET, FILM COATED ORAL at 10:06

## 2018-06-11 RX ADMIN — Medication 10 ML: at 10:06

## 2018-06-11 RX ADMIN — GUAIFENESIN 600 MG: 600 TABLET, EXTENDED RELEASE ORAL at 10:06

## 2018-06-11 RX ADMIN — Medication 10 ML: at 06:06

## 2018-06-11 RX ADMIN — OXYBUTYNIN CHLORIDE 10 MG: 10 TABLET, FILM COATED, EXTENDED RELEASE ORAL at 10:06

## 2018-06-11 RX ADMIN — CETIRIZINE HYDROCHLORIDE 10 MG: 5 TABLET, FILM COATED ORAL at 10:06

## 2018-06-11 RX ADMIN — ACETAMINOPHEN 650 MG: 325 TABLET ORAL at 06:06

## 2018-06-11 RX ADMIN — Medication 2000 UNITS: at 10:06

## 2018-06-11 RX ADMIN — PANTOPRAZOLE SODIUM 40 MG: 40 TABLET, DELAYED RELEASE ORAL at 10:06

## 2018-06-11 NOTE — ASSESSMENT & PLAN NOTE
Chronic with effective therapy with oxybutynin which will be continued    5/25/18  Chronic, no issues, continue Oxybutynin as ordered.    6/11/18  Chronic, continue home dosing of Oxybutynin as prescribed.

## 2018-06-11 NOTE — DISCHARGE SUMMARY
[facility] – [place] [facility] - Skilled Nursing Care  Department of Hospital Medicine  Discharge Summary      Patient Name: Mine Wilkins  MRN: [id number]  Admission Date: 5/23/2018  Hospital Length of Stay: 19 days  Discharge Date and Time:  06/11/2018 2:49 PM  Attending Physician: Charissa Robin MD   Discharging Provider: Cale Mcgregor NP  Primary Care Provider: MARVA Oswald MD    HPI:   Chief Complaint/Reason for Admission: Closed displaced intertrochanteric fracture of right femur with routine healing    History of Present Illness:  Patient is a 83 y.o. female with osteoporosis, moderate AS, HTN who presents to SNF after hospitalization for fall with resultant right hip fracture requiring right CMN by Dr. Scott on 5/20. The patient was treated for UTI with cipro.  She complains of pain to her right hip rating it as 3/10, exacerbated by walking and relieved with oxycodone.  She has also been experiencing chest soreness with movement and denies pressure, dyspnea or radiation of pain.  She also complains of long-standing dry mouth.  HCTZ was discontinued with no change in symptoms.  She has tried OTC mouthwashes without relief.       The patient has been admitted to SNF for ongoing PT/OT due to insufficient progress to go home safely from the hospital.    Records from last hospital stay reviewed and summarized above.     * No surgery found *      Hospital Course:   The patient was admitted at [facility] from 5/19 to 5/23/2018.    5/25/18  Patient seen at bedside, she reports she has a tightness across her chest which is worse with movement and deep breathing.  She denies n/v, sob or pain radiation, no diaphoresis.  She reports her pain medication helps to control this pain.  She has minimal pain to her right leg.  She reports she lives with her spouse who depends on her.  She has 2 daughters and will likely go to live with one of them while she recovers and put her  into a NH for that time.  She reports she is  eating, drinking, voiding and having BM's without issues.    5/30/18  Patient seen at bedside, she reports cough is better today.  Still with pain across her chest, worse with movement and taking deep breaths.  Pain is also reproducible when palpating the chest.  Patient denies trauma to her chest.  She admits she is not using her IS as previously instructed or TCDB and explained the importance of doing so.  She is asking if she can have Aleve to help with her pain as she found it helped better than Tylenol.  Told her would call Ortho team to see if they are ok due to risk of bleed with NSAIDs.  Called Ortho office, message left with .    6/7/18  Patient seen at bedside, she has no complaints, reports pain is well controlled.  States cough at night has improved with nasal spray and the MS pain in her chest is improving.  She is looking forward to being able to shower.    6/11/18  Patient seen prior to discharge, she reports feeling good, pain is well controlled.  States she is ready to go home.     Consults         Status Ordering Provider     Inpatient consult to Registered Dietitian/Nutritionist  Once     Provider:  (Not yet assigned)    Completed AMANDA TRISTAN          Significant Diagnostic Studies: Labs:   BMP:   Recent Labs  Lab 06/11/18  0509   GLU 88      K 4.4      CO2 25   BUN 18   CREATININE 0.8   CALCIUM 9.5   MG 2.0    and CBC   Recent Labs  Lab 06/11/18  0508   WBC 5.26   HGB 9.3*   HCT 29.1*   *       Pending Diagnostic Studies:     None        Final Active Diagnoses:    Diagnosis Date Noted POA    PRINCIPAL PROBLEM:  Closed displaced intertrochanteric fracture of right femur with routine healing s/p IM nail on 5/20/2018 [S72.141D] 05/19/2018 Not Applicable    Xerostomia [R68.2] 05/24/2018 Yes    Gastroesophageal reflux disease without esophagitis [K21.9] 04/13/2018 Yes    Left ventricular diastolic dysfunction with preserved systolic function [I51.9] 08/10/2016  Yes    Age-related osteoporosis with current pathological fracture with routine healing [M80.00XD]  Not Applicable    Aortic valve stenosis, moderate [I35.0]  Yes     Chronic    AR (allergic rhinitis) [J30.9]  Yes    Essential hypertension [I10]  Yes    Pure hypercholesterolemia [E78.00]  Yes    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) [N39.46] 12/01/2013 Yes      Problems Resolved During this Admission:    Diagnosis Date Noted Date Resolved POA    Muscular chest pain [R07.89] 05/25/2018 06/07/2018 Yes    Hypophosphatemia [E83.39] 05/22/2018 05/30/2018 Yes    Urinary tract infection due to Enterococcus faecalis [N39.0, B95.2] 05/22/2018 05/30/2018 Yes      * Closed displaced intertrochanteric fracture of right femur with routine healing s/p IM nail on 5/20/2018    -continue PT/OT to increase ambulation, ADL perfomance and endurance  -continue WBAT  -continue oxycodone for pain control prn  -continue lovenox for DVT prophylaxis for 4 weeks after joint surgery  -Ortho NP to assess surgical wound and remove dressing as indicated; will notify Ortho team for any leakage or excessive drainage evident on Aquacel dressing  -continue pregabalin to control pain; discontinue on discharge from SNF  -continue senokot-s and miralax to prevent constipation; hold for > 3 stools in 24 hours or loose stools  -keep appointment with ortho as scheduled    Future Appointments  Date Time Provider Department Center   7/5/2018 1:30 PM SIDNEY Scott MD [facility] SPORTS [place]   8/22/2018 9:30 AM LAB, STEFAN KENH LAB [place]   8/22/2018 10:00 AM MARGO KAPADIA SPECLAB [place]   8/29/2018 11:00 AM ZOEY Oswald MD [facility] IM [place]     5/25/18  As above.  Pain well controlled.  Continue Lovenox for DVT prevention.  Follow up with Ortho.    5/30/18  Plan as above, follow up with Ortho.  Ortho called to see if ok to use Aleve to aid in pain management.    Continue Lovenox for dvt ppx.    6/7/18  Follow up with  Ortho in 4 weeks.  Continue Lovenox for dvt ppx.  Doing well in therapy.    6/11/18  Progressed and participated in therapy sessions, see therapy notes for details.  Therapy is recommending home with home health.  Continue Lovenox as stated above and follow up with Ortho.  Incision site is healing well.        Xerostomia    Likely due to oxybutynin which has been effective in controlling urinary symptoms and patient wishes to continue  Will order artificial saliva before meals    5/25/18  No acute issues today.  Agree, Oxybutynin could be a factor.  Continue saliva substitute.    6/11/18  Improved with saliva substitute.  Recommend she speak with PCP for long-term management.  May use OTC Biotene.        Gastroesophageal reflux disease without esophagitis    Continue therapy with pantoprazole  Chronic and stable    5/25/18  Chronic, continue PPI as ordered.    5/30/18  Currently on PPI.  Has a cough but feel this is more post nasal drip as cough improved with flonase.    6/11/18  Continue home Prilosec as previously taken and follow up with treating provider.        Left ventricular diastolic dysfunction with preserved systolic function    Continue losartan therapy  Low Na diet   monitor daily weights and diurese for 3-4 pound gain or symptoms indicative of volume overload    5/25/18  Appears compensated, no CP or SOB.  Continue ARB as ordered.  Monitor weights and treat with diuretics for weight gain.  Wt Readings from Last 1 Encounters:   06/11/18 0600 64.8 kg (142 lb 13.7 oz)   06/10/18 0500 62.2 kg (137 lb 1.6 oz)   06/09/18 0204 65.5 kg (144 lb 6.4 oz)   06/08/18 0600 65.5 kg (144 lb 6.4 oz)   06/06/18 0529 66.6 kg (146 lb 13.2 oz)   06/05/18 0505 66.1 kg (145 lb 11.6 oz)   06/04/18 0600 65.8 kg (145 lb 1 oz)   06/03/18 0601 65.4 kg (144 lb 3.2 oz)   06/02/18 1201 66.2 kg (145 lb 15.1 oz)   06/01/18 0500 67.9 kg (149 lb 11.1 oz)   05/31/18 0700 67.2 kg (148 lb 2.4 oz)   05/28/18 0500 67.7 kg (149 lb 4 oz)    05/27/18 0623 67.9 kg (149 lb 12.8 oz)   05/24/18 1957 69.3 kg (152 lb 12.5 oz)   05/23/18 1600 69.3 kg (152 lb 12.5 oz)     5/30/18  No weights in last 2 days.  Currently on ARB.  No edema present, Breath sounds clear.  Appears compensated.  Will ask nursing team to weigh in AM.    6/7/18  Weight is stable  Continue ARB as ordered.  Appears compensated.    6/11/18  Appears compensated, continue ARB as prescribed.  Recommend to monitor daily weights at home and follow up with PCP.        Aortic valve stenosis, moderate    Chronic and stable  Avoid hypotension and monitor for volume overload.     5/25/18  No acute issues, continue to monitor.    6/7/18  No acute issues, will need to follow up with Treating provider after discharge.    6/11/18  No acute issues while admitted.  Recommend to follow up with treating provider after discharge.        Age-related osteoporosis with current pathological fracture with routine healing    Patient will need to f/u with Ortho fracture clinic  Continue therapy with Vit D supplement and dietary calcium.    5/25/18  Pain is adequately controlled.  Continue OxyIR PRN as ordered for pain and Lyrica for neuropathy.  Continue Calcium and vitamin D and follow up with Ortho in fracture clinic.    5/30/18  Pain to hip area is controlled with OxyIR PRN and Lyrica, continue as ordered.  Continue calcium and vitamin D supplements.    6/7/18  Pain is well controlled with OxyIR PRN.  Will stop Lyrica at discharge.  Continue calcium and vitamin D supplements.  Seen by Ortho recently, sutures have been removed.    6/11/18  Incision is healing well.  She is only taking Tylenol for pain control, will continue.  She will follow up with Ortho as directed.  Continue Calcium/vitamin D supplements.  Lyrica has been discontinued.  Will continue Lovenox to complete 30 day course, last dose on 6/19.  Advised to hold Fosamax until instructed to resume by PCP/Ortho        Pure hypercholesterolemia     Chronic and stable  Continue therapy with pravastatin.    5/25/18  Chronic, continue home statin therapy.    6/11/18  Chronic, continue Pravachol as prescribed.        Essential hypertension    Chronic and stable  Continue therapy with losartan  -will continue to monitor and adjust regimen as necessary    5/25/18  BP Readings from Last 3 Encounters:   06/11/18 132/72   06/05/18 114/67   05/23/18 (!) 126/59     BP is stable, continue losartan and monitor.    5/30/18  BP is stable, continue losartan as ordered.    6/7/18  BP is stable, continue Losartan as ordered.    6/11/18  BP Readings from Last 3 Encounters:   06/11/18 132/72   06/05/18 114/67   05/23/18 (!) 126/59     BP is well controlled, continue Losartan per home dosing.        AR (allergic rhinitis)    Continue chronic therapy with cetirizine  Symptoms currently controlled.     5/25/18  Chronic, no complaints, continue cetirizine as ordered.    5/30/18  Cough has improved with addition of Mucinex and fluticasone.    Continue cetirizine as ordered.    6/7/18  Cough has resolved.  Continue cetirizine and fluticasone as ordered.    6/11/18  Symptoms improved with use of fluticasone, will prescribe for her.  Recommend she follow up with PCP for ongoing management.  Continue Cetirizine as previously taking.        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    6/11/18  Chronic, continue home dosing of Oxybutynin as prescribed.            Discharged Condition: stable    [place] [facility] [place]:  Follow-up Information     [facility]  [place].    Specialty:  Home Health Services  Why:  Agency will call pt to schedule a home health assessment.   Contact information:  [address]  [address]  [place] [address] [phone]             [facility].    Specialty:  DME  "Provider  Why:  Equipment will be delivered to the pt's room. Ok per Constanza  Contact information:  [address]  [facility] [address]  [phone]                 Patient Instructions:     WALKER FOR HOME USE   Order Specific Question Answer Comments   Type of Walker: Adult (5'4"-6'6")    With wheels? Yes    Height: 5' 3" (1.6 m)    Weight: 64.8 kg (142 lb 13.7 oz)    Length of need (1-99 months): 99    Does patient have medical equipment at home? none spouse has DME including bath bench pt could use.   Please check all that apply: Walker will be used for gait training.    Vendor: [facility]    Expected Date of Delivery: 6/11/2018      3 IN 1 COMMODE FOR HOME USE   Order Specific Question Answer Comments   Type: Standard    Height: 5' 3" (1.6 m)    Weight: 64.8 kg (142 lb 13.7 oz)    Does patient have medical equipment at home? none spouse has DME including bath bench pt could use.   Length of need (1-99 months): 99    Vendor: [facility]    Expected Date of Delivery: 6/11/2018      Activity as tolerated     Notify your health care provider if you experience any of the following:  increased confusion or weakness     Notify your health care provider if you experience any of the following:  persistent dizziness, light-headedness, or visual disturbances     Notify your health care provider if you experience any of the following:  worsening rash     Notify your health care provider if you experience any of the following:  severe persistent headache     Notify your health care provider if you experience any of the following:  difficulty breathing or increased cough     Notify your health care provider if you experience any of the following:  redness, tenderness, or signs of infection (pain, swelling, redness, odor or green/yellow discharge around incision site)     Notify your health care provider if you experience any of the following:  temperature >100.4     Notify your health care provider if you " experience any of the following:  persistent nausea and vomiting or diarrhea     Notify your health care provider if you experience any of the following:  severe uncontrolled pain       Medications:  Reconciled Home Medications:      Medication List      START taking these medications    acetaminophen 325 MG tablet  Commonly known as:  TYLENOL  Take 2 tablets (650 mg total) by mouth every 6 (six) hours as needed.     enoxaparin 40 mg/0.4 mL Syrg  Commonly known as:  LOVENOX  Inject 0.4 mLs (40 mg total) into the skin once daily.     fluticasone 50 mcg/actuation nasal spray  Commonly known as:  FLONASE  2 sprays (100 mcg total) by Each Nare route every evening.     senna-docusate 8.6-50 mg 8.6-50 mg per tablet  Commonly known as:  PERICOLACE  Take 1 tablet by mouth 2 (two) times daily.        CHANGE how you take these medications    alendronate 70 MG tablet  Commonly known as:  FOSAMAX  Take 1 tablet (70 mg total) by mouth every 7 days. HOLD UNTIL SEEN BY YOUR PCP  What changed:  additional instructions     losartan 25 MG tablet  Commonly known as:  COZAAR  Take 1 tablet (25 mg total) by mouth once daily.  What changed:  See the new instructions.        CONTINUE taking these medications    cetirizine 10 MG tablet  Commonly known as:  ZYRTEC  Take 10 mg by mouth once daily.     cholecalciferol (vitamin D3) 2,000 unit Tab  Take by mouth. 1 Tablet Oral Every day     cranberry 400 mg Cap  Take by mouth once daily.     escitalopram oxalate 5 MG Tab  Commonly known as:  LEXAPRO  Take 1 tablet (5 mg total) by mouth once daily.     omeprazole 40 MG capsule  Commonly known as:  PRILOSEC  TAKE 1 CAPSULE EVERY MORNING  BEFORE  EATING     oxybutynin 10 MG 24 hr tablet  Commonly known as:  DITROPAN-XL  TAKE 1 TABLET EVERY DAY     pravastatin 20 MG tablet  Commonly known as:  PRAVACHOL  TAKE 1 TABLET EVERY EVENING        STOP taking these medications    hydroCHLOROthiazide 25 MG tablet  Commonly known as:  HYDRODIURIL           Time spent on the discharge of patient: 35 minutes    Cale Mcgregor NP  Department of Hospital Medicine  [facility] [place] [facility] - Skilled Nursing Care

## 2018-06-11 NOTE — PLAN OF CARE
[facility] [place] [facility] [place] [facility]    HOME HEALTH ORDERS  FACE TO FACE ENCOUNTER    Patient Name: Mine Wilkins  YOB: 1935    PCP: MARVA Oswald MD   PCP Address: [address] [facility] [address] / [place] [address]  PCP Phone Number: [phone]  PCP Fax: [phone]    Encounter Date: 06/11/2018    Admit to Home Health    Diagnoses:  Active Hospital Problems    Diagnosis  POA    *Closed displaced intertrochanteric fracture of right femur with routine healing s/p IM nail on 5/20/2018 [S72.141D]  Not Applicable    Xerostomia [R68.2]  Yes    Gastroesophageal reflux disease without esophagitis [K21.9]  Yes    Left ventricular diastolic dysfunction with preserved systolic function [I51.9]  Yes    Age-related osteoporosis with current pathological fracture with routine healing [M80.00XD]  Not Applicable    Aortic valve stenosis, moderate [I35.0]  Yes     Chronic    AR (allergic rhinitis) [J30.9]  Yes    Essential hypertension [I10]  Yes    Pure hypercholesterolemia [E78.00]  Yes    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) [N39.46]  Yes      Resolved Hospital Problems    Diagnosis Date Resolved POA    Muscular chest pain [R07.89] 06/07/2018 Yes    Hypophosphatemia [E83.39] 05/30/2018 Yes    Urinary tract infection due to Enterococcus faecalis [N39.0, B95.2] 05/30/2018 Yes       Future Appointments  Date Time Provider Department Center   7/5/2018 1:30 PM SIDNEY Scott MD [facility] SPORTS [place]   8/22/2018 9:30 AM LAB, STEFAN KENH LAB [place]   8/22/2018 10:00 AM SPECIMENMARGO SPECLAB [place]   8/29/2018 11:00 AM ZOEY Oswald MD [facility] [place]           I have seen and examined this patient face to face today. My clinical findings that support the need for the home health skilled services and home bound status are the following:  Weakness/numbness causing balance and gait disturbance due to Fracture making it taxing to leave  home.    Allergies:  Review of patient's allergies indicates:   Allergen Reactions    Ace inhibitors      Other reaction(s): cough    Codeine      Other reaction(s): Nausea       Diet: cardiac diet    Activities: activity as tolerated    Nursing:   SN to complete comprehensive assessment including routine vital signs. Instruct on disease process and s/s of complications to report to MD. Review/verify medication list sent home with the patient at time of discharge  and instruct patient/caregiver as needed. Frequency may be adjusted depending on start of care date.    Notify MD if SBP > 160 or < 90; DBP > 90 or < 50; HR > 120 or < 50; Temp > 101;       CONSULTS:    Physical Therapy to evaluate and treat. Evaluate for home safety and equipment needs; Establish/upgrade home exercise program. Perform / instruct on therapeutic exercises, gait training, transfer training, and Range of Motion.  Occupational Therapy to evaluate and treat. Evaluate home environment for safety and equipment needs. Perform/Instruct on transfers, ADL training, ROM, and therapeutic exercises.  Aide to provide assistance with personal care, ADLs, and vital signs.    MISCELLANEOUS CARE:  Heart Failure:      SN to instruct on the following:    Instruct on the definition of CHF.   Instruct on the signs/sympoms of CHF to be reported.   Instruct on and monitor daily weights.   Instruct on factors that cause exacerbation.   Instruct on action, dose, schedule, and side effects of medications.   Instruct on diet as prescribed.   Instruct on activity allowed.   Instruct on life-style modifications for life long management of CHF   SN to assess compliance with daily weights, diet, medications, fluid retention,    safety precautions, activities permitted and life-style modifications.   Additional 1-2 SN visits per week as needed for signs and symptoms     of CHF exacerbation.      For Weight Gain > 2-3 lbs in 1 day or 4-6 lbs over 1 week notify  PCP:      WOUND CARE ORDERS  n/a      Medications: Review discharge medications with patient and family and provide education.      Current Discharge Medication List      START taking these medications    Details   acetaminophen (TYLENOL) 325 MG tablet Take 2 tablets (650 mg total) by mouth every 6 (six) hours as needed.  Refills: 0      enoxaparin (LOVENOX) 40 mg/0.4 mL Syrg Inject 0.4 mLs (40 mg total) into the skin once daily.  Qty: 3.2 mL, Refills: 0      fluticasone (FLONASE) 50 mcg/actuation nasal spray 2 sprays (100 mcg total) by Each Nare route every evening.  Qty: 1 Bottle, Refills: 1      senna-docusate 8.6-50 mg (PERICOLACE) 8.6-50 mg per tablet Take 1 tablet by mouth 2 (two) times daily.         CONTINUE these medications which have CHANGED    Details   alendronate (FOSAMAX) 70 MG tablet Take 1 tablet (70 mg total) by mouth every 7 days. HOLD UNTIL SEEN BY YOUR PCP  Qty: 4 tablet, Refills: 11      losartan (COZAAR) 25 MG tablet Take 1 tablet (25 mg total) by mouth once daily.  Qty: 90 tablet, Refills: 0         CONTINUE these medications which have NOT CHANGED    Details   cetirizine (ZYRTEC) 10 MG tablet Take 10 mg by mouth once daily.      cholecalciferol, vitamin D3, 2,000 unit Tab Take by mouth. 1 Tablet Oral Every day      cranberry 400 mg Cap Take by mouth once daily.       escitalopram oxalate (LEXAPRO) 5 MG Tab Take 1 tablet (5 mg total) by mouth once daily.  Qty: 90 tablet, Refills: 3    Associated Diagnoses: Anxiety      omeprazole (PRILOSEC) 40 MG capsule TAKE 1 CAPSULE EVERY MORNING  BEFORE  EATING  Qty: 90 capsule, Refills: 3      oxybutynin (DITROPAN-XL) 10 MG 24 hr tablet TAKE 1 TABLET EVERY DAY  Qty: 90 tablet, Refills: 3      pravastatin (PRAVACHOL) 20 MG tablet TAKE 1 TABLET EVERY EVENING  Qty: 90 tablet, Refills: 3         STOP taking these medications       hydroCHLOROthiazide (HYDRODIURIL) 25 MG tablet Comments:   Reason for Stopping:               I certify that this patient is  confined to her home and needs physical therapy and occupational therapy.

## 2018-06-11 NOTE — ASSESSMENT & PLAN NOTE
Likely due to oxybutynin which has been effective in controlling urinary symptoms and patient wishes to continue  Will order artificial saliva before meals    5/25/18  No acute issues today.  Agree, Oxybutynin could be a factor.  Continue saliva substitute.    6/11/18  Improved with saliva substitute.  Recommend she speak with PCP for long-term management.  May use OTC Biotene.

## 2018-06-11 NOTE — ASSESSMENT & PLAN NOTE
Patient will need to f/u with Ortho fracture clinic  Continue therapy with Vit D supplement and dietary calcium.    5/25/18  Pain is adequately controlled.  Continue OxyIR PRN as ordered for pain and Lyrica for neuropathy.  Continue Calcium and vitamin D and follow up with Ortho in fracture clinic.    5/30/18  Pain to hip area is controlled with OxyIR PRN and Lyrica, continue as ordered.  Continue calcium and vitamin D supplements.    6/7/18  Pain is well controlled with OxyIR PRN.  Will stop Lyrica at discharge.  Continue calcium and vitamin D supplements.  Seen by Ortho recently, sutures have been removed.    6/11/18  Incision is healing well.  She is only taking Tylenol for pain control, will continue.  She will follow up with Ortho as directed.  Continue Calcium/vitamin D supplements.  Lyrica has been discontinued.  Will continue Lovenox to complete 30 day course, last dose on 6/19.  Advised to hold Fosamax until instructed to resume by PCP/Ortho

## 2018-06-11 NOTE — PLAN OF CARE
Problem: Occupational Therapy Goal  Goal: Occupational Therapy Goal  Goals to be met by: 14 days     Patient will increase functional independence with ADLs by performing:    UE Dressing with Modified [place]. Met  LE Dressing with Modified [place] /c AE.--met  Grooming while standing at sink with Modified [place].--met  Toileting from bedside commode with Modified [place] for hygiene and clothing management. --met  Bathing with Stand-by Assistance. Met  Supine to sit with Modified [place] /c HOB flat and no handrails.--met  Stand pivot transfers with Modified [place].--met  Toilet transfer to bedside commode with Modified [place].--met        Outcome: Outcome(s) achieved Date Met: 06/11/18  Goals met. SLAVA Duong  6/11/2018

## 2018-06-11 NOTE — ASSESSMENT & PLAN NOTE
-continue PT/OT to increase ambulation, ADL perfomance and endurance  -continue WBAT  -continue oxycodone for pain control prn  -continue lovenox for DVT prophylaxis for 4 weeks after joint surgery  -Ortho NP to assess surgical wound and remove dressing as indicated; will notify Ortho team for any leakage or excessive drainage evident on Aquacel dressing  -continue pregabalin to control pain; discontinue on discharge from SNF  -continue senokot-s and miralax to prevent constipation; hold for > 3 stools in 24 hours or loose stools  -keep appointment with ortho as scheduled    Future Appointments  Date Time Provider Department Center   7/5/2018 1:30 PM SIDNEY Scott MD [facility] SPORTS [place]   8/22/2018 9:30 AM LAB, STEFAN KENH LAB [place]   8/22/2018 10:00 AM SPECIM[facility] SPECLAB [place]   8/29/2018 11:00 AM ZOEY Oswald MD [facility] IM [place]     5/25/18  As above.  Pain well controlled.  Continue Lovenox for DVT prevention.  Follow up with Ortho.    5/30/18  Plan as above, follow up with Ortho.  Ortho called to see if ok to use Aleve to aid in pain management.    Continue Lovenox for dvt ppx.    6/7/18  Follow up with Ortho in 4 weeks.  Continue Lovenox for dvt ppx.  Doing well in therapy.    6/11/18  Progressed and participated in therapy sessions, see therapy notes for details.  Therapy is recommending home with home health.  Continue Lovenox as stated above and follow up with Ortho.  Incision site is healing well.

## 2018-06-11 NOTE — PT/OT/SLP PROGRESS
"Occupational Therapy  Treatment/Discharge Summary    Mine Wilkins   MRN: [id number]   Admitting Diagnosis: Closed displaced intertrochanteric fracture of right femur with routine healing    OT Date of Treatment: 06/11/18  Total Time (min): 55 min    Billable Minutes:  Self Care/Home Management 15, Therapeutic Activity 10 and Therapeutic Exercise 30    General Precautions: Standard, fall  Orthopedic Precautions: RLE weight bearing as tolerated  Braces: N/A         Subjective:  Communicated with pt prior to session.  "I am gonna miss you all"    Pain/Comfort  Pain Rating 1: 3/10  Location - Side 1: Right  Location 1: hip  Pain Addressed 1: Pre-medicate for activity  Pain Rating Post-Intervention 1: 3/10    Objective:  Patient found with:  (walking in room with RW)    Functional Status:  MDS G  Bed Mobility Functional Status: mod(I) - (I)  Transfer Functional Status: mod(I) - (I)  Walk in Room Functional Status: mod(I) - (I)  Walk in Corridor Functional Status: mod(I) - (I)  Locomotion on Unit Functional Status: mod(I) - (I)  Dressing Functional Status: 0: mod(I) - (I)  Eating Functional Status: mod(I) - (I)  Toilet Use Functional Status: mod(I) - (I)  Personal Hygiene Functional Status: mod(I) - (I)  Bathing Functional Status: mod(I) - (I)           AMPA 6 Click:  [facility] Total Score: 24    OT Exercises: UE Ergometer 15 min to increase functional endurance for ADLs    Additional Treatment:  Pt performed NuStep x 15 min to increase LE strength for ADLs    Pt ambulated in room and in hallway to gym with mod I-->150ft  Pt bathed at sink mod I  Pt performed toileting with RW mod I; grooming standing at sink and dressing mod I    Patient left up in chair with tech transport    ASSESSMENT:  Pt tolerated tx and has met all OT goals. Pt has good safety awareness and will cont to benefit from HHOT to increase functional indep and safety at home.     Rehab identified problem list/impairments: weakness, impaired " endurance, impaired self care skills, impaired functional mobilty, gait instability, impaired balance, pain, decreased lower extremity function    Rehab potential is good    Activity tolerance: Good    Discharge recommendations: home with home health     Barriers to discharge: Decreased caregiver support     Equipment recommendations: walker, rolling     GOALS:    Occupational Therapy Goals     Not on file          Multidisciplinary Problems (Resolved)        Problem: Occupational Therapy Goal    Goal Priority Disciplines Outcome Interventions   Occupational Therapy Goal   (Resolved)     OT, PT/OT Outcome(s) achieved    Description:  Goals to be met by: 14 days     Patient will increase functional independence with ADLs by performing:    UE Dressing with Modified [place]. Met  LE Dressing with Modified [place] /c AE.--met  Grooming while standing at sink with Modified [place].--met  Toileting from bedside commode with Modified [place] for hygiene and clothing management. --met  Bathing with Stand-by Assistance. Met  Supine to sit with Modified [place] /c HOB flat and no handrails.--met  Stand pivot transfers with Modified [place].--met  Toilet transfer to bedside commode with Modified [place].--met                          Plan:    (D/C pt from inpt OT)   Plan of Care expires: 06/24/18  Plan of Care reviewed with: patient    Kristopher SLAVA Ambrocio  06/11/2018

## 2018-06-11 NOTE — PLAN OF CARE
06/11/2018  10:58 AM    Patient to discharge home today with assist of family. Patient set up with [facility] per JOSE Correa.    Future Appointments  Date Time Provider Department Center   7/5/2018 1:30 PM SIDNEY Scott MD [facility] SPORTS [place]   8/22/2018 9:30 AM LAB, STEFAN KENH LAB [place]   8/22/2018 10:00 AM SPECIMEN, [place] GAL SPECLAB [place]   8/29/2018 11:00 AM ZOEY Oswald MD [facility] [place]        06/11/18 1057   Final Note   Assessment Type Discharge Planning Assessment   Discharge Disposition Home   What phone number can be called within the next 1-3 days to see how you are doing after discharge? [phone]   Hospital Follow Up  Appt(s) scheduled? Yes   Discharge plans and expectations educations in teach back method with documentation complete? Yes     Radha Bernard RN, CM Skilled  [phone]

## 2018-06-11 NOTE — ASSESSMENT & PLAN NOTE
Continue therapy with pantoprazole  Chronic and stable    5/25/18  Chronic, continue PPI as ordered.    5/30/18  Currently on PPI.  Has a cough but feel this is more post nasal drip as cough improved with flonase.    6/11/18  Continue home Prilosec as previously taken and follow up with treating provider.

## 2018-06-11 NOTE — ASSESSMENT & PLAN NOTE
Continue chronic therapy with cetirizine  Symptoms currently controlled.     5/25/18  Chronic, no complaints, continue cetirizine as ordered.    5/30/18  Cough has improved with addition of Mucinex and fluticasone.    Continue cetirizine as ordered.    6/7/18  Cough has resolved.  Continue cetirizine and fluticasone as ordered.    6/11/18  Symptoms improved with use of fluticasone, will prescribe for her.  Recommend she follow up with PCP for ongoing management.  Continue Cetirizine as previously taking.

## 2018-06-11 NOTE — ASSESSMENT & PLAN NOTE
Continue losartan therapy  Low Na diet   monitor daily weights and diurese for 3-4 pound gain or symptoms indicative of volume overload    5/25/18  Appears compensated, no CP or SOB.  Continue ARB as ordered.  Monitor weights and treat with diuretics for weight gain.  Wt Readings from Last 1 Encounters:   06/11/18 0600 64.8 kg (142 lb 13.7 oz)   06/10/18 0500 62.2 kg (137 lb 1.6 oz)   06/09/18 0204 65.5 kg (144 lb 6.4 oz)   06/08/18 0600 65.5 kg (144 lb 6.4 oz)   06/06/18 0529 66.6 kg (146 lb 13.2 oz)   06/05/18 0505 66.1 kg (145 lb 11.6 oz)   06/04/18 0600 65.8 kg (145 lb 1 oz)   06/03/18 0601 65.4 kg (144 lb 3.2 oz)   06/02/18 1201 66.2 kg (145 lb 15.1 oz)   06/01/18 0500 67.9 kg (149 lb 11.1 oz)   05/31/18 0700 67.2 kg (148 lb 2.4 oz)   05/28/18 0500 67.7 kg (149 lb 4 oz)   05/27/18 0623 67.9 kg (149 lb 12.8 oz)   05/24/18 1957 69.3 kg (152 lb 12.5 oz)   05/23/18 1600 69.3 kg (152 lb 12.5 oz)     5/30/18  No weights in last 2 days.  Currently on ARB.  No edema present, Breath sounds clear.  Appears compensated.  Will ask nursing team to weigh in AM.    6/7/18  Weight is stable  Continue ARB as ordered.  Appears compensated.    6/11/18  Appears compensated, continue ARB as prescribed.  Recommend to monitor daily weights at home and follow up with PCP.

## 2018-06-11 NOTE — ASSESSMENT & PLAN NOTE
Chronic and stable  Avoid hypotension and monitor for volume overload.     5/25/18  No acute issues, continue to monitor.    6/7/18  No acute issues, will need to follow up with Treating provider after discharge.    6/11/18  No acute issues while admitted.  Recommend to follow up with treating provider after discharge.

## 2018-06-11 NOTE — PLAN OF CARE
06/11/2018  10:42 AM    SW attempted to schedule d/c follow-up appointment with pt's PCP (Dr. ZOEY Oswald [phone]).  SW notified that no appointments available within 2 weeks and will have to call pt back with appointment time.    Michael Sanabria, LIV  [phone]

## 2018-06-11 NOTE — ASSESSMENT & PLAN NOTE
Chronic and stable  Continue therapy with losartan  -will continue to monitor and adjust regimen as necessary    5/25/18  BP Readings from Last 3 Encounters:   06/11/18 132/72   06/05/18 114/67   05/23/18 (!) 126/59     BP is stable, continue losartan and monitor.    5/30/18  BP is stable, continue losartan as ordered.    6/7/18  BP is stable, continue Losartan as ordered.    6/11/18  BP Readings from Last 3 Encounters:   06/11/18 132/72   06/05/18 114/67   05/23/18 (!) 126/59     BP is well controlled, continue Losartan per home dosing.

## 2018-06-19 DIAGNOSIS — F41.9 ANXIETY: ICD-10-CM

## 2018-06-19 RX ORDER — ESCITALOPRAM OXALATE 5 MG/1
TABLET ORAL
Qty: 30 TABLET | Refills: 6 | Status: SHIPPED | OUTPATIENT
Start: 2018-06-19 | End: 2018-12-11 | Stop reason: SDUPTHER

## 2018-06-25 ENCOUNTER — TELEPHONE (OUTPATIENT)
Dept: ADMINISTRATIVE | Facility: CLINIC | Age: 83
End: 2018-06-25

## 2018-06-25 NOTE — PROGRESS NOTES
Home Health SOC with [facility]. Dr Charissa Robin. SN,PT,OT services provided.   Mother

## 2018-07-05 ENCOUNTER — HOSPITAL ENCOUNTER (OUTPATIENT)
Dept: RADIOLOGY | Facility: HOSPITAL | Age: 83
Discharge: HOME | End: 2018-07-05
Attending: STUDENT IN AN ORGANIZED HEALTH CARE EDUCATION/TRAINING PROGRAM
Payer: MEDICARE

## 2018-07-05 ENCOUNTER — OFFICE VISIT (OUTPATIENT)
Dept: SPORTS MEDICINE | Facility: CLINIC | Age: 83
End: 2018-07-05
Payer: MEDICARE

## 2018-07-05 VITALS
DIASTOLIC BLOOD PRESSURE: 63 MMHG | HEIGHT: 63 IN | HEART RATE: 81 BPM | WEIGHT: 142 LBS | BODY MASS INDEX: 25.16 KG/M2 | SYSTOLIC BLOOD PRESSURE: 106 MMHG

## 2018-07-05 DIAGNOSIS — M25.551 RIGHT HIP PAIN: ICD-10-CM

## 2018-07-05 DIAGNOSIS — Z47.89 SURGICAL AFTERCARE, MUSCULOSKELETAL SYSTEM: ICD-10-CM

## 2018-07-05 DIAGNOSIS — Z47.89 SURGICAL AFTERCARE, MUSCULOSKELETAL SYSTEM: Primary | ICD-10-CM

## 2018-07-05 PROCEDURE — 99999 PR PBB SHADOW E&M-EST. PATIENT-LVL III: CPT | Mod: PBBFAC,,, | Performed by: ORTHOPAEDIC SURGERY

## 2018-07-05 PROCEDURE — 73552 X-RAY EXAM OF FEMUR 2/>: CPT | Mod: 26,RT,, | Performed by: RADIOLOGY

## 2018-07-05 PROCEDURE — 73552 X-RAY EXAM OF FEMUR 2/>: CPT | Mod: TC,FY,PO,RT

## 2018-07-05 PROCEDURE — 99024 POSTOP FOLLOW-UP VISIT: CPT | Mod: S$GLB,,, | Performed by: ORTHOPAEDIC SURGERY

## 2018-07-05 NOTE — PROGRESS NOTES
S:Mine Wilkins presents for post-operative evaluation.     DATE OF PROCEDURE: 5/20/2018     PROCEDURES PERFORMED:   Right femur cephalomedullary nailing    Mine Wilkins reports to be doing well 6wk s/p the above mentioned procedure.  States the hip feels a whole lot better.  Home health physical therapy has come to a conclusion.  She is seeking referral to outpatient therapy.  Accompanied by her daughter today.  No new complaints.  She does have some residual intermittent anterior groin pain with activity which continues to improve.     O: RLE Well-healed incisions.  Mild swelling and fullness over the proximal peritrochanteric region.  Minimal associated tenderness.  No pain with passive internal and external rotation of hip.  Negative Stinchfield test. NVI.    Radiographs: s/p CMN.  No interval changes in alignment.  Post reduction collapse noted on prior films.  Good interval healing present.    A/P: Patient has made good clinical progress.  Referral placed to PT at [facility] location.  May continue weightbearing to tolerance.  Goal of improved gait, overall strength, wean of walker to a cane and then off assistive device.  Patient has discontinued DVT prophylaxis.  We'll see her back in 6 weeks.

## 2018-07-17 ENCOUNTER — CLINICAL SUPPORT (OUTPATIENT)
Dept: REHABILITATION | Facility: HOSPITAL | Age: 83
End: 2018-07-17
Attending: ORTHOPAEDIC SURGERY
Payer: MEDICARE

## 2018-07-17 DIAGNOSIS — M79.604 RIGHT LEG PAIN: ICD-10-CM

## 2018-07-17 PROCEDURE — 97161 PT EVAL LOW COMPLEX 20 MIN: CPT | Mod: PO

## 2018-07-17 PROCEDURE — 97530 THERAPEUTIC ACTIVITIES: CPT | Mod: PO

## 2018-07-17 PROCEDURE — G8979 MOBILITY GOAL STATUS: HCPCS | Mod: CJ,PO

## 2018-07-17 PROCEDURE — G8978 MOBILITY CURRENT STATUS: HCPCS | Mod: CK,PO

## 2018-07-17 NOTE — PROGRESS NOTES
Physical Therapy Evaluation    Visit: 1    [id number]    Mine is a 83 y.o. female evaluated on 07/17/2018.     Diagnosis:   Encounter Diagnosis   Name Primary?    Right leg pain        DOS: 5/25/2018    Physician: SIDNEY Scott MD  Treatment Orders: PT Eval and Treat    Past Medical History:   Diagnosis Date    After-cataract of both eyes - Left Eye 10/11/2012    Anxiety     Aortic calcification 8/10/2016    Aortic valve stenosis, moderate     AR (allergic rhinitis)     Arthritis of facet joints at multiple vertebral levels 1/14/2016    Seen on lumbar MRI dated 01/14/16    Cataract     Cholelithiasis     Closed displaced intertrochanteric fracture of right femur with routine healing s/p IM nail on 5/20/2018 5/19/2018    Cystocele 12/1/2013    Esotropia, alternating - Both Eyes 10/11/2012    Essential hypertension     Gastroesophageal reflux disease without esophagitis 4/13/2018    Left ventricular diastolic dysfunction with preserved systolic function 8/10/2016    Lumbar radiculopathy 1/25/2016    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    Nondisplaced fracture of proximal end of humerus 8/7/2016    Osteoarthritis     Overweight (BMI 25.0-29.9) 4/13/2018    Pure hypercholesterolemia     Right-sided low back pain without sciatica 12/10/2015    Strabismus     Urethral hypermobility 12/1/2013     Current Outpatient Prescriptions   Medication Sig    acetaminophen (TYLENOL) 325 MG tablet Take 2 tablets (650 mg total) by mouth every 6 (six) hours as needed.    alendronate (FOSAMAX) 70 MG tablet Take 1 tablet (70 mg total) by mouth every 7 days. HOLD UNTIL SEEN BY YOUR PCP    cetirizine (ZYRTEC) 10 MG tablet Take 10 mg by mouth once daily.    cholecalciferol, vitamin D3, 2,000 unit Tab Take by mouth. 1 Tablet Oral Every day    cranberry 400 mg Cap Take  by mouth once daily.     escitalopram oxalate (LEXAPRO) 5 MG Tab TAKE ONE DAILY    fluticasone (FLONASE) 50 mcg/actuation nasal spray 2 sprays (100 mcg total) by Each Nare route every evening.    losartan (COZAAR) 25 MG tablet Take 1 tablet (25 mg total) by mouth once daily.    omeprazole (PRILOSEC) 40 MG capsule TAKE 1 CAPSULE EVERY MORNING  BEFORE  EATING    oxybutynin (DITROPAN-XL) 10 MG 24 hr tablet TAKE 1 TABLET EVERY DAY    pravastatin (PRAVACHOL) 20 MG tablet TAKE 1 TABLET EVERY EVENING    senna-docusate 8.6-50 mg (PERICOLACE) 8.6-50 mg per tablet Take 1 tablet by mouth 2 (two) times daily.     No current facility-administered medications for this visit.      Review of patient's allergies indicates:   Allergen Reactions    Ace inhibitors      Other reaction(s): cough    Codeine      Other reaction(s): Nausea     Precautions: Fall standard  Occupation: Retired      Subjective  Onset/TEO: sudden - fall in grocery store which Pt states may be secondary to a TIA.  Involved Area/Location: right  Current Symptoms: pain    Increases symptoms: walking  Decreases Symptoms: rest    Current Function: bathing on a shower bench, walks with RW  Previous function:independent in all ADL's    Diagnostic Tests: Radiographs    Pain Scale: Mine rates pain to be 2  Patient Goals: Pt would like to walk without a limp or assistive device  Pt reports a Hx of L LE sciatica  Pt reported that she fractured her R femur on 5/26/2018 and had surgery the following day.  3 days post op Pt was transferred to an inpatient rehab with [facility] for 3 weeks and was D/Charles to home with Home health care.  Pt reports that she lives in a single story home with her  with one step to enter/exit her home.      Objective    Functional Limitations  Reports - G Codes    Category:  Mobility   Tool:  FOTO Outcomes Measurement System.   Score:  Patient scored out 51 of 100 on the FOTO Outcomes Measurement System.   Current:  G 8978 CK   Goal:   G 8979 CJ          Observation: Pt ambulating with a RW    Posture: WNL      Passive Range of Motion: Knee   Left Right   Flexion: 144 130   Extension 0 0        Hip ROM  Flexion R 107, L 107  Abduction R 26, L 35        Lower Extremity Strength  Right LE  Left LE    Knee extension: 4/5 Knee extension: 4+/5   Knee flexion: 3/5 Knee flexion: 3+/5   Hip flexion: 2/5 Hip flexion: 4/5   Hip extension:  NT Hip extension: NT   Hip abduction/Glut Medius: NT Hip abduction/Glut medius NT   Hip adduction: NT Hip adduction: NT   Hip internal rotation: NT Hip internal rotation: NT   Hip external rotation: NT Hip external rotation: NT   Ankle dorsiflexion:   5/5 Ankle dorsiflexion:   5/5   Ankle plantarflexion: NT Ankle plantarflexion: NT       Special Tests: Knee    Joint Mobility: hypomobile - R hip  Palpation: Tender to palpation over incisions  Sensation: intact to light touch    Edema: None      Gait: Mine ambulated 200 feet with rolling walker.    Balance: Static standing Fair                 Dynamic standing Fair-     PT Evaluation Completed? Yes  Discussed Plan of Care with patient: Yes    Treatment:  Pt performed there ex's for R LE strengthening, ROM, flexibility and endurance including:    Therapeutic Exercise x 30 min  R hip IR/ER in supine  Heel slides 20 x 3  Supine B hip abd with orange TB x 10  Supine hip add with ball x 10 with 3 sec hold  Quad sets  Gluteal sets  Add stationary bike next Tx  Exercises were reviewed and pt was able to demonstrate them prior to the end of the session.   Pt received a written HEP including Hip IR/ER, heel slides, Supine hip abd with band, supine hip add with ball.    Mine brantley good understanding of the education provided. Patient demo good return demo of skill of exercises.      Assessment  This is a 83 y.o. female referred to outpatient physical therapy and presents with a medical diagnosis of   Encounter Diagnosis   Name Primary?    Right leg pain     and demonstrates  limitations as described in the problem list. Pt will benefit from physcial therapy services in order to maximize pain free and/or functional use of right knee. The following goals were discussed with the patient and patient is in agreement with them as to be addressed in the treatment plan.     Problem List: pain, decreased ROM, decreased flexibility, decreased strength, decreased balance and stability and decreased ability to fully participate in recreational/sports related activities.      GOALS: Short Term Goals:  3 weeks  1. Pt will demonstrate increased knee flexion AROM to 137 degrees.  2.  Independent with HEP to increase patient's tolerance for ADL's    Long Term Goals: 6 weeks  1.Report decreased    R LE    pain  <   / =  1  /10 with walking to increase tolerance for ADL's  2.Increased MMT  for  R LE  to increase tolerance for ADL and work activities.  3.Increased arom  for  R LE to improve normal movement patterns during ADL's.  4. Pt will report improvement in overall functional abilities of LE, evidenced by improved score on FOTO Hip Survey  5. Pt able to ambulate 125 without an assistive device.    Plan  Pt will be treated by physical therapy 2 times a week for 6 weeks for Pt Education, HEP, therapeutic exercises, neuromuscular re-education/ balance exercises, joint mobilizations, modalities prn   to achieve established goals. Mine may at times be seen by a PTA as part of the Rehab Team.     Cont PT for  6         weeks.   I certify the need for these services furnished under this plan of treatment and while under my care.______________________________ Physician/Referring Practitioner  Date of Signature

## 2018-07-18 NOTE — PLAN OF CARE
Physical Therapy Evaluation    Visit: 1    [id number]    Mine is a 83 y.o. female evaluated on 07/17/2018.     Diagnosis:   Encounter Diagnosis   Name Primary?    Right leg pain        DOS: 5/25/2018    Physician: SIDNEY Scott MD  Treatment Orders: PT Eval and Treat    Past Medical History:   Diagnosis Date    After-cataract of both eyes - Left Eye 10/11/2012    Anxiety     Aortic calcification 8/10/2016    Aortic valve stenosis, moderate     AR (allergic rhinitis)     Arthritis of facet joints at multiple vertebral levels 1/14/2016    Seen on lumbar MRI dated 01/14/16    Cataract     Cholelithiasis     Closed displaced intertrochanteric fracture of right femur with routine healing s/p IM nail on 5/20/2018 5/19/2018    Cystocele 12/1/2013    Esotropia, alternating - Both Eyes 10/11/2012    Essential hypertension     Gastroesophageal reflux disease without esophagitis 4/13/2018    Left ventricular diastolic dysfunction with preserved systolic function 8/10/2016    Lumbar radiculopathy 1/25/2016    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    Nondisplaced fracture of proximal end of humerus 8/7/2016    Osteoarthritis     Overweight (BMI 25.0-29.9) 4/13/2018    Pure hypercholesterolemia     Right-sided low back pain without sciatica 12/10/2015    Strabismus     Urethral hypermobility 12/1/2013     Current Outpatient Prescriptions   Medication Sig    acetaminophen (TYLENOL) 325 MG tablet Take 2 tablets (650 mg total) by mouth every 6 (six) hours as needed.    alendronate (FOSAMAX) 70 MG tablet Take 1 tablet (70 mg total) by mouth every 7 days. HOLD UNTIL SEEN BY YOUR PCP    cetirizine (ZYRTEC) 10 MG tablet Take 10 mg by mouth once daily.    cholecalciferol, vitamin D3, 2,000 unit Tab Take by mouth. 1 Tablet Oral Every day    cranberry 400 mg Cap Take by mouth once daily.     escitalopram oxalate (LEXAPRO) 5 MG Tab TAKE ONE  DAILY    fluticasone (FLONASE) 50 mcg/actuation nasal spray 2 sprays (100 mcg total) by Each Nare route every evening.    losartan (COZAAR) 25 MG tablet Take 1 tablet (25 mg total) by mouth once daily.    omeprazole (PRILOSEC) 40 MG capsule TAKE 1 CAPSULE EVERY MORNING  BEFORE  EATING    oxybutynin (DITROPAN-XL) 10 MG 24 hr tablet TAKE 1 TABLET EVERY DAY    pravastatin (PRAVACHOL) 20 MG tablet TAKE 1 TABLET EVERY EVENING    senna-docusate 8.6-50 mg (PERICOLACE) 8.6-50 mg per tablet Take 1 tablet by mouth 2 (two) times daily.     No current facility-administered medications for this visit.      Review of patient's allergies indicates:   Allergen Reactions    Ace inhibitors      Other reaction(s): cough    Codeine      Other reaction(s): Nausea     Precautions: Fall standard  Occupation: Retired      Subjective  Onset/TEO: sudden - fall in grocery store which Pt states may be secondary to a TIA.  Involved Area/Location: right  Current Symptoms: pain    Increases symptoms: walking  Decreases Symptoms: rest    Current Function: bathing on a shower bench, walks with RW  Previous function:independent in all ADL's    Diagnostic Tests: Radiographs    Pain Scale: Mine rates pain to be 2  Patient Goals: Pt would like to walk without a limp or assistive device  Pt reports a Hx of L LE sciatica  Pt reported that she fractured her R femur on 5/26/2018 and had surgery the following day.  3 days post op Pt was transferred [facility] and was D/[facility] to home with Home health care.  Pt reports that she lives in a single story home with her  with one step to enter/exit her home.      Objective    Functional Limitations  Reports - G Codes    Category:  Mobility   Tool:  FOTO Outcomes Measurement System.   Score:  Patient scored out 51 of 100 on the FOTO Outcomes Measurement System.   Current:  G 8911 CK   Goal:  G 1978 CJ          Observation: Pt ambulating with a RW    Posture:  WNL      Passive Range of Motion: Knee   Left Right   Flexion: 144 130   Extension 0 0        Hip ROM  Flexion R 107, L 107  Abduction R 26, L 35        Lower Extremity Strength  Right LE  Left LE    Knee extension: 4/5 Knee extension: 4+/5   Knee flexion: 3/5 Knee flexion: 3+/5   Hip flexion: 2/5 Hip flexion: 4/5   Hip extension:  NT Hip extension: NT   Hip abduction/Glut Medius: NT Hip abduction/Glut medius NT   Hip adduction: NT Hip adduction: NT   Hip internal rotation: NT Hip internal rotation: NT   Hip external rotation: NT Hip external rotation: NT   Ankle dorsiflexion:   5/5 Ankle dorsiflexion:   5/5   Ankle plantarflexion: NT Ankle plantarflexion: NT       Special Tests: Knee    Joint Mobility: hypomobile - R hip  Palpation: Tender to palpation over incisions  Sensation: intact to light touch    Edema: None      Gait: Mine ambulated 200 feet with rolling walker.    Balance: Static standing Fair                 Dynamic standing Fair-     PT Evaluation Completed? Yes  Discussed Plan of Care with patient: Yes    Treatment:  Pt performed there ex's for R LE strengthening, ROM, flexibility and endurance including:    Therapeutic Exercise x 30 min  R hip IR/ER in supine  Heel slides 20 x 3  Supine B hip abd with orange TB x 10  Supine hip add with ball x 10 with 3 sec hold  Quad sets  Gluteal sets  Add stationary bike next Tx  Exercises were reviewed and pt was able to demonstrate them prior to the end of the session.   Pt received a written HEP including Hip IR/ER, heel slides, Supine hip abd with band, supine hip add with ball.    Mine brantley good understanding of the education provided. Patient demo good return demo of skill of exercises.      Assessment  This is a 83 y.o. female referred to outpatient physical therapy and presents with a medical diagnosis of   Encounter Diagnosis   Name Primary?    Right leg pain     and demonstrates limitations as described in the problem list. Pt will benefit from physcial  therapy services in order to maximize pain free and/or functional use of right knee. The following goals were discussed with the patient and patient is in agreement with them as to be addressed in the treatment plan.     Problem List: pain, decreased ROM, decreased flexibility, decreased strength, decreased balance and stability and decreased ability to fully participate in recreational/sports related activities.      GOALS: Short Term Goals:  3 weeks  1. Pt will demonstrate increased knee flexion AROM to 137 degrees.  2.  Independent with HEP to increase patient's tolerance for ADL's    Long Term Goals: 6 weeks  1.Report decreased    R LE    pain  <   / =  1  /10 with walking to increase tolerance for ADL's  2.Increased MMT  for  R LE  to increase tolerance for ADL and work activities.  3.Increased arom  for  R LE to improve normal movement patterns during ADL's.  4. Pt will report improvement in overall functional abilities of LE, evidenced by improved score on FOTO Hip Survey  5. Pt able to ambulate 125 without an assistive device.    Plan  Pt will be treated by physical therapy 2 times a week for 6 weeks for Pt Education, HEP, therapeutic exercises, neuromuscular re-education/ balance exercises, joint mobilizations, modalities prn   to achieve established goals. Mine may at times be seen by a PTA as part of the Rehab Team.     Cont PT for  6         weeks.   I certify the need for these services furnished under this plan of treatment and while under my care.______________________________ Physician/Referring Practitioner  Date of Signature

## 2018-07-24 ENCOUNTER — CLINICAL SUPPORT (OUTPATIENT)
Dept: REHABILITATION | Facility: HOSPITAL | Age: 83
End: 2018-07-24
Attending: ORTHOPAEDIC SURGERY
Payer: MEDICARE

## 2018-07-24 DIAGNOSIS — M79.604 RIGHT LEG PAIN: Primary | ICD-10-CM

## 2018-07-24 PROCEDURE — 97110 THERAPEUTIC EXERCISES: CPT | Mod: PO

## 2018-07-24 NOTE — PROGRESS NOTES
"  Physical Therapy Daily Treatment Note     Name: Mine Marley  Clinic Number: [id number]    Therapy Diagnosis:   Encounter Diagnosis   Name Primary?    Right leg pain Yes     Physician: SIDNEY Scott MD    Visit Date: 7/24/2018   Evaluation Date:  7/17/18  Visit #/Visits authorized: 2/ 20  Time In:4;05  Time Out: 5:00  Treatment time: 50  Total Billable Time: 25minutes    Precautions: Standard and Fall    Subjective     Pt reports: only mild/minimal (R) knee discomfort. She reports compliance with ex's at home and reports that she walks some around the house without her walker AD but holds onto furniture. States that she has always been active and is used to exercising.   Pain: 3/10 (R) knee     Objective     Mine received therapeutic exercises to develop (R) LE  strength, endurance, ROM and flexibility for 50 minutes including:    Recumbent stationary bike x 5 minutes level 1    Supine ex's:   · QS x 20  · GS x 20  · Heel slides x 20  · (R) hip IR/ER x 20  · Hooklying hip abd/ER with OTB 2 x10  · Hooklying hip add isometric 2 x 10  · SAQ 2 x 10  · Bridging 2 x 10    Seated ex's  · Repeated sit<->stand from mat @ 20" height 2 x 5 trials ( cues for eccentric control)     Patient instructed in Gait training x 5 minutes on level surfaces with SC on (L) which she was able to perform Modified independently after instruction.          Written Home Exercises Provided:  Patient educated to continue with previously issued HEP to tolerance along with updated copy of HEP to include: Bridging ex and sit<->stand trials.  Exercises were reviewed and Mine was able to demonstrate them prior to the end of the session.  Mine demonstrated good  understanding of the education provided.        Assessment     Patient aditi Tx well. She was able to perform the above ex's/activities without much difficulty or c/o. She did well with the addition of the stationary bike today and was also progressed to perform gait with SC on " (L) Modified Independently. Patient appearing highly motivated and reports an active and independent lifestyle prior to her injury which is helping with her recovery. Still some (R)LE weakness evident. WIll monitor and attempt to progress as tolerated.       Pt will continue to benefit from skilled outpatient physical therapy to address the deficits listed in the problem list box on initial evaluation, provide pt/family education and to maximize pt's level of independence in the home and community environment.      GOALS: Short Term Goals:  3 weeks  1. Pt will demonstrate increased knee flexion AROM to 137 degrees.  2.  Independent with HEP to increase patient's tolerance for ADL's     Long Term Goals: 6 weeks  1.Report decreased    R LE    pain  <   / =  1  /10 with walking to increase tolerance for ADL's  2.Increased MMT  for  R LE  to increase tolerance for ADL and work activities.  3.Increased arom  for  R LE to improve normal movement patterns during ADL's.  4. Pt will report improvement in overall functional abilities of LE, evidenced by improved score on FOTO Hip Survey  5. Pt able to ambulate 125 without an assistive device.    Plan     Continue PT towards established plan of care and goals.     Andrei Guerra, PTA

## 2018-07-31 ENCOUNTER — CLINICAL SUPPORT (OUTPATIENT)
Dept: REHABILITATION | Facility: HOSPITAL | Age: 83
End: 2018-07-31
Attending: ORTHOPAEDIC SURGERY
Payer: MEDICARE

## 2018-07-31 DIAGNOSIS — M79.604 RIGHT LEG PAIN: ICD-10-CM

## 2018-07-31 PROCEDURE — 97110 THERAPEUTIC EXERCISES: CPT | Mod: PO

## 2018-07-31 NOTE — PROGRESS NOTES
"  Physical Therapy Daily Treatment Note     Name: Mine Wilkins  Clinic Number: [id number]    Therapy Diagnosis:   Encounter Diagnosis   Name Primary?    Right leg pain      Physician: SIDNEY Scott MD    Visit Date: 7/31/2018   Evaluation Date:  7/17/18  Visit #/Visits authorized: 2/ 20  Time In:4;05  Time Out: 5:00  Treatment time: 50  Total Billable Time: 30minutes    Precautions: Standard and Fall    Subjective     Pt reports: Pt reported continued discomfort in her R knee.  She stated that she is performing her exercises daily.  Pain: 3/10 (R) knee     Objective   Pt entered the clinic ambulating with a SPC independently.  Mine received therapeutic exercises to develop (R) LE  strength, endurance, ROM and flexibility for 50 minutes including:    Recumbent stationary bike x 10 minutes level 1    Supine ex's:   · QS x 20  · GS x 20  · Heel slides 2 x 20  · (R) hip IR/ER 2 x 20  · Hooklying hip abd/ER with OTB 3 x 10  · Hooklying hip add isometric 3 x 10  · SAQ 2 x 10  · Bridging 2 x 10    Seated ex's  · Repeated sit<->stand from mat @ 20" height 2 x 5 trials ( cues for eccentric control) - NP    Patient instructed in Gait training x 5 minutes on level surfaces with SC on (L) which she was able to perform Modified independently after instruction.          Written Home Exercises Provided:  Patient educated to continue with previously issued HEP to tolerance along with updated copy of HEP to include: Bridging ex and sit<->stand trials.  Exercises were reviewed and Mine was able to demonstrate them prior to the end of the session.  Mine demonstrated good  understanding of the education provided.        Assessment     Patient aditi Tx well. She was able to perform the above ex's/activities without much difficulty or c/o. She did well with the addition of the progression of time on the stationary bike today. Pt reported that she feels good following exercise.        Pt will continue to benefit from " skilled outpatient physical therapy to address the deficits listed in the problem list box on initial evaluation, provide pt/family education and to maximize pt's level of independence in the home and community environment.      GOALS: Short Term Goals:  3 weeks  1. Pt will demonstrate increased knee flexion AROM to 137 degrees.  2.  Independent with HEP to increase patient's tolerance for ADL's     Long Term Goals: 6 weeks  1.Report decreased    R LE    pain  <   / =  1  /10 with walking to increase tolerance for ADL's  2.Increased MMT  for  R LE  to increase tolerance for ADL and work activities.  3.Increased arom  for  R LE to improve normal movement patterns during ADL's.  4. Pt will report improvement in overall functional abilities of LE, evidenced by improved score on FOTO Hip Survey  5. Pt able to ambulate 125 without an assistive device.    Plan     Continue PT towards established plan of care and goals.     Daryl Duron, PT

## 2018-08-02 ENCOUNTER — CLINICAL SUPPORT (OUTPATIENT)
Dept: REHABILITATION | Facility: HOSPITAL | Age: 83
End: 2018-08-02
Attending: ORTHOPAEDIC SURGERY
Payer: MEDICARE

## 2018-08-02 DIAGNOSIS — M79.604 RIGHT LEG PAIN: ICD-10-CM

## 2018-08-02 PROCEDURE — 97140 MANUAL THERAPY 1/> REGIONS: CPT | Mod: PO

## 2018-08-02 PROCEDURE — 97110 THERAPEUTIC EXERCISES: CPT | Mod: PO

## 2018-08-02 NOTE — PROGRESS NOTES
"  Physical Therapy Daily Treatment Note     Name: Mine Wilkins  Clinic Number: [id number]    Therapy Diagnosis:   Encounter Diagnosis   Name Primary?    Right leg pain      Physician: SIDNEY Scott MD    Visit Date: 8/2/2018   Evaluation Date:  7/17/18  Visit #/Visits authorized: 4/ 20  Time In:4;05  Time Out: 5:00  Treatment time: 50  Total Billable Time: 50minutes    Precautions: Standard and Fall    Subjective     Pt reports: Pt reported continued discomfort in her R knee.  She stated that she is performing her exercises daily.  Pain: 3/10 (R) knee     Objective   Pt entered the clinic ambulating with a SPC independently.  Mine received therapeutic exercises to develop (R) LE  strength, endurance, ROM and flexibility for 50 minutes including:    Recumbent stationary bike x 10 minutes level 1    Supine ex's:   · QS x 2  · Heel slides 2 x 20  · (R) hip IR/ER 2 x 20  · Hooklying hip abd/ER with OTB 3 x 10  · Hooklying hip add isometric 3 x 10  · SAQ 2 x 10  · Bridging 2 x 10    Seated ex's  · Repeated sit<->stand from mat @ 20" height 2 x 5 trials ( cues for eccentric control) - np      · Manual Therapy x 8 min  · Pt received soft tissue mobilization to her distal quad and patella tendon  · Passive mobilization to the R patella    Patient instructed in Gait training x 5 minutes on level surfaces with SC on (L) which she was able to perform Modified independently after instruction.          Written Home Exercises Provided:  Patient educated to continue with previously issued HEP to tolerance along with updated copy of HEP to include: Bridging ex and sit<->stand trials.  Exercises were reviewed and Mine was able to demonstrate them prior to the end of the session.  Mine demonstrated good  understanding of the education provided.        Assessment     Patient aditi Tx well with increased resistance with activities as above. She was able to perform the above ex's/activities without much difficulty or c/o. " She did well with the addition of the progression of time on the stationary bike today. Pt reported that she feels good following exercise.        Pt will continue to benefit from skilled outpatient physical therapy to address the deficits listed in the problem list box on initial evaluation, provide pt/family education and to maximize pt's level of independence in the home and community environment.      GOALS: Short Term Goals:  3 weeks  1. Pt will demonstrate increased knee flexion AROM to 137 degrees.  2.  Independent with HEP to increase patient's tolerance for ADL's     Long Term Goals: 6 weeks  1.Report decreased    R LE    pain  <   / =  1  /10 with walking to increase tolerance for ADL's  2.Increased MMT  for  R LE  to increase tolerance for ADL and work activities.  3.Increased arom  for  R LE to improve normal movement patterns during ADL's.  4. Pt will report improvement in overall functional abilities of LE, evidenced by improved score on FOTO Hip Survey  5. Pt able to ambulate 125 without an assistive device.    Plan     Continue PT towards established plan of care and goals.     Daryl Duron, PT

## 2018-08-14 ENCOUNTER — HOSPITAL ENCOUNTER (OUTPATIENT)
Dept: RADIOLOGY | Facility: HOSPITAL | Age: 83
Discharge: HOME | End: 2018-08-14
Attending: PHYSICIAN ASSISTANT
Payer: MEDICARE

## 2018-08-14 ENCOUNTER — OFFICE VISIT (OUTPATIENT)
Dept: SPORTS MEDICINE | Facility: CLINIC | Age: 83
End: 2018-08-14
Payer: MEDICARE

## 2018-08-14 VITALS
WEIGHT: 142 LBS | BODY MASS INDEX: 25.16 KG/M2 | HEIGHT: 63 IN | HEART RATE: 78 BPM | DIASTOLIC BLOOD PRESSURE: 70 MMHG | SYSTOLIC BLOOD PRESSURE: 137 MMHG

## 2018-08-14 DIAGNOSIS — M25.551 RIGHT HIP PAIN: ICD-10-CM

## 2018-08-14 DIAGNOSIS — M89.8X5 PAIN IN RIGHT FEMUR: ICD-10-CM

## 2018-08-14 DIAGNOSIS — M89.8X5 PAIN IN RIGHT FEMUR: Primary | ICD-10-CM

## 2018-08-14 PROCEDURE — 99024 POSTOP FOLLOW-UP VISIT: CPT | Mod: S$GLB,,, | Performed by: PHYSICIAN ASSISTANT

## 2018-08-14 PROCEDURE — 99999 PR PBB SHADOW E&M-EST. PATIENT-LVL IV: CPT | Mod: PBBFAC,,, | Performed by: PHYSICIAN ASSISTANT

## 2018-08-14 PROCEDURE — 73552 X-RAY EXAM OF FEMUR 2/>: CPT | Mod: 26,RT,, | Performed by: RADIOLOGY

## 2018-08-14 PROCEDURE — 73552 X-RAY EXAM OF FEMUR 2/>: CPT | Mod: TC,FY,PO,RT

## 2018-08-21 ENCOUNTER — CLINICAL SUPPORT (OUTPATIENT)
Dept: REHABILITATION | Facility: HOSPITAL | Age: 83
End: 2018-08-21
Attending: ORTHOPAEDIC SURGERY
Payer: MEDICARE

## 2018-08-21 DIAGNOSIS — M79.604 RIGHT LEG PAIN: ICD-10-CM

## 2018-08-21 PROCEDURE — 97110 THERAPEUTIC EXERCISES: CPT | Mod: PO

## 2018-08-21 NOTE — PROGRESS NOTES
"  Physical Therapy Daily Treatment Note     Name: Mine Marley  Clinic Number: [id number]    Therapy Diagnosis:   Encounter Diagnosis   Name Primary?    Right leg pain      Physician: SIDNEY Scott MD    Visit Date: 8/21/2018   Evaluation Date:  7/17/18  Visit #/Visits authorized: 4/ 20  Time In:4:05  Time Out: 5:00  Treatment time: 55  Total Billable Time: 55 minutes    Precautions: Standard and Fall    Subjective     Pt reports: Pt reported continued discomfort in her R knee.  She stated that she is performing her exercises daily.  Pain: 3/10 (R) knee     Objective   Pt entered the clinic ambulating with a SPC independently.  Mine received therapeutic exercises to develop (R) LE  strength, endurance, ROM and flexibility for 50 minutes including:    Recumbent stationary bike x 10 minutes level 1    Supine ex's:   · QS 10 x 2  · Gluteal sets 10 x 3  · (R) hip IR/ER 2 x 20  · Hooklying hip abd/ER with OTB 3 x 10  · Hooklying hip add isometric 3 x 10  · SAQ 2 x 10 w 2#  · Bridging 2 x 10    Seated ex's  · Repeated sit<->stand from mat @ 20" height 2 x 5 trials ( cues for eccentric control)       · Manual Therapy x 8 min  · Pt received soft tissue mobilization to her distal quad and patella tendon  · Passive mobilization to the R patella    Patient instructed in Gait training x 5 minutes on level surfaces with SC on (L) which she was able to perform Modified independently after instruction.          Written Home Exercises Provided:  Patient educated to continue with previously issued HEP to tolerance along with updated copy of HEP to include: Bridging ex and sit<->stand trials.  Exercises were reviewed and Mine was able to demonstrate them prior to the end of the session.  Mine demonstrated good  understanding of the education provided.        Assessment     Patient aditi Tx well today.  She reports that she is performing all of her exercises at home, but has not been able to fine an ankle weight.  Pt " reported that she feels good following exercise.        Pt will continue to benefit from skilled outpatient physical therapy to address the deficits listed in the problem list box on initial evaluation, provide pt/family education and to maximize pt's level of independence in the home and community environment.      GOALS: Short Term Goals:  3 weeks  1. Pt will demonstrate increased knee flexion AROM to 137 degrees.  2.  Independent with HEP to increase patient's tolerance for ADL's     Long Term Goals: 6 weeks  1.Report decreased    R LE    pain  <   / =  1  /10 with walking to increase tolerance for ADL's  2.Increased MMT  for  R LE  to increase tolerance for ADL and work activities.  3.Increased arom  for  R LE to improve normal movement patterns during ADL's.  4. Pt will report improvement in overall functional abilities of LE, evidenced by improved score on FOTO Hip Survey  5. Pt able to ambulate 125 without an assistive device.    Plan     Continue PT towards established plan of care and goals.     Daryl Duron, PT

## 2018-08-22 ENCOUNTER — LAB VISIT (OUTPATIENT)
Dept: LAB | Facility: HOSPITAL | Age: 83
End: 2018-08-22
Attending: INTERNAL MEDICINE
Payer: MEDICARE

## 2018-08-22 DIAGNOSIS — I10 ESSENTIAL HYPERTENSION, BENIGN: ICD-10-CM

## 2018-08-22 DIAGNOSIS — E78.5 HYPERLIPIDEMIA, UNSPECIFIED HYPERLIPIDEMIA TYPE: ICD-10-CM

## 2018-08-22 DIAGNOSIS — Z00.00 ROUTINE GENERAL MEDICAL EXAMINATION AT A HEALTH CARE FACILITY: ICD-10-CM

## 2018-08-22 LAB
ALBUMIN SERPL BCP-MCNC: 4.2 G/DL
ALP SERPL-CCNC: 89 U/L
ALT SERPL W/O P-5'-P-CCNC: 25 U/L
ANION GAP SERPL CALC-SCNC: 10 MMOL/L
AST SERPL-CCNC: 26 U/L
BASOPHILS # BLD AUTO: 0.06 K/UL
BASOPHILS NFR BLD: 1 %
BILIRUB SERPL-MCNC: 0.5 MG/DL
BUN SERPL-MCNC: 19 MG/DL
CALCIUM SERPL-MCNC: 9.8 MG/DL
CHLORIDE SERPL-SCNC: 105 MMOL/L
CHOLEST SERPL-MCNC: 173 MG/DL
CHOLEST/HDLC SERPL: 3.2 {RATIO}
CO2 SERPL-SCNC: 23 MMOL/L
CREAT SERPL-MCNC: 0.8 MG/DL
DIFFERENTIAL METHOD: ABNORMAL
EOSINOPHIL # BLD AUTO: 0.1 K/UL
EOSINOPHIL NFR BLD: 2.2 %
ERYTHROCYTE [DISTWIDTH] IN BLOOD BY AUTOMATED COUNT: 14.6 %
EST. GFR  (AFRICAN AMERICAN): >60 ML/MIN/1.73 M^2
EST. GFR  (NON AFRICAN AMERICAN): >60 ML/MIN/1.73 M^2
GLUCOSE SERPL-MCNC: 92 MG/DL
HCT VFR BLD AUTO: 38.9 %
HDLC SERPL-MCNC: 54 MG/DL
HDLC SERPL: 31.2 %
HGB BLD-MCNC: 12.3 G/DL
IMM GRANULOCYTES # BLD AUTO: 0 K/UL
IMM GRANULOCYTES NFR BLD AUTO: 0 %
LDLC SERPL CALC-MCNC: 104.4 MG/DL
LYMPHOCYTES # BLD AUTO: 1.7 K/UL
LYMPHOCYTES NFR BLD: 28.4 %
MCH RBC QN AUTO: 26.2 PG
MCHC RBC AUTO-ENTMCNC: 31.6 G/DL
MCV RBC AUTO: 83 FL
MONOCYTES # BLD AUTO: 0.5 K/UL
MONOCYTES NFR BLD: 7.6 %
NEUTROPHILS # BLD AUTO: 3.6 K/UL
NEUTROPHILS NFR BLD: 60.8 %
NONHDLC SERPL-MCNC: 119 MG/DL
NRBC BLD-RTO: 0 /100 WBC
PLATELET # BLD AUTO: 338 K/UL
PMV BLD AUTO: 10.9 FL
POTASSIUM SERPL-SCNC: 4.3 MMOL/L
PROT SERPL-MCNC: 7.3 G/DL
RBC # BLD AUTO: 4.7 M/UL
SODIUM SERPL-SCNC: 138 MMOL/L
TRIGL SERPL-MCNC: 73 MG/DL
TSH SERPL DL<=0.005 MIU/L-ACNC: 2.8 UIU/ML
WBC # BLD AUTO: 5.92 K/UL

## 2018-08-22 PROCEDURE — 85025 COMPLETE CBC W/AUTO DIFF WBC: CPT

## 2018-08-22 PROCEDURE — 84443 ASSAY THYROID STIM HORMONE: CPT

## 2018-08-22 PROCEDURE — 80053 COMPREHEN METABOLIC PANEL: CPT

## 2018-08-22 PROCEDURE — 80061 LIPID PANEL: CPT

## 2018-08-22 PROCEDURE — 36415 COLL VENOUS BLD VENIPUNCTURE: CPT | Mod: PO

## 2018-08-23 ENCOUNTER — CLINICAL SUPPORT (OUTPATIENT)
Dept: REHABILITATION | Facility: HOSPITAL | Age: 83
End: 2018-08-23
Attending: ORTHOPAEDIC SURGERY
Payer: MEDICARE

## 2018-08-23 DIAGNOSIS — M79.604 RIGHT LEG PAIN: ICD-10-CM

## 2018-08-23 PROCEDURE — 97110 THERAPEUTIC EXERCISES: CPT | Mod: HCNC,PO

## 2018-08-23 PROCEDURE — 97140 MANUAL THERAPY 1/> REGIONS: CPT | Mod: HCNC,PO

## 2018-08-23 NOTE — PROGRESS NOTES
"  Physical Therapy Daily Treatment Note     Name: Mine Wilkins  Clinic Number: [id number]    Therapy Diagnosis:   Encounter Diagnosis   Name Primary?    Right leg pain      Physician: SIDNEY Scott MD    Visit Date: 8/23/2018   Evaluation Date:  7/17/18  Visit #/Visits authorized: 4/ 20  Time In:4;05  Time Out: 5:00  Treatment time: 50  Total Billable Time: 50minutes    Precautions: Standard and Fall    Subjective     Pt reports: Pt reported continued discomfort in her R knee.  She stated that she is performing her exercises daily.  Pain: 3/10 (R) knee     Objective   Pt entered the clinic ambulating without an assistive device independently.  Mine received therapeutic exercises to develop (R) LE  strength, endurance, ROM and flexibility for 50 minutes including:    Lower Extremity Strength  Right LE   Left LE     Knee extension: 4/5 Knee extension: 4+/5   Knee flexion: 3+/5 Knee flexion: 4/5   Hip flexion: 2/5 Hip flexion: 4/5   Hip extension:  2/5 Hip extension: 3+/5   Hip abduction/Glut Medius: 2+/5 Hip abduction/Glut medius 3+/5   Hip adduction: NT Hip adduction: NT   Hip internal rotation: NT Hip internal rotation: NT   Hip external rotation: NT Hip external rotation: NT   Ankle dorsiflexion:   5/5 Ankle dorsiflexion:   5/5   Ankle plantarflexion: NT Ankle plantarflexion: NT           Recumbent stationary bike x 10 minutes level 1    Supine ex's:   · QS x 2  · Heel slides 2 x 20  · (R) hip IR/ER 2 x 20  · Hooklying hip abd/ER with OTB 3 x 10  · Hooklying hip add isometric 3 x 10  · SAQ 2 x 10  · Bridging 2 x 10    Seated ex's  · Repeated sit<->stand from mat @ 20" height 2 x 5 trials ( cues for eccentric control) - np      · Manual Therapy x 8 min  · Pt received soft tissue mobilization to her distal quad and patella tendon  · Passive mobilization to the R patella    Patient instructed in Gait training x 5 minutes on level surfaces with SC on (L) which she was able to perform Modified " independently after instruction.          Written Home Exercises Provided:  Patient educated to continue with previously issued HEP to tolerance along with updated copy of HEP to include: Bridging ex and sit<->stand trials.  Exercises were reviewed and Mine was able to demonstrate them prior to the end of the session.  Mine demonstrated good  understanding of the education provided.        Assessment     Patient aditi Tx well today. She was able to perform the above ex's/activities without much difficulty or c/o. She did well with the addition of the progression of time on the stationary bike today. Pt reported that she feels good following exercise.        Pt will continue to benefit from skilled outpatient physical therapy to address the deficits listed in the problem list box on initial evaluation, provide pt/family education and to maximize pt's level of independence in the home and community environment.      GOALS: Short Term Goals:  3 weeks  1. Pt will demonstrate increased knee flexion AROM to 137 degrees.  2.  Independent with HEP to increase patient's tolerance for ADL's     Long Term Goals: 6 weeks  1.Report decreased    R LE    pain  <   / =  1  /10 with walking to increase tolerance for ADL's  2.Increased MMT  for  R LE  to increase tolerance for ADL and work activities.  3.Increased arom  for  R LE to improve normal movement patterns during ADL's.  4. Pt will report improvement in overall functional abilities of LE, evidenced by improved score on FOTO Hip Survey  5. Pt able to ambulate 125 without an assistive device.    Plan     Continue PT towards established plan of care and goals.     Daryl Duron, PT

## 2018-08-29 ENCOUNTER — OFFICE VISIT (OUTPATIENT)
Dept: INTERNAL MEDICINE | Facility: CLINIC | Age: 83
End: 2018-08-29
Payer: MEDICARE

## 2018-08-29 VITALS
TEMPERATURE: 98 F | RESPIRATION RATE: 16 BRPM | WEIGHT: 136.88 LBS | SYSTOLIC BLOOD PRESSURE: 132 MMHG | HEIGHT: 63 IN | OXYGEN SATURATION: 98 % | DIASTOLIC BLOOD PRESSURE: 84 MMHG | BODY MASS INDEX: 24.25 KG/M2 | HEART RATE: 73 BPM

## 2018-08-29 DIAGNOSIS — I35.0 AORTIC VALVE STENOSIS, MODERATE: ICD-10-CM

## 2018-08-29 DIAGNOSIS — Z12.39 BREAST CANCER SCREENING: ICD-10-CM

## 2018-08-29 DIAGNOSIS — Z00.00 ENCOUNTER FOR PREVENTIVE HEALTH EXAMINATION: Primary | ICD-10-CM

## 2018-08-29 DIAGNOSIS — Z78.0 POSTMENOPAUSAL: ICD-10-CM

## 2018-08-29 DIAGNOSIS — E78.5 DYSLIPIDEMIA: ICD-10-CM

## 2018-08-29 DIAGNOSIS — I10 ESSENTIAL HYPERTENSION: ICD-10-CM

## 2018-08-29 DIAGNOSIS — M81.0 SENILE OSTEOPOROSIS: ICD-10-CM

## 2018-08-29 PROCEDURE — 3079F DIAST BP 80-89 MM HG: CPT | Mod: CPTII,S$GLB,, | Performed by: INTERNAL MEDICINE

## 2018-08-29 PROCEDURE — 99999 PR PBB SHADOW E&M-EST. PATIENT-LVL IV: CPT | Mod: PBBFAC,,, | Performed by: INTERNAL MEDICINE

## 2018-08-29 PROCEDURE — 3075F SYST BP GE 130 - 139MM HG: CPT | Mod: CPTII,S$GLB,, | Performed by: INTERNAL MEDICINE

## 2018-08-29 PROCEDURE — 99397 PER PM REEVAL EST PAT 65+ YR: CPT | Mod: S$GLB,,, | Performed by: INTERNAL MEDICINE

## 2018-08-29 RX ORDER — LOSARTAN POTASSIUM 50 MG/1
50 TABLET ORAL DAILY
Qty: 90 TABLET | Refills: 3 | Status: SHIPPED | OUTPATIENT
Start: 2018-08-29 | End: 2019-03-11 | Stop reason: SDUPTHER

## 2018-08-29 RX ORDER — ALENDRONATE SODIUM 70 MG/1
70 TABLET ORAL
Qty: 12 TABLET | Refills: 3
Start: 2018-08-29 | End: 2018-09-28 | Stop reason: SDUPTHER

## 2018-08-29 NOTE — PROGRESS NOTES
History of present illness:  83-year-old lady in today for general health assessment.    Current medication:  All medications noted and reviewed in the electronic medical record medication list and updated    Review of systems:  General: no fever, chills, generalized body aches. No unexpected weight loss.  Eyes:  No visual disturbances.  HEENT:  No hoarseness, dysphagia, ear pain.  Respiratory:  No cough, no shortness of breath.  Cardiovascular: no chest pain, palpitations, cough, exertional limb pain. No edema.  GI: no nausea, vomiting.  No abdominal pain. No change in bowel habits.  No melena, no hematochezia.  : no dysuria. No change in the color or character of the urine. No urinary frequency.  Musculoskeletal:  Since our last visit the patient has sustained a traumatic fracture of the right femur subtrochanteric status post fixation and currently continuing outpatient rehab  Neurologic:  No focal neurological complaints.  No headaches.  Skin:  No rashes or other concerns.  Psych:  No emotional issues    Past medical history, past surgical history, family medical history social history is are all noted and reviewed in the electronic medical record history section.    Health screenings:  Colonoscopy 2015.  Mammogram May 2015.  Bone densitometry May 2015.  She has had 23 and 13 Valent pneumococcal vaccine.    Physical examination:  GENERAL:  Alert, appropriately groomed, no acute distress.  VS:  Blood pressure taken manually by this examiner is 132/84  EYES: sclerae white ,nonicteric. PERRL.  HEENT:  Normocephalic. Ear canals and tympanic membranes normal. Mouth and pharynx normal. No thyromegaly. Trachea midline and freely mobile.  LUNGS:  Clear to ascultation and normal to percussion.  CARDIOVASCULAR:  Normal heart sounds.  2/6 systolic murmur heard best at the aortic area.  Carotids full bilaterally with left carotid bruit.  Pedal pulses intact .  No abdominal bruit.  No peripheral extremity edema.  GI: the  abdomen is soft, no distension. No masses , tenderness, organomegaly.  LYMPHATIC:  No axillary, inguinal , cervical adenopathy.  MUSCULOSKELETAL:  Range of motion, stability and strength of the right and left upper  extremities normal.  Range of motion left lower extremity normal.  No swollen or tender joints  NEUROLOGIC:  DTR's normal. No gross motor or sensory deficits apparent, gait normal.  SKIN:  No rashes.   MS:  Alert, oriented , affect and mood all appropriate    Data:  Lab data noted reviewed from August 22, 2018.    Impression:  83-year-old lady in reasonably good health with several chronic medical conditions.  Hypertension is well controlled.  Dyslipidemia on statin therapy.  Osteoporosis on pharmacologic therapy with alendronate for the last 2 years.  Recent subtrochanteric fracture of the right status post fixation.  Mild to moderate aortic valve stenosis.    Plan:  Update bone densitometry.  After discussion we will update mammogram.  Return to clinic 6 months.

## 2018-08-31 ENCOUNTER — CLINICAL SUPPORT (OUTPATIENT)
Dept: REHABILITATION | Facility: HOSPITAL | Age: 83
End: 2018-08-31
Attending: ORTHOPAEDIC SURGERY
Payer: MEDICARE

## 2018-08-31 DIAGNOSIS — M79.604 RIGHT LEG PAIN: ICD-10-CM

## 2018-08-31 PROCEDURE — 97110 THERAPEUTIC EXERCISES: CPT | Mod: PO

## 2018-08-31 NOTE — PROGRESS NOTES
"  Physical Therapy Daily Treatment Note     Name: Mine Wilkins  Clinic Number: [id number]    Therapy Diagnosis:   Encounter Diagnosis   Name Primary?    Right leg pain      Physician: SIDNEY Scott MD    Visit Date: 8/31/2018   Evaluation Date:  7/17/18  Visit #/Visits authorized: 7/ 20  Time In:2:30  Time Out: 4:30  Treatment time: 50  Total Billable Time: 50minutes    Precautions: Standard and Fall    Subjective     Pt reports: Pt reported continued discomfort in her R knee.  She stated that she is performing her exercises daily.  Pain: 3/10 (R) knee     Objective   Pt entered the clinic ambulating without an assistive device independently.  Mine received therapeutic exercises to develop (R) LE  strength, endurance, ROM and flexibility for 50 minutes including:    Lower Extremity Strength  Right LE   Left LE     Knee extension: 4/5 Knee extension: 4+/5   Knee flexion: 3+/5 Knee flexion: 4/5   Hip flexion: 2/5 Hip flexion: 4/5   Hip extension:  2/5 Hip extension: 3+/5   Hip abduction/Glut Medius: 2+/5 Hip abduction/Glut medius 3+/5   Hip adduction: NT Hip adduction: NT   Hip internal rotation: NT Hip internal rotation: NT   Hip external rotation: NT Hip external rotation: NT   Ankle dorsiflexion:   5/5 Ankle dorsiflexion:   5/5   Ankle plantarflexion: NT Ankle plantarflexion: NT           Recumbent stationary bike x 10 minutes level 1    Supine ex's:   · QS 10 x 2   Standing hip abd 10 x 3 with 2#  · Clams 10 x 3  · Gluteal sets 10 x 3  · Hooklying hip add isometric 3 x 10 with yellow ball  · SAQ 2 x 10 with 3 #  · Bridging 2 x 10    Seated ex's  · Repeated sit<->stand from mat @ 20" height 2 x 5 trials ( cues for eccentric control) - np      · Manual Therapy x 8 min  · Pt received soft tissue mobilization to her distal quad and patella tendon  · Passive mobilization to the R patella    Patient instructed in Gait training x 5 minutes on level surfaces with SC on (L) which she was able to perform " Modified independently after instruction.          Written Home Exercises Provided:  Patient educated to continue with previously issued HEP to tolerance along with updated copy of HEP to include: Bridging ex and sit<->stand trials.  Exercises were reviewed and Mine was able to demonstrate them prior to the end of the session.  Mine demonstrated good  understanding of the education provided.        Assessment     Patient aditi Tx well with increased resistance with activities as above. Pt reported that she feels good following exercise.        Pt will continue to benefit from skilled outpatient physical therapy to address the deficits listed in the problem list box on initial evaluation, provide pt/family education and to maximize pt's level of independence in the home and community environment.      GOALS: Short Term Goals:  3 weeks  1. Pt will demonstrate increased knee flexion AROM to 137 degrees.  2.  Independent with HEP to increase patient's tolerance for ADL's     Long Term Goals: 6 weeks  1.Report decreased    R LE    pain  <   / =  1  /10 with walking to increase tolerance for ADL's  2.Increased MMT  for  R LE  to increase tolerance for ADL and work activities.  3.Increased arom  for  R LE to improve normal movement patterns during ADL's.  4. Pt will report improvement in overall functional abilities of LE, evidenced by improved score on FOTO Hip Survey  5. Pt able to ambulate 125 without an assistive device.    Plan     Continue PT towards established plan of care and goals.     Daryl Duron, PT

## 2018-09-06 ENCOUNTER — HOSPITAL ENCOUNTER (OUTPATIENT)
Dept: RADIOLOGY | Facility: HOSPITAL | Age: 83
Discharge: HOME | End: 2018-09-06
Attending: INTERNAL MEDICINE
Payer: MEDICARE

## 2018-09-06 DIAGNOSIS — Z12.39 BREAST CANCER SCREENING: ICD-10-CM

## 2018-09-06 PROCEDURE — 77067 SCR MAMMO BI INCL CAD: CPT | Mod: 26,,, | Performed by: RADIOLOGY

## 2018-09-06 PROCEDURE — 77063 BREAST TOMOSYNTHESIS BI: CPT | Mod: 26,,, | Performed by: RADIOLOGY

## 2018-09-06 PROCEDURE — 77067 SCR MAMMO BI INCL CAD: CPT | Mod: TC,PO

## 2018-09-12 ENCOUNTER — CLINICAL SUPPORT (OUTPATIENT)
Dept: REHABILITATION | Facility: HOSPITAL | Age: 83
End: 2018-09-12
Attending: ORTHOPAEDIC SURGERY
Payer: MEDICARE

## 2018-09-12 DIAGNOSIS — M79.604 RIGHT LEG PAIN: ICD-10-CM

## 2018-09-12 PROCEDURE — 97110 THERAPEUTIC EXERCISES: CPT | Mod: PO

## 2018-09-12 NOTE — PROGRESS NOTES
"  Physical Therapy Daily Treatment Note     Name: Mine Wilkins  Clinic Number: [id number]    Therapy Diagnosis:   Encounter Diagnosis   Name Primary?    Right leg pain      Physician: SIDNEY Scott MD    Visit Date: 9/12/2018   Evaluation Date:  7/17/18  Visit #/Visits authorized: 7/ 20  Time In:3:40  Time Out: 4:30  Treatment time: 50  Total Billable Time: 25 minutes    Precautions: Standard and Fall    Subjective     Pt reports: Pt reported that her back is bothering her today.  She also reported that back pain is often a limiting factor when performing clams for hip abd strengthening.  Pain: 3/10 (R) knee     Objective   Pt entered the clinic ambulating without an assistive device independently.  Mine received therapeutic exercises to develop (R) LE  strength, endurance, ROM and flexibility for 50 minutes including:    Lower Extremity Strength  Right LE   Left LE     Knee extension: 4/5 Knee extension: 4+/5   Knee flexion: 3+/5 Knee flexion: 4/5   Hip flexion: 2/5 Hip flexion: 4/5   Hip extension:  2/5 Hip extension: 3+/5   Hip abduction/Glut Medius: 2+/5 Hip abduction/Glut medius 3+/5   Hip adduction: NT Hip adduction: NT   Hip internal rotation: NT Hip internal rotation: NT   Hip external rotation: NT Hip external rotation: NT   Ankle dorsiflexion:   5/5 Ankle dorsiflexion:   5/5   Ankle plantarflexion: NT Ankle plantarflexion: NT           Recumbent stationary bike x 10 minutes level 1    Supine ex's:   · QS 10 x 2   Standing hip abd 10 x 3 with 3#  · Clams 10 x 3  · Gluteal sets 10 x 3  · Hooklying hip add isometric 3 x 10 with yellow ball  · SAQ 2 x 10 with 3 #  · Bridging 2 x 10    Seated ex's  · Repeated sit<->stand from mat @ 20" height 2 x 5 trials ( cues for eccentric control)       · Manual Therapy x 3 min  · Pt received soft tissue mobilization to her distal quad and patella tendon  · Passive mobilization to the R patella    Patient instructed in Gait training x 5 minutes on level " surfaces with SC on (L) which she was able to perform Modified independently after instruction.  - np         Written Home Exercises Provided:  Patient educated to continue with previously issued HEP to tolerance along with updated copy of HEP to include: Bridging ex and sit<->stand trials.  Exercises were reviewed and Mine was able to demonstrate them prior to the end of the session.  Mine demonstrated good  understanding of the education provided.        Assessment     Patient aditi Tx well with some low back discomfort with performing clams with the R LE       Pt will continue to benefit from skilled outpatient physical therapy to address the deficits listed in the problem list box on initial evaluation, provide pt/family education and to maximize pt's level of independence in the home and community environment.      GOALS: Short Term Goals:  3 weeks  1. Pt will demonstrate increased knee flexion AROM to 137 degrees.  2.  Independent with HEP to increase patient's tolerance for ADL's     Long Term Goals: 6 weeks  1.Report decreased    R LE    pain  <   / =  1  /10 with walking to increase tolerance for ADL's  2.Increased MMT  for  R LE  to increase tolerance for ADL and work activities.  3.Increased arom  for  R LE to improve normal movement patterns during ADL's.  4. Pt will report improvement in overall functional abilities of LE, evidenced by improved score on FOTO Hip Survey  5. Pt able to ambulate 125 without an assistive device.    Plan     Continue PT towards established plan of care and goals.     Daryl Duron, PT

## 2018-09-28 RX ORDER — ALENDRONATE SODIUM 70 MG/1
70 TABLET ORAL
Qty: 12 TABLET | Refills: 3 | Status: SHIPPED | OUTPATIENT
Start: 2018-09-28 | End: 2019-05-17 | Stop reason: SDUPTHER

## 2018-09-28 NOTE — TELEPHONE ENCOUNTER
----- Message from Tete Nair sent at 9/28/2018 10:46 AM CDT -----  Contact: slef/[phone]  Pt called in regards to checking the status of her Rx refill for alendronate (FOSAMAX) 70 MG tablet 12 tablet 3 8/29/2018 8/29/2019 No Take 1 tablet (70 mg total) by mouth every 7 days    [facility] pharmacy [phone]  Please advise

## 2018-10-03 ENCOUNTER — TELEPHONE (OUTPATIENT)
Dept: INTERNAL MEDICINE | Facility: CLINIC | Age: 83
End: 2018-10-03

## 2018-10-03 NOTE — TELEPHONE ENCOUNTER
I do not see that we have seen her previously for this issue. If not previously evaluated she should see us for initial eval etc

## 2018-10-03 NOTE — TELEPHONE ENCOUNTER
----- Message from Jazmin Garcia sent at 10/3/2018 11:03 AM CDT -----  Contact: Mine Wilkins [phone] or  [phone]  Patient would like to get a referral.  Does the patient already have the specialty clinic appointment scheduled: NO   If yes, what date is the appointment scheduled:     Referral to what specialty:  Pain Management  Reason (be specific):  Back Pain   Did you confirm the insurance currently in Epic is correct (important for a referral):  Yes  Does the patient want the referral with a specific physician:  No  [facility]   Comments:      ##Advise the patient that once the physician approves this either a nurse or the  will return their call##

## 2018-10-04 NOTE — TELEPHONE ENCOUNTER
Spoke with pt to advise that an OV with Dr. Oswald is needed.    Was able to offer an appt for 10/5/18 at 3:20 pm.    Verbalized understanding and agreeable to such.

## 2018-10-05 ENCOUNTER — OFFICE VISIT (OUTPATIENT)
Dept: INTERNAL MEDICINE | Facility: CLINIC | Age: 83
End: 2018-10-05
Payer: MEDICARE

## 2018-10-05 VITALS
TEMPERATURE: 99 F | HEIGHT: 63 IN | BODY MASS INDEX: 25.24 KG/M2 | HEART RATE: 77 BPM | DIASTOLIC BLOOD PRESSURE: 66 MMHG | SYSTOLIC BLOOD PRESSURE: 132 MMHG | RESPIRATION RATE: 18 BRPM | WEIGHT: 142.44 LBS

## 2018-10-05 DIAGNOSIS — M54.50 ACUTE RIGHT-SIDED LOW BACK PAIN WITHOUT SCIATICA: Primary | ICD-10-CM

## 2018-10-05 PROCEDURE — 99213 OFFICE O/P EST LOW 20 MIN: CPT | Mod: S$PBB,,, | Performed by: INTERNAL MEDICINE

## 2018-10-05 PROCEDURE — 99213 OFFICE O/P EST LOW 20 MIN: CPT | Mod: PBBFAC,PO | Performed by: INTERNAL MEDICINE

## 2018-10-05 PROCEDURE — 1100F PTFALLS ASSESS-DOCD GE2>/YR: CPT | Mod: CPTII,,, | Performed by: INTERNAL MEDICINE

## 2018-10-05 PROCEDURE — 99999 PR PBB SHADOW E&M-EST. PATIENT-LVL III: CPT | Mod: PBBFAC,,, | Performed by: INTERNAL MEDICINE

## 2018-10-05 PROCEDURE — 3078F DIAST BP <80 MM HG: CPT | Mod: CPTII,,, | Performed by: INTERNAL MEDICINE

## 2018-10-05 PROCEDURE — 3075F SYST BP GE 130 - 139MM HG: CPT | Mod: CPTII,,, | Performed by: INTERNAL MEDICINE

## 2018-10-05 PROCEDURE — 3288F FALL RISK ASSESSMENT DOCD: CPT | Mod: CPTII,,, | Performed by: INTERNAL MEDICINE

## 2018-10-05 RX ORDER — TRAMADOL HYDROCHLORIDE 50 MG/1
50 TABLET ORAL EVERY 6 HOURS PRN
Qty: 12 TABLET | Refills: 0 | Status: SHIPPED | OUTPATIENT
Start: 2018-10-05 | End: 2018-10-15

## 2018-10-05 RX ORDER — TIZANIDINE HYDROCHLORIDE 4 MG/1
4 CAPSULE, GELATIN COATED ORAL EVERY 8 HOURS PRN
Qty: 20 CAPSULE | Refills: 1 | Status: SHIPPED | OUTPATIENT
Start: 2018-10-05 | End: 2018-10-15

## 2018-10-15 ENCOUNTER — TELEPHONE (OUTPATIENT)
Dept: INTERNAL MEDICINE | Facility: CLINIC | Age: 83
End: 2018-10-15

## 2018-10-15 RX ORDER — TRAMADOL HYDROCHLORIDE 50 MG/1
50 TABLET ORAL EVERY 6 HOURS PRN
Qty: 30 TABLET | Refills: 2 | Status: SHIPPED | OUTPATIENT
Start: 2018-10-15 | End: 2018-10-25

## 2018-10-15 NOTE — TELEPHONE ENCOUNTER
"----- Message from Atul Sorto sent at 10/15/2018 11:18 AM CDT -----  Contact: Self / [phone] Mobile   Patient would like to get medical advice.    Symptoms (please be specific): Merlin Back Pain    How long has patient had these symptoms:  Since Last Week     Pharmacy name and phone # (DON'T enter "on file" or "in chart"):  [facility] ([place]) - [address] [place] [address] [phone] (Phone)  [phone] (Fax)    Any drug allergies:  Yes    Would you prefer a response via [employer]?:      Comments:    "

## 2018-10-15 NOTE — TELEPHONE ENCOUNTER
Spoke with pt who advises that she was seen last week for her back pain and received pain meds and muscle relaxants.    She has run out of the meds and have been using aleve with no relief.    She is not sure what else to do for the pain.    Pt rates the pain 8 out of 10 and the pain is constant, pain is worse with movement.    Denies pain or weakness to legs.    Please review and advise.    Thanks.

## 2018-10-15 NOTE — TELEPHONE ENCOUNTER
Noted.    Attempted to contact the pt to advise.    But the mailbox is full and not enough room to leave a message.    Unable to advise pt that there is a refill on the original rx.

## 2018-10-15 NOTE — TELEPHONE ENCOUNTER
Spoke with pt to advise.    Verbalized understanding and declined PT at this time.    Pt is requesting a refill on the muscle relaxant to [facility].    Please advise.    Thanks.

## 2018-10-15 NOTE — TELEPHONE ENCOUNTER
We30 can call additional pain meds until this hopefully calms down soon.  PT might be helpful if she is interested.  New rx tramadol entered for # 30 with 2 refills. Ish call in

## 2018-10-16 NOTE — PROGRESS NOTES
History of present illness:  83-year-old lady with hypertension, dyslipidemia, diastolic dysfunction auditory healthy is in today with back range.  The patient to the 2 weeks of right lower back pain.  Nonradiating.. No trauma.  No new bowel or urinary issues.  Patient has had similar in the past.  Typically will come down after several weeks.    Current medications:  All medications are noted and reviewed in the electronic medical record medication list.    Review of systems:  Constitutional:  No fever no chills no generalized body aches.  GI:  No bowel incontinence no abdominal pain.  :  No dysuria frequency change in the color care of her urine.  Skin:  No rashes    Past medical history, past surgical history, family medical history social history is are all noted reviewed the electronic medical record history sections.      Physical examination:  General:  Alert female appropriately groomed no acute distress  Eyes:  Sclerae are white and nonicteric.  HEENT:  Neck supple no masses no thyromegaly  Lungs:  Clear to auscultation.  Cardiovascular:  Regular rate and rhythm. 2/6 systolic murmur.  No JVD.  The no significant edema.  Musculoskeletal:  No gross deformity back.  He has mild tenderness to palpation over the lower lumbar sacral paraspinous musculature.  Her masses no rash negative straight leg raise.  No CVA tenderness    Impression:    musculoskeletal right lower back pain flare with history of similar and past.    Plan:  Discussed conservative treatment.  Offered physical therapy, declines.  Given tizanidine 4 mg 2 p.r.n..  Tramadol p.r.n.  Advise over the next several weeks if without gradual resolution or worsening of symptoms.

## 2018-10-23 ENCOUNTER — TELEPHONE (OUTPATIENT)
Dept: INTERNAL MEDICINE | Facility: CLINIC | Age: 83
End: 2018-10-23

## 2018-10-23 ENCOUNTER — LAB VISIT (OUTPATIENT)
Dept: LAB | Facility: HOSPITAL | Age: 83
End: 2018-10-23
Attending: INTERNAL MEDICINE
Payer: MEDICARE

## 2018-10-23 DIAGNOSIS — R30.0 DYSURIA: Primary | ICD-10-CM

## 2018-10-23 DIAGNOSIS — R30.0 DYSURIA: ICD-10-CM

## 2018-10-23 LAB
AMORPH CRY UR QL COMP ASSIST: ABNORMAL
BACTERIA #/AREA URNS AUTO: ABNORMAL /HPF
BILIRUB UR QL STRIP: NEGATIVE
CLARITY UR REFRACT.AUTO: ABNORMAL
COLOR UR AUTO: YELLOW
GLUCOSE UR QL STRIP: NEGATIVE
HGB UR QL STRIP: NEGATIVE
KETONES UR QL STRIP: NEGATIVE
LEUKOCYTE ESTERASE UR QL STRIP: ABNORMAL
MICROSCOPIC COMMENT: ABNORMAL
NITRITE UR QL STRIP: POSITIVE
PH UR STRIP: 8 [PH] (ref 5–8)
PROT UR QL STRIP: NEGATIVE
RBC #/AREA URNS AUTO: 2 /HPF (ref 0–4)
SP GR UR STRIP: 1.01 (ref 1–1.03)
URN SPEC COLLECT METH UR: ABNORMAL
WBC #/AREA URNS AUTO: 15 /HPF (ref 0–5)

## 2018-10-23 PROCEDURE — 87077 CULTURE AEROBIC IDENTIFY: CPT

## 2018-10-23 PROCEDURE — 87086 URINE CULTURE/COLONY COUNT: CPT

## 2018-10-23 PROCEDURE — 81001 URINALYSIS AUTO W/SCOPE: CPT

## 2018-10-23 PROCEDURE — 87186 SC STD MICRODIL/AGAR DIL: CPT

## 2018-10-23 PROCEDURE — 87088 URINE BACTERIA CULTURE: CPT

## 2018-10-23 NOTE — TELEPHONE ENCOUNTER
----- Message from Griselda Schmidt sent at 10/23/2018  1:37 PM CDT -----  Contact: call pt at [phone]  Patient would like to get test results    Name of test (lab, mammo, etc.):   urine    Date of test:  10/223/18    Ordering provider: melodie    Where was the test performed:  [facility]    Would you prefer a response via [employer]?:  no    Comments:

## 2018-10-23 NOTE — TELEPHONE ENCOUNTER
----- Message from Jazmin Garcia sent at 10/23/2018 10:45 AM CDT -----  Contact: Mine Wilkins   Doctor appointment and lab have been scheduled.  Please link lab orders to the lab appointment.    Date of lab appointment:  10/23  Comments: Patient believes she has a UTI and would like to give a urine sample.

## 2018-10-23 NOTE — TELEPHONE ENCOUNTER
Pt to submit a urine sample for u/a and culture today at   1 pm.    Please review and advise.    Thanks.

## 2018-10-23 NOTE — TELEPHONE ENCOUNTER
See message sent to dr. Oswald to advise that urine had been submitted and to advise of treatment for dysuria.

## 2018-10-24 NOTE — TELEPHONE ENCOUNTER
Advise urine is infected. Culture is pending,probably ready tomorrow. We can call in rx today or wait for final culture tomorrow.  Depends on her discomfort level,etc

## 2018-10-25 ENCOUNTER — TELEPHONE (OUTPATIENT)
Dept: INTERNAL MEDICINE | Facility: CLINIC | Age: 83
End: 2018-10-25

## 2018-10-25 NOTE — TELEPHONE ENCOUNTER
----- Message from Tete Diez sent at 10/25/2018 10:13 AM CDT -----  Contact: patient [phone]  Pt is waiting to hear from Karla about her u/a results. Please call her today to advise of status.

## 2018-10-26 LAB — BACTERIA UR CULT: NORMAL

## 2018-10-26 RX ORDER — CIPROFLOXACIN 500 MG/1
500 TABLET ORAL 2 TIMES DAILY
Qty: 10 TABLET | Refills: 0 | Status: SHIPPED | OUTPATIENT
Start: 2018-10-26 | End: 2019-01-03 | Stop reason: ALTCHOICE

## 2018-11-08 ENCOUNTER — HOSPITAL ENCOUNTER (OUTPATIENT)
Dept: RADIOLOGY | Facility: HOSPITAL | Age: 83
Discharge: HOME | End: 2018-11-08
Attending: PHYSICIAN ASSISTANT
Payer: MEDICARE

## 2018-11-08 ENCOUNTER — OFFICE VISIT (OUTPATIENT)
Dept: SPORTS MEDICINE | Facility: CLINIC | Age: 83
End: 2018-11-08
Payer: MEDICARE

## 2018-11-08 VITALS
DIASTOLIC BLOOD PRESSURE: 80 MMHG | BODY MASS INDEX: 25.16 KG/M2 | WEIGHT: 142 LBS | HEIGHT: 63 IN | SYSTOLIC BLOOD PRESSURE: 131 MMHG | HEART RATE: 76 BPM

## 2018-11-08 DIAGNOSIS — M89.8X5 PAIN OF RIGHT FEMUR: ICD-10-CM

## 2018-11-08 DIAGNOSIS — M25.551 RIGHT HIP PAIN: ICD-10-CM

## 2018-11-08 DIAGNOSIS — M25.551 RIGHT HIP PAIN: Primary | ICD-10-CM

## 2018-11-08 PROCEDURE — 1101F PT FALLS ASSESS-DOCD LE1/YR: CPT | Mod: CPTII,HCNC,S$GLB, | Performed by: PHYSICIAN ASSISTANT

## 2018-11-08 PROCEDURE — 3079F DIAST BP 80-89 MM HG: CPT | Mod: CPTII,HCNC,S$GLB, | Performed by: PHYSICIAN ASSISTANT

## 2018-11-08 PROCEDURE — 3075F SYST BP GE 130 - 139MM HG: CPT | Mod: CPTII,HCNC,S$GLB, | Performed by: PHYSICIAN ASSISTANT

## 2018-11-08 PROCEDURE — 99999 PR PBB SHADOW E&M-EST. PATIENT-LVL IV: CPT | Mod: PBBFAC,HCNC,, | Performed by: PHYSICIAN ASSISTANT

## 2018-11-08 PROCEDURE — 73552 X-RAY EXAM OF FEMUR 2/>: CPT | Mod: 26,HCNC,RT, | Performed by: RADIOLOGY

## 2018-11-08 PROCEDURE — 99213 OFFICE O/P EST LOW 20 MIN: CPT | Mod: HCNC,S$GLB,, | Performed by: PHYSICIAN ASSISTANT

## 2018-11-08 PROCEDURE — 73552 X-RAY EXAM OF FEMUR 2/>: CPT | Mod: TC,FY,HCNC,PO,RT

## 2018-11-08 NOTE — PROGRESS NOTES
Subjective:          Chief Complaint: Mine Wilkins is a 83 y.o. female who had concerns including Pain of the Right Hip.    Mine Wilkins reports to be doing well 6 months s/p right femur cephalomedullary nailing. No pain today. 0/10 pain in hip. She reports new back pain since she is helping her  with his ADLs. She has a PT referral to start soon. Doing HEP at home everyday. Overall doing well. Ambulating with no problems.       PROVIDER: DR. VALERIE MCGHEE     PATIENT INFORMATION   Mine Wilkins 83 y.o. female 1935   MRN: [id number]   LOCATION: [facility]      DATE OF PROCEDURE: 5/20/2018      PREOPERATIVE DIAGNOSES:   Right femur intertrochanteric fracture with reverse oblique extension, unstable     POSTOPERATIVE DIAGNOSES:   Right femur intertrochanteric fracture with reverse oblique extension, unstable     PROCEDURES PERFORMED:   Right femur cephalomedullary nailing     Surgeon(s) and Role:     * Jason Waggoner MD - Resident - Assisting     * Michael Joseph Casale, MD - Resident - Assisting     * Loi Blanco MD - Resident - Observing     * Quique Bernal MD - Resident - Assisting     * SIDNEY Mcghee MD - Primary                Review of Systems   Constitution: Negative for chills and fever.   HENT: Negative for congestion and sore throat.    Eyes: Negative for discharge and double vision.   Cardiovascular: Negative for chest pain, palpitations and syncope.   Respiratory: Negative for cough and shortness of breath.    Endocrine: Negative for cold intolerance and heat intolerance.   Skin: Negative for dry skin and rash.   Musculoskeletal: Positive for back pain. Negative for falls, gout, joint pain, joint swelling, muscle cramps and muscle weakness.   Gastrointestinal: Negative for abdominal pain, nausea and vomiting.   Neurological: Negative for focal weakness, numbness and paresthesias.                   Objective:        General:  Mine is well-developed, well-nourished, appears stated age, in no acute distress, alert and oriented to time, place and person.     General    Vitals reviewed.  Constitutional: She is oriented to person, place, and time. She appears well-developed and well-nourished. No distress.   HENT:   Head: Normocephalic and atraumatic.   Nose: Nose normal.   Eyes: Conjunctivae and EOM are normal. Pupils are equal, round, and reactive to light.   Cardiovascular: Normal rate, regular rhythm and intact distal pulses.    Pulmonary/Chest: Breath sounds normal. No respiratory distress.   Abdominal: Soft. Bowel sounds are normal. She exhibits no distension.   Neurological: She is alert and oriented to person, place, and time. She has normal reflexes.   Psychiatric: She has a normal mood and affect. Her behavior is normal. Judgment and thought content normal.           Normal gait   RLE Well-healed incisions.  0-90 AROM. No swelling proximal peritrochanteric region.  -TTP over operative area.  No pain with passive internal and external rotation of hip.  Negative Stinchfield test. NVI.   no edema present    Radiographic Findings 11/08/2018:    There is a proximal femur fracture good alignment no complication.            Xrays of the right femur were ordered and reviewed by me today. These findings were discussed and reviewed with the patient.        Assessment:       Encounter Diagnoses   Name Primary?    Right hip pain Yes    Pain of right femur           Plan:       1. Continue back physical therapy.  2. Ice compress to the affected area 2-3x a day for 15-20 minutes as needed for pain management.  3. Patient has made good clinical progress.  May continue weightbearing to tolerance. Continue HEP per protocol.   4. Continue vitamin D  5. Tylenol as needed.   6. RTC to see JEYSON Scott MD in 6 months for follow-up.    All of the patient's questions were answered and the patient will contact us if they have any questions or concerns in  the interim.                  Patient questionnaires may have been collected.

## 2018-11-08 NOTE — PROGRESS NOTES
"S:Mine Wilkins presents for post-operative evaluation.     PROVIDER: DR. VALERIE MCGHEE     PATIENT INFORMATION   Mine Wilkins 83 y.o. female 1935   MRN: [id number]   LOCATION: [facility]      DATE OF PROCEDURE: 5/20/2018      PREOPERATIVE DIAGNOSES:   Right femur intertrochanteric fracture with reverse oblique extension, unstable     POSTOPERATIVE DIAGNOSES:   Right femur intertrochanteric fracture with reverse oblique extension, unstable     PROCEDURES PERFORMED:   Right femur cephalomedullary nailing     Surgeon(s) and Role:     * Jason Waggoner MD - Resident - Assisting     * Michael Joseph Casale, MD - Resident - Assisting     * Loi Blanco MD - Resident - Observing     * Quique Bernal MD - Resident - Assisting     * SIDNEY Mcghee MD - Primary    Mine Wilkins reports to be doing well 3 months s/p the above mentioned procedure. No pain today. 3/10 pain at the worst.  Doing HEP at home everayday. In outpatient physical therapy twice a week at [facility]. No new complaints.  She does have some residual intermittent anterior groin pain "once in a while". Overall doing well. Ambulating with cane today.     O: RLE Well-healed incisions.  Minimal swelling and fullness over the proximal peritrochanteric region.  -TTP over operative area.  No pain with passive internal and external rotation of hip.  Negative Stinchfield test. NVI.    Radiographs: s/p CMN.  No interval changes in alignment.  Post reduction collapse noted on prior films.  Good interval healing present.    A/P: Patient has made good clinical progress.  May continue weightbearing to tolerance. Continue PT per protocol. Goal of improved gait, overall strength, wean off cane.    RTC to see Noah Gutierrez PA-C 3 months for follow-up and repeat femur radiographs.    All of the patient's questions were answered and the patient will contact us if they have any questions or concerns in " the interim.

## 2018-11-13 ENCOUNTER — CLINICAL SUPPORT (OUTPATIENT)
Dept: REHABILITATION | Facility: HOSPITAL | Age: 83
End: 2018-11-13
Attending: INTERNAL MEDICINE
Payer: MEDICARE

## 2018-11-13 DIAGNOSIS — M54.50 ACUTE RIGHT-SIDED LOW BACK PAIN WITHOUT SCIATICA: ICD-10-CM

## 2018-11-13 DIAGNOSIS — M79.604 RIGHT LEG PAIN: ICD-10-CM

## 2018-11-13 PROCEDURE — 97161 PT EVAL LOW COMPLEX 20 MIN: CPT | Mod: HCNC,PO | Performed by: PHYSICAL THERAPIST

## 2018-11-13 PROCEDURE — G8979 MOBILITY GOAL STATUS: HCPCS | Mod: CJ,HCNC,PO | Performed by: PHYSICAL THERAPIST

## 2018-11-13 PROCEDURE — G8978 MOBILITY CURRENT STATUS: HCPCS | Mod: CK,HCNC,PO | Performed by: PHYSICAL THERAPIST

## 2018-11-14 PROBLEM — M54.50 LOW BACK PAIN: Status: ACTIVE | Noted: 2018-11-14

## 2018-11-14 NOTE — PLAN OF CARE
Physical Therapy Initial Evaluation     [id number]    Diagnosis:   Encounter Diagnoses   Name Primary?    Right leg pain     Acute right-sided low back pain without sciatica      Physician: ZOEY Oswald MD  Treatment Orders: PT Eval and Treat  Past Medical History:   Diagnosis Date    After-cataract of both eyes - Left Eye 10/11/2012    Anxiety     Aortic calcification 8/10/2016    Aortic valve stenosis, moderate     AR (allergic rhinitis)     Arthritis of facet joints at multiple vertebral levels 1/14/2016    Seen on lumbar MRI dated 01/14/16    Cataract     Cholelithiasis     Closed displaced intertrochanteric fracture of right femur with routine healing s/p IM nail on 5/20/2018 5/19/2018    Cystocele 12/1/2013    Esotropia, alternating - Both Eyes 10/11/2012    Essential hypertension     Gastroesophageal reflux disease without esophagitis 4/13/2018    Left ventricular diastolic dysfunction with preserved systolic function 8/10/2016    Lumbar radiculopathy 1/25/2016    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    alendronate (FOSAMAX) 70 MG tablet Take 1 tablet (70 mg total) by mouth every 7 days. HOLD UNTIL SEEN BY YOUR PCP    cetirizine (ZYRTEC) 10 MG tablet Take 10 mg by mouth once daily.    cholecalciferol, vitamin D3, 2,000 unit Tab Take by mouth. 1 Tablet Oral Every day    ciprofloxacin HCl (CIPRO) 500 MG tablet Take 1 tablet (500 mg total) by mouth  2 (two) times daily.    cranberry 400 mg Cap Take by mouth once daily.     escitalopram oxalate (LEXAPRO) 5 MG Tab TAKE ONE DAILY    fluticasone (FLONASE) 50 mcg/actuation nasal spray 2 sprays (100 mcg total) by Each Nare route every evening.    losartan (COZAAR) 50 MG tablet Take 1 tablet (50 mg total) by mouth once daily.    omeprazole (PRILOSEC) 40 MG capsule TAKE 1 CAPSULE EVERY MORNING  BEFORE  EATING    oxybutynin (DITROPAN-XL) 10 MG 24 hr tablet TAKE 1 TABLET EVERY DAY    pravastatin (PRAVACHOL) 20 MG tablet TAKE 1 TABLET EVERY EVENING    senna-docusate 8.6-50 mg (PERICOLACE) 8.6-50 mg per tablet Take 1 tablet by mouth 2 (two) times daily.     No current facility-administered medications for this visit.      Review of patient's allergies indicates:   Allergen Reactions    Ace inhibitors      Other reaction(s): cough    Codeine      Other reaction(s): Nausea       Evaluation Date: 11/13/18  Visit # authorized: 20  Authorization period: 10/25/18-12/31/18  Plan of care Expiration: 12/28/18  MD referral: yes    X ray 11/8/18  There is a proximal femur fracture good alignment no complication R    Subjective     Patient states:  She began having back pain 3 weeks ago, she believes from bending/stooping helping her . 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 Pt sleeps in a recliner secondary to back pain.  Pt had a fall in grocery store fx R hip in May 2018. Pt has incontinence of her bladder but has been this way since her 60's. Pt denies bowel dysfunction, or saddle anesthesia. Pt had epidural injections on L side due to back pain 3 years ago. Childhood back injury, back has hurt her off/on over the years    Pain Scale: Mine rates pain on a scale of 0-10 to be 10 at worst; 4 currently; n/a at best .  Onset: sudden  Radicular symptoms:  none  Aggravating factors:   Bending, stooping, lifting, assisting her , worsens as the day progresses  Easing factors:  Sits in chair with pillow behind her  back  Prior Therapy: R leg, R arm fx   Functional Deficits Leading to Referral: difficulty walking and ADL, housework                    Pts goals:  Get back where I was      Objective     Posture Alignment: forward head, lumbar erector spinae hypertrophy, flared ribs, IS angle 100 degrees, decreased gluteal mass B    LUMBAR SPINE AROM:   Flexion: 50% limited   Extension: NT   Left Sidebend: 25% limitied   Right Sidebend: 25% limited       LOWER EXTREMITY STRENGTH:   Left Right   Quadriceps 4-/5 3+/5   Hamstrings 3+/5 3+/5     Iliopsoas 3+/5 3/5   PGM 3+/5 3+/5             Hip Ext 3+/5 3+/5     Lower abdominal MMT: 2/5- inability to keep abdominal mms down when performing heel slide  Dermatomes: Sensation: Light Touch: Intact    Flexibility:hamstring length 80 degrees    Special Tests:   Left Right   Slump - -   SLR - -   BKFO + +-poor pelvic control     GAIT: Mine ambulates with no assistive device with independently.     GAIT DEVIATIONS: Mine displays increased base of support, attempted to widen RAYMOND but pt expressed difficulty with balance.    Pt/family was provided educational information, including: role of PT, goals for PT, scheduling - pt verbalized understanding. Discussed insurance limitations with pt.     G code sit to stand test 30 sec 6 reps CK g code mobility current  GOAL: 9 reps CJ mobility    TREATMENT     Time In: 1pm  Time Out: 2pm    PT Evaluation Completed? Yes  Discussed Plan of Care with patient: Yes    Mine received 15 minutes of therapeutic exercise & instruction including:  Exhalation ex to depress ribs x 10  PGM ex x 10 L/R  Press ups x 10 with exhalation @ top of motion for spinal decompression/EO activation  Sit to stand with widened stance x 10 reps    Written Home Exercises Provided: yes, see pt instruction  Mine demo good understanding of the education provided. Patient demo good return demo of skill of exercises.    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  Pt will benefit from skilled outpatient physical therapy to address the above stated deficits, provide pt/family education and to maximize pt's level of independence.     Medical necessity is demonstrated by the following IMPAIRMENTS/PROBLEMS:  1. Increased Pain  2. Decreased Segmental Mobility & Decreased ROM  3. Decreased Core & BLE strength  4. Decreased Flexibility BLE  5. Decreased Tolerance to Functional Activities    Pt's spiritual, cultural and educational needs considered and pt agreeable to plan of care and goals as stated below:     Anticipated Barriers for physical therapy: none    Short Term GOALS: 3 weeks. Pt agrees with goals set.  1. Patient demonstrates independence with HEP.   2. Patient demonstrates independence with Postural Awareness, able to correct stance with decreased forward sway of hips  3. Patient demonstrates independence with body mechanics, I with proper lifting technique using gluteal mms    Long Term GOALS: 6 weeks. Pt agrees with goals set.  1. Patient demonstrates increased Lower abdominal MMT to 3/5 to improve tolerance to functional activities pain free.   2. Patient demonstrates increased strength BLE's to 4-/5 or greater to improve tolerance to functional activities pain free.   3. Patient demonstrates improved overall function with sit to stand test to 9 reps CJ modifier    PLAN     Outpatient physical therapy 1-2 times weekly to include: pt ed, hep, therapeutic exercises, neuromuscular re-education/ balance exercises, and modalities prn. Cont PT for  6 weeks. Pt may be seen by PTA as part of the rehabilitation team.     Therapist: Minna Loomis, PT  11/14/2018

## 2018-11-27 ENCOUNTER — CLINICAL SUPPORT (OUTPATIENT)
Dept: REHABILITATION | Facility: HOSPITAL | Age: 83
End: 2018-11-27
Attending: INTERNAL MEDICINE
Payer: MEDICARE

## 2018-11-27 DIAGNOSIS — M54.50 ACUTE RIGHT-SIDED LOW BACK PAIN WITHOUT SCIATICA: ICD-10-CM

## 2018-11-27 PROCEDURE — 97140 MANUAL THERAPY 1/> REGIONS: CPT | Mod: HCNC,PO | Performed by: PHYSICAL THERAPIST

## 2018-11-27 NOTE — PROGRESS NOTES
Physical Therapy Daily Note     Name: Mine Marley  Clinic Number: [id number]  Diagnosis:   Encounter Diagnosis   Name Primary?    Acute right-sided low back pain without sciatica      Physician: ZOEY Oswald MD  Precautions: standard  Visit #: 2 of 12  PTA Visit #: 0  Time In: 3pm  Time Out: 4pm  Total Treatment Time 1:1: 30 min    Evaluation Date: 11/13/18  Visit # authorized: 20  Authorization period: 10/25/18-12/31/18  Plan of care Expiration: 12/31/18  MD referral: yes    Subjective     Pt reports: she has felt unwell, has had a stomach bug. Also, has back pain today but feels it could be from being ill  Pain Scale: Mine rates pain on a scale of 0-10 to be 5 currently.    Objective     Mine received the following manual therapy techniques: Soft tissue Mobilization were applied to the: lumbar pvms x 25 minutes secondary to pain     exs not performed today secondary to pt still feeling unwell    MH x 15 min to lumbar pvms  Secondary to pain, slight pink cast noted following MH. Pt expressed comfort throughout treatment session.     Written Home Exercises Provided: current from evaluation    Education provided re: use of MH at home when in pain  Mine verbalized good understanding of education provided.   No spiritual or educational barriers to learning provided    Assessment     Patient tolerated treatment well, reduction in pain following STM/MH. Pt should be able to resume exs next visit.  This is a 83 y.o. female referred to outpatient physical therapy and presents with a medical diagnosis of LBP and demonstrates limitations as described in the problem list. Pt prognosis is Excellent. Pt will continue to benefit from skilled outpatient physical therapy to address the deficits listed in the problem list, provide pt/family education and to maximize pt's level of independence in the home and community environment.     Goals as follows:  Short  Term GOALS: 3 weeks. Pt agrees with goals set.  1. Patient demonstrates independence with HEP.   2. Patient demonstrates independence with Postural Awareness, able to correct stance with decreased forward sway of hips  3. Patient demonstrates independence with body mechanics, I with proper lifting technique using gluteal mms     Long Term GOALS: 6 weeks. Pt agrees with goals set.  1. Patient demonstrates increased Lower abdominal MMT to 3/5 to improve tolerance to functional activities pain free.   2. Patient demonstrates increased strength BLE's to 4-/5 or greater to improve tolerance to functional activities pain free.   3. Patient demonstrates improved overall function with sit to stand test to 9 reps CJ modifier     Plan     Continue with established Plan of Care towards PT goals.    Minna Loomis, PT  11/27/2018

## 2018-11-29 NOTE — PROGRESS NOTES
Physical Therapy Daily Treatment Note     Name: Mine Wilkins  Clinic Number: [id number]    Therapy Diagnosis:   Encounter Diagnoses   Name Primary?    Acute right-sided low back pain without sciatica Yes    Right leg pain      Physician: ZOEY Oswald MD    Visit Date: 11/30/2018    Evaluation Date: 11/13/18  Visit # authorized: 3/20  Authorization period: 10/25/18-12/31/18  Plan of care Expiration: 12/28/18  MD referral: yes  Time In: 1:00  Time Out:2;00  Treatment time: 50  Total Billable Time: 25 minutes    Precautions: Standard    Subjective     Pt reports:  (R) lower back pain. States that pain is minimal to moderate. 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  States that she does not take pain meds.  Pain: 3/10 to (R) lower back     Objective     Mine received therapeutic exercises to develop  LE strength, endurance, ROM and flexibility for 40 minutes including:     recumbent stationary bike x 5 minutes level 1    Mat ex's:     LTR stretch x 10 with 5 sec hold  SKTC 3 x 20 sec  Diaphragmatic breathing x 2 minutes  Pelvic tilt x 10 with 5 sec hold  Pelvic tilt with hooklying clam with OTB 2 x 10  Pelvic tilt with Hooklying hip add iso x 20  Pelvic tilt with marching x 10 each  GS x 20  Bridging   x 10    Seated ex's  Trunk flex with large blue T-ball x 10 with 5 sec hold      Mine received the following manual therapy techniques:    For 10  minutes, including:  (R)LE long leg distraction  STM to (R) lumbar paraspinals    Patient receives moist heat applied to lower back x 10 minutes for muscle relaxation/pain control      Written Home Exercises Provided: Patient educated to continue with previously issued HEP to tolerance  Exercises were reviewed and Mine was able to demonstrate them prior to the end of the session.  Mine demonstrated good  understanding of the education provided.      Assessment     Patient aditi Tx well. She was challenged with bridging ex.and this was limited to  10 reps otherwise, she was able to perform ex's/activities within tolerable level of muscular fatigue and without increased pain. Min verbal and tactile cues provided for core engagement with activities. Notes good relief with manual techniques and moist heat and pain level rated as slight to none post session. Will monitor and attempt to progress as tolerated.  Pt will continue to benefit from skilled outpatient physical therapy to address the deficits listed in the problem list box on initial evaluation, provide pt/family education and to maximize pt's level of independence in the home and community environment.     Goals from eval    Short Term GOALS: 3 weeks. Pt agrees with goals set.  1. Patient demonstrates independence with HEP.   2. Patient demonstrates independence with Postural Awareness, able to correct stance with decreased forward sway of hips  3. Patient demonstrates independence with body mechanics, I with proper lifting technique using gluteal mms     Long Term GOALS: 6 weeks. Pt agrees with goals set.  1. Patient demonstrates increased Lower abdominal MMT to 3/5 to improve tolerance to functional activities pain free.   2. Patient demonstrates increased strength BLE's to 4-/5 or greater to improve tolerance to functional activities pain free.   3. Patient demonstrates improved overall function with sit to stand test to 9 reps CJ modifier    Plan     Continue PT towards established plan of care and goals.     Andrei Guerra, PTA

## 2018-11-30 ENCOUNTER — CLINICAL SUPPORT (OUTPATIENT)
Dept: REHABILITATION | Facility: HOSPITAL | Age: 83
End: 2018-11-30
Attending: INTERNAL MEDICINE
Payer: MEDICARE

## 2018-11-30 DIAGNOSIS — M54.50 ACUTE RIGHT-SIDED LOW BACK PAIN WITHOUT SCIATICA: Primary | ICD-10-CM

## 2018-11-30 DIAGNOSIS — M79.604 RIGHT LEG PAIN: ICD-10-CM

## 2018-11-30 PROCEDURE — 97110 THERAPEUTIC EXERCISES: CPT | Mod: HCNC,PO

## 2018-12-04 ENCOUNTER — CLINICAL SUPPORT (OUTPATIENT)
Dept: REHABILITATION | Facility: HOSPITAL | Age: 83
End: 2018-12-04
Attending: INTERNAL MEDICINE
Payer: MEDICARE

## 2018-12-04 DIAGNOSIS — M54.50 ACUTE RIGHT-SIDED LOW BACK PAIN WITHOUT SCIATICA: ICD-10-CM

## 2018-12-04 NOTE — PROGRESS NOTES
Physical Therapy Daily Treatment Note     Name: Mine Wilkins  Clinic Number: [id number]    Therapy Diagnosis:   Encounter Diagnosis   Name Primary?    Acute right-sided low back pain without sciatica      Physician: ZOEY Oswald MD    Visit Date: 12/4/2018    Evaluation Date: 11/13/18  Visit # authorized: 3/20  Authorization period: 10/25/18-12/31/18  Plan of care Expiration: 12/28/18  MD referral: yes  Time In: 3:00  Time Out:4;00  Treatment time: 50  Total Billable Time: 30 minutes    Precautions: Standard    Subjective     Pt reports:  (R) lower back pain. Took out trash this morning which exacerbated her pain. 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  States that she does not take pain meds.  Pain: 3/10 to (R) lower back     Objective     Mine received therapeutic exercises to develop  LE strength, endurance, ROM and flexibility for 40 minutes including:     recumbent stationary bike x 5 minutes level 1 NP today    Mat ex's:     LTR stretch x 10 with 5 sec hold  SKTC 3 x 20 sec  Diaphragmatic breathing x 2 minutes  Pelvic tilt x 10 with 5 sec hold  Pelvic tilt with hooklying clam with OTB 2 x 10  Pelvic tilt with Hooklying hip add iso x 20  Pelvic tilt with marching x 10 each  GS x 20  Bridging   x 10  Sit to stand x 15 reps with core activation, gluteal activation cues provided    Seated ex's  Trunk flex with large blue T-ball x 10 with 5 sec hold      Mine received the following manual therapy techniques:    For 10  minutes, including:  (R)LE long leg distraction NP today  STM to (R) lumbar paraspinals    Patient receives moist heat applied to lower back x 10 minutes for muscle relaxation/pain control      Written Home Exercises Provided: Patient educated to continue with previously issued HEP to tolerance, proper lifting/bending technique  Exercises were reviewed and Mine was able to demonstrate them prior to the end of the session.  Mine demonstrated good  understanding of the  education provided.      Assessment     Patient aditi Tx well, reviewed proper lifting techniques with pt, she was able to perform ex's/activities within tolerable level of muscular fatigue and without increased pain. Min verbal and tactile cues provided for core engagement with activities. No pain after treatment today. Will monitor and attempt to progress as tolerated.  Pt will continue to benefit from skilled outpatient physical therapy to address the deficits listed in the problem list box on initial evaluation, provide pt/family education and to maximize pt's level of independence in the home and community environment.     Goals from eval    Short Term GOALS: 3 weeks. Pt agrees with goals set.  1. Patient demonstrates independence with HEP.   2. Patient demonstrates independence with Postural Awareness, able to correct stance with decreased forward sway of hips  3. Patient demonstrates independence with body mechanics, I with proper lifting technique using gluteal mms     Long Term GOALS: 6 weeks. Pt agrees with goals set.  1. Patient demonstrates increased Lower abdominal MMT to 3/5 to improve tolerance to functional activities pain free.   2. Patient demonstrates increased strength BLE's to 4-/5 or greater to improve tolerance to functional activities pain free.   3. Patient demonstrates improved overall function with sit to stand test to 9 reps CJ modifier    PT/PTA met face to face to discuss patient's treatment plan and progress towards established goals.  Treatment will be continued as described in initial report/eval and progress notes.  Patient will be seen by physical therapist every sixth visit and minimally once per month.       Plan     Continue PT towards established plan of care and goals.     Minna Loomis, PT

## 2018-12-07 ENCOUNTER — CLINICAL SUPPORT (OUTPATIENT)
Dept: REHABILITATION | Facility: HOSPITAL | Age: 83
End: 2018-12-07
Attending: INTERNAL MEDICINE
Payer: MEDICARE

## 2018-12-07 DIAGNOSIS — M54.50 ACUTE RIGHT-SIDED LOW BACK PAIN WITHOUT SCIATICA: ICD-10-CM

## 2018-12-07 DIAGNOSIS — M79.604 RIGHT LEG PAIN: Primary | ICD-10-CM

## 2018-12-07 PROCEDURE — 97110 THERAPEUTIC EXERCISES: CPT | Mod: HCNC,PO

## 2018-12-07 NOTE — PROGRESS NOTES
Physical Therapy Daily Treatment Note     Name: Mine Marley  Clinic Number: [id number]    Therapy Diagnosis:   Encounter Diagnoses   Name Primary?    Acute right-sided low back pain without sciatica     Right leg pain Yes     Physician: ZOEY Oswald MD    Visit Date: 12/7/2018    Evaluation Date: 11/13/18  Visit # authorized: 5/20  Authorization period: 10/25/18-12/31/18  Plan of care Expiration: 12/28/18  MD referral: yes  Time In: 1:00  Time Out:2;00  Treatment time: 50  Total Billable Time: 25 minutes    Precautions: Standard    Subjective     Pt reports: minimal  (R) lower back pain today. States that she has recovered from a stomach bug experienced earlier in the week.  Pain: 3/10 to (R) lower back     Objective     Mine received therapeutic exercises to develop  LE strength, endurance, ROM and flexibility for 40 minutes including:     recumbent stationary bike x 8 minutes level 1    Mat ex's:     LTR stretch x 10 with 5 sec hold  SKTC 3 x 20 sec  Diaphragmatic breathing x 2 minutes  Pelvic tilt x 10 with 5 sec hold  Pelvic tilt with hooklying clam with GTB  2 x 10  Pelvic tilt with Hooklying hip add iso x 20  Pelvic tilt with marching x 12 each  GS x 20  Bridging  + iso hip abd GTB 2 x 10  sidelying clams 2 x 10    Seated ex's  Trunk flex with large blue T-ball x 10 with 5 sec hold     Standing ex's:   Lateral band walk with OTB around knees with UE support on countertop  4 laps of 10 feet    Mine received the following manual therapy techniques:    For 10  minutes, including:  (R)LE long leg distraction-NP  STM to (R) lumbar paraspinals    Patient receives moist heat applied to lower back x 10 minutes for muscle relaxation/pain control--NP      Written Home Exercises Provided: Patient educated to continue with previously issued HEP to tolerance  Exercises were reviewed and Mine was able to demonstrate them prior to the end of the session.  Mine demonstrated good  understanding of the  education provided.      Assessment     Patient aditi Tx well. She was able to perform and progress slightly with  ex's/activities within tolerable level of muscular fatigue and without increased pain including improved tolerance for bridging ex. . Min verbal and tactile cues provided for core engagement with activities. Notes good relief with manual techniques and rates pain as slight to none post session. Will monitor and attempt to progress as tolerated.  Pt will continue to benefit from skilled outpatient physical therapy to address the deficits listed in the problem list box on initial evaluation, provide pt/family education and to maximize pt's level of independence in the home and community environment.     Goals from eval    Short Term GOALS: 3 weeks. Pt agrees with goals set.  1. Patient demonstrates independence with HEP.   2. Patient demonstrates independence with Postural Awareness, able to correct stance with decreased forward sway of hips  3. Patient demonstrates independence with body mechanics, I with proper lifting technique using gluteal mms     Long Term GOALS: 6 weeks. Pt agrees with goals set.  1. Patient demonstrates increased Lower abdominal MMT to 3/5 to improve tolerance to functional activities pain free.   2. Patient demonstrates increased strength BLE's to 4-/5 or greater to improve tolerance to functional activities pain free.   3. Patient demonstrates improved overall function with sit to stand test to 9 reps CJ modifier    Plan     Continue PT towards established plan of care and goals.     Andrei Guerra, PTA

## 2018-12-11 ENCOUNTER — CLINICAL SUPPORT (OUTPATIENT)
Dept: REHABILITATION | Facility: HOSPITAL | Age: 83
End: 2018-12-11
Attending: INTERNAL MEDICINE
Payer: MEDICARE

## 2018-12-11 DIAGNOSIS — M79.604 RIGHT LEG PAIN: Primary | ICD-10-CM

## 2018-12-11 DIAGNOSIS — M54.50 ACUTE RIGHT-SIDED LOW BACK PAIN WITHOUT SCIATICA: ICD-10-CM

## 2018-12-11 DIAGNOSIS — F41.9 ANXIETY: ICD-10-CM

## 2018-12-11 PROCEDURE — 97110 THERAPEUTIC EXERCISES: CPT | Mod: HCNC,PO

## 2018-12-11 PROCEDURE — 97140 MANUAL THERAPY 1/> REGIONS: CPT | Mod: HCNC,PO

## 2018-12-11 RX ORDER — ESCITALOPRAM OXALATE 5 MG/1
TABLET ORAL
Qty: 90 TABLET | Refills: 0 | Status: SHIPPED | OUTPATIENT
Start: 2018-12-11 | End: 2019-03-19 | Stop reason: SDUPTHER

## 2018-12-11 NOTE — PROGRESS NOTES
Physical Therapy Daily Treatment Note     Name: Mine Wilkins  Clinic Number: [id number]    Therapy Diagnosis:   Encounter Diagnoses   Name Primary?    Acute right-sided low back pain without sciatica     Right leg pain Yes     Physician: ZOEY Oswald MD    Visit Date: 12/11/2018    Evaluation Date: 11/13/18  Visit # authorized: [id number]  Authorization period: 10/25/18-12/31/18  Plan of care Expiration: 12/28/18  MD referral: yes  Time In: 3:00  Time Out:4:10  Treatment time: 55  Total Billable Time: 30 minutes    Precautions: Standard    Subjective     Pt reports: minimal  (R) lower back pain today. She reports no problems after last session    Pain: 3/10 to (R) lower back     Objective     Mine received therapeutic exercises to develop  LE strength, endurance, ROM and flexibility for 40 minutes including:     recumbent stationary bike x 8 minutes level 1    Mat ex's:     LTR stretch x 10 with 5 sec hold  SKTC 3 x 20 sec  Diaphragmatic breathing x 2 minutes  Pelvic tilt x 10 with 5 sec hold  Pelvic tilt with hooklying clam with BTB  2 x 10  Pelvic tilt with Hooklying hip add iso x 20  Pelvic tilt with marching x 15 each  GS x 20  Bridging  + iso hip abd BTB 2 x 10  sidelying clams with 1# 2 x 10    Seated ex's  Trunk flex with large blue T-ball x 10 with 5 sec hold straight plane x 5 x 5 sec diagonally    Standing ex's:   Lateral band walk with OTB around knees with UE support on countertop  4 laps of 10 feet  glute medius kick backs x 15 each    Mine received the following manual therapy techniques:    For 10  minutes, including  (R)LE long leg distraction-   STM to (R) lumbar paraspinals    Patient receives moist heat applied to lower back x 10 minutes for muscle relaxation/pain control--       Written Home Exercises Provided: Patient educated to continue with previously issued HEP to tolerance  Exercises were reviewed and Mine was able to demonstrate them prior to the end of the session.  Mine  demonstrated good  understanding of the education provided.      Assessment     Patient aditi Tx well. She was able to perform and progress slightly with  ex's/activities within tolerable level of muscular fatigue and without increased pain. She was challenged with new standing glut medius kick back ex and requires some cues for technique with this. Min verbal and tactile cues provided for core engagement with activities. Notes good relief with manual techniques and rates pain as slight to none post session. Will monitor and attempt to progress as tolerated.  Pt will continue to benefit from skilled outpatient physical therapy to address the deficits listed in the problem list box on initial evaluation, provide pt/family education and to maximize pt's level of independence in the home and community environment.     Goals from eval    Short Term GOALS: 3 weeks. Pt agrees with goals set.  1. Patient demonstrates independence with HEP.   2. Patient demonstrates independence with Postural Awareness, able to correct stance with decreased forward sway of hips  3. Patient demonstrates independence with body mechanics, I with proper lifting technique using gluteal mms     Long Term GOALS: 6 weeks. Pt agrees with goals set.  1. Patient demonstrates increased Lower abdominal MMT to 3/5 to improve tolerance to functional activities pain free.   2. Patient demonstrates increased strength BLE's to 4-/5 or greater to improve tolerance to functional activities pain free.   3. Patient demonstrates improved overall function with sit to stand test to 9 reps CJ modifier    Plan     Continue PT towards established plan of care and goals.     Andrei Guerra, PTA

## 2018-12-14 ENCOUNTER — CLINICAL SUPPORT (OUTPATIENT)
Dept: REHABILITATION | Facility: HOSPITAL | Age: 83
End: 2018-12-14
Attending: INTERNAL MEDICINE
Payer: MEDICARE

## 2018-12-14 DIAGNOSIS — M54.50 ACUTE RIGHT-SIDED LOW BACK PAIN WITHOUT SCIATICA: ICD-10-CM

## 2018-12-14 DIAGNOSIS — M79.604 RIGHT LEG PAIN: Primary | ICD-10-CM

## 2018-12-14 PROCEDURE — 97140 MANUAL THERAPY 1/> REGIONS: CPT | Mod: HCNC,PO

## 2018-12-14 PROCEDURE — 97110 THERAPEUTIC EXERCISES: CPT | Mod: HCNC,PO

## 2018-12-14 NOTE — PROGRESS NOTES
Physical Therapy Daily Treatment Note     Name: Mine Wilkins  Clinic Number: [id number]    Therapy Diagnosis:   Encounter Diagnoses   Name Primary?    Acute right-sided low back pain without sciatica     Right leg pain Yes     Physician: ZOEY Oswald MD    Visit Date: 12/14/2018    Evaluation Date: 11/13/18  Visit # authorized: 7/20  Authorization period: 10/25/18-12/31/18  Plan of care Expiration: 12/28/18  MD referral: yes  Time In: 1:00  Time Out:1:55  Treatment time: 55  Total Billable Time:55 minutes    Precautions: Standard    Subjective     Pt reports feeling better overall. She reports mild (R) lower back ache today but not too bad. No problems with ex's thus far. .   Pain: 2/10 to (R) lower back     Objective     Mine received therapeutic exercises to develop  LE strength, endurance, ROM and flexibility for 40 minutes including:     recumbent stationary bike x 8 minutes level 3    Mat ex's:   LTR stretch x 10 with 5 sec hold  SKTC 3 x 20 sec  Diaphragmatic breathing x 2 minutes--NP  Pelvic tilt with hooklying clam with BTB  2 x 10  Bridging  + iso hip abd BTB 2 x 10  Pelvic tilt with Hooklying hip add iso x 20  Pelvic tilt with marching with 1# x 15 each  Glute sets  x 20  sidelying clams with 2# 2 x 10    Seated ex's  Trunk flex with large blue T-ball x 10 with 5 sec hold straight plane x 5 x 5 sec diagonally    Standing ex's:   Lateral band walk with OTB around knees with UE support on countertop  4 laps of 10 feet  glute medius kick backs x 20 each    Mine received the following manual therapy techniques:    For 10  minutes, including  (R)LE long leg distraction-   STM to (R) lumbar paraspinals    Patient receives moist heat applied to lower back x 10 minutes for muscle relaxation/pain control--NP       Written Home Exercises Provided: Patient educated to continue with previously issued HEP to tolerance  Exercises were reviewed and Mine was able to demonstrate them prior to the end  of the session.  Mine demonstrated good  understanding of the education provided.      Assessment     Patient aditi Tx well. She was able to perform and progress slightly with  ex's/activities within tolerable level of muscular fatigue and without increased pain. Improved tolerance and performance of  glute medius kick back ex a . Min verbal and tactile cues provided for core engagement with activities. Notes good relief with manual techniques and rates pain as slight to none post session. Will monitor and attempt to progress as tolerated.  Pt will continue to benefit from skilled outpatient physical therapy to address the deficits listed in the problem list box on initial evaluation, provide pt/family education and to maximize pt's level of independence in the home and community environment.     Goals from eval    Short Term GOALS: 3 weeks. Pt agrees with goals set.  1. Patient demonstrates independence with HEP.   2. Patient demonstrates independence with Postural Awareness, able to correct stance with decreased forward sway of hips  3. Patient demonstrates independence with body mechanics, I with proper lifting technique using gluteal mms     Long Term GOALS: 6 weeks. Pt agrees with goals set.  1. Patient demonstrates increased Lower abdominal MMT to 3/5 to improve tolerance to functional activities pain free.   2. Patient demonstrates increased strength BLE's to 4-/5 or greater to improve tolerance to functional activities pain free.   3. Patient demonstrates improved overall function with sit to stand test to 9 reps CJ modifier    Plan     Continue PT towards established plan of care and goals.     Andrei Guerra, PTA

## 2018-12-18 ENCOUNTER — CLINICAL SUPPORT (OUTPATIENT)
Dept: REHABILITATION | Facility: HOSPITAL | Age: 83
End: 2018-12-18
Attending: INTERNAL MEDICINE
Payer: MEDICARE

## 2018-12-18 DIAGNOSIS — M54.50 ACUTE RIGHT-SIDED LOW BACK PAIN WITHOUT SCIATICA: ICD-10-CM

## 2018-12-18 PROCEDURE — 97140 MANUAL THERAPY 1/> REGIONS: CPT | Mod: PO | Performed by: PHYSICAL THERAPIST

## 2018-12-18 PROCEDURE — 97110 THERAPEUTIC EXERCISES: CPT | Mod: PO | Performed by: PHYSICAL THERAPIST

## 2018-12-18 NOTE — PROGRESS NOTES
Physical Therapy Daily Treatment Note     Name: Mine Wilkins  Clinic Number: [id number]    Therapy Diagnosis:   Encounter Diagnosis   Name Primary?    Acute right-sided low back pain without sciatica      Physician: ZOEY Oswald MD    Visit Date: 12/18/2018    Evaluation Date: 11/13/18  Visit # authorized: 7/20  Authorization period: 10/25/18-12/31/18  Plan of care Expiration: 12/28/18  MD referral: yes  Time In: 1:00  Time Out:1:55  Treatment time: 55  Total Billable Time:55 minutes    Precautions: Standard    Subjective     Pt reports feeling better overall. Pt reports she carries groceries a little bit at a time to avoid hurting her back.   Pain: 1/10 to (R) lower back     Objective     Mine received therapeutic exercises to develop  LE strength, endurance, ROM and flexibility for 40 minutes including:     recumbent stationary bike x 8 minutes level 3    Mat ex's:   LTR stretch x 10 with 5 sec hold  SKTC 3 x 20 sec  Diaphragmatic breathing x 2 minutes--NP  Pelvic tilt with hooklying clam with BTB  2 x 10  Bridging  + iso hip abd BTB 2 x 10  Pelvic tilt with Hooklying hip add iso x 20  Pelvic tilt with marching with 1# x 15 each  Glute sets  x 20  sidelying clams with 2# 2 x 10    Seated ex's  Trunk flex with large blue T-ball x 10 with 5 sec hold straight plane x 5 x 5 sec diagonally    Standing ex's:   Lateral band walk with OTB around knees with UE support on countertop  4 laps of 10 feet  glute medius kick backs x 20 each  Sit to stand with cues to contract gluteal mms when bending or lifting as primary extensor    Mine received the following manual therapy techniques:   For 15  minutes, including    STM to (R) lumbar paraspinals    Patient receives moist heat applied to lower back x 10 minutes for muscle relaxation/pain control--NP       Written Home Exercises Provided: Patient educated to continue with previously issued HEP to tolerance  Exercises were reviewed and Mine was able to  demonstrate them prior to the end of the session.  Mine demonstrated good  understanding of the education provided.      Assessment     Patient aditi Tx well. She was able to perform and progress slightly with  Ex's, deimonstrating correct body mechaics for lifting with cues provided. Min verbal and tactile cues provided for core engagement with activities. Notes good relief with manual techniques and rates pain as none post session. Will monitor and attempt to progress as tolerated.  Pt will continue to benefit from skilled outpatient physical therapy to address the deficits listed in the problem list box on initial evaluation, provide pt/family education and to maximize pt's level of independence in the home and community environment.     Goals from eval    Short Term GOALS: 3 weeks. Pt agrees with goals set.  1. Patient demonstrates independence with HEP. MET  2. Patient demonstrates independence with Postural Awareness, able to correct stance with decreased forward sway of hips MET  3. Patient demonstrates independence with body mechanics, I with proper lifting technique using gluteal mms MET     Long Term GOALS: 6 weeks. Pt agrees with goals set.  1. Patient demonstrates increased Lower abdominal MMT to 3/5 to improve tolerance to functional activities pain free.   2. Patient demonstrates increased strength BLE's to 4-/5 or greater to improve tolerance to functional activities pain free.   3. Patient demonstrates improved overall function with sit to stand test to 9 reps CJ modifier    Plan     Continue PT towards established plan of care and goals.     Minna Loomis, PT

## 2018-12-20 ENCOUNTER — IMMUNIZATION (OUTPATIENT)
Dept: FAMILY MEDICINE | Facility: CLINIC | Age: 83
End: 2018-12-20
Payer: MEDICARE

## 2018-12-20 DIAGNOSIS — Z23 NEED FOR INFLUENZA VACCINATION: Primary | ICD-10-CM

## 2018-12-20 PROCEDURE — G0008 ADMIN INFLUENZA VIRUS VAC: HCPCS | Mod: S$GLB,,, | Performed by: FAMILY MEDICINE

## 2018-12-20 PROCEDURE — 90662 IIV NO PRSV INCREASED AG IM: CPT | Mod: S$GLB,,, | Performed by: FAMILY MEDICINE

## 2019-01-03 ENCOUNTER — OFFICE VISIT (OUTPATIENT)
Dept: INTERNAL MEDICINE | Facility: CLINIC | Age: 84
End: 2019-01-03
Payer: MEDICARE

## 2019-01-03 ENCOUNTER — LAB VISIT (OUTPATIENT)
Dept: LAB | Facility: HOSPITAL | Age: 84
End: 2019-01-03
Attending: INTERNAL MEDICINE
Payer: MEDICARE

## 2019-01-03 VITALS
TEMPERATURE: 99 F | HEART RATE: 75 BPM | WEIGHT: 143.94 LBS | RESPIRATION RATE: 16 BRPM | HEIGHT: 63 IN | DIASTOLIC BLOOD PRESSURE: 50 MMHG | BODY MASS INDEX: 25.5 KG/M2 | SYSTOLIC BLOOD PRESSURE: 108 MMHG

## 2019-01-03 DIAGNOSIS — G89.29 CHRONIC RIGHT-SIDED LOW BACK PAIN WITHOUT SCIATICA: ICD-10-CM

## 2019-01-03 DIAGNOSIS — N39.0 URINARY TRACT INFECTION WITHOUT HEMATURIA, SITE UNSPECIFIED: Primary | ICD-10-CM

## 2019-01-03 DIAGNOSIS — N30.00 ACUTE CYSTITIS WITHOUT HEMATURIA: Primary | ICD-10-CM

## 2019-01-03 DIAGNOSIS — M54.50 CHRONIC RIGHT-SIDED LOW BACK PAIN WITHOUT SCIATICA: ICD-10-CM

## 2019-01-03 DIAGNOSIS — N39.0 URINARY TRACT INFECTION WITHOUT HEMATURIA, SITE UNSPECIFIED: ICD-10-CM

## 2019-01-03 LAB
BACTERIA #/AREA URNS AUTO: ABNORMAL /HPF
BILIRUB SERPL-MCNC: ABNORMAL MG/DL
BILIRUB UR QL STRIP: NEGATIVE
BLOOD URINE, POC: ABNORMAL
CLARITY UR REFRACT.AUTO: CLEAR
COLOR UR AUTO: YELLOW
COLOR, POC UA: YELLOW
GLUCOSE UR QL STRIP: ABNORMAL
GLUCOSE UR QL STRIP: NEGATIVE
HGB UR QL STRIP: ABNORMAL
KETONES UR QL STRIP: ABNORMAL
KETONES UR QL STRIP: NEGATIVE
LEUKOCYTE ESTERASE UR QL STRIP: ABNORMAL
LEUKOCYTE ESTERASE URINE, POC: ABNORMAL
MICROSCOPIC COMMENT: ABNORMAL
NITRITE UR QL STRIP: POSITIVE
NITRITE, POC UA: ABNORMAL
NON-SQ EPI CELLS #/AREA URNS AUTO: 0 /HPF
PH UR STRIP: 6 [PH] (ref 5–8)
PH, POC UA: 6
PROT UR QL STRIP: NEGATIVE
PROTEIN, POC: ABNORMAL
RBC #/AREA URNS AUTO: 2 /HPF (ref 0–4)
SP GR UR STRIP: 1.01 (ref 1–1.03)
SPECIFIC GRAVITY, POC UA: 1
SQUAMOUS #/AREA URNS AUTO: 0 /HPF
URN SPEC COLLECT METH UR: ABNORMAL
UROBILINOGEN, POC UA: ABNORMAL
WBC #/AREA URNS AUTO: 11 /HPF (ref 0–5)

## 2019-01-03 PROCEDURE — 99213 OFFICE O/P EST LOW 20 MIN: CPT | Mod: 25,S$GLB,, | Performed by: INTERNAL MEDICINE

## 2019-01-03 PROCEDURE — 3074F PR MOST RECENT SYSTOLIC BLOOD PRESSURE < 130 MM HG: ICD-10-PCS | Mod: CPTII,S$GLB,, | Performed by: INTERNAL MEDICINE

## 2019-01-03 PROCEDURE — 87088 URINE BACTERIA CULTURE: CPT

## 2019-01-03 PROCEDURE — 3078F PR MOST RECENT DIASTOLIC BLOOD PRESSURE < 80 MM HG: ICD-10-PCS | Mod: CPTII,S$GLB,, | Performed by: INTERNAL MEDICINE

## 2019-01-03 PROCEDURE — 99999 PR PBB SHADOW E&M-EST. PATIENT-LVL III: CPT | Mod: PBBFAC,,, | Performed by: INTERNAL MEDICINE

## 2019-01-03 PROCEDURE — 81002 POCT URINE DIPSTICK WITHOUT MICROSCOPE: ICD-10-PCS | Mod: S$GLB,,, | Performed by: INTERNAL MEDICINE

## 2019-01-03 PROCEDURE — 1101F PR PT FALLS ASSESS DOC 0-1 FALLS W/OUT INJ PAST YR: ICD-10-PCS | Mod: CPTII,S$GLB,, | Performed by: INTERNAL MEDICINE

## 2019-01-03 PROCEDURE — 87086 URINE CULTURE/COLONY COUNT: CPT

## 2019-01-03 PROCEDURE — 1101F PT FALLS ASSESS-DOCD LE1/YR: CPT | Mod: CPTII,S$GLB,, | Performed by: INTERNAL MEDICINE

## 2019-01-03 PROCEDURE — 99213 PR OFFICE/OUTPT VISIT, EST, LEVL III, 20-29 MIN: ICD-10-PCS | Mod: 25,S$GLB,, | Performed by: INTERNAL MEDICINE

## 2019-01-03 PROCEDURE — 3074F SYST BP LT 130 MM HG: CPT | Mod: CPTII,S$GLB,, | Performed by: INTERNAL MEDICINE

## 2019-01-03 PROCEDURE — 81002 URINALYSIS NONAUTO W/O SCOPE: CPT | Mod: S$GLB,,, | Performed by: INTERNAL MEDICINE

## 2019-01-03 PROCEDURE — 87077 CULTURE AEROBIC IDENTIFY: CPT

## 2019-01-03 PROCEDURE — 81001 URINALYSIS AUTO W/SCOPE: CPT

## 2019-01-03 PROCEDURE — 87186 SC STD MICRODIL/AGAR DIL: CPT

## 2019-01-03 PROCEDURE — 99999 PR PBB SHADOW E&M-EST. PATIENT-LVL III: ICD-10-PCS | Mod: PBBFAC,,, | Performed by: INTERNAL MEDICINE

## 2019-01-03 PROCEDURE — 3078F DIAST BP <80 MM HG: CPT | Mod: CPTII,S$GLB,, | Performed by: INTERNAL MEDICINE

## 2019-01-03 RX ORDER — NITROFURANTOIN (MACROCRYSTALS) 100 MG/1
100 CAPSULE ORAL 3 TIMES DAILY
Qty: 21 CAPSULE | Refills: 0 | Status: SHIPPED | OUTPATIENT
Start: 2019-01-03 | End: 2019-02-28

## 2019-01-04 NOTE — PROGRESS NOTES
This office note has been dictated.  HISTORY OF PRESENT ILLNESS:  This is an 83-year-old lady with history of   hypertension, aortic valve stenosis, dyslipidemia and others in today primarily   for UTI symptomatology.  In October of 2018, she had UTI symptomatologies and   took a course of Cipro.  There is a urine culture from that time, which grew   Proteus mirabilis, which was sensitive to Cipro.  She states her symptoms   improved but did not resolve and she again was treated in late November at   Urgent Care with a course of Bactrim.  Again, reports her symptoms improved, but   did not completely resolve and again in the last two days, she has had symptoms   of discomfort and dysuria.  No abdominal pain.  No fever, no chills.    She also is having some flare of her chronic lower back pain.  This is not a new   issue.  She is not having any radiating symptomatologies, primarily   right-sided.    CURRENT MEDICATIONS:  All medications noted and reviewed in the electronic   medical record medication list.    REVIEW OF SYSTEMS:  CONSTITUTIONAL:  No fever, no chills, no generalized body aches.  CARDIOVASCULAR:  No chest pain, no palpitations, no syncope.  No claudication.  RESPIRATORY:  No cough or shortness of breath.  GASTROINTESTINAL:  No nausea or vomiting.  No diarrhea, no changes in bowel   habits.    PAST MEDICAL HISTORY, PAST SURGICAL HISTORY, FAMILY MEDICAL HISTORY AND SOCIAL   HISTORY:  All noted and reviewed in the electronic medical record history   sections.    PHYSICAL EXAMINATION:  GENERAL:  Alert, pleasant, appropriately groomed lady in no acute distress.  VITAL SIGNS:  All noted and reviewed as normal.  LUNGS:  Clear to auscultation.  CARDIOVASCULAR:  Regular rate and rhythm, I/VI systolic murmur.  GASTROINTESTINAL:  The abdomen is soft and benign.  No masses, no tenderness, no   organomegaly.  There is no CVA tenderness.    DATA:  Dipstick urinalysis in the office appears infected.    IMPRESSION:   UTI, recurrent.    PLAN:  1.  Urine is submitted for culture.  2.  Macrodantin 100 mg t.i.d. for seven days.  If recurs or does not resolve   will need Urology referral for recurring UTIs.  3.  Repeat urinalysis in one month.  4.  Referral to Physical Medicine Department for her chronic lower back pain.      CISCO/SHU  dd: 01/04/2019 07:29:30 (CST)  td: 01/04/2019 23:33:18 (CST)  Doc ID   #[id number]  Job ID #[id number]    CC:

## 2019-01-06 LAB — BACTERIA UR CULT: NORMAL

## 2019-02-26 ENCOUNTER — PES CALL (OUTPATIENT)
Dept: ADMINISTRATIVE | Facility: CLINIC | Age: 84
End: 2019-02-26

## 2019-02-28 ENCOUNTER — OFFICE VISIT (OUTPATIENT)
Dept: FAMILY MEDICINE | Facility: CLINIC | Age: 84
End: 2019-02-28
Payer: MEDICARE

## 2019-02-28 VITALS
DIASTOLIC BLOOD PRESSURE: 76 MMHG | WEIGHT: 142.63 LBS | SYSTOLIC BLOOD PRESSURE: 136 MMHG | HEART RATE: 82 BPM | BODY MASS INDEX: 25.27 KG/M2 | HEIGHT: 63 IN | OXYGEN SATURATION: 98 %

## 2019-02-28 DIAGNOSIS — I10 ESSENTIAL HYPERTENSION: ICD-10-CM

## 2019-02-28 DIAGNOSIS — Z00.00 ENCOUNTER FOR PREVENTIVE HEALTH EXAMINATION: Primary | ICD-10-CM

## 2019-02-28 DIAGNOSIS — M47.819 ARTHRITIS OF FACET JOINTS AT MULTIPLE VERTEBRAL LEVELS: Chronic | ICD-10-CM

## 2019-02-28 DIAGNOSIS — S72.141A DISPLACED INTERTROCHANTERIC FRACTURE OF RIGHT FEMUR, INITIAL ENCOUNTER FOR CLOSED FRACTURE: ICD-10-CM

## 2019-02-28 DIAGNOSIS — E66.3 OVERWEIGHT (BMI 25.0-29.9): ICD-10-CM

## 2019-02-28 DIAGNOSIS — N39.46 MIXED INCONTINENCE URGE AND STRESS (MALE)(FEMALE): ICD-10-CM

## 2019-02-28 DIAGNOSIS — E78.00 PURE HYPERCHOLESTEROLEMIA: ICD-10-CM

## 2019-02-28 DIAGNOSIS — M80.00XD AGE-RELATED OSTEOPOROSIS WITH CURRENT PATHOLOGICAL FRACTURE WITH ROUTINE HEALING: ICD-10-CM

## 2019-02-28 DIAGNOSIS — K21.9 GASTROESOPHAGEAL REFLUX DISEASE WITHOUT ESOPHAGITIS: ICD-10-CM

## 2019-02-28 DIAGNOSIS — Z23 IMMUNIZATION DUE: ICD-10-CM

## 2019-02-28 DIAGNOSIS — F41.9 ANXIETY: ICD-10-CM

## 2019-02-28 DIAGNOSIS — I70.0 AORTIC CALCIFICATION: Chronic | ICD-10-CM

## 2019-02-28 PROCEDURE — 99499 UNLISTED E&M SERVICE: CPT | Mod: S$GLB,,, | Performed by: NURSE PRACTITIONER

## 2019-02-28 PROCEDURE — G0439 PR MEDICARE ANNUAL WELLNESS SUBSEQUENT VISIT: ICD-10-PCS | Mod: HCNC,S$GLB,, | Performed by: NURSE PRACTITIONER

## 2019-02-28 PROCEDURE — 3075F SYST BP GE 130 - 139MM HG: CPT | Mod: HCNC,CPTII,S$GLB, | Performed by: NURSE PRACTITIONER

## 2019-02-28 PROCEDURE — G0439 PPPS, SUBSEQ VISIT: HCPCS | Mod: HCNC,S$GLB,, | Performed by: NURSE PRACTITIONER

## 2019-02-28 PROCEDURE — 99499 RISK ADDL DX/OHS AUDIT: ICD-10-PCS | Mod: S$GLB,,, | Performed by: NURSE PRACTITIONER

## 2019-02-28 PROCEDURE — 99999 PR PBB SHADOW E&M-EST. PATIENT-LVL IV: CPT | Mod: PBBFAC,HCNC,, | Performed by: NURSE PRACTITIONER

## 2019-02-28 PROCEDURE — 3075F PR MOST RECENT SYSTOLIC BLOOD PRESS GE 130-139MM HG: ICD-10-PCS | Mod: HCNC,CPTII,S$GLB, | Performed by: NURSE PRACTITIONER

## 2019-02-28 PROCEDURE — 99999 PR PBB SHADOW E&M-EST. PATIENT-LVL IV: ICD-10-PCS | Mod: PBBFAC,HCNC,, | Performed by: NURSE PRACTITIONER

## 2019-02-28 PROCEDURE — 3078F PR MOST RECENT DIASTOLIC BLOOD PRESSURE < 80 MM HG: ICD-10-PCS | Mod: HCNC,CPTII,S$GLB, | Performed by: NURSE PRACTITIONER

## 2019-02-28 PROCEDURE — 3078F DIAST BP <80 MM HG: CPT | Mod: HCNC,CPTII,S$GLB, | Performed by: NURSE PRACTITIONER

## 2019-02-28 NOTE — PROGRESS NOTES
"Mine Wilkins presented for a  Medicare AWV and comprehensive Health Risk Assessment today. The following components were reviewed and updated:    · Medical history  · Family History  · Social history  · Allergies and Current Medications  · Health Risk Assessment  · Health Maintenance  · Care Team     ** See Completed Assessments for Annual Wellness Visit within the encounter summary.**       The following assessments were completed:  · Living Situation  · CAGE  · Depression Screening  · Timed Get Up and Go  · Whisper Test  · Cognitive Function Screening  · Nutrition Screening  · ADL Screening  · PAQ Screening    Vitals:    02/28/19 1007   BP: 136/76   BP Location: Right arm   Patient Position: Sitting   BP Method: Medium (Manual)   Pulse: 82   SpO2: 98%   Weight: 64.7 kg (142 lb 10.2 oz)   Height: 5' 3" (1.6 m)     Body mass index is 25.27 kg/m².     Physical Exam   Constitutional: She is oriented to person, place, and time. She appears well-developed and well-nourished. No distress.   HENT:   Head: Normocephalic and atraumatic.   Eyes: EOM are normal. Pupils are equal, round, and reactive to light.   Neck: Neck supple. No JVD present. No tracheal deviation present.   Cardiovascular: Normal rate, regular rhythm, normal heart sounds and intact distal pulses.   No murmur heard.  Pulmonary/Chest: Effort normal and breath sounds normal. No respiratory distress. She has no wheezes. She has no rales.   Abdominal: Soft. Bowel sounds are normal. She exhibits no distension and no mass. There is no tenderness.   Musculoskeletal: Normal range of motion. She exhibits no edema or tenderness.   Neurological: She is alert and oriented to person, place, and time. Coordination normal.   Skin: Skin is warm and dry. No erythema. No pallor.   Psychiatric: She has a normal mood and affect. Her behavior is normal. Judgment and thought content normal. Cognition and memory are normal. She expresses no homicidal and no suicidal " ideation.   Nursing note and vitals reviewed.        Diagnoses and health risks identified today and associated recommendations/orders:    1. Encounter for preventive health examination    2. Essential hypertension  Chronic; stable on medication.  Followed by PCP.    3. Pure hypercholesterolemia  Chronic; stable on medication.  Followed by PCP.    4. Aortic calcification  Chronic; stable.  Followed by PCP.    5. 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  Followed by PCP.    6. Displaced intertrochanteric fracture of right femur, initial encounter for closed fracture  Chronic; stable.  Followed by Sports Medicine.    7. Age-related osteoporosis with current pathological fracture with routine healing  Chronic; stable on medication.  Followed by PCP.    8. Anxiety  Chronic; stable on medication.  Followed by PCP.    9. Gastroesophageal reflux disease without esophagitis  Chronic; stable on medication.  Followed by PCP.    10. 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  Followed by PCP.    11. Overweight (BMI 25.0-29.9)  Chronic, stable. Therapeutic lifestyle changes discussed. Followed by PCP.    12. Immunization due  Tdap to be administered at local pharmacy.  - DIPH,PERTUSS,ACEL,,TET VAC,PF, ADULT (ADACEL) 2 Lf-(2.5-5-3-5 mcg)-5Lf/0.5 mL Syrg; Inject 0.5 mLs into the muscle once. for 1 dose  Dispense: 0.5 mL; Refill: 0      Provided Mine with a 5-10 year written screening schedule and personal prevention plan. Recommendations were developed using the USPSTF age appropriate recommendations. Education, counseling, and referrals were provided as needed. After Visit Summary printed and given to patient which includes a list of additional screenings\tests needed.    Follow-up in about 6 months (around 8/29/2019) for annual visit with PCP, Annual Wellness Visit in 1 year.    Gypsy Rodriguez NP

## 2019-02-28 NOTE — PATIENT INSTRUCTIONS
Counseling and Referral of Other Preventative  (Italic type indicates deductible and co-insurance are waived)    Patient Name: Mine Wilkins  Today's Date: 2/28/2019    Health Maintenance       Date Due Completion Date    TETANUS VACCINE 04/16/1953 ---    DEXA SCAN 09/06/2021 9/6/2018    Lipid Panel 08/22/2023 8/22/2018        No orders of the defined types were placed in this encounter.    The following information is provided to all patients.  This information is to help you find resources for any of the problems found today that may be affecting your health:                Living healthy guide: www.[facility].[place].[facility]      Understanding Diabetes: www.diabetes.org      Eating healthy: www.cdc.gov/healthyweight      CDC home safety checklist: www.cdc.gov/steadi/patient.html      Agency on Aging: www.goea.[place].[facility]      Alcoholics anonymous (AA): www.aa.org      Physical Activity: www.aisha.nih.gov/pl3nnsd      Tobacco use: www.quitwithusla.org

## 2019-03-03 ENCOUNTER — OFFICE VISIT (OUTPATIENT)
Dept: URGENT CARE | Facility: CLINIC | Age: 84
End: 2019-03-03
Payer: MEDICARE

## 2019-03-03 VITALS
BODY MASS INDEX: 25.34 KG/M2 | HEIGHT: 63 IN | OXYGEN SATURATION: 97 % | RESPIRATION RATE: 19 BRPM | DIASTOLIC BLOOD PRESSURE: 83 MMHG | HEART RATE: 73 BPM | SYSTOLIC BLOOD PRESSURE: 177 MMHG | WEIGHT: 143 LBS | TEMPERATURE: 98 F

## 2019-03-03 DIAGNOSIS — S51.801A OPEN WOUND OF RIGHT FOREARM, INITIAL ENCOUNTER: Primary | ICD-10-CM

## 2019-03-03 PROCEDURE — 3079F PR MOST RECENT DIASTOLIC BLOOD PRESSURE 80-89 MM HG: ICD-10-PCS | Mod: CPTII,S$GLB,, | Performed by: INTERNAL MEDICINE

## 2019-03-03 PROCEDURE — 99214 PR OFFICE/OUTPT VISIT, EST, LEVL IV, 30-39 MIN: ICD-10-PCS | Mod: S$GLB,,, | Performed by: INTERNAL MEDICINE

## 2019-03-03 PROCEDURE — 99214 OFFICE O/P EST MOD 30 MIN: CPT | Mod: S$GLB,,, | Performed by: INTERNAL MEDICINE

## 2019-03-03 PROCEDURE — 3079F DIAST BP 80-89 MM HG: CPT | Mod: CPTII,S$GLB,, | Performed by: INTERNAL MEDICINE

## 2019-03-03 PROCEDURE — 3077F SYST BP >= 140 MM HG: CPT | Mod: CPTII,S$GLB,, | Performed by: INTERNAL MEDICINE

## 2019-03-03 PROCEDURE — 1101F PT FALLS ASSESS-DOCD LE1/YR: CPT | Mod: CPTII,S$GLB,, | Performed by: INTERNAL MEDICINE

## 2019-03-03 PROCEDURE — 3077F PR MOST RECENT SYSTOLIC BLOOD PRESSURE >= 140 MM HG: ICD-10-PCS | Mod: CPTII,S$GLB,, | Performed by: INTERNAL MEDICINE

## 2019-03-03 PROCEDURE — 1101F PR PT FALLS ASSESS DOC 0-1 FALLS W/OUT INJ PAST YR: ICD-10-PCS | Mod: CPTII,S$GLB,, | Performed by: INTERNAL MEDICINE

## 2019-03-03 RX ORDER — MUPIROCIN 20 MG/G
OINTMENT TOPICAL
Qty: 22 G | Refills: 1 | Status: SHIPPED | OUTPATIENT
Start: 2019-03-03 | End: 2020-03-03

## 2019-03-03 NOTE — PROGRESS NOTES
"Subjective:       Patient ID: Mine Wilkins is a 83 y.o. female.    Vitals:  height is 5' 3" (1.6 m) and weight is 64.9 kg (143 lb). Her oral temperature is 98.1 °F (36.7 °C). Her blood pressure is 177/83 (abnormal) and her pulse is 73. Her respiration is 19 and oxygen saturation is 97%.     Chief Complaint: Laceration (right forearm )    Laceration    The incident occurred 3 to 5 days ago. The laceration is located on the right arm. Injury mechanism: edge on glass. The pain is at a severity of 4/10. The pain is mild. The pain has been intermittent since onset. She reports no foreign bodies present. Her tetanus status is UTD.       Constitution: Negative for fatigue.   HENT: Negative for facial swelling and facial trauma.    Neck: Negative for neck stiffness.   Cardiovascular: Negative for chest trauma.   Eyes: Negative for eye trauma, double vision and blurred vision.   Gastrointestinal: Negative for abdominal trauma, abdominal pain and rectal bleeding.   Genitourinary: Negative for hematuria, missed menses, genital trauma and pelvic pain.   Musculoskeletal: Negative for pain, trauma, joint swelling and abnormal ROM of joint.   Skin: Negative for color change, wound, abrasion, laceration and bruising.   Neurological: Negative for dizziness, history of vertigo, light-headedness, coordination disturbances, altered mental status and loss of consciousness.   Hematologic/Lymphatic: Negative for history of bleeding disorder.   Psychiatric/Behavioral: Negative for altered mental status.       Objective:      Physical Exam   Musculoskeletal: Normal range of motion.   Skin:   Skin tear R forearm   Nursing note and vitals reviewed.      Assessment:       1. Open wound of right forearm, initial encounter        Plan:         Open wound of right forearm, initial encounter  -     mupirocin (BACTROBAN) 2 % ointment; Apply to affected area 2 times daily  Dispense: 22 g; Refill: 1         "

## 2019-03-11 ENCOUNTER — OFFICE VISIT (OUTPATIENT)
Dept: INTERNAL MEDICINE | Facility: CLINIC | Age: 84
End: 2019-03-11
Payer: MEDICARE

## 2019-03-11 VITALS
RESPIRATION RATE: 18 BRPM | BODY MASS INDEX: 25.24 KG/M2 | WEIGHT: 142.44 LBS | DIASTOLIC BLOOD PRESSURE: 84 MMHG | TEMPERATURE: 99 F | HEIGHT: 63 IN | HEART RATE: 73 BPM | SYSTOLIC BLOOD PRESSURE: 132 MMHG

## 2019-03-11 DIAGNOSIS — I10 HTN, GOAL BELOW 130/80: Primary | ICD-10-CM

## 2019-03-11 PROCEDURE — 3075F SYST BP GE 130 - 139MM HG: CPT | Mod: HCNC,CPTII,S$GLB, | Performed by: INTERNAL MEDICINE

## 2019-03-11 PROCEDURE — 3288F PR FALLS RISK ASSESSMENT DOCUMENTED: ICD-10-PCS | Mod: HCNC,CPTII,S$GLB, | Performed by: INTERNAL MEDICINE

## 2019-03-11 PROCEDURE — 1100F PTFALLS ASSESS-DOCD GE2>/YR: CPT | Mod: HCNC,CPTII,S$GLB, | Performed by: INTERNAL MEDICINE

## 2019-03-11 PROCEDURE — 99213 PR OFFICE/OUTPT VISIT, EST, LEVL III, 20-29 MIN: ICD-10-PCS | Mod: HCNC,S$GLB,, | Performed by: INTERNAL MEDICINE

## 2019-03-11 PROCEDURE — 1100F PR PT FALLS ASSESS DOC 2+ FALLS/FALL W/INJURY/YR: ICD-10-PCS | Mod: HCNC,CPTII,S$GLB, | Performed by: INTERNAL MEDICINE

## 2019-03-11 PROCEDURE — 99999 PR PBB SHADOW E&M-EST. PATIENT-LVL III: ICD-10-PCS | Mod: PBBFAC,HCNC,, | Performed by: INTERNAL MEDICINE

## 2019-03-11 PROCEDURE — 99999 PR PBB SHADOW E&M-EST. PATIENT-LVL III: CPT | Mod: PBBFAC,HCNC,, | Performed by: INTERNAL MEDICINE

## 2019-03-11 PROCEDURE — 3079F PR MOST RECENT DIASTOLIC BLOOD PRESSURE 80-89 MM HG: ICD-10-PCS | Mod: HCNC,CPTII,S$GLB, | Performed by: INTERNAL MEDICINE

## 2019-03-11 PROCEDURE — 3079F DIAST BP 80-89 MM HG: CPT | Mod: HCNC,CPTII,S$GLB, | Performed by: INTERNAL MEDICINE

## 2019-03-11 PROCEDURE — 99213 OFFICE O/P EST LOW 20 MIN: CPT | Mod: HCNC,S$GLB,, | Performed by: INTERNAL MEDICINE

## 2019-03-11 PROCEDURE — 3288F FALL RISK ASSESSMENT DOCD: CPT | Mod: HCNC,CPTII,S$GLB, | Performed by: INTERNAL MEDICINE

## 2019-03-11 PROCEDURE — 3075F PR MOST RECENT SYSTOLIC BLOOD PRESS GE 130-139MM HG: ICD-10-PCS | Mod: HCNC,CPTII,S$GLB, | Performed by: INTERNAL MEDICINE

## 2019-03-11 RX ORDER — LOSARTAN POTASSIUM 50 MG/1
50 TABLET ORAL DAILY
Qty: 90 TABLET | Refills: 1 | Status: SHIPPED | OUTPATIENT
Start: 2019-03-11 | End: 2019-04-03

## 2019-03-11 NOTE — PROGRESS NOTES
Subjective:       Patient ID: Mine Wilkins is a 83 y.o. female.    Chief Complaint: Blood Pressure Check    HPI   Pt with HTN is here for BP check. Her numbers have been somewhat elevated x 1 week. She increased her Losartan to 100 mg 3 days ago which has decreased her BP some. Today it is normal.   Review of Systems   Constitutional: Negative for activity change, appetite change, chills, diaphoresis, fatigue, fever and unexpected weight change.   HENT: Negative for postnasal drip, rhinorrhea, sinus pressure, sneezing, sore throat, trouble swallowing and voice change.    Respiratory: Negative for cough, shortness of breath and wheezing.    Cardiovascular: Negative for chest pain, palpitations and leg swelling.   Gastrointestinal: Negative for abdominal pain, blood in stool, constipation, diarrhea, nausea and vomiting.   Genitourinary: Negative for dysuria.   Musculoskeletal: Negative for arthralgias and myalgias.   Skin: Negative for rash and wound.   Allergic/Immunologic: Negative for environmental allergies and food allergies.   Hematological: Negative for adenopathy. Does not bruise/bleed easily.       Objective:      Physical Exam   Constitutional: She is oriented to person, place, and time. She appears well-developed and well-nourished. No distress.   HENT:   Head: Normocephalic and atraumatic.   Eyes: Conjunctivae and EOM are normal. Pupils are equal, round, and reactive to light. Right eye exhibits no discharge. Left eye exhibits no discharge. No scleral icterus.   Neck: Neck supple. No JVD present.   Cardiovascular: Normal rate, regular rhythm, normal heart sounds and intact distal pulses.   Pulmonary/Chest: Effort normal and breath sounds normal. No respiratory distress. She has no wheezes. She has no rales.   Musculoskeletal: She exhibits no edema.   Lymphadenopathy:     She has no cervical adenopathy.   Neurological: She is alert and oriented to person, place, and time.   Skin: Skin is warm and  dry. No rash noted. She is not diaphoretic. No pallor.       Assessment:       1. HTN, goal below 130/80        Plan:    1. Increase Losartan to 100 mg daily        Continue to monitor BP daily x 2 wks

## 2019-03-12 RX ORDER — OMEPRAZOLE 40 MG/1
CAPSULE, DELAYED RELEASE ORAL
Qty: 90 CAPSULE | Refills: 3 | Status: SHIPPED | OUTPATIENT
Start: 2019-03-12 | End: 2019-05-17 | Stop reason: SDUPTHER

## 2019-03-19 DIAGNOSIS — F41.9 ANXIETY: ICD-10-CM

## 2019-03-19 RX ORDER — ESCITALOPRAM OXALATE 5 MG/1
5 TABLET ORAL DAILY
Qty: 90 TABLET | Refills: 0 | Status: SHIPPED | OUTPATIENT
Start: 2019-03-19 | End: 2019-05-28 | Stop reason: SDUPTHER

## 2019-04-03 ENCOUNTER — OFFICE VISIT (OUTPATIENT)
Dept: INTERNAL MEDICINE | Facility: CLINIC | Age: 84
End: 2019-04-03
Payer: MEDICARE

## 2019-04-03 VITALS
BODY MASS INDEX: 25.43 KG/M2 | RESPIRATION RATE: 16 BRPM | SYSTOLIC BLOOD PRESSURE: 152 MMHG | HEIGHT: 63 IN | DIASTOLIC BLOOD PRESSURE: 80 MMHG | HEART RATE: 70 BPM | WEIGHT: 143.5 LBS | TEMPERATURE: 98 F

## 2019-04-03 DIAGNOSIS — I10 ESSENTIAL HYPERTENSION: Primary | ICD-10-CM

## 2019-04-03 PROCEDURE — 99999 PR PBB SHADOW E&M-EST. PATIENT-LVL III: ICD-10-PCS | Mod: PBBFAC,HCNC,, | Performed by: INTERNAL MEDICINE

## 2019-04-03 PROCEDURE — 3077F SYST BP >= 140 MM HG: CPT | Mod: HCNC,CPTII,S$GLB, | Performed by: INTERNAL MEDICINE

## 2019-04-03 PROCEDURE — 1101F PT FALLS ASSESS-DOCD LE1/YR: CPT | Mod: HCNC,CPTII,S$GLB, | Performed by: INTERNAL MEDICINE

## 2019-04-03 PROCEDURE — 99213 PR OFFICE/OUTPT VISIT, EST, LEVL III, 20-29 MIN: ICD-10-PCS | Mod: HCNC,S$GLB,, | Performed by: INTERNAL MEDICINE

## 2019-04-03 PROCEDURE — 1101F PR PT FALLS ASSESS DOC 0-1 FALLS W/OUT INJ PAST YR: ICD-10-PCS | Mod: HCNC,CPTII,S$GLB, | Performed by: INTERNAL MEDICINE

## 2019-04-03 PROCEDURE — 99213 OFFICE O/P EST LOW 20 MIN: CPT | Mod: HCNC,S$GLB,, | Performed by: INTERNAL MEDICINE

## 2019-04-03 PROCEDURE — 3079F DIAST BP 80-89 MM HG: CPT | Mod: HCNC,CPTII,S$GLB, | Performed by: INTERNAL MEDICINE

## 2019-04-03 PROCEDURE — 99999 PR PBB SHADOW E&M-EST. PATIENT-LVL III: CPT | Mod: PBBFAC,HCNC,, | Performed by: INTERNAL MEDICINE

## 2019-04-03 PROCEDURE — 3079F PR MOST RECENT DIASTOLIC BLOOD PRESSURE 80-89 MM HG: ICD-10-PCS | Mod: HCNC,CPTII,S$GLB, | Performed by: INTERNAL MEDICINE

## 2019-04-03 PROCEDURE — 3077F PR MOST RECENT SYSTOLIC BLOOD PRESSURE >= 140 MM HG: ICD-10-PCS | Mod: HCNC,CPTII,S$GLB, | Performed by: INTERNAL MEDICINE

## 2019-04-03 RX ORDER — LOSARTAN POTASSIUM 50 MG/1
50 TABLET ORAL 2 TIMES DAILY
Qty: 180 TABLET | Refills: 3
Start: 2019-04-03 | End: 2019-05-17 | Stop reason: SDUPTHER

## 2019-04-03 RX ORDER — AMLODIPINE BESYLATE 2.5 MG/1
2.5 TABLET ORAL DAILY
Qty: 30 TABLET | Refills: 11 | Status: SHIPPED | OUTPATIENT
Start: 2019-04-03 | End: 2019-05-17 | Stop reason: SDUPTHER

## 2019-04-03 NOTE — PROGRESS NOTES
This office note has been dictated.  HISTORY OF PRESENT ILLNESS:  This is an 83-year-old lady with hypertension and   others, following up today primarily on her blood pressure issue.  Her pressure   has been a bit elevated recently.  No other changes in lifestyle or medications.    She saw one of my associates and her losartan was increased to 50 mg 2 tablets   daily several weeks ago and she returns with readings for review.  Generally   feels fine with no significant chest pain, palpitations, syncope or presyncope.    No severe headaches.    CURRENT MEDICATIONS:  Noted and reviewed in the electronic medical record and   updated.    PHYSICAL EXAMINATION:  GENERAL:  Pleasant, alert, appropriately groomed lady, in no acute distress.  VITAL SIGNS:  Blood pressure taken manually by this examiner is 152/80.  Other   vital signs noted and reviewed as normal.    ADDITIONAL DATA:  Reviewed the home blood pressure readings and the systolic   reading is consistently abnormal in the 150 range.  Diastolic is normal.    IMPRESSION:  Hypertension, systolic is not optimal.    PLAN:  1.  Add amlodipine 2.5 mg daily.  2.  Continue losartan 50 mg, but take the tablet b.i.d.  3.  Return to clinic in 4 to 6 weeks for followup.      CISCO/IN  dd: 04/03/2019 14:33:13 (CDT)  td: 04/04/2019 00:30:40 (CDT)  Doc ID   #[id number]  Job ID #[id number]    CC:

## 2019-04-09 ENCOUNTER — INITIAL CONSULT (OUTPATIENT)
Dept: PHYSICAL MEDICINE AND REHAB | Facility: CLINIC | Age: 84
End: 2019-04-09
Payer: MEDICARE

## 2019-04-09 VITALS
BODY MASS INDEX: 25.29 KG/M2 | HEIGHT: 63 IN | DIASTOLIC BLOOD PRESSURE: 76 MMHG | SYSTOLIC BLOOD PRESSURE: 128 MMHG | WEIGHT: 142.75 LBS | HEART RATE: 77 BPM

## 2019-04-09 DIAGNOSIS — M19.042 PRIMARY OSTEOARTHRITIS OF BOTH HANDS: ICD-10-CM

## 2019-04-09 DIAGNOSIS — M51.36 DDD (DEGENERATIVE DISC DISEASE), LUMBAR: ICD-10-CM

## 2019-04-09 DIAGNOSIS — M48.061 NEURAL FORAMINAL STENOSIS OF LUMBAR SPINE: ICD-10-CM

## 2019-04-09 DIAGNOSIS — M47.816 LUMBAR FACET ARTHROPATHY: ICD-10-CM

## 2019-04-09 DIAGNOSIS — M54.50 CHRONIC RIGHT-SIDED LOW BACK PAIN WITHOUT SCIATICA: Primary | ICD-10-CM

## 2019-04-09 DIAGNOSIS — M43.16 SPONDYLOLISTHESIS AT L4-L5 LEVEL: ICD-10-CM

## 2019-04-09 DIAGNOSIS — G89.29 CHRONIC RIGHT-SIDED LOW BACK PAIN WITHOUT SCIATICA: Primary | ICD-10-CM

## 2019-04-09 DIAGNOSIS — M19.041 PRIMARY OSTEOARTHRITIS OF BOTH HANDS: ICD-10-CM

## 2019-04-09 PROCEDURE — 3074F SYST BP LT 130 MM HG: CPT | Mod: HCNC,CPTII,S$GLB, | Performed by: PHYSICAL MEDICINE & REHABILITATION

## 2019-04-09 PROCEDURE — 99204 PR OFFICE/OUTPT VISIT, NEW, LEVL IV, 45-59 MIN: ICD-10-PCS | Mod: HCNC,S$GLB,, | Performed by: PHYSICAL MEDICINE & REHABILITATION

## 2019-04-09 PROCEDURE — 3074F PR MOST RECENT SYSTOLIC BLOOD PRESSURE < 130 MM HG: ICD-10-PCS | Mod: HCNC,CPTII,S$GLB, | Performed by: PHYSICAL MEDICINE & REHABILITATION

## 2019-04-09 PROCEDURE — 3078F PR MOST RECENT DIASTOLIC BLOOD PRESSURE < 80 MM HG: ICD-10-PCS | Mod: HCNC,CPTII,S$GLB, | Performed by: PHYSICAL MEDICINE & REHABILITATION

## 2019-04-09 PROCEDURE — 1101F PT FALLS ASSESS-DOCD LE1/YR: CPT | Mod: HCNC,CPTII,S$GLB, | Performed by: PHYSICAL MEDICINE & REHABILITATION

## 2019-04-09 PROCEDURE — 1101F PR PT FALLS ASSESS DOC 0-1 FALLS W/OUT INJ PAST YR: ICD-10-PCS | Mod: HCNC,CPTII,S$GLB, | Performed by: PHYSICAL MEDICINE & REHABILITATION

## 2019-04-09 PROCEDURE — 99999 PR PBB SHADOW E&M-EST. PATIENT-LVL II: ICD-10-PCS | Mod: PBBFAC,HCNC,, | Performed by: PHYSICAL MEDICINE & REHABILITATION

## 2019-04-09 PROCEDURE — 99204 OFFICE O/P NEW MOD 45 MIN: CPT | Mod: HCNC,S$GLB,, | Performed by: PHYSICAL MEDICINE & REHABILITATION

## 2019-04-09 PROCEDURE — 3078F DIAST BP <80 MM HG: CPT | Mod: HCNC,CPTII,S$GLB, | Performed by: PHYSICAL MEDICINE & REHABILITATION

## 2019-04-09 PROCEDURE — 99999 PR PBB SHADOW E&M-EST. PATIENT-LVL II: CPT | Mod: PBBFAC,HCNC,, | Performed by: PHYSICAL MEDICINE & REHABILITATION

## 2019-04-09 NOTE — PROGRESS NOTES
Subjective:       Patient ID: Mine Wilkins is a 83 y.o. female.    Chief Complaint: No chief complaint on file.    HPI     HISTORY OF PRESENT ILLNESS:  Ms. Wilkins is an 83-year-old white female with   past medical history of hypertension, aortic valve stenosis, GERD, osteoporosis,   arthritis, right hip fracture status post IMN in May 2018 and chronic back   pain.  She is presenting to the Physical Medicine Clinic for help with her back   pain.    The patient was complaining of back pain about 30 years ago.  She was diagnosed   with DJD.  Her pain has been waxing and waning.  It was worse over three years   ago.  She attributes this to having to take care of her sick .  Review of   the chart shows that she had an MRI of the lumbar spine done on 01/14/2016.  It   was positive for multilevel spondylosis with multilevel neuroforaminal   narrowing, moderate at bilateral L4-L5 and anterolisthesis grade I at L4-L5.    The patient was seen at the time at [facility].  She underwent   left L4 and L5 transforaminal ROMAN in February 2016.  The patient reports   significant relief of her back pain and left sciatica.  The patient reports that   her  passed away in February 2019.  This reduced the stress on her and   her pain has been better since.    Currently, her back pain is an intermittent aching sensation in the lower lumbar   spine and right buttocks region.  She used to have radicular symptoms to the   left leg, but none for the last couple of years.  Her pain is aggravated by   house chores including cleaning, bending and heavy lifting.  It is better with   sitting down and hot shower.  Her maximum pain is 5-6/10 and minimum 1-2/10.    Today, it is 1-2/10.  The patient denies any lower extremity weakness.  She   denies any leg numbness.  She denies any bowel incontinence.  She has chronic   bladder leakage.  She is taking over-the-counter Tylenol two tablets once or   twice  per week.  She takes over-the-counter Aleve about once per week as needed.    She finished a course of physical therapy in December 2018.  She has been   doing a home exercise program.      MS/HN  dd: 04/09/2019 15:23:07 (CDT)  td: 04/10/2019 08:43:26 (CDT)  Doc ID   #[id number]  Job ID #[id number]    CC:       Review of Systems   Constitutional: Negative for fatigue.   Eyes: Negative for visual disturbance.   Respiratory: Negative for chest tightness.    Cardiovascular: Negative for chest pain.   Gastrointestinal: Negative for blood in stool, constipation, nausea and vomiting.   Genitourinary: Positive for difficulty urinating and hematuria.   Musculoskeletal: Positive for back pain and neck pain. Negative for gait problem and neck stiffness.   Neurological: Negative for dizziness and headaches.   Psychiatric/Behavioral: Negative for behavioral problems and sleep disturbance.       Objective:      Physical Exam   Constitutional: She is oriented to person, place, and time. She appears well-developed and well-nourished.   HENT:   Head: Normocephalic and atraumatic.   Neck:   Mild decrease ROM.  Mild pain at end range.   Cardiovascular: Normal rate and regular rhythm.   Murmur heard.  Pulmonary/Chest: Effort normal and breath sounds normal.   Abdominal: Soft.   Musculoskeletal:   BUE:  +ve Heberden's & Nadine's nodes.  ROM:   RUE: full.   LUE: full.  Strength:    RUE: 5-/5 at shoulder abduction, 5 elbow flexion, 5 elbow extension, 5 hand .   LUE: 5/5 at shoulder abduction, 5 elbow flexion, 5 elbow extension, 5 hand .  Sensation to pinprick:   RUE: intact.   LUE: intact.  DTR:    RUE: +2 biceps, +2 triceps.   LUE:  +2 biceps, +2 triceps.  Hodges:   RUE: -ve.   LUE: -ve.    BLE:  ROM:   RLE: full.   LLE: full.  Knee crepitus:   RLE: +ve.   LLE: +ve.   Strength:    RLE: 5/5 at hip flexion, 5 knee extension, 5 ankle DF, 5 PF, 5 EHL.   LLE: 5/5 at hip flexion, 5 knee extension, 5 ankle DF, 5 PF, 5 EHL.  Sensation to  pinprick:     RLE: intact.      LLE: intact.   DTR:     RLE: +2 knee, +1 ankle.    LLE: +2 knee, +1 ankle.  Clonus:    Rt ankle: -ve.    Lt ankle: -ve.  SLR:      RLE: -ve at 70 degrees.      LLE: -ve at 70 degrees.   CASEY:     RLE: -ve.      LLE: -ve.  Gillet's test:     RLE: -ve.      LLE: -ve.  SI tenderness:     RLE: -ve.      LLE: -ve.  Mild tenderness over lumbar spine.  No leg length discrepancy.    Directional Preference:  Spine flexion: 90 degrees , mild pain in back.  Spine extension: 30 degrees, mild pain in back.  Lateral bending: no pain to Right, no pain to Left.      Heel walking: WNL.  Toe walking: WNL.  Gait: WNL     Neurological: She is alert and oriented to person, place, and time.   Skin: Skin is warm.   Psychiatric: She has a normal mood and affect. Her behavior is normal.   Vitals reviewed.        IMAGING STUDIES:    MRI OF THE LUMBAR SPINE WITHOUT CONTRAST (1/141/16):     Technique:  Sagittal T1, sagittal T2, sagittal STIR, axial T1 and axial T2 weighted images of the lumbar spine obtained without contrast.     Comparison: None.     Findings:    There is grade 1 anterolisthesis of L4 on L5. There is intervertebral disk space narrowing and disk desiccation with degenerative endplate changes at L5-S1.  The vertebral body heights are well maintained, with no fracture.  No marrow signal abnormality suspicious for an infiltrative process.  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    The conus is normal in appearance, and terminates at the L1 level.  The adjacent soft tissue structures show bilateral renal cysts.      T12-L1: There is no focal disc herniation. No significant central canal or neural foraminal narrowing.    L1-L2: There is no focal disc herniation. No significant central canal or neural foraminal narrowing.    L2-L3: Mild circumferential disk bulge without significant spinal canal stenosis.  Mild bilateral neuroforaminal narrowing.    L3-L4: Mild circumferential  disk bulge without significant spinal canal stenosis or neuroforaminal narrowing.    L4-L5: Grade I anterolisthesis of L4 on L5 with uncovering of the intervertebral disk.  Circumferential disk bulge without significant spinal canal stenosis.  Moderate bilateral neuroforaminal narrowing secondary to facet arthropathy and disk bulge.    L5-S1: Mild circumferential disk bulge without significant spinal canal stenosis.  Bilateral facet arthropathy contributing to mild bilateral neural foraminal narrowing.    Impression       1. Spondylosis of the lumbar spine greatest appreciated at L4-L5 and L5-S1 as detailed above.      Assessment:       1. Chronic right-sided low back pain without sciatica    2. DDD (degenerative disc disease), lumbar    3. Lumbar facet arthropathy    4. Neural foraminal stenosis of lumbar spine    5. Spondylolisthesis at L4-L5 level (anterolisthesis, grade 1)    6. Primary osteoarthritis of both hands        Plan:         - MRI findings were discussed with the patient.  - Start Tylenol 500 mg, 1-2 po tid prn for mild pain.  - Her LBP is intermittent and manageable. We will hold off on NSAIDs for now.  - Regular home exercise program was encouraged.  - Follow up in about 6 months (around 10/9/2019).  She may follow up earlier as needed if her LBP or sciatica flare up.    This was a 45 minute visit, 50% of which was spent educating the patient about the diagnosis and the treatment plan.

## 2019-04-09 NOTE — LETTER
April 9, 2019        ZOEY Oswald MD  [address]  [place] [address]           [facility]  [address]  [facility] [address]  Phone: [phone]          Patient: Mine Wilkins   MR Number: [id number]   YOB: 1935   Date of Visit: 4/9/2019       Dear Dr. ZOEY Oswald:    Thank you for referring Mine Wilkins to me for evaluation. Attached you will find relevant portions of my assessment and plan of care.    If you have questions, please do not hesitate to call me. I look forward to following Mine Wilkins along with you.    Sincerely,    Manny Serrano MD    Enclosure  CC:  No Recipients    If you would like to receive this communication electronically, please contact [email or web address] or [phone] to request more information on [facility] access.    For providers and/or their staff who would like to refer a patient to [facility], please contact us through our one-stop-shop provider referral line, [facility], at [phone].    If you feel you have received this communication in error or would no longer like to receive these types of communications, please e-mail [email or web address]

## 2019-04-17 ENCOUNTER — OFFICE VISIT (OUTPATIENT)
Dept: OPTOMETRY | Facility: CLINIC | Age: 84
End: 2019-04-17
Payer: COMMERCIAL

## 2019-04-17 DIAGNOSIS — H35.3132 INTERMEDIATE STAGE NONEXUDATIVE AGE-RELATED MACULAR DEGENERATION OF BOTH EYES: ICD-10-CM

## 2019-04-17 DIAGNOSIS — Z13.5 SCREENING FOR GLAUCOMA: ICD-10-CM

## 2019-04-17 DIAGNOSIS — H52.4 PRESBYOPIA: ICD-10-CM

## 2019-04-17 DIAGNOSIS — H50.05 ESOTROPIA, ALTERNATING: Primary | ICD-10-CM

## 2019-04-17 PROCEDURE — 99999 PR PBB SHADOW E&M-EST. PATIENT-LVL III: CPT | Mod: PBBFAC,,, | Performed by: OPTOMETRIST

## 2019-04-17 PROCEDURE — 92134 OCT, RETINA - OU - BOTH EYES: ICD-10-PCS | Mod: S$GLB,,, | Performed by: OPTOMETRIST

## 2019-04-17 PROCEDURE — 92015 DETERMINE REFRACTIVE STATE: CPT | Mod: S$GLB,,, | Performed by: OPTOMETRIST

## 2019-04-17 PROCEDURE — 92134 CPTRZ OPH DX IMG PST SGM RTA: CPT | Mod: S$GLB,,, | Performed by: OPTOMETRIST

## 2019-04-17 PROCEDURE — 92015 PR REFRACTION: ICD-10-PCS | Mod: S$GLB,,, | Performed by: OPTOMETRIST

## 2019-04-17 PROCEDURE — 92014 COMPRE OPH EXAM EST PT 1/>: CPT | Mod: S$GLB,,, | Performed by: OPTOMETRIST

## 2019-04-17 PROCEDURE — 92014 PR EYE EXAM, EST PATIENT,COMPREHESV: ICD-10-PCS | Mod: S$GLB,,, | Performed by: OPTOMETRIST

## 2019-04-17 PROCEDURE — 99999 PR PBB SHADOW E&M-EST. PATIENT-LVL III: ICD-10-PCS | Mod: PBBFAC,,, | Performed by: OPTOMETRIST

## 2019-04-17 NOTE — PATIENT INSTRUCTIONS
Using the Amsler Grid  If you are at risk for vision loss, you may be told to check your eyesight regularly using the Amsler grid. Below is the grid and instructions for using it.         The Amsler grid helps you track changes in your vision.    How to Use the Amsler Grid  1. Use the grid in a well-lighted area.  2. Wear glasses or contact lenses if you usually wear them.  3. Hold the grid at your normal reading distance (about 16 inches).  4. Cover your left eye.  5. With your right eye, look at the dot in the center of the grid.  6. While looking at the dot, notice if any of the lines look wavy, if any lines disappear, or if the boxes change shape.  7. Write down on a piece of paper any vision changes from the last time you used the grid.  8. Now repeat the exercise, this time covering your right eye.  9. Call your doctor right away if you notice any vision changes.  How Often Should I Check My Vision?  Use the Amsler grid as often as your eye doctor suggests. Keep the grid where youll remember to use it. Call your eye doctor right away if you notice any changes with your eyesight. This includes if your vision improves.  © 5853-1649 [employer], [address] [place] [address], [place] [address]. All rights reserved. This information is not intended as a substitute for professional medical care. Always follow your healthcare professional's instructions.

## 2019-04-17 NOTE — PROGRESS NOTES
HPI     DLS:3/13/18  Pt states with her glasses on she cant see very  well with dist--feels VA   getting worse. She have some blurry VA. Have been having headaches every   once in a while   No f/f   No gtts     Last edited by Almas Kidd, OD on 4/17/2019  4:29 PM. (History)        ROS     Positive for: Eyes (AET/ MFIOL OU)    Negative for: Constitutional, Gastrointestinal, Neurological, Skin,   Genitourinary, Musculoskeletal, HENT, Endocrine, Cardiovascular,   Respiratory, Psychiatric, Allergic/Imm, Heme/Lymph    Last edited by Almas Kidd, OD on 4/17/2019  4:29 PM. (History)        Assessment /Plan     For exam results, see Encounter Report.    Esotropia, alternating - Both Eyes    Intermediate stage nonexudative age-related macular degeneration of both eyes  -     OCT, Retina - OU - Both Eyes    Screening for glaucoma    Presbyopia      1. Alt eso w prism in spex--pt happy w RX (dist only)  2. Mild pco OD sp multifocal iol OU/YAG OS  3. Mild/mod ARMD (dry) OU (see OCT).  Discussed vitamins/sunglasses.  Instructed on Amsler grid use    PLAN:    rtc 1 yr, or immediately if amsler changes  (pt lost her  earlier this year)

## 2019-05-17 ENCOUNTER — OFFICE VISIT (OUTPATIENT)
Dept: INTERNAL MEDICINE | Facility: CLINIC | Age: 84
End: 2019-05-17
Payer: MEDICARE

## 2019-05-17 VITALS
SYSTOLIC BLOOD PRESSURE: 142 MMHG | TEMPERATURE: 99 F | HEART RATE: 69 BPM | HEIGHT: 63 IN | DIASTOLIC BLOOD PRESSURE: 78 MMHG | BODY MASS INDEX: 25.55 KG/M2 | WEIGHT: 144.19 LBS

## 2019-05-17 DIAGNOSIS — I10 ESSENTIAL HYPERTENSION: Primary | ICD-10-CM

## 2019-05-17 PROCEDURE — 3077F PR MOST RECENT SYSTOLIC BLOOD PRESSURE >= 140 MM HG: ICD-10-PCS | Mod: HCNC,CPTII,S$GLB, | Performed by: INTERNAL MEDICINE

## 2019-05-17 PROCEDURE — 3288F FALL RISK ASSESSMENT DOCD: CPT | Mod: HCNC,CPTII,S$GLB, | Performed by: INTERNAL MEDICINE

## 2019-05-17 PROCEDURE — 99213 OFFICE O/P EST LOW 20 MIN: CPT | Mod: HCNC,S$GLB,, | Performed by: INTERNAL MEDICINE

## 2019-05-17 PROCEDURE — 3078F DIAST BP <80 MM HG: CPT | Mod: HCNC,CPTII,S$GLB, | Performed by: INTERNAL MEDICINE

## 2019-05-17 PROCEDURE — 1100F PR PT FALLS ASSESS DOC 2+ FALLS/FALL W/INJURY/YR: ICD-10-PCS | Mod: HCNC,CPTII,S$GLB, | Performed by: INTERNAL MEDICINE

## 2019-05-17 PROCEDURE — 99999 PR PBB SHADOW E&M-EST. PATIENT-LVL III: CPT | Mod: PBBFAC,HCNC,, | Performed by: INTERNAL MEDICINE

## 2019-05-17 PROCEDURE — 3288F PR FALLS RISK ASSESSMENT DOCUMENTED: ICD-10-PCS | Mod: HCNC,CPTII,S$GLB, | Performed by: INTERNAL MEDICINE

## 2019-05-17 PROCEDURE — 1100F PTFALLS ASSESS-DOCD GE2>/YR: CPT | Mod: HCNC,CPTII,S$GLB, | Performed by: INTERNAL MEDICINE

## 2019-05-17 PROCEDURE — 99213 PR OFFICE/OUTPT VISIT, EST, LEVL III, 20-29 MIN: ICD-10-PCS | Mod: HCNC,S$GLB,, | Performed by: INTERNAL MEDICINE

## 2019-05-17 PROCEDURE — 3078F PR MOST RECENT DIASTOLIC BLOOD PRESSURE < 80 MM HG: ICD-10-PCS | Mod: HCNC,CPTII,S$GLB, | Performed by: INTERNAL MEDICINE

## 2019-05-17 PROCEDURE — 3077F SYST BP >= 140 MM HG: CPT | Mod: HCNC,CPTII,S$GLB, | Performed by: INTERNAL MEDICINE

## 2019-05-17 PROCEDURE — 99999 PR PBB SHADOW E&M-EST. PATIENT-LVL III: ICD-10-PCS | Mod: PBBFAC,HCNC,, | Performed by: INTERNAL MEDICINE

## 2019-05-17 RX ORDER — ALENDRONATE SODIUM 70 MG/1
70 TABLET ORAL
Qty: 12 TABLET | Refills: 3 | Status: SHIPPED | OUTPATIENT
Start: 2019-05-17 | End: 2020-04-27

## 2019-05-17 RX ORDER — OXYBUTYNIN CHLORIDE 10 MG/1
10 TABLET, EXTENDED RELEASE ORAL DAILY
Qty: 90 TABLET | Refills: 3 | Status: SHIPPED | OUTPATIENT
Start: 2019-05-17 | End: 2020-04-27

## 2019-05-17 RX ORDER — PRAVASTATIN SODIUM 20 MG/1
20 TABLET ORAL NIGHTLY
Qty: 90 TABLET | Refills: 3 | Status: SHIPPED | OUTPATIENT
Start: 2019-05-17 | End: 2020-04-27

## 2019-05-17 RX ORDER — AMLODIPINE BESYLATE 2.5 MG/1
2.5 TABLET ORAL DAILY
Qty: 90 TABLET | Refills: 3 | Status: SHIPPED | OUTPATIENT
Start: 2019-05-17 | End: 2020-04-27

## 2019-05-17 RX ORDER — OMEPRAZOLE 40 MG/1
CAPSULE, DELAYED RELEASE ORAL
Qty: 90 CAPSULE | Refills: 3 | Status: SHIPPED | OUTPATIENT
Start: 2019-05-17 | End: 2020-04-27

## 2019-05-17 RX ORDER — LOSARTAN POTASSIUM 50 MG/1
50 TABLET ORAL 2 TIMES DAILY
Qty: 180 TABLET | Refills: 3 | Status: SHIPPED | OUTPATIENT
Start: 2019-05-17 | End: 2020-04-27

## 2019-05-25 NOTE — PROGRESS NOTES
"Called to Brielle Leal PA-C WBC 41.44.    Reason for Disposition  • Lab or radiology calling with CRITICAL test results    Additional Information  • Negative: Lab calling with strep throat test results and triager can call in prescription  • Negative: Lab calling with urinalysis test results and triager can call in prescription  • Negative: Medication questions  • Negative: ED call to PCP  • Negative: Physician call to PCP  • Negative: Call about patient who is currently hospitalized    Answer Assessment - Initial Assessment Questions  1. REASON FOR CALL or QUESTION: \"What is your reason for calling today?\" or \"How can I best  help you?\" or \"What question do you have that I can help answer?\"      Critical lab WBC 41.44  2. CALLER: Document the source of call. (e.g., laboratory, patient).      [facility]    Protocols used: PCP CALL - NO TRIAGE-ADULT-      " "S:Mine Wilkins presents for post-operative evaluation.     PROVIDER: DR. VALERIE MCGHEE     PATIENT INFORMATION   Mine Wilkins 83 y.o. female 1935   MRN: [id number]   LOCATION: [facility]      DATE OF PROCEDURE: 5/20/2018      PREOPERATIVE DIAGNOSES:   Right femur intertrochanteric fracture with reverse oblique extension, unstable     POSTOPERATIVE DIAGNOSES:   Right femur intertrochanteric fracture with reverse oblique extension, unstable     PROCEDURES PERFORMED:   Right femur cephalomedullary nailing     Surgeon(s) and Role:     * Jason Waggoner MD - Resident - Assisting     * Michael Joseph Casale, MD - Resident - Assisting     * Loi Blanco MD - Resident - Observing     * Quique Bernal MD - Resident - Assisting     * SIDNEY Mcghee MD - Primary    Mine Wilkins reports to be doing well 3 months s/p the above mentioned procedure. No pain today. 3/10 pain at the worst.  Doing HEP at home everayday. In outpatient physical therapy twice a week at [facility]. No new complaints.  She does have some residual intermittent anterior groin pain "once in a while". Overall doing well. Ambulating with cane today.     O: RLE Well-healed incisions.  Minimal swelling and fullness over the proximal peritrochanteric region.  -TTP over operative area.  No pain with passive internal and external rotation of hip.  Negative Stinchfield test. NVI.    Radiographs: s/p CMN.  No interval changes in alignment.  Post reduction collapse noted on prior films.  Good interval healing present.    A/P: Patient has made good clinical progress.  May continue weightbearing to tolerance. Continue PT per protocol. Goal of improved gait, overall strength, wean off cane.    RTC to see Noah Gutierrez PA-C 3 months for follow-up and repeat femur radiographs.    All of the patient's questions were answered and the patient will contact us if they have any questions or concerns in " the interim.

## 2019-05-28 DIAGNOSIS — F41.9 ANXIETY: ICD-10-CM

## 2019-05-28 RX ORDER — ESCITALOPRAM OXALATE 5 MG/1
TABLET ORAL
Qty: 90 TABLET | Refills: 0 | Status: SHIPPED | OUTPATIENT
Start: 2019-05-28 | End: 2019-09-16 | Stop reason: SDUPTHER

## 2019-06-04 NOTE — PROGRESS NOTES
History of present illness:  84-year-old lady with hypertension, dyslipidemia, aortic stenosis following up today primarily on hypertension.  Last month we had added amlodipine and increase her dose of losartan.  She returns for follow-up.  She reports all home blood pressure readings are within normal limits she denies any side effects from medications.  Currently without chest pain palpitations syncope.    Current medications:  All medications are noted and reviewed the electronic medical record medication list.    Review of systems:  Respiratory:  No cough shortness of breath.  Cardiovascular:  No chest pain palpitations syncope claudication.    Past medical history past surgical history family medical history social history is are all noted and reviewed in the electronic medical record history sections    Physical examination:  General:  Pleasant alert appropriately groomed lady no acute distress.  Vital signs:  Blood pressure taken manually by this examiner 142/78.  All blood pressure readings described as within normal limits outside the office setting.  Cardiovascular:  2/6 systolic murmur.  Carotids full bilaterally no significant peripheral extremity edema  Mental status:  Alert oriented affect appropriate    Impression:  Hypertension, appears the systolic is reasonably controlled for age    Plan:  Continue current pharmacologic regimens.  Return to clinic September 2019 for annual health assessment.

## 2019-06-10 ENCOUNTER — TELEPHONE (OUTPATIENT)
Dept: INTERNAL MEDICINE | Facility: CLINIC | Age: 84
End: 2019-06-10

## 2019-06-10 DIAGNOSIS — E78.5 DYSLIPIDEMIA: ICD-10-CM

## 2019-06-10 DIAGNOSIS — I10 HTN (HYPERTENSION), BENIGN: Primary | ICD-10-CM

## 2019-06-10 NOTE — TELEPHONE ENCOUNTER
----- Message from David Pimentel sent at 6/10/2019 11:40 AM CDT -----  Contact: Patient   10/16/19\annual physical need lab orders placed and linked  Labs 10/07/19    Thank you

## 2019-09-08 ENCOUNTER — OFFICE VISIT (OUTPATIENT)
Dept: URGENT CARE | Facility: CLINIC | Age: 84
End: 2019-09-08
Payer: MEDICARE

## 2019-09-08 VITALS
DIASTOLIC BLOOD PRESSURE: 68 MMHG | HEIGHT: 63 IN | TEMPERATURE: 98 F | RESPIRATION RATE: 16 BRPM | WEIGHT: 145 LBS | BODY MASS INDEX: 25.69 KG/M2 | SYSTOLIC BLOOD PRESSURE: 132 MMHG | OXYGEN SATURATION: 99 % | HEART RATE: 70 BPM

## 2019-09-08 DIAGNOSIS — R82.90 FOUL SMELLING URINE: Primary | ICD-10-CM

## 2019-09-08 LAB
BILIRUB UR QL STRIP: NEGATIVE
GLUCOSE UR QL STRIP: NEGATIVE
KETONES UR QL STRIP: NEGATIVE
LEUKOCYTE ESTERASE UR QL STRIP: NEGATIVE
PH, POC UA: 6 (ref 5–8)
POC BLOOD, URINE: NEGATIVE
POC NITRATES, URINE: NEGATIVE
PROT UR QL STRIP: NEGATIVE
SP GR UR STRIP: 1.01 (ref 1–1.03)
UROBILINOGEN UR STRIP-ACNC: NORMAL (ref 0.1–1.1)

## 2019-09-08 PROCEDURE — 3288F FALL RISK ASSESSMENT DOCD: CPT | Mod: CPTII,S$GLB,, | Performed by: NURSE PRACTITIONER

## 2019-09-08 PROCEDURE — 1100F PTFALLS ASSESS-DOCD GE2>/YR: CPT | Mod: CPTII,S$GLB,, | Performed by: NURSE PRACTITIONER

## 2019-09-08 PROCEDURE — 3078F DIAST BP <80 MM HG: CPT | Mod: CPTII,S$GLB,, | Performed by: NURSE PRACTITIONER

## 2019-09-08 PROCEDURE — 87086 URINE CULTURE/COLONY COUNT: CPT | Mod: HCNC

## 2019-09-08 PROCEDURE — 99214 PR OFFICE/OUTPT VISIT, EST, LEVL IV, 30-39 MIN: ICD-10-PCS | Mod: S$GLB,,, | Performed by: NURSE PRACTITIONER

## 2019-09-08 PROCEDURE — 81003 POCT URINALYSIS, DIPSTICK, AUTOMATED, W/O SCOPE: ICD-10-PCS | Mod: QW,S$GLB,, | Performed by: NURSE PRACTITIONER

## 2019-09-08 PROCEDURE — 3075F PR MOST RECENT SYSTOLIC BLOOD PRESS GE 130-139MM HG: ICD-10-PCS | Mod: CPTII,S$GLB,, | Performed by: NURSE PRACTITIONER

## 2019-09-08 PROCEDURE — 3075F SYST BP GE 130 - 139MM HG: CPT | Mod: CPTII,S$GLB,, | Performed by: NURSE PRACTITIONER

## 2019-09-08 PROCEDURE — 1100F PR PT FALLS ASSESS DOC 2+ FALLS/FALL W/INJURY/YR: ICD-10-PCS | Mod: CPTII,S$GLB,, | Performed by: NURSE PRACTITIONER

## 2019-09-08 PROCEDURE — 81003 URINALYSIS AUTO W/O SCOPE: CPT | Mod: QW,S$GLB,, | Performed by: NURSE PRACTITIONER

## 2019-09-08 PROCEDURE — 99214 OFFICE O/P EST MOD 30 MIN: CPT | Mod: S$GLB,,, | Performed by: NURSE PRACTITIONER

## 2019-09-08 PROCEDURE — 3078F PR MOST RECENT DIASTOLIC BLOOD PRESSURE < 80 MM HG: ICD-10-PCS | Mod: CPTII,S$GLB,, | Performed by: NURSE PRACTITIONER

## 2019-09-08 PROCEDURE — 3288F PR FALLS RISK ASSESSMENT DOCUMENTED: ICD-10-PCS | Mod: CPTII,S$GLB,, | Performed by: NURSE PRACTITIONER

## 2019-09-08 NOTE — PATIENT INSTRUCTIONS
WE WILL CALL WITH RESULTS IN THE NEXT 2-3 DAYS    You must understand that you've received an Urgent Care treatment only and that you may be released before all your medical problems are known or treated. You, the patient, will arrange for follow up care as instructed.  If your condition worsens we recommend that you receive another evaluation at the emergency room immediately or contact your primary medical clinics after hours call service to discuss your concerns.  Please return here or go to the Emergency Department for any concerns or worsening of condition.      Urine Culture  Does this test have other names?  Urine culture and sensitivity, urine C&S  What is this test?  This test checks for bacteria in your urine that could be causing an infection in your urinary tract. The urinary tract includes the kidneys, bladder, ureters, and urethra.  The results of a urine culture help your doctor find out what's causing your infection and determine the best way to treat it. Almost 90% of urinary tract infections (UTIs) are caused by E. coli bacteria. Other types of bacteria, tuberculosis, and yeast infections can also cause a urinary tract infection.  Why do I need this test?  You may need this test if you have symptoms of a UTI. These include:  · Fever and chills  · Burning pain when urinating  · Pain in the back or lower belly  · Frequent need to urinate  · Cloudy or smelly urine  What other tests might I have along with this test?  Your doctor may also order a urinalysis, which is a urine test to check for white blood cells. He or she may also order a blood test to look for signs of infection in your blood.  What do my test results mean?  Many things may affect your lab test results. These include the method each lab uses to do the test. Even if your test results are different from the normal value, you may not have a problem. To learn what the results mean for you, talk with your healthcare provider.  Urine culture  "results are given in colony forming units per milliliter (CFU/mL). A negative result means you don't have an infection. A higher bacterial count may mean infection.  How is this test done?  This test requires a "clean-catch" urine sample. To collect this type of sample:  · Carefully wash and dry your hands before removing the cap of the specimen container.  · Clean the area around the opening of your urethra with an antiseptic pad.  · Start urinating directly into the toilet, then urinate into the sterile container to collect a sample.  · Fill the container as instructed.  · Do not let any part of the container touch your genitals or skin.  · Recap the container.  Does this test pose any risks?  This test poses no known risks.  What might affect my test results?  Taking antibiotics right before the test may affect your results.  How do I get ready for this test?  Drink enough water before the test so that you can urinate. Tell your doctor if you have taken antibiotic medicine recently. Be sure your doctor knows about all medicines, herbs, vitamins, and supplements you are taking. This includes medicines that don't need a prescription and any illicit drugs you may use.   © 5514-2672 [employer]. [address] [place] [address], [place] [address]. All rights reserved. This information is not intended as a substitute for professional medical care. Always follow your healthcare professional's instructions.        "

## 2019-09-08 NOTE — PROGRESS NOTES
"Subjective:       Patient ID: Mine Wilkins is a 84 y.o. female.    Vitals:  height is 5' 3" (1.6 m) and weight is 65.8 kg (145 lb). Her oral temperature is 98.1 °F (36.7 °C). Her blood pressure is 132/68 and her pulse is 70. Her respiration is 16 and oxygen saturation is 99%.     Chief Complaint: Urinary Tract Infection    Urinary Tract Infection    This is a new problem. The problem occurs every urination. The problem has been unchanged. The patient is experiencing no pain. There has been no fever. She is not sexually active. There is no history of pyelonephritis. Pertinent negatives include no chills, frequency, hematuria, nausea, urgency, vomiting or rash. She has tried increased fluids for the symptoms. The treatment provided no relief.       Constitution: Negative for chills and fever.   Neck: Negative for painful lymph nodes.   Gastrointestinal: Negative for abdominal pain, nausea and vomiting.   Genitourinary: Negative for dysuria, frequency, urgency, urine decreased, hematuria, history of kidney stones, painful menstruation, irregular menstruation, missed menses, heavy menstrual bleeding, ovarian cysts, genital trauma, vaginal pain, vaginal discharge, vaginal bleeding, vaginal odor, painful intercourse, genital sore, painful ejaculation and pelvic pain.   Musculoskeletal: Negative for back pain.   Skin: Negative for rash and lesion.   Hematologic/Lymphatic: Negative for swollen lymph nodes.       Objective:      Physical Exam   Constitutional: She is oriented to person, place, and time. She appears well-developed and well-nourished.   HENT:   Head: Normocephalic and atraumatic.   Right Ear: External ear normal.   Left Ear: External ear normal.   Nose: Nose normal.   Eyes: Lids are normal.   Neck: Trachea normal, normal range of motion and phonation normal. Neck supple.   Cardiovascular: Normal pulses.   Pulmonary/Chest: Effort normal.   Abdominal: Soft. Normal appearance and bowel sounds are " normal. She exhibits no distension. There is no tenderness. There is no CVA tenderness.   Neurological: She is alert and oriented to person, place, and time.   Skin: Skin is warm, dry and intact.   Psychiatric: She has a normal mood and affect. Her speech is normal and behavior is normal. Cognition and memory are normal.   Nursing note and vitals reviewed.      Results for orders placed or performed in visit on 09/08/19   POCT Urinalysis, Dipstick, Automated, W/O Scope   Result Value Ref Range    POC Blood, Urine Negative Negative    POC Bilirubin, Urine Negative Negative    POC Urobilinogen, Urine norm 0.1 - 1.1    POC Ketones, Urine Negative Negative    POC Protein, Urine Negative Negative    POC Nitrates, Urine Negative Negative    POC Glucose, Urine Negative Negative    pH, UA 6.0 5 - 8    POC Specific Gravity, Urine 1.010 1.003 - 1.029    POC Leukocytes, Urine Negative Negative       Assessment:       1. Foul smelling urine        Plan:         Patient would like to wait to be treated based on urine culture results.  UA negative in clinic.    Foul smelling urine  -     POCT Urinalysis, Dipstick, Automated, W/O Scope  -     Culture, Urine            Patient Instructions   WE WILL CALL WITH RESULTS IN THE NEXT 2-3 DAYS    You must understand that you've received an Urgent Care treatment only and that you may be released before all your medical problems are known or treated. You, the patient, will arrange for follow up care as instructed.  If your condition worsens we recommend that you receive another evaluation at the emergency room immediately or contact your primary medical clinics after hours call service to discuss your concerns.  Please return here or go to the Emergency Department for any concerns or worsening of condition.      Urine Culture  Does this test have other names?  Urine culture and sensitivity, urine C&S  What is this test?  This test checks for bacteria in your urine that could be causing an  "infection in your urinary tract. The urinary tract includes the kidneys, bladder, ureters, and urethra.  The results of a urine culture help your doctor find out what's causing your infection and determine the best way to treat it. Almost 90% of urinary tract infections (UTIs) are caused by E. coli bacteria. Other types of bacteria, tuberculosis, and yeast infections can also cause a urinary tract infection.  Why do I need this test?  You may need this test if you have symptoms of a UTI. These include:  · Fever and chills  · Burning pain when urinating  · Pain in the back or lower belly  · Frequent need to urinate  · Cloudy or smelly urine  What other tests might I have along with this test?  Your doctor may also order a urinalysis, which is a urine test to check for white blood cells. He or she may also order a blood test to look for signs of infection in your blood.  What do my test results mean?  Many things may affect your lab test results. These include the method each lab uses to do the test. Even if your test results are different from the normal value, you may not have a problem. To learn what the results mean for you, talk with your healthcare provider.  Urine culture results are given in colony forming units per milliliter (CFU/mL). A negative result means you don't have an infection. A higher bacterial count may mean infection.  How is this test done?  This test requires a "clean-catch" urine sample. To collect this type of sample:  · Carefully wash and dry your hands before removing the cap of the specimen container.  · Clean the area around the opening of your urethra with an antiseptic pad.  · Start urinating directly into the toilet, then urinate into the sterile container to collect a sample.  · Fill the container as instructed.  · Do not let any part of the container touch your genitals or skin.  · Recap the container.  Does this test pose any risks?  This test poses no known risks.  What might " affect my test results?  Taking antibiotics right before the test may affect your results.  How do I get ready for this test?  Drink enough water before the test so that you can urinate. Tell your doctor if you have taken antibiotic medicine recently. Be sure your doctor knows about all medicines, herbs, vitamins, and supplements you are taking. This includes medicines that don't need a prescription and any illicit drugs you may use.   © 5365-8485 [employer]. [address] [place] [address], [place] [address]. All rights reserved. This information is not intended as a substitute for professional medical care. Always follow your healthcare professional's instructions.

## 2019-09-10 ENCOUNTER — HOSPITAL ENCOUNTER (OUTPATIENT)
Dept: RADIOLOGY | Facility: HOSPITAL | Age: 84
Discharge: HOME | End: 2019-09-10
Attending: ORTHOPAEDIC SURGERY
Payer: MEDICARE

## 2019-09-10 ENCOUNTER — OFFICE VISIT (OUTPATIENT)
Dept: SPORTS MEDICINE | Facility: CLINIC | Age: 84
End: 2019-09-10
Payer: MEDICARE

## 2019-09-10 ENCOUNTER — TELEPHONE (OUTPATIENT)
Dept: SPORTS MEDICINE | Facility: CLINIC | Age: 84
End: 2019-09-10

## 2019-09-10 VITALS
HEART RATE: 76 BPM | BODY MASS INDEX: 25.69 KG/M2 | HEIGHT: 63 IN | WEIGHT: 145 LBS | DIASTOLIC BLOOD PRESSURE: 68 MMHG | SYSTOLIC BLOOD PRESSURE: 126 MMHG

## 2019-09-10 DIAGNOSIS — M89.8X5 PAIN OF RIGHT FEMUR: Primary | ICD-10-CM

## 2019-09-10 DIAGNOSIS — M89.8X5 PAIN OF RIGHT FEMUR: ICD-10-CM

## 2019-09-10 PROCEDURE — 3074F PR MOST RECENT SYSTOLIC BLOOD PRESSURE < 130 MM HG: ICD-10-PCS | Mod: HCNC,CPTII,S$GLB, | Performed by: ORTHOPAEDIC SURGERY

## 2019-09-10 PROCEDURE — 99999 PR PBB SHADOW E&M-EST. PATIENT-LVL III: ICD-10-PCS | Mod: PBBFAC,HCNC,, | Performed by: ORTHOPAEDIC SURGERY

## 2019-09-10 PROCEDURE — 3074F SYST BP LT 130 MM HG: CPT | Mod: HCNC,CPTII,S$GLB, | Performed by: ORTHOPAEDIC SURGERY

## 2019-09-10 PROCEDURE — 1101F PT FALLS ASSESS-DOCD LE1/YR: CPT | Mod: HCNC,CPTII,S$GLB, | Performed by: ORTHOPAEDIC SURGERY

## 2019-09-10 PROCEDURE — 73552 XR FEMUR 2 VIEW RIGHT: ICD-10-PCS | Mod: 26,HCNC,RT, | Performed by: RADIOLOGY

## 2019-09-10 PROCEDURE — 99999 PR PBB SHADOW E&M-EST. PATIENT-LVL III: CPT | Mod: PBBFAC,HCNC,, | Performed by: ORTHOPAEDIC SURGERY

## 2019-09-10 PROCEDURE — 3078F DIAST BP <80 MM HG: CPT | Mod: HCNC,CPTII,S$GLB, | Performed by: ORTHOPAEDIC SURGERY

## 2019-09-10 PROCEDURE — 99213 OFFICE O/P EST LOW 20 MIN: CPT | Mod: HCNC,S$GLB,, | Performed by: ORTHOPAEDIC SURGERY

## 2019-09-10 PROCEDURE — 73552 X-RAY EXAM OF FEMUR 2/>: CPT | Mod: TC,HCNC,FY,PO,RT

## 2019-09-10 PROCEDURE — 73552 X-RAY EXAM OF FEMUR 2/>: CPT | Mod: 26,HCNC,RT, | Performed by: RADIOLOGY

## 2019-09-10 PROCEDURE — 3078F PR MOST RECENT DIASTOLIC BLOOD PRESSURE < 80 MM HG: ICD-10-PCS | Mod: HCNC,CPTII,S$GLB, | Performed by: ORTHOPAEDIC SURGERY

## 2019-09-10 PROCEDURE — 99213 PR OFFICE/OUTPT VISIT, EST, LEVL III, 20-29 MIN: ICD-10-PCS | Mod: HCNC,S$GLB,, | Performed by: ORTHOPAEDIC SURGERY

## 2019-09-10 PROCEDURE — 1101F PR PT FALLS ASSESS DOC 0-1 FALLS W/OUT INJ PAST YR: ICD-10-PCS | Mod: HCNC,CPTII,S$GLB, | Performed by: ORTHOPAEDIC SURGERY

## 2019-09-10 NOTE — PROGRESS NOTES
CC: Right hip pain    DATE OF PROCEDURE: 5/20/2018   PROCEDURES PERFORMED:   Right femur cephalomedullary nailing    Mine Wilkins returns to clinic 4 mos s/p the above mentioned procedure. Presents today FWB with a cane. Was doing quite well until about 4 weeks ago. States she had an atraumatic onset of R groin pain for about 2 days. Has come and gone since then. Improves with Tylenol. Continuing to do a HEP daily. No LBP. No N/T. Here today for a repeat x-ray & clinical evaluation. No LBP.    Pain Score: 0-No pain    REVIEW OF SYSTEMS:   Constitution: Negative. Negative for chills, fever and night sweats.    Hematologic/Lymphatic: Negative for bleeding problem. Does not bruise/bleed easily.   Skin: Negative for dry skin, itching and rash.   Musculoskeletal: Negative for falls. Positive for right hip pain.     All other review of symptoms were reviewed and found to be noncontributory.     PAST MEDICAL HISTORY:   Past Medical History:   Diagnosis Date    After-cataract of both eyes - Left Eye 10/11/2012    Anxiety     Aortic calcification 8/10/2016    Aortic valve stenosis, moderate     AR (allergic rhinitis)     Arthritis of facet joints at multiple vertebral levels 1/14/2016    Seen on lumbar MRI dated 01/14/16    Cataract     Cholelithiasis     Closed displaced intertrochanteric fracture of right femur with routine healing s/p IM nail on 5/20/2018 5/19/2018    Cystocele 12/1/2013    Esotropia, alternating - Both Eyes 10/11/2012    Essential hypertension     Gastroesophageal reflux disease without esophagitis 4/13/2018    Left ventricular diastolic dysfunction with preserved systolic function 8/10/2016    Lumbar radiculopathy 1/25/2016    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    Nondisplaced fracture of proximal end of humerus 8/7/2016    Osteoarthritis     Overweight (BMI 25.0-29.9) 4/13/2018    Pure hypercholesterolemia     Right-sided low back pain without  sciatica 12/10/2015    Strabismus     Urethral hypermobility 12/1/2013       PAST SURGICAL HISTORY: Non contributory    FAMILY HISTORY:   Family History   Problem Relation Age of Onset    Colon cancer Brother     Dementia Brother     Hypertension Brother     Osteoporosis Mother     Hypertension Mother     Macular degeneration Mother     Cataracts Mother     Cancer Father     No Known Problems Daughter     Hypertension Brother     Dementia Brother     No Known Problems Daughter     Hypertension Maternal Grandmother     Stroke Maternal Grandmother     Breast cancer Maternal Grandmother     Melanoma Maternal Grandfather     Anesthesia problems Neg Hx     Broken bones Neg Hx     Clotting disorder Neg Hx     Collagen disease Neg Hx     Diabetes Neg Hx     Dislocations Neg Hx     Rheumatologic disease Neg Hx     Scoliosis Neg Hx     Severe sprains Neg Hx     Ovarian cancer Neg Hx     Amblyopia Neg Hx     Blindness Neg Hx     Glaucoma Neg Hx     Retinal detachment Neg Hx     Strabismus Neg Hx     Psoriasis Neg Hx     Lupus Neg Hx     Eczema Neg Hx     Acne Neg Hx        SOCIAL HISTORY:   Social History     Socioeconomic History    Marital status:      Spouse name: Not on file    Number of children: Not on file    Years of education: Not on file    Highest education level: Not on file   Occupational History    Occupation: Retired   Social Needs    Financial resource strain: Not on file    Food insecurity:     Worry: Not on file     Inability: Not on file    Transportation needs:     Medical: Not on file     Non-medical: Not on file   Tobacco Use    Smoking status: Never Smoker    Smokeless tobacco: Never Used   Substance and Sexual Activity    Alcohol use: Yes     Comment: maybe 6 drinks per year    Drug use: No    Sexual activity: Never     Partners: Male   Lifestyle    Physical activity:     Days per week: Not on file     Minutes per session: Not on file     Stress: Not on file   Relationships    Social connections:     Talks on phone: Not on file     Gets together: Not on file     Attends Alevism service: Not on file     Active member of club or organization: Not on file     Attends meetings of clubs or organizations: Not on file     Relationship status: Not on file   Other Topics Concern    Are you pregnant or think you may be? No    Breast-feeding No   Social History Narrative    . Two grown daughters       MEDICATIONS:     Current Outpatient Medications:     acetaminophen (TYLENOL) 325 MG tablet, Take 2 tablets (650 mg total) by mouth every 6 (six) hours as needed., Disp: , Rfl: 0    alendronate (FOSAMAX) 70 MG tablet, Take 1 tablet (70 mg total) by mouth every 7 days. HOLD UNTIL SEEN BY YOUR PCP, Disp: 12 tablet, Rfl: 3    amLODIPine (NORVASC) 2.5 MG tablet, Take 1 tablet (2.5 mg total) by mouth once daily., Disp: 90 tablet, Rfl: 3    cetirizine (ZYRTEC) 10 MG tablet, Take 10 mg by mouth once daily., Disp: , Rfl:     cholecalciferol, vitamin D3, 2,000 unit Tab, Take by mouth. 1 Tablet Oral Every day, Disp: , Rfl:     fluticasone (FLONASE) 50 mcg/actuation nasal spray, 2 sprays (100 mcg total) by Each Nare route every evening., Disp: 1 Bottle, Rfl: 1    multivit-min/iron/folic/lutein (CENTRUM SILVER WOMEN ORAL), Take by mouth., Disp: , Rfl:     mupirocin (BACTROBAN) 2 % ointment, Apply to affected area 2 times daily, Disp: 22 g, Rfl: 1    omeprazole (PRILOSEC) 40 MG capsule, TAKE 1 CAPSULE EVERY MORNING  BEFORE  EATING, Disp: 90 capsule, Rfl: 3    oxybutynin (DITROPAN-XL) 10 MG 24 hr tablet, Take 1 tablet (10 mg total) by mouth once daily., Disp: 90 tablet, Rfl: 3    pravastatin (PRAVACHOL) 20 MG tablet, Take 1 tablet (20 mg total) by mouth every evening., Disp: 90 tablet, Rfl: 3    escitalopram oxalate (LEXAPRO) 5 MG Tab, TAKE ONE DAILY, Disp: 90 tablet, Rfl: 0    losartan (COZAAR) 50 MG tablet, Take 1 tablet (50 mg total) by mouth 2 (two)  "times daily., Disp: 180 tablet, Rfl: 3    ALLERGIES:   Review of patient's allergies indicates:   Allergen Reactions    Ace inhibitors      Other reaction(s): cough    Codeine      Other reaction(s): Nausea        PHYSICAL EXAMINATION:  /68   Pulse 76   Ht 5' 3" (1.6 m)   Wt 65.8 kg (145 lb)   BMI 25.69 kg/m²   General: Well-developed well-nourished 84 y.o. femalein no acute distress   Cardiovascular: Regular rhythm by palpation of distal pulse, normal color and temperature, no concerning varicosities on symptomatic side   Lungs: No labored breathing or wheezing appreciated   Neuro: Alert and oriented ×3   Psychiatric: well oriented to person, place and time, demonstrates normal mood and affect   Skin: No rashes, lesions or ulcers, normal temperature, turgor, and texture on involved extremity    Ortho/SPM Exam  Examination of the right hip demonstrates well-healed incisions. Mild fullness over the proximal peritrochanteric region. Non tender to palpation. No pain with passive internal and external rotation of hip.  Negative Stinchfield test. Good strength. NVI.    IMAGING:  X-rays including 2 views of the R Femur were ordered and images reviewed by me show:    S/p CMN.  No interval changes in alignment.  Post reduction collapse noted on prior films.  Good interval healing present. No AVN.    ASSESSMENT:      ICD-10-CM ICD-9-CM   1. Pain of right femur M89.8X5 733.90       PLAN:     -Radiographs were discussed with the patient. No significant pain on exam. Reassurance provided.   -Continue HEP and Tylenol as needed   -If she develops any significant laterally based pain, we can discuss possible hardware removal   -RTC as needed       Procedures    "

## 2019-09-11 ENCOUNTER — TELEPHONE (OUTPATIENT)
Dept: URGENT CARE | Facility: CLINIC | Age: 84
End: 2019-09-11

## 2019-09-11 LAB — BACTERIA UR CULT: NORMAL

## 2019-09-16 DIAGNOSIS — F41.9 ANXIETY: ICD-10-CM

## 2019-09-16 RX ORDER — ESCITALOPRAM OXALATE 5 MG/1
TABLET ORAL
Qty: 90 TABLET | Refills: 0 | Status: SHIPPED | OUTPATIENT
Start: 2019-09-16 | End: 2019-11-18 | Stop reason: SDUPTHER

## 2019-09-18 ENCOUNTER — DOCUMENTATION ONLY (OUTPATIENT)
Dept: REHABILITATION | Facility: HOSPITAL | Age: 84
End: 2019-09-18

## 2019-09-18 NOTE — PROGRESS NOTES
PHYSICAL THERAPY DISCHARGE:    This patient was originally evaluated at our facility on: 11/13/18         Pt attended PT for a total of 8 Visits receiving: Manual Therapy, Therex, Postural Education, Body Mechanics Training, Home Exercise Instruction     This patient's last visit occurred on: 12/18/19      Short term goals were achieved: yes    Long term goals were achieved: no    Pt was DC'd from our care secondary to: pt did not return for subsequent visits       Therapist: Minna Loomis, PT  9/18/2019

## 2019-10-07 ENCOUNTER — PATIENT OUTREACH (OUTPATIENT)
Dept: ADMINISTRATIVE | Facility: OTHER | Age: 84
End: 2019-10-07

## 2019-10-07 ENCOUNTER — LAB VISIT (OUTPATIENT)
Dept: LAB | Facility: HOSPITAL | Age: 84
End: 2019-10-07
Attending: INTERNAL MEDICINE
Payer: MEDICARE

## 2019-10-07 DIAGNOSIS — E78.5 DYSLIPIDEMIA: ICD-10-CM

## 2019-10-07 DIAGNOSIS — I10 HTN (HYPERTENSION), BENIGN: ICD-10-CM

## 2019-10-07 LAB
ALBUMIN SERPL BCP-MCNC: 4.3 G/DL (ref 3.5–5.2)
ALP SERPL-CCNC: 72 U/L (ref 55–135)
ALT SERPL W/O P-5'-P-CCNC: 27 U/L (ref 10–44)
ANION GAP SERPL CALC-SCNC: 8 MMOL/L (ref 8–16)
AST SERPL-CCNC: 30 U/L (ref 10–40)
BASOPHILS # BLD AUTO: 0.06 K/UL (ref 0–0.2)
BASOPHILS NFR BLD: 0.9 % (ref 0–1.9)
BILIRUB SERPL-MCNC: 0.5 MG/DL (ref 0.1–1)
BUN SERPL-MCNC: 23 MG/DL (ref 8–23)
CALCIUM SERPL-MCNC: 10.3 MG/DL (ref 8.7–10.5)
CHLORIDE SERPL-SCNC: 102 MMOL/L (ref 95–110)
CHOLEST SERPL-MCNC: 191 MG/DL (ref 120–199)
CHOLEST/HDLC SERPL: 3.4 {RATIO} (ref 2–5)
CO2 SERPL-SCNC: 28 MMOL/L (ref 23–29)
CREAT SERPL-MCNC: 0.8 MG/DL (ref 0.5–1.4)
DIFFERENTIAL METHOD: ABNORMAL
EOSINOPHIL # BLD AUTO: 0.2 K/UL (ref 0–0.5)
EOSINOPHIL NFR BLD: 2.4 % (ref 0–8)
ERYTHROCYTE [DISTWIDTH] IN BLOOD BY AUTOMATED COUNT: 14.7 % (ref 11.5–14.5)
EST. GFR  (AFRICAN AMERICAN): >60 ML/MIN/1.73 M^2
EST. GFR  (NON AFRICAN AMERICAN): >60 ML/MIN/1.73 M^2
GLUCOSE SERPL-MCNC: 94 MG/DL (ref 70–110)
HCT VFR BLD AUTO: 40.9 % (ref 37–48.5)
HDLC SERPL-MCNC: 57 MG/DL (ref 40–75)
HDLC SERPL: 29.8 % (ref 20–50)
HGB BLD-MCNC: 12.9 G/DL (ref 12–16)
IMM GRANULOCYTES # BLD AUTO: 0.02 K/UL (ref 0–0.04)
IMM GRANULOCYTES NFR BLD AUTO: 0.3 % (ref 0–0.5)
LDLC SERPL CALC-MCNC: 120.8 MG/DL (ref 63–159)
LYMPHOCYTES # BLD AUTO: 2.1 K/UL (ref 1–4.8)
LYMPHOCYTES NFR BLD: 32.4 % (ref 18–48)
MCH RBC QN AUTO: 28 PG (ref 27–31)
MCHC RBC AUTO-ENTMCNC: 31.5 G/DL (ref 32–36)
MCV RBC AUTO: 89 FL (ref 82–98)
MONOCYTES # BLD AUTO: 0.5 K/UL (ref 0.3–1)
MONOCYTES NFR BLD: 7.9 % (ref 4–15)
NEUTROPHILS # BLD AUTO: 3.6 K/UL (ref 1.8–7.7)
NEUTROPHILS NFR BLD: 56.1 % (ref 38–73)
NONHDLC SERPL-MCNC: 134 MG/DL
NRBC BLD-RTO: 0 /100 WBC
PLATELET # BLD AUTO: 366 K/UL (ref 150–350)
PMV BLD AUTO: 10.7 FL (ref 9.2–12.9)
POTASSIUM SERPL-SCNC: 4.4 MMOL/L (ref 3.5–5.1)
PROT SERPL-MCNC: 7.7 G/DL (ref 6–8.4)
RBC # BLD AUTO: 4.6 M/UL (ref 4–5.4)
SODIUM SERPL-SCNC: 138 MMOL/L (ref 136–145)
TRIGL SERPL-MCNC: 66 MG/DL (ref 30–150)
TSH SERPL DL<=0.005 MIU/L-ACNC: 2.58 UIU/ML (ref 0.4–4)
WBC # BLD AUTO: 6.36 K/UL (ref 3.9–12.7)

## 2019-10-07 PROCEDURE — 80053 COMPREHEN METABOLIC PANEL: CPT | Mod: HCNC

## 2019-10-07 PROCEDURE — 80061 LIPID PANEL: CPT | Mod: HCNC

## 2019-10-07 PROCEDURE — 36415 COLL VENOUS BLD VENIPUNCTURE: CPT | Mod: HCNC,PO

## 2019-10-07 PROCEDURE — 85025 COMPLETE CBC W/AUTO DIFF WBC: CPT | Mod: HCNC

## 2019-10-07 PROCEDURE — 84443 ASSAY THYROID STIM HORMONE: CPT | Mod: HCNC

## 2019-10-10 ENCOUNTER — OFFICE VISIT (OUTPATIENT)
Dept: PHYSICAL MEDICINE AND REHAB | Facility: CLINIC | Age: 84
End: 2019-10-10
Payer: MEDICARE

## 2019-10-10 VITALS
HEIGHT: 63 IN | HEART RATE: 70 BPM | BODY MASS INDEX: 25.73 KG/M2 | SYSTOLIC BLOOD PRESSURE: 139 MMHG | DIASTOLIC BLOOD PRESSURE: 69 MMHG | WEIGHT: 145.19 LBS

## 2019-10-10 DIAGNOSIS — M19.041 PRIMARY OSTEOARTHRITIS OF BOTH HANDS: ICD-10-CM

## 2019-10-10 DIAGNOSIS — M51.36 DDD (DEGENERATIVE DISC DISEASE), LUMBAR: ICD-10-CM

## 2019-10-10 DIAGNOSIS — M54.50 CHRONIC RIGHT-SIDED LOW BACK PAIN WITHOUT SCIATICA: Primary | ICD-10-CM

## 2019-10-10 DIAGNOSIS — M48.061 NEURAL FORAMINAL STENOSIS OF LUMBAR SPINE: ICD-10-CM

## 2019-10-10 DIAGNOSIS — M43.16 SPONDYLOLISTHESIS AT L4-L5 LEVEL: ICD-10-CM

## 2019-10-10 DIAGNOSIS — M19.042 PRIMARY OSTEOARTHRITIS OF BOTH HANDS: ICD-10-CM

## 2019-10-10 DIAGNOSIS — M47.816 LUMBAR FACET ARTHROPATHY: ICD-10-CM

## 2019-10-10 DIAGNOSIS — G89.29 CHRONIC RIGHT-SIDED LOW BACK PAIN WITHOUT SCIATICA: Primary | ICD-10-CM

## 2019-10-10 PROCEDURE — 1101F PT FALLS ASSESS-DOCD LE1/YR: CPT | Mod: HCNC,CPTII,S$GLB, | Performed by: PHYSICAL MEDICINE & REHABILITATION

## 2019-10-10 PROCEDURE — 99999 PR PBB SHADOW E&M-EST. PATIENT-LVL II: CPT | Mod: PBBFAC,HCNC,, | Performed by: PHYSICAL MEDICINE & REHABILITATION

## 2019-10-10 PROCEDURE — 3075F SYST BP GE 130 - 139MM HG: CPT | Mod: HCNC,CPTII,S$GLB, | Performed by: PHYSICAL MEDICINE & REHABILITATION

## 2019-10-10 PROCEDURE — 99213 OFFICE O/P EST LOW 20 MIN: CPT | Mod: HCNC,S$GLB,, | Performed by: PHYSICAL MEDICINE & REHABILITATION

## 2019-10-10 PROCEDURE — 3078F DIAST BP <80 MM HG: CPT | Mod: HCNC,CPTII,S$GLB, | Performed by: PHYSICAL MEDICINE & REHABILITATION

## 2019-10-10 PROCEDURE — 3075F PR MOST RECENT SYSTOLIC BLOOD PRESS GE 130-139MM HG: ICD-10-PCS | Mod: HCNC,CPTII,S$GLB, | Performed by: PHYSICAL MEDICINE & REHABILITATION

## 2019-10-10 PROCEDURE — 3078F PR MOST RECENT DIASTOLIC BLOOD PRESSURE < 80 MM HG: ICD-10-PCS | Mod: HCNC,CPTII,S$GLB, | Performed by: PHYSICAL MEDICINE & REHABILITATION

## 2019-10-10 PROCEDURE — 99213 PR OFFICE/OUTPT VISIT, EST, LEVL III, 20-29 MIN: ICD-10-PCS | Mod: HCNC,S$GLB,, | Performed by: PHYSICAL MEDICINE & REHABILITATION

## 2019-10-10 PROCEDURE — 99999 PR PBB SHADOW E&M-EST. PATIENT-LVL II: ICD-10-PCS | Mod: PBBFAC,HCNC,, | Performed by: PHYSICAL MEDICINE & REHABILITATION

## 2019-10-10 PROCEDURE — 1101F PR PT FALLS ASSESS DOC 0-1 FALLS W/OUT INJ PAST YR: ICD-10-PCS | Mod: HCNC,CPTII,S$GLB, | Performed by: PHYSICAL MEDICINE & REHABILITATION

## 2019-10-10 NOTE — PROGRESS NOTES
Subjective:       Patient ID: Mine Wilkins is a 84 y.o. female.    Chief Complaint: No chief complaint on file.    HPI     HISTORY OF PRESENT ILLNESS:  Ms. Wilkins is an 84-year-old white female with past medical history of hypertension, aortic valve stenosis, GERD, osteoporosis, arthritis, right hip fracture status post IMN in May 2018 and chronic back pain.  She presented to the Physical Medicine Clinic on 4/9/19 for help with her back pain.  MRI of the lumbar spine findings were reviewed.  The patient was started on p.r.n. Tylenol.    The patient is coming to the clinic for follow-up.  Her back pain has been under good control.  It is is an intermittent aching sensation in the lower lumbar spine and right buttocks region.  She denies any radiation to her legs.  Her pain is aggravated by house chores including cleaning, bending and heavy lifting.  It is better with sitting down and hot shower.  Her maximum pain is 5-6/10 and minimum 0/10.  Today, it is 0/10.  The patient denies any lower extremity weakness.  She denies any leg numbness.  She denies any bowel incontinence.  She has chronic bladder leakage.      She is taking over-the-counter Tylenol 500 mg, one tablet daily.  She is staying active.  She exercises using a recumbent bike.      Review of Systems   Constitutional: Negative for fatigue.   Eyes: Negative for visual disturbance.   Respiratory: Negative for chest tightness.    Cardiovascular: Negative for chest pain.   Gastrointestinal: Negative for blood in stool, constipation, nausea and vomiting.   Genitourinary: Positive for difficulty urinating and hematuria.   Musculoskeletal: Positive for back pain and neck pain. Negative for gait problem and neck stiffness.   Neurological: Negative for dizziness and headaches.   Psychiatric/Behavioral: Negative for behavioral problems and sleep disturbance.       Objective:      Physical Exam   Constitutional: She is oriented to person, place, and  time. She appears well-developed and well-nourished.   HENT:   Head: Normocephalic and atraumatic.   Neck:   Mild decrease ROM.  Mild pain at end range.   Musculoskeletal:   BUE:  +ve Heberden's & Nadine's nodes.  ROM:   RUE: decreased at shoulder.   LUE: full.  Strength:    RUE: 5-/5 at shoulder abduction, 5 elbow flexion, 5 elbow extension, 5 hand .   LUE: 5/5 at shoulder abduction, 5 elbow flexion, 5 elbow extension, 5 hand .  Sensation to pinprick:   RUE: intact.   LUE: intact.      BLE:  ROM:   RLE: full.   LLE: full.  Knee crepitus:   RLE: +ve.   LLE: +ve.   Strength:    RLE: 5/5 at hip flexion, 5 knee extension, 5 ankle DF, 5 PF, 5 EHL.   LLE: 5/5 at hip flexion, 5 knee extension, 5 ankle DF, 5 PF, 5 EHL.  Sensation to pinprick:     RLE: intact.      LLE: intact.   SLR (sitting):      RLE: -ve.      LLE: -ve.    -ve  Tenderness lumbar spine.    Gait: WNL     Neurological: She is alert and oriented to person, place, and time.   Skin: Skin is warm.   Psychiatric: She has a normal mood and affect. Her behavior is normal.   Vitals reviewed.          Assessment:       1. Chronic right-sided low back pain without sciatica    2. Lumbar facet arthropathy    3. DDD (degenerative disc disease), lumbar    4. Neural foraminal stenosis of lumbar spine    5. Spondylolisthesis at L4-L5 level (anterolisthesis, grade 1)    6. Primary osteoarthritis of both hands        Plan:         - ContinueTylenol 500 mg, 1-2 po tid prn for mild pain.  - Regular home exercise program was encouraged.  - Follow up in about 6 months (around 4/10/2020).

## 2019-10-15 ENCOUNTER — PATIENT OUTREACH (OUTPATIENT)
Dept: ADMINISTRATIVE | Facility: OTHER | Age: 84
End: 2019-10-15

## 2019-10-16 ENCOUNTER — OFFICE VISIT (OUTPATIENT)
Dept: INTERNAL MEDICINE | Facility: CLINIC | Age: 84
End: 2019-10-16
Payer: MEDICARE

## 2019-10-16 VITALS
TEMPERATURE: 99 F | HEART RATE: 64 BPM | WEIGHT: 143.31 LBS | BODY MASS INDEX: 25.39 KG/M2 | DIASTOLIC BLOOD PRESSURE: 68 MMHG | SYSTOLIC BLOOD PRESSURE: 122 MMHG | HEIGHT: 63 IN

## 2019-10-16 DIAGNOSIS — E78.5 DYSLIPIDEMIA: ICD-10-CM

## 2019-10-16 DIAGNOSIS — I10 ESSENTIAL HYPERTENSION: ICD-10-CM

## 2019-10-16 DIAGNOSIS — I35.0 AORTIC VALVE STENOSIS, MODERATE: ICD-10-CM

## 2019-10-16 DIAGNOSIS — M81.0 SENILE OSTEOPOROSIS: ICD-10-CM

## 2019-10-16 DIAGNOSIS — Z00.00 ENCOUNTER FOR PREVENTIVE HEALTH EXAMINATION: Primary | ICD-10-CM

## 2019-10-16 PROCEDURE — 99999 PR PBB SHADOW E&M-EST. PATIENT-LVL III: ICD-10-PCS | Mod: PBBFAC,HCNC,, | Performed by: INTERNAL MEDICINE

## 2019-10-16 PROCEDURE — 3074F SYST BP LT 130 MM HG: CPT | Mod: HCNC,CPTII,S$GLB, | Performed by: INTERNAL MEDICINE

## 2019-10-16 PROCEDURE — 99999 PR PBB SHADOW E&M-EST. PATIENT-LVL III: CPT | Mod: PBBFAC,HCNC,, | Performed by: INTERNAL MEDICINE

## 2019-10-16 PROCEDURE — 90662 IIV NO PRSV INCREASED AG IM: CPT | Mod: HCNC,S$GLB,, | Performed by: INTERNAL MEDICINE

## 2019-10-16 PROCEDURE — 99397 PR PREVENTIVE VISIT,EST,65 & OVER: ICD-10-PCS | Mod: 25,HCNC,S$GLB, | Performed by: INTERNAL MEDICINE

## 2019-10-16 PROCEDURE — 90662 FLU VACCINE - HIGH DOSE (65+) PRESERVATIVE FREE IM: ICD-10-PCS | Mod: HCNC,S$GLB,, | Performed by: INTERNAL MEDICINE

## 2019-10-16 PROCEDURE — 3074F PR MOST RECENT SYSTOLIC BLOOD PRESSURE < 130 MM HG: ICD-10-PCS | Mod: HCNC,CPTII,S$GLB, | Performed by: INTERNAL MEDICINE

## 2019-10-16 PROCEDURE — 99397 PER PM REEVAL EST PAT 65+ YR: CPT | Mod: 25,HCNC,S$GLB, | Performed by: INTERNAL MEDICINE

## 2019-10-16 PROCEDURE — 3078F PR MOST RECENT DIASTOLIC BLOOD PRESSURE < 80 MM HG: ICD-10-PCS | Mod: HCNC,CPTII,S$GLB, | Performed by: INTERNAL MEDICINE

## 2019-10-16 PROCEDURE — G0008 FLU VACCINE - HIGH DOSE (65+) PRESERVATIVE FREE IM: ICD-10-PCS | Mod: HCNC,S$GLB,, | Performed by: INTERNAL MEDICINE

## 2019-10-16 PROCEDURE — 3078F DIAST BP <80 MM HG: CPT | Mod: HCNC,CPTII,S$GLB, | Performed by: INTERNAL MEDICINE

## 2019-10-16 PROCEDURE — G0008 ADMIN INFLUENZA VIRUS VAC: HCPCS | Mod: HCNC,S$GLB,, | Performed by: INTERNAL MEDICINE

## 2019-10-17 NOTE — PROGRESS NOTES
History of present illness:  84-year-old lady in today for general health assessment.    Current medications:  All medications are noted and reviewed in the electronic medical record medication list.    Review of systems:  General: no fever, chills, generalized body aches. No unexpected weight loss.  Eyes:  No visual disturbances.  HEENT:  No hoarseness, dysphagia, ear pain.  Respiratory:  No cough, no shortness of breath.  Cardiovascular: no chest pain, palpitations, cough, exertional limb pain. No edema.  GI: no nausea, vomiting.  No abdominal pain. No change in bowel habits.  No melena, no hematochezia.  : no dysuria. No change in the color or character of the urine. No urinary frequency.  Musculoskeletal:  Off and on various joint aches and pains.  Neurologic:  No focal neurological complaints.  No headaches.  Skin:  No rashes or other concerns.  Psych:  No emotional issues    Past medical history, past surgical history family medical history social history is are all noted and reviewed the electronic medical record history sections.    Health screenings:  Mammogram August 2018.  Bone densitometry  Colonoscopy 2015.  She has had both pneumococcal vaccines.  Tetanus immunization March 2019.    Physical examination:  GENERAL:  Alert, appropriately groomed, no acute distress.  VS:  Blood pressure taken manually is 122/68.  EYES: sclerae white ,nonicteric. PERRL.  HEENT:  Normocephalic. Ear canals and tympanic membranes normal. Mouth and pharynx normal. No thyromegaly. Trachea midline and freely mobile.  LUNGS:  Clear to ascultation and normal to percussion.  CARDIOVASCULAR:  Normal heart sounds.  2/6 systolic murmur.  Carotids full bilaterally .  Pedal pulses intact .  No abdominal bruit.  No peripheral extremity edema.  GI: the abdomen is soft, no distension. No masses , tenderness, organomegaly.    LYMPHATIC:  No axillary, inguinal , cervical adenopathy.  MUSCULOSKELETAL:  Range of motion, stability and  strength of the right and left upper and lower extremities normal. No swollen or tender joints  NEUROLOGIC:  DTR's normal. No gross motor or sensory deficits apparent, gait normal.  SKIN:  No rashes.   MS:  Alert, oriented , affect and mood all appropriate    Data:  Health maintenance laboratory data noted reviewed from October 7th 2019.  All reasonable.      Impression:  Generally healthy 84-year-old lady with several chronic stable medical conditions.  Hypertension controlled.  Moderate aortic stenosis clinically stable.  Dyslipidemia on statin therapy.  Osteoporosis on pharmacologic therapy.    Plan:  Continue current pharmacologic regimens.Return to clinic 6 months follow-up.  Influenza vaccine today.

## 2019-10-18 ENCOUNTER — OFFICE VISIT (OUTPATIENT)
Dept: DERMATOLOGY | Facility: CLINIC | Age: 84
End: 2019-10-18
Payer: MEDICARE

## 2019-10-18 DIAGNOSIS — L81.4 LENTIGINES: ICD-10-CM

## 2019-10-18 DIAGNOSIS — L82.0 LICHENOID KERATOSIS: ICD-10-CM

## 2019-10-18 DIAGNOSIS — L82.1 SEBORRHEIC KERATOSES: Primary | ICD-10-CM

## 2019-10-18 PROCEDURE — 99213 OFFICE O/P EST LOW 20 MIN: CPT | Mod: HCNC,S$GLB,, | Performed by: DERMATOLOGY

## 2019-10-18 PROCEDURE — 99213 PR OFFICE/OUTPT VISIT, EST, LEVL III, 20-29 MIN: ICD-10-PCS | Mod: HCNC,S$GLB,, | Performed by: DERMATOLOGY

## 2019-10-18 PROCEDURE — 99999 PR PBB SHADOW E&M-EST. PATIENT-LVL II: CPT | Mod: PBBFAC,HCNC,, | Performed by: DERMATOLOGY

## 2019-10-18 PROCEDURE — 1101F PR PT FALLS ASSESS DOC 0-1 FALLS W/OUT INJ PAST YR: ICD-10-PCS | Mod: HCNC,CPTII,S$GLB, | Performed by: DERMATOLOGY

## 2019-10-18 PROCEDURE — 1101F PT FALLS ASSESS-DOCD LE1/YR: CPT | Mod: HCNC,CPTII,S$GLB, | Performed by: DERMATOLOGY

## 2019-10-18 PROCEDURE — 99999 PR PBB SHADOW E&M-EST. PATIENT-LVL II: ICD-10-PCS | Mod: PBBFAC,HCNC,, | Performed by: DERMATOLOGY

## 2019-10-18 NOTE — PROGRESS NOTES
Subjective:       Patient ID:  Mine Wilkins is a 84 y.o. female who presents for   Chief Complaint   Patient presents with    Skin Check    Spot     right leg     Spot  - Initial  Affected locations: right lower leg  Signs / symptoms: redness  Severity: mild  Timing: constant  Aggravated by: nothing  Relieving factors/Treatments tried: nothing  Improvement on treatment: no relief        Review of Systems   Constitutional: Negative for fever, chills, fatigue and malaise.   Skin: Positive for daily sunscreen use and activity-related sunscreen use. Negative for recent sunburn.   Hematologic/Lymphatic: Does not bruise/bleed easily.        Objective:    Physical Exam   Constitutional: She appears well-developed and well-nourished. No distress.   Neurological: She is alert and oriented to person, place, and time. She is not disoriented.   Psychiatric: She has a normal mood and affect.   Skin:   Areas Examined (abnormalities noted in diagram):   Head / Face Inspection Performed  Neck Inspection Performed  Chest / Axilla Inspection Performed  Back Inspection Performed  RUE Inspected  LUE Inspection Performed  RLE Inspected  LLE Inspection Performed              Diagram Legend     Erythematous scaling macule/papule c/w actinic keratosis       Vascular papule c/w angioma      Pigmented verrucoid papule/plaque c/w seborrheic keratosis      Yellow umbilicated papule c/w sebaceous hyperplasia      Irregularly shaped tan macule c/w lentigo     1-2 mm smooth white papules consistent with Milia      Movable subcutaneous cyst with punctum c/w epidermal inclusion cyst      Subcutaneous movable cyst c/w pilar cyst      Firm pink to brown papule c/w dermatofibroma      Pedunculated fleshy papule(s) c/w skin tag(s)      Evenly pigmented macule c/w junctional nevus     Mildly variegated pigmented, slightly irregular-bordered macule c/w mildly atypical nevus      Flesh colored to evenly pigmented papule c/w intradermal  "nevus       Pink pearly papule/plaque c/w basal cell carcinoma      Erythematous hyperkeratotic cursted plaque c/w SCC      Surgical scar with no sign of skin cancer recurrence      Open and closed comedones      Inflammatory papules and pustules      Verrucoid papule consistent consistent with wart     Erythematous eczematous patches and plaques     Dystrophic onycholytic nail with subungual debris c/w onychomycosis     Umbilicated papule    Erythematous-base heme-crusted tan verrucoid plaque consistent with inflamed seborrheic keratosis     Erythematous Silvery Scaling Plaque c/w Psoriasis     See annotation      Assessment / Plan:        Seborrheic keratoses  reassurance  Brochure provided      Lichenoid keratosis  reassurance      Lentigines  The "ABCD" rules to observe pigmented lesions were reviewed.               Follow up in about 1 year (around 10/18/2020).  "

## 2019-11-18 DIAGNOSIS — F41.9 ANXIETY: ICD-10-CM

## 2019-11-18 RX ORDER — ESCITALOPRAM OXALATE 5 MG/1
TABLET ORAL
Qty: 90 TABLET | Refills: 0 | Status: SHIPPED | OUTPATIENT
Start: 2019-11-18 | End: 2019-11-29 | Stop reason: SDUPTHER

## 2019-11-29 DIAGNOSIS — F41.9 ANXIETY: ICD-10-CM

## 2019-11-29 RX ORDER — ESCITALOPRAM OXALATE 5 MG/1
TABLET ORAL
Qty: 90 TABLET | Refills: 0 | Status: SHIPPED | OUTPATIENT
Start: 2019-11-29 | End: 2020-06-04

## 2020-02-13 ENCOUNTER — TELEPHONE (OUTPATIENT)
Dept: INTERNAL MEDICINE | Facility: CLINIC | Age: 85
End: 2020-02-13

## 2020-02-13 DIAGNOSIS — E78.5 DYSLIPIDEMIA: Primary | ICD-10-CM

## 2020-02-13 NOTE — TELEPHONE ENCOUNTER
----- Message from Chetna Freed sent at 2/13/2020 10:43 AM CST -----  Contact: Patient [phone]  Patient is requesting lab orders for her appt that's in April.    Please call and advise.    Thank You

## 2020-02-21 ENCOUNTER — TELEPHONE (OUTPATIENT)
Dept: UROGYNECOLOGY | Facility: CLINIC | Age: 85
End: 2020-02-21

## 2020-02-21 NOTE — TELEPHONE ENCOUNTER
----- Message from Erum Joy sent at 2/21/2020 12:51 PM CST -----  Contact: PERNELL FOX [[id number]]  Name of Who is Calling: PERNELL FOX [[id number]]     What is the request in detail:PERNELL FOX [[id number]] Patient is requesting a call back  in regards to becoming a new Patient  Please contact to further discuss and advise      Can the clinic reply by [facility]: NO     What Number to Call Back if not in [facility]:  ,ph

## 2020-03-03 ENCOUNTER — OFFICE VISIT (OUTPATIENT)
Dept: FAMILY MEDICINE | Facility: CLINIC | Age: 85
End: 2020-03-03
Payer: MEDICARE

## 2020-03-03 VITALS
SYSTOLIC BLOOD PRESSURE: 120 MMHG | DIASTOLIC BLOOD PRESSURE: 86 MMHG | OXYGEN SATURATION: 95 % | WEIGHT: 147.94 LBS | BODY MASS INDEX: 26.2 KG/M2 | HEART RATE: 78 BPM

## 2020-03-03 DIAGNOSIS — N39.46 MIXED INCONTINENCE URGE AND STRESS (MALE)(FEMALE): ICD-10-CM

## 2020-03-03 DIAGNOSIS — M15.9 OSTEOARTHRITIS OF MULTIPLE JOINTS, UNSPECIFIED OSTEOARTHRITIS TYPE: ICD-10-CM

## 2020-03-03 DIAGNOSIS — F41.1 GENERALIZED ANXIETY DISORDER: ICD-10-CM

## 2020-03-03 DIAGNOSIS — I70.0 AORTIC CALCIFICATION: Chronic | ICD-10-CM

## 2020-03-03 DIAGNOSIS — J30.2 SEASONAL ALLERGIC RHINITIS, UNSPECIFIED TRIGGER: ICD-10-CM

## 2020-03-03 DIAGNOSIS — D75.839 THROMBOCYTOSIS: ICD-10-CM

## 2020-03-03 DIAGNOSIS — M81.0 SENILE OSTEOPOROSIS: ICD-10-CM

## 2020-03-03 DIAGNOSIS — Z00.00 ENCOUNTER FOR PREVENTIVE HEALTH EXAMINATION: Primary | ICD-10-CM

## 2020-03-03 DIAGNOSIS — S72.141D CLOSED DISPLACED INTERTROCHANTERIC FRACTURE OF RIGHT FEMUR WITH ROUTINE HEALING, SUBSEQUENT ENCOUNTER: ICD-10-CM

## 2020-03-03 DIAGNOSIS — K21.9 GASTROESOPHAGEAL REFLUX DISEASE WITHOUT ESOPHAGITIS: ICD-10-CM

## 2020-03-03 DIAGNOSIS — E66.3 OVERWEIGHT (BMI 25.0-29.9): ICD-10-CM

## 2020-03-03 DIAGNOSIS — E78.00 PURE HYPERCHOLESTEROLEMIA: ICD-10-CM

## 2020-03-03 DIAGNOSIS — I10 ESSENTIAL HYPERTENSION: ICD-10-CM

## 2020-03-03 PROCEDURE — 99499 RISK ADDL DX/OHS AUDIT: ICD-10-PCS | Mod: HCNC,S$GLB,, | Performed by: NURSE PRACTITIONER

## 2020-03-03 PROCEDURE — 99999 PR PBB SHADOW E&M-EST. PATIENT-LVL IV: CPT | Mod: PBBFAC,HCNC,, | Performed by: NURSE PRACTITIONER

## 2020-03-03 PROCEDURE — G0439 PR MEDICARE ANNUAL WELLNESS SUBSEQUENT VISIT: ICD-10-PCS | Mod: HCNC,S$GLB,, | Performed by: NURSE PRACTITIONER

## 2020-03-03 PROCEDURE — 3074F PR MOST RECENT SYSTOLIC BLOOD PRESSURE < 130 MM HG: ICD-10-PCS | Mod: HCNC,CPTII,S$GLB, | Performed by: NURSE PRACTITIONER

## 2020-03-03 PROCEDURE — 99999 PR PBB SHADOW E&M-EST. PATIENT-LVL IV: ICD-10-PCS | Mod: PBBFAC,HCNC,, | Performed by: NURSE PRACTITIONER

## 2020-03-03 PROCEDURE — 3079F PR MOST RECENT DIASTOLIC BLOOD PRESSURE 80-89 MM HG: ICD-10-PCS | Mod: HCNC,CPTII,S$GLB, | Performed by: NURSE PRACTITIONER

## 2020-03-03 PROCEDURE — G0439 PPPS, SUBSEQ VISIT: HCPCS | Mod: HCNC,S$GLB,, | Performed by: NURSE PRACTITIONER

## 2020-03-03 PROCEDURE — 99499 UNLISTED E&M SERVICE: CPT | Mod: HCNC,S$GLB,, | Performed by: NURSE PRACTITIONER

## 2020-03-03 PROCEDURE — 3074F SYST BP LT 130 MM HG: CPT | Mod: HCNC,CPTII,S$GLB, | Performed by: NURSE PRACTITIONER

## 2020-03-03 PROCEDURE — 3079F DIAST BP 80-89 MM HG: CPT | Mod: HCNC,CPTII,S$GLB, | Performed by: NURSE PRACTITIONER

## 2020-03-03 NOTE — PROGRESS NOTES
Mine Wilkins presented for a  Medicare AWV and comprehensive Health Risk Assessment today. The following components were reviewed and updated:    · Medical history  · Family History  · Social history  · Allergies and Current Medications  · Health Risk Assessment  · Health Maintenance  · Care Team     ** See Completed Assessments for Annual Wellness Visit within the encounter summary.**       The following assessments were completed:  · Living Situation  · CAGE  · Depression Screening  · Timed Get Up and Go  · Whisper Test  · Cognitive Function Screening            · Nutrition Screening  · ADL Screening  · PAQ Screening    Vitals:    03/03/20 1319   BP: 120/86   BP Location: Right arm   Patient Position: Sitting   BP Method: Small (Manual)   Pulse: 78   SpO2: 95%   Weight: 67.1 kg (147 lb 14.9 oz)     Body mass index is 26.2 kg/m².     Physical Exam   Constitutional: She is oriented to person, place, and time. Vital signs are normal. She appears well-developed. She is cooperative. No distress.   Overweight   HENT:   Head: Normocephalic and atraumatic.   Right Ear: Hearing, tympanic membrane, external ear and ear canal normal. No drainage.   Left Ear: Hearing, tympanic membrane, external ear and ear canal normal. No drainage.   Nose: Nose normal. No septal deviation.   Mouth/Throat: Uvula is midline, oropharynx is clear and moist and mucous membranes are normal. Mucous membranes are not pale. No oral lesions. No oropharyngeal exudate.   Eyes: Pupils are equal, round, and reactive to light. Conjunctivae, EOM and lids are normal. Right eye exhibits no discharge. Left eye exhibits no discharge.   Wears glasses   Neck: Trachea normal, normal range of motion and full passive range of motion without pain. Neck supple. Carotid bruit is not present. No neck rigidity. No tracheal deviation present.   Cardiovascular: Normal rate, regular rhythm, S1 normal, S2 normal, intact distal pulses and normal pulses.   Murmur heard.    Systolic murmur is present with a grade of 3/6.  Pulses:       Carotid pulses are 2+ on the right side, and 2+ on the left side.       Radial pulses are 2+ on the right side, and 2+ on the left side.        Dorsalis pedis pulses are 2+ on the right side, and 2+ on the left side.   Pulmonary/Chest: Effort normal and breath sounds normal. No respiratory distress. She has no wheezes. She has no rales.   Abdominal: Soft. Normal appearance and bowel sounds are normal. She exhibits no distension. There is no tenderness. There is no guarding.   Musculoskeletal: She exhibits no edema.   Lymphadenopathy:     She has no cervical adenopathy.   Neurological: She is alert and oriented to person, place, and time. She is not disoriented. She exhibits normal muscle tone. Coordination and gait normal.   Skin: Skin is warm, dry and intact. Capillary refill takes less than 2 seconds. No rash noted. No pallor.   Psychiatric: She has a normal mood and affect. Her speech is normal and behavior is normal. Thought content normal. Cognition and memory are normal.   Nursing note and vitals reviewed.        Diagnoses and health risks identified today and associated recommendations/orders:    1. Encounter for preventive health examination  Patient seen for AWV. Encouraged patient to receive shingles vaccine series at local pharmacy. Up to date with all other screenings and immunizations.    2. Essential hypertension  Chronic; stable on medication. Follow up with PCP.    3. Pure hypercholesterolemia  Chronic; stable on medication. Follow up with PCP.    4. Aortic calcification  Chronic; stable on medication. Follow up with PCP.    5. Thrombocytosis  Chronic; stable. Follow up with PCP.    6. Closed displaced intertrochanteric fracture of right femur with routine healing, subsequent encounter  Chronic; stable. Completed PT but continues to do PT exercises at home daily. Follow up with PCP.    7. Senile osteoporosis  Chronic; stable on  medication. Follow up with PCP.    8. Osteoarthritis of multiple joints, unspecified osteoarthritis type  Chronic; stable on OTC medication. Followed by Physical Medicine and Rehabilitation.    9. 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 Follow up with PCP.    10. Gastroesophageal reflux disease without esophagitis  Chronic; stable on medication. Follow up with PCP.    11. Seasonal allergic rhinitis, unspecified trigger  Chronic; stable on medication. Follow up with PCP.    12. Generalized anxiety disorder  Chronic; stable on medication. Follow up with PCP.    13. Overweight (BMI 25.0-29.9)  Encouraged patient to continue to eat a low salt/low fat diet and discussed importance of engaging in physical activity at least 5x/week for a minimum of 30 min/day.      Provided Mine with a 5-10 year written screening schedule and personal prevention plan. Recommendations were developed using the USPSTF age appropriate recommendations. Education, counseling, and referrals were provided as needed. After Visit Summary printed and given to patient which includes a list of additional screenings/tests needed.    I offered to discuss end of life issues, including information on how to make advance directives that the patient could use to name someone who would make medical decisions on their behalf if they became too ill to make themselves.    ___Patient declined  _X_Patient already has a set of Advanced Directives on file, and she does not need to make any revisions at this time.      Follow up for your next annual wellness visit.       Harriet Gibbons NP

## 2020-03-03 NOTE — PATIENT INSTRUCTIONS
- Be sure to receive your shingles vaccine series (Shingrix) at your local pharmacy.  - Be sure to follow up with Optometry after April 2020.    Counseling and Referral of Other Preventative  (Italic type indicates deductible and co-insurance are waived)    Patient Name: Mine Wilkins  Today's Date: 3/3/2020    Health Maintenance       Date Due Completion Date    Shingles Vaccine (2 of 3) 08/12/2014 6/17/2014    DEXA SCAN 09/06/2021 9/6/2018    Lipid Panel 10/07/2024 10/7/2019    TETANUS VACCINE 03/01/2029 3/1/2019        No orders of the defined types were placed in this encounter.    The following information is provided to all patients.  This information is to help you find resources for any of the problems found today that may be affecting your health:                Living healthy guide: www.[facility].[place].gov      Understanding Diabetes: www.diabetes.org      Eating healthy: www.cdc.gov/healthyweight      [facility] home safety checklist: www.cdc.gov/steadi/patient.html      Agency on Aging: www.goea.[place].gov      Alcoholics anonymous (AA): www.aa.org      Physical Activity: www.aisha.nih.gov/xg0kwnu      Tobacco use: www.quitwithusla.org

## 2020-04-27 RX ORDER — LOSARTAN POTASSIUM 50 MG/1
TABLET ORAL
Qty: 180 TABLET | Refills: 3 | Status: SHIPPED | OUTPATIENT
Start: 2020-04-27 | End: 2021-04-13

## 2020-04-27 RX ORDER — OXYBUTYNIN CHLORIDE 10 MG/1
TABLET, EXTENDED RELEASE ORAL
Qty: 90 TABLET | Refills: 3 | Status: SHIPPED | OUTPATIENT
Start: 2020-04-27 | End: 2021-04-13

## 2020-04-27 RX ORDER — ALENDRONATE SODIUM 70 MG/1
TABLET ORAL
Qty: 12 TABLET | Refills: 3 | Status: SHIPPED | OUTPATIENT
Start: 2020-04-27 | End: 2021-04-13

## 2020-04-27 RX ORDER — OMEPRAZOLE 40 MG/1
CAPSULE, DELAYED RELEASE ORAL
Qty: 90 CAPSULE | Refills: 3 | Status: SHIPPED | OUTPATIENT
Start: 2020-04-27 | End: 2021-04-13

## 2020-04-27 RX ORDER — AMLODIPINE BESYLATE 2.5 MG/1
TABLET ORAL
Qty: 90 TABLET | Refills: 3 | Status: SHIPPED | OUTPATIENT
Start: 2020-04-27 | End: 2021-04-13

## 2020-04-27 RX ORDER — PRAVASTATIN SODIUM 20 MG/1
TABLET ORAL
Qty: 90 TABLET | Refills: 3 | Status: SHIPPED | OUTPATIENT
Start: 2020-04-27 | End: 2021-04-13

## 2020-06-24 ENCOUNTER — TELEPHONE (OUTPATIENT)
Dept: INTERNAL MEDICINE | Facility: CLINIC | Age: 85
End: 2020-06-24

## 2020-06-24 DIAGNOSIS — M25.511 CHRONIC RIGHT SHOULDER PAIN: Primary | ICD-10-CM

## 2020-06-24 DIAGNOSIS — G89.29 CHRONIC RIGHT SHOULDER PAIN: Primary | ICD-10-CM

## 2020-06-24 NOTE — TELEPHONE ENCOUNTER
----- Message from Nisha Mclaughlin sent at 6/24/2020  2:06 PM CDT -----  Contact: Mine Wilkins [phone]  Patient would like to get a referral.  Referral to what specialty:  Orthopedic  Does the patient want the referral with a specific physician:  No  [facility] the [facility]  Reason (be specific):  Old injury broken right arm pain  Does the patient already have the specialty clinic appointment scheduled:  No  If yes, what date is the appointment scheduled:   n/a   Is the insurance listed in Epic correct? (this is important for a referral): Phone    Comments:

## 2020-06-24 NOTE — TELEPHONE ENCOUNTER
Pt would a referral to Orthopedics as she experiencing pain from an healed arm fracture.    Please advise if ok to refer to Orthopedics.    Thanks.

## 2020-06-30 ENCOUNTER — OFFICE VISIT (OUTPATIENT)
Dept: UROGYNECOLOGY | Facility: CLINIC | Age: 85
End: 2020-06-30
Payer: MEDICARE

## 2020-06-30 VITALS
DIASTOLIC BLOOD PRESSURE: 80 MMHG | BODY MASS INDEX: 25.94 KG/M2 | HEIGHT: 63 IN | SYSTOLIC BLOOD PRESSURE: 138 MMHG | WEIGHT: 146.38 LBS

## 2020-06-30 DIAGNOSIS — Z01.419 WELL WOMAN EXAM: Primary | ICD-10-CM

## 2020-06-30 DIAGNOSIS — N39.46 MIXED STRESS AND URGE URINARY INCONTINENCE: ICD-10-CM

## 2020-06-30 DIAGNOSIS — N81.11 MIDLINE CYSTOCELE: ICD-10-CM

## 2020-06-30 DIAGNOSIS — N95.2 VAGINAL ATROPHY: ICD-10-CM

## 2020-06-30 PROCEDURE — G0101 CA SCREEN;PELVIC/BREAST EXAM: HCPCS | Mod: HCNC,S$GLB,, | Performed by: NURSE PRACTITIONER

## 2020-06-30 PROCEDURE — G0101 PR CA SCREEN;PELVIC/BREAST EXAM: ICD-10-PCS | Mod: HCNC,S$GLB,, | Performed by: NURSE PRACTITIONER

## 2020-06-30 PROCEDURE — 99999 PR PBB SHADOW E&M-EST. PATIENT-LVL III: ICD-10-PCS | Mod: PBBFAC,HCNC,, | Performed by: NURSE PRACTITIONER

## 2020-06-30 PROCEDURE — 99999 PR PBB SHADOW E&M-EST. PATIENT-LVL III: CPT | Mod: PBBFAC,HCNC,, | Performed by: NURSE PRACTITIONER

## 2020-06-30 NOTE — PATIENT INSTRUCTIONS
1. Well woman  --mammogram due 09/2020  --dexa due 09/2020    2. Vaginal atrophy (dryness): Use REPLENS or REFRESH OTC: 1/2 applicator full in vagina twice a week.        3. Mixed urinary incontinence, urge > stress:    --Empty bladder every 3 hours.  Empty well: wait a minute, lean forward on toilet.    --Avoid dietary irritants (see sheet).  Keep diary x 3-5 days to determine your irritants.  --KEGELS: do 10 in AM and 10 in PM, holding each x 10 seconds.  When you feel urge to go, STOP, KEGEL, and when urge has passed, then go to bathroom.    --URGE: consider medication if it worsens. Takes 2-4 weeks to see if will have effect.  For dry mouth: get sour, sugar free lozenge or gum.    --STRESS:  Pessary vs. Sling.     4. RTC 2 years for annual

## 2020-06-30 NOTE — PROGRESS NOTES
06/30/2020    SUBJECTIVE:   85 y.o. female for annual exam.    Past Medical History:   Diagnosis Date    After-cataract of both eyes - Left Eye 10/11/2012    Anxiety     Aortic calcification 8/10/2016    Aortic valve stenosis, moderate     AR (allergic rhinitis)     Arthritis of facet joints at multiple vertebral levels 1/14/2016    Seen on lumbar MRI dated 01/14/16    Cataract     Cholelithiasis     Closed displaced intertrochanteric fracture of right femur with routine healing s/p IM nail on 5/20/2018 5/19/2018    Cystocele 12/1/2013    Esotropia, alternating - Both Eyes 10/11/2012    Essential hypertension     Gastroesophageal reflux disease without esophagitis 4/13/2018    Left ventricular diastolic dysfunction with preserved systolic function 8/10/2016    Lumbar radiculopathy 1/25/2016    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    Nondisplaced fracture of proximal end of humerus 8/7/2016    Osteoarthritis     Overweight (BMI 25.0-29.9) 4/13/2018    Pure hypercholesterolemia     Right-sided low back pain without sciatica 12/10/2015    Strabismus     Urethral hypermobility 12/1/2013       Past Surgical History:   Procedure Laterality Date    ADENOIDECTOMY      CATARACT EXTRACTION Bilateral     BOTH EYES MULTIFOCAL    COLONOSCOPY  2015    EYE SURGERY      FEMUR FRACTURE SURGERY Right     FRACTURE SURGERY Right     femur    HYSTERECTOMY      INTERTROCHANTERIC HIP FRACTURE SURGERY Right 05/20/2018    IM nail    LUMBAR EPIDURAL INJECTION  02/04/2016    L4-l5    PARTIAL HYSTERECTOMY      SKIN GRAFT      left foot     TONSILLECTOMY      WRIST FRACTURE SURGERY Right 2009    YAG CAP      LEFT EYE       Family History   Problem Relation Age of Onset    Colon cancer Brother     Dementia Brother     Hypertension Brother     Osteoporosis Mother     Hypertension Mother     Macular degeneration Mother     Cataracts Mother     Cancer Father     No Known  Problems Daughter     Hypertension Brother     Dementia Brother     No Known Problems Daughter     Hypertension Maternal Grandmother     Stroke Maternal Grandmother     Breast cancer Maternal Grandmother     Melanoma Maternal Grandfather     Anesthesia problems Neg Hx     Broken bones Neg Hx     Clotting disorder Neg Hx     Collagen disease Neg Hx     Diabetes Neg Hx     Dislocations Neg Hx     Rheumatologic disease Neg Hx     Scoliosis Neg Hx     Severe sprains Neg Hx     Ovarian cancer Neg Hx     Amblyopia Neg Hx     Blindness Neg Hx     Glaucoma Neg Hx     Retinal detachment Neg Hx     Strabismus Neg Hx     Psoriasis Neg Hx     Lupus Neg Hx     Eczema Neg Hx     Acne Neg Hx        Social History     Socioeconomic History    Marital status:      Spouse name: Not on file    Number of children: Not on file    Years of education: Not on file    Highest education level: Not on file   Occupational History    Occupation: Retired   Social Needs    Financial resource strain: Not on file    Food insecurity     Worry: Not on file     Inability: Not on file    Transportation needs     Medical: Not on file     Non-medical: Not on file   Tobacco Use    Smoking status: Never Smoker    Smokeless tobacco: Never Used   Substance and Sexual Activity    Alcohol use: Yes     Comment: maybe 6 drinks per year    Drug use: No    Sexual activity: Not Currently     Partners: Male   Lifestyle    Physical activity     Days per week: Not on file     Minutes per session: Not on file    Stress: Not on file   Relationships    Social connections     Talks on phone: Not on file     Gets together: Not on file     Attends Buddhist service: Not on file     Active member of club or organization: Not on file     Attends meetings of clubs or organizations: Not on file     Relationship status: Not on file   Other Topics Concern    Are you pregnant or think you may be? No    Breast-feeding No   Social  History Narrative    . Two grown daughters       Current Outpatient Medications   Medication Sig Dispense Refill    acetaminophen (TYLENOL) 325 MG tablet Take 2 tablets (650 mg total) by mouth every 6 (six) hours as needed.  0    alendronate (FOSAMAX) 70 MG tablet TAKE 1 TABLET  EVERY 7 DAYS. HOLD UNTIL SEEN BY YOUR PCP 12 tablet 3    amLODIPine (NORVASC) 2.5 MG tablet TAKE 1 TABLET EVERY DAY 90 tablet 3    cetirizine (ZYRTEC) 10 MG tablet Take 10 mg by mouth once daily.      cholecalciferol, vitamin D3, 2,000 unit Tab Take by mouth. 1 Tablet Oral Every day      escitalopram oxalate (LEXAPRO) 5 MG Tab TAKE ONE DAILY 90 tablet 0    fluticasone (FLONASE) 50 mcg/actuation nasal spray 2 sprays (100 mcg total) by Each Nare route every evening. 1 Bottle 1    losartan (COZAAR) 50 MG tablet TAKE 1 TABLET TWICE DAILY 180 tablet 3    multivit-min/iron/folic/lutein (CENTRUM SILVER WOMEN ORAL) Take by mouth.      omeprazole (PRILOSEC) 40 MG capsule TAKE 1 CAPSULE EVERY MORNING  BEFORE  EATING 90 capsule 3    oxybutynin (DITROPAN-XL) 10 MG 24 hr tablet TAKE 1 TABLET EVERY DAY 90 tablet 3    pravastatin (PRAVACHOL) 20 MG tablet TAKE 1 TABLET EVERY EVENING. 90 tablet 3     No current facility-administered medications for this visit.        Review of patient's allergies indicates:   Allergen Reactions    Ace inhibitors      Other reaction(s): cough    Codeine      Other reaction(s): Nausea       No LMP recorded. Patient has had a hysterectomy.    Well Woman:  Pap:post hysterectomy  Mammo:2018 normal-- will do in October  Colonoscopy:2015 normal  Dexa:2018 Osteoporosis of the femoral neck, total hip and lumbar spine on fosamax-- managed by Dr. Oswald    OB History        2    Para   2    Term   2            AB        Living           SAB        TAB        Ectopic        Multiple        Live Births                       ROS:  Feeling well.   No dyspnea or chest pain on exertion.    No  "abdominal pain, change in bowel habits, black or bloody stools.    + MALINI with sneezing. Occasional UUI if bladder if full. Using 1-2 pad/ day--- and 1/ night.   GYN ROS: no breast pain or new or enlarging lumps on self exam, no vaginal bleeding.   No neurological complaints.    OBJECTIVE:   The patient appears well, alert, oriented x 3, in no distress.  /80   Ht 5' 3" (1.6 m)   Wt 66.4 kg (146 lb 6.2 oz)   BMI 25.93 kg/m²   ENT normal.  Neck supple. No adenopathy or thyromegaly. CRISS.   Lungs are clear, good air entry, no wheezes, rhonchi or rales.   S1 and S2 normal, no murmurs, regular rate and rhythm.   Abdomen soft without tenderness, guarding, mass or organomegaly.   Extremities show no edema, normal peripheral pulses.   Neurological is normal, no focal findings.    BREAST EXAM: breasts appear normal, no suspicious masses, no skin or nipple changes or axillary nodes    PELVIC EXAM:   VULVA: normal appearing vulva with no masses, tenderness or lesions,   VAGINA: normal appearing vagina with normal color and discharge, no lesions, atrophic, PELVIC FLOOR EXAM: cystocele stage 1,  CERVIX: surgically absent,   UTERUS: absent,   ADNEXA: no masses,   RECTAL: normal rectal, no masses, external hemorrhoids non-thrombosed    ASSESSMENT:   1. Well woman exam     2. Vaginal atrophy     3. Midline cystocele     4. Mixed stress and urge urinary incontinence         PLAN:   1. Well woman  --mammogram due 09/2020  --dexa due 09/2020    2. Vaginal atrophy (dryness): Use REPLENS or REFRESH OTC: 1/2 applicator full in vagina twice a week.        3. Mixed urinary incontinence, urge > stress:    --Empty bladder every 3 hours.  Empty well: wait a minute, lean forward on toilet.    --Avoid dietary irritants (see sheet).  Keep diary x 3-5 days to determine your irritants.  --KEGELS: do 10 in AM and 10 in PM, holding each x 10 seconds.  When you feel urge to go, STOP, KEGEL, and when urge has passed, then go to bathroom.  "   --URGE: consider medication if it worsens. Takes 2-4 weeks to see if will have effect.  For dry mouth: get sour, sugar free lozenge or gum.    --STRESS:  Pessary vs. Sling.     4. RTC 2 years for annual          30 minutes were spent in face to face time with this patient  90 % of this time was spent in counseling and/or coordination of care  Ashlee REN [facility]  Division of Female Pelvic Medicine and Reconstructive Surgery  Department of Obstetrics & Gynecology

## 2020-07-10 ENCOUNTER — OFFICE VISIT (OUTPATIENT)
Dept: ORTHOPEDICS | Facility: CLINIC | Age: 85
End: 2020-07-10
Payer: MEDICARE

## 2020-07-10 ENCOUNTER — HOSPITAL ENCOUNTER (OUTPATIENT)
Dept: RADIOLOGY | Facility: HOSPITAL | Age: 85
Discharge: HOME | End: 2020-07-10
Attending: PHYSICIAN ASSISTANT
Payer: MEDICARE

## 2020-07-10 VITALS
SYSTOLIC BLOOD PRESSURE: 140 MMHG | WEIGHT: 147.69 LBS | BODY MASS INDEX: 26.17 KG/M2 | HEIGHT: 63 IN | HEART RATE: 68 BPM | DIASTOLIC BLOOD PRESSURE: 67 MMHG

## 2020-07-10 DIAGNOSIS — G89.29 CHRONIC RIGHT SHOULDER PAIN: ICD-10-CM

## 2020-07-10 DIAGNOSIS — M25.511 CHRONIC RIGHT SHOULDER PAIN: Primary | ICD-10-CM

## 2020-07-10 DIAGNOSIS — G89.29 CHRONIC RIGHT SHOULDER PAIN: Primary | ICD-10-CM

## 2020-07-10 DIAGNOSIS — M25.511 CHRONIC RIGHT SHOULDER PAIN: ICD-10-CM

## 2020-07-10 PROCEDURE — 99203 PR OFFICE/OUTPT VISIT, NEW, LEVL III, 30-44 MIN: ICD-10-PCS | Mod: HCNC,S$GLB,, | Performed by: PHYSICIAN ASSISTANT

## 2020-07-10 PROCEDURE — 1159F MED LIST DOCD IN RCRD: CPT | Mod: HCNC,S$GLB,, | Performed by: PHYSICIAN ASSISTANT

## 2020-07-10 PROCEDURE — 99999 PR PBB SHADOW E&M-EST. PATIENT-LVL V: ICD-10-PCS | Mod: PBBFAC,HCNC,, | Performed by: PHYSICIAN ASSISTANT

## 2020-07-10 PROCEDURE — 3078F PR MOST RECENT DIASTOLIC BLOOD PRESSURE < 80 MM HG: ICD-10-PCS | Mod: HCNC,CPTII,S$GLB, | Performed by: PHYSICIAN ASSISTANT

## 2020-07-10 PROCEDURE — 1159F PR MEDICATION LIST DOCUMENTED IN MEDICAL RECORD: ICD-10-PCS | Mod: HCNC,S$GLB,, | Performed by: PHYSICIAN ASSISTANT

## 2020-07-10 PROCEDURE — 73030 XR SHOULDER COMPLETE 2 OR MORE VIEWS RIGHT: ICD-10-PCS | Mod: 26,HCNC,RT, | Performed by: RADIOLOGY

## 2020-07-10 PROCEDURE — 1101F PT FALLS ASSESS-DOCD LE1/YR: CPT | Mod: HCNC,CPTII,S$GLB, | Performed by: PHYSICIAN ASSISTANT

## 2020-07-10 PROCEDURE — 99999 PR PBB SHADOW E&M-EST. PATIENT-LVL V: CPT | Mod: PBBFAC,HCNC,, | Performed by: PHYSICIAN ASSISTANT

## 2020-07-10 PROCEDURE — 1101F PR PT FALLS ASSESS DOC 0-1 FALLS W/OUT INJ PAST YR: ICD-10-PCS | Mod: HCNC,CPTII,S$GLB, | Performed by: PHYSICIAN ASSISTANT

## 2020-07-10 PROCEDURE — 1125F PR PAIN SEVERITY QUANTIFIED, PAIN PRESENT: ICD-10-PCS | Mod: HCNC,S$GLB,, | Performed by: PHYSICIAN ASSISTANT

## 2020-07-10 PROCEDURE — 3078F DIAST BP <80 MM HG: CPT | Mod: HCNC,CPTII,S$GLB, | Performed by: PHYSICIAN ASSISTANT

## 2020-07-10 PROCEDURE — 73030 X-RAY EXAM OF SHOULDER: CPT | Mod: 26,HCNC,RT, | Performed by: RADIOLOGY

## 2020-07-10 PROCEDURE — 3077F PR MOST RECENT SYSTOLIC BLOOD PRESSURE >= 140 MM HG: ICD-10-PCS | Mod: HCNC,CPTII,S$GLB, | Performed by: PHYSICIAN ASSISTANT

## 2020-07-10 PROCEDURE — 3077F SYST BP >= 140 MM HG: CPT | Mod: HCNC,CPTII,S$GLB, | Performed by: PHYSICIAN ASSISTANT

## 2020-07-10 PROCEDURE — 1125F AMNT PAIN NOTED PAIN PRSNT: CPT | Mod: HCNC,S$GLB,, | Performed by: PHYSICIAN ASSISTANT

## 2020-07-10 PROCEDURE — 99203 OFFICE O/P NEW LOW 30 MIN: CPT | Mod: HCNC,S$GLB,, | Performed by: PHYSICIAN ASSISTANT

## 2020-07-10 PROCEDURE — 73030 X-RAY EXAM OF SHOULDER: CPT | Mod: TC,HCNC,RT

## 2020-07-10 NOTE — PROGRESS NOTES
SUBJECTIVE:     Chief Complaint & History of Present Illness:  Mine Wilkins is a  New  patient 85 y.o. female who is seen here today with a complaint of  right shoulder pain .  She is here today for evaluation treatment of pain and paresthesias in the right shoulder for the past several months.  She is status post right humeral head fracture from December 2016 from which she recovered very well.  She is very diligent about caring out her daily exercise routines but in spite of this she has developed some decreased range of motion particularly in forward flexion and and intermittent episodes of paresthesias and soreness in the shoulder.  On a scale of 1-10, with 10 being worst pain imaginable, he rates this pain as 2 on good days and 5 on bad days.  she describes the pain as sore.    Review of patient's allergies indicates:   Allergen Reactions    Ace inhibitors      Other reaction(s): cough    Codeine      Other reaction(s): Nausea         Current Outpatient Medications   Medication Sig Dispense Refill    acetaminophen (TYLENOL) 325 MG tablet Take 2 tablets (650 mg total) by mouth every 6 (six) hours as needed.  0    alendronate (FOSAMAX) 70 MG tablet TAKE 1 TABLET  EVERY 7 DAYS. HOLD UNTIL SEEN BY YOUR PCP 12 tablet 3    amLODIPine (NORVASC) 2.5 MG tablet TAKE 1 TABLET EVERY DAY 90 tablet 3    cetirizine (ZYRTEC) 10 MG tablet Take 10 mg by mouth once daily.      cholecalciferol, vitamin D3, 2,000 unit Tab Take by mouth. 1 Tablet Oral Every day      escitalopram oxalate (LEXAPRO) 5 MG Tab TAKE ONE DAILY 90 tablet 0    fluticasone (FLONASE) 50 mcg/actuation nasal spray 2 sprays (100 mcg total) by Each Nare route every evening. 1 Bottle 1    losartan (COZAAR) 50 MG tablet TAKE 1 TABLET TWICE DAILY 180 tablet 3    multivit-min/iron/folic/lutein (CENTRUM SILVER WOMEN ORAL) Take by mouth.      omeprazole (PRILOSEC) 40 MG capsule TAKE 1 CAPSULE EVERY MORNING  BEFORE  EATING 90 capsule 3     oxybutynin (DITROPAN-XL) 10 MG 24 hr tablet TAKE 1 TABLET EVERY DAY 90 tablet 3    pravastatin (PRAVACHOL) 20 MG tablet TAKE 1 TABLET EVERY EVENING. 90 tablet 3     No current facility-administered medications for this visit.        Past Medical History:   Diagnosis Date    After-cataract of both eyes - Left Eye 10/11/2012    Anxiety     Aortic calcification 8/10/2016    Aortic valve stenosis, moderate     AR (allergic rhinitis)     Arthritis of facet joints at multiple vertebral levels 1/14/2016    Seen on lumbar MRI dated 01/14/16    Cataract     Cholelithiasis     Closed displaced intertrochanteric fracture of right femur with routine healing s/p IM nail on 5/20/2018 5/19/2018    Cystocele 12/1/2013    Esotropia, alternating - Both Eyes 10/11/2012    Essential hypertension     Gastroesophageal reflux disease without esophagitis 4/13/2018    Left ventricular diastolic dysfunction with preserved systolic function 8/10/2016    Lumbar radiculopathy 1/25/2016    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    Nondisplaced fracture of proximal end of humerus 8/7/2016    Osteoarthritis     Overweight (BMI 25.0-29.9) 4/13/2018    Pure hypercholesterolemia     Right-sided low back pain without sciatica 12/10/2015    Strabismus     Urethral hypermobility 12/1/2013       Past Surgical History:   Procedure Laterality Date    ADENOIDECTOMY      CATARACT EXTRACTION Bilateral     BOTH EYES MULTIFOCAL    COLONOSCOPY  2015    EYE SURGERY      FEMUR FRACTURE SURGERY Right     FRACTURE SURGERY Right     femur    HYSTERECTOMY      INTERTROCHANTERIC HIP FRACTURE SURGERY Right 05/20/2018    IM nail    LUMBAR EPIDURAL INJECTION  02/04/2016    L4-l5    PARTIAL HYSTERECTOMY      SKIN GRAFT      left foot     TONSILLECTOMY      WRIST FRACTURE SURGERY Right 2009    YAG CAP      LEFT EYE       Vital Signs (Most Recent)  Vitals:    07/10/20 1429   BP: (!) 140/67   Pulse: 68       Review of  Systems:  ROS:  Constitutional: no fever or chills  Eyes: no visual changes, Positive for strabismus  ENT: no nasal congestion or sore throat  Respiratory: no cough or shortness of breath  Cardiovascular: no chest pain or palpitations, Positive aortic valve stenosis, aortic calcification, left ventricular diastolic dysfunction  Gastrointestinal: no nausea or vomiting, tolerating diet, Positive cholelithiasis, GERD  Genitourinary: no hematuria or dysuria  Integument/Breast: no rash or pruritis  Hematologic/Lymphatic: no easy bruising or lymphadenopathy, Positive for thrombocytosis  Musculoskeletal: no arthralgias or myalgias, Positive age-related osteoporosis, history of femur fracture chronic low back pain proximal humerus fracture  Neurological: no seizures or tremors, Positive for lumbar radiculopathy  Behavioral/Psych: no auditory or visual hallucinations, Anxiety, generalized anxiety disorder  Endocrine: no heat or cold intolerance      OBJECTIVE:     PHYSICAL EXAM:    Weight: 67 kg (147 lb 11.3 oz), General Appearance: Well nourished, well developed, in no acute distress.  Neurological: Mood & affect are normal.  Shoulder exam: right  Tenderness: lateral acromial, trapezius muscle  ROM: forward flexion 180/180, extension 45/45, full abduction 180/180, abduction-glenohumeral 90/90, external rotation 50/50, pain at the extremes of mobility  Shoulder Strength: biceps 5/5, triceps 5/5, abduction 5/5, adduction 5/5, external rotation 5/5 with shoulder at side, flexion 5/5, and extension 5/5  positive for tenderness about the glenohumeral joint, negative for tenderness over the acromioclavicular joint and negative for impingement sign  Stability tests: anterior apprehension test negative and posterior apprehension test negative  Special Tests:Cross-chest abduction: diffuse pain                     RADIOGRAPHS:  X-rays taken today films reviewed by me demonstrate well-healed fracture of the humeral head with medial  rotation of the humeral head moderate posttraumatic arthritis throughout the glenohumeral region and AC joint    ASSESSMENT/PLAN:       ICD-10-CM ICD-9-CM   1. Chronic right shoulder pain  M25.511 719.41    G89.29 338.29       Plan: We discussed with the patient at length all the different treatment options available for her rightshoulder including anti-inflammatories, acetaminophen, rest, ice, Physical therapy to include strengthening exercise, occasional cortisone injections for temporary relief, arthroscopic surgical repair, and finally shoulder arthroplasty.   Will schedule physical therapy to include dry needling and inflammation pain control as well as working with range of motion.  Follow-up in 5 weeks if symptoms not significantly improved sooner symptoms dictate

## 2020-07-10 NOTE — LETTER
July 10, 2020      ZOEY Oswald MD  [address] [facility] [place] [address]           [facility]  [address]  [facility] [address]  Phone: [phone]          Patient: Mine Wilkins   MR Number: [id number]   YOB: 1935   Date of Visit: 7/10/2020       Dear Dr. ZOEY Oswald:    Thank you for referring Mine Wilkins to me for evaluation. Attached you will find relevant portions of my assessment and plan of care.    If you have questions, please do not hesitate to call me. I look forward to following Mine Wilkins along with you.    Sincerely,    Elliott Mendez PA-C    Enclosure  CC:  No Recipients    If you would like to receive this communication electronically, please contact [email or web address] or [phone] to request more information on [facility] Link access.    For providers and/or their staff who would like to refer a patient to [facility], please contact us through our one-stop-shop provider referral line, [facility], at [phone].    If you feel you have received this communication in error or would no longer like to receive these types of communications, please e-mail [email or web address]

## 2020-07-14 ENCOUNTER — CLINICAL SUPPORT (OUTPATIENT)
Dept: REHABILITATION | Facility: HOSPITAL | Age: 85
End: 2020-07-14
Payer: MEDICARE

## 2020-07-14 DIAGNOSIS — G89.29 CHRONIC RIGHT SHOULDER PAIN: ICD-10-CM

## 2020-07-14 DIAGNOSIS — R29.898 WEAKNESS OF RIGHT UPPER EXTREMITY: ICD-10-CM

## 2020-07-14 DIAGNOSIS — M25.511 CHRONIC RIGHT SHOULDER PAIN: ICD-10-CM

## 2020-07-14 PROCEDURE — 97140 MANUAL THERAPY 1/> REGIONS: CPT | Mod: HCNC,PO

## 2020-07-14 PROCEDURE — 97161 PT EVAL LOW COMPLEX 20 MIN: CPT | Mod: HCNC,PO

## 2020-07-15 PROBLEM — R29.898 WEAKNESS OF RIGHT UPPER EXTREMITY: Status: ACTIVE | Noted: 2020-07-15

## 2020-07-15 PROBLEM — M25.511 CHRONIC RIGHT SHOULDER PAIN: Status: ACTIVE | Noted: 2020-07-15

## 2020-07-15 PROBLEM — G89.29 CHRONIC RIGHT SHOULDER PAIN: Status: ACTIVE | Noted: 2020-07-15

## 2020-07-15 NOTE — PROGRESS NOTES
[facility]  Physical Therapy Initial Evaluation    Name: Mine Wilkins  Clinic Number: [id number]    Therapy Diagnosis:   Encounter Diagnoses   Name Primary?    Chronic right shoulder pain     Weakness of right upper extremity      Physician: Elliott Mendez PA*    Physician Orders: PT Eval and Treat   Medical Diagnosis from Referral: M25.511, G89.29 (ICD-10-CM) Chronic R shoulder pain  Evaluation Date: 7/14/2020  Authorization Period Expiration: 12/31/20  Plan of Care Expiration: 9/30/20  Visit # / Visits authorized: 1/ 1    Time In: 200 pm  Time Out: 245 pm  Total Billable Time: 45 minutes. Low complex eval and MT -1    Precautions: Standard    Subjective   Date of onset: 4-5 months ago  History of current condition - Mine reports: progressive increased pain at R shoulder with decreased overall function.  Pt with full independence prior and remains independent with increased pain.  Pt outcome complicated by previous RUE fracture years ago.     Medical History:   Past Medical History:   Diagnosis Date    After-cataract of both eyes - Left Eye 10/11/2012    Anxiety     Aortic calcification 8/10/2016    Aortic valve stenosis, moderate     AR (allergic rhinitis)     Arthritis of facet joints at multiple vertebral levels 1/14/2016    Seen on lumbar MRI dated 01/14/16    Cataract     Cholelithiasis     Closed displaced intertrochanteric fracture of right femur with routine healing s/p IM nail on 5/20/2018 5/19/2018    Cystocele 12/1/2013    Esotropia, alternating - Both Eyes 10/11/2012    Essential hypertension     Gastroesophageal reflux disease without esophagitis 4/13/2018    Left ventricular diastolic dysfunction with preserved systolic function 8/10/2016    Lumbar radiculopathy 1/25/2016    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    Nondisplaced fracture of proximal end of humerus 8/7/2016    Osteoarthritis     Overweight (BMI  25.0-29.9) 4/13/2018    Pure hypercholesterolemia     Right-sided low back pain without sciatica 12/10/2015    Strabismus     Urethral hypermobility 12/1/2013       Surgical History:   Mine Wilkins  has a past surgical history that includes YAG CAP; Tonsillectomy; Partial hysterectomy; Cataract extraction (Bilateral); Skin graft; Eye surgery; Hysterectomy; Colonoscopy (2015); Lumbar epidural injection (02/04/2016); Wrist fracture surgery (Right, 2009); Adenoidectomy; Intertrochanteric hip fract. Surgery (Right, 05/20/2018); Fracture surgery (Right); and Femur fracture surgery (Right).    Medications:   Mine has a current medication list which includes the following prescription(s): acetaminophen, alendronate, amlodipine, cetirizine, cholecalciferol (vitamin d3), escitalopram oxalate, fluticasone propionate, losartan, multivit-min/iron/folic/lutein, omeprazole, oxybutynin, and pravastatin.    Allergies:   Review of patient's allergies indicates:   Allergen Reactions    Ace inhibitors      Other reaction(s): cough    Codeine      Other reaction(s): Nausea        Imaging, xray:   Narrative & Impression     EXAMINATION:  XR SHOULDER COMPLETE 2 OR MORE VIEWS RIGHT     CLINICAL HISTORY:  rt. shoulder xray;  Pain in right shoulder     FINDINGS:  Two views: There is deformity from a remote fracture of the right humeral neck.  There is DJD.  No acute fracture seen.        Electronically signed by: Jonnathan Wood MD  Date:                                            07/10/2020  Time:                                           14:30             Last Resulted: 07/10/20 14:30 Order Details View Encounter Lab and Collection Details Routing Result History            External Result Report    External Result Report   Narrative & Impression    EXAMINATION:  XR SHOULDER COMPLETE 2 OR MORE VIEWS RIGHT     CLINICAL HISTORY:  rt. shoulder xray;  Pain in right shoulder     FINDINGS:  Two views: There is deformity from a  remote fracture of the right humeral neck.  There is DJD.  No acute fracture seen.        Electronically signed by: Jonnathan Wood MD  Date:                                            07/10/2020  Time:                                           14:30            Prior Therapy: yes  Social History:  lives alone  Occupation: retired  Prior Level of Function: independent community level  Current Level of Function: same but with pain and mild difficulty    Pain:  Current 6/10, worst 10/10, best 1/10   Location: right shoulder   Description: Aching and Tight  Aggravating Factors: Lifting  Easing Factors: relaxation and pain medication    Pts goals: to be pain free and able to do whatever I need to do everyday and exercise    Objective     Observation:/Posture Rounded shoulder, Head forward and Affected scapula elevated      Cervical ROM: (measured in degrees) WNL except rotation   Degrees Quality   Flexion wnl    Right SB wnl    Left SB wnl    Right rotation 65 Pain on R   Left rotation 70 Pain on R     Shoulder Active/ Passive ROM: (measured in degrees)   Shoulder Right UE Left UE   Flexion  limited as follows: 80/100  150/160   Abduction limited as follows: 80/120 limited as follows: 150/150   ER limited as follows: 40 limited as follows: 60   IR WFLs WFLs     Sensation: Dermatomes: Intact to light touch and proprioception    Reflexes: NT    Strength: (measured in neutral spine, gradin-5 out of 5)   Cervical MMT   Flexion 4+/5   Extension 4+/5   Right Side Bend 4+/5   Left Side Bend 4+/5   Upper trap. 4+/5     Upper Extremity Strength: (grading 1-5 out of 5)      Right UE Left UE   Shoulder Flexion: 3-/5 4/5   Shoulder Abduction: 3+/5 4/5   Shoulder ER: 4-/5 4/5   Shoulder IR: 4/5 4+/5        Elbow Flexion: 4+/5 4+/5   Elbow Extension: 4/5 4+/5        Wrist flexion: 4+/5 4+/5   Wrist extension: 4+/5 4+/5     Cervical Spine Special Tests: ((+): positive; (-): negative)   Compression  neg   Distraction  neg   Ortiz  test/TOS  neg   Lateral Flexion Alar Ligament  intact   First Rib  Empty can  Speed test  neg   pos  pos         CMS Impairment/Limitation/Restriction for FOTO shoulder Survey    Therapist reviewed FOTO scores for Mine Wilkins on 7/14/2020.   FOTO documents entered into [employer] - see Media section.    Limitation Score: 55%  Category: Carrying    Current : CK = at least 40% but < 60% impaired, limited or restricted  Goal: CJ = at least 20% but < 40% impaired, limited or restricted  Discharge:          TREATMENT   Treatment Time In: 200 pm  Treatment Time Out: 245  Total Treatment time separate from Evaluation: 20 minutes    Mine received therapeutic exercises to develop strength, endurance and ROM for 5 minutes including:  One set only today for demo  Shoulder rows 3 x 10 OTB  shld ext YTB 3 x10  AROM supine flex 3 x 10  AROM supine serratus punches 3 x 10  AROM abd supine 3 x 10  shld retraction x 20    Mine received the following manual therapy techniques: Joint mobilizations, Manual traction, Myofacial release and Soft tissue Mobilization were applied to the: levator, rhomboid, biceps, triceps teres minor, pect minor for 15 minutes, including:  GH traction  Cervical traction    Home Exercises and Patient Education Provided    Education provided:   - HEP, pain management    Written Home Exercises Provided: Patient instructed to cont prior HEP.  Exercises were reviewed and Mine was able to demonstrate them prior to the end of the session.  Mine demonstrated good  understanding of the education provided.     See EMR under Patient Instructions for exercises provided next visit.    Assessment   Mine is a 85 y.o. female referred to outpatient Physical Therapy with a medical diagnosis of chronic R shoulder pain. Pt presents with R shoulder pain with decreased strength and ROM.  Pt with good insight and eager to decrease overall pain but complicated by previous fx in RUE which impaired function for  years.    Pt prognosis is Good.   Pt will benefit from skilled outpatient Physical Therapy to address the deficits stated above and in the chart below, provide pt/family education, and to maximize pt's level of independence.     Plan of care discussed with patient: Yes  Pt's spiritual, cultural and educational needs considered and patient is agreeable to the plan of care and goals as stated below:     Anticipated Barriers for therapy: prior fx of RUE    Medical Necessity is demonstrated by the following  History  Co-morbidities and personal factors that may impact the plan of care Co-morbidities:   advanced age, prior hip surgery and cardiac disease, humeral fracture    Personal Factors:   age     moderate   Examination  Body Structures and Functions, activity limitations and participation restrictions that may impact the plan of care Body Regions:   upper extremities    Body Systems:    gross symmetry  ROM  strength  gross coordinated movement    Participation Restrictions:   none    Activity limitations:   Learning and applying knowledge  no deficits    General Tasks and Commands  no deficits    Communication  No deficits    Mobility  lifting and carrying objects    Self care  no deficits    Domestic Life  shopping  cooking  doing house work (cleaning house, washing dishes, laundry)    Interactions/Relationships  no deficits    Life Areas  no deficits    Community and Social Life  recreation and leisure         low   Clinical Presentation stable and uncomplicated low   Decision Making/ Complexity Score: low     Goals:  Short Term Goals: 3 weeks   1. Pt to be Independent with HEP for increased strength, endurance and functional mobility.  2. Pt to have decreased pain 3/10 after session for increased functional mobility and tolerance.  3. Pt to increase AROM to R shoulder flexion 150 degrees for increased functional mobility.      Long Term Goals: 8 weeks   1. Pt to be Independent with pain management strategies and  verbalize 2 for increased strength, endurance and functional mobility.  2. Pt to have decreased pain 2/10 over 50% of the day for increased functional mobility and tolerance.  3. Pt to increase AROM right shoulder Abduction to 150 degrees for increased functional mobility.  4. Pt to increase activity tolerance to 1 hrs of BUEs use for household work, cleaning, cooking for without increased pain for increased overall functional activity.    Plan   Plan of care Certification: 7/14/2020 to 9/31/20    Outpatient Physical Therapy 2 times weekly for 8 weeks to include the following interventions: Cervical/Lumbar Traction, Manual Therapy, Moist Heat/ Ice, Patient Education and Therapeutic Exercise.     Yvonne Poole, PT

## 2020-07-16 NOTE — PLAN OF CARE
[facility]  Physical Therapy Initial Evaluation    Name: Mine Wilkins  Clinic Number: [id number]    Therapy Diagnosis:   Encounter Diagnoses   Name Primary?    Chronic right shoulder pain     Weakness of right upper extremity      Physician: Elliott Mendez PA*    Physician Orders: PT Eval and Treat   Medical Diagnosis from Referral: M25.511, G89.29 (ICD-10-CM) Chronic R shoulder pain  Evaluation Date: 7/14/2020  Authorization Period Expiration: 12/31/20  Plan of Care Expiration: 9/30/20  Visit # / Visits authorized: 1/ 1    Time In: 200 pm  Time Out: 245 pm  Total Billable Time: 45 minutes. Low complex eval and MT -1    Precautions: Standard    Subjective   Date of onset: 4-5 months ago  History of current condition - Mine reports: progressive increased pain at R shoulder with decreased overall function.  Pt with full independence prior and remains independent with increased pain.  Pt outcome complicated by previous RUE fracture years ago.     Medical History:   Past Medical History:   Diagnosis Date    After-cataract of both eyes - Left Eye 10/11/2012    Anxiety     Aortic calcification 8/10/2016    Aortic valve stenosis, moderate     AR (allergic rhinitis)     Arthritis of facet joints at multiple vertebral levels 1/14/2016    Seen on lumbar MRI dated 01/14/16    Cataract     Cholelithiasis     Closed displaced intertrochanteric fracture of right femur with routine healing s/p IM nail on 5/20/2018 5/19/2018    Cystocele 12/1/2013    Esotropia, alternating - Both Eyes 10/11/2012    Essential hypertension     Gastroesophageal reflux disease without esophagitis 4/13/2018    Left ventricular diastolic dysfunction with preserved systolic function 8/10/2016    Lumbar radiculopathy 1/25/2016    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    Nondisplaced fracture of proximal end of humerus 8/7/2016    Osteoarthritis     Overweight (BMI  25.0-29.9) 4/13/2018    Pure hypercholesterolemia     Right-sided low back pain without sciatica 12/10/2015    Strabismus     Urethral hypermobility 12/1/2013       Surgical History:   Mine Wilkins  has a past surgical history that includes YAG CAP; Tonsillectomy; Partial hysterectomy; Cataract extraction (Bilateral); Skin graft; Eye surgery; Hysterectomy; Colonoscopy (2015); Lumbar epidural injection (02/04/2016); Wrist fracture surgery (Right, 2009); Adenoidectomy; Intertrochanteric hip fract. Surgery (Right, 05/20/2018); Fracture surgery (Right); and Femur fracture surgery (Right).    Medications:   Mine has a current medication list which includes the following prescription(s): acetaminophen, alendronate, amlodipine, cetirizine, cholecalciferol (vitamin d3), escitalopram oxalate, fluticasone propionate, losartan, multivit-min/iron/folic/lutein, omeprazole, oxybutynin, and pravastatin.    Allergies:   Review of patient's allergies indicates:   Allergen Reactions    Ace inhibitors      Other reaction(s): cough    Codeine      Other reaction(s): Nausea        Imaging, xray:   Narrative & Impression     EXAMINATION:  XR SHOULDER COMPLETE 2 OR MORE VIEWS RIGHT     CLINICAL HISTORY:  rt. shoulder xray;  Pain in right shoulder     FINDINGS:  Two views: There is deformity from a remote fracture of the right humeral neck.  There is DJD.  No acute fracture seen.        Electronically signed by: Jonnathan Wood MD  Date:                                            07/10/2020  Time:                                           14:30             Last Resulted: 07/10/20 14:30 Order Details View Encounter Lab and Collection Details Routing Result History            External Result Report    External Result Report   Narrative & Impression    EXAMINATION:  XR SHOULDER COMPLETE 2 OR MORE VIEWS RIGHT     CLINICAL HISTORY:  rt. shoulder xray;  Pain in right shoulder     FINDINGS:  Two views: There is deformity from a  remote fracture of the right humeral neck.  There is DJD.  No acute fracture seen.        Electronically signed by: Jonnathan Wood MD  Date:                                            07/10/2020  Time:                                           14:30            Prior Therapy: yes  Social History:  lives alone  Occupation: retired  Prior Level of Function: independent community level  Current Level of Function: same but with pain and mild difficulty    Pain:  Current 6/10, worst 10/10, best 1/10   Location: right shoulder   Description: Aching and Tight  Aggravating Factors: Lifting  Easing Factors: relaxation and pain medication    Pts goals: to be pain free and able to do whatever I need to do everyday and exercise    Objective     Observation:/Posture Rounded shoulder, Head forward and Affected scapula elevated      Cervical ROM: (measured in degrees) WNL except rotation   Degrees Quality   Flexion wnl    Right SB wnl    Left SB wnl    Right rotation 65 Pain on R   Left rotation 70 Pain on R     Shoulder Active/ Passive ROM: (measured in degrees)   Shoulder Right UE Left UE   Flexion  limited as follows: 80/100  150/160   Abduction limited as follows: 80/120 limited as follows: 150/150   ER limited as follows: 40 limited as follows: 60   IR WFLs WFLs     Sensation: Dermatomes: Intact to light touch and proprioception    Reflexes: NT    Strength: (measured in neutral spine, gradin-5 out of 5)   Cervical MMT   Flexion 4+/5   Extension 4+/5   Right Side Bend 4+/5   Left Side Bend 4+/5   Upper trap. 4+/5     Upper Extremity Strength: (grading 1-5 out of 5)      Right UE Left UE   Shoulder Flexion: 3-/5 4/5   Shoulder Abduction: 3+/5 4/5   Shoulder ER: 4-/5 4/5   Shoulder IR: 4/5 4+/5        Elbow Flexion: 4+/5 4+/5   Elbow Extension: 4/5 4+/5        Wrist flexion: 4+/5 4+/5   Wrist extension: 4+/5 4+/5     Cervical Spine Special Tests: ((+): positive; (-): negative)   Compression  neg   Distraction  neg   Ortiz  test/TOS  neg   Lateral Flexion Alar Ligament  intact   First Rib  Empty can  Speed test  neg   pos  pos         CMS Impairment/Limitation/Restriction for FOTO shoulder Survey    Therapist reviewed FOTO scores for Mine Wilkins on 7/14/2020.   FOTO documents entered into [employer] - see Media section.    Limitation Score: 55%  Category: Carrying    Current : CK = at least 40% but < 60% impaired, limited or restricted  Goal: CJ = at least 20% but < 40% impaired, limited or restricted  Discharge:          TREATMENT   Treatment Time In: 200 pm  Treatment Time Out: 245  Total Treatment time separate from Evaluation: 20 minutes    Mine received therapeutic exercises to develop strength, endurance and ROM for 5 minutes including:  One set only today for demo  Shoulder rows 3 x 10 OTB  shld ext YTB 3 x10  AROM supine flex 3 x 10  AROM supine serratus punches 3 x 10  AROM abd supine 3 x 10  shld retraction x 20    Mine received the following manual therapy techniques: Joint mobilizations, Manual traction, Myofacial release and Soft tissue Mobilization were applied to the: levator, rhomboid, biceps, triceps teres minor, pect minor for 15 minutes, including:  GH traction  Cervical traction    Home Exercises and Patient Education Provided    Education provided:   - HEP, pain management    Written Home Exercises Provided: Patient instructed to cont prior HEP.  Exercises were reviewed and Mine was able to demonstrate them prior to the end of the session.  Mine demonstrated good  understanding of the education provided.     See EMR under Patient Instructions for exercises provided next visit.    Assessment   Mine is a 85 y.o. female referred to outpatient Physical Therapy with a medical diagnosis of chronic R shoulder pain. Pt presents with R shoulder pain with decreased strength and ROM.  Pt with good insight and eager to decrease overall pain but complicated by previous fx in RUE which impaired function for  years.    Pt prognosis is Good.   Pt will benefit from skilled outpatient Physical Therapy to address the deficits stated above and in the chart below, provide pt/family education, and to maximize pt's level of independence.     Plan of care discussed with patient: Yes  Pt's spiritual, cultural and educational needs considered and patient is agreeable to the plan of care and goals as stated below:     Anticipated Barriers for therapy: prior fx of RUE    Medical Necessity is demonstrated by the following  History  Co-morbidities and personal factors that may impact the plan of care Co-morbidities:   advanced age, prior hip surgery and cardiac disease, humeral fracture    Personal Factors:   age     moderate   Examination  Body Structures and Functions, activity limitations and participation restrictions that may impact the plan of care Body Regions:   upper extremities    Body Systems:    gross symmetry  ROM  strength  gross coordinated movement    Participation Restrictions:   none    Activity limitations:   Learning and applying knowledge  no deficits    General Tasks and Commands  no deficits    Communication  No deficits    Mobility  lifting and carrying objects    Self care  no deficits    Domestic Life  shopping  cooking  doing house work (cleaning house, washing dishes, laundry)    Interactions/Relationships  no deficits    Life Areas  no deficits    Community and Social Life  recreation and leisure         low   Clinical Presentation stable and uncomplicated low   Decision Making/ Complexity Score: low     Goals:  Short Term Goals: 3 weeks   1. Pt to be Independent with HEP for increased strength, endurance and functional mobility.  2. Pt to have decreased pain 3/10 after session for increased functional mobility and tolerance.  3. Pt to increase AROM to R shoulder flexion 150 degrees for increased functional mobility.      Long Term Goals: 8 weeks   1. Pt to be Independent with pain management strategies and  verbalize 2 for increased strength, endurance and functional mobility.  2. Pt to have decreased pain 2/10 over 50% of the day for increased functional mobility and tolerance.  3. Pt to increase AROM right shoulder Abduction to 150 degrees for increased functional mobility.  4. Pt to increase activity tolerance to 1 hrs of BUEs use for household work, cleaning, cooking for without increased pain for increased overall functional activity.    Plan   Plan of care Certification: 7/14/2020 to 9/31/20    Outpatient Physical Therapy 2 times weekly for 8 weeks to include the following interventions: Cervical/Lumbar Traction, Manual Therapy, Moist Heat/ Ice, Patient Education and Therapeutic Exercise.     Yvonne Poole, PT

## 2020-07-29 ENCOUNTER — CLINICAL SUPPORT (OUTPATIENT)
Dept: REHABILITATION | Facility: HOSPITAL | Age: 85
End: 2020-07-29
Payer: MEDICARE

## 2020-07-29 DIAGNOSIS — M25.511 CHRONIC RIGHT SHOULDER PAIN: ICD-10-CM

## 2020-07-29 DIAGNOSIS — G89.29 CHRONIC RIGHT SHOULDER PAIN: ICD-10-CM

## 2020-07-29 DIAGNOSIS — R29.898 WEAKNESS OF RIGHT UPPER EXTREMITY: ICD-10-CM

## 2020-07-29 PROCEDURE — 97110 THERAPEUTIC EXERCISES: CPT | Mod: HCNC,PO

## 2020-07-29 PROCEDURE — 97140 MANUAL THERAPY 1/> REGIONS: CPT | Mod: HCNC,PO

## 2020-07-29 NOTE — PROGRESS NOTES
Physical Therapy Daily Treatment Note     Name: Mine Marley  Clinic Number: [id number]    Therapy Diagnosis:   Name Primary?    Chronic right shoulder pain      Weakness of right upper extremity        Physician: Elliott Mendez PA*    Visit Date: 7/29/2020     Physician Orders: PT Eval and Treat   Medical Diagnosis from Referral: M25.511, G89.29 (ICD-10-CM) Chronic R shoulder pain  Evaluation Date: 7/14/2020  Authorization Period Expiration: 12/31/20  Plan of Care Expiration: 9/30/20  Visit # / Visits authorized: 2/ 1         Time In: 415 pm  Time Out: 500pm  Total Billable Time: 45 minutes  TE 2, MT 1    Precautions: Standard    Subjective     Pt reports: has been pain free for a week.  She was compliant with home exercise program.  Response to previous treatment: less pain overall mostly no pain  Functional change: increased activity except mopping still an issue    Pain: 0/10 pre and post session.  Location: right shoulder      Objective   Mine received therapeutic exercises to develop strength, endurance and ROM for 30 minutes including:  AROM/PROM on R abd 120/150 and R flexion 145/160  Shoulder rows 3 x 10 OTB  shld ext YTB 3 x10  AROM supine flex 3 x 10  AROM supine serratus punches 3 x 10  AROM abd supine 3 x 10  shld retraction x 20     Mine received the following manual therapy techniques: Joint mobilizations, Manual traction, Myofacial release and Soft tissue Mobilization were applied to the: levator, rhomboid, biceps, triceps teres minor, pect minor for 15 minutes, including:  GH traction  Cervical traction    Mine received hot pack for 0 min        Home Exercises Provided and Patient Education Provided     Education provided:   - HEP, pain management.    Written Home Exercises Provided: Patient instructed to cont prior HEP.  Exercises were reviewed and Mine was able to demonstrate them prior to the end of the session.  Mine demonstrated good  understanding of the education  provided.     See EMR under Patient Instructions for exercises provided prior visit.    Assessment     Pt with good insight and pain control even with reported +1 hr cleaning her house weekly.  Pt with good form needed only min cues with HEP.  Pt with no issues or pain after session.      Mine is progressing well towards her goals.   Pt prognosis is Good.     Pt will continue to benefit from skilled outpatient physical therapy to address the deficits listed in the problem list box on initial evaluation, provide pt/family education and to maximize pt's level of independence in the home and community environment.     Pt's spiritual, cultural and educational needs considered and pt agreeable to plan of care and goals.     Anticipated barriers to physical therapy: none    Goals:   Short Term Goals: 3 weeks   1. Pt to be Independent with HEP for increased strength, endurance and functional mobility. Progressing 7/29/20  2. Pt to have decreased pain 3/10 after session for increased functional mobility and tolerance. MET 7/29/20  3. Pt to increase AROM to R shoulder flexion 150 degrees for increased functional mobility. Progressing 7/29/20        Long Term Goals: 8 weeks   1. Pt to be Independent with pain management strategies and verbalize 2 for increased strength, endurance and functional mobility.  2. Pt to have decreased pain 2/10 over 50% of the day for increased functional mobility and tolerance.  3. Pt to increase AROM right shoulder Abduction to 150 degrees for increased functional mobility.  4. Pt to increase activity tolerance to 1 hrs of BUEs use for household work, cleaning, cooking for without increased pain for increased overall functional activity.    Plan     Cont with RENÉE Poole, PT

## 2020-08-03 ENCOUNTER — CLINICAL SUPPORT (OUTPATIENT)
Dept: REHABILITATION | Facility: HOSPITAL | Age: 85
End: 2020-08-03
Payer: MEDICARE

## 2020-08-03 DIAGNOSIS — R29.898 WEAKNESS OF RIGHT UPPER EXTREMITY: ICD-10-CM

## 2020-08-03 DIAGNOSIS — M25.511 CHRONIC RIGHT SHOULDER PAIN: ICD-10-CM

## 2020-08-03 DIAGNOSIS — G89.29 CHRONIC RIGHT SHOULDER PAIN: ICD-10-CM

## 2020-08-03 PROCEDURE — 97110 THERAPEUTIC EXERCISES: CPT | Mod: HCNC,PO

## 2020-08-03 PROCEDURE — 97140 MANUAL THERAPY 1/> REGIONS: CPT | Mod: HCNC,PO

## 2020-08-03 NOTE — PROGRESS NOTES
Physical Therapy Daily Treatment Note     Name: Mine Marley  Clinic Number: [id number]    Therapy Diagnosis:   Name Primary?    Chronic right shoulder pain      Weakness of right upper extremity        Physician: Elliott Mendez PA*    Visit Date: 8/3/2020     Physician Orders: PT Eval and Treat   Medical Diagnosis from Referral: M25.511, G89.29 (ICD-10-CM) Chronic R shoulder pain  Evaluation Date: 7/14/2020  Authorization Period Expiration: 12/31/20  Plan of Care Expiration: 9/30/20  Visit # / Visits authorized: 2/ 1         Time In: 155 pm  Time Out: 235 pm  Total Billable Time: 40 minutes  TE 2, MT 1    Precautions: Standard    Subjective     Pt reports: has been pain free for the weekend until she overdid it on grocery shopping yesterday eveing.   She was compliant with home exercise program.  Response to previous treatment: less pain overall mostly no pain  Functional change: increased activity except mopping still an issue    Pain: 2/10 pre and post session.  Location: right shoulder      Objective   Mine received therapeutic exercises to develop strength, endurance and ROM for 25 minutes including: only 2 sets today  AROM/AAROM on R abd 120/150 and R flexion 145/160  Shoulder rows 3 x 10 OTB  shld ext YTB 3 x10  Wall slides into flexion 2 x 10  Wall slides into abd 1 x 10 due to difficulty  AROM supine flex 3 x 10  AROM supine serratus punches 3 x 10  AROM abd supine 3 x 10  shld retraction x 20     Mine received the following manual therapy techniques: Joint mobilizations, Manual traction, Myofacial release and Soft tissue Mobilization were applied to the: levator, rhomboid, biceps, triceps teres minor, pect minor for 15 minutes, including:  GH traction  Cervical traction    Mine received hot pack for 0 min        Home Exercises Provided and Patient Education Provided     Education provided:   - HEP, pain management.    Written Home Exercises Provided: Patient instructed to cont prior  HEP.  Exercises were reviewed and Mine was able to demonstrate them prior to the end of the session.  Mine demonstrated good  understanding of the education provided.     See EMR under Patient Instructions for exercises provided prior visit.    Assessment     Pt with good insight and pain control only increased with grocery shopping which she does a big trip monthly which includes carrying in her water which increased levator and mid trap and rhomboids.  Pt with pain resolved in session and good AROM on wall.  Pt independent with HEP at this time and min cues needed for 50% of exercises for form.      Mine is progressing well towards her goals.   Pt prognosis is Good.     Pt will continue to benefit from skilled outpatient physical therapy to address the deficits listed in the problem list box on initial evaluation, provide pt/family education and to maximize pt's level of independence in the home and community environment.     Pt's spiritual, cultural and educational needs considered and pt agreeable to plan of care and goals.     Anticipated barriers to physical therapy: none    Goals:   Short Term Goals: 3 weeks   1. Pt to be Independent with HEP for increased strength, endurance and functional mobility. Progressing 8/3/20  2. Pt to have decreased pain 3/10 after session for increased functional mobility and tolerance. MET 7/29/20  3. Pt to increase AROM to R shoulder flexion 150 degrees for increased functional mobility. Progressing 8/3/20        Long Term Goals: 8 weeks   1. Pt to be Independent with pain management strategies and verbalize 2 for increased strength, endurance and functional mobility.  2. Pt to have decreased pain 2/10 over 50% of the day for increased functional mobility and tolerance.  3. Pt to increase AROM right shoulder Abduction to 150 degrees for increased functional mobility.  4. Pt to increase activity tolerance to 1 hrs of BUEs use for household work, cleaning, cooking for without  increased pain for increased overall functional activity.    Plan     Cont with POC    Yvonne Poole, PT

## 2020-08-05 ENCOUNTER — CLINICAL SUPPORT (OUTPATIENT)
Dept: REHABILITATION | Facility: HOSPITAL | Age: 85
End: 2020-08-05
Payer: MEDICARE

## 2020-08-05 DIAGNOSIS — R29.898 WEAKNESS OF RIGHT UPPER EXTREMITY: ICD-10-CM

## 2020-08-05 DIAGNOSIS — G89.29 CHRONIC RIGHT SHOULDER PAIN: ICD-10-CM

## 2020-08-05 DIAGNOSIS — M25.511 CHRONIC RIGHT SHOULDER PAIN: ICD-10-CM

## 2020-08-05 PROCEDURE — 97110 THERAPEUTIC EXERCISES: CPT | Mod: HCNC,PO

## 2020-08-05 PROCEDURE — 97140 MANUAL THERAPY 1/> REGIONS: CPT | Mod: HCNC,PO

## 2020-08-05 NOTE — ED NOTES
Pt placed on cardiac monitor, continuous pulse ox, cycling blood pressures. Side rails up x2, call bell in reach, bed in low position with brake engaged. Family at bedside. \   [facility] Dermatology Note      Dermatology Problem List:  1.Rash -tinea corporis - ketoconazole 2% cream BID x 10-14 days, KOH positive  2. SKs - back - moisturizers and OTC hydrocortisone 1% cream  3. Hx breast cancer x2, radiation to the left breast ~2000, lumpectomy of right breast 1999.    CC:   Chief Complaint   Patient presents with     Rash     left breast x 4 months. Red, not itchy or painful. Has implant in left breast. Was rx'd abx without relief. completed ultrasound and mammogram all NL.     Encounter Date: Aug 5, 2020    History of Present Illness:  Ms. Zayra Diop is a 73 year old female who presents in referral for a rash on the L breast. States she noticed a red area on the L breast about 4mo ago. Does not itch or give patient any sx. She wouldn't know it was there if she didn't see it she says. It gets more red after a shower. Started as a small round patch and has slowly grown. Has tried keflex pills, topical steroids without benefit and has tried clotrimazole/betmethasone cream on it and she used it for 4-5 days without benefit. Has had US and mammo and both were nml she reports. Referred here by Dr. Radha Puentes. Hx of breast cancer x2. Notes she has a breast implant on the left. She had a lumpectomy on the R breast and a mastectomy on the L breast. Notes radiation to the R breast ~20 years ago. Additionally notes several borwn scaly things onher back that itch periodically, especially at night. Has not tried to treat them. She is otherwise well. She has no other concerns.    Past Medical History:   Patient Active Problem List   Diagnosis     Health Care Home     Breast cancer (H)     Family history of thyroid disease     Hyperlipidemia LDL goal <130     Vitamin D deficiency disease     Advance care planning     Chronic cough     Past Medical History:   Diagnosis Date     Breast cancer (H)      Infertility, female      Premature menopause age 41     Past Surgical  History:   Procedure Laterality Date     APPENDECTOMY  1969     CATARACT IOL, RT/LT      laser treatment following     LAPAROSCOPY  1972    for infertility     LUMPECTOMY BREAST  1999    right     MASTECTOMY  6/2010    left       Social History:  Patient is a teacher    Family History:  There is no family history of skin cancer.    Medications:  Current Outpatient Medications   Medication Sig Dispense Refill     clotrimazole-betamethasone (LOTRISONE) 1-0.05 % external cream Apply topically 2 times daily (Patient not taking: Reported on 8/5/2020) 45 g 0     Allergies   Allergen Reactions     Propofol Itching     Review of Systems:  -Constitutional: The patient denies fatigue, fevers, chills, unintended weight loss, and night sweats.  -HEENT: Patient denies nonhealing oral sores.  -Skin: As above in HPI. No additional skin concerns.    Physical exam:  Vitals: LMP 11/10/1986 (Exact Date)   GEN: This is a well developed, well-nourished female in no acute distress, in a pleasant mood.    SKIN: Waist-up skin, which includes the head/face, neck, both arms, chest, back, abdomen, digits and/or nails was examined.  -There are waxy stuck on tan to brown papules on the back.  -right breast - 12 oclock position - 3x3cm light pink patch with central clearing and a more pronounced border, minimal scaling  -No other lesions of concern on areas examined.     Impression/Plan:  1. Rash - tinea corporis - has been partially treated with clotrimazole/betamethasone cream x 4-5 days, oral keflex tried also without improvement.   -KOH positive  -start ketoconazole 2% cream BID on AA plus 1cm margin x10-14 days  -Follow up for biopsy if not improved in 1mo    2. Seborrheic keratosis, symptomatic - back  -reassured benign  -for associated itching recommended patient moisturize area daily  -ok to use small amount of OTC hydrocortisone 1% cream to the itching if it persists    CC Dr. Radha Puentes and Dr. Yao on close of this  encounter.  Follow-up prn for new or changing lesions.      Staff Involved:  Staff Only  All risks, benefits and alternatives were discussed with patient.  Patient is in agreement and understands the assessment and plan.  All questions were answered.    Erika Navas PA-C, MPAS  [facility]: Phone: [phone], Fax: [phone]  [facility]: Phone: [phone],  Fax: [phone]

## 2020-08-05 NOTE — PROGRESS NOTES
Physical Therapy Daily Treatment Note     Name: Mine Marley  Clinic Number: [id number]    Therapy Diagnosis:   Name Primary?    Chronic right shoulder pain      Weakness of right upper extremity        Physician: Elliott Mendez PA*    Visit Date: 8/5/2020     Physician Orders: PT Eval and Treat   Medical Diagnosis from Referral: M25.511, G89.29 (ICD-10-CM) Chronic R shoulder pain  Evaluation Date: 7/14/2020  Authorization Period Expiration: 12/31/20  Plan of Care Expiration: 9/30/20  Visit # / Visits authorized: 2/ 1         Time In: 155 pm  Time Out: 235 pm  Total Billable Time: 40 minutes  TE 2, MT 1    Precautions: Standard    Subjective     Pt reports: has been pain free for the weekend until she overdid it on grocery shopping yesterday eveing.   She was compliant with home exercise program.  Response to previous treatment: less pain overall mostly no pain  Functional change: increased activity except mopping still an issue    Pain: 2/10 pre and post session.  Location: right shoulder      Objective   Mine received therapeutic exercises to develop strength, endurance and ROM for 25 minutes including: only 2 sets today  AROM/AAROM on R abd 120/150 and R flexion 150/160    AROM supine flex 3 x 10  With 4# dowel  AROM supine serratus punches 3 x 10 gradually progress across midline.  AROM abd supine 3 x 10  shld retraction x 20  Shoulder rows 3 x 10 OTB  shld ext YTB 3 x10    Wall slides into abd 2 x 10  Wall slides into flexion 2 x 10           Mine received the following manual therapy techniques: Joint mobilizations, Manual traction, Myofacial release and Soft tissue Mobilization were applied to the: levator, rhomboid, biceps, triceps teres minor, pect minor for 15 minutes, including:  GH traction  Cervical traction    Mine received hot pack for 0 min        Home Exercises Provided and Patient Education Provided     Education provided:   - HEP, pain management.    Written Home Exercises  Provided: Patient instructed to cont prior HEP.  Exercises were reviewed and Mine was able to demonstrate them prior to the end of the session.  Mine demonstrated good  understanding of the education provided.     See EMR under Patient Instructions for exercises provided prior visit.    Assessment     Pt with good insight and pain control and no increased pain decreased overall with increased AROM and independent with HEP.  Pt with decreased overall shoulder hiking at the wall with abd but still needed min tactile cues to keep scapular depression.       Mine is progressing well towards her goals.   Pt prognosis is Good.     Pt will continue to benefit from skilled outpatient physical therapy to address the deficits listed in the problem list box on initial evaluation, provide pt/family education and to maximize pt's level of independence in the home and community environment.     Pt's spiritual, cultural and educational needs considered and pt agreeable to plan of care and goals.     Anticipated barriers to physical therapy: none    Goals:   Short Term Goals: 3 weeks   1. Pt to be Independent with HEP for increased strength, endurance and functional mobility. MET 8/5/20  2. Pt to have decreased pain 3/10 after session for increased functional mobility and tolerance. MET 7/29/20  3. Pt to increase AROM to R shoulder flexion 150 degrees for increased functional mobility. Progressing 8/5/20        Long Term Goals: 8 weeks   1. Pt to be Independent with pain management strategies and verbalize 2 for increased strength, endurance and functional mobility.  2. Pt to have decreased pain 2/10 over 50% of the day for increased functional mobility and tolerance.  3. Pt to increase AROM right shoulder Abduction to 150 degrees for increased functional mobility.  4. Pt to increase activity tolerance to 1 hrs of BUEs use for household work, cleaning, cooking for without increased pain for increased overall functional  activity.    Plan     Cont with POC    Yvonne Poole PT

## 2020-08-10 ENCOUNTER — CLINICAL SUPPORT (OUTPATIENT)
Dept: REHABILITATION | Facility: HOSPITAL | Age: 85
End: 2020-08-10
Payer: MEDICARE

## 2020-08-10 DIAGNOSIS — M25.511 CHRONIC RIGHT SHOULDER PAIN: ICD-10-CM

## 2020-08-10 DIAGNOSIS — G89.29 CHRONIC RIGHT SHOULDER PAIN: ICD-10-CM

## 2020-08-10 DIAGNOSIS — R29.898 WEAKNESS OF RIGHT UPPER EXTREMITY: ICD-10-CM

## 2020-08-10 PROCEDURE — 97140 MANUAL THERAPY 1/> REGIONS: CPT | Mod: HCNC,PO

## 2020-08-10 PROCEDURE — 97110 THERAPEUTIC EXERCISES: CPT | Mod: HCNC,PO

## 2020-08-10 NOTE — PROGRESS NOTES
Physical Therapy Daily Treatment Note     Name: Mine Marley  Clinic Number: [id number]    Therapy Diagnosis:   Name Primary?    Chronic right shoulder pain      Weakness of right upper extremity        Physician: Elliott Mendez PA*    Visit Date: 8/10/2020     Physician Orders: PT Eval and Treat   Medical Diagnosis from Referral: M25.511, G89.29 (ICD-10-CM) Chronic R shoulder pain  Evaluation Date: 7/14/2020  Authorization Period Expiration: 12/31/20  Plan of Care Expiration: 9/30/20  Visit # / Visits authorized: 4/ 1         Time In: 205 pm  Time Out: 245 pm  Total Billable Time: 40 minutes  TE 2, MT 1    Precautions: Standard    Subjective     Pt reports: uneventful weekend and compliant with HEP .   She was compliant with home exercise program.  Response to previous treatment: less pain overall mostly no pain  Functional change: increased activity except mopping still an issue    Pain: 3/10 pre and 1 /10 post session.  Location: right shoulder      Objective   Mine received therapeutic exercises to develop strength, endurance and ROM for 25 minutes including: only 2 sets today  AROM/AAROM on R abd 90/150 and R flexion 120/160    AROM supine flex 3 x 10  With 4# dowel  AROM supine serratus punches 3 x 10 gradually progress across midline 2#    sidelying  AROM abd  3 x 10  AROM ER 2 x 10 2#  shld retraction x 20  Shoulder rows 3 x 10 OTB  shld ext YTB 3 x10    Wall slides into abd 2 x 10  Wall slides into flexion 2 x 10           Mine received the following manual therapy techniques: Joint mobilizations, Manual traction, Myofacial release and Soft tissue Mobilization were applied to the: levator, rhomboid, biceps, triceps teres minor, pect minor for 15 minutes, including:  GH traction  Cervical traction    Mine received hot pack for 0 min        Home Exercises Provided and Patient Education Provided     Education provided:   - HEP, pain management.    Written Home Exercises Provided: Patient  instructed to cont prior HEP.  Exercises were reviewed and Mine was able to demonstrate them prior to the end of the session.  Mine demonstrated good  understanding of the education provided.     See EMR under Patient Instructions for exercises provided prior visit.    Assessment     Pt with good insight and pain control and no increased pain decreased overall with increased AAROM with wall motion but still minimal changes with AROM. Pt with increased guarding at levator and teres minor but overall pain well controlled. Pt tolerated increased workload with exercises today.      Mine is progressing well towards her goals.   Pt prognosis is Good.     Pt will continue to benefit from skilled outpatient physical therapy to address the deficits listed in the problem list box on initial evaluation, provide pt/family education and to maximize pt's level of independence in the home and community environment.     Pt's spiritual, cultural and educational needs considered and pt agreeable to plan of care and goals.     Anticipated barriers to physical therapy: none    Goals:   Short Term Goals: 3 weeks   1. Pt to be Independent with HEP for increased strength, endurance and functional mobility. MET 8/5/20  2. Pt to have decreased pain 3/10 after session for increased functional mobility and tolerance. MET 7/29/20  3. Pt to increase AROM to R shoulder flexion 150 degrees for increased functional mobility. Progressing 8/10/20        Long Term Goals: 8 weeks   1. Pt to be Independent with pain management strategies and verbalize 2 for increased strength, endurance and functional mobility. Progressing 8/10/20  2. Pt to have decreased pain 2/10 over 50% of the day for increased functional mobility and tolerance. Progressing 8/10/20  3. Pt to increase AROM right shoulder Abduction to 150 degrees for increased functional mobility.  4. Pt to increase activity tolerance to 1 hrs of BUEs use for household work, cleaning, cooking for  without increased pain for increased overall functional activity.    Plan     Cont with POC    Yvonne Poole, PT

## 2020-08-12 ENCOUNTER — CLINICAL SUPPORT (OUTPATIENT)
Dept: REHABILITATION | Facility: HOSPITAL | Age: 85
End: 2020-08-12
Payer: MEDICARE

## 2020-08-12 DIAGNOSIS — G89.29 CHRONIC RIGHT SHOULDER PAIN: ICD-10-CM

## 2020-08-12 DIAGNOSIS — M25.511 CHRONIC RIGHT SHOULDER PAIN: ICD-10-CM

## 2020-08-12 DIAGNOSIS — R29.898 WEAKNESS OF RIGHT UPPER EXTREMITY: ICD-10-CM

## 2020-08-12 PROCEDURE — 97140 MANUAL THERAPY 1/> REGIONS: CPT | Mod: HCNC,PO

## 2020-08-12 PROCEDURE — 97110 THERAPEUTIC EXERCISES: CPT | Mod: HCNC,PO

## 2020-08-12 NOTE — PROGRESS NOTES
Physical Therapy Daily Treatment Note     Name: Mine Marley  Clinic Number: [id number]    Therapy Diagnosis:   Name Primary?    Chronic right shoulder pain      Weakness of right upper extremity        Physician: Elliott Mendez PA*    Visit Date: 8/12/2020     Physician Orders: PT Eval and Treat   Medical Diagnosis from Referral: M25.511, G89.29 (ICD-10-CM) Chronic R shoulder pain  Evaluation Date: 7/14/2020  Authorization Period Expiration: 12/31/20  Plan of Care Expiration: 9/30/20  Visit # / Visits authorized: 5/ 1         Time In: 200 pm  Time Out: 245 pm  Total Billable Time: 45 minutes  TE 2, MT 1    Precautions: Standard    Subjective     Pt reports: uneventful weekend and compliant with HEP .   She was compliant with home exercise program.  Response to previous treatment: less pain overall mostly no pain  Functional change: increased activity except mopping still an issue    Pain: 3/10 pre and 1 /10 post session.  Location: right shoulder      Objective   Mine received therapeutic exercises to develop strength, endurance and ROM for 30 minutes including: 3  sets today  AROM/AAROM on R abd 90/150 and R flexion 120/160    HOB at 30 deg:  AROM supine flex 3 x 10  With 4# dowel  AROM supine serratus punches 3 x 10 gradually progress across midline 2#    sidelying  AROM abd  3 x 10  AROM ER 2 x 10 2#    Standing   shld retraction x 20  Shoulder rows 3 x 10 OTB  shld ext YTB 3 x10    Wall slides into abd 2 x 10  Wall slides into flexion 2 x 10           Mine received the following manual therapy techniques: Joint mobilizations, Manual traction, Myofacial release and Soft tissue Mobilization were applied to the: levator, rhomboid, biceps, triceps teres minor, pect minor for 15 minutes, including:  GH traction  Cervical traction    Mine received hot pack for 0 min        Home Exercises Provided and Patient Education Provided     Education provided:   - HEP, pain management.    Written Home  Exercises Provided: Patient instructed to cont prior HEP.  Exercises were reviewed and Mine was able to demonstrate them prior to the end of the session.  Mine demonstrated good  understanding of the education provided.     See EMR under Patient Instructions for exercises provided prior visit.    Assessment     Pt with good insight and pain control and no increased pain decreased overall with increased AAROM with wall motion but still minimal changes with AROM. Pt with decreased guarding at levator and teres minor but overall pain well controlled. Pt tolerated increased workload with exercises today with HOB elevated.       Mine is progressing well towards her goals.   Pt prognosis is Good.     Pt will continue to benefit from skilled outpatient physical therapy to address the deficits listed in the problem list box on initial evaluation, provide pt/family education and to maximize pt's level of independence in the home and community environment.     Pt's spiritual, cultural and educational needs considered and pt agreeable to plan of care and goals.     Anticipated barriers to physical therapy: none    Goals:   Short Term Goals: 3 weeks   1. Pt to be Independent with HEP for increased strength, endurance and functional mobility. MET 8/5/20  2. Pt to have decreased pain 3/10 after session for increased functional mobility and tolerance. MET 7/29/20  3. Pt to increase AROM to R shoulder flexion 150 degrees for increased functional mobility. Progressing 8/12/20        Long Term Goals: 8 weeks   1. Pt to be Independent with pain management strategies and verbalize 2 for increased strength, endurance and functional mobility. Progressing 8/10/20  2. Pt to have decreased pain 2/10 over 50% of the day for increased functional mobility and tolerance. Progressing 8/12/20  3. Pt to increase AROM right shoulder Abduction to 150 degrees for increased functional mobility.  4. Pt to increase activity tolerance to 1 hrs of BUEs  use for household work, cleaning, cooking for without increased pain for increased overall functional activity.    Plan     Cont with POC    Yvonne Poole, PT

## 2020-08-17 ENCOUNTER — CLINICAL SUPPORT (OUTPATIENT)
Dept: REHABILITATION | Facility: HOSPITAL | Age: 85
End: 2020-08-17
Payer: MEDICARE

## 2020-08-17 DIAGNOSIS — G89.29 CHRONIC RIGHT SHOULDER PAIN: ICD-10-CM

## 2020-08-17 DIAGNOSIS — R29.898 WEAKNESS OF RIGHT UPPER EXTREMITY: ICD-10-CM

## 2020-08-17 DIAGNOSIS — M25.511 CHRONIC RIGHT SHOULDER PAIN: ICD-10-CM

## 2020-08-17 PROCEDURE — 97140 MANUAL THERAPY 1/> REGIONS: CPT | Mod: HCNC,PO

## 2020-08-17 PROCEDURE — 97110 THERAPEUTIC EXERCISES: CPT | Mod: HCNC,PO

## 2020-08-17 NOTE — PROGRESS NOTES
Physical Therapy Daily Treatment Note     Name: Mine Marley  Clinic Number: [id number]    Therapy Diagnosis:   Name Primary?    Chronic right shoulder pain      Weakness of right upper extremity        Physician: Elliott Mendez PA*    Visit Date: 8/17/2020     Physician Orders: PT Eval and Treat   Medical Diagnosis from Referral: M25.511, G89.29 (ICD-10-CM) Chronic R shoulder pain  Evaluation Date: 7/14/2020  Authorization Period Expiration: 12/31/20  Plan of Care Expiration: 9/30/20  Visit # / Visits authorized: 5/ 1         Time In: 200 pm  Time Out: 240 pm  Total Billable Time: 40 minutes  TE 2, MT 1    Precautions: Standard    Subjective     Pt reports: uneventful weekend and compliant with HEP .   She was compliant with home exercise program.  Response to previous treatment: less pain overall mostly no pain  Functional change: increased activity except mopping still an issue    Pain: 0/10 pre and  0 /10 post session.  Location: right shoulder      Objective   Mine received therapeutic exercises to develop strength, endurance and ROM for 30 minutes including: 3  sets today  AROM/AAROM on R abd 110/150 and R flexion 135/160    HOB at 30 deg:  AROM supine flex 3 x 10  With 4# to 6# dowel  AROM supine serratus punches 3 x 10 gradually progress across midline 2#    sidelying  AROM abd  3 x 10 2#  AROM ER 2 x 10 2#    Standing   shld retraction x 20  Shoulder rows 3 x 10 GTB  shld ext OTB 3 x10    Wall slides into abd 2 x 10  Wall slides into flexion 2 x 10    Supine scap stabilization large circles CW and CCW 2 x 10         Mine received the following manual therapy techniques: Joint mobilizations, Manual traction, Myofacial release and Soft tissue Mobilization were applied to the: levator, rhomboid, biceps, triceps teres minor, pect minor for 10 minutes, including:  GH traction  Cervical traction    Mine received hot pack for 0 min        Home Exercises Provided and Patient Education Provided      Education provided:   - HEP, pain management.    Written Home Exercises Provided: Patient instructed to cont prior HEP.  Exercises were reviewed and Mine was able to demonstrate them prior to the end of the session.  Mine demonstrated good  understanding of the education provided.     See EMR under Patient Instructions for exercises provided prior visit.    Assessment     Pt with good insight and pain control and no pain for a few days now and increased AROM with exercise and in session.  Pt with good pain control and mechanics and with pain free ROM and progression with increased scapular stabilization exercise and increased tolerance.     Mine is progressing well towards her goals.   Pt prognosis is Good.     Pt will continue to benefit from skilled outpatient physical therapy to address the deficits listed in the problem list box on initial evaluation, provide pt/family education and to maximize pt's level of independence in the home and community environment.     Pt's spiritual, cultural and educational needs considered and pt agreeable to plan of care and goals.     Anticipated barriers to physical therapy: none    Goals:   Short Term Goals: 3 weeks   1. Pt to be Independent with HEP for increased strength, endurance and functional mobility. MET 8/5/20  2. Pt to have decreased pain 3/10 after session for increased functional mobility and tolerance. MET 7/29/20  3. Pt to increase AROM to R shoulder flexion 150 degrees for increased functional mobility. Progressing 8/17/20        Long Term Goals: 8 weeks   1. Pt to be Independent with pain management strategies and verbalize 2 for increased strength, endurance and functional mobility. Progressing 8/10/20  2. Pt to have decreased pain 2/10 over 50% of the day for increased functional mobility and tolerance. MET 8/17/20  3. Pt to increase AROM right shoulder Abduction to 150 degrees for increased functional mobility.Progressing 8/17/20  4. Pt to increase  activity tolerance to 1 hrs of BUEs use for household work, cleaning, cooking for without increased pain for increased overall functional activity.    Plan     Cont with POC    Yvonne Poole, PT

## 2020-08-19 ENCOUNTER — CLINICAL SUPPORT (OUTPATIENT)
Dept: REHABILITATION | Facility: HOSPITAL | Age: 85
End: 2020-08-19
Payer: MEDICARE

## 2020-08-19 DIAGNOSIS — G89.29 CHRONIC RIGHT SHOULDER PAIN: ICD-10-CM

## 2020-08-19 DIAGNOSIS — M25.511 CHRONIC RIGHT SHOULDER PAIN: ICD-10-CM

## 2020-08-19 DIAGNOSIS — R29.898 WEAKNESS OF RIGHT UPPER EXTREMITY: ICD-10-CM

## 2020-08-19 PROCEDURE — 97140 MANUAL THERAPY 1/> REGIONS: CPT | Mod: HCNC,PO

## 2020-08-19 PROCEDURE — 97110 THERAPEUTIC EXERCISES: CPT | Mod: HCNC,PO

## 2020-08-19 NOTE — PROGRESS NOTES
Physical Therapy Daily Treatment Note     Name: Mine Marley  Clinic Number: [id number]    Therapy Diagnosis:   Name Primary?    Chronic right shoulder pain      Weakness of right upper extremity        Physician: Elliott Mendez PA*    Visit Date: 8/19/2020     Physician Orders: PT Eval and Treat   Medical Diagnosis from Referral: M25.511, G89.29 (ICD-10-CM) Chronic R shoulder pain  Evaluation Date: 7/14/2020  Authorization Period Expiration: 12/31/20  Plan of Care Expiration: 9/30/20  Visit # / Visits authorized: 5/ 1         Time In: 250 pm  Time Out: 330 pm  Total Billable Time: 40 minutes  TE 2, MT 1    Precautions: Standard    Subjective     Pt reports: uneventful weekend and compliant with HEP .   She was compliant with home exercise program.  Response to previous treatment: less pain overall mostly no pain  Functional change: increased activity except mopping still an issue    Pain: 0/10 pre and  0 /10 post session.  Location: right shoulder      Objective   Mine received therapeutic exercises to develop strength, endurance and ROM for 30 minutes including: 3  sets today  AROM/AAROM on R abd 110/150 and R flexion 135/160    HOB at 30 deg:  AROM supine flex 3 x 10  With 4# to 6# dowel  AROM supine serratus punches 3 x 10 gradually progress across midline 2#    sidelying  AROM abd  3 x 10 2#  AROM ER 2 x 10 2#    Standing   shld retraction x 20  Shoulder rows 3 x 10 GTB  shld ext OTB 3 x10    Wall slides into abd 2 x 10  Wall slides into flexion 2 x 10    Supine scap stabilization large circles CW and CCW 2 x 10         Mine received the following manual therapy techniques: Joint mobilizations, Manual traction, Myofacial release and Soft tissue Mobilization were applied to the: levator, rhomboid, biceps, triceps teres minor, pect minor for 10 minutes, including:  GH traction  Cervical traction    Mine received hot pack for 0 min        Home Exercises Provided and Patient Education Provided      Education provided:   - HEP, pain management.    Written Home Exercises Provided: Patient instructed to cont prior HEP.  Exercises were reviewed and Mine was able to demonstrate them prior to the end of the session.  Mine demonstrated good  understanding of the education provided.     See EMR under Patient Instructions for exercises provided prior visit.    Assessment     Pt with good insight and pain control and no pain for a few days now and increased AROM and then verbalized and demonstrated 3 strategies to avoid pain.  Pt with good insight and increased AROM in sidelying and supine.  Pain well controlled but teres minor continues to limit abd.      Mine is progressing well towards her goals.   Pt prognosis is Good.     Pt will continue to benefit from skilled outpatient physical therapy to address the deficits listed in the problem list box on initial evaluation, provide pt/family education and to maximize pt's level of independence in the home and community environment.     Pt's spiritual, cultural and educational needs considered and pt agreeable to plan of care and goals.     Anticipated barriers to physical therapy: none    Goals:   Short Term Goals: 3 weeks   1. Pt to be Independent with HEP for increased strength, endurance and functional mobility. MET 8/5/20  2. Pt to have decreased pain 3/10 after session for increased functional mobility and tolerance. MET 7/29/20  3. Pt to increase AROM to R shoulder flexion 150 degrees for increased functional mobility. Progressing 8/17/20        Long Term Goals: 8 weeks   1. Pt to be Independent with pain management strategies and verbalize 2 for increased strength, endurance and functional mobility. MET 8/19/20  2. Pt to have decreased pain 2/10 over 50% of the day for increased functional mobility and tolerance. MET 8/17/20  3. Pt to increase AROM right shoulder Abduction to 150 degrees for increased functional mobility.Progressing 8/19/20  4. Pt to increase  activity tolerance to 1 hrs of BUEs use for household work, cleaning, cooking for without increased pain for increased overall functional activity.    Plan     Cont with POC    Yvonne Poole, PT

## 2020-08-28 NOTE — PROGRESS NOTES
Physical Therapy Daily Treatment Note     Name: Mine Marely  Clinic Number: [id number]    Therapy Diagnosis:   Name Primary?    Chronic right shoulder pain      Weakness of right upper extremity        Physician: Elliott Mendez PA*    Visit Date: 9/1/2020     Physician Orders: PT Eval and Treat   Medical Diagnosis from Referral: M25.511, G89.29 (ICD-10-CM) Chronic R shoulder pain  Evaluation Date: 7/14/2020  Authorization Period Expiration: 12/31/20  Plan of Care Expiration: 9/30/20  Visit # / Visits authorized: 6/ 1         Time In: 2:00 pm  Time Out: 2:45 pm  Total Billable Time: 40 minutes  TE 2, MT 1    Precautions: Standard    Subjective     Pt reports: she is doing alright. Occasionally she overdoes it, but not terrible.   She was compliant with home exercise program.  Response to previous treatment: less pain overall mostly no pain  Functional change: increased activity except mopping still an issue    Pain: 0/10 pre and  0 /10 post session.  Location: right shoulder      Objective   Mine received therapeutic exercises to develop strength, endurance and ROM for 30 minutes including: 3 sets today  AROM/AAROM on R abd 110/150 and R flexion 135/160    HOB at 30 deg:  AROM supine flex 3 x 10  With 4# to 6# dowel  AROM supine serratus punches 3 x 10 gradually progress across midline 2#    sidelying  AROM abd  3 x 10   AROM ER 2 x 10 2#    Standing   shld retraction x 20  Shoulder rows 3 x 10 GTB  shld ext OTB 3 x10    Wall slides into abd 2 x 10  Wall slides into flexion 2 x 10    Supine scap stabilization large circles CW and CCW 2 x 10         Mine received the following manual therapy techniques: Joint mobilizations, Manual traction, Myofacial release and Soft tissue Mobilization were applied to the: levator, rhomboid, biceps, triceps teres minor, pect minor for 10 minutes, including:  GH traction  Cervical traction    Mine received hot pack for 0 min        Home Exercises Provided and  Patient Education Provided     Education provided:   - HEP, pain management.    Written Home Exercises Provided: Patient instructed to cont prior HEP.  Exercises were reviewed and Mine was able to demonstrate them prior to the end of the session.  Mine demonstrated good  understanding of the education provided.     See EMR under Patient Instructions for exercises provided prior visit.    Assessment     Patient tolerated treatment well with no increase in pain or discomfort noted. Mine requires constant verbal and tactile cueing for proper form during serratus punches. Right shoulder hiking still present during active range of motion against gravity.     Mine is progressing well towards her goals.   Pt prognosis is Good.     Pt will continue to benefit from skilled outpatient physical therapy to address the deficits listed in the problem list box on initial evaluation, provide pt/family education and to maximize pt's level of independence in the home and community environment.     Pt's spiritual, cultural and educational needs considered and pt agreeable to plan of care and goals.     Anticipated barriers to physical therapy: none    Goals:   Short Term Goals: 3 weeks   1. Pt to be Independent with HEP for increased strength, endurance and functional mobility. MET 8/5/20  2. Pt to have decreased pain 3/10 after session for increased functional mobility and tolerance. MET 7/29/20  3. Pt to increase AROM to R shoulder flexion 150 degrees for increased functional mobility. Progressing 8/17/20        Long Term Goals: 8 weeks   1. Pt to be Independent with pain management strategies and verbalize 2 for increased strength, endurance and functional mobility. MET 8/19/20  2. Pt to have decreased pain 2/10 over 50% of the day for increased functional mobility and tolerance. MET 8/17/20  3. Pt to increase AROM right shoulder Abduction to 150 degrees for increased functional mobility.Progressing 8/19/20  4. Pt to increase  activity tolerance to 1 hrs of BUEs use for household work, cleaning, cooking for without increased pain for increased overall functional activity.    Plan     Cont with POC    Cira Abel, PTA

## 2020-09-01 ENCOUNTER — CLINICAL SUPPORT (OUTPATIENT)
Dept: REHABILITATION | Facility: HOSPITAL | Age: 85
End: 2020-09-01
Payer: MEDICARE

## 2020-09-01 DIAGNOSIS — G89.29 CHRONIC RIGHT SHOULDER PAIN: ICD-10-CM

## 2020-09-01 DIAGNOSIS — M25.511 CHRONIC RIGHT SHOULDER PAIN: ICD-10-CM

## 2020-09-01 DIAGNOSIS — R29.898 WEAKNESS OF RIGHT UPPER EXTREMITY: ICD-10-CM

## 2020-09-01 PROCEDURE — 97110 THERAPEUTIC EXERCISES: CPT | Mod: HCNC,PO,CQ

## 2020-09-01 PROCEDURE — 97140 MANUAL THERAPY 1/> REGIONS: CPT | Mod: HCNC,PO,CQ

## 2020-09-02 NOTE — PROGRESS NOTES
Physical Therapy Daily Treatment Note     Name: Mine Marley  Clinic Number: [id number]    Therapy Diagnosis:   Name Primary?    Chronic right shoulder pain      Weakness of right upper extremity        Physician: Elliott Mendez PA*    Visit Date: 9/3/2020     Physician Orders: PT Eval and Treat   Medical Diagnosis from Referral: M25.511, G89.29 (ICD-10-CM) Chronic R shoulder pain  Evaluation Date: 7/14/2020  Authorization Period Expiration: 12/31/20  Plan of Care Expiration: 9/30/20  Visit # / Visits authorized: 7/ 20         Time In: 2:15 pm  Time Out: 3:00 pm  Total Billable Time: 40 minutes  TE 2, MT 1    Precautions: Standard    Subjective     Pt reports: doing about the same.   She was compliant with home exercise program.  Response to previous treatment: less pain overall mostly no pain  Functional change: increased activity except mopping still an issue    Pain: 0/10 pre and  0 /10 post session.  Location: right shoulder      Objective   Mine received therapeutic exercises to develop strength, endurance and ROM for 30 minutes including: 3 sets today  AROM/AAROM on R abd 110/150 and R flexion 135/160    HOB at 30 deg:  AROM supine flex 3 x 10  With 4# to 6# dowel  AROM supine serratus punches 3 x 10 gradually progress across midline 2#    sidelying  AROM abd  3 x 10   AROM ER 2 x 10 2#    Standing   shld retraction x 20  Shoulder rows 3 x 10 GTB  shld ext OTB 3 x10    Wall slides into abd 2 x 10  Wall slides into flexion 2 x 10    Supine scap stabilization large circles CW and CCW 2 x 10         Mine received the following manual therapy techniques: Joint mobilizations, Manual traction, Myofacial release and Soft tissue Mobilization were applied to the: levator, rhomboid, biceps, triceps teres minor, pect minor for 10 minutes, including:  GH traction  Cervical traction    Mine received hot pack for 0 min        Home Exercises Provided and Patient Education Provided     Education provided:    - HEP, pain management.    Written Home Exercises Provided: Patient instructed to cont prior HEP.  Exercises were reviewed and Mine was able to demonstrate them prior to the end of the session.  Mine demonstrated good  understanding of the education provided.     See EMR under Patient Instructions for exercises provided prior visit.    Assessment     Mine requires constant verbal and tactile cueing for proper form during serratus facilitation. Right shoulder hiking present during shoulder flexion and abduction. Good response to manual for posterior shoulder complex to improve range of motion.     Mine is progressing well towards her goals.   Pt prognosis is Good.     Pt will continue to benefit from skilled outpatient physical therapy to address the deficits listed in the problem list box on initial evaluation, provide pt/family education and to maximize pt's level of independence in the home and community environment.     Pt's spiritual, cultural and educational needs considered and pt agreeable to plan of care and goals.     Anticipated barriers to physical therapy: none    Goals:   Short Term Goals: 3 weeks   1. Pt to be Independent with HEP for increased strength, endurance and functional mobility. MET 8/5/20  2. Pt to have decreased pain 3/10 after session for increased functional mobility and tolerance. MET 7/29/20  3. Pt to increase AROM to R shoulder flexion 150 degrees for increased functional mobility. Progressing 8/17/20        Long Term Goals: 8 weeks   1. Pt to be Independent with pain management strategies and verbalize 2 for increased strength, endurance and functional mobility. MET 8/19/20  2. Pt to have decreased pain 2/10 over 50% of the day for increased functional mobility and tolerance. MET 8/17/20  3. Pt to increase AROM right shoulder Abduction to 150 degrees for increased functional mobility.Progressing 8/19/20  4. Pt to increase activity tolerance to 1 hrs of BUEs use for household  work, cleaning, cooking for without increased pain for increased overall functional activity.    Plan     Cont with POC    Cira Abel, PTA

## 2020-09-03 ENCOUNTER — CLINICAL SUPPORT (OUTPATIENT)
Dept: REHABILITATION | Facility: HOSPITAL | Age: 85
End: 2020-09-03
Payer: MEDICARE

## 2020-09-03 DIAGNOSIS — R29.898 WEAKNESS OF RIGHT UPPER EXTREMITY: ICD-10-CM

## 2020-09-03 DIAGNOSIS — G89.29 CHRONIC RIGHT SHOULDER PAIN: ICD-10-CM

## 2020-09-03 DIAGNOSIS — M25.511 CHRONIC RIGHT SHOULDER PAIN: ICD-10-CM

## 2020-09-03 PROCEDURE — 97110 THERAPEUTIC EXERCISES: CPT | Mod: HCNC,PO,CQ

## 2020-09-03 PROCEDURE — 97140 MANUAL THERAPY 1/> REGIONS: CPT | Mod: HCNC,PO,CQ

## 2020-09-08 ENCOUNTER — CLINICAL SUPPORT (OUTPATIENT)
Dept: REHABILITATION | Facility: HOSPITAL | Age: 85
End: 2020-09-08
Payer: MEDICARE

## 2020-09-08 DIAGNOSIS — G89.29 CHRONIC RIGHT SHOULDER PAIN: ICD-10-CM

## 2020-09-08 DIAGNOSIS — R29.898 WEAKNESS OF RIGHT UPPER EXTREMITY: ICD-10-CM

## 2020-09-08 DIAGNOSIS — M25.511 CHRONIC RIGHT SHOULDER PAIN: ICD-10-CM

## 2020-09-08 PROCEDURE — 97110 THERAPEUTIC EXERCISES: CPT | Mod: HCNC,PO

## 2020-09-08 PROCEDURE — 97140 MANUAL THERAPY 1/> REGIONS: CPT | Mod: HCNC,PO

## 2020-09-08 NOTE — PROGRESS NOTES
Physical Therapy Daily Treatment Note     Name: Mine Marley  Clinic Number: [id number]    Therapy Diagnosis:   Name Primary?    Chronic right shoulder pain      Weakness of right upper extremity        Physician: Elliott Mendez PA*    Visit Date: 9/8/2020     Physician Orders: PT Eval and Treat   Medical Diagnosis from Referral: M25.511, G89.29 (ICD-10-CM) Chronic R shoulder pain  Evaluation Date: 7/14/2020  Authorization Period Expiration: 12/31/20  Plan of Care Expiration: 9/30/20  Visit # / Visits authorized: 8/ 20         Time In: 2:00 pm  Time Out: 240 pm  Total Billable Time: 40 minutes  TE 2, MT 1    Precautions: Standard    Subjective     Pt reports: compliance with HEP without issues.  She was compliant with home exercise program.  Response to previous treatment: less pain overall mostly no pain  Functional change: increased activity except mopping still an issue    Pain: 0/10 pre and  0 /10 post session.  Location: right shoulder      Objective   Mine received therapeutic exercises to develop strength, endurance and ROM for 25 minutes including: 3 sets today  AROM/AAROM on R abd 120/150 and R flexion 125/160    HOB at 30 deg:  AROM supine flex 3 x 10  1-2 #  Supine scap stabilization large circles CW and CCW 2 x 10 NP    sidelying  AROM abd  3 x 10 1#  AROM ER 2 x 10 2#    Standing     Shoulder rows 3 x 10 GTB  shld ext OTB 3 x10    Wall slides into abd 2 x 10  Wall slides into flexion 3 x 10 hold for 30 secs at end of set for ROM    Sitting stretch into flexion 3 x 30 secs on table top  Sitting stretch into abd 3 x 30 secs on table top.         Mine received the following manual therapy techniques: Joint mobilizations, Manual traction, Myofacial release and Soft tissue Mobilization were applied to the: levator, teres minor, pect minor for 15 minutes, including:  GH traction  Cervical traction    Mine received hot pack for 0 min        Home Exercises Provided and Patient Education  Provided     Education provided:   - HEP, pain management.    Written Home Exercises Provided: Patient instructed to cont prior HEP.  Exercises were reviewed and Mine was able to demonstrate them prior to the end of the session.  Mine demonstrated good  understanding of the education provided.     See EMR under Patient Instructions for exercises provided prior visit.    Assessment     Mine limited mostly with ROM from teres minor guarding for stability leading to increased workload for scaption and abduction leading to supraspinatus impingement.    Mine is progressing well towards her goals.   Pt prognosis is Good.     Pt will continue to benefit from skilled outpatient physical therapy to address the deficits listed in the problem list box on initial evaluation, provide pt/family education and to maximize pt's level of independence in the home and community environment.     Pt's spiritual, cultural and educational needs considered and pt agreeable to plan of care and goals.     Anticipated barriers to physical therapy: none    Goals:   Short Term Goals: 3 weeks   1. Pt to be Independent with HEP for increased strength, endurance and functional mobility. MET 8/5/20  2. Pt to have decreased pain 3/10 after session for increased functional mobility and tolerance. MET 7/29/20  3. Pt to increase AROM to R shoulder flexion 150 degrees for increased functional mobility. Progressing 9/8/20        Long Term Goals: 8 weeks   1. Pt to be Independent with pain management strategies and verbalize 2 for increased strength, endurance and functional mobility. MET 8/19/20  2. Pt to have decreased pain 2/10 over 50% of the day for increased functional mobility and tolerance. MET 8/17/20  3. Pt to increase AROM right shoulder Abduction to 150 degrees for increased functional mobility.Progressing 9/8/20  4. Pt to increase activity tolerance to 1 hrs of BUEs use for household work, cleaning, cooking for without increased pain for  increased overall functional activity.    Plan     Cont with POC    Yvonne Poole, PT

## 2020-09-09 NOTE — PROGRESS NOTES
Physical Therapy Daily Treatment Note     Name: Mine Marley  Clinic Number: [id number]    Therapy Diagnosis:   Name Primary?    Chronic right shoulder pain      Weakness of right upper extremity        Physician: Elliott Mendez PA*    Visit Date: 9/10/2020     Physician Orders: PT Eval and Treat   Medical Diagnosis from Referral: M25.511, G89.29 (ICD-10-CM) Chronic R shoulder pain  Evaluation Date: 7/14/2020  Authorization Period Expiration: 12/31/20  Plan of Care Expiration: 9/30/20  Visit # / Visits authorized: 9/ 20         Time In: 2:15 pm  Time Out: 3:00 pm  Total Billable Time: 40 minutes  TE 2, MT 1    Precautions: Standard    Subjective     Pt reports: some neck pain   She was compliant with home exercise program.  Response to previous treatment: less pain overall mostly no pain  Functional change: increased activity except mopping still an issue    Pain: 3/10 pre and  0 /10 post session.  Location: right neck      Objective   Mine received therapeutic exercises to develop strength, endurance and ROM for 35 minutes including: 3 sets today  AROM/AAROM on R abd 120/150 and R flexion 125/160    HOB at 30 deg:  AROM supine flex 1# 2 x 10 ,  1 x 10 0#  Supine scap stabilization large circles CW and CCW 2 x 10 NP    sidelying  AROM abd  3 x 10 0#  AROM ER 2 x 10 1#    Standing     Shoulder rows 3 x 10 GTB  shld ext OTB 3 x10    Wall slides into abd 2 x 10  Wall slides into flexion 3 x 10 hold for 30 secs at end of set for ROM    Sitting stretch into flexion 3 x 30 secs on table top  Sitting stretch into abd 3 x 30 secs on table top.         Mine received the following manual therapy techniques: Joint mobilizations, Manual traction, Myofacial release and Soft tissue Mobilization were applied to the: levator, teres minor, pect minor for 10 minutes, including:  GH traction  Cervical traction    Mine received hot pack for 0 min        Home Exercises Provided and Patient Education Provided      Education provided:   - HEP, pain management.    Written Home Exercises Provided: Patient instructed to cont prior HEP.  Exercises were reviewed and Mine was able to demonstrate them prior to the end of the session.  Mine demonstrated good  understanding of the education provided.     See EMR under Patient Instructions for exercises provided prior visit.    Assessment     Patient had increased right upper trap tightness upon arrival to session. Right shoulder fatigue during treatment. Good response to manual performed. Shoulder hiking still present due to teres minor inflexibility/compensation.     Mine is progressing well towards her goals.   Pt prognosis is Good.     Pt will continue to benefit from skilled outpatient physical therapy to address the deficits listed in the problem list box on initial evaluation, provide pt/family education and to maximize pt's level of independence in the home and community environment.     Pt's spiritual, cultural and educational needs considered and pt agreeable to plan of care and goals.     Anticipated barriers to physical therapy: none    Goals:   Short Term Goals: 3 weeks   1. Pt to be Independent with HEP for increased strength, endurance and functional mobility. MET 8/5/20  2. Pt to have decreased pain 3/10 after session for increased functional mobility and tolerance. MET 7/29/20  3. Pt to increase AROM to R shoulder flexion 150 degrees for increased functional mobility. Progressing 9/8/20        Long Term Goals: 8 weeks   1. Pt to be Independent with pain management strategies and verbalize 2 for increased strength, endurance and functional mobility. MET 8/19/20  2. Pt to have decreased pain 2/10 over 50% of the day for increased functional mobility and tolerance. MET 8/17/20  3. Pt to increase AROM right shoulder Abduction to 150 degrees for increased functional mobility.Progressing 9/8/20  4. Pt to increase activity tolerance to 1 hrs of BUEs use for household  work, cleaning, cooking for without increased pain for increased overall functional activity.    Plan     Cont with POC    Cira Abel, PTA

## 2020-09-10 ENCOUNTER — CLINICAL SUPPORT (OUTPATIENT)
Dept: REHABILITATION | Facility: HOSPITAL | Age: 85
End: 2020-09-10
Payer: MEDICARE

## 2020-09-10 DIAGNOSIS — G89.29 CHRONIC RIGHT SHOULDER PAIN: ICD-10-CM

## 2020-09-10 DIAGNOSIS — M25.511 CHRONIC RIGHT SHOULDER PAIN: ICD-10-CM

## 2020-09-10 DIAGNOSIS — R29.898 WEAKNESS OF RIGHT UPPER EXTREMITY: ICD-10-CM

## 2020-09-10 PROCEDURE — 97140 MANUAL THERAPY 1/> REGIONS: CPT | Mod: HCNC,PO,CQ

## 2020-09-10 PROCEDURE — 97110 THERAPEUTIC EXERCISES: CPT | Mod: HCNC,PO,CQ

## 2020-09-17 ENCOUNTER — CLINICAL SUPPORT (OUTPATIENT)
Dept: REHABILITATION | Facility: HOSPITAL | Age: 85
End: 2020-09-17
Payer: MEDICARE

## 2020-09-17 DIAGNOSIS — R29.898 WEAKNESS OF RIGHT UPPER EXTREMITY: ICD-10-CM

## 2020-09-17 DIAGNOSIS — G89.29 CHRONIC RIGHT SHOULDER PAIN: ICD-10-CM

## 2020-09-17 DIAGNOSIS — M25.511 CHRONIC RIGHT SHOULDER PAIN: ICD-10-CM

## 2020-09-17 PROCEDURE — 97140 MANUAL THERAPY 1/> REGIONS: CPT | Mod: HCNC,PO

## 2020-09-17 PROCEDURE — 97110 THERAPEUTIC EXERCISES: CPT | Mod: HCNC,PO

## 2020-09-17 NOTE — PROGRESS NOTES
Physical Therapy Daily Treatment Note     Name: Mine Marley  Clinic Number: [id number]    Therapy Diagnosis:   Name Primary?    Chronic right shoulder pain      Weakness of right upper extremity        Physician: Elliott Mendez PA*    Visit Date: 9/17/2020     Physician Orders: PT Eval and Treat   Medical Diagnosis from Referral: M25.511, G89.29 (ICD-10-CM) Chronic R shoulder pain  Evaluation Date: 7/14/2020  Authorization Period Expiration: 12/31/20  Plan of Care Expiration: 9/30/20  Visit # / Visits authorized: 10/ 20         Time In: 140 pm  Time Out: 225 pm  Total Billable Time: 45 minutes  TE 2, MT 1    Precautions: Standard    Subjective     Pt reports: some neck pain and R shld   She was compliant with home exercise program.  Response to previous treatment: less pain overall mostly no pain  Functional change: increased activity except mopping still an issue    Pain: 3/10 pre and  0 /10 post session.  Location: right neck  > R shld    Objective   Mine received therapeutic exercises to develop strength, endurance and ROM for 35 minutes including: 3 sets today  AROM/AAROM on R abd 120/150 and R flexion 130/160 on 9/17/20    HOB at 30 deg:  AROM supine flex today 3# dowel B shld flex  Supine scap stabilization large circles CW and CCW 2 x 10 NP  shld abd 3 x 10 AAROM  shld flex 3 x 10 AAROM    sidelying  AROM abd  3 x 10 0#  AROM ER 2 x 10 1#    Standing   shld abd 3 x 10 AAROM  shld flex 3 x 10 AAROM  Shoulder rows 3 x 10 GTB NP  shld ext OTB 3 x10 NP    Wall slides into abd 2 x 10  Wall slides into flexion 3 x 10 hold for 30 secs at end of set for ROM    Sitting stretch into flexion 3 x 30 secs on table top  Sitting stretch into abd 3 x 30 secs on table top.         Mine received the following manual therapy techniques: Joint mobilizations, Manual traction, Myofacial release and Soft tissue Mobilization were applied to the: levator, teres minor, pect minor for 10 minutes, including:  GH  traction  Cervical traction    Mine received hot pack for 0 min        Home Exercises Provided and Patient Education Provided     Education provided:   - HEP, pain management.    Written Home Exercises Provided: Patient instructed to cont prior HEP.  Exercises were reviewed and Mine was able to demonstrate them prior to the end of the session.  Mine demonstrated good  understanding of the education provided.     See EMR under Patient Instructions for exercises provided prior visit.    Assessment     Patient had increased right levator tightness leading to decreased AROM and R neck pain today.  PT increased AAROM to work through her available ROM and back to basics with cues 50% of time verbal and tactile as hiking has increased pain and obstructed motion.  GH mobility is appropriate at the capsule and teres minor and pect minor calms down with MT but increases after exercise due to hiking and pain.     Mine is progressing well towards her goals.   Pt prognosis is Good.     Pt will continue to benefit from skilled outpatient physical therapy to address the deficits listed in the problem list box on initial evaluation, provide pt/family education and to maximize pt's level of independence in the home and community environment.     Pt's spiritual, cultural and educational needs considered and pt agreeable to plan of care and goals.     Anticipated barriers to physical therapy: none    Goals:   Short Term Goals: 3 weeks   1. Pt to be Independent with HEP for increased strength, endurance and functional mobility. MET 8/5/20  2. Pt to have decreased pain 3/10 after session for increased functional mobility and tolerance. MET 7/29/20  3. Pt to increase AROM to R shoulder flexion 150 degrees for increased functional mobility. Progressing 9/8/20        Long Term Goals: 8 weeks   1. Pt to be Independent with pain management strategies and verbalize 2 for increased strength, endurance and functional mobility. MET  8/19/20  2. Pt to have decreased pain 2/10 over 50% of the day for increased functional mobility and tolerance. MET 8/17/20  3. Pt to increase AROM right shoulder Abduction to 150 degrees for increased functional mobility.Progressing 9/17/20  4. Pt to increase activity tolerance to 1 hrs of BUEs use for household work, cleaning, cooking for without increased pain for increased overall functional activity.    Plan     Cont with POC    Yvonne Poole, PT

## 2020-09-22 ENCOUNTER — CLINICAL SUPPORT (OUTPATIENT)
Dept: REHABILITATION | Facility: HOSPITAL | Age: 85
End: 2020-09-22
Payer: MEDICARE

## 2020-09-22 DIAGNOSIS — M25.511 CHRONIC RIGHT SHOULDER PAIN: ICD-10-CM

## 2020-09-22 DIAGNOSIS — G89.29 CHRONIC RIGHT SHOULDER PAIN: ICD-10-CM

## 2020-09-22 DIAGNOSIS — R29.898 WEAKNESS OF RIGHT UPPER EXTREMITY: ICD-10-CM

## 2020-09-22 PROCEDURE — 97140 MANUAL THERAPY 1/> REGIONS: CPT | Mod: HCNC,PO,CQ

## 2020-09-22 PROCEDURE — 97110 THERAPEUTIC EXERCISES: CPT | Mod: HCNC,PO,CQ

## 2020-09-22 NOTE — PROGRESS NOTES
Physical Therapy Daily Treatment Note     Name: Mine Marley  Clinic Number: [id number]    Therapy Diagnosis:   Name Primary?    Chronic right shoulder pain      Weakness of right upper extremity        Physician: Elliott Mendez PA*    Visit Date: 9/22/2020     Physician Orders: PT Eval and Treat   Medical Diagnosis from Referral: M25.511, G89.29 (ICD-10-CM) Chronic R shoulder pain  Evaluation Date: 7/14/2020  Authorization Period Expiration: 12/31/20  Plan of Care Expiration: 9/30/20  Visit # / Visits authorized: 11/ 20         Time In: 2:00 pm  Time Out: 2:45 pm  Total Billable Time: 45 minutes  TE 2, MT 1    Precautions: Standard    Subjective     Pt reports: only her right shoulder is giving her trouble today.  She is doing crossword puzzles at a lower table to avoid hiking her shoulder and being more aware of her posture.   She was compliant with home exercise program.  Response to previous treatment: less pain overall mostly no pain  Functional change: increased activity except mopping still an issue    Pain: 2/10 pre and  0 /10 post session.  Location: right neck  > R shld    Objective   Mine received therapeutic exercises to develop strength, endurance and ROM for 35 minutes including: 3 sets today  AROM/AAROM on R abd 120/150 and R flexion 130/160 on 9/17/20    HOB at 30 deg:  AROM supine flex today 3# dowel B shld flex  Supine scap stabilization large circles CW and CCW 2 x 10 NP  shld abd 3 x 10 AAROM  shld flex 3 x 10 AAROM    sidelying  AROM abd  3 x 10 0#  AROM ER 2 x 10 1#    Standing   shld abd 3 x 10 AAROM  shld flex 3 x 10 AAROM  Shoulder rows 3 x 10 GTB NP  shld ext OTB 3 x10 NP    Wall slides into abd 2 x 10  Wall slides into flexion 3 x 10 hold for 30 secs at end of set for ROM    Sitting stretch into flexion 3 x 30 secs on table top  Sitting stretch into abd 3 x 30 secs on table top.         Mine received the following manual therapy techniques: Joint mobilizations, Manual  traction, Myofacial release and Soft tissue Mobilization were applied to the: levator, teres minor, pect minor for 10 minutes, including:  GH traction  Cervical traction    Mine received hot pack for 0 min        Home Exercises Provided and Patient Education Provided     Education provided:   - HEP, pain management.    Written Home Exercises Provided: Patient instructed to cont prior HEP.  Exercises were reviewed and Mine was able to demonstrate them prior to the end of the session.  Mine demonstrated good  understanding of the education provided.     See EMR under Patient Instructions for exercises provided prior visit.    Assessment     Patient arrived to session with decreased cervical pain and symptoms, discomfort near external rotator insertion. Shoulder hiking still present during shoulder flexion and abduction exercises, although patient reports it is getting better. Patient is improving with noticing proper form and posture throughout exercises and daily life.     Mine is progressing well towards her goals.   Pt prognosis is Good.     Pt will continue to benefit from skilled outpatient physical therapy to address the deficits listed in the problem list box on initial evaluation, provide pt/family education and to maximize pt's level of independence in the home and community environment.     Pt's spiritual, cultural and educational needs considered and pt agreeable to plan of care and goals.     Anticipated barriers to physical therapy: none    Goals:   Short Term Goals: 3 weeks   1. Pt to be Independent with HEP for increased strength, endurance and functional mobility. MET 8/5/20  2. Pt to have decreased pain 3/10 after session for increased functional mobility and tolerance. MET 7/29/20  3. Pt to increase AROM to R shoulder flexion 150 degrees for increased functional mobility. Progressing 9/8/20        Long Term Goals: 8 weeks   1. Pt to be Independent with pain management strategies and verbalize 2  for increased strength, endurance and functional mobility. MET 8/19/20  2. Pt to have decreased pain 2/10 over 50% of the day for increased functional mobility and tolerance. MET 8/17/20  3. Pt to increase AROM right shoulder Abduction to 150 degrees for increased functional mobility.Progressing 9/17/20  4. Pt to increase activity tolerance to 1 hrs of BUEs use for household work, cleaning, cooking for without increased pain for increased overall functional activity.    Plan     Cont with POC    Cira Abel, PTA

## 2020-09-24 ENCOUNTER — CLINICAL SUPPORT (OUTPATIENT)
Dept: REHABILITATION | Facility: HOSPITAL | Age: 85
End: 2020-09-24
Payer: MEDICARE

## 2020-09-24 DIAGNOSIS — R29.898 WEAKNESS OF RIGHT UPPER EXTREMITY: ICD-10-CM

## 2020-09-24 DIAGNOSIS — G89.29 CHRONIC RIGHT SHOULDER PAIN: ICD-10-CM

## 2020-09-24 DIAGNOSIS — M25.511 CHRONIC RIGHT SHOULDER PAIN: ICD-10-CM

## 2020-09-24 PROCEDURE — 97110 THERAPEUTIC EXERCISES: CPT | Mod: HCNC,PO

## 2020-09-24 PROCEDURE — 97140 MANUAL THERAPY 1/> REGIONS: CPT | Mod: HCNC,PO

## 2020-09-24 NOTE — PROGRESS NOTES
Physical Therapy Daily Treatment Note     Name: Mine Marley  Clinic Number: [id number]    Therapy Diagnosis:   Name Primary?    Chronic right shoulder pain      Weakness of right upper extremity        Physician: Elliott Mendez PA*    Visit Date: 9/24/2020     Physician Orders: PT Eval and Treat   Medical Diagnosis from Referral: M25.511, G89.29 (ICD-10-CM) Chronic R shoulder pain  Evaluation Date: 7/14/2020  Authorization Period Expiration: 12/31/20  Plan of Care Expiration: 9/30/20  Visit # / Visits authorized: 11/ 20         Time In: 155 pm  Time Out: 2:40 pm  Total Billable Time: 45 minutes  TE 2, MT 1    Precautions: Standard    Subjective     Pt reports: feels better with taping no issues since last visit   She was compliant with home exercise program.  Response to previous treatment: less pain overall mostly no pain  Functional change: increased activity except mopping still an issue    Pain: 2/10 pre and  0 /10 post session.  Location: right neck  > R shld    Objective   Mine received therapeutic exercises to develop strength, endurance and ROM for 35 minutes including: 3 sets today  AROM/AAROM on R abd 120/150 and R flexion 130/160 on 9/17/20    HOB at 0-30 deg:  AROM supine flex today 3# dowel B shld flex 3 x10  Supine scap stabilization large circles CW and CCW 2 x 10 NP  shld flex 3 x 10 AAROM    sidelying  AROM abd  3 x 10 0#  AROM ER 2 x 10 1#    Standing   shld abd 3 x 10 AAROM NP  shld flex 3 x 10 AAROM NP  Shoulder rows 3 x 10 GTB   shld ext OTB 3 x10     Pulley 1 min into flex  Pulley 1 min into abd    Wall slides into abd 2 x 10  Wall slides into flexion 3 x 10 hold for 30 secs at end of set for ROM    Sitting stretch into flexion 3 x 30 secs on table top  Sitting stretch into abd 3 x 30 secs on table top.    Passive prolonged with towel roll vertical between scapula and then horizontal at T4 2 min each position     Mine received the following manual therapy techniques: Joint  mobilizations, Manual traction, Myofacial release and Soft tissue Mobilization were applied to the: levator, teres minor, pect minor for 10 minutes, including:  GH traction  Cervical traction    Mine received hot pack for 0 min        Home Exercises Provided and Patient Education Provided     Education provided:   - HEP, pain management.    Written Home Exercises Provided: Patient instructed to cont prior HEP.  Exercises were reviewed and Mine was able to demonstrate them prior to the end of the session.  Mine demonstrated good  understanding of the education provided.     See EMR under Patient Instructions for exercises provided prior visit.    Assessment     Patient with good session and feedback with kinesiotape in flexion but still with early hiking on sidleying for abd even with AAROM. Pt with min cues into flexion and mod cues into abd. For session.  Pt continues to progress overall need to strengthen thru available ROM without compensation. PT to discontinue exercise as soon as she continues to compensate with fatigue.      Mine is progressing well towards her goals.   Pt prognosis is Good.     Pt will continue to benefit from skilled outpatient physical therapy to address the deficits listed in the problem list box on initial evaluation, provide pt/family education and to maximize pt's level of independence in the home and community environment.     Pt's spiritual, cultural and educational needs considered and pt agreeable to plan of care and goals.     Anticipated barriers to physical therapy: none    Goals:   Short Term Goals: 3 weeks   1. Pt to be Independent with HEP for increased strength, endurance and functional mobility. MET 8/5/20  2. Pt to have decreased pain 3/10 after session for increased functional mobility and tolerance. MET 7/29/20  3. Pt to increase AROM to R shoulder flexion 150 degrees for increased functional mobility. Progressing 9/8/20        Long Term Goals: 8 weeks   1. Pt to be  Independent with pain management strategies and verbalize 2 for increased strength, endurance and functional mobility. MET 8/19/20  2. Pt to have decreased pain 2/10 over 50% of the day for increased functional mobility and tolerance. MET 8/17/20  3. Pt to increase AROM right shoulder Abduction to 150 degrees for increased functional mobility.Progressing 9/17/20  4. Pt to increase activity tolerance to 1 hrs of BUEs use for household work, cleaning, cooking for without increased pain for increased overall functional activity.    Plan     Cont with POC    Yvonen Poole, PT

## 2020-10-05 ENCOUNTER — CLINICAL SUPPORT (OUTPATIENT)
Dept: REHABILITATION | Facility: HOSPITAL | Age: 85
End: 2020-10-05
Payer: MEDICARE

## 2020-10-05 DIAGNOSIS — R29.898 WEAKNESS OF RIGHT UPPER EXTREMITY: ICD-10-CM

## 2020-10-05 DIAGNOSIS — G89.29 CHRONIC RIGHT SHOULDER PAIN: ICD-10-CM

## 2020-10-05 DIAGNOSIS — M25.511 CHRONIC RIGHT SHOULDER PAIN: ICD-10-CM

## 2020-10-05 PROCEDURE — 97110 THERAPEUTIC EXERCISES: CPT | Mod: HCNC,PO

## 2020-10-05 PROCEDURE — 97140 MANUAL THERAPY 1/> REGIONS: CPT | Mod: HCNC,PO

## 2020-10-05 NOTE — PROGRESS NOTES
Physical Therapy Daily Treatment Note     Name: Mine Marley  Clinic Number: [id number]    Therapy Diagnosis:   Name Primary?    Chronic right shoulder pain      Weakness of right upper extremity        Physician: Elliott Mendez PA*    Visit Date: 10/5/2020     Physician Orders: PT Eval and Treat   Medical Diagnosis from Referral: M25.511, G89.29 (ICD-10-CM) Chronic R shoulder pain  Evaluation Date: 7/14/2020  Authorization Period Expiration: 12/31/20  Plan of Care Expiration: 9/30/20  Visit # / Visits authorized: 11/ 20         Time In: 200 pm  Time Out: 2:40 pm  Total Billable Time: 40 minutes  TE 2, MT 1    Precautions: Standard    Subjective     Pt reports: feels better with taping no issues and it helped with awarenss.   She was compliant with home exercise program.  Response to previous treatment: less pain overall mostly no pain  Functional change: increased activity except mopping still an issue    Pain: 2/10 pre and  0 /10 post session.  Location: right neck  > R shld    Objective   Mine received therapeutic exercises to develop strength, endurance and ROM for 35 minutes including: 3 sets today  AROM/AAROM on R abd 120/150 and R flexion 130/160 on 10/05/20    HOB at 0-30 deg:  AROM supine flex today 3# dowel B shld flex 3 x10  Supine scap stabilization large circles CW and CCW 2 x 10 NP  shld flex 3 x 10 AAROM    sidelying  AROM abd  3 x 10 0#  AROM ER 2 x 10 1#      Standing   shld abd 3 x 10 AAROM NP  shld flex 3 x 10 AAROM NP  Shoulder rows 3 x 10 GTB   shld ext OTB 3 x10     Pulley 1 min into flex  Pulley 1 min into abd    Wall slides into abd 2 x 10  Wall slides into flexion 3 x 10 hold for 30 secs at end of set for ROM    Sitting stretch into flexion 4 x 20 secs on table top  Sitting stretch into abd 4 x 20 secs on table top.    Passive prolonged with towel roll vertical between scapula and then horizontal at T4 2 min each position     Mine received the following manual therapy  techniques: Joint mobilizations, Manual traction, Myofacial release and Soft tissue Mobilization were applied to the: levator, teres minor, pect minor for 10 minutes, including:  GH traction  Cervical traction    Mien received hot pack for 0 min        Home Exercises Provided and Patient Education Provided     Education provided:   - HEP, pain management.    Written Home Exercises Provided: Patient instructed to cont prior HEP.  Exercises were reviewed and Mine was able to demonstrate them prior to the end of the session.  Mine demonstrated good  understanding of the education provided.     See EMR under Patient Instructions for exercises provided prior visit.    Assessment     Patient with old habits without kinesiotape today in session.  Pt with early hiking which obstructed AROM.  Pt still with good underlying PROM.  Pt with continues to progress with HEP but needed cues for hiking overall min to mod cues.     Mine is progressing well towards her goals.   Pt prognosis is Good.     Pt will continue to benefit from skilled outpatient physical therapy to address the deficits listed in the problem list box on initial evaluation, provide pt/family education and to maximize pt's level of independence in the home and community environment.     Pt's spiritual, cultural and educational needs considered and pt agreeable to plan of care and goals.     Anticipated barriers to physical therapy: none    Goals:   Short Term Goals: 3 weeks   1. Pt to be Independent with HEP for increased strength, endurance and functional mobility. MET 8/5/20  2. Pt to have decreased pain 3/10 after session for increased functional mobility and tolerance. MET 7/29/20  3. Pt to increase AROM to R shoulder flexion 150 degrees for increased functional mobility. Progressing 10/5/20        Long Term Goals: 8 weeks   1. Pt to be Independent with pain management strategies and verbalize 2 for increased strength, endurance and functional mobility.  MET 8/19/20  2. Pt to have decreased pain 2/10 over 50% of the day for increased functional mobility and tolerance. MET 8/17/20  3. Pt to increase AROM right shoulder Abduction to 150 degrees for increased functional mobility.Progressing 10/5/20  4. Pt to increase activity tolerance to 1 hrs of BUEs use for household work, cleaning, cooking for without increased pain for increased overall functional activity.    Plan     Cont with POC    Yvonne Poole, PT

## 2020-10-13 NOTE — PROGRESS NOTES
Physical Therapy Daily Treatment Note     Name: Mine Marley  Clinic Number: [id number]    Therapy Diagnosis:   Name Primary?    Chronic right shoulder pain      Weakness of right upper extremity        Physician: Elliott Mendez PA*    Visit Date: 10/14/2020     Physician Orders: PT Eval and Treat   Medical Diagnosis from Referral: M25.511, G89.29 (ICD-10-CM) Chronic R shoulder pain  Evaluation Date: 7/14/2020  Authorization Period Expiration: 01/29/21  Plan of Care Expiration: 9/30/20  Visit # / Visits authorized: 16/ 20          Time In:2:15 pm  Time Out: 3:00 pm  Total Billable Time: 45 minutes  TE 2, MT 1    Precautions: Standard    Subjective     Pt reports: she does her exercises at home and she feels more relaxed and does not hike her shoulder up as much.   She was compliant with home exercise program.  Response to previous treatment: less pain overall mostly no pain  Functional change: increased activity except mopping still an issue    Pain:0/10 pre and  0 /10 post session.  Location: right neck  > R shld    Objective   Mine received therapeutic exercises to develop strength, endurance and ROM for 30 minutes including: 3 sets today  AROM/AAROM on R abd 120/150 and R flexion 130/160 on 10/05/20    HOB at 0-30 deg:  AROM supine flex today 3# dowel B shld flex 3 x10  Supine scap stabilization large circles CW and CCW 2 x 10   shld flex 3 x 10 AAROM    sidelying  AROM abd  3 x 10 0#  AROM ER 2 x 10 1#      Standing   shld abd 3 x 10 AAROM NP  shld flex 3 x 10 AAROM NP  Shoulder rows 3 x 10 GTB   shld ext OTB 3 x10     Pulley 1 min into flex NP  Pulley 1 min into abd NP    Wall slides into abd 2 x 10  Wall slides into flexion 3 x 10 hold for 30 secs at end of set for ROM    Sitting stretch into flexion 4 x 20 secs on table top  Sitting stretch into abd 4 x 20 secs on table top.    Passive prolonged with towel roll vertical between scapula and then horizontal at T4 2 min each position  NP     Mine received the following manual therapy techniques: Joint mobilizations, Manual traction, Myofacial release and Soft tissue Mobilization were applied to the: levator, teres minor, pect minor for 15 minutes, including:  GH traction  Cervical traction    Mine received hot pack for 0 min        Home Exercises Provided and Patient Education Provided     Education provided:   - HEP, pain management.    Written Home Exercises Provided: Patient instructed to cont prior HEP.  Exercises were reviewed and Mine was able to demonstrate them prior to the end of the session.  Mine demonstrated good  understanding of the education provided.     See EMR under Patient Instructions for exercises provided prior visit.    Assessment     Patient presents with less shoulder hiking today, but did require cueing and facilitation of scap for proper position during exercises. Good tolerance to exercises today with patient progressing well with strength. Continue progressing patient as tolerated.     Mine is progressing well towards her goals.   Pt prognosis is Good.     Pt will continue to benefit from skilled outpatient physical therapy to address the deficits listed in the problem list box on initial evaluation, provide pt/family education and to maximize pt's level of independence in the home and community environment.     Pt's spiritual, cultural and educational needs considered and pt agreeable to plan of care and goals.     Anticipated barriers to physical therapy: none    Goals:   Short Term Goals: 3 weeks   1. Pt to be Independent with HEP for increased strength, endurance and functional mobility. MET 8/5/20  2. Pt to have decreased pain 3/10 after session for increased functional mobility and tolerance. MET 7/29/20  3. Pt to increase AROM to R shoulder flexion 150 degrees for increased functional mobility. Progressing 10/5/20        Long Term Goals: 8 weeks   1. Pt to be Independent with pain management strategies and  verbalize 2 for increased strength, endurance and functional mobility. MET 8/19/20  2. Pt to have decreased pain 2/10 over 50% of the day for increased functional mobility and tolerance. MET 8/17/20  3. Pt to increase AROM right shoulder Abduction to 150 degrees for increased functional mobility.Progressing 10/5/20  4. Pt to increase activity tolerance to 1 hrs of BUEs use for household work, cleaning, cooking for without increased pain for increased overall functional activity.    Plan     Cont with POC    Josefina Park, PTA

## 2020-10-14 ENCOUNTER — CLINICAL SUPPORT (OUTPATIENT)
Dept: REHABILITATION | Facility: HOSPITAL | Age: 85
End: 2020-10-14
Payer: MEDICARE

## 2020-10-14 DIAGNOSIS — R29.898 WEAKNESS OF RIGHT UPPER EXTREMITY: ICD-10-CM

## 2020-10-14 DIAGNOSIS — G89.29 CHRONIC RIGHT SHOULDER PAIN: ICD-10-CM

## 2020-10-14 DIAGNOSIS — M25.511 CHRONIC RIGHT SHOULDER PAIN: ICD-10-CM

## 2020-10-14 PROCEDURE — 97110 THERAPEUTIC EXERCISES: CPT | Mod: HCNC,PO,CQ

## 2020-10-14 PROCEDURE — 97140 MANUAL THERAPY 1/> REGIONS: CPT | Mod: HCNC,PO,CQ

## 2020-10-15 NOTE — PROGRESS NOTES
Physical Therapy Daily Treatment Note     Name: Mine Marley  Clinic Number: [id number]    Therapy Diagnosis:   Name Primary?    Chronic right shoulder pain      Weakness of right upper extremity        Physician: Elliott Mendez PA*    Visit Date: 10/16/2020     Physician Orders: PT Eval and Treat   Medical Diagnosis from Referral: M25.511, G89.29 (ICD-10-CM) Chronic R shoulder pain  Evaluation Date: 7/14/2020  Authorization Period Expiration: 01/29/21  Plan of Care Expiration: 9/30/20  Visit # / Visits authorized: 17/ 20        Time In:12:45 pm  Time Out: 1:30 pm  Total Billable Time: 45 minutes  TE 2, MT 1    Precautions: Standard    Subjective     Pt reports: she isn't having any pain, she is feeling good and keeping up with exercises  She was compliant with home exercise program.  Response to previous treatment: less pain overall mostly no pain  Functional change: increased activity except mopping still an issue    Pain:0/10 pre and  0 /10 post session.  Location: right neck  > R shld    Objective   Mine received therapeutic exercises to develop strength, endurance and ROM for 30 minutes including: 3 sets today  AROM/AAROM on R abd 120/150 and R flexion 130/160 on 10/05/20    HOB at 0-30 deg:  AROM supine flex today 3# dowel B shld flex 3 x10  Supine scap stabilization large circles CW and CCW 2 x 10   shld flex 3 x 10 AAROM    sidelying  AROM abd  3 x 10 0#  AROM ER 2 x 10 1#      Standing   shld abd 3 x 10 AAROM NP  shld flex 3 x 10 AAROM NP  Shoulder rows 3 x 10 GTB   shld ext OTB 3 x10     Pulley 1 min into flex NP  Pulley 1 min into abd NP    Wall slides into abd 2 x 10  Wall slides into flexion 3 x 10 hold for 30 secs at end of set for ROM    Sitting stretch into flexion 4 x 20 secs on table top  Sitting stretch into abd 4 x 20 secs on table top.    Passive prolonged with towel roll vertical between scapula and then horizontal at T4 2 min each position ANGEL Blount received the following  manual therapy techniques: Joint mobilizations, Manual traction, Myofacial release and Soft tissue Mobilization were applied to the: levator, teres minor, pect minor for 15 minutes, including:  GH traction  Cervical traction    Mine received hot pack for 0 min        Home Exercises Provided and Patient Education Provided     Education provided:   - HEP, pain management.    Written Home Exercises Provided: Patient instructed to cont prior HEP.  Exercises were reviewed and Mine was able to demonstrate them prior to the end of the session.  Mine demonstrated good  understanding of the education provided.     See EMR under Patient Instructions for exercises provided prior visit.    Assessment     Patient presents with less shoulder hiking initially, but demonstrated increased hiking as session progressed. Frequent cueing and facilitation of scap provided for relaxation and decreasing shoulder hiking for proper technique and increasing shoulder ROM. Continue progressing patient as tolerated.     Mine is progressing well towards her goals.   Pt prognosis is Good.     Pt will continue to benefit from skilled outpatient physical therapy to address the deficits listed in the problem list box on initial evaluation, provide pt/family education and to maximize pt's level of independence in the home and community environment.     Pt's spiritual, cultural and educational needs considered and pt agreeable to plan of care and goals.     Anticipated barriers to physical therapy: none    Goals:   Short Term Goals: 3 weeks   1. Pt to be Independent with HEP for increased strength, endurance and functional mobility. MET 8/5/20  2. Pt to have decreased pain 3/10 after session for increased functional mobility and tolerance. MET 7/29/20  3. Pt to increase AROM to R shoulder flexion 150 degrees for increased functional mobility. Progressing 10/5/20        Long Term Goals: 8 weeks   1. Pt to be Independent with pain management  strategies and verbalize 2 for increased strength, endurance and functional mobility. MET 8/19/20  2. Pt to have decreased pain 2/10 over 50% of the day for increased functional mobility and tolerance. MET 8/17/20  3. Pt to increase AROM right shoulder Abduction to 150 degrees for increased functional mobility.Progressing 10/5/20  4. Pt to increase activity tolerance to 1 hrs of BUEs use for household work, cleaning, cooking for without increased pain for increased overall functional activity.    Plan     Cont with POC    Josefina Park, PTA

## 2020-10-16 ENCOUNTER — LAB VISIT (OUTPATIENT)
Dept: LAB | Facility: HOSPITAL | Age: 85
End: 2020-10-16
Attending: INTERNAL MEDICINE
Payer: MEDICARE

## 2020-10-16 ENCOUNTER — CLINICAL SUPPORT (OUTPATIENT)
Dept: REHABILITATION | Facility: HOSPITAL | Age: 85
End: 2020-10-16
Payer: MEDICARE

## 2020-10-16 DIAGNOSIS — G89.29 CHRONIC RIGHT SHOULDER PAIN: ICD-10-CM

## 2020-10-16 DIAGNOSIS — E78.5 DYSLIPIDEMIA: ICD-10-CM

## 2020-10-16 DIAGNOSIS — M25.511 CHRONIC RIGHT SHOULDER PAIN: ICD-10-CM

## 2020-10-16 DIAGNOSIS — R29.898 WEAKNESS OF RIGHT UPPER EXTREMITY: ICD-10-CM

## 2020-10-16 LAB
ALBUMIN SERPL BCP-MCNC: 4 G/DL (ref 3.5–5.2)
ALP SERPL-CCNC: 59 U/L (ref 55–135)
ALT SERPL W/O P-5'-P-CCNC: 15 U/L (ref 10–44)
ANION GAP SERPL CALC-SCNC: 7 MMOL/L (ref 8–16)
AST SERPL-CCNC: 20 U/L (ref 10–40)
BILIRUB SERPL-MCNC: 0.4 MG/DL (ref 0.1–1)
BUN SERPL-MCNC: 22 MG/DL (ref 8–23)
CALCIUM SERPL-MCNC: 9.8 MG/DL (ref 8.7–10.5)
CHLORIDE SERPL-SCNC: 103 MMOL/L (ref 95–110)
CHOLEST SERPL-MCNC: 171 MG/DL (ref 120–199)
CHOLEST/HDLC SERPL: 2.9 {RATIO} (ref 2–5)
CO2 SERPL-SCNC: 28 MMOL/L (ref 23–29)
CREAT SERPL-MCNC: 0.9 MG/DL (ref 0.5–1.4)
EST. GFR  (AFRICAN AMERICAN): >60 ML/MIN/1.73 M^2
EST. GFR  (NON AFRICAN AMERICAN): 58.9 ML/MIN/1.73 M^2
GLUCOSE SERPL-MCNC: 90 MG/DL (ref 70–110)
HDLC SERPL-MCNC: 59 MG/DL (ref 40–75)
HDLC SERPL: 34.5 % (ref 20–50)
LDLC SERPL CALC-MCNC: 99.4 MG/DL (ref 63–159)
NONHDLC SERPL-MCNC: 112 MG/DL
POTASSIUM SERPL-SCNC: 4.6 MMOL/L (ref 3.5–5.1)
PROT SERPL-MCNC: 7.1 G/DL (ref 6–8.4)
SODIUM SERPL-SCNC: 138 MMOL/L (ref 136–145)
TRIGL SERPL-MCNC: 63 MG/DL (ref 30–150)

## 2020-10-16 PROCEDURE — 97140 MANUAL THERAPY 1/> REGIONS: CPT | Mod: HCNC,PO,CQ

## 2020-10-16 PROCEDURE — 36415 COLL VENOUS BLD VENIPUNCTURE: CPT | Mod: HCNC,PO

## 2020-10-16 PROCEDURE — 80053 COMPREHEN METABOLIC PANEL: CPT | Mod: HCNC

## 2020-10-16 PROCEDURE — 80061 LIPID PANEL: CPT | Mod: HCNC

## 2020-10-16 PROCEDURE — 97110 THERAPEUTIC EXERCISES: CPT | Mod: HCNC,PO,CQ

## 2020-10-27 ENCOUNTER — CLINICAL SUPPORT (OUTPATIENT)
Dept: REHABILITATION | Facility: HOSPITAL | Age: 85
End: 2020-10-27
Payer: MEDICARE

## 2020-10-27 DIAGNOSIS — M25.511 CHRONIC RIGHT SHOULDER PAIN: ICD-10-CM

## 2020-10-27 DIAGNOSIS — G89.29 CHRONIC RIGHT SHOULDER PAIN: ICD-10-CM

## 2020-10-27 DIAGNOSIS — R29.898 WEAKNESS OF RIGHT UPPER EXTREMITY: ICD-10-CM

## 2020-10-27 PROCEDURE — 97110 THERAPEUTIC EXERCISES: CPT | Mod: HCNC,PO

## 2020-10-27 PROCEDURE — 97140 MANUAL THERAPY 1/> REGIONS: CPT | Mod: HCNC,PO

## 2020-10-27 NOTE — PROGRESS NOTES
Physical Therapy Daily Treatment Note     Name: Mine Marley  Clinic Number: [id number]    Therapy Diagnosis:   Name Primary?    Chronic right shoulder pain      Weakness of right upper extremity        Physician: Elliott Mendez PA*    Visit Date: 10/27/2020     Physician Orders: PT Eval and Treat   Medical Diagnosis from Referral: M25.511, G89.29 (ICD-10-CM) Chronic R shoulder pain  Evaluation Date: 7/14/2020  Authorization Period Expiration: 01/29/21  Plan of Care Expiration: 9/30/20  Visit # / Visits authorized: 18/ 20        Time In:159 pm  Time Out: 1:25 pm  Total Billable Time: 26 minutes  TE 1, MT 1    Precautions: Standard    Subjective     Pt reports: she isn't having any pain, she is feeling good and keeping up with exercises and happy with her progress and really is just happy to be pain free.    She was compliant with home exercise program.  Response to previous treatment: less pain overall mostly no pain  Functional change: increased activity except mopping still an issue    Pain:0/10 pre and  0 /10 post session.  Location: right neck  > R shld    Objective   Mine received therapeutic exercises to develop strength, endurance and ROM for 15 minutes including: 3 sets today  AROM/AAROM on R ER 60,  R abd 120/150 and R flexion 140/160 on 10/27/20    HOB at 0-30 deg:  AROM supine flex today 3# dowel B shld flex 3 x10  Supine scap stabilization large circles CW and CCW 2 x 10 NP  shld flex 3 x 10 AAROM    sidelying  AROM abd  3 x 10 0#  AROM ER 2 x 10 1#      Standing   shld abd 3 x 10 AAROM NP  shld flex 3 x 10 AAROM NP  Shoulder rows 3 x 10 GTB   shld ext OTB 3 x10     Pulley 1 min into flex NP  Pulley 1 min into abd NP    Wall slides into abd 2 x 10  Wall slides into flexion 3 x 10 hold for 30 secs at end of set for ROM    Sitting stretch into flexion 4 x 20 secs on table top  Sitting stretch into abd 4 x 20 secs on table top.    Passive prolonged with towel roll vertical between scapula  and then horizontal at T4 2 min each position NP     Mine received the following manual therapy techniques: Joint mobilizations, Manual traction, Myofacial release and Soft tissue Mobilization were applied to the: levator, teres minor, pect minor for 11 minutes, including:  GH traction  Cervical traction    Mine received hot pack for 0 min        Home Exercises Provided and Patient Education Provided     Education provided:   - HEP, pain management.    Written Home Exercises Provided: Patient instructed to cont prior HEP.  Exercises were reviewed and Mine was able to demonstrate them prior to the end of the session.  Mine demonstrated good  understanding of the education provided.     See EMR under Patient Instructions for exercises provided prior visit.    Assessment     Patient presents with less shoulder hiking initially but able to control thru available ROM. Pt mostly pain free even with +2 hrs activities with BUEs as she is independent with management and through available ROM which is functional for her.     Mine is progressing well towards her goals.   Pt prognosis is Good.     Pt will continue to benefit from skilled outpatient physical therapy to address the deficits listed in the problem list box on initial evaluation, provide pt/family education and to maximize pt's level of independence in the home and community environment.     Pt's spiritual, cultural and educational needs considered and pt agreeable to plan of care and goals.     Anticipated barriers to physical therapy: none    Goals:   Short Term Goals: 3 weeks   1. Pt to be Independent with HEP for increased strength, endurance and functional mobility. MET 8/5/20  2. Pt to have decreased pain 3/10 after session for increased functional mobility and tolerance. MET 7/29/20  3. Pt to increase AROM to R shoulder flexion 150 degrees for increased functional mobility. Progressing 10/5/20        Long Term Goals: 8 weeks   1. Pt to be Independent  with pain management strategies and verbalize 2 for increased strength, endurance and functional mobility. MET 8/19/20  2. Pt to have decreased pain 2/10 over 50% of the day for increased functional mobility and tolerance. MET 8/17/20  3. Pt to increase AROM right shoulder Abduction to 150 degrees for increased functional mobility.Progressing 10/27/20  4. Pt to increase activity tolerance to 1 hrs of BUEs use for household work, cleaning, cooking for without increased pain for increased overall functional activity. Met 10/27/20    Plan     Cont with RENÉE Poole, PT

## 2020-11-12 ENCOUNTER — PATIENT OUTREACH (OUTPATIENT)
Dept: ADMINISTRATIVE | Facility: OTHER | Age: 85
End: 2020-11-12

## 2020-11-12 NOTE — PROGRESS NOTES
Care Everywhere: updated  Immunization: updated, links delay   Health Maintenance: updated  Media Review:   Legacy Review:   Order placed:   Upcoming appts:

## 2020-11-16 ENCOUNTER — OFFICE VISIT (OUTPATIENT)
Dept: OPTOMETRY | Facility: CLINIC | Age: 85
End: 2020-11-16
Payer: MEDICARE

## 2020-11-16 DIAGNOSIS — Z13.5 SCREENING FOR GLAUCOMA: ICD-10-CM

## 2020-11-16 DIAGNOSIS — H52.4 PRESBYOPIA: ICD-10-CM

## 2020-11-16 DIAGNOSIS — H50.05 ESOTROPIA, ALTERNATING: Primary | ICD-10-CM

## 2020-11-16 DIAGNOSIS — H35.3132 INTERMEDIATE STAGE NONEXUDATIVE AGE-RELATED MACULAR DEGENERATION OF BOTH EYES: ICD-10-CM

## 2020-11-16 DIAGNOSIS — D31.32 CHOROIDAL NEVUS, LEFT: ICD-10-CM

## 2020-11-16 PROCEDURE — 92014 COMPRE OPH EXAM EST PT 1/>: CPT | Mod: S$GLB,,, | Performed by: OPTOMETRIST

## 2020-11-16 PROCEDURE — 99999 PR PBB SHADOW E&M-EST. PATIENT-LVL III: ICD-10-PCS | Mod: PBBFAC,,, | Performed by: OPTOMETRIST

## 2020-11-16 PROCEDURE — 92014 PR EYE EXAM, EST PATIENT,COMPREHESV: ICD-10-PCS | Mod: S$GLB,,, | Performed by: OPTOMETRIST

## 2020-11-16 PROCEDURE — 92015 PR REFRACTION: ICD-10-PCS | Mod: S$GLB,,, | Performed by: OPTOMETRIST

## 2020-11-16 PROCEDURE — 92015 DETERMINE REFRACTIVE STATE: CPT | Mod: S$GLB,,, | Performed by: OPTOMETRIST

## 2020-11-16 PROCEDURE — 99999 PR PBB SHADOW E&M-EST. PATIENT-LVL III: CPT | Mod: PBBFAC,,, | Performed by: OPTOMETRIST

## 2020-11-17 ENCOUNTER — OFFICE VISIT (OUTPATIENT)
Dept: DERMATOLOGY | Facility: CLINIC | Age: 85
End: 2020-11-17
Payer: MEDICARE

## 2020-11-17 VITALS — WEIGHT: 147 LBS | BODY MASS INDEX: 26.04 KG/M2

## 2020-11-17 DIAGNOSIS — L82.1 SEBORRHEIC KERATOSES: ICD-10-CM

## 2020-11-17 DIAGNOSIS — L81.4 LENTIGINES: ICD-10-CM

## 2020-11-17 DIAGNOSIS — L57.0 MULTIPLE ACTINIC KERATOSES: Primary | ICD-10-CM

## 2020-11-17 PROCEDURE — 1157F ADVNC CARE PLAN IN RCRD: CPT | Mod: HCNC,S$GLB,, | Performed by: DERMATOLOGY

## 2020-11-17 PROCEDURE — 17003 DESTRUCTION, PREMALIGNANT LESIONS; SECOND THROUGH 14 LESIONS: ICD-10-PCS | Mod: HCNC,S$GLB,, | Performed by: DERMATOLOGY

## 2020-11-17 PROCEDURE — 1159F MED LIST DOCD IN RCRD: CPT | Mod: HCNC,S$GLB,, | Performed by: DERMATOLOGY

## 2020-11-17 PROCEDURE — 1101F PR PT FALLS ASSESS DOC 0-1 FALLS W/OUT INJ PAST YR: ICD-10-PCS | Mod: HCNC,CPTII,S$GLB, | Performed by: DERMATOLOGY

## 2020-11-17 PROCEDURE — 17003 DESTRUCT PREMALG LES 2-14: CPT | Mod: HCNC,S$GLB,, | Performed by: DERMATOLOGY

## 2020-11-17 PROCEDURE — 99213 OFFICE O/P EST LOW 20 MIN: CPT | Mod: 25,HCNC,S$GLB, | Performed by: DERMATOLOGY

## 2020-11-17 PROCEDURE — 17000 DESTRUCT PREMALG LESION: CPT | Mod: HCNC,S$GLB,, | Performed by: DERMATOLOGY

## 2020-11-17 PROCEDURE — 99999 PR PBB SHADOW E&M-EST. PATIENT-LVL III: CPT | Mod: PBBFAC,HCNC,, | Performed by: DERMATOLOGY

## 2020-11-17 PROCEDURE — 3288F FALL RISK ASSESSMENT DOCD: CPT | Mod: HCNC,CPTII,S$GLB, | Performed by: DERMATOLOGY

## 2020-11-17 PROCEDURE — 17000 PR DESTRUCTION(LASER SURGERY,CRYOSURGERY,CHEMOSURGERY),PREMALIGNANT LESIONS,FIRST LESION: ICD-10-PCS | Mod: HCNC,S$GLB,, | Performed by: DERMATOLOGY

## 2020-11-17 PROCEDURE — 99213 PR OFFICE/OUTPT VISIT, EST, LEVL III, 20-29 MIN: ICD-10-PCS | Mod: 25,HCNC,S$GLB, | Performed by: DERMATOLOGY

## 2020-11-17 PROCEDURE — 1159F PR MEDICATION LIST DOCUMENTED IN MEDICAL RECORD: ICD-10-PCS | Mod: HCNC,S$GLB,, | Performed by: DERMATOLOGY

## 2020-11-17 PROCEDURE — 1101F PT FALLS ASSESS-DOCD LE1/YR: CPT | Mod: HCNC,CPTII,S$GLB, | Performed by: DERMATOLOGY

## 2020-11-17 PROCEDURE — 99999 PR PBB SHADOW E&M-EST. PATIENT-LVL III: ICD-10-PCS | Mod: PBBFAC,HCNC,, | Performed by: DERMATOLOGY

## 2020-11-17 PROCEDURE — 1126F AMNT PAIN NOTED NONE PRSNT: CPT | Mod: HCNC,S$GLB,, | Performed by: DERMATOLOGY

## 2020-11-17 PROCEDURE — 1126F PR PAIN SEVERITY QUANTIFIED, NO PAIN PRESENT: ICD-10-PCS | Mod: HCNC,S$GLB,, | Performed by: DERMATOLOGY

## 2020-11-17 PROCEDURE — 3288F PR FALLS RISK ASSESSMENT DOCUMENTED: ICD-10-PCS | Mod: HCNC,CPTII,S$GLB, | Performed by: DERMATOLOGY

## 2020-11-17 PROCEDURE — 1157F PR ADVANCE CARE PLAN OR EQUIV PRESENT IN MEDICAL RECORD: ICD-10-PCS | Mod: HCNC,S$GLB,, | Performed by: DERMATOLOGY

## 2020-11-17 NOTE — PROGRESS NOTES
Subjective:       Patient ID:  Mien Wilkins is a 85 y.o. female who presents for   Chief Complaint   Patient presents with    Spot     right leg, arm,    Skin Check     upper and lower exam      New spots on arms and one on right leg not painful .      Review of Systems   Constitutional: Negative for fever, chills, weight loss, weight gain, fatigue, night sweats and malaise.   Skin: Positive for daily sunscreen use and activity-related sunscreen use. Negative for wears hat.   Hematologic/Lymphatic: Does not bruise/bleed easily.        Objective:    Physical Exam   Constitutional: She appears well-developed and well-nourished. No distress.   Neurological: She is alert and oriented to person, place, and time. She is not disoriented.   Psychiatric: She has a normal mood and affect.   Skin:   Areas Examined (abnormalities noted in diagram):   Head / Face Inspection Performed  Neck Inspection Performed  Chest / Axilla Inspection Performed  Back Inspection Performed  RUE Inspected  LUE Inspection Performed  RLE Inspected  LLE Inspection Performed              Diagram Legend     Erythematous scaling macule/papule c/w actinic keratosis       Vascular papule c/w angioma      Pigmented verrucoid papule/plaque c/w seborrheic keratosis      Yellow umbilicated papule c/w sebaceous hyperplasia      Irregularly shaped tan macule c/w lentigo     1-2 mm smooth white papules consistent with Milia      Movable subcutaneous cyst with punctum c/w epidermal inclusion cyst      Subcutaneous movable cyst c/w pilar cyst      Firm pink to brown papule c/w dermatofibroma      Pedunculated fleshy papule(s) c/w skin tag(s)      Evenly pigmented macule c/w junctional nevus     Mildly variegated pigmented, slightly irregular-bordered macule c/w mildly atypical nevus      Flesh colored to evenly pigmented papule c/w intradermal nevus       Pink pearly papule/plaque c/w basal cell carcinoma      Erythematous hyperkeratotic cursted  "plaque c/w SCC      Surgical scar with no sign of skin cancer recurrence      Open and closed comedones      Inflammatory papules and pustules      Verrucoid papule consistent consistent with wart     Erythematous eczematous patches and plaques     Dystrophic onycholytic nail with subungual debris c/w onychomycosis     Umbilicated papule    Erythematous-base heme-crusted tan verrucoid plaque consistent with inflamed seborrheic keratosis     Erythematous Silvery Scaling Plaque c/w Psoriasis     See annotation      Assessment / Plan:        Multiple actinic keratoses   Cryosurgery Procedure Note    Verbal consent from the patient is obtained and the patient is aware of the precancerous quality and need for treatment of these lesions. Liquid nitrogen cryosurgery is applied to the 2 actinic keratoses, as detailed in the physical exam, to produce a freeze injury.      Seborrheic keratoses  reassurance      Lentigines  The "ABCD" rules to observe pigmented lesions were reviewed.               Follow up in about 1 year (around 11/17/2021).  "

## 2020-12-01 NOTE — PT/OT/SLP PROGRESS
Occupational Therapy  Treatment    Mine Wilkins   MRN: [id number]   Admitting Diagnosis: Closed displaced intertrochanteric fracture of right femur with routine healing    OT Date of Treatment: 06/02/18  Total Time (min): 60 min    Billable Minutes:  Therapeutic Activity 30 and Therapeutic Exercise 30    General Precautions: Standard, fall  Orthopedic Precautions: RLE weight bearing as tolerated  Braces: N/A         Subjective:  Communicated with patient prior to session.    Pain/Comfort  Pain Rating 1:  (patient did not rate)  Location - Side 1: Right  Location - Orientation 1: generalized  Location 1: hip  Pain Addressed 1: Reposition, Distraction  Pain Rating Post-Intervention 1:  (patient did not rate)    Objective:       Functional Status:  MDS G  Transfer Functional Status: S bedside chair<>w/c using [facility] 6 Click:  [facility] Total Score: 19    OT Exercises: UE Ergometer 15 min for improving endurance to increase independence with ADLs.      Patient performed B UE ROM exercises using 2# dowel jose 3 x 10 focusing to improve strength and endurance to increase independence with ADLs.     Additional Treatment:  Patient performed a visual perception activity for attention and scanning with  FM component for in hand manipulation while standing with S for activity tolerance in order to perform a functional household task.     Patient performed a functional task removing and replacing coins while standing and sidestepping L<>R with S at counter top in order to perform functional household kitchen task.     Patient left up in chair with call button in reach and all needs met.     ASSESSMENT:  Mine Wilkins is a 83 y.o. female with a medical diagnosis of Closed displaced intertrochanteric fracture of right femur with routine healing and presents with the deficits listed below. Patient tolerated treatment session and was motivated to complete tasks. Patient continues to benefit from skilled OT  services to achieve maximal independence.    Rehab identified problem list/impairments: weakness, impaired endurance, impaired self care skills, impaired functional mobilty, impaired balance, decreased lower extremity function, orthopedic precautions, pain    Rehab potential is good    Activity tolerance: Good    Discharge recommendations: nursing facility, skilled     Barriers to discharge:  Decreased Caregiver Support    Equipment recommendations: walker, rolling     GOALS:    Occupational Therapy Goals        Problem: Occupational Therapy Goal    Goal Priority Disciplines Outcome Interventions   Occupational Therapy Goal     OT, PT/OT Ongoing (interventions implemented as appropriate)    Description:  Goals to be met by: 14 days     Patient will increase functional independence with ADLs by performing:    UE Dressing with Modified [place].  LE Dressing with Modified [place] /c AE.  Grooming while standing at sink with Modified [place].  Toileting from bedside commode with Modified [place] for hygiene and clothing management.   Bathing with Stand-by Assistance.  Supine to sit with Modified [place] /c HOB flat and no handrails.  Stand pivot transfers with Modified [place].  Toilet transfer to bedside commode with Modified [place].                       Plan:  Patient to be seen 5 x/week to address the above listed problems via self-care/home management, therapeutic activities, therapeutic exercises  Plan of Care expires: 06/24/18  Plan of Care reviewed with: patient    SLAVA Yarbrough  06/02/2018   Cephalexin Pregnancy And Lactation Text: This medication is Pregnancy Category B and considered safe during pregnancy.  It is also excreted in breast milk but can be used safely for shorter doses.

## 2020-12-04 ENCOUNTER — LAB VISIT (OUTPATIENT)
Dept: LAB | Facility: HOSPITAL | Age: 85
End: 2020-12-04
Attending: INTERNAL MEDICINE
Payer: MEDICARE

## 2020-12-04 ENCOUNTER — OFFICE VISIT (OUTPATIENT)
Dept: INTERNAL MEDICINE | Facility: CLINIC | Age: 85
End: 2020-12-04
Payer: MEDICARE

## 2020-12-04 VITALS
HEIGHT: 63 IN | RESPIRATION RATE: 16 BRPM | HEART RATE: 72 BPM | SYSTOLIC BLOOD PRESSURE: 112 MMHG | DIASTOLIC BLOOD PRESSURE: 62 MMHG | TEMPERATURE: 97 F | WEIGHT: 152.13 LBS | OXYGEN SATURATION: 95 % | BODY MASS INDEX: 26.95 KG/M2

## 2020-12-04 DIAGNOSIS — E78.5 DYSLIPIDEMIA: ICD-10-CM

## 2020-12-04 DIAGNOSIS — I10 ESSENTIAL HYPERTENSION: ICD-10-CM

## 2020-12-04 DIAGNOSIS — Z00.00 ENCOUNTER FOR PREVENTIVE HEALTH EXAMINATION: Primary | ICD-10-CM

## 2020-12-04 DIAGNOSIS — Z12.31 OTHER SCREENING MAMMOGRAM: ICD-10-CM

## 2020-12-04 DIAGNOSIS — Z78.0 POSTMENOPAUSAL: ICD-10-CM

## 2020-12-04 DIAGNOSIS — K21.9 GASTROESOPHAGEAL REFLUX DISEASE WITHOUT ESOPHAGITIS: ICD-10-CM

## 2020-12-04 DIAGNOSIS — M81.0 SENILE OSTEOPOROSIS: ICD-10-CM

## 2020-12-04 DIAGNOSIS — I35.0 AORTIC VALVE STENOSIS, MODERATE: ICD-10-CM

## 2020-12-04 LAB
BASOPHILS # BLD AUTO: 0.05 K/UL (ref 0–0.2)
BASOPHILS NFR BLD: 0.5 % (ref 0–1.9)
DIFFERENTIAL METHOD: ABNORMAL
EOSINOPHIL # BLD AUTO: 0.2 K/UL (ref 0–0.5)
EOSINOPHIL NFR BLD: 1.7 % (ref 0–8)
ERYTHROCYTE [DISTWIDTH] IN BLOOD BY AUTOMATED COUNT: 14.2 % (ref 11.5–14.5)
HCT VFR BLD AUTO: 38.1 % (ref 37–48.5)
HGB BLD-MCNC: 11.8 G/DL (ref 12–16)
IMM GRANULOCYTES # BLD AUTO: 0.03 K/UL (ref 0–0.04)
IMM GRANULOCYTES NFR BLD AUTO: 0.3 % (ref 0–0.5)
LYMPHOCYTES # BLD AUTO: 2.4 K/UL (ref 1–4.8)
LYMPHOCYTES NFR BLD: 23.7 % (ref 18–48)
MCH RBC QN AUTO: 28.1 PG (ref 27–31)
MCHC RBC AUTO-ENTMCNC: 31 G/DL (ref 32–36)
MCV RBC AUTO: 91 FL (ref 82–98)
MONOCYTES # BLD AUTO: 0.8 K/UL (ref 0.3–1)
MONOCYTES NFR BLD: 7.9 % (ref 4–15)
NEUTROPHILS # BLD AUTO: 6.6 K/UL (ref 1.8–7.7)
NEUTROPHILS NFR BLD: 65.9 % (ref 38–73)
NRBC BLD-RTO: 0 /100 WBC
PLATELET # BLD AUTO: 306 K/UL (ref 150–350)
PMV BLD AUTO: 11.2 FL (ref 9.2–12.9)
RBC # BLD AUTO: 4.2 M/UL (ref 4–5.4)
WBC # BLD AUTO: 9.99 K/UL (ref 3.9–12.7)

## 2020-12-04 PROCEDURE — 1101F PT FALLS ASSESS-DOCD LE1/YR: CPT | Mod: HCNC,CPTII,S$GLB, | Performed by: INTERNAL MEDICINE

## 2020-12-04 PROCEDURE — 3074F PR MOST RECENT SYSTOLIC BLOOD PRESSURE < 130 MM HG: ICD-10-PCS | Mod: HCNC,CPTII,S$GLB, | Performed by: INTERNAL MEDICINE

## 2020-12-04 PROCEDURE — 3288F FALL RISK ASSESSMENT DOCD: CPT | Mod: HCNC,CPTII,S$GLB, | Performed by: INTERNAL MEDICINE

## 2020-12-04 PROCEDURE — 1157F ADVNC CARE PLAN IN RCRD: CPT | Mod: HCNC,S$GLB,, | Performed by: INTERNAL MEDICINE

## 2020-12-04 PROCEDURE — 3074F SYST BP LT 130 MM HG: CPT | Mod: HCNC,CPTII,S$GLB, | Performed by: INTERNAL MEDICINE

## 2020-12-04 PROCEDURE — 3078F DIAST BP <80 MM HG: CPT | Mod: HCNC,CPTII,S$GLB, | Performed by: INTERNAL MEDICINE

## 2020-12-04 PROCEDURE — 36415 COLL VENOUS BLD VENIPUNCTURE: CPT | Mod: HCNC,PO

## 2020-12-04 PROCEDURE — 1126F PR PAIN SEVERITY QUANTIFIED, NO PAIN PRESENT: ICD-10-PCS | Mod: HCNC,S$GLB,, | Performed by: INTERNAL MEDICINE

## 2020-12-04 PROCEDURE — 85025 COMPLETE CBC W/AUTO DIFF WBC: CPT | Mod: HCNC

## 2020-12-04 PROCEDURE — 99397 PER PM REEVAL EST PAT 65+ YR: CPT | Mod: HCNC,S$GLB,, | Performed by: INTERNAL MEDICINE

## 2020-12-04 PROCEDURE — 99999 PR PBB SHADOW E&M-EST. PATIENT-LVL IV: CPT | Mod: PBBFAC,HCNC,, | Performed by: INTERNAL MEDICINE

## 2020-12-04 PROCEDURE — 3288F PR FALLS RISK ASSESSMENT DOCUMENTED: ICD-10-PCS | Mod: HCNC,CPTII,S$GLB, | Performed by: INTERNAL MEDICINE

## 2020-12-04 PROCEDURE — 1101F PR PT FALLS ASSESS DOC 0-1 FALLS W/OUT INJ PAST YR: ICD-10-PCS | Mod: HCNC,CPTII,S$GLB, | Performed by: INTERNAL MEDICINE

## 2020-12-04 PROCEDURE — 99397 PR PREVENTIVE VISIT,EST,65 & OVER: ICD-10-PCS | Mod: HCNC,S$GLB,, | Performed by: INTERNAL MEDICINE

## 2020-12-04 PROCEDURE — 84443 ASSAY THYROID STIM HORMONE: CPT | Mod: HCNC

## 2020-12-04 PROCEDURE — 3078F PR MOST RECENT DIASTOLIC BLOOD PRESSURE < 80 MM HG: ICD-10-PCS | Mod: HCNC,CPTII,S$GLB, | Performed by: INTERNAL MEDICINE

## 2020-12-04 PROCEDURE — 99999 PR PBB SHADOW E&M-EST. PATIENT-LVL IV: ICD-10-PCS | Mod: PBBFAC,HCNC,, | Performed by: INTERNAL MEDICINE

## 2020-12-04 PROCEDURE — 1157F PR ADVANCE CARE PLAN OR EQUIV PRESENT IN MEDICAL RECORD: ICD-10-PCS | Mod: HCNC,S$GLB,, | Performed by: INTERNAL MEDICINE

## 2020-12-04 PROCEDURE — 1126F AMNT PAIN NOTED NONE PRSNT: CPT | Mod: HCNC,S$GLB,, | Performed by: INTERNAL MEDICINE

## 2020-12-04 NOTE — PROGRESS NOTES
History of present illness:  [age] year lady in today for general health assessment.    Current medications:  All medications noted and reviewed in our electronic medical record medication list.    Review of systems:  General: no fever, chills, generalized body aches. No unexpected weight loss.  Eyes:  No visual disturbances.  HEENT:  No hoarseness, dysphagia, ear pain.  Respiratory:  No cough, no shortness of breath.  Cardiovascular: no chest pain, palpitations, cough, exertional limb pain. No edema.  GI: no nausea, vomiting.  No abdominal pain. No change in bowel habits.  No melena, no hematochezia.  : no dysuria. No change in the color or character of the urine. No urinary frequency.  Musculoskeletal: no joint pain or swelling.  Neurologic:  No focal neurological complaints.  No headaches.  Skin:  No rashes or other concerns.  Psych:  No emotional issues    Past medical history, past surgical history, family medical history social history is are all noted and reviewed in our electronic medical record history sections.    Health screenings:  Colonoscopy 2015.  Mammogram September 2018.  Bone densitometry September 2018.  She has had both pneumococcal vaccines.    Physical examination:  GENERAL:  Alert, appropriately groomed, no acute distress.  VS:  Blood pressure taken manually is 112/62  EYES: sclerae white ,nonicteric. PERRL.  HEENT:  Normocephalic. Ear canals and tympanic membranes normal. Mouth and pharynx normal. No thyromegaly. Trachea midline and freely mobile.  LUNGS:  Clear to ascultation and normal to percussion.  CARDIOVASCULAR:  Normal heart sounds.  2/6 systolic murmur.. Carotids full bilaterally without bruit.  Pedal pulses intact .  No abdominal bruit.  No peripheral extremity edema.  GI: the abdomen is soft, no distension. No masses , tenderness, organomegaly. LYMPHATIC:  No axillary, inguinal , cervical adenopathy.  MUSCULOSKELETAL:  Range of motion, stability and strength of the right  and left upper and lower extremities normal. No swollen or tender joints  NEUROLOGIC:  DTR's normal. No gross motor or sensory deficits apparent, gait normal.  SKIN:  No rashes.   MS:  Alert, oriented , affect and mood all appropriate    Data:  Reviewed liver profile chemistry profile from October of this year    Impression:  Generally healthy 85 year lady living a reasonably healthy lifestyle with several chronic stable medical conditions.  Hypertension controlled.  Dyslipidemia on statin therapy.  Aortic stenosis moderate due for update.  Osteoporosis on pharmacologic therapy.  GERD.    Plan:  Update CBC, TSH, urinalysis.  Mammogram.  Bone densitometry update.  2D echo cardiogram with color-flow Doppler study update.  Return to clinic 6 months follow-up.

## 2020-12-05 LAB — TSH SERPL DL<=0.005 MIU/L-ACNC: 2.03 UIU/ML (ref 0.4–4)

## 2020-12-07 ENCOUNTER — TELEPHONE (OUTPATIENT)
Dept: INTERNAL MEDICINE | Facility: CLINIC | Age: 85
End: 2020-12-07

## 2020-12-07 NOTE — TELEPHONE ENCOUNTER
Spoke with pt to advise that her labs are normal and that she will receive a call to schedule Mammo, echo, and BMD.    Verbalized understanding.

## 2020-12-07 NOTE — TELEPHONE ENCOUNTER
----- Message from Lian Joy sent at 12/7/2020 12:37 PM CST -----  Regarding: [phone]  Patient is returning a phone call.    Who left a message for the patient: The doctor's office    Does patient know what this is regarding:  Returning a phone call    Comments: Mine is requesting a callback; she states to please call her after 3 today.

## 2020-12-14 ENCOUNTER — OFFICE VISIT (OUTPATIENT)
Dept: URGENT CARE | Facility: CLINIC | Age: 85
End: 2020-12-14
Payer: MEDICARE

## 2020-12-14 VITALS
OXYGEN SATURATION: 95 % | TEMPERATURE: 98 F | HEIGHT: 64 IN | HEART RATE: 65 BPM | BODY MASS INDEX: 25.61 KG/M2 | SYSTOLIC BLOOD PRESSURE: 160 MMHG | DIASTOLIC BLOOD PRESSURE: 81 MMHG | WEIGHT: 150 LBS

## 2020-12-14 DIAGNOSIS — S76.312A HAMSTRING STRAIN, LEFT, INITIAL ENCOUNTER: Primary | ICD-10-CM

## 2020-12-14 PROCEDURE — 1157F PR ADVANCE CARE PLAN OR EQUIV PRESENT IN MEDICAL RECORD: ICD-10-PCS | Mod: S$GLB,,, | Performed by: NURSE PRACTITIONER

## 2020-12-14 PROCEDURE — 1157F ADVNC CARE PLAN IN RCRD: CPT | Mod: S$GLB,,, | Performed by: NURSE PRACTITIONER

## 2020-12-14 PROCEDURE — 99214 OFFICE O/P EST MOD 30 MIN: CPT | Mod: S$GLB,,, | Performed by: NURSE PRACTITIONER

## 2020-12-14 PROCEDURE — 99214 PR OFFICE/OUTPT VISIT, EST, LEVL IV, 30-39 MIN: ICD-10-PCS | Mod: S$GLB,,, | Performed by: NURSE PRACTITIONER

## 2020-12-14 NOTE — PROGRESS NOTES
"Subjective:       Patient ID: Mine Wilkins is a 85 y.o. female.    Vitals:  height is 5' 4" (1.626 m) and weight is 68 kg (150 lb). Her temperature is 98 °F (36.7 °C). Her blood pressure is 160/81 (abnormal) and her pulse is 65. Her oxygen saturation is 95%.     Chief Complaint: Leg Pain (left )    Patient c/o left thigh/leg pain.     Leg Pain   The incident occurred 5 to 7 days ago. There was no injury mechanism. The pain is present in the left leg. The quality of the pain is described as aching. The pain is at a severity of 3/10. The pain has been fluctuating since onset. Associated symptoms include an inability to bear weight. Pertinent negatives include no loss of motion, loss of sensation, muscle weakness, numbness or tingling. She reports no foreign bodies present. The symptoms are aggravated by movement. She has tried acetaminophen and heat (Tylenol) for the symptoms. The treatment provided mild relief.       Constitution: Negative for chills, fatigue and fever.   HENT: Negative for congestion and sore throat.    Neck: Negative for painful lymph nodes.   Cardiovascular: Negative for chest pain and leg swelling.   Eyes: Negative for double vision and blurred vision.   Respiratory: Negative for cough and shortness of breath.    Gastrointestinal: Negative for nausea, vomiting and diarrhea.   Genitourinary: Negative for dysuria, frequency, urgency and history of kidney stones.   Musculoskeletal: Positive for joint pain and joint swelling. Negative for muscle cramps and muscle ache.   Skin: Negative for color change, pale, rash and bruising.   Allergic/Immunologic: Negative for seasonal allergies.   Neurological: Negative for dizziness, history of vertigo, light-headedness, passing out, headaches and numbness.   Hematologic/Lymphatic: Negative for swollen lymph nodes.   Psychiatric/Behavioral: Negative for nervous/anxious, sleep disturbance and depression. The patient is not nervous/anxious.      "   Objective:      Physical Exam   Constitutional: She is oriented to person, place, and time. She appears well-developed. She is cooperative.  Non-toxic appearance. She does not appear ill. No distress.   HENT:   Head: Normocephalic and atraumatic.   Ears:   Right Ear: Hearing, tympanic membrane, external ear and ear canal normal.   Left Ear: Hearing, tympanic membrane, external ear and ear canal normal.   Nose: Nose normal. No mucosal edema, rhinorrhea or nasal deformity. No epistaxis. Right sinus exhibits no maxillary sinus tenderness and no frontal sinus tenderness. Left sinus exhibits no maxillary sinus tenderness and no frontal sinus tenderness.   Mouth/Throat: Uvula is midline, oropharynx is clear and moist and mucous membranes are normal. No trismus in the jaw. Normal dentition. No uvula swelling. No posterior oropharyngeal erythema.   Eyes: Conjunctivae and lids are normal. Right eye exhibits no discharge. Left eye exhibits no discharge. No scleral icterus.   Neck: Trachea normal, normal range of motion, full passive range of motion without pain and phonation normal. Neck supple.   Cardiovascular: Normal rate, regular rhythm, normal heart sounds and normal pulses.      Comments: Denies palpitaitions   Pulmonary/Chest: Effort normal and breath sounds normal. No respiratory distress.   No acute respiratory distress    Comments: No acute respiratory distress    Abdominal: Soft. Normal appearance and bowel sounds are normal. She exhibits no distension, no pulsatile midline mass and no mass. There is no abdominal tenderness.   Musculoskeletal: Normal range of motion.         General: No swelling, tenderness, deformity or signs of injury.      Right lower leg: She exhibits no tenderness and no swelling. No edema.      Left lower leg: She exhibits no tenderness and no swelling. No edema.      Comments: No swelling, tenderness, or erythema noted to lower bilateral calves. R=L   Neurological: She is alert and  oriented to person, place, and time. She exhibits normal muscle tone. Coordination normal.   Skin: Skin is warm, dry, intact, not diaphoretic and not pale. Psychiatric: Her speech is normal and behavior is normal. Judgment and thought content normal.   Nursing note and vitals reviewed.        Assessment:       1. Hamstring strain, left, initial encounter        Plan:     No indication for DVT. All signs are negative. Explained to patient this is more than likely a hamstring strain. No definitive way in UC to rule out but assessment so far, there is no indication for DVT.    Hamstring strain, left, initial encounter         Patient Instructions                                Orthopedic Follow up Discharge Instructions    If your condition worsens or fails to improve we recommend that you receive another evaluation at the ER immediately or contact your PCP to discuss your concerns or return here. You must understand that you've received an urgent care treatment only and that you may be released before all your medical problems are known or treated. You the patient will arrange for follouwp care as instructed.   If you were prescribed a narcotic or muscle relaxant do not drive or operate heavy machinery while taking these medications   Tylenol or ibuprofen can also be used as directed for pain unless you have an allergy to them or medical condition such as stomach ulcers, kidney or liver disease or blood thinners etc for which you should not be taking these type of medications.   If you were given a prescription NSAID here do not also take any over the counter NSAID like ibuprofen, aleve, advil, motrin etc   RICE which means rest, ice compression and elevation are helpful.   If you have Low Back Pain and develop bowel or bladder symptoms or increase pain going down your legs go to the ER immediately.   If you were given a splint wear it at all times.   If you were given crutches use them as we instructed. Do not rest  your armpits on the foam pad or you risk compressing the nerves and the vessels there   Follow up with the orthopedist in 1 week if you continue with pain.   Sometimes it can take up to 1 week for stress fractures to show up on an X-ray, and may need reimaging or a CT or MRI of the affected area.

## 2020-12-29 ENCOUNTER — HOSPITAL ENCOUNTER (OUTPATIENT)
Dept: RADIOLOGY | Facility: HOSPITAL | Age: 85
Discharge: HOME | End: 2020-12-29
Attending: INTERNAL MEDICINE
Payer: MEDICARE

## 2020-12-29 DIAGNOSIS — Z12.31 OTHER SCREENING MAMMOGRAM: ICD-10-CM

## 2020-12-29 PROCEDURE — 77067 SCR MAMMO BI INCL CAD: CPT | Mod: 26,HCNC,, | Performed by: RADIOLOGY

## 2020-12-29 PROCEDURE — 77063 BREAST TOMOSYNTHESIS BI: CPT | Mod: 26,HCNC,, | Performed by: RADIOLOGY

## 2020-12-29 PROCEDURE — 77063 MAMMO DIGITAL SCREENING BILAT WITH TOMO: ICD-10-PCS | Mod: 26,HCNC,, | Performed by: RADIOLOGY

## 2020-12-29 PROCEDURE — 77067 MAMMO DIGITAL SCREENING BILAT WITH TOMO: ICD-10-PCS | Mod: 26,HCNC,, | Performed by: RADIOLOGY

## 2020-12-29 PROCEDURE — 77067 SCR MAMMO BI INCL CAD: CPT | Mod: TC,HCNC,PO

## 2021-01-07 ENCOUNTER — HOSPITAL ENCOUNTER (OUTPATIENT)
Dept: CARDIOLOGY | Facility: HOSPITAL | Age: 86
Discharge: HOME | End: 2021-01-07
Attending: INTERNAL MEDICINE
Payer: MEDICARE

## 2021-01-07 VITALS
BODY MASS INDEX: 25.61 KG/M2 | SYSTOLIC BLOOD PRESSURE: 130 MMHG | WEIGHT: 150 LBS | HEIGHT: 64 IN | DIASTOLIC BLOOD PRESSURE: 64 MMHG | HEART RATE: 75 BPM

## 2021-01-07 DIAGNOSIS — I35.0 AORTIC VALVE STENOSIS, MODERATE: ICD-10-CM

## 2021-01-07 LAB
ASCENDING AORTA: 4.08 CM
AV INDEX (PROSTH): 0.26
AV MEAN GRADIENT: 44 MMHG
AV PEAK GRADIENT: 67 MMHG
AV VALVE AREA: 0.94 CM2
AV VELOCITY RATIO: 0.27
BSA FOR ECHO PROCEDURE: 1.75 M2
CV ECHO LV RWT: 0.44 CM
DOP CALC AO PEAK VEL: 4.1 M/S
DOP CALC AO VTI: 102.5 CM
DOP CALC LVOT AREA: 3.7 CM2
DOP CALC LVOT DIAMETER: 2.16 CM
DOP CALC LVOT PEAK VEL: 1.1 M/S
DOP CALC LVOT STROKE VOLUME: 96.32 CM3
DOP CALCLVOT PEAK VEL VTI: 26.3 CM
E WAVE DECELERATION TIME: 227.27 MSEC
E/A RATIO: 0.83
E/E' RATIO: 15 M/S
ECHO LV POSTERIOR WALL: 0.94 CM (ref 0.6–1.1)
FRACTIONAL SHORTENING: 33 % (ref 28–44)
INTERVENTRICULAR SEPTUM: 1.07 CM (ref 0.6–1.1)
IVRT: 105.61 MSEC
LA MAJOR: 5.43 CM
LA MINOR: 5.5 CM
LA WIDTH: 4.65 CM
LEFT ATRIUM SIZE: 3.22 CM
LEFT ATRIUM VOLUME INDEX MOD: 47.1 ML/M2
LEFT ATRIUM VOLUME INDEX: 40.2 ML/M2
LEFT ATRIUM VOLUME MOD: 81.59 CM3
LEFT ATRIUM VOLUME: 69.55 CM3
LEFT INTERNAL DIMENSION IN SYSTOLE: 2.83 CM (ref 2.1–4)
LEFT VENTRICLE DIASTOLIC VOLUME INDEX: 46.09 ML/M2
LEFT VENTRICLE DIASTOLIC VOLUME: 79.77 ML
LEFT VENTRICLE MASS INDEX: 81 G/M2
LEFT VENTRICLE SYSTOLIC VOLUME INDEX: 17.5 ML/M2
LEFT VENTRICLE SYSTOLIC VOLUME: 30.37 ML
LEFT VENTRICULAR INTERNAL DIMENSION IN DIASTOLE: 4.23 CM (ref 3.5–6)
LEFT VENTRICULAR MASS: 139.78 G
LV LATERAL E/E' RATIO: 17.5 M/S
LV SEPTAL E/E' RATIO: 13.13 M/S
MV PEAK A VEL: 1.27 M/S
MV PEAK E VEL: 1.05 M/S
PISA TR MAX VEL: 2.67 M/S
PULM VEIN S/D RATIO: 1.86
PV PEAK D VEL: 0.42 M/S
PV PEAK S VEL: 0.78 M/S
RA MAJOR: 4.94 CM
RA PRESSURE: 3 MMHG
RA WIDTH: 3.29 CM
RIGHT VENTRICULAR END-DIASTOLIC DIMENSION: 3.68 CM
SINUS: 3.31 CM
STJ: 3.24 CM
TDI LATERAL: 0.06 M/S
TDI SEPTAL: 0.08 M/S
TDI: 0.07 M/S
TR MAX PG: 29 MMHG
TRICUSPID ANNULAR PLANE SYSTOLIC EXCURSION: 2.03 CM
TV REST PULMONARY ARTERY PRESSURE: 32 MMHG

## 2021-01-07 PROCEDURE — 93306 ECHO (CUPID ONLY): ICD-10-PCS | Mod: 26,HCNC,, | Performed by: INTERNAL MEDICINE

## 2021-01-07 PROCEDURE — 93306 TTE W/DOPPLER COMPLETE: CPT | Mod: 26,HCNC,, | Performed by: INTERNAL MEDICINE

## 2021-01-07 PROCEDURE — 93306 TTE W/DOPPLER COMPLETE: CPT | Mod: HCNC

## 2021-01-14 ENCOUNTER — OFFICE VISIT (OUTPATIENT)
Dept: OPHTHALMOLOGY | Facility: CLINIC | Age: 86
End: 2021-01-14
Payer: MEDICARE

## 2021-01-14 DIAGNOSIS — H43.813 POSTERIOR VITREOUS DETACHMENT, BILATERAL: ICD-10-CM

## 2021-01-14 DIAGNOSIS — H35.3132 NONEXUDATIVE AGE-RELATED MACULAR DEGENERATION, BILATERAL, INTERMEDIATE DRY STAGE: ICD-10-CM

## 2021-01-14 DIAGNOSIS — D31.32 NEVUS, CHOROIDAL, LEFT: ICD-10-CM

## 2021-01-14 PROCEDURE — 1126F AMNT PAIN NOTED NONE PRSNT: CPT | Mod: HCNC,S$GLB,, | Performed by: OPHTHALMOLOGY

## 2021-01-14 PROCEDURE — 3288F FALL RISK ASSESSMENT DOCD: CPT | Mod: HCNC,CPTII,S$GLB, | Performed by: OPHTHALMOLOGY

## 2021-01-14 PROCEDURE — 92004 PR EYE EXAM, NEW PATIENT,COMPREHESV: ICD-10-PCS | Mod: HCNC,S$GLB,, | Performed by: OPHTHALMOLOGY

## 2021-01-14 PROCEDURE — 92202 OPSCPY EXTND ON/MAC DRAW: CPT | Mod: HCNC,S$GLB,, | Performed by: OPHTHALMOLOGY

## 2021-01-14 PROCEDURE — 99999 PR PBB SHADOW E&M-EST. PATIENT-LVL III: ICD-10-PCS | Mod: PBBFAC,HCNC,, | Performed by: OPHTHALMOLOGY

## 2021-01-14 PROCEDURE — 1126F PR PAIN SEVERITY QUANTIFIED, NO PAIN PRESENT: ICD-10-PCS | Mod: HCNC,S$GLB,, | Performed by: OPHTHALMOLOGY

## 2021-01-14 PROCEDURE — 1101F PR PT FALLS ASSESS DOC 0-1 FALLS W/OUT INJ PAST YR: ICD-10-PCS | Mod: HCNC,CPTII,S$GLB, | Performed by: OPHTHALMOLOGY

## 2021-01-14 PROCEDURE — 92202 PR OPHTHALMOSCOPY, EXT, W/DRAW OPTIC NERVE/MACULA, I&R, UNI/BI: ICD-10-PCS | Mod: HCNC,S$GLB,, | Performed by: OPHTHALMOLOGY

## 2021-01-14 PROCEDURE — 1101F PT FALLS ASSESS-DOCD LE1/YR: CPT | Mod: HCNC,CPTII,S$GLB, | Performed by: OPHTHALMOLOGY

## 2021-01-14 PROCEDURE — 99999 PR PBB SHADOW E&M-EST. PATIENT-LVL III: CPT | Mod: PBBFAC,HCNC,, | Performed by: OPHTHALMOLOGY

## 2021-01-14 PROCEDURE — 92004 COMPRE OPH EXAM NEW PT 1/>: CPT | Mod: HCNC,S$GLB,, | Performed by: OPHTHALMOLOGY

## 2021-01-14 PROCEDURE — 1157F PR ADVANCE CARE PLAN OR EQUIV PRESENT IN MEDICAL RECORD: ICD-10-PCS | Mod: HCNC,S$GLB,, | Performed by: OPHTHALMOLOGY

## 2021-01-14 PROCEDURE — 3288F PR FALLS RISK ASSESSMENT DOCUMENTED: ICD-10-PCS | Mod: HCNC,CPTII,S$GLB, | Performed by: OPHTHALMOLOGY

## 2021-01-14 PROCEDURE — 1157F ADVNC CARE PLAN IN RCRD: CPT | Mod: HCNC,S$GLB,, | Performed by: OPHTHALMOLOGY

## 2021-02-10 ENCOUNTER — IMMUNIZATION (OUTPATIENT)
Dept: PRIMARY CARE CLINIC | Facility: CLINIC | Age: 86
End: 2021-02-10
Payer: MEDICARE

## 2021-02-10 DIAGNOSIS — Z23 NEED FOR VACCINATION: Primary | ICD-10-CM

## 2021-02-10 PROCEDURE — 0001A PR IMMUNIZ ADMIN, SARS-COV-2 COVID-19 VACC, 30MCG/0.3ML, 1ST DOSE: CPT | Mod: PBBFAC | Performed by: INTERNAL MEDICINE

## 2021-02-10 PROCEDURE — 91300 PR SARS-COV- 2 COVID-19 VACCINE, NO PRSV, 30MCG/0.3ML, IM: CPT | Mod: PBBFAC | Performed by: INTERNAL MEDICINE

## 2021-02-10 RX ADMIN — RNA INGREDIENT BNT-162B2 0.3 ML: 0.23 INJECTION, SUSPENSION INTRAMUSCULAR at 12:02

## 2021-03-03 ENCOUNTER — IMMUNIZATION (OUTPATIENT)
Dept: PRIMARY CARE CLINIC | Facility: CLINIC | Age: 86
End: 2021-03-03
Payer: MEDICARE

## 2021-03-03 DIAGNOSIS — Z23 NEED FOR VACCINATION: Primary | ICD-10-CM

## 2021-03-03 PROCEDURE — 91300 PR SARS-COV- 2 COVID-19 VACCINE, NO PRSV, 30MCG/0.3ML, IM: ICD-10-PCS | Mod: S$GLB,,, | Performed by: INTERNAL MEDICINE

## 2021-03-03 PROCEDURE — 0002A PR IMMUNIZ ADMIN, SARS-COV-2 COVID-19 VACC, 30MCG/0.3ML, 2ND DOSE: ICD-10-PCS | Mod: CV19,S$GLB,, | Performed by: INTERNAL MEDICINE

## 2021-03-03 PROCEDURE — 0002A PR IMMUNIZ ADMIN, SARS-COV-2 COVID-19 VACC, 30MCG/0.3ML, 2ND DOSE: CPT | Mod: CV19,S$GLB,, | Performed by: INTERNAL MEDICINE

## 2021-03-03 PROCEDURE — 91300 PR SARS-COV- 2 COVID-19 VACCINE, NO PRSV, 30MCG/0.3ML, IM: CPT | Mod: S$GLB,,, | Performed by: INTERNAL MEDICINE

## 2021-03-03 RX ADMIN — Medication 0.3 ML: at 01:03

## 2021-05-20 ENCOUNTER — TELEPHONE (OUTPATIENT)
Dept: UROGYNECOLOGY | Facility: CLINIC | Age: 86
End: 2021-05-20

## 2021-05-27 ENCOUNTER — PES CALL (OUTPATIENT)
Dept: ADMINISTRATIVE | Facility: CLINIC | Age: 86
End: 2021-05-27

## 2021-05-28 ENCOUNTER — OFFICE VISIT (OUTPATIENT)
Dept: FAMILY MEDICINE | Facility: CLINIC | Age: 86
End: 2021-05-28
Payer: MEDICARE

## 2021-05-28 VITALS
HEART RATE: 77 BPM | WEIGHT: 145.5 LBS | OXYGEN SATURATION: 96 % | SYSTOLIC BLOOD PRESSURE: 120 MMHG | BODY MASS INDEX: 24.98 KG/M2 | DIASTOLIC BLOOD PRESSURE: 82 MMHG

## 2021-05-28 DIAGNOSIS — I70.0 AORTIC ARCH ATHEROSCLEROSIS: ICD-10-CM

## 2021-05-28 DIAGNOSIS — E78.5 DYSLIPIDEMIA: ICD-10-CM

## 2021-05-28 DIAGNOSIS — D75.839 THROMBOCYTOSIS: ICD-10-CM

## 2021-05-28 DIAGNOSIS — F41.1 GENERALIZED ANXIETY DISORDER: ICD-10-CM

## 2021-05-28 DIAGNOSIS — M81.0 SENILE OSTEOPOROSIS: ICD-10-CM

## 2021-05-28 DIAGNOSIS — I51.89 LEFT VENTRICULAR DIASTOLIC DYSFUNCTION WITH PRESERVED SYSTOLIC FUNCTION: ICD-10-CM

## 2021-05-28 DIAGNOSIS — I10 ESSENTIAL HYPERTENSION: ICD-10-CM

## 2021-05-28 DIAGNOSIS — M15.9 OSTEOARTHRITIS OF MULTIPLE JOINTS, UNSPECIFIED OSTEOARTHRITIS TYPE: ICD-10-CM

## 2021-05-28 DIAGNOSIS — J30.2 SEASONAL ALLERGIC RHINITIS, UNSPECIFIED TRIGGER: ICD-10-CM

## 2021-05-28 DIAGNOSIS — Z00.00 ENCOUNTER FOR PREVENTIVE HEALTH EXAMINATION: Primary | ICD-10-CM

## 2021-05-28 DIAGNOSIS — H35.3132 NONEXUDATIVE AGE-RELATED MACULAR DEGENERATION, BILATERAL, INTERMEDIATE DRY STAGE: ICD-10-CM

## 2021-05-28 DIAGNOSIS — N39.46 MIXED INCONTINENCE URGE AND STRESS (MALE)(FEMALE): ICD-10-CM

## 2021-05-28 PROBLEM — R29.898 WEAKNESS OF RIGHT UPPER EXTREMITY: Status: RESOLVED | Noted: 2020-07-15 | Resolved: 2021-05-28

## 2021-05-28 PROBLEM — M54.50 LOW BACK PAIN: Status: RESOLVED | Noted: 2018-11-14 | Resolved: 2021-05-28

## 2021-05-28 PROBLEM — M79.604 RIGHT LEG PAIN: Status: RESOLVED | Noted: 2018-07-17 | Resolved: 2021-05-28

## 2021-05-28 PROBLEM — S72.141A CLOSED DISPLACED INTERTROCHANTERIC FRACTURE OF RIGHT FEMUR: Status: RESOLVED | Noted: 2018-05-23 | Resolved: 2021-05-28

## 2021-05-28 PROBLEM — S72.141D CLOSED DISPLACED INTERTROCHANTERIC FRACTURE OF RIGHT FEMUR WITH ROUTINE HEALING: Status: RESOLVED | Noted: 2018-05-19 | Resolved: 2021-05-28

## 2021-05-28 PROBLEM — S72.001D ENCOUNTER FOR AFTERCARE FOR HEALING CLOSED TRAUMATIC FRACTURE OF RIGHT HIP: Status: RESOLVED | Noted: 2018-05-22 | Resolved: 2021-05-28

## 2021-05-28 PROCEDURE — 99999 PR PBB SHADOW E&M-EST. PATIENT-LVL IV: ICD-10-PCS | Mod: PBBFAC,,, | Performed by: NURSE PRACTITIONER

## 2021-05-28 PROCEDURE — 3288F PR FALLS RISK ASSESSMENT DOCUMENTED: ICD-10-PCS | Mod: CPTII,S$GLB,, | Performed by: NURSE PRACTITIONER

## 2021-05-28 PROCEDURE — 1126F PR PAIN SEVERITY QUANTIFIED, NO PAIN PRESENT: ICD-10-PCS | Mod: S$GLB,,, | Performed by: NURSE PRACTITIONER

## 2021-05-28 PROCEDURE — 99499 RISK ADDL DX/OHS AUDIT: ICD-10-PCS | Mod: S$PBB,HCNC,, | Performed by: NURSE PRACTITIONER

## 2021-05-28 PROCEDURE — 1101F PT FALLS ASSESS-DOCD LE1/YR: CPT | Mod: CPTII,S$GLB,, | Performed by: NURSE PRACTITIONER

## 2021-05-28 PROCEDURE — 1123F ACP DISCUSS/DSCN MKR DOCD: CPT | Mod: S$GLB,,, | Performed by: NURSE PRACTITIONER

## 2021-05-28 PROCEDURE — 1101F PR PT FALLS ASSESS DOC 0-1 FALLS W/OUT INJ PAST YR: ICD-10-PCS | Mod: CPTII,S$GLB,, | Performed by: NURSE PRACTITIONER

## 2021-05-28 PROCEDURE — G0439 PPPS, SUBSEQ VISIT: HCPCS | Mod: S$GLB,,, | Performed by: NURSE PRACTITIONER

## 2021-05-28 PROCEDURE — 1126F AMNT PAIN NOTED NONE PRSNT: CPT | Mod: S$GLB,,, | Performed by: NURSE PRACTITIONER

## 2021-05-28 PROCEDURE — 99499 UNLISTED E&M SERVICE: CPT | Mod: S$PBB,HCNC,, | Performed by: NURSE PRACTITIONER

## 2021-05-28 PROCEDURE — 3288F FALL RISK ASSESSMENT DOCD: CPT | Mod: CPTII,S$GLB,, | Performed by: NURSE PRACTITIONER

## 2021-05-28 PROCEDURE — 1123F PR ADV CARE PLAN DISCUSSED, PLAN OR SURROGATE DOCUMENTED: ICD-10-PCS | Mod: S$GLB,,, | Performed by: NURSE PRACTITIONER

## 2021-05-28 PROCEDURE — 99999 PR PBB SHADOW E&M-EST. PATIENT-LVL IV: CPT | Mod: PBBFAC,,, | Performed by: NURSE PRACTITIONER

## 2021-05-28 PROCEDURE — G0439 PR MEDICARE ANNUAL WELLNESS SUBSEQUENT VISIT: ICD-10-PCS | Mod: S$GLB,,, | Performed by: NURSE PRACTITIONER

## 2021-06-11 ENCOUNTER — OFFICE VISIT (OUTPATIENT)
Dept: INTERNAL MEDICINE | Facility: CLINIC | Age: 86
End: 2021-06-11
Payer: MEDICARE

## 2021-06-11 VITALS
TEMPERATURE: 98 F | HEART RATE: 72 BPM | DIASTOLIC BLOOD PRESSURE: 80 MMHG | BODY MASS INDEX: 25.78 KG/M2 | RESPIRATION RATE: 14 BRPM | WEIGHT: 145.5 LBS | SYSTOLIC BLOOD PRESSURE: 122 MMHG | HEIGHT: 63 IN

## 2021-06-11 DIAGNOSIS — I10 HTN (HYPERTENSION), BENIGN: Primary | ICD-10-CM

## 2021-06-11 DIAGNOSIS — E78.5 DYSLIPIDEMIA: ICD-10-CM

## 2021-06-11 DIAGNOSIS — I35.0 SEVERE AORTIC STENOSIS: ICD-10-CM

## 2021-06-11 PROCEDURE — 1157F PR ADVANCE CARE PLAN OR EQUIV PRESENT IN MEDICAL RECORD: ICD-10-PCS | Mod: S$GLB,,, | Performed by: INTERNAL MEDICINE

## 2021-06-11 PROCEDURE — 3074F PR MOST RECENT SYSTOLIC BLOOD PRESSURE < 130 MM HG: ICD-10-PCS | Mod: CPTII,S$GLB,, | Performed by: INTERNAL MEDICINE

## 2021-06-11 PROCEDURE — 99999 PR PBB SHADOW E&M-EST. PATIENT-LVL IV: CPT | Mod: PBBFAC,,, | Performed by: INTERNAL MEDICINE

## 2021-06-11 PROCEDURE — 99213 PR OFFICE/OUTPT VISIT, EST, LEVL III, 20-29 MIN: ICD-10-PCS | Mod: S$GLB,,, | Performed by: INTERNAL MEDICINE

## 2021-06-11 PROCEDURE — 1159F MED LIST DOCD IN RCRD: CPT | Mod: S$GLB,,, | Performed by: INTERNAL MEDICINE

## 2021-06-11 PROCEDURE — 1157F ADVNC CARE PLAN IN RCRD: CPT | Mod: S$GLB,,, | Performed by: INTERNAL MEDICINE

## 2021-06-11 PROCEDURE — 1126F AMNT PAIN NOTED NONE PRSNT: CPT | Mod: S$GLB,,, | Performed by: INTERNAL MEDICINE

## 2021-06-11 PROCEDURE — 99213 OFFICE O/P EST LOW 20 MIN: CPT | Mod: S$GLB,,, | Performed by: INTERNAL MEDICINE

## 2021-06-11 PROCEDURE — 1101F PR PT FALLS ASSESS DOC 0-1 FALLS W/OUT INJ PAST YR: ICD-10-PCS | Mod: CPTII,S$GLB,, | Performed by: INTERNAL MEDICINE

## 2021-06-11 PROCEDURE — 1126F PR PAIN SEVERITY QUANTIFIED, NO PAIN PRESENT: ICD-10-PCS | Mod: S$GLB,,, | Performed by: INTERNAL MEDICINE

## 2021-06-11 PROCEDURE — 1159F PR MEDICATION LIST DOCUMENTED IN MEDICAL RECORD: ICD-10-PCS | Mod: S$GLB,,, | Performed by: INTERNAL MEDICINE

## 2021-06-11 PROCEDURE — 3079F DIAST BP 80-89 MM HG: CPT | Mod: CPTII,S$GLB,, | Performed by: INTERNAL MEDICINE

## 2021-06-11 PROCEDURE — 3079F PR MOST RECENT DIASTOLIC BLOOD PRESSURE 80-89 MM HG: ICD-10-PCS | Mod: CPTII,S$GLB,, | Performed by: INTERNAL MEDICINE

## 2021-06-11 PROCEDURE — 99999 PR PBB SHADOW E&M-EST. PATIENT-LVL IV: ICD-10-PCS | Mod: PBBFAC,,, | Performed by: INTERNAL MEDICINE

## 2021-06-11 PROCEDURE — 3074F SYST BP LT 130 MM HG: CPT | Mod: CPTII,S$GLB,, | Performed by: INTERNAL MEDICINE

## 2021-06-11 PROCEDURE — 1101F PT FALLS ASSESS-DOCD LE1/YR: CPT | Mod: CPTII,S$GLB,, | Performed by: INTERNAL MEDICINE

## 2021-06-11 PROCEDURE — 3288F PR FALLS RISK ASSESSMENT DOCUMENTED: ICD-10-PCS | Mod: CPTII,S$GLB,, | Performed by: INTERNAL MEDICINE

## 2021-06-11 PROCEDURE — 3288F FALL RISK ASSESSMENT DOCD: CPT | Mod: CPTII,S$GLB,, | Performed by: INTERNAL MEDICINE

## 2021-06-16 ENCOUNTER — OFFICE VISIT (OUTPATIENT)
Dept: CARDIOLOGY | Facility: CLINIC | Age: 86
End: 2021-06-16
Payer: MEDICARE

## 2021-06-16 VITALS
HEIGHT: 63 IN | BODY MASS INDEX: 25.78 KG/M2 | HEART RATE: 80 BPM | WEIGHT: 145.5 LBS | DIASTOLIC BLOOD PRESSURE: 58 MMHG | SYSTOLIC BLOOD PRESSURE: 149 MMHG

## 2021-06-16 DIAGNOSIS — I35.0 SEVERE AORTIC STENOSIS: Primary | ICD-10-CM

## 2021-06-16 DIAGNOSIS — I10 ESSENTIAL HYPERTENSION: ICD-10-CM

## 2021-06-16 DIAGNOSIS — E78.5 DYSLIPIDEMIA: ICD-10-CM

## 2021-06-16 PROCEDURE — 1126F AMNT PAIN NOTED NONE PRSNT: CPT | Mod: S$GLB,,, | Performed by: INTERNAL MEDICINE

## 2021-06-16 PROCEDURE — 3288F PR FALLS RISK ASSESSMENT DOCUMENTED: ICD-10-PCS | Mod: CPTII,S$GLB,, | Performed by: INTERNAL MEDICINE

## 2021-06-16 PROCEDURE — 93010 EKG 12-LEAD: ICD-10-PCS | Mod: S$GLB,,, | Performed by: INTERNAL MEDICINE

## 2021-06-16 PROCEDURE — 3288F FALL RISK ASSESSMENT DOCD: CPT | Mod: CPTII,S$GLB,, | Performed by: INTERNAL MEDICINE

## 2021-06-16 PROCEDURE — 99999 PR PBB SHADOW E&M-EST. PATIENT-LVL III: ICD-10-PCS | Mod: PBBFAC,,, | Performed by: INTERNAL MEDICINE

## 2021-06-16 PROCEDURE — 99204 PR OFFICE/OUTPT VISIT, NEW, LEVL IV, 45-59 MIN: ICD-10-PCS | Mod: S$GLB,,, | Performed by: INTERNAL MEDICINE

## 2021-06-16 PROCEDURE — 1157F PR ADVANCE CARE PLAN OR EQUIV PRESENT IN MEDICAL RECORD: ICD-10-PCS | Mod: S$GLB,,, | Performed by: INTERNAL MEDICINE

## 2021-06-16 PROCEDURE — 1159F PR MEDICATION LIST DOCUMENTED IN MEDICAL RECORD: ICD-10-PCS | Mod: S$GLB,,, | Performed by: INTERNAL MEDICINE

## 2021-06-16 PROCEDURE — 1101F PT FALLS ASSESS-DOCD LE1/YR: CPT | Mod: CPTII,S$GLB,, | Performed by: INTERNAL MEDICINE

## 2021-06-16 PROCEDURE — 1126F PR PAIN SEVERITY QUANTIFIED, NO PAIN PRESENT: ICD-10-PCS | Mod: S$GLB,,, | Performed by: INTERNAL MEDICINE

## 2021-06-16 PROCEDURE — 99204 OFFICE O/P NEW MOD 45 MIN: CPT | Mod: S$GLB,,, | Performed by: INTERNAL MEDICINE

## 2021-06-16 PROCEDURE — 1157F ADVNC CARE PLAN IN RCRD: CPT | Mod: S$GLB,,, | Performed by: INTERNAL MEDICINE

## 2021-06-16 PROCEDURE — 93010 ELECTROCARDIOGRAM REPORT: CPT | Mod: S$GLB,,, | Performed by: INTERNAL MEDICINE

## 2021-06-16 PROCEDURE — 99999 PR PBB SHADOW E&M-EST. PATIENT-LVL III: CPT | Mod: PBBFAC,,, | Performed by: INTERNAL MEDICINE

## 2021-06-16 PROCEDURE — 93005 EKG 12-LEAD: ICD-10-PCS | Mod: S$GLB,,, | Performed by: INTERNAL MEDICINE

## 2021-06-16 PROCEDURE — 1101F PR PT FALLS ASSESS DOC 0-1 FALLS W/OUT INJ PAST YR: ICD-10-PCS | Mod: CPTII,S$GLB,, | Performed by: INTERNAL MEDICINE

## 2021-06-16 PROCEDURE — 93005 ELECTROCARDIOGRAM TRACING: CPT | Mod: S$GLB,,, | Performed by: INTERNAL MEDICINE

## 2021-06-16 PROCEDURE — 1159F MED LIST DOCD IN RCRD: CPT | Mod: S$GLB,,, | Performed by: INTERNAL MEDICINE

## 2021-06-21 DIAGNOSIS — F41.9 ANXIETY: ICD-10-CM

## 2021-06-21 RX ORDER — ESCITALOPRAM OXALATE 5 MG/1
5 TABLET ORAL DAILY
Qty: 90 TABLET | Refills: 0 | OUTPATIENT
Start: 2021-06-21

## 2021-07-01 DIAGNOSIS — F41.9 ANXIETY: ICD-10-CM

## 2021-07-01 RX ORDER — ESCITALOPRAM OXALATE 5 MG/1
5 TABLET ORAL DAILY
Qty: 90 TABLET | Refills: 0 | Status: SHIPPED | OUTPATIENT
Start: 2021-07-01 | End: 2021-07-06 | Stop reason: SDUPTHER

## 2021-07-01 RX ORDER — FLUTICASONE PROPIONATE 50 MCG
1 SPRAY, SUSPENSION (ML) NASAL NIGHTLY
Qty: 16 G | Refills: 3 | Status: SHIPPED | OUTPATIENT
Start: 2021-07-01 | End: 2022-06-24

## 2021-07-06 RX ORDER — ESCITALOPRAM OXALATE 5 MG/1
5 TABLET ORAL DAILY
Qty: 90 TABLET | Refills: 0 | Status: SHIPPED | OUTPATIENT
Start: 2021-07-06 | End: 2021-09-14 | Stop reason: SDUPTHER

## 2021-09-14 DIAGNOSIS — F41.9 ANXIETY: ICD-10-CM

## 2021-09-14 RX ORDER — ESCITALOPRAM OXALATE 5 MG/1
5 TABLET ORAL DAILY
Qty: 90 TABLET | Refills: 0 | Status: SHIPPED | OUTPATIENT
Start: 2021-09-14 | End: 2021-09-17 | Stop reason: SDUPTHER

## 2021-09-17 DIAGNOSIS — F41.9 ANXIETY: ICD-10-CM

## 2021-09-17 RX ORDER — ESCITALOPRAM OXALATE 5 MG/1
5 TABLET ORAL DAILY
Qty: 90 TABLET | Refills: 0 | Status: SHIPPED | OUTPATIENT
Start: 2021-09-17 | End: 2022-04-21 | Stop reason: SDUPTHER

## 2021-11-01 ENCOUNTER — HOSPITAL ENCOUNTER (OUTPATIENT)
Dept: CARDIOLOGY | Facility: HOSPITAL | Age: 86
Discharge: HOME | End: 2021-11-01
Attending: INTERNAL MEDICINE
Payer: MEDICARE

## 2021-11-01 VITALS
BODY MASS INDEX: 25.69 KG/M2 | WEIGHT: 145 LBS | HEIGHT: 63 IN | SYSTOLIC BLOOD PRESSURE: 164 MMHG | DIASTOLIC BLOOD PRESSURE: 84 MMHG | HEART RATE: 67 BPM

## 2021-11-01 DIAGNOSIS — I35.0 SEVERE AORTIC STENOSIS: ICD-10-CM

## 2021-11-01 LAB
ASCENDING AORTA: 4.01 CM
AV INDEX (PROSTH): 0.21
AV MEAN GRADIENT: 47 MMHG
AV PEAK GRADIENT: 77 MMHG
AV VALVE AREA: 0.73 CM2
AV VELOCITY RATIO: 0.21
BSA FOR ECHO PROCEDURE: 1.71 M2
CV ECHO LV RWT: 0.31 CM
DOP CALC AO PEAK VEL: 4.4 M/S
DOP CALC AO VTI: 120.69 CM
DOP CALC LVOT AREA: 3.6 CM2
DOP CALC LVOT DIAMETER: 2.13 CM
DOP CALC LVOT PEAK VEL: 0.92 M/S
DOP CALC LVOT STROKE VOLUME: 88.64 CM3
DOP CALCLVOT PEAK VEL VTI: 24.89 CM
E WAVE DECELERATION TIME: 213.81 MSEC
E/A RATIO: 0.79
E/E' RATIO: 20.67 M/S
ECHO LV POSTERIOR WALL: 0.74 CM (ref 0.6–1.1)
EJECTION FRACTION: 65 %
FRACTIONAL SHORTENING: 42 % (ref 28–44)
INTERVENTRICULAR SEPTUM: 0.84 CM (ref 0.6–1.1)
IVRT: 114.18 MSEC
LA MAJOR: 4.91 CM
LA MINOR: 5.42 CM
LA WIDTH: 4.68 CM
LEFT ATRIUM SIZE: 3.53 CM
LEFT ATRIUM VOLUME INDEX MOD: 42.5 ML/M2
LEFT ATRIUM VOLUME INDEX: 42.8 ML/M2
LEFT ATRIUM VOLUME MOD: 71.88 CM3
LEFT ATRIUM VOLUME: 72.35 CM3
LEFT INTERNAL DIMENSION IN SYSTOLE: 2.75 CM (ref 2.1–4)
LEFT VENTRICLE DIASTOLIC VOLUME INDEX: 60.85 ML/M2
LEFT VENTRICLE DIASTOLIC VOLUME: 102.84 ML
LEFT VENTRICLE MASS INDEX: 71 G/M2
LEFT VENTRICLE SYSTOLIC VOLUME INDEX: 16.8 ML/M2
LEFT VENTRICLE SYSTOLIC VOLUME: 28.37 ML
LEFT VENTRICULAR INTERNAL DIMENSION IN DIASTOLE: 4.71 CM (ref 3.5–6)
LEFT VENTRICULAR MASS: 120.72 G
LV LATERAL E/E' RATIO: 18.6 M/S
LV SEPTAL E/E' RATIO: 23.25 M/S
MV PEAK A VEL: 1.17 M/S
MV PEAK E VEL: 0.93 M/S
MV STENOSIS PRESSURE HALF TIME: 62.01 MS
MV VALVE AREA P 1/2 METHOD: 3.55 CM2
PISA TR MAX VEL: 2.52 M/S
PULM VEIN S/D RATIO: 1.68
PV PEAK D VEL: 0.41 M/S
PV PEAK S VEL: 0.69 M/S
RA MAJOR: 4.8 CM
RA PRESSURE: 3 MMHG
RA WIDTH: 3.34 CM
RIGHT VENTRICULAR END-DIASTOLIC DIMENSION: 3.31 CM
SINUS: 3.33 CM
STJ: 3.16 CM
TDI LATERAL: 0.05 M/S
TDI SEPTAL: 0.04 M/S
TDI: 0.05 M/S
TR MAX PG: 25 MMHG
TRICUSPID ANNULAR PLANE SYSTOLIC EXCURSION: 1.72 CM
TV REST PULMONARY ARTERY PRESSURE: 28 MMHG

## 2021-11-01 PROCEDURE — 93306 TTE W/DOPPLER COMPLETE: CPT | Mod: 26,,, | Performed by: INTERNAL MEDICINE

## 2021-11-01 PROCEDURE — 93306 TTE W/DOPPLER COMPLETE: CPT

## 2021-11-01 PROCEDURE — 93306 ECHO (CUPID ONLY): ICD-10-PCS | Mod: 26,,, | Performed by: INTERNAL MEDICINE

## 2021-12-14 ENCOUNTER — TELEPHONE (OUTPATIENT)
Dept: INTERNAL MEDICINE | Facility: CLINIC | Age: 86
End: 2021-12-14

## 2021-12-14 ENCOUNTER — OFFICE VISIT (OUTPATIENT)
Dept: INTERNAL MEDICINE | Facility: CLINIC | Age: 86
End: 2021-12-14
Payer: MEDICARE

## 2021-12-14 VITALS
WEIGHT: 147.06 LBS | RESPIRATION RATE: 18 BRPM | OXYGEN SATURATION: 98 % | TEMPERATURE: 98 F | HEIGHT: 63 IN | SYSTOLIC BLOOD PRESSURE: 140 MMHG | HEART RATE: 73 BPM | DIASTOLIC BLOOD PRESSURE: 82 MMHG | BODY MASS INDEX: 26.06 KG/M2

## 2021-12-14 DIAGNOSIS — Z00.00 ENCOUNTER FOR PREVENTIVE HEALTH EXAMINATION: Primary | ICD-10-CM

## 2021-12-14 DIAGNOSIS — E78.5 DYSLIPIDEMIA: ICD-10-CM

## 2021-12-14 DIAGNOSIS — I35.0 SEVERE AORTIC STENOSIS: ICD-10-CM

## 2021-12-14 DIAGNOSIS — M81.0 SENILE OSTEOPOROSIS: ICD-10-CM

## 2021-12-14 DIAGNOSIS — I10 ESSENTIAL HYPERTENSION: ICD-10-CM

## 2021-12-14 PROCEDURE — 99499 UNLISTED E&M SERVICE: CPT | Mod: HCNC,S$GLB,, | Performed by: INTERNAL MEDICINE

## 2021-12-14 PROCEDURE — 1157F PR ADVANCE CARE PLAN OR EQUIV PRESENT IN MEDICAL RECORD: ICD-10-PCS | Mod: HCNC,CPTII,S$GLB, | Performed by: INTERNAL MEDICINE

## 2021-12-14 PROCEDURE — 99397 PR PREVENTIVE VISIT,EST,65 & OVER: ICD-10-PCS | Mod: HCNC,S$GLB,, | Performed by: INTERNAL MEDICINE

## 2021-12-14 PROCEDURE — 1157F ADVNC CARE PLAN IN RCRD: CPT | Mod: HCNC,CPTII,S$GLB, | Performed by: INTERNAL MEDICINE

## 2021-12-14 PROCEDURE — 99397 PER PM REEVAL EST PAT 65+ YR: CPT | Mod: HCNC,S$GLB,, | Performed by: INTERNAL MEDICINE

## 2021-12-14 PROCEDURE — 99999 PR PBB SHADOW E&M-EST. PATIENT-LVL III: ICD-10-PCS | Mod: PBBFAC,HCNC,, | Performed by: INTERNAL MEDICINE

## 2021-12-14 PROCEDURE — 99499 RISK ADDL DX/OHS AUDIT: ICD-10-PCS | Mod: HCNC,S$GLB,, | Performed by: INTERNAL MEDICINE

## 2021-12-14 PROCEDURE — 99999 PR PBB SHADOW E&M-EST. PATIENT-LVL III: CPT | Mod: PBBFAC,HCNC,, | Performed by: INTERNAL MEDICINE

## 2021-12-29 ENCOUNTER — TELEPHONE (OUTPATIENT)
Dept: CARDIOLOGY | Facility: CLINIC | Age: 86
End: 2021-12-29
Payer: MEDICARE

## 2022-01-06 ENCOUNTER — PATIENT OUTREACH (OUTPATIENT)
Dept: ADMINISTRATIVE | Facility: OTHER | Age: 87
End: 2022-01-06
Payer: MEDICARE

## 2022-01-10 ENCOUNTER — OFFICE VISIT (OUTPATIENT)
Dept: CARDIOLOGY | Facility: CLINIC | Age: 87
End: 2022-01-10
Payer: MEDICARE

## 2022-01-10 VITALS
SYSTOLIC BLOOD PRESSURE: 136 MMHG | WEIGHT: 144.31 LBS | DIASTOLIC BLOOD PRESSURE: 60 MMHG | HEIGHT: 63 IN | BODY MASS INDEX: 25.57 KG/M2 | HEART RATE: 76 BPM | RESPIRATION RATE: 20 BRPM

## 2022-01-10 DIAGNOSIS — I10 ESSENTIAL HYPERTENSION: ICD-10-CM

## 2022-01-10 DIAGNOSIS — E78.5 DYSLIPIDEMIA: ICD-10-CM

## 2022-01-10 DIAGNOSIS — I35.0 SEVERE AORTIC STENOSIS: Primary | ICD-10-CM

## 2022-01-10 DIAGNOSIS — Z01.818 PRE-OP TESTING: ICD-10-CM

## 2022-01-10 PROCEDURE — 3075F SYST BP GE 130 - 139MM HG: CPT | Mod: HCNC,CPTII,S$GLB, | Performed by: INTERNAL MEDICINE

## 2022-01-10 PROCEDURE — 1157F ADVNC CARE PLAN IN RCRD: CPT | Mod: HCNC,CPTII,S$GLB, | Performed by: INTERNAL MEDICINE

## 2022-01-10 PROCEDURE — 3075F PR MOST RECENT SYSTOLIC BLOOD PRESS GE 130-139MM HG: ICD-10-PCS | Mod: HCNC,CPTII,S$GLB, | Performed by: INTERNAL MEDICINE

## 2022-01-10 PROCEDURE — 99215 PR OFFICE/OUTPT VISIT, EST, LEVL V, 40-54 MIN: ICD-10-PCS | Mod: HCNC,S$GLB,, | Performed by: INTERNAL MEDICINE

## 2022-01-10 PROCEDURE — 3078F DIAST BP <80 MM HG: CPT | Mod: HCNC,CPTII,S$GLB, | Performed by: INTERNAL MEDICINE

## 2022-01-10 PROCEDURE — 1101F PR PT FALLS ASSESS DOC 0-1 FALLS W/OUT INJ PAST YR: ICD-10-PCS | Mod: HCNC,CPTII,S$GLB, | Performed by: INTERNAL MEDICINE

## 2022-01-10 PROCEDURE — 3078F PR MOST RECENT DIASTOLIC BLOOD PRESSURE < 80 MM HG: ICD-10-PCS | Mod: HCNC,CPTII,S$GLB, | Performed by: INTERNAL MEDICINE

## 2022-01-10 PROCEDURE — 1101F PT FALLS ASSESS-DOCD LE1/YR: CPT | Mod: HCNC,CPTII,S$GLB, | Performed by: INTERNAL MEDICINE

## 2022-01-10 PROCEDURE — 99499 UNLISTED E&M SERVICE: CPT | Mod: S$GLB,,, | Performed by: INTERNAL MEDICINE

## 2022-01-10 PROCEDURE — 1160F PR REVIEW ALL MEDS BY PRESCRIBER/CLIN PHARMACIST DOCUMENTED: ICD-10-PCS | Mod: HCNC,CPTII,S$GLB, | Performed by: INTERNAL MEDICINE

## 2022-01-10 PROCEDURE — 3288F PR FALLS RISK ASSESSMENT DOCUMENTED: ICD-10-PCS | Mod: HCNC,CPTII,S$GLB, | Performed by: INTERNAL MEDICINE

## 2022-01-10 PROCEDURE — 99999 PR PBB SHADOW E&M-EST. PATIENT-LVL III: ICD-10-PCS | Mod: PBBFAC,HCNC,, | Performed by: INTERNAL MEDICINE

## 2022-01-10 PROCEDURE — 99499 RISK ADDL DX/OHS AUDIT: ICD-10-PCS | Mod: S$GLB,,, | Performed by: INTERNAL MEDICINE

## 2022-01-10 PROCEDURE — 99999 PR PBB SHADOW E&M-EST. PATIENT-LVL III: CPT | Mod: PBBFAC,HCNC,, | Performed by: INTERNAL MEDICINE

## 2022-01-10 PROCEDURE — 99215 OFFICE O/P EST HI 40 MIN: CPT | Mod: HCNC,S$GLB,, | Performed by: INTERNAL MEDICINE

## 2022-01-10 PROCEDURE — 1157F PR ADVANCE CARE PLAN OR EQUIV PRESENT IN MEDICAL RECORD: ICD-10-PCS | Mod: HCNC,CPTII,S$GLB, | Performed by: INTERNAL MEDICINE

## 2022-01-10 PROCEDURE — 1159F MED LIST DOCD IN RCRD: CPT | Mod: HCNC,CPTII,S$GLB, | Performed by: INTERNAL MEDICINE

## 2022-01-10 PROCEDURE — 3288F FALL RISK ASSESSMENT DOCD: CPT | Mod: HCNC,CPTII,S$GLB, | Performed by: INTERNAL MEDICINE

## 2022-01-10 PROCEDURE — 1159F PR MEDICATION LIST DOCUMENTED IN MEDICAL RECORD: ICD-10-PCS | Mod: HCNC,CPTII,S$GLB, | Performed by: INTERNAL MEDICINE

## 2022-01-10 PROCEDURE — 1160F RVW MEDS BY RX/DR IN RCRD: CPT | Mod: HCNC,CPTII,S$GLB, | Performed by: INTERNAL MEDICINE

## 2022-01-10 RX ORDER — SODIUM CHLORIDE 9 MG/ML
INJECTION, SOLUTION INTRAVENOUS CONTINUOUS
Status: CANCELLED | OUTPATIENT
Start: 2022-01-10 | End: 2022-01-10

## 2022-01-10 RX ORDER — DIPHENHYDRAMINE HCL 50 MG
50 CAPSULE ORAL ONCE
Status: CANCELLED | OUTPATIENT
Start: 2022-01-10 | End: 2022-01-10

## 2022-01-10 NOTE — ASSESSMENT & PLAN NOTE
The patient has severe, symptomatic aortic stenosis. Recommend initiating assessment for valve replacement. Her ascending aorta has been noted to be around 4.1cm. Will proceed with a diagnostic cardiac catheterization and then anticipate a CT TAVR protocol followed by a visit with the structural heart team to assess for AVR options.    Will order labs and setup for cath. The patient's daughter Nella is a nurse at [employer] and present for this discussion. Both patient and daughter understand potential risks and benefits of cardiac catheterization.

## 2022-01-10 NOTE — H&P (VIEW-ONLY)
Chief Complaint   Patient presents with    Severe aortic stenosis       HPI: This is a pleasant 85 yo F with a h/o aortic stenosis, hypertension, and hyperlipidemia presenting for follow-up.    On initial evaluation in June, 2021 the patient reported no chest discomfort, SOB, or UMANZOR. She had no issues with light headedness, dizziness, or syncope. Since that evaluation she has noted increased UMANZOR. She believes it has been slowly progressive. She has stopped walking on a treadmill due to her UMANZOR. She still does some recumbent bike, up to 15 minutes and does some light weight exercises without issue. Still no chest pain, orthopnea, or LE edema.     She tolerates losartan 50x2 and amlodipine 2.5x1 with normal blood pressures at home.  She had some transient issues with hypotension associated with light headedness and dizziness over the holidays that have resolved. No infectious symptoms at that time. In hindsight she may have been dehydrated.  She tolerates pravastatin as well.    PHYSICAL EXAM:  Vitals:    01/10/22 1033   BP: 136/60   Pulse: 76   Resp: 20       Physical Exam  Constitutional:       Appearance: Normal appearance.   Neck:      Vascular: No carotid bruit or JVD.   Cardiovascular:      Rate and Rhythm: Normal rate and regular rhythm.      Pulses: Normal pulses.           Radial pulses are 2+ on the right side and 2+ on the left side.        Dorsalis pedis pulses are 2+ on the right side and 2+ on the left side.      Heart sounds: S1 normal and S2 normal. Murmur heard.    Harsh crescendo-decrescendo midsystolic murmur is present with a grade of 3/6 at the upper right sternal border radiating to the neck.  No S3 sounds.    Pulmonary:      Effort: Pulmonary effort is normal.      Breath sounds: Normal breath sounds. No rales.   Feet:      Right foot:      Skin integrity: Skin integrity normal.      Left foot:      Skin integrity: Skin integrity normal.   Skin:     General: Skin is warm and dry.       Findings: No lesion.   Neurological:      Mental Status: She is alert and oriented to person, place, and time.      Motor: Motor function is intact.      Gait: Gait is intact.         LABS/CARDIAC TESTS:  CBC and BMP from late 2020 are normal.  LDL is 99 and HDL is 59.   EKG demonstrates sinus rhythm with no Q waves or ST changes.  Echocardiogram from November 2021 demonstrates a normal LV size and function with an LVEF of 65%. Severe AS w a PV 4.40 and MG 47 mm Hg. Mod MR. Mild AI Ascending aorta near 4cm. January 2021 normal LV size and function with mild LVH.  Severe aortic valve stenosis is noted with a peak velocity of 4.1 m/sec and a mean gradient of 40 mm Hg.  Mild ascending aorta dilation measuring 4.1 cm. Mild to moderate AI noted.  Elevated filling pressures with mild mitral regurgitation and a dilated LA.  Estimated PASP in the low 30s with a normal central venous pressure.    ASSESSMENT & PLAN:    Severe aortic stenosis  The patient has severe, symptomatic aortic stenosis. Recommend initiating assessment for valve replacement. Her ascending aorta has been noted to be around 4.1cm. Will proceed with a diagnostic cardiac catheterization and then anticipate a CT TAVR protocol followed by a visit with the structural heart team to assess for AVR options.    Will order labs and setup for cath. The patient's daughter Nella is a nurse at [employer] and present for this discussion. Both patient and daughter understand potential risks and benefits of cardiac catheterization.     Essential hypertension  Permissive hypertension acceptable. Will stop amlodipine therapy and decrease losartan to 50x1 given transient hypotension at the end of December.     Dyslipidemia  On pravastatin. LDL 99.       Severe aortic stenosis  -     Case Request-Cath Lab: Angiogram, Coronary, with Left Heart Cath; Standing    Essential hypertension  -     CBC Without Differential; Future; Expected date: 01/10/2022  -     Comprehensive  Metabolic Panel; Future; Expected date: 01/10/2022  -     TSH; Future; Expected date: 01/10/2022    Dyslipidemia  -     Lipid Panel; Future; Expected date: 01/10/2022    Pre-op testing  -     COVID-19 Routine Screening; Future; Expected date: 01/10/2022        Robby Mistry MD

## 2022-01-10 NOTE — PROGRESS NOTES
Chief Complaint   Patient presents with    Severe aortic stenosis       HPI: This is a pleasant 85 yo F with a h/o aortic stenosis, hypertension, and hyperlipidemia presenting for follow-up.    On initial evaluation in June, 2021 the patient reported no chest discomfort, SOB, or UMANZOR. She had no issues with light headedness, dizziness, or syncope. Since that evaluation she has noted increased UMANZOR. She believes it has been slowly progressive. She has stopped walking on a treadmill due to her UMANZOR. She still does some recumbent bike, up to 15 minutes and does some light weight exercises without issue. Still no chest pain, orthopnea, or LE edema.     She tolerates losartan 50x2 and amlodipine 2.5x1 with normal blood pressures at home.  She had some transient issues with hypotension associated with light headedness and dizziness over the holidays that have resolved. No infectious symptoms at that time. In hindsight she may have been dehydrated.  She tolerates pravastatin as well.    PHYSICAL EXAM:  Vitals:    01/10/22 1033   BP: 136/60   Pulse: 76   Resp: 20       Physical Exam  Constitutional:       Appearance: Normal appearance.   Neck:      Vascular: No carotid bruit or JVD.   Cardiovascular:      Rate and Rhythm: Normal rate and regular rhythm.      Pulses: Normal pulses.           Radial pulses are 2+ on the right side and 2+ on the left side.        Dorsalis pedis pulses are 2+ on the right side and 2+ on the left side.      Heart sounds: S1 normal and S2 normal. Murmur heard.    Harsh crescendo-decrescendo midsystolic murmur is present with a grade of 3/6 at the upper right sternal border radiating to the neck.  No S3 sounds.    Pulmonary:      Effort: Pulmonary effort is normal.      Breath sounds: Normal breath sounds. No rales.   Feet:      Right foot:      Skin integrity: Skin integrity normal.      Left foot:      Skin integrity: Skin integrity normal.   Skin:     General: Skin is warm and dry.       Findings: No lesion.   Neurological:      Mental Status: She is alert and oriented to person, place, and time.      Motor: Motor function is intact.      Gait: Gait is intact.         LABS/CARDIAC TESTS:  CBC and BMP from late 2020 are normal.  LDL is 99 and HDL is 59.   EKG demonstrates sinus rhythm with no Q waves or ST changes.  Echocardiogram from November 2021 demonstrates a normal LV size and function with an LVEF of 65%. Severe AS w a PV 4.40 and MG 47 mm Hg. Mod MR. Mild AI Ascending aorta near 4cm. January 2021 normal LV size and function with mild LVH.  Severe aortic valve stenosis is noted with a peak velocity of 4.1 m/sec and a mean gradient of 40 mm Hg.  Mild ascending aorta dilation measuring 4.1 cm. Mild to moderate AI noted.  Elevated filling pressures with mild mitral regurgitation and a dilated LA.  Estimated PASP in the low 30s with a normal central venous pressure.    ASSESSMENT & PLAN:    Severe aortic stenosis  The patient has severe, symptomatic aortic stenosis. Recommend initiating assessment for valve replacement. Her ascending aorta has been noted to be around 4.1cm. Will proceed with a diagnostic cardiac catheterization and then anticipate a CT TAVR protocol followed by a visit with the structural heart team to assess for AVR options.    Will order labs and setup for cath. The patient's daughter Nella is a nurse at [employer] and present for this discussion. Both patient and daughter understand potential risks and benefits of cardiac catheterization.     Essential hypertension  Permissive hypertension acceptable. Will stop amlodipine therapy and decrease losartan to 50x1 given transient hypotension at the end of December.     Dyslipidemia  On pravastatin. LDL 99.       Severe aortic stenosis  -     Case Request-Cath Lab: Angiogram, Coronary, with Left Heart Cath; Standing    Essential hypertension  -     CBC Without Differential; Future; Expected date: 01/10/2022  -     Comprehensive  Metabolic Panel; Future; Expected date: 01/10/2022  -     TSH; Future; Expected date: 01/10/2022    Dyslipidemia  -     Lipid Panel; Future; Expected date: 01/10/2022    Pre-op testing  -     COVID-19 Routine Screening; Future; Expected date: 01/10/2022        Robby Mistry MD

## 2022-01-10 NOTE — ASSESSMENT & PLAN NOTE
Permissive hypertension acceptable. Will stop amlodipine therapy and decrease losartan to 50x1 given transient hypotension at the end of December.

## 2022-01-12 ENCOUNTER — TELEPHONE (OUTPATIENT)
Dept: CARDIOLOGY | Facility: CLINIC | Age: 87
End: 2022-01-12
Payer: MEDICARE

## 2022-01-12 NOTE — TELEPHONE ENCOUNTER
OUTPATIENT CATHETERIZATION INSTRUCTIONS    You have been scheduled for a procedure in the catheterization lab on Monday, January 24, 2022.     Please report to the Cardiology Waiting Area on the Third floor of the hospital and check in at 11:00 AM.    You will then be taken to the SSCU (Short Stay Cardiac Unit) and prepared for your procedure. Please be aware that this is not the time of your procedure but the time you are to arrive. The procedures are scheduled on an hourly basis; however, emergency cases take precedence over all other cases.       You may not have anything to eat or drink after midnight the night before your test. You may take your regular morning medications with water. If there are any medications that you should not take you will be instructed to hold them that morning. If you are diabetic and on Metformin (Glucophage) do not take it the day before, the day of, and for 2 days after your procedure.      The procedure will take 1-2 hours to perform. After the procedure, you will return to SSCU on the third floor of the hospital. You will need to lie still (or keep your arm still) for the next 4 to 6 hours to minimize bleeding from the puncture site. Your family may remain in the room with you during this time.       You may be able to be discharged home that same afternoon if there is someone to drive you home and there were no complications. If you have one of the balloon, stent, or device procedures you may spend the night in the hospital. Your doctor will determine, based on your progress, the date and time of your discharge. The results of your procedure will be discussed with you before you are discharged. Any further testing or procedures will be scheduled for you either before you leave or you will be called with these appointments.       If you should have any questions, concerns, or need to change the date of your procedure, please call our office at [phone].    Special  Instructions:          THE ABOVE INSTRUCTIONS WERE GIVEN TO THE PATIENT VERBALLY AND THEY VERBALIZED UNDERSTANDING.  THEY DO NOT REQUIRE ANY SPECIAL NEEDS AND DO NOT HAVE ANY LEARNING BARRIERS.          Directions for Reporting to Cardiology Waiting Area in the Hospital  If you park in the Parking Garage:  Take elevators to the1st floor of the parking garage.  Continue past the gift shop, coffee shop, and piano.  Take a right and go to the gold elevators. (Elevator B)  Take the elevator to the 3rd floor.  Follow the arrow on the sign on the wall that says Cath Lab Registration/EP Lab Registration.  Follow the long hallway all the way around until you come to a big open area.  This is the registration area.  Check in at Reception Desk.    OR    If family is dropping you off:  Have them drop you off at the front of the Hospital under the green overhang.  Enter through the doors and take a right.  Take the E elevators to the 3rd floor Cardiology Waiting Area.  Check in at the Reception Desk in the waiting room.

## 2022-01-19 ENCOUNTER — LAB VISIT (OUTPATIENT)
Dept: LAB | Facility: HOSPITAL | Age: 87
End: 2022-01-19
Attending: INTERNAL MEDICINE
Payer: MEDICARE

## 2022-01-19 DIAGNOSIS — I10 ESSENTIAL HYPERTENSION: ICD-10-CM

## 2022-01-19 DIAGNOSIS — E78.5 DYSLIPIDEMIA: ICD-10-CM

## 2022-01-19 LAB
ALBUMIN SERPL BCP-MCNC: 3.9 G/DL (ref 3.5–5.2)
ALP SERPL-CCNC: 57 U/L (ref 55–135)
ALT SERPL W/O P-5'-P-CCNC: 19 U/L (ref 10–44)
ANION GAP SERPL CALC-SCNC: 12 MMOL/L (ref 8–16)
AST SERPL-CCNC: 24 U/L (ref 10–40)
BILIRUB SERPL-MCNC: 0.5 MG/DL (ref 0.1–1)
BUN SERPL-MCNC: 21 MG/DL (ref 8–23)
CALCIUM SERPL-MCNC: 10.3 MG/DL (ref 8.7–10.5)
CHLORIDE SERPL-SCNC: 104 MMOL/L (ref 95–110)
CHOLEST SERPL-MCNC: 176 MG/DL (ref 120–199)
CHOLEST/HDLC SERPL: 3 {RATIO} (ref 2–5)
CO2 SERPL-SCNC: 21 MMOL/L (ref 23–29)
CREAT SERPL-MCNC: 0.9 MG/DL (ref 0.5–1.4)
ERYTHROCYTE [DISTWIDTH] IN BLOOD BY AUTOMATED COUNT: 14.7 % (ref 11.5–14.5)
EST. GFR  (AFRICAN AMERICAN): >60 ML/MIN/1.73 M^2
EST. GFR  (NON AFRICAN AMERICAN): 58.5 ML/MIN/1.73 M^2
GLUCOSE SERPL-MCNC: 90 MG/DL (ref 70–110)
HCT VFR BLD AUTO: 38.7 % (ref 37–48.5)
HDLC SERPL-MCNC: 59 MG/DL (ref 40–75)
HDLC SERPL: 33.5 % (ref 20–50)
HGB BLD-MCNC: 12.1 G/DL (ref 12–16)
LDLC SERPL CALC-MCNC: 104.6 MG/DL (ref 63–159)
MCH RBC QN AUTO: 27.4 PG (ref 27–31)
MCHC RBC AUTO-ENTMCNC: 31.3 G/DL (ref 32–36)
MCV RBC AUTO: 88 FL (ref 82–98)
NONHDLC SERPL-MCNC: 117 MG/DL
PLATELET # BLD AUTO: 336 K/UL (ref 150–450)
PMV BLD AUTO: 12 FL (ref 9.2–12.9)
POTASSIUM SERPL-SCNC: 5.1 MMOL/L (ref 3.5–5.1)
PROT SERPL-MCNC: 7.2 G/DL (ref 6–8.4)
RBC # BLD AUTO: 4.42 M/UL (ref 4–5.4)
SODIUM SERPL-SCNC: 137 MMOL/L (ref 136–145)
TRIGL SERPL-MCNC: 62 MG/DL (ref 30–150)
TSH SERPL DL<=0.005 MIU/L-ACNC: 2.44 UIU/ML (ref 0.4–4)
WBC # BLD AUTO: 6.38 K/UL (ref 3.9–12.7)

## 2022-01-19 PROCEDURE — 85027 COMPLETE CBC AUTOMATED: CPT | Mod: HCNC | Performed by: INTERNAL MEDICINE

## 2022-01-19 PROCEDURE — 84443 ASSAY THYROID STIM HORMONE: CPT | Mod: HCNC | Performed by: INTERNAL MEDICINE

## 2022-01-19 PROCEDURE — 80061 LIPID PANEL: CPT | Mod: HCNC | Performed by: INTERNAL MEDICINE

## 2022-01-19 PROCEDURE — 80053 COMPREHEN METABOLIC PANEL: CPT | Mod: HCNC | Performed by: INTERNAL MEDICINE

## 2022-01-19 PROCEDURE — 36415 COLL VENOUS BLD VENIPUNCTURE: CPT | Mod: HCNC,PO | Performed by: INTERNAL MEDICINE

## 2022-01-21 ENCOUNTER — CLINICAL SUPPORT (OUTPATIENT)
Dept: URGENT CARE | Facility: CLINIC | Age: 87
End: 2022-01-21
Payer: MEDICARE

## 2022-01-21 DIAGNOSIS — Z20.822 ENCOUNTER FOR LABORATORY TESTING FOR COVID-19 VIRUS: ICD-10-CM

## 2022-01-21 PROCEDURE — 99000 PR SPECIMEN HANDLING,DR OFF->LAB: ICD-10-PCS | Mod: S$GLB,,, | Performed by: FAMILY MEDICINE

## 2022-01-21 PROCEDURE — U0003 INFECTIOUS AGENT DETECTION BY NUCLEIC ACID (DNA OR RNA); SEVERE ACUTE RESPIRATORY SYNDROME CORONAVIRUS 2 (SARS-COV-2) (CORONAVIRUS DISEASE [COVID-19]), AMPLIFIED PROBE TECHNIQUE, MAKING USE OF HIGH THROUGHPUT TECHNOLOGIES AS DESCRIBED BY CMS-2020-01-R: HCPCS | Mod: HCNC | Performed by: FAMILY MEDICINE

## 2022-01-21 PROCEDURE — U0005 INFEC AGEN DETEC AMPLI PROBE: HCPCS | Performed by: FAMILY MEDICINE

## 2022-01-21 PROCEDURE — 99000 SPECIMEN HANDLING OFFICE-LAB: CPT | Mod: S$GLB,,, | Performed by: FAMILY MEDICINE

## 2022-01-22 DIAGNOSIS — U07.1 COVID-19 VIRUS DETECTED: ICD-10-CM

## 2022-01-22 LAB
SARS-COV-2 RNA RESP QL NAA+PROBE: DETECTED
SARS-COV-2- CYCLE NUMBER: 39

## 2022-01-23 ENCOUNTER — TELEPHONE (OUTPATIENT)
Dept: URGENT CARE | Facility: CLINIC | Age: 87
End: 2022-01-23
Payer: MEDICARE

## 2022-01-24 ENCOUNTER — TELEPHONE (OUTPATIENT)
Dept: CARDIOLOGY | Facility: CLINIC | Age: 87
End: 2022-01-24
Payer: MEDICARE

## 2022-01-24 ENCOUNTER — TELEPHONE (OUTPATIENT)
Dept: INTERNAL MEDICINE | Facility: CLINIC | Age: 87
End: 2022-01-24
Payer: MEDICARE

## 2022-01-24 ENCOUNTER — TELEPHONE (OUTPATIENT)
Dept: URGENT CARE | Facility: CLINIC | Age: 87
End: 2022-01-24
Payer: MEDICARE

## 2022-01-24 ENCOUNTER — PATIENT MESSAGE (OUTPATIENT)
Dept: URGENT CARE | Facility: CLINIC | Age: 87
End: 2022-01-24
Payer: MEDICARE

## 2022-01-24 NOTE — TELEPHONE ENCOUNTER
The spoke to patient about positive COVID result from this past Friday.  The patient is completely asymptomatic.  She understands that her cardiac catheterization planned for this afternoon will have to be postponed.  She additionally understands CDC recommendations to stay home for 5 days from her positive test date and isolate from others.

## 2022-01-24 NOTE — TELEPHONE ENCOUNTER
----- Message from Beatriz Paniagua sent at 1/24/2022  8:52 AM CST -----  Contact: PT @ [phone]  Patient is tatyana for Imagining today 11:00. Would like results of her Covid test, to know how to proceed.  Please call and advise.    Thank you and have a great day.

## 2022-01-24 NOTE — TELEPHONE ENCOUNTER
----- Message from Beatriz Paniagua sent at 1/24/2022 10:47 AM CST -----  Contact: Pt @[phone]  Patient is returning a phone call.  Who left a message for the patient:   Does patient know what this is regarding:  positive covid  Would you like a call back, or a response through your [facility] portal?:   no  Comments: Pt. States she is feeling well and does not need a call back.

## 2022-02-03 ENCOUNTER — TELEPHONE (OUTPATIENT)
Dept: CARDIOLOGY | Facility: CLINIC | Age: 87
End: 2022-02-03
Payer: MEDICARE

## 2022-02-03 DIAGNOSIS — Z01.818 PRE-OP TESTING: ICD-10-CM

## 2022-02-03 NOTE — TELEPHONE ENCOUNTER
----- Message from Tayler Oreilly sent at 2/3/2022 12:45 PM CST -----  Contact: [phone]  Patient would like to get medical advice.  Please call patient about her covid test before her procedure.

## 2022-02-04 ENCOUNTER — CLINICAL SUPPORT (OUTPATIENT)
Dept: URGENT CARE | Facility: CLINIC | Age: 87
End: 2022-02-04
Payer: MEDICARE

## 2022-02-04 DIAGNOSIS — Z11.52 ENCOUNTER FOR SCREENING LABORATORY TESTING FOR COVID-19 VIRUS: Primary | ICD-10-CM

## 2022-02-04 PROCEDURE — 99211 PR OFFICE/OUTPT VISIT, EST, LEVL I: ICD-10-PCS | Mod: HCNC,S$GLB,, | Performed by: FAMILY MEDICINE

## 2022-02-04 PROCEDURE — 99211 OFF/OP EST MAY X REQ PHY/QHP: CPT | Mod: HCNC,S$GLB,, | Performed by: FAMILY MEDICINE

## 2022-02-04 PROCEDURE — U0005 INFEC AGEN DETEC AMPLI PROBE: HCPCS | Performed by: FAMILY MEDICINE

## 2022-02-04 PROCEDURE — U0003 INFECTIOUS AGENT DETECTION BY NUCLEIC ACID (DNA OR RNA); SEVERE ACUTE RESPIRATORY SYNDROME CORONAVIRUS 2 (SARS-COV-2) (CORONAVIRUS DISEASE [COVID-19]), AMPLIFIED PROBE TECHNIQUE, MAKING USE OF HIGH THROUGHPUT TECHNOLOGIES AS DESCRIBED BY CMS-2020-01-R: HCPCS | Mod: HCNC | Performed by: FAMILY MEDICINE

## 2022-02-05 LAB
SARS-COV-2 RNA RESP QL NAA+PROBE: NOT DETECTED
SARS-COV-2- CYCLE NUMBER: NORMAL

## 2022-02-07 ENCOUNTER — HOSPITAL ENCOUNTER (OUTPATIENT)
Facility: HOSPITAL | Age: 87
Discharge: HOME | End: 2022-02-07
Attending: INTERNAL MEDICINE | Admitting: INTERNAL MEDICINE
Payer: MEDICARE

## 2022-02-07 VITALS
TEMPERATURE: 97 F | HEIGHT: 63 IN | DIASTOLIC BLOOD PRESSURE: 67 MMHG | WEIGHT: 145 LBS | BODY MASS INDEX: 25.69 KG/M2 | RESPIRATION RATE: 18 BRPM | HEART RATE: 72 BPM | SYSTOLIC BLOOD PRESSURE: 149 MMHG | OXYGEN SATURATION: 100 %

## 2022-02-07 DIAGNOSIS — I10 ESSENTIAL HYPERTENSION: ICD-10-CM

## 2022-02-07 DIAGNOSIS — I35.0 SEVERE AORTIC STENOSIS: ICD-10-CM

## 2022-02-07 DIAGNOSIS — I35.0 AORTIC STENOSIS: ICD-10-CM

## 2022-02-07 PROCEDURE — 93005 ELECTROCARDIOGRAM TRACING: CPT | Mod: HCNC

## 2022-02-07 PROCEDURE — C1769 GUIDE WIRE: HCPCS | Mod: HCNC | Performed by: INTERNAL MEDICINE

## 2022-02-07 PROCEDURE — 99152 MOD SED SAME PHYS/QHP 5/>YRS: CPT | Mod: HCNC | Performed by: INTERNAL MEDICINE

## 2022-02-07 PROCEDURE — 25000003 PHARM REV CODE 250: Mod: HCNC | Performed by: INTERNAL MEDICINE

## 2022-02-07 PROCEDURE — 93010 ELECTROCARDIOGRAM REPORT: CPT | Mod: HCNC,,, | Performed by: INTERNAL MEDICINE

## 2022-02-07 PROCEDURE — C1887 CATHETER, GUIDING: HCPCS | Mod: HCNC | Performed by: INTERNAL MEDICINE

## 2022-02-07 PROCEDURE — 93454 CORONARY ARTERY ANGIO S&I: CPT | Mod: HCNC | Performed by: INTERNAL MEDICINE

## 2022-02-07 PROCEDURE — 99153 MOD SED SAME PHYS/QHP EA: CPT | Mod: HCNC | Performed by: INTERNAL MEDICINE

## 2022-02-07 PROCEDURE — 93454 CORONARY ARTERY ANGIO S&I: CPT | Mod: 26,HCNC,, | Performed by: INTERNAL MEDICINE

## 2022-02-07 PROCEDURE — C1760 CLOSURE DEV, VASC: HCPCS | Mod: HCNC | Performed by: INTERNAL MEDICINE

## 2022-02-07 PROCEDURE — 99152 MOD SED SAME PHYS/QHP 5/>YRS: CPT | Mod: HCNC,,, | Performed by: INTERNAL MEDICINE

## 2022-02-07 PROCEDURE — 99152 PR MOD CONSCIOUS SEDATION, SAME PHYS, 5+ YRS, FIRST 15 MIN: ICD-10-PCS | Mod: HCNC,,, | Performed by: INTERNAL MEDICINE

## 2022-02-07 PROCEDURE — 25500020 PHARM REV CODE 255: Mod: HCNC | Performed by: INTERNAL MEDICINE

## 2022-02-07 PROCEDURE — C1894 INTRO/SHEATH, NON-LASER: HCPCS | Mod: HCNC | Performed by: INTERNAL MEDICINE

## 2022-02-07 PROCEDURE — 63600175 PHARM REV CODE 636 W HCPCS: Mod: HCNC | Performed by: INTERNAL MEDICINE

## 2022-02-07 PROCEDURE — 93454 PR CATH PLACE/CORONARY ANGIO, IMG SUPER/INTERP: ICD-10-PCS | Mod: 26,HCNC,, | Performed by: INTERNAL MEDICINE

## 2022-02-07 PROCEDURE — 93010 EKG 12-LEAD: ICD-10-PCS | Mod: HCNC,,, | Performed by: INTERNAL MEDICINE

## 2022-02-07 RX ORDER — ACETAMINOPHEN 325 MG/1
650 TABLET ORAL EVERY 4 HOURS PRN
Status: DISCONTINUED | OUTPATIENT
Start: 2022-02-07 | End: 2022-02-07 | Stop reason: HOSPADM

## 2022-02-07 RX ORDER — ONDANSETRON 8 MG/1
8 TABLET, ORALLY DISINTEGRATING ORAL EVERY 8 HOURS PRN
Status: DISCONTINUED | OUTPATIENT
Start: 2022-02-07 | End: 2022-02-07 | Stop reason: HOSPADM

## 2022-02-07 RX ORDER — LIDOCAINE HYDROCHLORIDE 20 MG/ML
INJECTION, SOLUTION EPIDURAL; INFILTRATION; INTRACAUDAL; PERINEURAL
Status: DISCONTINUED | OUTPATIENT
Start: 2022-02-07 | End: 2022-02-07 | Stop reason: HOSPADM

## 2022-02-07 RX ORDER — FENTANYL CITRATE 50 UG/ML
INJECTION, SOLUTION INTRAMUSCULAR; INTRAVENOUS
Status: DISCONTINUED | OUTPATIENT
Start: 2022-02-07 | End: 2022-02-07 | Stop reason: HOSPADM

## 2022-02-07 RX ORDER — SODIUM CHLORIDE 9 MG/ML
INJECTION, SOLUTION INTRAVENOUS CONTINUOUS
Status: DISCONTINUED | OUTPATIENT
Start: 2022-02-07 | End: 2022-02-07 | Stop reason: HOSPADM

## 2022-02-07 RX ORDER — MIDAZOLAM HYDROCHLORIDE 1 MG/ML
INJECTION, SOLUTION INTRAMUSCULAR; INTRAVENOUS
Status: DISCONTINUED | OUTPATIENT
Start: 2022-02-07 | End: 2022-02-07 | Stop reason: HOSPADM

## 2022-02-07 RX ORDER — ASPIRIN 325 MG
325 TABLET ORAL ONCE
Status: COMPLETED | OUTPATIENT
Start: 2022-02-07 | End: 2022-02-07

## 2022-02-07 RX ORDER — SODIUM CHLORIDE 9 MG/ML
INJECTION, SOLUTION INTRAVENOUS CONTINUOUS
Status: ACTIVE | OUTPATIENT
Start: 2022-02-07 | End: 2022-02-07

## 2022-02-07 RX ORDER — HEPARIN SOD,PORCINE/0.9 % NACL 1000/500ML
INTRAVENOUS SOLUTION INTRAVENOUS
Status: DISCONTINUED | OUTPATIENT
Start: 2022-02-07 | End: 2022-02-07 | Stop reason: HOSPADM

## 2022-02-07 RX ORDER — DIPHENHYDRAMINE HCL 50 MG
50 CAPSULE ORAL ONCE
Status: COMPLETED | OUTPATIENT
Start: 2022-02-07 | End: 2022-02-07

## 2022-02-07 RX ORDER — HEPARIN SODIUM 1000 [USP'U]/ML
INJECTION, SOLUTION INTRAVENOUS; SUBCUTANEOUS
Status: DISCONTINUED | OUTPATIENT
Start: 2022-02-07 | End: 2022-02-07 | Stop reason: HOSPADM

## 2022-02-07 RX ADMIN — DIPHENHYDRAMINE HYDROCHLORIDE 50 MG: 50 CAPSULE ORAL at 11:02

## 2022-02-07 RX ADMIN — ASPIRIN 325 MG ORAL TABLET 325 MG: 325 PILL ORAL at 01:02

## 2022-02-07 RX ADMIN — SODIUM CHLORIDE: 0.9 INJECTION, SOLUTION INTRAVENOUS at 11:02

## 2022-02-07 NOTE — PLAN OF CARE
Pt arrived to SSCU bed 12. Assumed care of pt. Pt laying in bed with no S/S of distress or SOB. Denies pain. Needs met. Right radial vascband assessed; CDI, no bleeding or hematoma noted. Will remove air per protocol. Right groin assessed; CDI, no bleeding or hematoma noted. Bed in lowest position. Call light within reach.  Will continue to monitor.

## 2022-02-07 NOTE — PROGRESS NOTES
Vascband removed per protocol. Gauze and tegaderm applied. CDI, no bleeding or hematoma noted. Will continue to monitor.

## 2022-02-07 NOTE — Clinical Note
The catheter was reinserted into the ostium   right coronary artery. An angiography was performed of the right coronary arteries. Multiple views were taken.

## 2022-02-07 NOTE — PROGRESS NOTES
Pt ambulated and voided without difficulty. Right groin assessed; CDI, no bleeding or hematoma noted. Will continue to monitor.

## 2022-02-07 NOTE — BRIEF OP NOTE
Brief Operative Note:    : Robby Mistry MD     Referring Physician: ZOEY Oswald     All Operators: Surgeon(s):  MD Barrie Boyer MD     Preoperative Diagnosis: Severe aortic stenosis [I35.0]     Postop Diagnosis: Severe aortic stenosis [I35.0]    Treatments/Procedures: Procedure(s) (LRB):  ANGIOGRAM, CORONARY ARTERY (N/A)    Findings:  -normal coronaries via angiography   See catheterization report for full details.    Recommendations:  -continue workup for TAVR  -continue medical management  -follow-up in valve Clinic within 1-2 weeks    Estimated Blood loss: 20 cc    Specimens removed: No    Signed:  Barrie Harrison MD  Page # [phone]  Cardiovascular Fellow  [facility]

## 2022-02-07 NOTE — DISCHARGE SUMMARY
Discharge Summary  Interventional Cardiology      Admit Date: 2/7/2022    Discharge Date :2/7/2022    Attending Physician: Barrie Harrison     Discharge Physician: Barrie Harrison    Discharged Condition: good    Discharge Diagnosis: Severe aortic stenosis [I35.0]    Treatments/Procedures: Procedure(s) (LRB):  ANGIOGRAM, CORONARY ARTERY (N/A)    Hospital Course:   87 yo F with a h/o aortic stenosis, hypertension, and hyperlipidemia presenting for left heart catheterization for workup for TAVR.  See details below.    Findings:  -normal coronaries via angiography   See catheterization report for full details.     Recommendations:  -continue workup for TAVR  -continue medical management  -follow-up in valve Clinic within 1-2 weeks    Significant Diagnostic Studies: none    Disposition: Home or Self Care    Diet: Regular    Follow up: Office 10-14 days    Activity: No heavy lifting till seen in office.    Patient Instructions:   Current Discharge Medication List      CONTINUE these medications which have NOT CHANGED    Details   alendronate (FOSAMAX) 70 MG tablet TAKE 1 TABLET  EVERY 7 DAYS. HOLD UNTIL SEEN BY YOUR PRIMARY CARE PHYSICIAN  Qty: 12 tablet, Refills: 3      cetirizine (ZYRTEC) 10 MG tablet Take 10 mg by mouth once daily.      cholecalciferol, vitamin D3, 2,000 unit Tab Take by mouth. 1 Tablet Oral Every day      EScitalopram oxalate (LEXAPRO) 5 MG Tab Take 1 tablet (5 mg total) by mouth once daily.  Qty: 90 tablet, Refills: 0    Associated Diagnoses: Anxiety      fluticasone propionate (FLONASE) 50 mcg/actuation nasal spray 1 spray (50 mcg total) by Each Nostril route every evening.  Qty: 16 g, Refills: 3      losartan (COZAAR) 50 MG tablet TAKE 1 TABLET TWICE DAILY  Qty: 180 tablet, Refills: 3      multivit-min/iron/folic/lutein (CENTRUM SILVER WOMEN ORAL) Take by mouth.      omeprazole (PRILOSEC) 40 MG capsule TAKE 1 CAPSULE EVERY MORNING  BEFORE  EATING  Qty: 90 capsule, Refills: 3       oxybutynin (DITROPAN-XL) 10 MG 24 hr tablet TAKE 1 TABLET EVERY DAY  Qty: 90 tablet, Refills: 3      pravastatin (PRAVACHOL) 20 MG tablet TAKE 1 TABLET EVERY EVENING.  Qty: 90 tablet, Refills: 3      acetaminophen (TYLENOL) 325 MG tablet Take 2 tablets (650 mg total) by mouth every 6 (six) hours as needed.  Refills: 0      amLODIPine (NORVASC) 2.5 MG tablet TAKE 1 TABLET EVERY DAY  Qty: 90 tablet, Refills: 3             Signed:  Barrie Harrison MD  Page # [phone]  Cardiovascular Fellow  [facility]

## 2022-02-07 NOTE — DISCHARGE INSTRUCTIONS
Discharge Instructions and Care of Your Wrist After a Cardiac Catheterization Procedure Performed via the Radial Artery    For 3-5 days following the procedure:  Do not subject your affected hand/arm to any forceful movements (i.e. supporting weight when rising from a chair or bed)  Avoid excessive (extension/flexion) wrist movement (i.e. supporting weight when rising from a chair or bed, push-ups, lifting garage doors, etc.)  Do not drive a car for 24 hours  The dressing on the puncture site may be removed after 24 hours and left open to air. If there is minor oozing, you may apply a Band-aid and remove after 12 hours  You may shower on the day following your procedure. Do not take a tub bath or submerge the puncture site in water for 3 days following the procedure  Do not lift anything heavier than 3 to 5 pounds with the affected hand/arm  Do not operate a lawnmower, motorcycle, chainsaw, or all-terrain vehicle   Do not engage in vigorous exercise (i.e. Tennis, Golf, Bowling) using the affected arm    If bleeding should occur following discharge:  Sit down and apply firm pressure to the puncture site with your fingers for 10 minutes   If the bleeding stops, continue to sit quietly, keeping your wrist straight for 2 hours. Notify your physician as soon as possible   If bleeding does not stop after 10 minutes or if there is a large amount of bleeding or spurting, call 911 immediately. Do not drive yourself to the hospital.     You should expect mild tingling in your hand and tenderness at the puncture site for up to 3 days.     Notify your physician if these symptoms persist or if you experience:  Change in color or temperature of the hand or arm  Redness, heat, or pus at the puncture site  Chills or fever greater than 101.0 F    1. Do not strain or lift anything greater than 5 lb for 1 week.  2. Do not drive or operate any dangerous machinery for 24 hours.   3. Keep the dressing on, clean, and dry for 24 hours.    4. After 24 hours, the dressing may be removed and a shower is allowed.   5. Clean the area with mild soap and water and then leave open to air. Keep area free of any creams, lotions or ointments. .   6. Once the skin has healed, bathing in a tub or swimming is allowed.   7. Inspect the groin site daily and report to the physician any swelling at the site that   cannot be controlled with manual pressure for 10 minutes, unusual pain at the   access site or affected extremity, unusual swelling at the access site, or signs or   symptoms of infection such as redness, pain, or fever.     Call 911 if you have:   Bleeding from the puncture site that you cannot stop by doing the following:   Relax and lie down right away. Keep your leg flat and apply firm pressure to the site using your fingers and a gauze pad. Keep the pressure on for 20 minutes. Continue this until the bleeding stops. This may take awhile. When bleeding stops, cover the site with a clean bandage and keep your leg still as much as possible.

## 2022-02-07 NOTE — INTERVAL H&P NOTE
The patient has been examined and the H&P has been reviewed:    I concur with the findings and no changes have occurred since H&P was written.    Procedure risks, benefits and alternative options discussed and understood by patient/family.    Severe Aortic Stenosis:  Aortic valve area is 0.73 cm2; peak velocity is 4.40 m/s; mean gradient is 47 mmHg. Ef-60%    --+/- PCI, patient is a JOSEE candidate  - Access: Right Radial with bilat Femoral as backup  - Access closure: TBD  - Catheters: 5F Tiger  - Creatinine/CrCl:   CrCl cannot be calculated (Patient's most recent lab result is older than the maximum 7 days allowed.).  - Allergies:   - No shellfish / Iodine allergy  - No Latex allergy   - No Aspirin allergy    - No history of HIT  - Pre-Hydration: NS 3cc/kg x 1 hour   - Pre-Op Med: Bendaryl 50mg pO   - All patient's questions were answered.  -The risks, benefits and alternatives of the procedure were explained to the patient.   -The risks of coronary angiography include but are not limited to: bleeding, infection, heart rhythm abnormalities, allergic reactions, kidney injury and potential need for dialysis, stroke and death.   - Should stenting be indicated, the patient has agreed to dual anti-platelet therapy for 1-consecutive year with a drug-eluting stent and a minimum of 1-month with the use of a bare metal stent  - Additionally, pt is aware that non-compliance is likely to result in stent clotting with heart attack, heart failure, and/or death  -The risks of moderate sedation include hypotension, respiratory depression, arrhythmias, bronchospasm, and death.   - Informed consent was obtained and the  patient is agreeable to proceed with the procedure.    Signed:  Barrie Harrison MD  Page # [phone]  Cardiovascular Fellow  [facility]

## 2022-02-08 DIAGNOSIS — I35.0 SEVERE AORTIC STENOSIS: Primary | ICD-10-CM

## 2022-02-09 ENCOUNTER — PATIENT MESSAGE (OUTPATIENT)
Dept: CARDIOLOGY | Facility: CLINIC | Age: 87
End: 2022-02-09
Payer: MEDICARE

## 2022-02-09 DIAGNOSIS — N18.9 CHRONIC KIDNEY DISEASE, UNSPECIFIED CKD STAGE: ICD-10-CM

## 2022-02-09 DIAGNOSIS — I35.0 SEVERE AORTIC STENOSIS: Primary | ICD-10-CM

## 2022-02-18 ENCOUNTER — OFFICE VISIT (OUTPATIENT)
Dept: CARDIOLOGY | Facility: CLINIC | Age: 87
End: 2022-02-18
Payer: MEDICARE

## 2022-02-18 ENCOUNTER — HOSPITAL ENCOUNTER (OUTPATIENT)
Dept: RADIOLOGY | Facility: HOSPITAL | Age: 87
Discharge: HOME | End: 2022-02-18
Attending: INTERNAL MEDICINE
Payer: MEDICARE

## 2022-02-18 ENCOUNTER — EDUCATION (OUTPATIENT)
Dept: CARDIOLOGY | Facility: CLINIC | Age: 87
End: 2022-02-18
Payer: MEDICARE

## 2022-02-18 VITALS
WEIGHT: 144.19 LBS | DIASTOLIC BLOOD PRESSURE: 62 MMHG | HEART RATE: 95 BPM | BODY MASS INDEX: 25.55 KG/M2 | OXYGEN SATURATION: 98 % | SYSTOLIC BLOOD PRESSURE: 131 MMHG | HEIGHT: 63 IN

## 2022-02-18 DIAGNOSIS — I10 ESSENTIAL HYPERTENSION: ICD-10-CM

## 2022-02-18 DIAGNOSIS — N18.9 CHRONIC KIDNEY DISEASE, UNSPECIFIED CKD STAGE: ICD-10-CM

## 2022-02-18 DIAGNOSIS — I35.0 NONRHEUMATIC AORTIC VALVE STENOSIS: Primary | ICD-10-CM

## 2022-02-18 DIAGNOSIS — I49.3 PVC'S (PREMATURE VENTRICULAR CONTRACTIONS): ICD-10-CM

## 2022-02-18 DIAGNOSIS — I70.0 AORTIC ARCH ATHEROSCLEROSIS: ICD-10-CM

## 2022-02-18 DIAGNOSIS — I35.0 SEVERE AORTIC STENOSIS: ICD-10-CM

## 2022-02-18 DIAGNOSIS — I35.0 AORTIC STENOSIS: ICD-10-CM

## 2022-02-18 DIAGNOSIS — I35.0 SEVERE AORTIC STENOSIS: Primary | ICD-10-CM

## 2022-02-18 PROCEDURE — 71275 CTA CARDIAC TAVR_PARTNERS (XPD): ICD-10-PCS | Mod: 26,HCNC,, | Performed by: RADIOLOGY

## 2022-02-18 PROCEDURE — 93005 EKG 12-LEAD: ICD-10-PCS | Mod: HCNC,S$GLB,, | Performed by: INTERNAL MEDICINE

## 2022-02-18 PROCEDURE — 1159F PR MEDICATION LIST DOCUMENTED IN MEDICAL RECORD: ICD-10-PCS | Mod: HCNC,CPTII,S$GLB, | Performed by: INTERNAL MEDICINE

## 2022-02-18 PROCEDURE — 1126F AMNT PAIN NOTED NONE PRSNT: CPT | Mod: HCNC,CPTII,S$GLB, | Performed by: INTERNAL MEDICINE

## 2022-02-18 PROCEDURE — 3075F SYST BP GE 130 - 139MM HG: CPT | Mod: HCNC,CPTII,S$GLB, | Performed by: INTERNAL MEDICINE

## 2022-02-18 PROCEDURE — 3078F DIAST BP <80 MM HG: CPT | Mod: HCNC,CPTII,S$GLB, | Performed by: INTERNAL MEDICINE

## 2022-02-18 PROCEDURE — 93005 ELECTROCARDIOGRAM TRACING: CPT | Mod: HCNC,S$GLB,, | Performed by: INTERNAL MEDICINE

## 2022-02-18 PROCEDURE — 74174 CTA CARDIAC TAVR_PARTNERS (XPD): ICD-10-PCS | Mod: 26,HCNC,, | Performed by: RADIOLOGY

## 2022-02-18 PROCEDURE — 71275 CT ANGIOGRAPHY CHEST: CPT | Mod: 26,HCNC,, | Performed by: RADIOLOGY

## 2022-02-18 PROCEDURE — 99214 PR OFFICE/OUTPT VISIT, EST, LEVL IV, 30-39 MIN: ICD-10-PCS | Mod: HCNC,S$GLB,, | Performed by: INTERNAL MEDICINE

## 2022-02-18 PROCEDURE — 93010 ELECTROCARDIOGRAM REPORT: CPT | Mod: HCNC,S$GLB,, | Performed by: INTERNAL MEDICINE

## 2022-02-18 PROCEDURE — 1160F PR REVIEW ALL MEDS BY PRESCRIBER/CLIN PHARMACIST DOCUMENTED: ICD-10-PCS | Mod: HCNC,CPTII,S$GLB, | Performed by: INTERNAL MEDICINE

## 2022-02-18 PROCEDURE — 1159F MED LIST DOCD IN RCRD: CPT | Mod: HCNC,CPTII,S$GLB, | Performed by: INTERNAL MEDICINE

## 2022-02-18 PROCEDURE — 3078F PR MOST RECENT DIASTOLIC BLOOD PRESSURE < 80 MM HG: ICD-10-PCS | Mod: HCNC,CPTII,S$GLB, | Performed by: INTERNAL MEDICINE

## 2022-02-18 PROCEDURE — 1160F RVW MEDS BY RX/DR IN RCRD: CPT | Mod: HCNC,CPTII,S$GLB, | Performed by: INTERNAL MEDICINE

## 2022-02-18 PROCEDURE — 3075F PR MOST RECENT SYSTOLIC BLOOD PRESS GE 130-139MM HG: ICD-10-PCS | Mod: HCNC,CPTII,S$GLB, | Performed by: INTERNAL MEDICINE

## 2022-02-18 PROCEDURE — 99214 OFFICE O/P EST MOD 30 MIN: CPT | Mod: HCNC,S$GLB,, | Performed by: INTERNAL MEDICINE

## 2022-02-18 PROCEDURE — 1157F ADVNC CARE PLAN IN RCRD: CPT | Mod: HCNC,CPTII,S$GLB, | Performed by: INTERNAL MEDICINE

## 2022-02-18 PROCEDURE — 1157F PR ADVANCE CARE PLAN OR EQUIV PRESENT IN MEDICAL RECORD: ICD-10-PCS | Mod: HCNC,CPTII,S$GLB, | Performed by: INTERNAL MEDICINE

## 2022-02-18 PROCEDURE — 93010 EKG 12-LEAD: ICD-10-PCS | Mod: HCNC,S$GLB,, | Performed by: INTERNAL MEDICINE

## 2022-02-18 PROCEDURE — 1126F PR PAIN SEVERITY QUANTIFIED, NO PAIN PRESENT: ICD-10-PCS | Mod: HCNC,CPTII,S$GLB, | Performed by: INTERNAL MEDICINE

## 2022-02-18 PROCEDURE — 99999 PR PBB SHADOW E&M-EST. PATIENT-LVL IV: ICD-10-PCS | Mod: PBBFAC,HCNC,, | Performed by: INTERNAL MEDICINE

## 2022-02-18 PROCEDURE — 71275 CT ANGIOGRAPHY CHEST: CPT | Mod: TC,HCNC

## 2022-02-18 PROCEDURE — 25500020 PHARM REV CODE 255: Mod: HCNC | Performed by: INTERNAL MEDICINE

## 2022-02-18 PROCEDURE — 74174 CTA ABD&PLVS W/CONTRAST: CPT | Mod: 26,HCNC,, | Performed by: RADIOLOGY

## 2022-02-18 PROCEDURE — 99999 PR PBB SHADOW E&M-EST. PATIENT-LVL IV: CPT | Mod: PBBFAC,HCNC,, | Performed by: INTERNAL MEDICINE

## 2022-02-18 RX ORDER — DIPHENHYDRAMINE HCL 50 MG
50 CAPSULE ORAL ONCE
Status: CANCELLED | OUTPATIENT
Start: 2022-02-18 | End: 2022-02-18

## 2022-02-18 RX ORDER — SODIUM CHLORIDE 9 MG/ML
INJECTION, SOLUTION INTRAVENOUS CONTINUOUS
Status: CANCELLED | OUTPATIENT
Start: 2022-02-18 | End: 2022-02-18

## 2022-02-18 RX ADMIN — IOHEXOL 100 ML: 350 INJECTION, SOLUTION INTRAVENOUS at 09:02

## 2022-02-18 NOTE — H&P (VIEW-ONLY)
PCP - P Miguel Oswald MD  Subjective:     Mine Wilkins is a 86 y.o. y.o. female with PMH of severe aortic stenosis, HTN, HLD who presents to interventional cardiology clinic to establish care. She has a history of severe AS by TTE 11/2021. She reports worsening symptoms since about the fall of last year, used to be able to walk briskly on her treadmill multiple days weekly, however has noticed progressive exertional dyspnea and intermittent chest tightness. No CAD or chronic lung disease.       Mine Wilkins is a 86 y.o. female referred by Dr Bustamante for evaluation of severe AS (NYHA Class II).    The patient has undergone the following TAVR work-up:   ECHO (Date 11/1/21): ERENDIRA= 0.73 cm2, MG= 47 mmHg, Peak Jayant= 4.4 m/s, EF= 65%.   LHC (2/7/2022): Normal coronaries angiographically    STS: 2.2 %   Frailty: 1/4 (failed handgrip)  Iliacs are >8.14 mm on R and > 7.59 mm on L   LVOT area by CTA is  4.00 cm2 (25.3 mm X 21.4 mm) and Avg Diameter is 22.6 per Dr. Wyatt   Incidental findings on CT: Heterogeneous thyroid with rim calcified 1.4 cm right lobe nodule. Scattered subcentimeter pulmonary micro-nodules (report forwarded to PCP)  CT Surgery risk assessment: Needs  Rhythm issues: None    Comorbidities: Hypertension         Mine Wilkins is a 23 mm S3 Evita valve candidate via right transfemoral access.        ---------------  Cardiac Studies ---------------  TTE 11/2021:   · The left ventricle is normal in size with normal systolic function.  · The estimated ejection fraction is 65%.  · Grade I left ventricular diastolic dysfunction.  · Moderate left atrial enlargement.  · The estimated PA systolic pressure is 28 mmHg.  · Normal right ventricular size with normal right ventricular systolic function.  · There is no pulmonary hypertension.  · There is severe aortic valve stenosis.  · Aortic valve area is 0.73 cm2; peak velocity is 4.40 m/s; mean gradient is 47 mmHg.  · Moderate mitral  regurgitation.  · Mild aortic regurgitation.  · The ascending aorta is moderately dilated.  · The ascending aortic to height ratio is 2.5 indicative of moderate risk    LHC 2/7/22:   Patent coronary arteries without evidence of obstructive epicardial disease.  Severe aortic stenosis, will refer to the structural team for further evaluation      History:     Social History     Tobacco Use    Smoking status: Never Smoker    Smokeless tobacco: Never Used   Substance Use Topics    Alcohol use: Yes     Comment: maybe 6 drinks per year     Family History   Problem Relation Age of Onset    Colon cancer Brother     Dementia Brother     Hypertension Brother     Osteoporosis Mother     Hypertension Mother     Macular degeneration Mother     Cataracts Mother     Cancer Father     No Known Problems Daughter     Hypertension Brother     Dementia Brother     No Known Problems Daughter     Hypertension Maternal Grandmother     Stroke Maternal Grandmother     Breast cancer Maternal Grandmother     Melanoma Maternal Grandfather     Anesthesia problems Neg Hx     Broken bones Neg Hx     Clotting disorder Neg Hx     Collagen disease Neg Hx     Diabetes Neg Hx     Dislocations Neg Hx     Rheumatologic disease Neg Hx     Scoliosis Neg Hx     Severe sprains Neg Hx     Ovarian cancer Neg Hx     Amblyopia Neg Hx     Blindness Neg Hx     Glaucoma Neg Hx     Retinal detachment Neg Hx     Strabismus Neg Hx     Psoriasis Neg Hx     Lupus Neg Hx     Eczema Neg Hx     Acne Neg Hx        Meds:     Review of patient's allergies indicates:   Allergen Reactions    Ace inhibitors      Other reaction(s): cough    Codeine      Other reaction(s): Nausea       Current Outpatient Medications:     acetaminophen (TYLENOL) 325 MG tablet, Take 2 tablets (650 mg total) by mouth every 6 (six) hours as needed., Disp: , Rfl: 0    alendronate (FOSAMAX) 70 MG tablet, TAKE 1 TABLET  EVERY 7 DAYS. HOLD UNTIL SEEN BY YOUR  "PRIMARY CARE PHYSICIAN, Disp: 12 tablet, Rfl: 3    cetirizine (ZYRTEC) 10 MG tablet, Take 10 mg by mouth once daily., Disp: , Rfl:     cholecalciferol, vitamin D3, 2,000 unit Tab, Take by mouth. 1 Tablet Oral Every day, Disp: , Rfl:     EScitalopram oxalate (LEXAPRO) 5 MG Tab, Take 1 tablet (5 mg total) by mouth once daily., Disp: 90 tablet, Rfl: 0    fluticasone propionate (FLONASE) 50 mcg/actuation nasal spray, 1 spray (50 mcg total) by Each Nostril route every evening., Disp: 16 g, Rfl: 3    losartan (COZAAR) 50 MG tablet, TAKE 1 TABLET TWICE DAILY (Patient taking differently: Take 50 mg by mouth once daily.), Disp: 180 tablet, Rfl: 3    multivit-min/iron/folic/lutein (CENTRUM SILVER WOMEN ORAL), Take by mouth., Disp: , Rfl:     omeprazole (PRILOSEC) 40 MG capsule, TAKE 1 CAPSULE EVERY MORNING  BEFORE  EATING, Disp: 90 capsule, Rfl: 3    oxybutynin (DITROPAN-XL) 10 MG 24 hr tablet, TAKE 1 TABLET EVERY DAY, Disp: 90 tablet, Rfl: 3    pravastatin (PRAVACHOL) 20 MG tablet, TAKE 1 TABLET EVERY EVENING., Disp: 90 tablet, Rfl: 3    amLODIPine (NORVASC) 2.5 MG tablet, TAKE 1 TABLET EVERY DAY, Disp: 90 tablet, Rfl: 3  No current facility-administered medications for this visit.    10 point ROS performed and negative except as stated in HPI   Objective:   /62 (BP Location: Left arm, Patient Position: Sitting, BP Method: Large (Automatic))   Pulse 95   Ht 5' 3" (1.6 m)   Wt 65.4 kg (144 lb 2.9 oz)   SpO2 98%   BMI 25.54 kg/m²     Physical Exam:   Constitutional: no acute distress  HEENT: NCAT, EOMI, no scleral icterus  Cardiovascular: Regular rate and rhythm, harsh 3/6 crescendo-decrescendo systolic murmur at RUSB. 2+ carotid, radial, femoral pulses bilaterally    Pulmonary: Clear to auscultation bilaterally   Abdomen: nontender, non-distended   Neuro: alert and oriented, no focal deficits  Extremities: warm, no edema   MSK: no deformities  Integument: intact, no rashes       Labs:     Lab Results "   Component Value Date     02/18/2022    K 4.2 02/18/2022     02/18/2022    CO2 29 02/18/2022    BUN 23 02/18/2022    CREATININE 0.8 02/18/2022    ANIONGAP 9 02/18/2022     Lab Results   Component Value Date    HGBA1C 5.3 05/19/2018     No results found for: BNP, BNPTRIAGEBLO    Lab Results   Component Value Date    WBC 6.81 02/18/2022    HGB 12.3 02/18/2022    HCT 37.9 02/18/2022     02/18/2022    GRAN 4.2 02/18/2022    GRAN 62.1 02/18/2022     Lab Results   Component Value Date    CHOL 176 01/19/2022    HDL 59 01/19/2022    LDLCALC 104.6 01/19/2022    TRIG 62 01/19/2022       Lab Results   Component Value Date     02/18/2022    K 4.2 02/18/2022     02/18/2022    CO2 29 02/18/2022    BUN 23 02/18/2022    CREATININE 0.8 02/18/2022    ANIONGAP 9 02/18/2022     Lab Results   Component Value Date    HGBA1C 5.3 05/19/2018     No results found for: BNP, BNPTRIAGEBLO Lab Results   Component Value Date    WBC 6.81 02/18/2022    HGB 12.3 02/18/2022    HCT 37.9 02/18/2022     02/18/2022    GRAN 4.2 02/18/2022    GRAN 62.1 02/18/2022     Lab Results   Component Value Date    CHOL 176 01/19/2022    HDL 59 01/19/2022    LDLCALC 104.6 01/19/2022    TRIG 62 01/19/2022            Assessment     1. Essential hypertension    2. Aortic arch atherosclerosis    3. Severe aortic stenosis        Plan:   86 y.o. y.o. female with PMH of severe aortic stenosis, HTN, HLD who presents to interventional cardiology clinic for TAVR workup.      Severe AS: Stage D1, NYHA II-III by symptoms. Still needs assessment by CT surgery but unlikely to be a surgical candidate given age. Tentatively planning for TAVR on 3/8/22.   Transcatheter Aortic valve replacement   Valve: 23 mm S3 Evita   TAVR access: Right transfemoral   Valvuloplasty balloon: 18 mm   Viabahn size if needed: 9x5  Antithrombotic therapy: Aspirin   Pacemaker risk factors: None    The patient was told that the procedure carries around a 12.5% risk  of permanent pacemaker requirement and that risk depends on the patients underlying conduction system.  Prior to the [facility] visit, all available records from referring provider were reviewed.  I have personally reviewed all the lab tests available related to the patient's case  The patient's images were reviewed by myself and the procedural planning was done with my own interpretation of the iliac and aortic anatomy based on CTA, angiography or KINJAL. I have reviewed all other imaging studies relevant to the patient including echocardiography and coronary angiography.  Patient was educated abut the pathophysiology and natural history of severe aortic stenosis and was educated about the treatment options including surgical and transcatheter valve replacement. She agrees to be full code for the forseable future and to undergo workup for treatment of severe AS.   The risks, benefits, and alternatives of transcatheter aortic valve replacement were discussed with the patient. All questions were answered and informed consent was obtained. I had a detailed discussion with the patient regarding risk of stroke, MI, bleeding access site complications including limb loss, allergy, kidney failure including dialysis and death.  The patient understands the risks and benefits and wishes to go ahead with the procedure.  The referring Cardiologist was notified of the plan  All patient's questions were answered        Hector Castaneda MD  [facility]  Cardiovascular Disease, PGY-IV

## 2022-02-18 NOTE — PROGRESS NOTES
PCP - P Miguel Oswald MD  Subjective:     Mine Wilkins is a 86 y.o. y.o. female with PMH of severe aortic stenosis, HTN, HLD who presents to interventional cardiology clinic to establish care. She has a history of severe AS by TTE 11/2021. She reports worsening symptoms since about the fall of last year, used to be able to walk briskly on her treadmill multiple days weekly, however has noticed progressive exertional dyspnea and intermittent chest tightness. No CAD or chronic lung disease.       Mine Wilkins is a 86 y.o. female referred by Dr Bustamante for evaluation of severe AS (NYHA Class II).    The patient has undergone the following TAVR work-up:   ECHO (Date 11/1/21): ERENDIRA= 0.73 cm2, MG= 47 mmHg, Peak Jayant= 4.4 m/s, EF= 65%.   LHC (2/7/2022): Normal coronaries angiographically    STS: 2.2 %   Frailty: 1/4 (failed handgrip)  Iliacs are >8.14 mm on R and > 7.59 mm on L   LVOT area by CTA is  4.00 cm2 (25.3 mm X 21.4 mm) and Avg Diameter is 22.6 per Dr. Wyatt   Incidental findings on CT: Heterogeneous thyroid with rim calcified 1.4 cm right lobe nodule. Scattered subcentimeter pulmonary micro-nodules (report forwarded to PCP)  CT Surgery risk assessment: Needs  Rhythm issues: None    Comorbidities: Hypertension         Mine Wilkins is a 23 mm S3 Evita valve candidate via right transfemoral access.        ---------------  Cardiac Studies ---------------  TTE 11/2021:   · The left ventricle is normal in size with normal systolic function.  · The estimated ejection fraction is 65%.  · Grade I left ventricular diastolic dysfunction.  · Moderate left atrial enlargement.  · The estimated PA systolic pressure is 28 mmHg.  · Normal right ventricular size with normal right ventricular systolic function.  · There is no pulmonary hypertension.  · There is severe aortic valve stenosis.  · Aortic valve area is 0.73 cm2; peak velocity is 4.40 m/s; mean gradient is 47 mmHg.  · Moderate mitral  regurgitation.  · Mild aortic regurgitation.  · The ascending aorta is moderately dilated.  · The ascending aortic to height ratio is 2.5 indicative of moderate risk    LHC 2/7/22:   Patent coronary arteries without evidence of obstructive epicardial disease.  Severe aortic stenosis, will refer to the structural team for further evaluation      History:     Social History     Tobacco Use    Smoking status: Never Smoker    Smokeless tobacco: Never Used   Substance Use Topics    Alcohol use: Yes     Comment: maybe 6 drinks per year     Family History   Problem Relation Age of Onset    Colon cancer Brother     Dementia Brother     Hypertension Brother     Osteoporosis Mother     Hypertension Mother     Macular degeneration Mother     Cataracts Mother     Cancer Father     No Known Problems Daughter     Hypertension Brother     Dementia Brother     No Known Problems Daughter     Hypertension Maternal Grandmother     Stroke Maternal Grandmother     Breast cancer Maternal Grandmother     Melanoma Maternal Grandfather     Anesthesia problems Neg Hx     Broken bones Neg Hx     Clotting disorder Neg Hx     Collagen disease Neg Hx     Diabetes Neg Hx     Dislocations Neg Hx     Rheumatologic disease Neg Hx     Scoliosis Neg Hx     Severe sprains Neg Hx     Ovarian cancer Neg Hx     Amblyopia Neg Hx     Blindness Neg Hx     Glaucoma Neg Hx     Retinal detachment Neg Hx     Strabismus Neg Hx     Psoriasis Neg Hx     Lupus Neg Hx     Eczema Neg Hx     Acne Neg Hx        Meds:     Review of patient's allergies indicates:   Allergen Reactions    Ace inhibitors      Other reaction(s): cough    Codeine      Other reaction(s): Nausea       Current Outpatient Medications:     acetaminophen (TYLENOL) 325 MG tablet, Take 2 tablets (650 mg total) by mouth every 6 (six) hours as needed., Disp: , Rfl: 0    alendronate (FOSAMAX) 70 MG tablet, TAKE 1 TABLET  EVERY 7 DAYS. HOLD UNTIL SEEN BY YOUR  "PRIMARY CARE PHYSICIAN, Disp: 12 tablet, Rfl: 3    cetirizine (ZYRTEC) 10 MG tablet, Take 10 mg by mouth once daily., Disp: , Rfl:     cholecalciferol, vitamin D3, 2,000 unit Tab, Take by mouth. 1 Tablet Oral Every day, Disp: , Rfl:     EScitalopram oxalate (LEXAPRO) 5 MG Tab, Take 1 tablet (5 mg total) by mouth once daily., Disp: 90 tablet, Rfl: 0    fluticasone propionate (FLONASE) 50 mcg/actuation nasal spray, 1 spray (50 mcg total) by Each Nostril route every evening., Disp: 16 g, Rfl: 3    losartan (COZAAR) 50 MG tablet, TAKE 1 TABLET TWICE DAILY (Patient taking differently: Take 50 mg by mouth once daily.), Disp: 180 tablet, Rfl: 3    multivit-min/iron/folic/lutein (CENTRUM SILVER WOMEN ORAL), Take by mouth., Disp: , Rfl:     omeprazole (PRILOSEC) 40 MG capsule, TAKE 1 CAPSULE EVERY MORNING  BEFORE  EATING, Disp: 90 capsule, Rfl: 3    oxybutynin (DITROPAN-XL) 10 MG 24 hr tablet, TAKE 1 TABLET EVERY DAY, Disp: 90 tablet, Rfl: 3    pravastatin (PRAVACHOL) 20 MG tablet, TAKE 1 TABLET EVERY EVENING., Disp: 90 tablet, Rfl: 3    amLODIPine (NORVASC) 2.5 MG tablet, TAKE 1 TABLET EVERY DAY, Disp: 90 tablet, Rfl: 3  No current facility-administered medications for this visit.    10 point ROS performed and negative except as stated in HPI   Objective:   /62 (BP Location: Left arm, Patient Position: Sitting, BP Method: Large (Automatic))   Pulse 95   Ht 5' 3" (1.6 m)   Wt 65.4 kg (144 lb 2.9 oz)   SpO2 98%   BMI 25.54 kg/m²     Physical Exam:   Constitutional: no acute distress  HEENT: NCAT, EOMI, no scleral icterus  Cardiovascular: Regular rate and rhythm, harsh 3/6 crescendo-decrescendo systolic murmur at RUSB. 2+ carotid, radial, femoral pulses bilaterally    Pulmonary: Clear to auscultation bilaterally   Abdomen: nontender, non-distended   Neuro: alert and oriented, no focal deficits  Extremities: warm, no edema   MSK: no deformities  Integument: intact, no rashes       Labs:     Lab Results "   Component Value Date     02/18/2022    K 4.2 02/18/2022     02/18/2022    CO2 29 02/18/2022    BUN 23 02/18/2022    CREATININE 0.8 02/18/2022    ANIONGAP 9 02/18/2022     Lab Results   Component Value Date    HGBA1C 5.3 05/19/2018     No results found for: BNP, BNPTRIAGEBLO    Lab Results   Component Value Date    WBC 6.81 02/18/2022    HGB 12.3 02/18/2022    HCT 37.9 02/18/2022     02/18/2022    GRAN 4.2 02/18/2022    GRAN 62.1 02/18/2022     Lab Results   Component Value Date    CHOL 176 01/19/2022    HDL 59 01/19/2022    LDLCALC 104.6 01/19/2022    TRIG 62 01/19/2022       Lab Results   Component Value Date     02/18/2022    K 4.2 02/18/2022     02/18/2022    CO2 29 02/18/2022    BUN 23 02/18/2022    CREATININE 0.8 02/18/2022    ANIONGAP 9 02/18/2022     Lab Results   Component Value Date    HGBA1C 5.3 05/19/2018     No results found for: BNP, BNPTRIAGEBLO Lab Results   Component Value Date    WBC 6.81 02/18/2022    HGB 12.3 02/18/2022    HCT 37.9 02/18/2022     02/18/2022    GRAN 4.2 02/18/2022    GRAN 62.1 02/18/2022     Lab Results   Component Value Date    CHOL 176 01/19/2022    HDL 59 01/19/2022    LDLCALC 104.6 01/19/2022    TRIG 62 01/19/2022            Assessment     1. Essential hypertension    2. Aortic arch atherosclerosis    3. Severe aortic stenosis        Plan:   86 y.o. y.o. female with PMH of severe aortic stenosis, HTN, HLD who presents to interventional cardiology clinic for TAVR workup.      Severe AS: Stage D1, NYHA II-III by symptoms. Still needs assessment by CT surgery but unlikely to be a surgical candidate given age. Tentatively planning for TAVR on 3/8/22.   Transcatheter Aortic valve replacement   Valve: 23 mm S3 Evita   TAVR access: Right transfemoral   Valvuloplasty balloon: 18 mm   Viabahn size if needed: 9x5  Antithrombotic therapy: Aspirin   Pacemaker risk factors: None    The patient was told that the procedure carries around a 12.5% risk  of permanent pacemaker requirement and that risk depends on the patients underlying conduction system.  Prior to the [facility] visit, all available records from referring provider were reviewed.  I have personally reviewed all the lab tests available related to the patient's case  The patient's images were reviewed by myself and the procedural planning was done with my own interpretation of the iliac and aortic anatomy based on CTA, angiography or KINJAL. I have reviewed all other imaging studies relevant to the patient including echocardiography and coronary angiography.  Patient was educated abut the pathophysiology and natural history of severe aortic stenosis and was educated about the treatment options including surgical and transcatheter valve replacement. She agrees to be full code for the forseable future and to undergo workup for treatment of severe AS.   The risks, benefits, and alternatives of transcatheter aortic valve replacement were discussed with the patient. All questions were answered and informed consent was obtained. I had a detailed discussion with the patient regarding risk of stroke, MI, bleeding access site complications including limb loss, allergy, kidney failure including dialysis and death.  The patient understands the risks and benefits and wishes to go ahead with the procedure.  The referring Cardiologist was notified of the plan  All patient's questions were answered        Hector Castaneda MD  [facility]  Cardiovascular Disease, PGY-IV

## 2022-02-18 NOTE — PROGRESS NOTES
Transcatheter Valve Replacement (TAVR)    You have been scheduled for your valve replacement on Tuesday, March 8, 2022.     Please report to the Cardiology Waiting Area on the Third floor of the hospital and check in at 8 AM.   You will then be taken to the SSCU (Short Stay Cardiac Unit) and prepared for your procedure. Please be aware that this is not the time of your procedure but the time you are to arrive.     Preperations for your procedure:  1. Shower with Dial soap the night before and the morning of your procedure.  2. No solid foods 8 hours prior to your procedure.  You may have clear liquids (12 ounces or 1 1/2 cups) up until 2 hours of your procedure.  Patients with gastric         emptying issues should be fasting for 6- 8 hours prior to the procedure.  Clear liquids include water, black coffee, clear juices, and performance drinks - no pulp         or milk.    3. Heart failure or dialysis patients will be limited to 8 ounces (1 cup) of clear liquids up until 2 hours of the procedure.  You may take your regular morning medications         with as much water as necessary.   4. Bring your cane and/or walker with you to the hospital.  5. Bring CPAP/BiPAP, or oxygen if you are on oxygen at home.  6. Call the office for any signs of infection ( fever, cough, pneumonia, urinary tract, etc.) We cannot implant a device in an infected patient.    Medications:  You may take your regular scheduled medications the morning of your procedure with as much water as necessary.  If you are diabetic and on Metformin (Glucophage), do not take it the day before, the day of, and 2 days after your procedure.  If you are on Pradaxa, Xarelto, Eliquis, or Coumadin hold 3 days prior to your procedure.     How long will the procedure take?  The entire procedure takes three to four hours.  After the procedure, you will go to an ICU where you will be monitored closely for the next several hours.  You will remain in the ICU overnight.       Covid-19 restrictions:  Our visitation policy has been altered due to Covid-19.  You may bring one family member with you for the day until 6 pm.  No one is allowed to spend the night with you at this time.  Please keep in mind that our policies are constantly changing and we must adhere to the current policy.    If you walk with a cane or walker, please bring it with you to the hospital so that we can get you out of bed several hours after your valve replacement.  Our goal is to get you up in a chair and moving as quickly as possible as long as it is safe for you to do so.    When can I go home?  Your length of stay in the hospital, will be determined by your physician.  Be prepared to stay 1-3 days.  You will be discharged when your physician thinks it is safe for you to go home.    Follow Up After Valve Replacement:  It is required that you return to [facility] for the follow up in one month and one year with an echo, lab work, and a clinic visit.  If you are in a research trial, your follow up will be slightly different and according to the trial requirements.  You can follow up with your regular cardiologist regarding any other heart issues.    Contacts one of our nurses at [phone] for any questions.  Amy Keith, RN  Faina Helms RN        THE ABOVE INSTRUCTIONS WERE GIVEN TO THE PATIENT VERBALLY AND THEY VERBALIZED UNDERSTANDING.  THEY DO NOT REQUIRE ANY SPECIAL NEEDS AND DO NOT HAVE ANY LEARNING BARRIERS.          Directions for Reporting to Cardiology Waiting Area in the Hospital  If you park in the Parking Garage:  Take elevators to the1st floor of the parking garage.  Continue past the gift shop, coffee shop, and piano.  Take a right and go to the gold elevators. (Elevator B)  Take the elevator to the 3rd floor.  Follow the arrow on the sign on the wall that says Cath Lab Registration/EP Lab Registration.  Follow the long hallway all the way around until you come to a big open area.  This is  the registration area.  Check in at Reception Desk.    OR    If family is dropping you off:  Have them drop you off at the front of the Hospital under the green overhang.  Enter through the doors and take a right.  Take the E elevators to the 3rd floor Cardiology Waiting Area.  Check in at the Reception Desk in the waiting room.

## 2022-02-23 ENCOUNTER — OFFICE VISIT (OUTPATIENT)
Dept: CARDIOTHORACIC SURGERY | Facility: CLINIC | Age: 87
End: 2022-02-23
Payer: MEDICARE

## 2022-02-23 VITALS
DIASTOLIC BLOOD PRESSURE: 77 MMHG | BODY MASS INDEX: 25.65 KG/M2 | HEIGHT: 63 IN | HEART RATE: 81 BPM | WEIGHT: 144.75 LBS | SYSTOLIC BLOOD PRESSURE: 169 MMHG | OXYGEN SATURATION: 100 %

## 2022-02-23 DIAGNOSIS — I35.0 SEVERE AORTIC STENOSIS: Primary | ICD-10-CM

## 2022-02-23 PROCEDURE — 99999 PR PBB SHADOW E&M-EST. PATIENT-LVL III: CPT | Mod: PBBFAC,HCNC,, | Performed by: THORACIC SURGERY (CARDIOTHORACIC VASCULAR SURGERY)

## 2022-02-23 PROCEDURE — 3078F DIAST BP <80 MM HG: CPT | Mod: HCNC,CPTII,S$GLB, | Performed by: THORACIC SURGERY (CARDIOTHORACIC VASCULAR SURGERY)

## 2022-02-23 PROCEDURE — 3078F PR MOST RECENT DIASTOLIC BLOOD PRESSURE < 80 MM HG: ICD-10-PCS | Mod: HCNC,CPTII,S$GLB, | Performed by: THORACIC SURGERY (CARDIOTHORACIC VASCULAR SURGERY)

## 2022-02-23 PROCEDURE — 3077F SYST BP >= 140 MM HG: CPT | Mod: HCNC,CPTII,S$GLB, | Performed by: THORACIC SURGERY (CARDIOTHORACIC VASCULAR SURGERY)

## 2022-02-23 PROCEDURE — 1101F PR PT FALLS ASSESS DOC 0-1 FALLS W/OUT INJ PAST YR: ICD-10-PCS | Mod: HCNC,CPTII,S$GLB, | Performed by: THORACIC SURGERY (CARDIOTHORACIC VASCULAR SURGERY)

## 2022-02-23 PROCEDURE — 99205 OFFICE O/P NEW HI 60 MIN: CPT | Mod: HCNC,S$GLB,, | Performed by: THORACIC SURGERY (CARDIOTHORACIC VASCULAR SURGERY)

## 2022-02-23 PROCEDURE — 1157F ADVNC CARE PLAN IN RCRD: CPT | Mod: HCNC,CPTII,S$GLB, | Performed by: THORACIC SURGERY (CARDIOTHORACIC VASCULAR SURGERY)

## 2022-02-23 PROCEDURE — 1126F PR PAIN SEVERITY QUANTIFIED, NO PAIN PRESENT: ICD-10-PCS | Mod: HCNC,CPTII,S$GLB, | Performed by: THORACIC SURGERY (CARDIOTHORACIC VASCULAR SURGERY)

## 2022-02-23 PROCEDURE — 1101F PT FALLS ASSESS-DOCD LE1/YR: CPT | Mod: HCNC,CPTII,S$GLB, | Performed by: THORACIC SURGERY (CARDIOTHORACIC VASCULAR SURGERY)

## 2022-02-23 PROCEDURE — 3288F FALL RISK ASSESSMENT DOCD: CPT | Mod: HCNC,CPTII,S$GLB, | Performed by: THORACIC SURGERY (CARDIOTHORACIC VASCULAR SURGERY)

## 2022-02-23 PROCEDURE — 1126F AMNT PAIN NOTED NONE PRSNT: CPT | Mod: HCNC,CPTII,S$GLB, | Performed by: THORACIC SURGERY (CARDIOTHORACIC VASCULAR SURGERY)

## 2022-02-23 PROCEDURE — 3077F PR MOST RECENT SYSTOLIC BLOOD PRESSURE >= 140 MM HG: ICD-10-PCS | Mod: HCNC,CPTII,S$GLB, | Performed by: THORACIC SURGERY (CARDIOTHORACIC VASCULAR SURGERY)

## 2022-02-23 PROCEDURE — 99999 PR PBB SHADOW E&M-EST. PATIENT-LVL III: ICD-10-PCS | Mod: PBBFAC,HCNC,, | Performed by: THORACIC SURGERY (CARDIOTHORACIC VASCULAR SURGERY)

## 2022-02-23 PROCEDURE — 1157F PR ADVANCE CARE PLAN OR EQUIV PRESENT IN MEDICAL RECORD: ICD-10-PCS | Mod: HCNC,CPTII,S$GLB, | Performed by: THORACIC SURGERY (CARDIOTHORACIC VASCULAR SURGERY)

## 2022-02-23 PROCEDURE — 99205 PR OFFICE/OUTPT VISIT, NEW, LEVL V, 60-74 MIN: ICD-10-PCS | Mod: HCNC,S$GLB,, | Performed by: THORACIC SURGERY (CARDIOTHORACIC VASCULAR SURGERY)

## 2022-02-23 PROCEDURE — 3288F PR FALLS RISK ASSESSMENT DOCUMENTED: ICD-10-PCS | Mod: HCNC,CPTII,S$GLB, | Performed by: THORACIC SURGERY (CARDIOTHORACIC VASCULAR SURGERY)

## 2022-02-23 NOTE — PROGRESS NOTES
Subjective:      Patient ID: Mine Wilkins is a 86 y.o. female.    Chief Complaint: No chief complaint on file.      HPI:  Mine Wilkins is a 86 y.o. female who presents for surgical evaluation of AS. Medical conditions include HTN, HLD. She has a history of severe AS by TTE 11/2021. She reports worsening symptoms since about the fall of last year, used to be able to walk briskly on her treadmill multiple days weekly, however has noticed progressive exertional dyspnea and intermittent chest tightness. No CAD or chronic lung disease.      Mine Wilkins is a 86 y.o. female referred by Dr Bustamante for evaluation of severe AS (NYHA Class II).     The patient has undergone the following TAVR work-up:   · ECHO (Date 11/1/21): ERENDIRA= 0.73 cm2, MG= 47 mmHg, Peak Jayant= 4.4 m/s, EF= 65%.   · LHC (2/7/2022): Normal coronaries angiographically    · STS: 2.2 %   · Frailty: 1/4 (failed handgrip)  · Iliacs are >8.14 mm on R and > 7.59 mm on L   · LVOT area by CTA is  4.00 cm2 (25.3 mm X 21.4 mm) and Avg Diameter is 22.6 per Dr. Wyatt   · Incidental findings on CT: Heterogeneous thyroid with rim calcified 1.4 cm right lobe nodule. Scattered subcentimeter pulmonary micro-nodules (report forwarded to PCP)  · CT Surgery risk assessment: Needs  · Rhythm issues: None    · Comorbidities: Hypertension     Family and social history reviewed    Review of patient's allergies indicates:   Allergen Reactions    Ace inhibitors      Other reaction(s): cough    Codeine      Other reaction(s): Nausea     Past Medical History:   Diagnosis Date    After-cataract of both eyes - Left Eye 10/11/2012    Anxiety     Aortic calcification 8/10/2016    Aortic valve stenosis, moderate     AR (allergic rhinitis)     Arthritis of facet joints at multiple vertebral levels 1/14/2016    Seen on lumbar MRI dated 01/14/16    Cataract     Cholelithiasis     Closed displaced intertrochanteric fracture of right femur with routine  healing s/p IM nail on 5/20/2018 5/19/2018    Cystocele 12/1/2013    Esotropia, alternating - Both Eyes 10/11/2012    Essential hypertension     Gastroesophageal reflux disease without esophagitis 4/13/2018    Left ventricular diastolic dysfunction with preserved systolic function 8/10/2016    Lumbar radiculopathy 1/25/2016    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    Nondisplaced fracture of proximal end of humerus 8/7/2016    Nonexudative age-related macular degeneration, bilateral, intermediate dry stage 1/14/2021    Osteoarthritis     Overweight (BMI 25.0-29.9) 4/13/2018    Pure hypercholesterolemia     Right-sided low back pain without sciatica 12/10/2015    Strabismus     Urethral hypermobility 12/1/2013     Past Surgical History:   Procedure Laterality Date    ADENOIDECTOMY      CATARACT EXTRACTION Bilateral     BOTH EYES MULTIFOCAL    COLONOSCOPY  2015    CORONARY ANGIOGRAPHY N/A 2/7/2022    Procedure: ANGIOGRAM, CORONARY ARTERY;  Surgeon: Robby Mistry MD;  Location: [facility] CATH LAB;  Service: Cardiology;  Laterality: N/A;    EYE SURGERY      FEMUR FRACTURE SURGERY Right     FRACTURE SURGERY Right     femur    HYSTERECTOMY      INTERTROCHANTERIC HIP FRACTURE SURGERY Right 05/20/2018    IM nail    LUMBAR EPIDURAL INJECTION  02/04/2016    L4-l5    PARTIAL HYSTERECTOMY      SKIN GRAFT      left foot     TONSILLECTOMY      WRIST FRACTURE SURGERY Right 2009    YAG CAP      LEFT EYE     Family History     Problem Relation (Age of Onset)    Breast cancer Maternal Grandmother    Cancer Father    Cataracts Mother    Colon cancer Brother    Dementia Brother, Brother    Hypertension Brother, Mother, Brother, Maternal Grandmother    Macular degeneration Mother    Melanoma Maternal Grandfather    No Known Problems Daughter, Daughter    Osteoporosis Mother    Stroke Maternal Grandmother        Social History     Socioeconomic History    Marital status:     Occupational History    Occupation: Retired   Tobacco Use    Smoking status: Never Smoker    Smokeless tobacco: Never Used   Substance and Sexual Activity    Alcohol use: Yes     Comment: maybe 6 drinks per year    Drug use: No    Sexual activity: Not Currently     Partners: Male   Other Topics Concern    Are you pregnant or think you may be? No    Breast-feeding No   Social History Narrative    . Two grown daughters     Social Determinants of Health     Financial Resource Strain: Low Risk     Difficulty of Paying Living Expenses: Not hard at all   Food Insecurity: No Food Insecurity    Worried About Running Out of Food in the Last Year: Never true    Ran Out of Food in the Last Year: Never true   Transportation Needs: No Transportation Needs    Lack of Transportation (Medical): No    Lack of Transportation (Non-Medical): No   Physical Activity: Sufficiently Active    Days of Exercise per Week: 3 days    Minutes of Exercise per Session: 70 min   Stress: No Stress Concern Present    Feeling of Stress : Not at all   Social Connections: Moderately Isolated    Frequency of Communication with Friends and Family: More than three times a week    Frequency of Social Gatherings with Friends and Family: More than three times a week    Attends Cheondoism Services: More than 4 times per year    Active Member of Clubs or Organizations: No    Attends Club or Organization Meetings: Never    Marital Status:    Housing Stability: Low Risk     Unable to Pay for Housing in the Last Year: No    Number of Places Lived in the Last Year: 1    Unstable Housing in the Last Year: No       Current medications Reviewed    Review of Systems   Constitutional: Positive for activity change and fatigue.   HENT: Negative for nosebleeds.    Eyes: Negative for visual disturbance.   Respiratory: Positive for shortness of breath.    Cardiovascular: Negative for chest pain, palpitations and leg swelling.    Gastrointestinal: Negative for nausea and vomiting.   Musculoskeletal: Negative for gait problem.   Skin: Negative for color change and pallor.   Neurological: Negative for dizziness, seizures, syncope, weakness, numbness and headaches.   Hematological: Does not bruise/bleed easily.   Psychiatric/Behavioral: Negative for sleep disturbance.     Objective:   Physical Exam  Constitutional:       General: She is not in acute distress.  HENT:      Head: Normocephalic and atraumatic.   Eyes:      Pupils: Pupils are equal, round, and reactive to light.   Cardiovascular:      Rate and Rhythm: Normal rate.   Pulmonary:      Effort: Pulmonary effort is normal. No respiratory distress.   Abdominal:      General: There is no distension.   Musculoskeletal:         General: Normal range of motion.      Cervical back: Normal range of motion.   Skin:     Coloration: Skin is not pale.   Neurological:      General: No focal deficit present.      Mental Status: She is alert.   Psychiatric:         Mood and Affect: Mood normal.         Behavior: Behavior normal.         Diagnostic Results: reviewed   TTE 11/1/2021:   · The left ventricle is normal in size with normal systolic function.  · The estimated ejection fraction is 65%.  · Grade I left ventricular diastolic dysfunction.  · Moderate left atrial enlargement.  · The estimated PA systolic pressure is 28 mmHg.  · Normal right ventricular size with normal right ventricular systolic function.  · There is no pulmonary hypertension.  · There is severe aortic valve stenosis.  · Aortic valve area is 0.73 cm2; peak velocity is 4.40 m/s; mean gradient is 47 mmHg.  · Moderate mitral regurgitation.  · Mild aortic regurgitation.  · The ascending aorta is moderately dilated.  · The ascending aortic to height ratio is 2.5 indicative of moderate risk     [facility] 2/7/22:   Patent coronary arteries without evidence of obstructive epicardial disease.  Severe aortic stenosis, will refer to the  structural team for further evaluation    CTA TAVR 2/28/22  1. Cardiac TAVR measurements as above.  2. Heterogeneous thyroid with rim calcified 1.4 cm right lobe nodule.  This can be further assessed with nonemergent thyroid ultrasound.  3. Multifocal aortic atherosclerosis with ectatic proximal ascending portion.  4. Scattered subcentimeter pulmonary micro-nodules.  In a high risk patient, optional CT at 12 months could be performed to ensure stability.  5. Vertebral wedge compression deformity at L2 and to a lesser extent at L5.  6. Additional findings as above.  Assessment:   AS  Plan:   Continue with TAVR workup     I have seen the patient and reviewed the physician assistant's note above. I have personally interviewed and examined the patient at bedside and agree with the findings.     86 y.o. female with multiple comorbidities presented with symptomatic severe aortic stenosis. Deemed inoperable for surgical aortic valve replacement (SAVR) due to age and subjective frailty.  Appropriate candidate for TAVR evaluation.  We explained the patient's condition, pathology and prognosis of severe aortic stenosis, treatment options including medical therapy, SAVR and TAVR, details of SAVR and the TAVR procedure, risks and benefits of SAVR and TAVR including myocardial infarction, stroke, pacemaker, bleeding, infection, acute renal injury, conversion to open surgery, need for emergency mechanical circulatory support, and potential complications and recovery course with patient and family, and also durability of surgical vs transcatheter valve and options for reintervention in the future.  Patient and family understood and agreed to proceed. All questions answered.    We spent >70 minutes reviewing, examining and evaluating this patient, including reviewing relevant diagnostic studies, and 50% of which were spent on patient counseling including the patient's condition, diagnosis, prognosis, expected recovery after SAVR vs  TAVR, follow-up plan and next steps.

## 2022-03-02 NOTE — PROGRESS NOTES
Subjective:       Patient ID: Mine Wilkins is a 86 y.o. female.    Chief Complaint: Labs Only    HPI  ROS     Objective:      Physical Exam    Assessment:       1. Encounter for screening laboratory testing for COVID-19 virus        Plan:                   No follow-ups on file.

## 2022-03-06 ENCOUNTER — LAB VISIT (OUTPATIENT)
Dept: URGENT CARE | Facility: CLINIC | Age: 87
DRG: 267 | End: 2022-03-06
Payer: MEDICARE

## 2022-03-06 DIAGNOSIS — I35.0 AORTIC STENOSIS: ICD-10-CM

## 2022-03-06 PROCEDURE — U0003 INFECTIOUS AGENT DETECTION BY NUCLEIC ACID (DNA OR RNA); SEVERE ACUTE RESPIRATORY SYNDROME CORONAVIRUS 2 (SARS-COV-2) (CORONAVIRUS DISEASE [COVID-19]), AMPLIFIED PROBE TECHNIQUE, MAKING USE OF HIGH THROUGHPUT TECHNOLOGIES AS DESCRIBED BY CMS-2020-01-R: HCPCS | Mod: HCNC | Performed by: INTERNAL MEDICINE

## 2022-03-06 PROCEDURE — U0005 INFEC AGEN DETEC AMPLI PROBE: HCPCS | Performed by: INTERNAL MEDICINE

## 2022-03-06 NOTE — PROGRESS NOTES

## 2022-03-07 ENCOUNTER — ANESTHESIA EVENT (OUTPATIENT)
Dept: MEDSURG UNIT | Facility: HOSPITAL | Age: 87
DRG: 267 | End: 2022-03-07
Payer: MEDICARE

## 2022-03-07 LAB
SARS-COV-2 RNA RESP QL NAA+PROBE: NOT DETECTED
SARS-COV-2- CYCLE NUMBER: NORMAL

## 2022-03-07 NOTE — CARE UPDATE
Mine Wilkins is a 86 y.o. female referred by Dr Bustamante for evaluation of severe AS (NYHA Class II).     The patient has undergone the following TAVR work-up:   · ECHO (Date 11/1/21): ERENDIRA= 0.73 cm2, MG= 47 mmHg, Peak Jayant= 4.4 m/s, EF= 65%.   · LHC (2/7/2022): Normal coronaries   · STS: 2.2 %   · Frailty: 1/4 (failed handgrip)  · Iliacs are >8.14 mm on R and > 7.59 mm on L   · LVOT area by CTA is 4.00 cm2 (25.3 mm X 21.4 mm) and Avg Diameter is 22.6 per Dr. Wyatt   · Incidental findings on CT: Heterogeneous thyroid with rim calcified 1.4 cm right lobe nodule. Scattered subcentimeter pulmonary micro-nodules (report forwarded to PCP)  · CT Surgery risk assessment: inoperable per Dr Lyle due to age and subjective frailty  · Rhythm issues: None    · Comorbidities: Hypertension       Mine Wilkins is a 23 mm S3 Evita valve +1 mL (1.5% OS) candidate via right transfemoral access.    1. Valve: 23 mm S3 Evita + 1 mL (1.5% OS)   2. TAVR access: Right transfemoral       3. Valvuloplasty balloon: 18 mm       4. Viabahn size if needed: 9x5      5. Antithrombotic therapy: Aspirin   6. Pacemaker risk factors: None

## 2022-03-07 NOTE — ANESTHESIA PREPROCEDURE EVALUATION
[facility]  Anesthesia Pre-Operative Evaluation         Patient Name: Mine Wilkins  YOB: 1935  MRN: [id number]    SUBJECTIVE:     Pre-operative evaluation for Procedure(s) (LRB):  REPLACEMENT, AORTIC VALVE, TRANSCATHETER (TAVR) (N/A)     03/07/2022    Mine Wilkins is a 86 y.o. female w/ a significant PMHx of aortic stenosis, GERD, HTN, and HLD. She presented for evaluation of progressive exertional dyspnea. Evaluation with ECHO identified severe aortic stenosis. CTS evaluated her and deemed her not a candidate for surgery, so decision made to pursue TAVR.    · ECHO (Date 11/1/21): ERENDIRA= 0.73 cm2, MG= 47 mmHg, Peak Jayant= 4.4 m/s, EF= 65%.   · LHC (2/7/2022): Normal coronaries angiographically    · STS: 2.2 %   · Frailty: 1/4 (failed handgrip)  · Iliacs are >8.14 mm on R and > 7.59 mm on L   · LVOT area by CTA is  4.00 cm2 (25.3 mm X 21.4 mm) and Avg Diameter is 22.6 per Dr. Wyatt   · Incidental findings on CT: Heterogeneous thyroid with rim calcified 1.4 cm right lobe nodule. Scattered subcentimeter pulmonary micro-nodules (report forwarded to PCP)  · CT Surgery risk assessment: Needs  · Rhythm issues: None    · Comorbidities: Hypertension     Patient now presents for the above procedure(s).       Prev airway: 05/20/18; Placement Time: 0832; Method of Intubation: Direct laryngoscopy; Inserted by: Anesthesia MD; Airway Device: Endotracheal Tube; Mask Ventilation: Easy; Intubated: Postinduction; Blade: Che #2; Airway Device Size: 7.0; Style: Cuffed; Cuff Inflation: Minimal occlusive pressure; Inflation Amount: 4; Placement Verified By: Auscultation, Capnometry, ETT Condensation; Grade: Grade II; Complicating Factors: Small mouth, Poor neck/head extension      Patient Active Problem List   Diagnosis    Esotropia, alternating - Both Eyes    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)    Cystocele    Urethral hypermobility    Osteoarthritis of multiple  joints    AR (allergic rhinitis)    Essential hypertension    Lumbar radiculopathy    Aortic arch atherosclerosis    Left ventricular diastolic dysfunction with preserved systolic function    Generalized anxiety disorder    Body mass index (BMI) of 24.0 to 24.9 in adult    Thrombocytosis    Gastroesophageal reflux disease without esophagitis    Xerostomia    Senile osteoporosis    Dyslipidemia    Chronic right shoulder pain    Nonexudative age-related macular degeneration, bilateral, intermediate dry stage    Nevus, choroidal, left    Posterior vitreous detachment, bilateral    Severe aortic stenosis       Review of patient's allergies indicates:   Allergen Reactions    Ace inhibitors      Other reaction(s): cough    Codeine      Other reaction(s): Nausea       Current Inpatient Medications:      No current facility-administered medications on file prior to encounter.     Current Outpatient Medications on File Prior to Encounter   Medication Sig Dispense Refill    acetaminophen (TYLENOL) 325 MG tablet Take 2 tablets (650 mg total) by mouth every 6 (six) hours as needed.  0    alendronate (FOSAMAX) 70 MG tablet TAKE 1 TABLET  EVERY 7 DAYS. HOLD UNTIL SEEN BY YOUR PRIMARY CARE PHYSICIAN 12 tablet 3    cetirizine (ZYRTEC) 10 MG tablet Take 10 mg by mouth once daily.      cholecalciferol, vitamin D3, 2,000 unit Tab Take by mouth. 1 Tablet Oral Every day      EScitalopram oxalate (LEXAPRO) 5 MG Tab Take 1 tablet (5 mg total) by mouth once daily. 90 tablet 0    fluticasone propionate (FLONASE) 50 mcg/actuation nasal spray 1 spray (50 mcg total) by Each Nostril route every evening. 16 g 3    losartan (COZAAR) 50 MG tablet TAKE 1 TABLET TWICE DAILY (Patient taking differently: Take 50 mg by mouth once daily.) 180 tablet 3    multivit-min/iron/folic/lutein (CENTRUM SILVER WOMEN ORAL) Take by mouth.      omeprazole (PRILOSEC) 40 MG capsule TAKE 1 CAPSULE EVERY MORNING  BEFORE  EATING 90 capsule 3     oxybutynin (DITROPAN-XL) 10 MG 24 hr tablet TAKE 1 TABLET EVERY DAY 90 tablet 3    pravastatin (PRAVACHOL) 20 MG tablet TAKE 1 TABLET EVERY EVENING. 90 tablet 3       Past Surgical History:   Procedure Laterality Date    ADENOIDECTOMY      CATARACT EXTRACTION Bilateral     BOTH EYES MULTIFOCAL    COLONOSCOPY  2015    CORONARY ANGIOGRAPHY N/A 2/7/2022    Procedure: ANGIOGRAM, CORONARY ARTERY;  Surgeon: Robby Mistry MD;  Location: [facility] CATH LAB;  Service: Cardiology;  Laterality: N/A;    EYE SURGERY      FEMUR FRACTURE SURGERY Right     FRACTURE SURGERY Right     femur    HYSTERECTOMY      INTERTROCHANTERIC HIP FRACTURE SURGERY Right 05/20/2018    IM nail    LUMBAR EPIDURAL INJECTION  02/04/2016    L4-l5    PARTIAL HYSTERECTOMY      SKIN GRAFT      left foot     TONSILLECTOMY      WRIST FRACTURE SURGERY Right 2009    YAG CAP      LEFT EYE       Social History:  Tobacco Use: Low Risk     Smoking Tobacco Use: Never Smoker    Smokeless Tobacco Use: Never Used      Alcohol Use: Not At Risk    Frequency of Alcohol Consumption: Monthly or less    Average Number of Drinks: 1 or 2    Frequency of Binge Drinking: Never        OBJECTIVE:     Vital Signs Range (Last 24H):         Significant Labs:  Lab Results   Component Value Date    WBC 6.81 02/18/2022    HGB 12.3 02/18/2022    HCT 37.9 02/18/2022     02/18/2022    CHOL 176 01/19/2022    TRIG 62 01/19/2022    HDL 59 01/19/2022    ALT 19 01/19/2022    AST 24 01/19/2022     02/18/2022    K 4.2 02/18/2022     02/18/2022    CREATININE 0.8 02/18/2022    BUN 23 02/18/2022    CO2 29 02/18/2022    TSH 2.442 01/19/2022    INR 1.0 02/18/2022    HGBA1C 5.3 05/19/2018       Diagnostic Studies: No relevant studies.    EKG:   Results for orders placed or performed in visit on 02/18/22   IN OFFICE EKG 12-LEAD (to [place])    Collection Time: 02/18/22 12:00 PM    Narrative    Test Reason : I49.3,    Vent. Rate : 069 BPM     Atrial Rate : 069 BPM      P-R Int : 142 ms          QRS Dur : 070 ms      QT Int : 416 ms       P-R-T Axes : 032 047 051 degrees     QTc Int : 445 ms    Normal sinus rhythm  Normal ECG  When compared with ECG of 07-FEB-2022 11:31,  Premature ventricular complexes are no longer Present  Confirmed by Ronny Francois MD ([id number]) on 2/19/2022 9:51:08 AM    Referred By: KUMAR GLASGOW           Confirmed By:Ronny Francois MD       2D ECHO:  TTE:  Results for orders placed or performed during the hospital encounter of 11/01/21   Echo   Result Value Ref Range    Ascending aorta 4.01 cm    STJ 3.16 cm    AV mean gradient 47 mmHg    Ao peak rachael 4.40 m/s    Ao .69 cm    IVRT 114.18 msec    IVS 0.84 0.6 - 1.1 cm    LA size 3.53 cm    Left Atrium Major Axis 4.91 cm    Left Atrium Minor Axis 5.42 cm    LVIDd 4.71 3.5 - 6.0 cm    LVIDs 2.75 2.1 - 4.0 cm    LVOT diameter 2.13 cm    LVOT peak VTI 24.89 cm    Posterior Wall 0.74 0.6 - 1.1 cm    MV Peak A Rachael 1.17 m/s    E wave deceleration time 213.81 msec    MV Peak E Rachael 0.93 m/s    PV Peak D Rachael 0.41 m/s    PV Peak S Rachael 0.69 m/s    RA Major Axis 4.80 cm    RA Width 3.34 cm    RVDD 3.31 cm    Sinus 3.33 cm    TAPSE 1.72 cm    TR Max Rachael 2.52 m/s    TDI LATERAL 0.05 m/s    TDI SEPTAL 0.04 m/s    LA WIDTH 4.68 cm    MV stenosis pressure 1/2 time 62.01 ms    LV Diastolic Volume 102.84 mL    LV Systolic Volume 28.37 mL    LVOT peak rachael 0.92 m/s    LA volume (mod) 71.88 cm3    LV LATERAL E/E' RATIO 18.60 m/s    LV SEPTAL E/E' RATIO 23.25 m/s    FS 42 %    LA volume 72.35 cm3    LV mass 120.72 g    Left Ventricle Relative Wall Thickness 0.31 cm    AV valve area 0.73 cm2    AV Velocity Ratio 0.21     AV index (prosthetic) 0.21     MV valve area p 1/2 method 3.55 cm2    E/A ratio 0.79     Mean e' 0.05 m/s    Pulm vein S/D ratio 1.68     LVOT area 3.6 cm2    LVOT stroke volume 88.64 cm3    AV peak gradient 77 mmHg    E/E' ratio 20.67 m/s    Triscuspid Valve Regurgitation Peak Gradient 25 mmHg    BSA 1.71 m2    LV  Systolic Volume Index 16.8 mL/m2    LV Diastolic Volume Index 60.85 mL/m2    LA Volume Index 42.8 mL/m2    LV Mass Index 71 g/m2    LA Volume Index (Mod) 42.5 mL/m2    Right Atrial Pressure (from IVC) 3 mmHg    EF 65 %    TV rest pulmonary artery pressure 28 mmHg    Narrative    · The left ventricle is normal in size with normal systolic function.  · The estimated ejection fraction is 65%.  · Grade I left ventricular diastolic dysfunction.  · Moderate left atrial enlargement.  · The estimated PA systolic pressure is 28 mmHg.  · Normal right ventricular size with normal right ventricular systolic   function.  · There is no pulmonary hypertension.  · There is severe aortic valve stenosis.  · Aortic valve area is 0.73 cm2; peak velocity is 4.40 m/s; mean gradient   is 47 mmHg.  · Moderate mitral regurgitation.  · Mild aortic regurgitation.  · The ascending aorta is moderately dilated.  · The ascending aortic to height ratio is 2.5 indicative of moderate risk          KINJAL:  No results found for this or any previous visit.     [facility] (2/7/2022):  Patent coronary arteries without evidence of obstructive epicardial disease.  Severe aortic stenosis    ASSESSMENT/PLAN:           Pre-op Assessment    I have reviewed the Patient Summary Reports.     I have reviewed the Nursing Notes. I have reviewed the NPO Status.   I have reviewed the Medications.     Review of Systems  Anesthesia Hx:  Denies Hx of Anesthetic complications  History of prior surgery of interest to airway management or planning: Denies Family Hx of Anesthesia complications.   Denies Personal Hx of Anesthesia complications.   Social:  Non-Smoker    Cardiovascular:   Hypertension Valvular problems/Murmurs, AS hyperlipidemia    Pulmonary:  Pulmonary Normal    Renal/:  Renal/ Normal     Hepatic/GI:   GERD    Musculoskeletal:   Arthritis     Neurological:  Neurology Normal    Endocrine:  Endocrine Normal    Psych:   anxiety          Physical Exam  General: Well  nourished, Alert and Oriented    Airway:  Mallampati: II   Mouth Opening: Normal  TM Distance: Normal  Tongue: Normal  Neck ROM: Normal ROM    Dental:  Intact    Chest/Lungs:  Clear to auscultation, Normal Respiratory Rate    Heart:  Rate: Normal  Rhythm: Regular Rhythm  Sounds: Normal        Anesthesia Plan  Type of Anesthesia, risks & benefits discussed:    Anesthesia Type: Gen Natural Airway, MAC  Intra-op Monitoring Plan: Standard ASA Monitors, Art Line and Central Line  Post Op Pain Control Plan: multimodal analgesia and IV/PO Opioids PRN  Induction:  IV  Informed Consent: Informed consent signed with the Patient and all parties understand the risks and agree with anesthesia plan.  All questions answered.   ASA Score: 4  Day of Surgery Review of History & Physical: I have interviewed and examined the patient. I have reviewed the patient's H&P dated: There are no significant changes.     Ready For Surgery From Anesthesia Perspective.     .

## 2022-03-08 ENCOUNTER — HOSPITAL ENCOUNTER (INPATIENT)
Facility: HOSPITAL | Age: 87
LOS: 1 days | Discharge: HOME | DRG: 267 | End: 2022-03-09
Attending: INTERNAL MEDICINE | Admitting: INTERNAL MEDICINE
Payer: MEDICARE

## 2022-03-08 ENCOUNTER — ANESTHESIA (OUTPATIENT)
Dept: MEDSURG UNIT | Facility: HOSPITAL | Age: 87
DRG: 267 | End: 2022-03-08
Payer: MEDICARE

## 2022-03-08 DIAGNOSIS — Z95.2 S/P TAVR (TRANSCATHETER AORTIC VALVE REPLACEMENT): ICD-10-CM

## 2022-03-08 DIAGNOSIS — I35.0 SEVERE AORTIC STENOSIS: Primary | ICD-10-CM

## 2022-03-08 DIAGNOSIS — I35.0 NONRHEUMATIC AORTIC VALVE STENOSIS: ICD-10-CM

## 2022-03-08 DIAGNOSIS — N18.9 CHRONIC KIDNEY DISEASE, UNSPECIFIED CKD STAGE: ICD-10-CM

## 2022-03-08 DIAGNOSIS — I35.0 AORTIC STENOSIS: ICD-10-CM

## 2022-03-08 LAB
ABO + RH BLD: NORMAL
ANION GAP SERPL CALC-SCNC: 9 MMOL/L (ref 8–16)
APTT BLDCRRT: 26.3 SEC (ref 21–32)
BLD GP AB SCN CELLS X3 SERPL QL: NORMAL
BNP SERPL-MCNC: 167 PG/ML (ref 0–99)
BUN SERPL-MCNC: 26 MG/DL (ref 8–23)
CALCIUM SERPL-MCNC: 10.2 MG/DL (ref 8.7–10.5)
CHLORIDE SERPL-SCNC: 104 MMOL/L (ref 95–110)
CO2 SERPL-SCNC: 24 MMOL/L (ref 23–29)
CREAT SERPL-MCNC: 0.9 MG/DL (ref 0.5–1.4)
ERYTHROCYTE [DISTWIDTH] IN BLOOD BY AUTOMATED COUNT: 14.5 % (ref 11.5–14.5)
EST. GFR  (AFRICAN AMERICAN): >60 ML/MIN/1.73 M^2
EST. GFR  (NON AFRICAN AMERICAN): 58.5 ML/MIN/1.73 M^2
GLUCOSE SERPL-MCNC: 103 MG/DL (ref 70–110)
HCT VFR BLD AUTO: 36.5 % (ref 37–48.5)
HGB BLD-MCNC: 11.6 G/DL (ref 12–16)
INR PPP: 1 (ref 0.8–1.2)
MCH RBC QN AUTO: 27.2 PG (ref 27–31)
MCHC RBC AUTO-ENTMCNC: 31.8 G/DL (ref 32–36)
MCV RBC AUTO: 86 FL (ref 82–98)
PLATELET # BLD AUTO: 229 K/UL (ref 150–450)
PMV BLD AUTO: 11.6 FL (ref 9.2–12.9)
POTASSIUM SERPL-SCNC: 4.4 MMOL/L (ref 3.5–5.1)
PROTHROMBIN TIME: 10 SEC (ref 9–12.5)
RBC # BLD AUTO: 4.26 M/UL (ref 4–5.4)
SODIUM SERPL-SCNC: 137 MMOL/L (ref 136–145)
WBC # BLD AUTO: 9.25 K/UL (ref 3.9–12.7)

## 2022-03-08 PROCEDURE — 33210 SWAN GANZ LINE: ICD-10-PCS | Mod: HCNC,59,, | Performed by: ANESTHESIOLOGY

## 2022-03-08 PROCEDURE — 83880 ASSAY OF NATRIURETIC PEPTIDE: CPT | Mod: HCNC | Performed by: INTERNAL MEDICINE

## 2022-03-08 PROCEDURE — 25500020 PHARM REV CODE 255: Mod: HCNC | Performed by: INTERNAL MEDICINE

## 2022-03-08 PROCEDURE — 93010 ELECTROCARDIOGRAM REPORT: CPT | Mod: HCNC,,, | Performed by: INTERNAL MEDICINE

## 2022-03-08 PROCEDURE — 25000003 PHARM REV CODE 250: Mod: HCNC | Performed by: INTERNAL MEDICINE

## 2022-03-08 PROCEDURE — 25000003 PHARM REV CODE 250: Mod: HCNC | Performed by: STUDENT IN AN ORGANIZED HEALTH CARE EDUCATION/TRAINING PROGRAM

## 2022-03-08 PROCEDURE — 93005 ELECTROCARDIOGRAM TRACING: CPT | Mod: HCNC

## 2022-03-08 PROCEDURE — 93010 EKG 12-LEAD: ICD-10-PCS | Mod: HCNC,,, | Performed by: INTERNAL MEDICINE

## 2022-03-08 PROCEDURE — 33361 PR TAVR, PERCUTANEOUS FEMORAL: ICD-10-PCS | Mod: 62,Q0,HCNC, | Performed by: INTERNAL MEDICINE

## 2022-03-08 PROCEDURE — 27201423 OPTIME MED/SURG SUP & DEVICES STERILE SUPPLY: Mod: HCNC | Performed by: INTERNAL MEDICINE

## 2022-03-08 PROCEDURE — 33361 REPLACE AORTIC VALVE PERQ: CPT | Mod: 62,Q0,HCNC, | Performed by: INTERNAL MEDICINE

## 2022-03-08 PROCEDURE — D9220A PRA ANESTHESIA: Mod: HCNC,Q0,, | Performed by: ANESTHESIOLOGY

## 2022-03-08 PROCEDURE — 33361 PR TAVR, PERCUTANEOUS FEMORAL: ICD-10-PCS | Mod: 62,Q0,HCNC, | Performed by: THORACIC SURGERY (CARDIOTHORACIC VASCULAR SURGERY)

## 2022-03-08 PROCEDURE — C1894 INTRO/SHEATH, NON-LASER: HCPCS | Mod: HCNC | Performed by: INTERNAL MEDICINE

## 2022-03-08 PROCEDURE — 33210 INSERT ELECTRD/PM CATH SNGL: CPT | Mod: HCNC,59,, | Performed by: ANESTHESIOLOGY

## 2022-03-08 PROCEDURE — 36620 INSERTION CATHETER ARTERY: CPT | Mod: HCNC,59,, | Performed by: ANESTHESIOLOGY

## 2022-03-08 PROCEDURE — 37000009 HC ANESTHESIA EA ADD 15 MINS: Mod: HCNC | Performed by: INTERNAL MEDICINE

## 2022-03-08 PROCEDURE — 86901 BLOOD TYPING SEROLOGIC RH(D): CPT | Mod: HCNC | Performed by: INTERNAL MEDICINE

## 2022-03-08 PROCEDURE — 33361 REPLACE AORTIC VALVE PERQ: CPT | Mod: 62,Q0,HCNC, | Performed by: THORACIC SURGERY (CARDIOTHORACIC VASCULAR SURGERY)

## 2022-03-08 PROCEDURE — 36415 COLL VENOUS BLD VENIPUNCTURE: CPT | Mod: HCNC | Performed by: INTERNAL MEDICINE

## 2022-03-08 PROCEDURE — 27000239 HC STAND-BY BYPASS PUMP: Mod: HCNC

## 2022-03-08 PROCEDURE — 27800903 OPTIME MED/SURG SUP & DEVICES OTHER IMPLANTS: Mod: HCNC | Performed by: INTERNAL MEDICINE

## 2022-03-08 PROCEDURE — 27200676 HC TRANSDUCER MONITOR KIT DOUBLE: Mod: HCNC | Performed by: ANESTHESIOLOGY

## 2022-03-08 PROCEDURE — 33361 REPLACE AORTIC VALVE PERQ: CPT | Mod: Q0,HCNC | Performed by: INTERNAL MEDICINE

## 2022-03-08 PROCEDURE — 85027 COMPLETE CBC AUTOMATED: CPT | Mod: HCNC | Performed by: INTERNAL MEDICINE

## 2022-03-08 PROCEDURE — C1725 CATH, TRANSLUMIN NON-LASER: HCPCS | Mod: HCNC | Performed by: INTERNAL MEDICINE

## 2022-03-08 PROCEDURE — 37000008 HC ANESTHESIA 1ST 15 MINUTES: Mod: HCNC | Performed by: INTERNAL MEDICINE

## 2022-03-08 PROCEDURE — 99499 UNLISTED E&M SERVICE: CPT | Mod: HCNC,,, | Performed by: INTERNAL MEDICINE

## 2022-03-08 PROCEDURE — 36620 ARTERIAL: ICD-10-PCS | Mod: HCNC,59,, | Performed by: ANESTHESIOLOGY

## 2022-03-08 PROCEDURE — C1760 CLOSURE DEV, VASC: HCPCS | Mod: HCNC | Performed by: INTERNAL MEDICINE

## 2022-03-08 PROCEDURE — 27000191 HC C-V MONITORING: Mod: HCNC

## 2022-03-08 PROCEDURE — D9220A PRA ANESTHESIA: ICD-10-PCS | Mod: HCNC,Q0,, | Performed by: ANESTHESIOLOGY

## 2022-03-08 PROCEDURE — 85730 THROMBOPLASTIN TIME PARTIAL: CPT | Mod: HCNC | Performed by: INTERNAL MEDICINE

## 2022-03-08 PROCEDURE — 94761 N-INVAS EAR/PLS OXIMETRY MLT: CPT | Mod: HCNC

## 2022-03-08 PROCEDURE — 99499 NO LOS: ICD-10-PCS | Mod: HCNC,,, | Performed by: INTERNAL MEDICINE

## 2022-03-08 PROCEDURE — C1769 GUIDE WIRE: HCPCS | Mod: HCNC | Performed by: INTERNAL MEDICINE

## 2022-03-08 PROCEDURE — C1751 CATH, INF, PER/CENT/MIDLINE: HCPCS | Mod: HCNC | Performed by: ANESTHESIOLOGY

## 2022-03-08 PROCEDURE — 20000000 HC ICU ROOM: Mod: HCNC

## 2022-03-08 PROCEDURE — 85610 PROTHROMBIN TIME: CPT | Mod: HCNC | Performed by: INTERNAL MEDICINE

## 2022-03-08 PROCEDURE — 80048 BASIC METABOLIC PNL TOTAL CA: CPT | Mod: HCNC | Performed by: INTERNAL MEDICINE

## 2022-03-08 PROCEDURE — 63600175 PHARM REV CODE 636 W HCPCS: Mod: HCNC | Performed by: STUDENT IN AN ORGANIZED HEALTH CARE EDUCATION/TRAINING PROGRAM

## 2022-03-08 DEVICE — VALVE SAPIEN 3 ULT COMMND 23MM: Type: IMPLANTABLE DEVICE | Site: HEART | Status: FUNCTIONAL

## 2022-03-08 RX ORDER — ACETAMINOPHEN 325 MG/1
650 TABLET ORAL EVERY 4 HOURS PRN
Status: DISCONTINUED | OUTPATIENT
Start: 2022-03-08 | End: 2022-03-09 | Stop reason: HOSPADM

## 2022-03-08 RX ORDER — PRAVASTATIN SODIUM 20 MG/1
20 TABLET ORAL NIGHTLY
Status: DISCONTINUED | OUTPATIENT
Start: 2022-03-08 | End: 2022-03-09 | Stop reason: HOSPADM

## 2022-03-08 RX ORDER — DIPHENHYDRAMINE HCL 50 MG
50 CAPSULE ORAL ONCE
Status: COMPLETED | OUTPATIENT
Start: 2022-03-08 | End: 2022-03-08

## 2022-03-08 RX ORDER — NOREPINEPHRINE BITARTRATE/D5W 4MG/250ML
0-3 PLASTIC BAG, INJECTION (ML) INTRAVENOUS CONTINUOUS
Status: DISCONTINUED | OUTPATIENT
Start: 2022-03-08 | End: 2022-03-09 | Stop reason: HOSPADM

## 2022-03-08 RX ORDER — HEPARIN SOD,PORCINE/0.9 % NACL 1000/500ML
INTRAVENOUS SOLUTION INTRAVENOUS
Status: DISCONTINUED | OUTPATIENT
Start: 2022-03-08 | End: 2022-03-08 | Stop reason: HOSPADM

## 2022-03-08 RX ORDER — LIDOCAINE HYDROCHLORIDE 20 MG/ML
INJECTION, SOLUTION INFILTRATION; PERINEURAL
Status: DISCONTINUED | OUTPATIENT
Start: 2022-03-08 | End: 2022-03-08

## 2022-03-08 RX ORDER — LANOLIN ALCOHOL/MO/W.PET/CERES
800 CREAM (GRAM) TOPICAL
Status: DISCONTINUED | OUTPATIENT
Start: 2022-03-08 | End: 2022-03-09 | Stop reason: HOSPADM

## 2022-03-08 RX ORDER — ASPIRIN 81 MG/1
81 TABLET ORAL DAILY
Qty: 90 TABLET | Refills: 3 | Status: SHIPPED | OUTPATIENT
Start: 2022-03-10 | End: 2024-02-20

## 2022-03-08 RX ORDER — FUROSEMIDE 20 MG/1
TABLET ORAL
Qty: 30 TABLET | Refills: 0 | Status: SHIPPED | OUTPATIENT
Start: 2022-03-08 | End: 2022-10-06

## 2022-03-08 RX ORDER — ASPIRIN 325 MG
325 TABLET ORAL ONCE
Status: COMPLETED | OUTPATIENT
Start: 2022-03-08 | End: 2022-03-08

## 2022-03-08 RX ORDER — CEFAZOLIN SODIUM 2 G/50ML
SOLUTION INTRAVENOUS
Status: DISCONTINUED | OUTPATIENT
Start: 2022-03-08 | End: 2022-03-08

## 2022-03-08 RX ORDER — SODIUM CHLORIDE 9 MG/ML
INJECTION, SOLUTION INTRAVENOUS CONTINUOUS
Status: ACTIVE | OUTPATIENT
Start: 2022-03-08 | End: 2022-03-08

## 2022-03-08 RX ORDER — DEXMEDETOMIDINE HYDROCHLORIDE 100 UG/ML
INJECTION, SOLUTION INTRAVENOUS
Status: DISCONTINUED | OUTPATIENT
Start: 2022-03-08 | End: 2022-03-08

## 2022-03-08 RX ORDER — PANTOPRAZOLE SODIUM 40 MG/1
40 TABLET, DELAYED RELEASE ORAL DAILY
Refills: 3 | Status: DISCONTINUED | OUTPATIENT
Start: 2022-03-09 | End: 2022-03-09 | Stop reason: HOSPADM

## 2022-03-08 RX ORDER — OXYBUTYNIN CHLORIDE 5 MG/1
10 TABLET, EXTENDED RELEASE ORAL DAILY
Status: DISCONTINUED | OUTPATIENT
Start: 2022-03-09 | End: 2022-03-09 | Stop reason: HOSPADM

## 2022-03-08 RX ORDER — ONDANSETRON 8 MG/1
8 TABLET, ORALLY DISINTEGRATING ORAL EVERY 8 HOURS PRN
Status: DISCONTINUED | OUTPATIENT
Start: 2022-03-08 | End: 2022-03-09 | Stop reason: HOSPADM

## 2022-03-08 RX ORDER — PHENYLEPHRINE HCL IN 0.9% NACL 1 MG/10 ML
SYRINGE (ML) INTRAVENOUS
Status: DISCONTINUED | OUTPATIENT
Start: 2022-03-08 | End: 2022-03-08

## 2022-03-08 RX ORDER — DEXMEDETOMIDINE HYDROCHLORIDE 100 UG/ML
INJECTION, SOLUTION INTRAVENOUS CONTINUOUS PRN
Status: DISCONTINUED | OUTPATIENT
Start: 2022-03-08 | End: 2022-03-08

## 2022-03-08 RX ORDER — SODIUM,POTASSIUM PHOSPHATES 280-250MG
2 POWDER IN PACKET (EA) ORAL
Status: DISCONTINUED | OUTPATIENT
Start: 2022-03-08 | End: 2022-03-09 | Stop reason: HOSPADM

## 2022-03-08 RX ORDER — ESCITALOPRAM OXALATE 5 MG/1
5 TABLET ORAL NIGHTLY
Status: DISCONTINUED | OUTPATIENT
Start: 2022-03-08 | End: 2022-03-09 | Stop reason: HOSPADM

## 2022-03-08 RX ORDER — FENTANYL CITRATE 50 UG/ML
INJECTION, SOLUTION INTRAMUSCULAR; INTRAVENOUS
Status: DISCONTINUED | OUTPATIENT
Start: 2022-03-08 | End: 2022-03-08

## 2022-03-08 RX ORDER — ASPIRIN 81 MG/1
81 TABLET ORAL DAILY
Status: DISCONTINUED | OUTPATIENT
Start: 2022-03-09 | End: 2022-03-09 | Stop reason: HOSPADM

## 2022-03-08 RX ORDER — HEPARIN SODIUM 1000 [USP'U]/ML
INJECTION, SOLUTION INTRAVENOUS; SUBCUTANEOUS
Status: DISCONTINUED | OUTPATIENT
Start: 2022-03-08 | End: 2022-03-08

## 2022-03-08 RX ORDER — LIDOCAINE HYDROCHLORIDE 20 MG/ML
INJECTION, SOLUTION EPIDURAL; INFILTRATION; INTRACAUDAL; PERINEURAL
Status: DISCONTINUED | OUTPATIENT
Start: 2022-03-08 | End: 2022-03-08 | Stop reason: HOSPADM

## 2022-03-08 RX ORDER — PROTAMINE SULFATE 10 MG/ML
INJECTION, SOLUTION INTRAVENOUS
Status: DISCONTINUED | OUTPATIENT
Start: 2022-03-08 | End: 2022-03-08

## 2022-03-08 RX ADMIN — SODIUM CHLORIDE, SODIUM GLUCONATE, SODIUM ACETATE, POTASSIUM CHLORIDE, MAGNESIUM CHLORIDE, SODIUM PHOSPHATE, DIBASIC, AND POTASSIUM PHOSPHATE: .53; .5; .37; .037; .03; .012; .00082 INJECTION, SOLUTION INTRAVENOUS at 07:03

## 2022-03-08 RX ADMIN — DEXMEDETOMIDINE HYDROCHLORIDE 1 MCG/KG/HR: 100 INJECTION, SOLUTION INTRAVENOUS at 07:03

## 2022-03-08 RX ADMIN — FENTANYL CITRATE 25 MCG: 50 INJECTION INTRAMUSCULAR; INTRAVENOUS at 07:03

## 2022-03-08 RX ADMIN — DIPHENHYDRAMINE HYDROCHLORIDE 50 MG: 50 CAPSULE ORAL at 06:03

## 2022-03-08 RX ADMIN — SODIUM CHLORIDE: 9 INJECTION, SOLUTION INTRAVENOUS at 07:03

## 2022-03-08 RX ADMIN — NOREPINEPHRINE BITARTRATE 0.02 MCG/KG/MIN: 1 INJECTION, SOLUTION, CONCENTRATE INTRAVENOUS at 07:03

## 2022-03-08 RX ADMIN — ESCITALOPRAM OXALATE 5 MG: 5 TABLET, FILM COATED ORAL at 08:03

## 2022-03-08 RX ADMIN — SODIUM CHLORIDE: 0.9 INJECTION, SOLUTION INTRAVENOUS at 06:03

## 2022-03-08 RX ADMIN — LIDOCAINE HYDROCHLORIDE 100 MG: 20 INJECTION, SOLUTION INFILTRATION; PERINEURAL at 07:03

## 2022-03-08 RX ADMIN — PRAVASTATIN SODIUM 20 MG: 20 TABLET ORAL at 08:03

## 2022-03-08 RX ADMIN — HEPARIN SODIUM 1000 UNITS: 1000 INJECTION, SOLUTION INTRAVENOUS; SUBCUTANEOUS at 09:03

## 2022-03-08 RX ADMIN — ASPIRIN 325 MG ORAL TABLET 325 MG: 325 PILL ORAL at 07:03

## 2022-03-08 RX ADMIN — CEFAZOLIN SODIUM 2 G: 2 SOLUTION INTRAVENOUS at 08:03

## 2022-03-08 RX ADMIN — PROTAMINE SULFATE 100 MG: 10 INJECTION, SOLUTION INTRAVENOUS at 09:03

## 2022-03-08 RX ADMIN — NOREPINEPHRINE BITARTRATE 0.02 MCG/KG/MIN: 4 INJECTION, SOLUTION INTRAVENOUS at 10:03

## 2022-03-08 RX ADMIN — HEPARIN SODIUM 9000 UNITS: 1000 INJECTION, SOLUTION INTRAVENOUS; SUBCUTANEOUS at 08:03

## 2022-03-08 RX ADMIN — DEXMEDETOMIDINE HYDROCHLORIDE 68 MCG: 100 INJECTION, SOLUTION INTRAVENOUS at 07:03

## 2022-03-08 RX ADMIN — Medication 100 MCG: at 07:03

## 2022-03-08 RX ADMIN — SODIUM CHLORIDE: 0.9 INJECTION, SOLUTION INTRAVENOUS at 09:03

## 2022-03-08 RX ADMIN — ESMOLOL HYDROCHLORIDE 40 MG: 20 INJECTION INTRAVENOUS at 09:03

## 2022-03-08 NOTE — OP NOTE
[facility]  Cardiothoracic Surgery Operative Report    Patient Name:  Mine Wilkins; [id number]    DATE OF PROCEDURE: 03/08/2022   ATTENDING SURGEONS: Shahram Valdivia M.D., Constantin Wyatt M.D., and Shahram Lyle M.D.  PREOPERATIVE DIAGNOSIS:  Severe aortic stenosis.  POSTOPERATIVE DIAGNOSIS:  Severe aortic stenosis  ?  OPERATION PERFORMED: Transcatheter aortic valve insertion via transfemoral approach using a 23mm Siu Evita S3 bioprosthesis  ANESTHESIA: General.  ESTIMATED BLOOD LOSS: 10 mL.  BRIEF HISTORY: This patient is a 86 year old female with symptomatic severe aortic stenosis.  The patient has had progressive dyspnea on exertion. This prompted a thoughtful and thorough evaluation, which demonstrated severe aortic stenosis. Given the comorbid conditions, a transcatheter valve insertion was recommended. The patient now presents for transcatheter aortic valve insertion.  PROCEDURE: After obtaining informed and written consent, the patient was brought to the cath lab and placed on the cath table in supine position. After induction of adequate anesthesia, a transvenous pacing wire was placed.  Bilateral femoral arterial and femoral venous access was obtained. A wire was advanced across the aortic valve. It was exchanged for stiff wire, and an aortic balloon was placed. Under rapid ventricular pacing, balloon valvuloplasty was performed. The balloon was then withdrawn, and a valve was advanced to the level of the aortic annulus. Once the team was satisfied that the valve was in proper position, it was deployed. Excellent positioning was obtained. Post-procedure echo demonstrated no aortic insufficiency. The femoral access sites were controlled using percutaneous techniques. The patient tolerated the procedure well, there were no complications. At the conclusion of the case, sponge and instrument counts were correct.

## 2022-03-08 NOTE — BRIEF OP NOTE
Brief Operative Note:    : Constantin Goldberg MD     Referring Physician: ZOEY Oswald     All Operators: Surgeon(s):  MD Janeth Irwin MD Aashish Gupta, MD Christopher Puleo, MD Stephen M. Spindel, MD Stephen R. Ramee, MD     Preoperative Diagnosis: Nonrheumatic aortic valve stenosis [I35.0]     Postop Diagnosis: Nonrheumatic aortic valve stenosis [I35.0]    Treatments/Procedures: Procedure(s) (LRB):  REPLACEMENT, AORTIC VALVE, TRANSCATHETER (TAVR) (N/A)  Left heart cath (Left)  Cardiac Cath Cosurgeon (N/A)    Access: Right CFA, Left CFA    Findings:Severe structural heart disease is present.     See catheterization report for full details.    Intervention: S/P 23 mm S3 TAVR via RTF access.    PV 1 m/s, MG 4 mmHg, and no PVL on post procedure echo.    See catheterization report for full details.    Closure device: Perclose       Estimated Blood loss: 20 cc    Specimens removed: No    Janeth Gutierrez MD

## 2022-03-08 NOTE — INTERVAL H&P NOTE
The patient has been examined and the H&P has been reviewed:    I concur with the findings and no changes have occurred since H&P was written.    Anesthesia/Surgery risks, benefits and alternative options discussed and understood by patient/family.    Mine Wilkins is a 86 y.o. female referred by Dr Bustamante for evaluation of severe AS (NYHA Class II).     The patient has undergone the following TAVR work-up:   · ECHO (Date 11/1/21): ERENDIRA= 0.73 cm2, MG= 47 mmHg, Peak Jayant= 4.4 m/s, EF= 65%.   · LHC (2/7/2022): Normal coronaries   · STS: 2.2 %   · Frailty: 1/4 (failed handgrip)  · Iliacs are >8.14 mm on R and > 7.59 mm on L   · LVOT area by CTA is 4.00 cm2 (25.3 mm X 21.4 mm) and Avg Diameter is 22.6 per Dr. Wyatt   · Incidental findings on CT: Heterogeneous thyroid with rim calcified 1.4 cm right lobe nodule. Scattered subcentimeter pulmonary micro-nodules (report forwarded to PCP)  · CT Surgery risk assessment: inoperable per Dr Lyle due to age and subjective frailty  · Rhythm issues: None    · Comorbidities: Hypertension       Mine Wilkins is a 23 mm S3 Evita valve +1 mL (1.5% OS) candidate via right transfemoral access.     1. Valve: 23 mm S3 Evita + 1 mL (1.5% OS)          2. TAVR access: Right transfemoral                                                3. Valvuloplasty balloon: 18 mm                                           4. Viabahn size if needed: 9x5                                              5. Antithrombotic therapy: Aspirin   6. Pacemaker risk factors: None    7. Patient was educated about the pathophysiology and natural history of severe aortic stenosis and was educated about the treatment options including surgical and transcatheter valve replacement. He agrees to be full code for the foresable future and to undergo workup and treatment of severe AS.   8. The risks, benefits, and alternatives of transcatheter aortic valve replacement were discussed with the patient. All  questions were answered and informed consent was obtained. I had a detailed discussion with the patient regarding risk of stroke, MI, bleeding access site complications including limb loss, allergy, kidney failure including dialysis and death.   9. The patient understands the risks and benefits and wishes to go ahead with the procedure.   10. All patient's questions were answered.       Active Hospital Problems    Diagnosis  POA    Severe aortic stenosis [I35.0]  Yes      Resolved Hospital Problems   No resolved problems to display.

## 2022-03-08 NOTE — PLAN OF CARE
Progress Note  Critical Care    Admit Date: 3/8/2022   LOS: 0 days     SUBJECTIVE:     Pt arrived to unit post-TAVR procedure. Post procedure vitals, site check, and pulse checks performed per provider order. No concerns noted. Pt denies pain, numbness, tingling, HA, nausea, vomiting. Pt was drowsy upon arrival and awakened spontaneously within the first hour of admission to unit.     Pt up to chair and BSC per provider orders x 4 hours. No concerns noted.Daughter at bedside when Int. Cardiology provider rounded. Supporting daughter and patient     Temporary pacemaker was removed by Interventional Cardiology MD at bedside. No bleeding, hematoma, redness to site. Sutures were removed.    Will continue to assess and monitor pt.    Continuous Infusions:   NORepinephrine bitartrate-D5W Stopped (03/08/22 1042)     Scheduled Meds:   [START ON 3/9/2022] aspirin  81 mg Oral Daily    EScitalopram oxalate  5 mg Oral Nightly    [START ON 3/9/2022] oxybutynin  10 mg Oral Daily    [START ON 3/9/2022] pantoprazole  40 mg Oral Daily    pravastatin  20 mg Oral QHS     PRN Meds:acetaminophen, magnesium oxide, magnesium oxide, ondansetron, potassium bicarbonate, potassium bicarbonate, potassium bicarbonate, potassium, sodium phosphates, potassium, sodium phosphates, potassium, sodium phosphates    Review of patient's allergies indicates:   Allergen Reactions    Ace inhibitors      Other reaction(s): cough    Codeine      Other reaction(s): Nausea       OBJECTIVE:     Vital Signs (Most Recent)  Temp: 97.1 °F (36.2 °C) (03/08/22 0930)  Pulse: 61 (03/08/22 1300)  Resp: 17 (03/08/22 0500)  BP: 120/63 (03/08/22 1300)  SpO2: 97 % (03/08/22 1300)    Vital Signs Range (Last 24H):  Temp:  [97.1 °F (36.2 °C)-97.8 °F (36.6 °C)]   Pulse:  [55-84]   Resp:  [17]   BP: ()/(53-79)   SpO2:  [96 %-100 %]   Arterial Line BP: ()/(53-75)     I & O (Last 24H):  Intake/Output Summary (Last 24 hours) at 3/8/2022 6227  Last data filed at 3/8/2022  1300  Gross per 24 hour   Intake 1387.28 ml   Output 300 ml   Net 1087.28 ml     Ventilator Data (Last 24H):          Hemodynamic Parameters (Last 24H):       Physical Exam:  See flowsheet    Lines/Drains:       Peripheral IV - Single Lumen 03/08/22 0635 18 G Anterior;Left Wrist (Active)   Extremity Assessment Distal to IV No abnormal discoloration;No redness;No swelling;No warmth 03/08/22 0930   Line Status Blood return noted;Saline locked 03/08/22 0930   Dressing Status New drainage 03/08/22 0930   Dressing Intervention Sterile dressing change 03/08/22 0930   Dressing Change Due 03/12/22 03/08/22 0930   Site Change Due 03/12/22 03/08/22 0930   Reason Not Rotated Not due 03/08/22 0930   Number of days: 0       Arterial Line 03/08/22 0847 Left Radial (Active)   Site Assessment Clean;Dry;Intact;No redness;No swelling 03/08/22 0930   Line Status Tubing changed 03/08/22 0930   Art Line Waveform Appropriate 03/08/22 0930   Arterial Line Interventions Zeroed and calibrated;Leveled;Tubing changed;Connections checked and tightened;Flushed per protocol;Line pulled back 03/08/22 0930   Color/Movement/Sensation Capillary refill less than 3 sec 03/08/22 0930   Dressing Type Biopatch in place;Central line dressing 03/08/22 0930   Dressing Status Clean;Dry;Intact 03/08/22 0930   Dressing Intervention Sterile dressing change 03/08/22 0930   Dressing Change Due 03/15/22 03/08/22 0930   Tubing Change Due 03/15/22 03/08/22 0930   Number of days: 0       Laboratory (Last 24H):  CBC:  Recent Labs   Lab 03/08/22 0604   WBC 9.25   HGB 11.6*   HCT 36.5*        CMP:  Recent Labs   Lab 03/08/22 0604   CALCIUM 10.2      K 4.4   CO2 24      BUN 26*   CREATININE 0.9             Brad Phelps RN  3/8/2022  4:17 PM

## 2022-03-08 NOTE — PROGRESS NOTES
Perfusion Standby Note:      03/08/2022  Perfusionist:  Nader Rivera Jr  [facility]  Surgeon(s) and Role:  Panel 1:     * Constantin Goldberg MD - Primary     * Shahram Valdivia MD - Assisting     * Jeremy Lofton MD - Fellow     * Janeth Gutierrez MD - Fellow     * Barrie Zarate MD  Panel 2:     * Shahram Lyle MD - Primary  Anesthesiologist:  Barrington Lyons Jr., MD    Past Medical History:   Diagnosis Date    After-cataract of both eyes - Left Eye 10/11/2012    Anxiety     Aortic calcification 8/10/2016    Aortic valve stenosis, moderate     AR (allergic rhinitis)     Arthritis of facet joints at multiple vertebral levels 1/14/2016    Seen on lumbar MRI dated 01/14/16    Cataract     Cholelithiasis     Closed displaced intertrochanteric fracture of right femur with routine healing s/p IM nail on 5/20/2018 5/19/2018    Cystocele 12/1/2013    Esotropia, alternating - Both Eyes 10/11/2012    Essential hypertension     Gastroesophageal reflux disease without esophagitis 4/13/2018    Left ventricular diastolic dysfunction with preserved systolic function 8/10/2016    Lumbar radiculopathy 1/25/2016    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    Nondisplaced fracture of proximal end of humerus 8/7/2016    Nonexudative age-related macular degeneration, bilateral, intermediate dry stage 1/14/2021    Osteoarthritis     Overweight (BMI 25.0-29.9) 4/13/2018    Pure hypercholesterolemia     Right-sided low back pain without sciatica 12/10/2015    Strabismus     Urethral hypermobility 12/1/2013         Perfusion department standby was requested for this case, utilizing either a full cardiopulmonary machine or ECMO pump.      All pre-op checklists were completed, all equipment was available, as were cannulae and other disposables.  A TAVR instrument tray was also verified as available, per the checklist, for any case requiring ECMO standby.    I fully participated in  the briefing and time-out for this procedure.  I was present in the room for all critical portions of this case during which mechanical circulatory support could be required.  Please see cardiopulmonary bypass record for details.  There were no complications.    9:25 AM

## 2022-03-08 NOTE — PLAN OF CARE
Pt ambulated on unit this morning with daughter at her side.  Denies pain or SOB.  Verbalized an understanding of procedure.  Oriented to unit and call bell provided.  Will continue to monitor.

## 2022-03-08 NOTE — ANESTHESIA PROCEDURE NOTES
Arterial    Diagnosis: Aortic stenosis    Patient location during procedure: done in OR    Staffing  Authorizing Provider: Barrington Lyons Jr., MD  Performing Provider: Chicho Marti MD    Anesthesiologist was present at the time of the procedure.    Preanesthetic Checklist  Completed: patient identified, IV checked, site marked, risks and benefits discussed, surgical consent, monitors and equipment checked, pre-op evaluation, timeout performed and anesthesia consent givenArterial  Skin Prep: chlorhexidine gluconate  Local Infiltration: lidocaine  Orientation: left  Location: radial    Catheter Size: 20 G  Catheter placement by Ultrasound guidance. Heme positive aspiration all ports.   Vessel Caliber: patent  Needle advanced into vessel with real time Ultrasound guidance.Insertion Attempts: 1  Assessment  Dressing: secured with tape and tegaderm  Patient: Tolerated well

## 2022-03-08 NOTE — TRANSFER OF CARE
"Anesthesia Transfer of Care Note    Patient: Mine Wilkins    Procedure(s) Performed: Procedure(s) (LRB):  REPLACEMENT, AORTIC VALVE, TRANSCATHETER (TAVR) (N/A)  Left heart cath (Left)  Cardiac Cath Cosurgeon (N/A)  PTA, Femoral Artery    Patient location: ICU    Anesthesia Type: general    Transport from OR: Transported from OR on 6-10 L/min O2 by face mask with adequate spontaneous ventilation    Post pain: adequate analgesia    Post assessment: no apparent anesthetic complications    Post vital signs: stable    Level of consciousness: sedated    Nausea/Vomiting: no nausea/vomiting    Complications: none    Transfer of care protocol was followed      Last vitals:   Visit Vitals  /79 (BP Location: Right arm, Patient Position: Lying)   Pulse 73   Temp 36.6 °C (97.8 °F)   Resp 17   Ht 5' 3" (1.6 m)   Wt 68 kg (150 lb)   SpO2 97%   Breastfeeding No   BMI 26.57 kg/m²     "

## 2022-03-09 ENCOUNTER — PATIENT MESSAGE (OUTPATIENT)
Dept: INTENSIVE CARE | Facility: HOSPITAL | Age: 87
End: 2022-03-09
Payer: MEDICARE

## 2022-03-09 VITALS
SYSTOLIC BLOOD PRESSURE: 148 MMHG | HEIGHT: 63 IN | RESPIRATION RATE: 25 BRPM | DIASTOLIC BLOOD PRESSURE: 72 MMHG | WEIGHT: 150 LBS | BODY MASS INDEX: 26.58 KG/M2 | TEMPERATURE: 99 F | HEART RATE: 76 BPM | OXYGEN SATURATION: 94 %

## 2022-03-09 PROBLEM — Z95.2 S/P TAVR (TRANSCATHETER AORTIC VALVE REPLACEMENT): Status: ACTIVE | Noted: 2022-03-09

## 2022-03-09 PROBLEM — Z95.3 S/P TAVR (TRANSCATHETER AORTIC VALVE REPLACEMENT): Status: ACTIVE | Noted: 2022-03-09

## 2022-03-09 PROBLEM — I50.33 ACUTE ON CHRONIC DIASTOLIC HEART FAILURE: Status: ACTIVE | Noted: 2022-03-09

## 2022-03-09 LAB
ANION GAP SERPL CALC-SCNC: 7 MMOL/L (ref 8–16)
BASOPHILS # BLD AUTO: 0.04 K/UL (ref 0–0.2)
BASOPHILS NFR BLD: 0.4 % (ref 0–1.9)
BUN SERPL-MCNC: 19 MG/DL (ref 8–23)
CALCIUM SERPL-MCNC: 9.3 MG/DL (ref 8.7–10.5)
CHLORIDE SERPL-SCNC: 101 MMOL/L (ref 95–110)
CO2 SERPL-SCNC: 25 MMOL/L (ref 23–29)
CREAT SERPL-MCNC: 1 MG/DL (ref 0.5–1.4)
DIFFERENTIAL METHOD: ABNORMAL
EOSINOPHIL # BLD AUTO: 0.2 K/UL (ref 0–0.5)
EOSINOPHIL NFR BLD: 1.4 % (ref 0–8)
ERYTHROCYTE [DISTWIDTH] IN BLOOD BY AUTOMATED COUNT: 14.8 % (ref 11.5–14.5)
EST. GFR  (AFRICAN AMERICAN): 59.4 ML/MIN/1.73 M^2
EST. GFR  (NON AFRICAN AMERICAN): 51.5 ML/MIN/1.73 M^2
GLUCOSE SERPL-MCNC: 108 MG/DL (ref 70–110)
HCT VFR BLD AUTO: 30.9 % (ref 37–48.5)
HGB BLD-MCNC: 10.2 G/DL (ref 12–16)
IMM GRANULOCYTES # BLD AUTO: 0.03 K/UL (ref 0–0.04)
IMM GRANULOCYTES NFR BLD AUTO: 0.3 % (ref 0–0.5)
LYMPHOCYTES # BLD AUTO: 1.3 K/UL (ref 1–4.8)
LYMPHOCYTES NFR BLD: 12.7 % (ref 18–48)
MCH RBC QN AUTO: 28 PG (ref 27–31)
MCHC RBC AUTO-ENTMCNC: 33 G/DL (ref 32–36)
MCV RBC AUTO: 85 FL (ref 82–98)
MONOCYTES # BLD AUTO: 0.9 K/UL (ref 0.3–1)
MONOCYTES NFR BLD: 8.5 % (ref 4–15)
NEUTROPHILS # BLD AUTO: 8.1 K/UL (ref 1.8–7.7)
NEUTROPHILS NFR BLD: 76.7 % (ref 38–73)
NRBC BLD-RTO: 0 /100 WBC
PLATELET # BLD AUTO: 244 K/UL (ref 150–450)
PMV BLD AUTO: 12.4 FL (ref 9.2–12.9)
POC ACTIVATED CLOTTING TIME K: 124 SEC (ref 74–137)
POC ACTIVATED CLOTTING TIME K: 130 SEC (ref 74–137)
POC ACTIVATED CLOTTING TIME K: 238 SEC (ref 74–137)
POTASSIUM SERPL-SCNC: 4.2 MMOL/L (ref 3.5–5.1)
RBC # BLD AUTO: 3.64 M/UL (ref 4–5.4)
SAMPLE: ABNORMAL
SAMPLE: NORMAL
SAMPLE: NORMAL
SODIUM SERPL-SCNC: 133 MMOL/L (ref 136–145)
WBC # BLD AUTO: 10.51 K/UL (ref 3.9–12.7)

## 2022-03-09 PROCEDURE — 25000003 PHARM REV CODE 250: Mod: HCNC | Performed by: STUDENT IN AN ORGANIZED HEALTH CARE EDUCATION/TRAINING PROGRAM

## 2022-03-09 PROCEDURE — 1111F PR DISCHARGE MEDS RECONCILED W/ CURRENT OUTPATIENT MED LIST: ICD-10-PCS | Mod: HCNC,CPTII,, | Performed by: PHYSICIAN ASSISTANT

## 2022-03-09 PROCEDURE — 99239 PR HOSPITAL DISCHARGE DAY,>30 MIN: ICD-10-PCS | Mod: HCNC,,, | Performed by: PHYSICIAN ASSISTANT

## 2022-03-09 PROCEDURE — 85025 COMPLETE CBC W/AUTO DIFF WBC: CPT | Mod: HCNC | Performed by: STUDENT IN AN ORGANIZED HEALTH CARE EDUCATION/TRAINING PROGRAM

## 2022-03-09 PROCEDURE — 80048 BASIC METABOLIC PNL TOTAL CA: CPT | Mod: HCNC | Performed by: INTERNAL MEDICINE

## 2022-03-09 PROCEDURE — 99239 HOSP IP/OBS DSCHRG MGMT >30: CPT | Mod: HCNC,,, | Performed by: PHYSICIAN ASSISTANT

## 2022-03-09 PROCEDURE — 1111F DSCHRG MED/CURRENT MED MERGE: CPT | Mod: HCNC,CPTII,, | Performed by: PHYSICIAN ASSISTANT

## 2022-03-09 RX ADMIN — OXYBUTYNIN CHLORIDE 10 MG: 5 TABLET, EXTENDED RELEASE ORAL at 08:03

## 2022-03-09 RX ADMIN — ASPIRIN 81 MG: 81 TABLET, COATED ORAL at 08:03

## 2022-03-09 RX ADMIN — ACETAMINOPHEN 650 MG: 325 TABLET ORAL at 02:03

## 2022-03-09 RX ADMIN — PANTOPRAZOLE SODIUM 40 MG: 40 TABLET, DELAYED RELEASE ORAL at 08:03

## 2022-03-09 NOTE — ANESTHESIA POSTPROCEDURE EVALUATION
Anesthesia Post Evaluation    Patient: Mine Wilkins    Procedure(s) Performed: Procedure(s) (LRB):  REPLACEMENT, AORTIC VALVE, TRANSCATHETER (TAVR) (N/A)  Left heart cath (Left)  Cardiac Cath Cosurgeon (N/A)  PTA, Femoral Artery    Final Anesthesia Type: general      Patient location during evaluation: ICU  Patient participation: Yes- Able to Participate  Level of consciousness: awake and alert and oriented  Post-procedure vital signs: reviewed and stable  Pain management: adequate  Airway patency: patent    PONV status at discharge: No PONV  Anesthetic complications: no      Cardiovascular status: blood pressure returned to baseline, hemodynamically stable and stable  Respiratory status: unassisted, room air and spontaneous ventilation  Hydration status: euvolemic  Follow-up not needed.          Vitals Value Taken Time   /72 03/09/22 0818   Temp 36.7 °C (98 °F) 03/09/22 0400   Pulse 81 03/09/22 0821   Resp 31 03/09/22 0821   SpO2 97 % 03/09/22 0821   Vitals shown include unvalidated device data.      No case tracking events are documented in the log.      Pain/Dorene Score: Pain Rating Prior to Med Admin: 4 (3/9/2022  2:12 AM)  Pain Rating Post Med Admin: 0 (3/9/2022  3:12 AM)

## 2022-03-09 NOTE — NURSING
Pt education regarding follow-up instructions and medications were given to patient. Patient verbalized understanding. No concerns noted at this time.    Brad Phelps RN

## 2022-03-09 NOTE — PLAN OF CARE
[facility] - Cardiac Intensive Care  Discharge Assessment    Primary Care Provider: MARVA Oswald MD     Discharge Assessment (most recent)     BRIEF DISCHARGE ASSESSMENT - 03/09/22 1341        Discharge Planning    Assessment Type Discharge Planning Brief Assessment     Resource/Environmental Concerns none     Support Systems Spouse/significant other;Children     Equipment Currently Used at Home none     Current Living Arrangements home/apartment/condo     Patient/Family Anticipates Transition to home     Patient/Family Anticipated Services at Transition none     DME Needed Upon Discharge  none     Discharge Plan A Home with family     Discharge Plan B Home;Home with family                   Pt admitted 3/8/2022  5:54 AM    For Nonrheumatic aortic valve stenosis [I35.0]         PCP is MARVA Oswald MD  [phone] (p)  [phone] (f)      Payor: [employer] MEDICARE / Plan: [employer] TOTAL CARE ADVANTAGE / Product Type: Medicare Advantage /       [id number] - [place], [place] - [address] [facility]  [address]  [place] [address]  Phone: [phone] Fax: [phone]    [facility] - [place], [place] - [address] [facility]  [address] [facility] [address]  Phone: [phone] Fax: [phone]    [id number] - [place], [place] - [address]  [address]  [place] [address]  Phone: [phone] Fax: [phone]      Extended Emergency Contact Information  Primary Emergency Contact: Justine Lucas   [facility]  Home Phone: [phone]  Mobile Phone: [phone]  Relation: Daughter  Secondary Emergency Contact: Raffi Pulliam   [facility]  Home Phone: [phone]  Mobile Phone: [phone]  Relation: Relative    Future Appointments   Date Time Provider Department Center   4/12/2022 12:15 PM LAB, SAME DAY [facility] LAB [facility]   4/12/2022  1:00 PM [facility]  ECHOSTR [address]   4/12/2022  2:00 PM Tangela Chatman PA-C [facility] CARDVAL [address]   6/16/2022 10:00 AM ZOEY Oswald MD [facility] Julie Haase RN  Case Management [phone]

## 2022-03-09 NOTE — HOSPITAL COURSE
Mine Wilkins was admitted and underwent successful placement of a 23 mm Evita S3 TAVR via TF access under MAC sedation on 3/8/22. Please see full cath report for details. A transthoracic echo was performed immediately post procedure which showed no paravalvular leak. An aortic valve mean gradient of 4 mmHg and a maximum velocity through the aortic valve of 1.1 m/s. She was transported to the CCU in stable condition with a TVP and arterial line in place. No EP consult was warranted. She remained hemodynamically stable overnight. EKG remained stable and her TVP was removed. This morning, she ambulated without difficulty and was eager to go home. It was felt she was stable for discharge and will go home on ASA alone for antithrombotic therapy post TAVR.

## 2022-03-09 NOTE — ASSESSMENT & PLAN NOTE
Successful transfemoral aortic valve replacement with a 23 mm Evita S3 valve. A transthoracic echo was performed immediately post procedure which showed no paravalvular leak. An aortic valve mean gradient of 4 mmHg and a maximum velocity through the aortic valve of 1.1 m/s.     PTA of the right common femoral artery required for hemostasis after percutaneous closure device partial failure.       Discharge Plans:  1. Follow up with [facility] in 1 month and 1 year with labs and Echo.  2. ASA indefinitely.   3. No non sterile procedures which could cause endocarditis for 6 months post TAVR including dental work, endoscopy, colonoscopy, and  procedures.   4. SBE prophylaxis for life.

## 2022-03-09 NOTE — DISCHARGE SUMMARY
[facility] - Cardiac Intensive Care  Interventional Cardiology  Discharge Summary      Patient Name: Mine Wilkins  MRN: [id number]  Admission Date: 3/8/2022  Hospital Length of Stay: 1 days  Discharge Date and Time:  03/09/2022 9:31 AM  Attending Physician: Constantin Goldberg MD  Discharging Provider: GIOVANI ThomasC  Primary Care Physician: MARVA Oswald MD    HPI:  Mine Wilkins is a 86 y.o. y.o. female with PMH of severe aortic stenosis, HTN, HLD who presents to interventional cardiology clinic to establish care. She has a history of severe AS by TTE 11/2021. She reports worsening symptoms since about the fall of last year, used to be able to walk briskly on her treadmill multiple days weekly, however has noticed progressive exertional dyspnea and intermittent chest tightness. No CAD or chronic lung disease.         Mine Wilkins is a 86 y.o. female referred by Dr Bustamante for evaluation of severe AS (NYHA Class II).     The patient has undergone the following TAVR work-up:   · ECHO (Date 11/1/21): ERENDIRA= 0.73 cm2, MG= 47 mmHg, Peak Jayant= 4.4 m/s, EF= 65%.   · LHC (2/7/2022): Normal coronaries angiographically    · STS: 2.2 %   · Frailty: 1/4 (failed handgrip)  · Iliacs are >8.14 mm on R and > 7.59 mm on L   · LVOT area by CTA is  4.00 cm2 (25.3 mm X 21.4 mm) and Avg Diameter is 22.6 per Dr. [facility]   · Incidental findings on CT: Heterogeneous thyroid with rim calcified 1.4 cm right lobe nodule. Scattered subcentimeter pulmonary micro-nodules (report forwarded to PCP)  · CT Surgery risk assessment: Needs  · Rhythm issues: None    · Comorbidities: Hypertension            Mine Wilkins is a 23 mm S3 Evita valve candidate via right transfemoral access.    Procedure(s) (LRB):  REPLACEMENT, AORTIC VALVE, TRANSCATHETER (TAVR) (N/A)  Left heart cath (Left)  Cardiac Cath Cosurgeon (N/A)  PTA, Femoral Artery     Indwelling Lines/Drains at time of discharge:  Lines/Drains/Airways      None                 Hospital Course:  Mine Wilkins was admitted and underwent successful placement of a 23 mm Evita S3 TAVR via TF access under MAC sedation on 3/8/22. Please see full cath report for details. A transthoracic echo was performed immediately post procedure which showed no paravalvular leak. An aortic valve mean gradient of 4 mmHg and a maximum velocity through the aortic valve of 1.1 m/s. She was transported to the CCU in stable condition with a TVP and arterial line in place. No EP consult was warranted. She remained hemodynamically stable overnight. EKG remained stable and her TVP was removed. This morning, she ambulated without difficulty and was eager to go home. It was felt she was stable for discharge and will go home on ASA alone for antithrombotic therapy post TAVR.       Goals of Care Treatment Preferences:  Code Status: Full Code    Living Will: Yes                  Significant Diagnostic Studies: Labs:   BMP:   Recent Labs   Lab 03/08/22  0604 03/09/22  0539    108    133*   K 4.4 4.2    101   CO2 24 25   BUN 26* 19   CREATININE 0.9 1.0   CALCIUM 10.2 9.3   , CBC   Recent Labs   Lab 03/08/22  0604 03/09/22  0430   WBC 9.25 10.51   HGB 11.6* 10.2*   HCT 36.5* 30.9*    244    and All labs within the past 24 hours have been reviewed    Pending Diagnostic Studies:     Procedure Component Value Units Date/Time    EKG 12-LEAD starting tomorrow [[id number]]     Order Status: Sent Lab Status: No result         * S/P TAVR (transcatheter aortic valve replacement)  Successful transfemoral aortic valve replacement with a 23 mm Evita S3 valve. A transthoracic echo was performed immediately post procedure which showed no paravalvular leak. An aortic valve mean gradient of 4 mmHg and a maximum velocity through the aortic valve of 1.1 m/s.     PTA of the right common femoral artery required for hemostasis after percutaneous closure device partial failure.        Discharge Plans:  1. Follow up with [facility] in 1 month and 1 year with labs and Echo.  2. ASA indefinitely.   3. No non sterile procedures which could cause endocarditis for 6 months post TAVR including dental work, endoscopy, colonoscopy, and  procedures.   4. SBE prophylaxis for life.    Severe aortic stenosis  See above.     Acute on chronic diastolic heart failure  Acute on chronic diastolic heart failure with LVEDP 20 mmHg     Dyslipidemia  Chronic. Controlled. Follow up with Cardiology/PCP.    Aortic atherosclerosis  See CTA    Essential hypertension  Chronic. Controlled. Follow up with Cardiology/PCP.        Discharged Condition: good    Follow Up:    Patient Instructions:      B-TYPE NATRIURETIC PEPTIDE   Standing Status: Future Standing Exp. Date: 04/19/23     CBC Without Differential   Standing Status: Future Standing Exp. Date: 05/07/23     Basic Metabolic Panel   Standing Status: Future Standing Exp. Date: 05/07/23     Notify your health care provider if you experience any of the following:  temperature >100.4     Notify your health care provider if you experience any of the following:  persistent nausea and vomiting or diarrhea     Notify your health care provider if you experience any of the following:  severe uncontrolled pain     Notify your health care provider if you experience any of the following:  redness, tenderness, or signs of infection (pain, swelling, redness, odor or green/yellow discharge around incision site)     Notify your health care provider if you experience any of the following:  difficulty breathing or increased cough     Notify your health care provider if you experience any of the following:  severe persistent headache     Notify your health care provider if you experience any of the following:  worsening rash     Notify your health care provider if you experience any of the following:  persistent dizziness, light-headedness, or visual disturbances     Notify  your health care provider if you experience any of the following:  increased confusion or weakness     Echo   Standing Status: Future Standing Exp. Date: 03/08/23     Order Specific Question Answer Comments   Color Doppler? Yes      Medications:  Reconciled Home Medications:      Medication List      START taking these medications    aspirin 81 MG EC tablet  Commonly known as: ECOTRIN  Take 1 tablet (81 mg total) by mouth once daily.  Start taking on: March 10, 2022     furosemide 20 MG tablet  Commonly known as: LASIX  Take AS NEEDED, daily for 3 lb weight gain overnight or 5 lbs in 5 days.        CONTINUE taking these medications    losartan 50 MG tablet  Commonly known as: COZAAR  TAKE 1 TABLET TWICE DAILY  What changed: when to take this           acetaminophen 325 MG tablet  Commonly known as: TYLENOL  Take 2 tablets (650 mg total) by mouth every 6 (six) hours as needed.     alendronate 70 MG tablet  Commonly known as: FOSAMAX  TAKE 1 TABLET  EVERY 7 DAYS. HOLD UNTIL SEEN BY YOUR PRIMARY CARE PHYSICIAN     CENTRUM SILVER WOMEN ORAL  Take by mouth.     cetirizine 10 MG tablet  Commonly known as: ZYRTEC  Take 10 mg by mouth once daily.     cholecalciferol (vitamin D3) 50 mcg (2,000 unit) Tab  Commonly known as: VITAMIN D3  Take by mouth. 1 Tablet Oral Every day     EScitalopram oxalate 5 MG Tab  Commonly known as: LEXAPRO  Take 1 tablet (5 mg total) by mouth once daily.     fluticasone propionate 50 mcg/actuation nasal spray  Commonly known as: FLONASE  1 spray (50 mcg total) by Each Nostril route every evening.     omeprazole 40 MG capsule  Commonly known as: PRILOSEC  TAKE 1 CAPSULE EVERY MORNING  BEFORE  EATING     oxybutynin 10 MG 24 hr tablet  Commonly known as: DITROPAN-XL  TAKE 1 TABLET EVERY DAY     pravastatin 20 MG tablet  Commonly known as: PRAVACHOL  TAKE 1 TABLET EVERY EVENING.            Time spent on the discharge of patient: 40 minutes    Tangela Chatman PA-C  Interventional Cardiology  [facility]  - Cardiac Intensive Care

## 2022-03-16 DIAGNOSIS — I35.0 AORTIC VALVE STENOSIS, ETIOLOGY OF CARDIAC VALVE DISEASE UNSPECIFIED: Primary | ICD-10-CM

## 2022-04-04 ENCOUNTER — HOSPITAL ENCOUNTER (OUTPATIENT)
Dept: CARDIOLOGY | Facility: HOSPITAL | Age: 87
Discharge: HOME | End: 2022-04-04
Attending: INTERNAL MEDICINE
Payer: MEDICARE

## 2022-04-04 VITALS
DIASTOLIC BLOOD PRESSURE: 80 MMHG | WEIGHT: 150 LBS | HEART RATE: 75 BPM | BODY MASS INDEX: 26.58 KG/M2 | SYSTOLIC BLOOD PRESSURE: 148 MMHG | HEIGHT: 63 IN

## 2022-04-04 DIAGNOSIS — I35.0 AORTIC VALVE STENOSIS, ETIOLOGY OF CARDIAC VALVE DISEASE UNSPECIFIED: ICD-10-CM

## 2022-04-04 LAB
ASCENDING AORTA: 3.83 CM
AV INDEX (PROSTH): 0.41
AV MEAN GRADIENT: 17 MMHG
AV PEAK GRADIENT: 28 MMHG
AV VALVE AREA: 1.65 CM2
AV VELOCITY RATIO: 0.37
BSA FOR ECHO PROCEDURE: 1.74 M2
CV ECHO LV RWT: 0.41 CM
DOP CALC AO PEAK VEL: 2.63 M/S
DOP CALC AO VTI: 58.8 CM
DOP CALC LVOT AREA: 4 CM2
DOP CALC LVOT DIAMETER: 2.27 CM
DOP CALC LVOT PEAK VEL: 0.98 M/S
DOP CALC LVOT STROKE VOLUME: 97.12 CM3
DOP CALCLVOT PEAK VEL VTI: 24.01 CM
E WAVE DECELERATION TIME: 282.78 MSEC
E/A RATIO: 0.69
E/E' RATIO: 20.25 M/S
ECHO LV POSTERIOR WALL: 0.86 CM (ref 0.6–1.1)
EJECTION FRACTION: 65 %
FRACTIONAL SHORTENING: 33 % (ref 28–44)
INTERVENTRICULAR SEPTUM: 0.83 CM (ref 0.6–1.1)
IVRT: 117.03 MSEC
LA MAJOR: 5.5 CM
LA MINOR: 5.27 CM
LA WIDTH: 4.22 CM
LEFT ATRIUM SIZE: 3.72 CM
LEFT ATRIUM VOLUME INDEX MOD: 39.6 ML/M2
LEFT ATRIUM VOLUME INDEX: 42 ML/M2
LEFT ATRIUM VOLUME MOD: 67.67 CM3
LEFT ATRIUM VOLUME: 71.82 CM3
LEFT INTERNAL DIMENSION IN SYSTOLE: 2.78 CM (ref 2.1–4)
LEFT VENTRICLE DIASTOLIC VOLUME INDEX: 45.08 ML/M2
LEFT VENTRICLE DIASTOLIC VOLUME: 77.08 ML
LEFT VENTRICLE MASS INDEX: 63 G/M2
LEFT VENTRICLE SYSTOLIC VOLUME INDEX: 17 ML/M2
LEFT VENTRICLE SYSTOLIC VOLUME: 29.05 ML
LEFT VENTRICULAR INTERNAL DIMENSION IN DIASTOLE: 4.17 CM (ref 3.5–6)
LEFT VENTRICULAR MASS: 107.69 G
LV LATERAL E/E' RATIO: 20.25 M/S
LV SEPTAL E/E' RATIO: 20.25 M/S
MV PEAK A VEL: 1.17 M/S
MV PEAK E VEL: 0.81 M/S
MV STENOSIS PRESSURE HALF TIME: 82.01 MS
MV VALVE AREA P 1/2 METHOD: 2.68 CM2
PISA TR MAX VEL: 2.6 M/S
PULM VEIN S/D RATIO: 1.53
PV PEAK D VEL: 0.43 M/S
PV PEAK S VEL: 0.66 M/S
RA MAJOR: 4.68 CM
RA PRESSURE: 3 MMHG
RA WIDTH: 3.31 CM
RIGHT VENTRICULAR END-DIASTOLIC DIMENSION: 2.86 CM
SINUS: 3.27 CM
STJ: 3.22 CM
TDI LATERAL: 0.04 M/S
TDI SEPTAL: 0.04 M/S
TDI: 0.04 M/S
TR MAX PG: 27 MMHG
TRICUSPID ANNULAR PLANE SYSTOLIC EXCURSION: 2.44 CM
TV REST PULMONARY ARTERY PRESSURE: 30 MMHG

## 2022-04-04 PROCEDURE — 93306 TTE W/DOPPLER COMPLETE: CPT | Mod: 26,,, | Performed by: INTERNAL MEDICINE

## 2022-04-04 PROCEDURE — 93306 TTE W/DOPPLER COMPLETE: CPT

## 2022-04-04 PROCEDURE — 93306 ECHO (CUPID ONLY): ICD-10-PCS | Mod: 26,,, | Performed by: INTERNAL MEDICINE

## 2022-04-07 ENCOUNTER — PATIENT OUTREACH (OUTPATIENT)
Dept: ADMINISTRATIVE | Facility: OTHER | Age: 87
End: 2022-04-07
Payer: MEDICARE

## 2022-04-08 ENCOUNTER — OFFICE VISIT (OUTPATIENT)
Dept: CARDIOLOGY | Facility: CLINIC | Age: 87
End: 2022-04-08
Payer: MEDICARE

## 2022-04-08 VITALS
SYSTOLIC BLOOD PRESSURE: 158 MMHG | HEIGHT: 63 IN | DIASTOLIC BLOOD PRESSURE: 72 MMHG | RESPIRATION RATE: 20 BRPM | WEIGHT: 144.19 LBS | BODY MASS INDEX: 25.55 KG/M2 | HEART RATE: 74 BPM

## 2022-04-08 DIAGNOSIS — I10 ESSENTIAL HYPERTENSION: Primary | ICD-10-CM

## 2022-04-08 DIAGNOSIS — I35.0 SEVERE AORTIC STENOSIS: ICD-10-CM

## 2022-04-08 DIAGNOSIS — E78.5 DYSLIPIDEMIA: ICD-10-CM

## 2022-04-08 DIAGNOSIS — Z95.2 S/P TAVR (TRANSCATHETER AORTIC VALVE REPLACEMENT): ICD-10-CM

## 2022-04-08 DIAGNOSIS — I73.9 PVD (PERIPHERAL VASCULAR DISEASE): ICD-10-CM

## 2022-04-08 PROCEDURE — 1111F DSCHRG MED/CURRENT MED MERGE: CPT | Mod: CPTII,S$GLB,, | Performed by: INTERNAL MEDICINE

## 2022-04-08 PROCEDURE — 3077F PR MOST RECENT SYSTOLIC BLOOD PRESSURE >= 140 MM HG: ICD-10-PCS | Mod: CPTII,S$GLB,, | Performed by: INTERNAL MEDICINE

## 2022-04-08 PROCEDURE — 3078F PR MOST RECENT DIASTOLIC BLOOD PRESSURE < 80 MM HG: ICD-10-PCS | Mod: CPTII,S$GLB,, | Performed by: INTERNAL MEDICINE

## 2022-04-08 PROCEDURE — 1157F PR ADVANCE CARE PLAN OR EQUIV PRESENT IN MEDICAL RECORD: ICD-10-PCS | Mod: CPTII,S$GLB,, | Performed by: INTERNAL MEDICINE

## 2022-04-08 PROCEDURE — 3288F FALL RISK ASSESSMENT DOCD: CPT | Mod: CPTII,S$GLB,, | Performed by: INTERNAL MEDICINE

## 2022-04-08 PROCEDURE — 99214 OFFICE O/P EST MOD 30 MIN: CPT | Mod: S$GLB,,, | Performed by: INTERNAL MEDICINE

## 2022-04-08 PROCEDURE — 1101F PR PT FALLS ASSESS DOC 0-1 FALLS W/OUT INJ PAST YR: ICD-10-PCS | Mod: CPTII,S$GLB,, | Performed by: INTERNAL MEDICINE

## 2022-04-08 PROCEDURE — 1157F ADVNC CARE PLAN IN RCRD: CPT | Mod: CPTII,S$GLB,, | Performed by: INTERNAL MEDICINE

## 2022-04-08 PROCEDURE — 3077F SYST BP >= 140 MM HG: CPT | Mod: CPTII,S$GLB,, | Performed by: INTERNAL MEDICINE

## 2022-04-08 PROCEDURE — 99999 PR PBB SHADOW E&M-EST. PATIENT-LVL III: CPT | Mod: PBBFAC,,, | Performed by: INTERNAL MEDICINE

## 2022-04-08 PROCEDURE — 1159F MED LIST DOCD IN RCRD: CPT | Mod: CPTII,S$GLB,, | Performed by: INTERNAL MEDICINE

## 2022-04-08 PROCEDURE — 1159F PR MEDICATION LIST DOCUMENTED IN MEDICAL RECORD: ICD-10-PCS | Mod: CPTII,S$GLB,, | Performed by: INTERNAL MEDICINE

## 2022-04-08 PROCEDURE — 1126F PR PAIN SEVERITY QUANTIFIED, NO PAIN PRESENT: ICD-10-PCS | Mod: CPTII,S$GLB,, | Performed by: INTERNAL MEDICINE

## 2022-04-08 PROCEDURE — 1126F AMNT PAIN NOTED NONE PRSNT: CPT | Mod: CPTII,S$GLB,, | Performed by: INTERNAL MEDICINE

## 2022-04-08 PROCEDURE — 1111F PR DISCHARGE MEDS RECONCILED W/ CURRENT OUTPATIENT MED LIST: ICD-10-PCS | Mod: CPTII,S$GLB,, | Performed by: INTERNAL MEDICINE

## 2022-04-08 PROCEDURE — 1160F RVW MEDS BY RX/DR IN RCRD: CPT | Mod: CPTII,S$GLB,, | Performed by: INTERNAL MEDICINE

## 2022-04-08 PROCEDURE — 99499 UNLISTED E&M SERVICE: CPT | Mod: S$GLB,,, | Performed by: INTERNAL MEDICINE

## 2022-04-08 PROCEDURE — 1160F PR REVIEW ALL MEDS BY PRESCRIBER/CLIN PHARMACIST DOCUMENTED: ICD-10-PCS | Mod: CPTII,S$GLB,, | Performed by: INTERNAL MEDICINE

## 2022-04-08 PROCEDURE — 99999 PR PBB SHADOW E&M-EST. PATIENT-LVL III: ICD-10-PCS | Mod: PBBFAC,,, | Performed by: INTERNAL MEDICINE

## 2022-04-08 PROCEDURE — 3288F PR FALLS RISK ASSESSMENT DOCUMENTED: ICD-10-PCS | Mod: CPTII,S$GLB,, | Performed by: INTERNAL MEDICINE

## 2022-04-08 PROCEDURE — 1101F PT FALLS ASSESS-DOCD LE1/YR: CPT | Mod: CPTII,S$GLB,, | Performed by: INTERNAL MEDICINE

## 2022-04-08 PROCEDURE — 99499 RISK ADDL DX/OHS AUDIT: ICD-10-PCS | Mod: S$GLB,,, | Performed by: INTERNAL MEDICINE

## 2022-04-08 PROCEDURE — 3078F DIAST BP <80 MM HG: CPT | Mod: CPTII,S$GLB,, | Performed by: INTERNAL MEDICINE

## 2022-04-08 PROCEDURE — 99214 PR OFFICE/OUTPT VISIT, EST, LEVL IV, 30-39 MIN: ICD-10-PCS | Mod: S$GLB,,, | Performed by: INTERNAL MEDICINE

## 2022-04-08 NOTE — PROGRESS NOTES
No chief complaint on file.      HPI: This is a pleasant 85 yo F with a h/o aortic stenosis status post TAVR (March 2022 with a 23 mm S3), hypertension, and hyperlipidemia presenting for follow-up.    The patient first presented to me in 2021 when we identified severe AS with rpogressive UMANZOR. She was referred for TAVR, which she underwent successfully in March 2022. She tolerated the procedure without issue and feels very good at this time.    The patient denies any symptoms of chest pain, shortness of breath, or dyspnea on exertion. She has started walking on her treadmill for 15 minutes a day (was not able to do this pre-TAVR) and is building up her tolerance. She continues to do light weight exercises as well.    She tolerates asa 81x1, losartan 50x2, and pravastatin 20x1. She did not take her losartan this am. She has a home BP log with SBPs ranging from 120s-130s/60-70s.       PHYSICAL EXAM:  Vitals:    04/08/22 1101   BP: (!) 158/72   Pulse: 74   Resp: 20       Physical Exam  Constitutional:       Appearance: Normal appearance.   Neck:      Vascular: No carotid bruit or JVD.   Cardiovascular:      Rate and Rhythm: Normal rate and regular rhythm.      Pulses:           Radial pulses are 2+ on the right side and 2+ on the left side.        Femoral pulses are 2+ on the right side and 2+ on the left side.       Dorsalis pedis pulses are 1+ on the right side and 1+ on the left side.      Heart sounds: S1 normal and S2 normal. Murmur heard.    Early systolic murmur is present with a grade of 1/6 at the upper right sternal border.    No S3 sounds.      Comments: Access sites soft.   Pulmonary:      Effort: Pulmonary effort is normal.      Breath sounds: Normal breath sounds. No rales.   Feet:      Right foot:      Skin integrity: Skin integrity normal.      Left foot:      Skin integrity: Skin integrity normal.   Skin:     General: Skin is warm and dry.      Findings: No lesion.   Neurological:      Mental Status:  She is alert and oriented to person, place, and time.      Motor: Motor function is intact.      Gait: Gait is intact.         LABS/CARDIAC TESTS:  CBC and BMP from March 2022 demonstrate a hb of 10.2 w an MCV 85. Cr 1.0 w a BUN of 19. January 2022  LDL is 104 and HDL is 59.   EKG March 2022 demonstrates sinus rhythm with no Q waves or ST changes.  TTE April 2022 demonstrates normal LV size and systolic function with EF 65%.  Normal bioprosthetic aortic valve function is noted with a peak velocity of 2.6 m/sec across the valve.  No prosthetic valve leak.  Mild MR. PASP near 30 with a CVP of 3.   Cardiac catheterization February 2022 demonstrates patent coronary arteries without evidence of obstructive epicardial disease.  CTA TAVR protocol February 2022 demonstrates aortic atherosclerosis.  Ectatic ascending aorta measuring 4.0 cm.  Calcified aortic valve.  Normal abdominal aorta an unremarkable bilateral iliacs/femorals.    ASSESSMENT & PLAN:    S/P TAVR (transcatheter aortic valve replacement)  Severe AS s/p successful TAVR w an S3 23mm valve. Normal ECG/TTE post implant. UMANZOR improved and patient now building up exercise tolerance. Feels well.  Cont asa monotherapy.     Essential hypertension  Bps acceptable at home on losartan 50x2.     Dyslipidemia  LDL near 100 on pravastatin.     PVD (peripheral vascular disease)  Asc ao dilation stable. 4.0cm on latest CTA. Access site soft with no pain.       Essential hypertension    S/P TAVR (transcatheter aortic valve replacement)    Dyslipidemia    PVD (peripheral vascular disease)    Severe aortic stenosis        Robby Mistry MD

## 2022-04-08 NOTE — ASSESSMENT & PLAN NOTE
Severe AS s/p successful TAVR w an S3 23mm valve. Normal ECG/TTE post implant. UMANZOR improved and patient now building up exercise tolerance. Feels well.  Cont asa monotherapy.

## 2022-04-12 ENCOUNTER — LAB VISIT (OUTPATIENT)
Dept: LAB | Facility: HOSPITAL | Age: 87
End: 2022-04-12
Payer: MEDICARE

## 2022-04-12 ENCOUNTER — OFFICE VISIT (OUTPATIENT)
Dept: CARDIOLOGY | Facility: CLINIC | Age: 87
End: 2022-04-12
Payer: MEDICARE

## 2022-04-12 VITALS
BODY MASS INDEX: 25.94 KG/M2 | OXYGEN SATURATION: 98 % | DIASTOLIC BLOOD PRESSURE: 72 MMHG | WEIGHT: 146.38 LBS | HEART RATE: 72 BPM | HEIGHT: 63 IN | SYSTOLIC BLOOD PRESSURE: 162 MMHG

## 2022-04-12 DIAGNOSIS — Z95.2 S/P TAVR (TRANSCATHETER AORTIC VALVE REPLACEMENT): ICD-10-CM

## 2022-04-12 DIAGNOSIS — I70.0 AORTIC ATHEROSCLEROSIS: ICD-10-CM

## 2022-04-12 DIAGNOSIS — I50.32 CHRONIC DIASTOLIC HEART FAILURE: ICD-10-CM

## 2022-04-12 LAB
ANION GAP SERPL CALC-SCNC: 6 MMOL/L (ref 8–16)
BUN SERPL-MCNC: 19 MG/DL (ref 8–23)
CALCIUM SERPL-MCNC: 10.3 MG/DL (ref 8.7–10.5)
CHLORIDE SERPL-SCNC: 101 MMOL/L (ref 95–110)
CO2 SERPL-SCNC: 29 MMOL/L (ref 23–29)
CREAT SERPL-MCNC: 0.9 MG/DL (ref 0.5–1.4)
ERYTHROCYTE [DISTWIDTH] IN BLOOD BY AUTOMATED COUNT: 14.3 % (ref 11.5–14.5)
EST. GFR  (AFRICAN AMERICAN): >60 ML/MIN/1.73 M^2
EST. GFR  (NON AFRICAN AMERICAN): 58.5 ML/MIN/1.73 M^2
GLUCOSE SERPL-MCNC: 95 MG/DL (ref 70–110)
HCT VFR BLD AUTO: 38.4 % (ref 37–48.5)
HGB BLD-MCNC: 12 G/DL (ref 12–16)
MCH RBC QN AUTO: 27.2 PG (ref 27–31)
MCHC RBC AUTO-ENTMCNC: 31.3 G/DL (ref 32–36)
MCV RBC AUTO: 87 FL (ref 82–98)
PLATELET # BLD AUTO: 230 K/UL (ref 150–450)
PMV BLD AUTO: 11.3 FL (ref 9.2–12.9)
POTASSIUM SERPL-SCNC: 4.9 MMOL/L (ref 3.5–5.1)
RBC # BLD AUTO: 4.41 M/UL (ref 4–5.4)
SODIUM SERPL-SCNC: 136 MMOL/L (ref 136–145)
WBC # BLD AUTO: 7.87 K/UL (ref 3.9–12.7)

## 2022-04-12 PROCEDURE — 36415 COLL VENOUS BLD VENIPUNCTURE: CPT | Performed by: PHYSICIAN ASSISTANT

## 2022-04-12 PROCEDURE — 3078F PR MOST RECENT DIASTOLIC BLOOD PRESSURE < 80 MM HG: ICD-10-PCS | Mod: CPTII,S$GLB,, | Performed by: PHYSICIAN ASSISTANT

## 2022-04-12 PROCEDURE — 1157F PR ADVANCE CARE PLAN OR EQUIV PRESENT IN MEDICAL RECORD: ICD-10-PCS | Mod: CPTII,S$GLB,, | Performed by: PHYSICIAN ASSISTANT

## 2022-04-12 PROCEDURE — 1126F PR PAIN SEVERITY QUANTIFIED, NO PAIN PRESENT: ICD-10-PCS | Mod: CPTII,S$GLB,, | Performed by: PHYSICIAN ASSISTANT

## 2022-04-12 PROCEDURE — 1159F PR MEDICATION LIST DOCUMENTED IN MEDICAL RECORD: ICD-10-PCS | Mod: CPTII,S$GLB,, | Performed by: PHYSICIAN ASSISTANT

## 2022-04-12 PROCEDURE — 99999 PR PBB SHADOW E&M-EST. PATIENT-LVL IV: CPT | Mod: PBBFAC,,, | Performed by: PHYSICIAN ASSISTANT

## 2022-04-12 PROCEDURE — 1126F AMNT PAIN NOTED NONE PRSNT: CPT | Mod: CPTII,S$GLB,, | Performed by: PHYSICIAN ASSISTANT

## 2022-04-12 PROCEDURE — 3078F DIAST BP <80 MM HG: CPT | Mod: CPTII,S$GLB,, | Performed by: PHYSICIAN ASSISTANT

## 2022-04-12 PROCEDURE — 99499 RISK ADDL DX/OHS AUDIT: ICD-10-PCS | Mod: S$GLB,,, | Performed by: PHYSICIAN ASSISTANT

## 2022-04-12 PROCEDURE — 80048 BASIC METABOLIC PNL TOTAL CA: CPT | Performed by: PHYSICIAN ASSISTANT

## 2022-04-12 PROCEDURE — 99214 OFFICE O/P EST MOD 30 MIN: CPT | Mod: S$GLB,,, | Performed by: PHYSICIAN ASSISTANT

## 2022-04-12 PROCEDURE — 3077F PR MOST RECENT SYSTOLIC BLOOD PRESSURE >= 140 MM HG: ICD-10-PCS | Mod: CPTII,S$GLB,, | Performed by: PHYSICIAN ASSISTANT

## 2022-04-12 PROCEDURE — 99999 PR PBB SHADOW E&M-EST. PATIENT-LVL IV: ICD-10-PCS | Mod: PBBFAC,,, | Performed by: PHYSICIAN ASSISTANT

## 2022-04-12 PROCEDURE — 99499 UNLISTED E&M SERVICE: CPT | Mod: S$GLB,,, | Performed by: PHYSICIAN ASSISTANT

## 2022-04-12 PROCEDURE — 1157F ADVNC CARE PLAN IN RCRD: CPT | Mod: CPTII,S$GLB,, | Performed by: PHYSICIAN ASSISTANT

## 2022-04-12 PROCEDURE — 85027 COMPLETE CBC AUTOMATED: CPT | Performed by: PHYSICIAN ASSISTANT

## 2022-04-12 PROCEDURE — 99214 PR OFFICE/OUTPT VISIT, EST, LEVL IV, 30-39 MIN: ICD-10-PCS | Mod: S$GLB,,, | Performed by: PHYSICIAN ASSISTANT

## 2022-04-12 PROCEDURE — 1159F MED LIST DOCD IN RCRD: CPT | Mod: CPTII,S$GLB,, | Performed by: PHYSICIAN ASSISTANT

## 2022-04-12 PROCEDURE — 3077F SYST BP >= 140 MM HG: CPT | Mod: CPTII,S$GLB,, | Performed by: PHYSICIAN ASSISTANT

## 2022-04-12 NOTE — PROGRESS NOTES
Subjective:    Patient ID:  Mine Wilkins is a 86 y.o. female who presents for follow-up of TAVR    Referring Physician: Dr Mistry    HPI  Mine Wilkins is a 86 y.o. female with PMH of severe aortic stenosis, HTN, HLD who presents to interventional cardiology clinic for TAVR follow up.     TAVR Hospital Course:  Mine Wilkins was admitted and underwent successful placement of a 23 mm Evita S3 TAVR via TF access under MAC sedation on 3/8/22. Please see full cath report for details. A transthoracic echo was performed immediately post procedure which showed no paravalvular leak. An aortic valve mean gradient of 4 mmHg and a maximum velocity through the aortic valve of 1.1 m/s. She was transported to the CCU in stable condition with a TVP and arterial line in place. No EP consult was warranted. She remained hemodynamically stable overnight. EKG remained stable and her TVP was removed. This morning, she ambulated without difficulty and was eager to go home. It was felt she was stable for discharge and will go home on ASA alone for antithrombotic therapy post TAVR.     Interval History:  Ms Wilkins has done great since her valve replacement. She denies SOB, LE swelling, UMANZOR, orthopnea, and chest pain. She is back on her treadmill without symptoms. She continues to follow closely with Dr Mistry.       NYHA: I CCS: 0    Review of Systems   Constitutional: Negative for chills and fever.   HENT: Negative for sore throat.    Eyes: Negative for blurred vision.   Cardiovascular: Negative for chest pain, claudication, cyanosis, dyspnea on exertion, irregular heartbeat, leg swelling, near-syncope, orthopnea, palpitations, paroxysmal nocturnal dyspnea and syncope.   Respiratory: Negative for cough and sputum production.    Hematologic/Lymphatic: Does not bruise/bleed easily.   Skin: Negative for itching, rash and suspicious lesions.   Musculoskeletal: Negative for falls.   Gastrointestinal: Negative  for abdominal pain and change in bowel habit.   Genitourinary: Negative for dysuria.   Neurological: Negative for disturbances in coordination, dizziness and loss of balance.   Psychiatric/Behavioral: Negative for altered mental status.          Past Medical History:   Diagnosis Date    Acute on chronic diastolic heart failure 3/9/2022    After-cataract of both eyes - Left Eye 10/11/2012    Anxiety     Aortic calcification 8/10/2016    Aortic valve stenosis, moderate     AR (allergic rhinitis)     Arthritis of facet joints at multiple vertebral levels 1/14/2016    Seen on lumbar MRI dated 01/14/16    Cataract     Cholelithiasis     Closed displaced intertrochanteric fracture of right femur with routine healing s/p IM nail on 5/20/2018 5/19/2018    Cystocele 12/1/2013    Esotropia, alternating - Both Eyes 10/11/2012    Essential hypertension     Gastroesophageal reflux disease without esophagitis 4/13/2018    Left ventricular diastolic dysfunction with preserved systolic function 8/10/2016    Lumbar radiculopathy 1/25/2016    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    Nondisplaced fracture of proximal end of humerus 8/7/2016    Nonexudative age-related macular degeneration, bilateral, intermediate dry stage 1/14/2021    Osteoarthritis     Overweight (BMI 25.0-29.9) 4/13/2018    Pure hypercholesterolemia     Right-sided low back pain without sciatica 12/10/2015    Strabismus     Urethral hypermobility 12/1/2013       Current Outpatient Medications:     acetaminophen (TYLENOL) 325 MG tablet, Take 2 tablets (650 mg total) by mouth every 6 (six) hours as needed., Disp: , Rfl: 0    alendronate (FOSAMAX) 70 MG tablet, TAKE 1 TABLET  EVERY 7 DAYS. HOLD UNTIL SEEN BY YOUR PRIMARY CARE PHYSICIAN, Disp: 12 tablet, Rfl: 3    aspirin (ECOTRIN) 81 MG EC tablet, Take 1 tablet (81 mg total) by mouth once daily., Disp: 90 tablet, Rfl: 3    cetirizine (ZYRTEC) 10 MG tablet, Take 10 mg  by mouth once daily., Disp: , Rfl:     cholecalciferol, vitamin D3, 2,000 unit Tab, Take by mouth. 1 Tablet Oral Every day, Disp: , Rfl:     EScitalopram oxalate (LEXAPRO) 5 MG Tab, Take 1 tablet (5 mg total) by mouth once daily., Disp: 90 tablet, Rfl: 0    fluticasone propionate (FLONASE) 50 mcg/actuation nasal spray, 1 spray (50 mcg total) by Each Nostril route every evening., Disp: 16 g, Rfl: 3    losartan (COZAAR) 50 MG tablet, TAKE 1 TABLET TWICE DAILY (Patient taking differently: Take 50 mg by mouth once daily.), Disp: 180 tablet, Rfl: 3    multivit-min/iron/folic/lutein (CENTRUM SILVER WOMEN ORAL), Take by mouth., Disp: , Rfl:     omeprazole (PRILOSEC) 40 MG capsule, TAKE 1 CAPSULE EVERY MORNING  BEFORE  EATING, Disp: 90 capsule, Rfl: 3    oxybutynin (DITROPAN-XL) 10 MG 24 hr tablet, TAKE 1 TABLET EVERY DAY, Disp: 90 tablet, Rfl: 3    pravastatin (PRAVACHOL) 20 MG tablet, TAKE 1 TABLET EVERY EVENING., Disp: 90 tablet, Rfl: 3    furosemide (LASIX) 20 MG tablet, Take AS NEEDED, daily for 3 lb weight gain overnight or 5 lbs in 5 days. (Patient not taking: Reported on 4/12/2022), Disp: 30 tablet, Rfl: 0    Objective:    Physical Exam  Vitals reviewed.   Constitutional:       General: She is not in acute distress.     Appearance: She is well-developed. She is not diaphoretic.   HENT:      Head: Normocephalic and atraumatic.   Neck:      Vascular: No JVD.   Cardiovascular:      Rate and Rhythm: Normal rate and regular rhythm.      Pulses: Intact distal pulses.      Heart sounds: No murmur heard.  Pulmonary:      Effort: Pulmonary effort is normal. No respiratory distress.      Breath sounds: Normal breath sounds.   Musculoskeletal:      Cervical back: Normal range of motion.      Right lower leg: No edema.      Left lower leg: No edema.   Skin:     General: Skin is warm and dry.   Neurological:      Mental Status: She is alert and oriented to person, place, and time.             Vitals:    04/12/22 1330  "04/12/22 1333   BP: (!) 162/70 (!) 162/72   BP Location: Right arm Left arm   Patient Position: Sitting Sitting   BP Method: Large (Automatic) Large (Automatic)   Pulse: 72 72   SpO2: 98%    Weight: 66.4 kg (146 lb 6.2 oz)    Height: 5' 3" (1.6 m)      Body mass index is 25.93 kg/m².    Test(s) Reviewed  I have reviewed the following in detail:  [] Stress test   [] Angiography   [] Echocardiogram   [] Labs:   [] Other:     TTE 4/4/22  · The left ventricle is normal in size with normal systolic function.  · The estimated ejection fraction is 65%.  · Grade I left ventricular diastolic dysfunction.  · Moderate left atrial enlargement.  · Normal right ventricular size with normal right ventricular systolic function.  · A bioprosthetic aortic valve is present and demonstrates normal function without any evidence of stenosis or prosthetic valve leak. Peak velocity across the valve is 2.6 m/sec.  · Mild mitral regurgitation.  · Normal central venous pressure (3 mmHg).  · The estimated PA systolic pressure is 30 mmHg.      Assessment:   S/P TAVR (transcatheter aortic valve replacement)  Successful transfemoral aortic valve replacement with a 23 mm Evita S3 valve. TTE reviewed which shows a well seated valve with MG decreased to 17 mmHg.     Chronic diastolic heart failure  Chronic. Controlled. Follow up with Cardiology/PCP.    Aortic atherosclerosis  See CTA    Plan:       1. Follow up with [facility] in 1 year with labs and Echo. OK to have with Dr Mistry prior to her appt.   2. ASA indefinitely.   3. No non sterile procedures which could cause endocarditis for 6 months post TAVR including dental work, endoscopy, colonoscopy, and  procedures.   4. SBE prophylaxis for life.    Orders Placed This Encounter   Procedures    CBC Without Differential    Basic Metabolic Panel    Echo          Tangela Chatman PA-C  Valve and Structural Heart Disease  [facility]          "

## 2022-04-14 RX ORDER — PRAVASTATIN SODIUM 20 MG/1
TABLET ORAL
Qty: 90 TABLET | Refills: 3 | Status: SHIPPED | OUTPATIENT
Start: 2022-04-14 | End: 2023-05-11

## 2022-04-14 RX ORDER — OMEPRAZOLE 40 MG/1
CAPSULE, DELAYED RELEASE ORAL
Qty: 90 CAPSULE | Refills: 3 | Status: SHIPPED | OUTPATIENT
Start: 2022-04-14 | End: 2023-05-11

## 2022-04-14 RX ORDER — OXYBUTYNIN CHLORIDE 10 MG/1
TABLET, EXTENDED RELEASE ORAL
Qty: 90 TABLET | Refills: 3 | Status: SHIPPED | OUTPATIENT
Start: 2022-04-14 | End: 2023-05-11

## 2022-04-14 RX ORDER — ALENDRONATE SODIUM 70 MG/1
TABLET ORAL
Qty: 12 TABLET | Refills: 3 | Status: SHIPPED | OUTPATIENT
Start: 2022-04-14 | End: 2023-05-11

## 2022-04-14 RX ORDER — LOSARTAN POTASSIUM 50 MG/1
TABLET ORAL
Qty: 180 TABLET | Refills: 3 | Status: SHIPPED | OUTPATIENT
Start: 2022-04-14 | End: 2022-10-19 | Stop reason: ALTCHOICE

## 2022-04-21 DIAGNOSIS — F41.9 ANXIETY: ICD-10-CM

## 2022-04-21 RX ORDER — ESCITALOPRAM OXALATE 5 MG/1
5 TABLET ORAL DAILY
Qty: 90 TABLET | Refills: 0 | Status: SHIPPED | OUTPATIENT
Start: 2022-04-21 | End: 2022-05-30

## 2022-06-16 ENCOUNTER — OFFICE VISIT (OUTPATIENT)
Dept: INTERNAL MEDICINE | Facility: CLINIC | Age: 87
End: 2022-06-16
Payer: MEDICARE

## 2022-06-16 VITALS
OXYGEN SATURATION: 95 % | BODY MASS INDEX: 25.66 KG/M2 | SYSTOLIC BLOOD PRESSURE: 116 MMHG | WEIGHT: 144.81 LBS | TEMPERATURE: 97 F | DIASTOLIC BLOOD PRESSURE: 68 MMHG | HEART RATE: 78 BPM | HEIGHT: 63 IN

## 2022-06-16 DIAGNOSIS — E78.5 DYSLIPIDEMIA: ICD-10-CM

## 2022-06-16 DIAGNOSIS — M81.0 SENILE OSTEOPOROSIS: ICD-10-CM

## 2022-06-16 DIAGNOSIS — M54.50 RIGHT-SIDED LOW BACK PAIN WITHOUT SCIATICA, UNSPECIFIED CHRONICITY: ICD-10-CM

## 2022-06-16 DIAGNOSIS — I10 ESSENTIAL HYPERTENSION: Primary | ICD-10-CM

## 2022-06-16 DIAGNOSIS — R05.9 COUGH: ICD-10-CM

## 2022-06-16 DIAGNOSIS — Z95.2 S/P TAVR (TRANSCATHETER AORTIC VALVE REPLACEMENT): ICD-10-CM

## 2022-06-16 PROCEDURE — 1125F PR PAIN SEVERITY QUANTIFIED, PAIN PRESENT: ICD-10-PCS | Mod: CPTII,S$GLB,, | Performed by: INTERNAL MEDICINE

## 2022-06-16 PROCEDURE — 99214 PR OFFICE/OUTPT VISIT, EST, LEVL IV, 30-39 MIN: ICD-10-PCS | Mod: S$GLB,,, | Performed by: INTERNAL MEDICINE

## 2022-06-16 PROCEDURE — 1159F PR MEDICATION LIST DOCUMENTED IN MEDICAL RECORD: ICD-10-PCS | Mod: CPTII,S$GLB,, | Performed by: INTERNAL MEDICINE

## 2022-06-16 PROCEDURE — 1101F PT FALLS ASSESS-DOCD LE1/YR: CPT | Mod: CPTII,S$GLB,, | Performed by: INTERNAL MEDICINE

## 2022-06-16 PROCEDURE — 3288F FALL RISK ASSESSMENT DOCD: CPT | Mod: CPTII,S$GLB,, | Performed by: INTERNAL MEDICINE

## 2022-06-16 PROCEDURE — 1101F PR PT FALLS ASSESS DOC 0-1 FALLS W/OUT INJ PAST YR: ICD-10-PCS | Mod: CPTII,S$GLB,, | Performed by: INTERNAL MEDICINE

## 2022-06-16 PROCEDURE — 3288F PR FALLS RISK ASSESSMENT DOCUMENTED: ICD-10-PCS | Mod: CPTII,S$GLB,, | Performed by: INTERNAL MEDICINE

## 2022-06-16 PROCEDURE — 1159F MED LIST DOCD IN RCRD: CPT | Mod: CPTII,S$GLB,, | Performed by: INTERNAL MEDICINE

## 2022-06-16 PROCEDURE — 1125F AMNT PAIN NOTED PAIN PRSNT: CPT | Mod: CPTII,S$GLB,, | Performed by: INTERNAL MEDICINE

## 2022-06-16 PROCEDURE — 99999 PR PBB SHADOW E&M-EST. PATIENT-LVL III: CPT | Mod: PBBFAC,,, | Performed by: INTERNAL MEDICINE

## 2022-06-16 PROCEDURE — 1157F ADVNC CARE PLAN IN RCRD: CPT | Mod: CPTII,S$GLB,, | Performed by: INTERNAL MEDICINE

## 2022-06-16 PROCEDURE — 99999 PR PBB SHADOW E&M-EST. PATIENT-LVL III: ICD-10-PCS | Mod: PBBFAC,,, | Performed by: INTERNAL MEDICINE

## 2022-06-16 PROCEDURE — 99214 OFFICE O/P EST MOD 30 MIN: CPT | Mod: S$GLB,,, | Performed by: INTERNAL MEDICINE

## 2022-06-16 PROCEDURE — 1157F PR ADVANCE CARE PLAN OR EQUIV PRESENT IN MEDICAL RECORD: ICD-10-PCS | Mod: CPTII,S$GLB,, | Performed by: INTERNAL MEDICINE

## 2022-06-16 RX ORDER — TIZANIDINE HYDROCHLORIDE 2 MG/1
CAPSULE, GELATIN COATED ORAL
Qty: 20 CAPSULE | Refills: 1 | Status: SHIPPED | OUTPATIENT
Start: 2022-06-16 | End: 2022-07-11

## 2022-06-16 RX ORDER — BENZONATATE 200 MG/1
200 CAPSULE ORAL 3 TIMES DAILY PRN
Qty: 30 CAPSULE | Refills: 1 | Status: SHIPPED | OUTPATIENT
Start: 2022-06-16 | End: 2022-06-26

## 2022-06-16 NOTE — PROGRESS NOTES
History of present illness:  [age] year lady with hypertension, dyslipidemia, osteoporosis, anxiety and others following up six-month visit.  The patient had successful TAVR procedure in March of this year.  She reports feeling quite a bit better since she had this performed.  Currently without chest pain palpitations syncope presyncope.  She is taking all of her medications as directed.    Current medications:  All medications are noted reviewed.    Review of systems:  Constitutional:  No fever no chills.  HEENT:  No hoarseness no dysphagia.  No URI symptoms.  Respiratory:  For week or so she has had a very slight cough.  Feels like a tickle in her throat.  Nonproductive.  No shortness of breath.  Cardiovascular:  Currently without chest pain palpitations syncope or claudication.  Musculoskeletal:  She has had some recurring right lower back pain for the last couple of weeks.  Nonradiating.  Using Tylenol.  Requesting muscle relaxer p.r.n..    Physical examination:  General:  Pleasant alert appropriately groomed lady no acute distress.  Vital signs:  All noted and reviewed as normal.  HEENT:  Normocephalic.  Neck supple no masses no thyromegaly.  Lungs:  Clear to auscultation.  Cardiovascular:  Regular rate and rhythm.  2/6 systolic murmur.  No JVD.  No significant peripheral extremity edema.  Back:  No gross deformity.  No tenderness to palpation.  Negative straight leg raising.  Mental status:  Alert oriented affect mood all appropriate.        Impression:  Hypertension is reasonably controlled.  Status post recent TAVR.  Dyslipidemia statin therapy.  Osteoporosis on pharmacologic therapy.  Recent minimal cough probable allergy postnasal drip related.  Lower back pain.    Plan:  Continue current pharmacologic regimens.  Paco Reagan for cough and advise if without resolution in one week.  P.r.n. use of tizanidine 2 mg every 8 hours.  Discussed potential sedation.  Return clinic six months.

## 2022-06-28 ENCOUNTER — TELEPHONE (OUTPATIENT)
Dept: INTERNAL MEDICINE | Facility: CLINIC | Age: 87
End: 2022-06-28
Payer: MEDICARE

## 2022-06-28 RX ORDER — AZITHROMYCIN 250 MG/1
TABLET, FILM COATED ORAL
Qty: 6 TABLET | Refills: 0 | Status: SHIPPED | OUTPATIENT
Start: 2022-06-28 | End: 2022-07-03

## 2022-06-28 NOTE — TELEPHONE ENCOUNTER
Spoke with pt to advise that antibiotics have been sent to her pharmacy.    She will advise if no improvement.   verbal cues/nonverbal cues (demo/gestures)/1 person assist

## 2022-06-28 NOTE — TELEPHONE ENCOUNTER
----- Message from Betty Feliz sent at 6/28/2022  9:48 AM CDT -----  Contact: Mine   1MEDICALADVICE     Patient is calling for Medical Advice regarding: cough     How long has patient had these symptoms:2 weeks     Pharmacy name and phone#: [facility] [address]     Would like response via [facility]: call back     Comments:Mine had an appt on Thursday 06/16/22 to see Dr Oswald. He prescribed medication & the cough is no better.

## 2022-07-05 ENCOUNTER — TELEPHONE (OUTPATIENT)
Dept: INTERNAL MEDICINE | Facility: CLINIC | Age: 87
End: 2022-07-05
Payer: MEDICARE

## 2022-07-05 NOTE — TELEPHONE ENCOUNTER
If she is still coughing place her my schedule pm tomorrow for eval and possible cxr.   Pain most likely muscular from coughing

## 2022-07-05 NOTE — TELEPHONE ENCOUNTER
Spoke with pt who advises that she did use the Tizanidine and it did help with the initial back pain.    But now she is having pain in the middle of her back due to persistent cough.    She is wondering if she should f/u with Pulmonology.    Please advise.    Thank you.

## 2022-07-05 NOTE — TELEPHONE ENCOUNTER
Record indicates she has Rx tizanidine from 6/20/22 with 1 refill .  Is she using such currently as a muscle relaxer ??

## 2022-07-05 NOTE — TELEPHONE ENCOUNTER
----- Message from Harriet Osullivan sent at 7/5/2022  1:59 PM CDT -----  Contact: [phone]  Patient is returning a phone call.  Who left a message for the patient: Dr Oswald's office  Does patient know what this is regarding:  no  Would you like a call back, or a response through your [facility] portal?:  phone  Comments:

## 2022-07-05 NOTE — TELEPHONE ENCOUNTER
----- Message from Renee Berg sent at 7/5/2022  8:25 AM CDT -----  Contact: Pt [phone]  1MEDICALADVICE     Patient is calling for Medical Advice regarding: pulled muscle in back (coughing so much)    How long has patient had these symptoms: over the weekend    Pharmacy name and phone#:   [id number] - [place] - [address] AT [facility] [address]  [address] [address]  Phone: [phone] Fax: [phone]    Would like response via [email or web address]: Call back    Comments:    Please call and advise.    Thank You

## 2022-07-06 ENCOUNTER — OFFICE VISIT (OUTPATIENT)
Dept: INTERNAL MEDICINE | Facility: CLINIC | Age: 87
End: 2022-07-06
Payer: MEDICARE

## 2022-07-06 ENCOUNTER — HOSPITAL ENCOUNTER (OUTPATIENT)
Dept: RADIOLOGY | Facility: HOSPITAL | Age: 87
Discharge: HOME | End: 2022-07-06
Attending: INTERNAL MEDICINE
Payer: MEDICARE

## 2022-07-06 VITALS
TEMPERATURE: 97 F | OXYGEN SATURATION: 96 % | HEART RATE: 75 BPM | SYSTOLIC BLOOD PRESSURE: 114 MMHG | DIASTOLIC BLOOD PRESSURE: 68 MMHG | WEIGHT: 142.88 LBS | RESPIRATION RATE: 16 BRPM | BODY MASS INDEX: 25.31 KG/M2

## 2022-07-06 DIAGNOSIS — R05.9 COUGH: Primary | ICD-10-CM

## 2022-07-06 DIAGNOSIS — M54.9 BACK PAIN, UNSPECIFIED BACK LOCATION, UNSPECIFIED BACK PAIN LATERALITY, UNSPECIFIED CHRONICITY: ICD-10-CM

## 2022-07-06 DIAGNOSIS — R05.9 COUGH: ICD-10-CM

## 2022-07-06 PROCEDURE — 71046 X-RAY EXAM CHEST 2 VIEWS: CPT | Mod: TC,PO

## 2022-07-06 PROCEDURE — 1157F ADVNC CARE PLAN IN RCRD: CPT | Mod: CPTII,S$GLB,, | Performed by: INTERNAL MEDICINE

## 2022-07-06 PROCEDURE — 99214 PR OFFICE/OUTPT VISIT, EST, LEVL IV, 30-39 MIN: ICD-10-PCS | Mod: S$GLB,,, | Performed by: INTERNAL MEDICINE

## 2022-07-06 PROCEDURE — 1101F PR PT FALLS ASSESS DOC 0-1 FALLS W/OUT INJ PAST YR: ICD-10-PCS | Mod: CPTII,S$GLB,, | Performed by: INTERNAL MEDICINE

## 2022-07-06 PROCEDURE — 1101F PT FALLS ASSESS-DOCD LE1/YR: CPT | Mod: CPTII,S$GLB,, | Performed by: INTERNAL MEDICINE

## 2022-07-06 PROCEDURE — 71046 XR CHEST PA AND LATERAL: ICD-10-PCS | Mod: 26,,, | Performed by: RADIOLOGY

## 2022-07-06 PROCEDURE — 71046 X-RAY EXAM CHEST 2 VIEWS: CPT | Mod: 26,,, | Performed by: RADIOLOGY

## 2022-07-06 PROCEDURE — 1125F PR PAIN SEVERITY QUANTIFIED, PAIN PRESENT: ICD-10-PCS | Mod: CPTII,S$GLB,, | Performed by: INTERNAL MEDICINE

## 2022-07-06 PROCEDURE — 3288F PR FALLS RISK ASSESSMENT DOCUMENTED: ICD-10-PCS | Mod: CPTII,S$GLB,, | Performed by: INTERNAL MEDICINE

## 2022-07-06 PROCEDURE — 1159F MED LIST DOCD IN RCRD: CPT | Mod: CPTII,S$GLB,, | Performed by: INTERNAL MEDICINE

## 2022-07-06 PROCEDURE — 99999 PR PBB SHADOW E&M-EST. PATIENT-LVL IV: ICD-10-PCS | Mod: PBBFAC,,, | Performed by: INTERNAL MEDICINE

## 2022-07-06 PROCEDURE — 1157F PR ADVANCE CARE PLAN OR EQUIV PRESENT IN MEDICAL RECORD: ICD-10-PCS | Mod: CPTII,S$GLB,, | Performed by: INTERNAL MEDICINE

## 2022-07-06 PROCEDURE — 1125F AMNT PAIN NOTED PAIN PRSNT: CPT | Mod: CPTII,S$GLB,, | Performed by: INTERNAL MEDICINE

## 2022-07-06 PROCEDURE — 99999 PR PBB SHADOW E&M-EST. PATIENT-LVL IV: CPT | Mod: PBBFAC,,, | Performed by: INTERNAL MEDICINE

## 2022-07-06 PROCEDURE — 1159F PR MEDICATION LIST DOCUMENTED IN MEDICAL RECORD: ICD-10-PCS | Mod: CPTII,S$GLB,, | Performed by: INTERNAL MEDICINE

## 2022-07-06 PROCEDURE — 99214 OFFICE O/P EST MOD 30 MIN: CPT | Mod: S$GLB,,, | Performed by: INTERNAL MEDICINE

## 2022-07-06 PROCEDURE — 3288F FALL RISK ASSESSMENT DOCD: CPT | Mod: CPTII,S$GLB,, | Performed by: INTERNAL MEDICINE

## 2022-07-06 RX ORDER — TRAMADOL HYDROCHLORIDE 50 MG/1
50 TABLET ORAL EVERY 6 HOURS PRN
Qty: 16 TABLET | Refills: 0 | Status: SHIPPED | OUTPATIENT
Start: 2022-07-06 | End: 2022-08-08

## 2022-07-06 RX ORDER — PREDNISONE 20 MG/1
TABLET ORAL
Qty: 8 TABLET | Refills: 0 | Status: SHIPPED | OUTPATIENT
Start: 2022-07-06 | End: 2023-04-05

## 2022-07-06 NOTE — PROGRESS NOTES
History of present illness:  [age] year lady with hypertension, dyslipidemia, osteoporosis in today with persistent cough.  His been occurring for several weeks.  Not productive.  No shortness of breath.  Seems to bother her more she lies down.  No PND no orthopnea.  She has had a course of antibiotics.  He did start with primarily sinus symptoms with drainage congestion and sneezing.  No symptoms do persist but not as bad as initially.  No history of underlying lung disease.    Current medications:  All medications are reviewed.      Review of systems:  Constitutional:  No fever no chills.  GI:  No nausea .  She did have a couple of episodes of gagging after coughing spell.  No diarrhea.  Cardiovascular:  No cardiac type chest pain palpitations syncope  Musculoskeletal:  She is having mid back pain since coughing has been an issue.        Physical examination:  General:  Pleasant alert appropriately lady no acute distress.  HEENT:  Neck supple no masses no thyromegaly.  Lungs:  Clear to auscultation and to percussion.  Cardiovascular:  Regular rhythm.  No significant murmur.  Back:  Mild tenderness palpation over the right mid thoracic paraspinous musculature.    Data:  Chest x-ray today normal other than valve replacement        Impression:  Persistent cough I think is primarily postnasal drip.  Muscular back pain    Plan:    Prednisone 40 mg daily for four days.  Continue Flonase/  Advise if without improvement over the next two three weeks or any worsening.  Symptomatic treatment for her back also p.r.n. tramadol if necessary.  Given 16 tablets

## 2022-07-11 ENCOUNTER — TELEPHONE (OUTPATIENT)
Dept: INTERNAL MEDICINE | Facility: CLINIC | Age: 87
End: 2022-07-11
Payer: MEDICARE

## 2022-07-11 RX ORDER — TIZANIDINE 4 MG/1
4 TABLET ORAL EVERY 8 HOURS
Qty: 20 TABLET | Refills: 1 | OUTPATIENT
Start: 2022-07-11 | End: 2022-07-16

## 2022-07-11 NOTE — TELEPHONE ENCOUNTER
----- Message from Petra Fan sent at 7/11/2022 10:08 AM CDT -----  Contact: Pt [phone]  Pt called and stated that the sneezing and coughing has subsided but she is still having the pain in her pack. She wants to know if you can call in a muscle relaxer      [id number] [place] - [address] AT [facility] [address]  [address] [address]  Phone: [phone] Fax: [phone]

## 2022-07-11 NOTE — TELEPHONE ENCOUNTER
Left a message for the pt to advise that medication has been sent to her pharmacy for the back pain.

## 2022-07-16 ENCOUNTER — HOSPITAL ENCOUNTER (EMERGENCY)
Facility: HOSPITAL | Age: 87
Discharge: HOME | End: 2022-07-16
Attending: EMERGENCY MEDICINE
Payer: MEDICARE

## 2022-07-16 VITALS
DIASTOLIC BLOOD PRESSURE: 74 MMHG | WEIGHT: 145 LBS | OXYGEN SATURATION: 99 % | BODY MASS INDEX: 25.69 KG/M2 | HEIGHT: 63 IN | RESPIRATION RATE: 16 BRPM | HEART RATE: 69 BPM | TEMPERATURE: 98 F | SYSTOLIC BLOOD PRESSURE: 160 MMHG

## 2022-07-16 DIAGNOSIS — I10 HYPERTENSION, UNSPECIFIED TYPE: ICD-10-CM

## 2022-07-16 DIAGNOSIS — E87.1 HYPONATREMIA: ICD-10-CM

## 2022-07-16 DIAGNOSIS — R05.9 COUGH: ICD-10-CM

## 2022-07-16 DIAGNOSIS — T14.8XXA MUSCLE STRAIN: Primary | ICD-10-CM

## 2022-07-16 PROBLEM — Z95.2 S/P TAVR (TRANSCATHETER AORTIC VALVE REPLACEMENT): Chronic | Status: ACTIVE | Noted: 2022-03-09

## 2022-07-16 PROBLEM — F41.1 GENERALIZED ANXIETY DISORDER: Chronic | Status: ACTIVE | Noted: 2017-05-10

## 2022-07-16 PROBLEM — I73.9 PVD (PERIPHERAL VASCULAR DISEASE): Chronic | Status: ACTIVE | Noted: 2022-04-08

## 2022-07-16 PROBLEM — Z95.3 S/P TAVR (TRANSCATHETER AORTIC VALVE REPLACEMENT): Chronic | Status: ACTIVE | Noted: 2022-03-09

## 2022-07-16 LAB
ALBUMIN SERPL BCP-MCNC: 4.2 G/DL (ref 3.5–5.2)
ALP SERPL-CCNC: 73 U/L (ref 55–135)
ALT SERPL W/O P-5'-P-CCNC: 21 U/L (ref 10–44)
ANION GAP SERPL CALC-SCNC: 8 MMOL/L (ref 8–16)
AST SERPL-CCNC: 23 U/L (ref 10–40)
BASOPHILS # BLD AUTO: 0.05 K/UL (ref 0–0.2)
BASOPHILS NFR BLD: 0.4 % (ref 0–1.9)
BILIRUB SERPL-MCNC: 0.6 MG/DL (ref 0.1–1)
BILIRUB UR QL STRIP: NEGATIVE
BUN SERPL-MCNC: 27 MG/DL (ref 8–23)
CALCIUM SERPL-MCNC: 10.3 MG/DL (ref 8.7–10.5)
CHLORIDE SERPL-SCNC: 97 MMOL/L (ref 95–110)
CLARITY UR REFRACT.AUTO: CLEAR
CO2 SERPL-SCNC: 25 MMOL/L (ref 23–29)
COLOR UR AUTO: YELLOW
CREAT SERPL-MCNC: 1.2 MG/DL (ref 0.5–1.4)
DIFFERENTIAL METHOD: ABNORMAL
EOSINOPHIL # BLD AUTO: 0.2 K/UL (ref 0–0.5)
EOSINOPHIL NFR BLD: 1.8 % (ref 0–8)
ERYTHROCYTE [DISTWIDTH] IN BLOOD BY AUTOMATED COUNT: 14.8 % (ref 11.5–14.5)
EST. GFR  (AFRICAN AMERICAN): 47.3 ML/MIN/1.73 M^2
EST. GFR  (NON AFRICAN AMERICAN): 41 ML/MIN/1.73 M^2
GLUCOSE SERPL-MCNC: 102 MG/DL (ref 70–110)
GLUCOSE UR QL STRIP: NEGATIVE
HCT VFR BLD AUTO: 38.7 % (ref 37–48.5)
HGB BLD-MCNC: 12.7 G/DL (ref 12–16)
HGB UR QL STRIP: NEGATIVE
IMM GRANULOCYTES # BLD AUTO: 0.07 K/UL (ref 0–0.04)
IMM GRANULOCYTES NFR BLD AUTO: 0.6 % (ref 0–0.5)
KETONES UR QL STRIP: NEGATIVE
LEUKOCYTE ESTERASE UR QL STRIP: ABNORMAL
LYMPHOCYTES # BLD AUTO: 2.1 K/UL (ref 1–4.8)
LYMPHOCYTES NFR BLD: 17 % (ref 18–48)
MCH RBC QN AUTO: 27.9 PG (ref 27–31)
MCHC RBC AUTO-ENTMCNC: 32.8 G/DL (ref 32–36)
MCV RBC AUTO: 85 FL (ref 82–98)
MICROSCOPIC COMMENT: ABNORMAL
MONOCYTES # BLD AUTO: 0.9 K/UL (ref 0.3–1)
MONOCYTES NFR BLD: 7.3 % (ref 4–15)
NEUTROPHILS # BLD AUTO: 9.1 K/UL (ref 1.8–7.7)
NEUTROPHILS NFR BLD: 72.9 % (ref 38–73)
NITRITE UR QL STRIP: NEGATIVE
NRBC BLD-RTO: 0 /100 WBC
PH UR STRIP: 7 [PH] (ref 5–8)
PLATELET # BLD AUTO: 302 K/UL (ref 150–450)
PMV BLD AUTO: 10.7 FL (ref 9.2–12.9)
POTASSIUM SERPL-SCNC: 4.7 MMOL/L (ref 3.5–5.1)
PROT SERPL-MCNC: 7.6 G/DL (ref 6–8.4)
PROT UR QL STRIP: NEGATIVE
RBC # BLD AUTO: 4.55 M/UL (ref 4–5.4)
RBC #/AREA URNS AUTO: 1 /HPF (ref 0–4)
SODIUM SERPL-SCNC: 130 MMOL/L (ref 136–145)
SP GR UR STRIP: 1.01 (ref 1–1.03)
URN SPEC COLLECT METH UR: ABNORMAL
WBC # BLD AUTO: 12.44 K/UL (ref 3.9–12.7)
WBC #/AREA URNS AUTO: 7 /HPF (ref 0–5)

## 2022-07-16 PROCEDURE — 99284 EMERGENCY DEPT VISIT MOD MDM: CPT | Mod: 25

## 2022-07-16 PROCEDURE — 99285 EMERGENCY DEPT VISIT HI MDM: CPT | Mod: ,,, | Performed by: EMERGENCY MEDICINE

## 2022-07-16 PROCEDURE — 81001 URINALYSIS AUTO W/SCOPE: CPT | Performed by: EMERGENCY MEDICINE

## 2022-07-16 PROCEDURE — 25000003 PHARM REV CODE 250: Performed by: EMERGENCY MEDICINE

## 2022-07-16 PROCEDURE — 99285 PR EMERGENCY DEPT VISIT,LEVEL V: ICD-10-PCS | Mod: ,,, | Performed by: EMERGENCY MEDICINE

## 2022-07-16 PROCEDURE — 80053 COMPREHEN METABOLIC PANEL: CPT | Performed by: EMERGENCY MEDICINE

## 2022-07-16 PROCEDURE — 85025 COMPLETE CBC W/AUTO DIFF WBC: CPT | Performed by: EMERGENCY MEDICINE

## 2022-07-16 RX ORDER — METHOCARBAMOL 500 MG/1
500 TABLET, FILM COATED ORAL 4 TIMES DAILY
Qty: 20 TABLET | Refills: 0 | Status: SHIPPED | OUTPATIENT
Start: 2022-07-16 | End: 2022-07-22 | Stop reason: SDUPTHER

## 2022-07-16 RX ORDER — IBUPROFEN 600 MG/1
600 TABLET ORAL EVERY 6 HOURS PRN
Qty: 20 TABLET | Refills: 0 | Status: SHIPPED | OUTPATIENT
Start: 2022-07-16 | End: 2022-07-21

## 2022-07-16 RX ORDER — IBUPROFEN 600 MG/1
600 TABLET ORAL
Status: COMPLETED | OUTPATIENT
Start: 2022-07-16 | End: 2022-07-16

## 2022-07-16 RX ORDER — METHOCARBAMOL 500 MG/1
500 TABLET, FILM COATED ORAL
Status: COMPLETED | OUTPATIENT
Start: 2022-07-16 | End: 2022-07-16

## 2022-07-16 RX ADMIN — METHOCARBAMOL 500 MG: 500 TABLET ORAL at 08:07

## 2022-07-16 RX ADMIN — SODIUM CHLORIDE 500 ML: 0.9 INJECTION, SOLUTION INTRAVENOUS at 08:07

## 2022-07-16 RX ADMIN — IBUPROFEN 600 MG: 600 TABLET ORAL at 08:07

## 2022-07-17 NOTE — ED TRIAGE NOTES
Patient arrived to ED via POV. Patient complaint of right sided back pain described as muscle spasm. Patient currently on Tramadol and muscle relaxer without relief.t dose 2 pm today. Also complaint of foul smelling urine, denies burning with urination.

## 2022-07-17 NOTE — ED PROVIDER NOTES
Source of History:  Patient  Chart    Chief complaint:  Back Pain (Pt reports being seen by PCP for back pain. Pt prescribed muscle relaxers. Pt reports taking tramadol at 2PM. Pt reports appointment Monday but couldn't wait. Pt also reports foul odor of urine but denies dysuria. )      HPI:  Mine Wilkins is a 87 y.o. female with history of diastolic dysfunction, hypertension, anxiety, peripheral vascular disease, TAVR, lumbar radiculopathy presenting to emergency department with complaint of right flank/back pain, and nonproductive cough.    Per chart review, patient seen in primary care office on 07/06/2022 with complaint of persistent cough of several weeks duration.  Was nonproductive and there is no associated shortness of breath, orthopnea, or PND.  She had already tried a course of antibiotics.  She has no history of underlying lung disease.  Chest x-ray did not reveal any acute abnormality.  Her exam was unremarkable at that visit, aside from some mild tenderness to palpation over the right midthoracic paraspinous musculature.  Her cough was thought to be secondary to postnasal drip.  She was advised to continue Flonase, and trial 40 mg prednisone daily for 4 days.  She was given tramadol to use p.r.n. if no improvement.    Patient states that initially the cough resolved after use of the prednisone.  It then began to return.  Patient states that she finished using the tramadol as well.  She called the office because she was having some discomfort in the right flank, and was prescribed tizanidine.  Initially this was helping with the pain, however she coughed last night and now has worsening pain in that area.  No rashes.  No fevers or chills.  No difficulty breathing or chest pain.  No abdominal pain.  The pain in her back/flank is not postprandial.  She has no dysuria, hematuria, increased urinary frequency.  Patient states that she has had ongoing strong smelling urine 4 months.  This is not  a new complaint.    Patient states that pain is worse with movement.  She last took her tizanidine at 2:00 a.m..  She has taken no other medications for pain prior to arrival in the emergency department today.    ROS: As per HPI and below:  Review of Systems   Constitutional: Negative for fever.   HENT: Negative for sore throat.    Eyes: Negative for double vision.   Respiratory: Positive for cough. Negative for sputum production and shortness of breath.    Cardiovascular: Negative for chest pain.   Gastrointestinal: Negative for abdominal pain and vomiting.   Genitourinary: Positive for flank pain. Negative for dysuria, frequency and urgency.   Musculoskeletal: Negative for falls.   Skin: Negative for rash.   Neurological: Negative for headaches.       Review of patient's allergies indicates:   Allergen Reactions    Ace inhibitors      Other reaction(s): cough    Codeine      Other reaction(s): Nausea       No current facility-administered medications on file prior to encounter.     Current Outpatient Medications on File Prior to Encounter   Medication Sig Dispense Refill    acetaminophen (TYLENOL) 325 MG tablet Take 2 tablets (650 mg total) by mouth every 6 (six) hours as needed.  0    alendronate (FOSAMAX) 70 MG tablet TAKE 1 TABLET  EVERY 7 DAYS. HOLD UNTIL SEEN BY YOUR PRIMARY CARE PHYSICIAN 12 tablet 3    aspirin (ECOTRIN) 81 MG EC tablet Take 1 tablet (81 mg total) by mouth once daily. 90 tablet 3    cetirizine (ZYRTEC) 10 MG tablet Take 10 mg by mouth once daily.      cholecalciferol, vitamin D3, 2,000 unit Tab Take by mouth. 1 Tablet Oral Every day      EScitalopram oxalate (LEXAPRO) 5 MG Tab TAKE 1 TABLET(5 MG) BY MOUTH EVERY DAY 90 tablet 0    fluticasone propionate (FLONASE) 50 mcg/actuation nasal spray SHAKE LIQUID AND USE 1 SPRAY(50 MCG) IN EACH NOSTRIL EVERY EVENING 16 g 3    furosemide (LASIX) 20 MG tablet Take AS NEEDED, daily for 3 lb weight gain overnight or 5 lbs in 5 days. (Patient not  taking: Reported on 7/6/2022) 30 tablet 0    losartan (COZAAR) 50 MG tablet TAKE 1 TABLET TWICE DAILY 180 tablet 3    multivit-min/iron/folic/lutein (CENTRUM SILVER WOMEN ORAL) Take by mouth.      omeprazole (PRILOSEC) 40 MG capsule TAKE 1 CAPSULE EVERY MORNING  BEFORE  EATING 90 capsule 3    oxybutynin (DITROPAN-XL) 10 MG 24 hr tablet TAKE 1 TABLET EVERY DAY 90 tablet 3    pravastatin (PRAVACHOL) 20 MG tablet TAKE 1 TABLET EVERY EVENING. 90 tablet 3    predniSONE (DELTASONE) 20 MG tablet 2 po qd x 4d 8 tablet 0    traMADoL (ULTRAM) 50 mg tablet Take 1 tablet (50 mg total) by mouth every 6 (six) hours as needed for Pain. 16 tablet 0       PMH:  As per HPI and below:  Past Medical History:   Diagnosis Date    Acute on chronic diastolic heart failure 3/9/2022    After-cataract of both eyes - Left Eye 10/11/2012    Anxiety     Aortic calcification 8/10/2016    Aortic valve stenosis, moderate     AR (allergic rhinitis)     Arthritis of facet joints at multiple vertebral levels 1/14/2016    Seen on lumbar MRI dated 01/14/16    Cataract     Cholelithiasis     Closed displaced intertrochanteric fracture of right femur with routine healing s/p IM nail on 5/20/2018 5/19/2018    Cystocele 12/1/2013    Esotropia, alternating - Both Eyes 10/11/2012    Essential hypertension     Gastroesophageal reflux disease without esophagitis 4/13/2018    Left ventricular diastolic dysfunction with preserved systolic function 8/10/2016    Lumbar radiculopathy 1/25/2016    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    Nondisplaced fracture of proximal end of humerus 8/7/2016    Nonexudative age-related macular degeneration, bilateral, intermediate dry stage 1/14/2021    Osteoarthritis     Overweight (BMI 25.0-29.9) 4/13/2018    Pure hypercholesterolemia     Right-sided low back pain without sciatica 12/10/2015    Strabismus     Urethral hypermobility 12/1/2013     Past Surgical History:    Procedure Laterality Date    ADENOIDECTOMY      CARDIAC CATH COSURGEON N/A 3/8/2022    Procedure: Cardiac Cath Cosurgeon;  Surgeon: Shahram Lyle MD;  Location: [facility] CATH LAB;  Service: Cardiovascular;  Laterality: N/A;    CARDIAC VALVE SURGERY      CATARACT EXTRACTION Bilateral     BOTH EYES MULTIFOCAL    COLONOSCOPY  2015    CORONARY ANGIOGRAPHY N/A 2/7/2022    Procedure: ANGIOGRAM, CORONARY ARTERY;  Surgeon: Robby Mistry MD;  Location: [facility] CATH LAB;  Service: Cardiology;  Laterality: N/A;    EYE SURGERY      FEMUR FRACTURE SURGERY Right     FRACTURE SURGERY Right     femur    HYSTERECTOMY      INTERTROCHANTERIC HIP FRACTURE SURGERY Right 05/20/2018    IM nail    LEFT HEART CATHETERIZATION Left 3/8/2022    Procedure: Left heart cath;  Surgeon: Constantin Goldberg MD;  Location: [facility] CATH LAB;  Service: Cardiology;  Laterality: Left;    LUMBAR EPIDURAL INJECTION  02/04/2016    L4-l5    PARTIAL HYSTERECTOMY      SKIN GRAFT      left foot     TONSILLECTOMY      TRANSCATHETER AORTIC VALVE REPLACEMENT (TAVR) N/A 3/8/2022    Procedure: REPLACEMENT, AORTIC VALVE, TRANSCATHETER (TAVR);  Surgeon: Constantin Goldberg MD;  Location: [facility] CATH LAB;  Service: Cardiology;  Laterality: N/A;    WRIST FRACTURE SURGERY Right 2009    YAG CAP      LEFT EYE       Social History     Socioeconomic History    Marital status:    Occupational History    Occupation: Retired   Tobacco Use    Smoking status: Never Smoker    Smokeless tobacco: Never Used   Substance and Sexual Activity    Alcohol use: Yes     Comment: maybe 6 drinks per year    Drug use: No    Sexual activity: Not Currently     Partners: Male   Other Topics Concern    Are you pregnant or think you may be? No    Breast-feeding No   Social History Narrative    . Two grown daughters       Family History   Problem Relation Age of Onset    Colon cancer Brother     Dementia Brother     Hypertension Brother     Osteoporosis  Mother     Hypertension Mother     Macular degeneration Mother     Cataracts Mother     Cancer Father     No Known Problems Daughter     Hypertension Brother     Dementia Brother     No Known Problems Daughter     Hypertension Maternal Grandmother     Stroke Maternal Grandmother     Breast cancer Maternal Grandmother     Melanoma Maternal Grandfather     Anesthesia problems Neg Hx     Broken bones Neg Hx     Clotting disorder Neg Hx     Collagen disease Neg Hx     Diabetes Neg Hx     Dislocations Neg Hx     Rheumatologic disease Neg Hx     Scoliosis Neg Hx     Severe sprains Neg Hx     Ovarian cancer Neg Hx     Amblyopia Neg Hx     Blindness Neg Hx     Glaucoma Neg Hx     Retinal detachment Neg Hx     Strabismus Neg Hx     Psoriasis Neg Hx     Lupus Neg Hx     Eczema Neg Hx     Acne Neg Hx        Physical Exam:      Vitals:    07/16/22 2229   BP: (!) 160/74   Pulse: 69   Resp: 16   Temp: 98.1 °F (36.7 °C)     Gen: No acute distress.  Nontoxic.  Well appearing.  Mental Status:  Alert and oriented .  Appropriate, conversant.  Skin: Warm, dry. No rashes seen.  Eyes: No conjunctival injection.  Pulm: CTAB. No increased work of breathing.  No significant tachypnea.  No audible stridor or wheezing.  No conversational dyspnea.    CV: Regular rate. Regular rhythm.   Abd: Soft.  Not distended.  Nontender.   MSK: Good range of motion all joints.  No deformities.  No CVA tenderness bilaterally.  Tenderness to palpation of the right thoracic paraspinal region.  No mass palpated.  No midline  Neuro: Awake. Speech normal. No focal neuro deficit observed.    Laboratory Studies:  Labs Reviewed   CBC W/ AUTO DIFFERENTIAL - Abnormal; Notable for the following components:       Result Value    RDW 14.8 (*)     Immature Granulocytes 0.6 (*)     Gran # (ANC) 9.1 (*)     Immature Grans (Abs) 0.07 (*)     Lymph % 17.0 (*)     All other components within normal limits   COMPREHENSIVE METABOLIC PANEL -  Abnormal; Notable for the following components:    Sodium 130 (*)     BUN 27 (*)     eGFR if  47.3 (*)     eGFR if non  41.0 (*)     All other components within normal limits   URINALYSIS, REFLEX TO URINE CULTURE - Abnormal; Notable for the following components:    Leukocytes, UA Trace (*)     All other components within normal limits    Narrative:     Specimen Source->Urine   URINALYSIS MICROSCOPIC - Abnormal; Notable for the following components:    WBC, UA 7 (*)     All other components within normal limits    Narrative:     Specimen Source->Urine       X-rays (independently interpreted by me):  No acute abnormality    Chart reviewed.  See summary in HPI    Imaging Results          X-Ray Chest PA And Lateral (Final result)  Result time 07/16/22 22:03:21    Final result by Jose Angel Longoria MD (07/16/22 22:03:21)                 Impression:      No acute intrathoracic findings identified.    Electronically signed by resident: Hadley Argueta  Date:    07/16/2022  Time:    21:28    Electronically signed by: Jose Angel Longoria MD  Date:    07/16/2022  Time:    22:03             Narrative:    EXAMINATION:  XR CHEST PA AND LATERAL    CLINICAL HISTORY:  Cough, unspecified    TECHNIQUE:  PA and lateral views of the chest were performed.    COMPARISON:  Chest radiograph 07/06/2022, 05/19/2018    FINDINGS:  Trachea and mediastinal structures are midline.  Cardiac silhouette appears within normal limits.  Postoperative changes of aortic valve replacement.  There is calcifications within the aortic arch.  No focal consolidation, pneumothorax, or pleural fluid.  Pulmonary vascularity is within normal limits.  Stable L2 compression fracture.                                Medications Given:  Medications   methocarbamoL tablet 500 mg (500 mg Oral Given 7/16/22 2017)   ibuprofen tablet 600 mg (600 mg Oral Given 7/16/22 2017)   sodium chloride 0.9% bolus 500 mL (500 mLs Intravenous New Bag  7/16/22 2059)       MDM:    87 y.o. female with recurrent nonproductive cough, now associated with right flank/back pain that is worse with movement, initially improved with tizanidine but now worse.  She is afebrile, stable, nontoxic.    Will obtain labs, urinalysis, chest x-ray.  Will trial Robaxin and ibuprofen.    Labs reviewed.  Hemoglobin stable.  Sodium is 130 from 01/30 6.  Creatinine 1.2 from 0.9.  Chest x-ray without acute abnormality.  No pneumonia.  Urinalysis with trace leukocytes, not consistent with infection.    No rash to suggest shingles.  No abdominal tenderness to suggest intra-abdominal pathology.  Very likely musculoskeletal.    Patient reassessed.  States pain is improved.  Updated regarding findings.  Prescriptions given.  Discharged home in stable condition.  Return precautions discussed at bedside.    Diagnostic Impression:    1. Muscle strain    2. Cough    3. Hyponatremia    4. Hypertension, unspecified type         ED Disposition Condition    Discharge Stable        ED Prescriptions     Medication Sig Dispense Start Date End Date Auth. Provider    ibuprofen (ADVIL,MOTRIN) 600 MG tablet Take 1 tablet (600 mg total) by mouth every 6 (six) hours as needed for Pain. 20 tablet 7/16/2022 7/21/2022 Guerita Rasmussen MD    methocarbamoL (ROBAXIN) 500 MG Tab Take 1 tablet (500 mg total) by mouth 4 (four) times daily. for 5 days 20 tablet 7/16/2022 7/21/2022 Guerita Rasmussen MD        Follow-up Information     Follow up With Specialties Details Why Contact Info Additional Information    ZOEY Oswald MD Internal Medicine Schedule an appointment as soon as possible for a visit   [address] [facility] [address]  [place] [address]  [phone]       [facility] - Pulmonary [address] Pulmonology Schedule an appointment as soon as possible for a visit   [address] [facility] [address] [phone] Main Building, 9th Floor Please park in [facility] and use Clinic elevator           Patient understands the plan and is in agreement, verbalized understanding, questions answered    Guerita Rasmussen MD  Emergency Medicine       Guerita Rasmussen MD  07/17/22 0059

## 2022-07-17 NOTE — DISCHARGE INSTRUCTIONS
Your blood tests showed an incidental finding of slightly low sodium.  You must follow-up with your primary care doctor for a recheck.    Please take the prescribed medications as directed.  Please stop taking the previously prescribed tizanidine.    Tests today showed:   Labs Reviewed   CBC W/ AUTO DIFFERENTIAL - Abnormal; Notable for the following components:       Result Value    RDW 14.8 (*)     Immature Granulocytes 0.6 (*)     Gran # (ANC) 9.1 (*)     Immature Grans (Abs) 0.07 (*)     Lymph % 17.0 (*)     All other components within normal limits   COMPREHENSIVE METABOLIC PANEL - Abnormal; Notable for the following components:    Sodium 130 (*)     BUN 27 (*)     eGFR if  47.3 (*)     eGFR if non  41.0 (*)     All other components within normal limits   URINALYSIS, REFLEX TO URINE CULTURE - Abnormal; Notable for the following components:    Leukocytes, UA Trace (*)     All other components within normal limits    Narrative:     Specimen Source->Urine   URINALYSIS MICROSCOPIC - Abnormal; Notable for the following components:    WBC, UA 7 (*)     All other components within normal limits    Narrative:     Specimen Source->Urine     X-Ray Chest PA And Lateral    (Results Pending)       Treatments you had today:   Medications   sodium chloride 0.9% bolus 500 mL (has no administration in time range)   methocarbamoL tablet 500 mg (500 mg Oral Given 7/16/22 2017)   ibuprofen tablet 600 mg (600 mg Oral Given 7/16/22 2017)       Follow-Up Plan:  - Follow-up with primary care doctor within 3 - 5 days  - Additional testing and/or evaluation as directed by your primary doctor    Return to the Emergency Department for symptoms including but not limited to: worsening symptoms, shortness of breath or chest pain, vomiting with inability to hold down fluids, fevers greater than 100.4°F, passing out/fainting/unconsciousness, or other concerning symptoms.

## 2022-07-18 ENCOUNTER — TELEPHONE (OUTPATIENT)
Dept: INTERNAL MEDICINE | Facility: CLINIC | Age: 87
End: 2022-07-18
Payer: MEDICARE

## 2022-07-18 DIAGNOSIS — R05.9 COUGH: Primary | ICD-10-CM

## 2022-07-18 RX ORDER — FLUTICASONE PROPIONATE 50 UG/1
50 POWDER, METERED RESPIRATORY (INHALATION) 2 TIMES DAILY
Qty: 60 EACH | Refills: 1 | Status: SHIPPED | OUTPATIENT
Start: 2022-07-18 | End: 2023-07-18

## 2022-07-18 NOTE — TELEPHONE ENCOUNTER
----- Message from Betty Feliz sent at 7/18/2022  8:46 AM CDT -----  Contact: Mine   [email or web address]     Patient is calling for Medical Advice regarding:cough & muscle spasms    How long has patient had these symptoms:about a month     Pharmacy name and phone#:[facility] [address]     Would like response via [employer]: Call back     Comments:Mine was seen @ the ER on Saturday. They did lab work & told her she has low sodium. They recommended she talk to Dr Oswald & possibly see a Pulmonologist.

## 2022-07-18 NOTE — TELEPHONE ENCOUNTER
Spoke with pt to advise of MD recommendations.    She will try the inhaler but would also like a referral to Pulmonology.    Pt advises that she has not been using the Lasix recently.    Please advise.    Thank you.

## 2022-07-18 NOTE — TELEPHONE ENCOUNTER
The cough is likely inflammatory since it improved while on the prednisone. Should resolve. We can refer to pulmonology though I suspect it will resolve before that appt.  I would advise an inhaled cortisone preparation and observe over the next week---Rx sent to pharm.  The sodium is a bit low on ED lab.  Has she recently been using the furosemide (diuretic) ?

## 2022-07-20 ENCOUNTER — TELEPHONE (OUTPATIENT)
Dept: PULMONOLOGY | Facility: CLINIC | Age: 87
End: 2022-07-20
Payer: MEDICARE

## 2022-07-20 DIAGNOSIS — R05.9 COUGH: Primary | ICD-10-CM

## 2022-07-20 DIAGNOSIS — R05.3 CHRONIC COUGH: ICD-10-CM

## 2022-07-20 NOTE — TELEPHONE ENCOUNTER
----- Message from Diana Martino sent at 7/20/2022  3:23 PM CDT -----  Regarding: Referral  Contact: [phone]  PT, would like to Scheduled a  Appointment, please contact @ number provided     Pt, has a referral on file for: Cough [R05.9]    [phone]

## 2022-07-20 NOTE — TELEPHONE ENCOUNTER
Spoke with patient, informed her that I have received message. I advised patient that I can schedule her appointment with Dr Tomlinson on 7/22/22 for 2:00pm. Patient verbalized that she understand and accepted the appointment.

## 2022-07-21 ENCOUNTER — HOSPITAL ENCOUNTER (OUTPATIENT)
Dept: PULMONOLOGY | Facility: CLINIC | Age: 87
Discharge: HOME | End: 2022-07-21
Payer: MEDICARE

## 2022-07-21 DIAGNOSIS — R05.3 CHRONIC COUGH: ICD-10-CM

## 2022-07-21 PROCEDURE — 94010 BREATHING CAPACITY TEST: CPT | Mod: S$GLB,,, | Performed by: INTERNAL MEDICINE

## 2022-07-21 PROCEDURE — 94729 PR C02/MEMBANE DIFFUSE CAPACITY: ICD-10-PCS | Mod: S$GLB,,, | Performed by: INTERNAL MEDICINE

## 2022-07-21 PROCEDURE — 94727 GAS DIL/WSHOT DETER LNG VOL: CPT | Mod: S$GLB,,, | Performed by: INTERNAL MEDICINE

## 2022-07-21 PROCEDURE — 94727 PR PULM FUNCTION TEST BY GAS: ICD-10-PCS | Mod: S$GLB,,, | Performed by: INTERNAL MEDICINE

## 2022-07-21 PROCEDURE — 94729 DIFFUSING CAPACITY: CPT | Mod: S$GLB,,, | Performed by: INTERNAL MEDICINE

## 2022-07-21 PROCEDURE — 94010 BREATHING CAPACITY TEST: ICD-10-PCS | Mod: S$GLB,,, | Performed by: INTERNAL MEDICINE

## 2022-07-22 ENCOUNTER — OFFICE VISIT (OUTPATIENT)
Dept: PULMONOLOGY | Facility: CLINIC | Age: 87
End: 2022-07-22
Payer: MEDICARE

## 2022-07-22 VITALS
DIASTOLIC BLOOD PRESSURE: 80 MMHG | OXYGEN SATURATION: 96 % | HEART RATE: 83 BPM | WEIGHT: 151 LBS | BODY MASS INDEX: 26.75 KG/M2 | SYSTOLIC BLOOD PRESSURE: 142 MMHG | HEIGHT: 63 IN

## 2022-07-22 DIAGNOSIS — R05.3 CHRONIC COUGH: Primary | ICD-10-CM

## 2022-07-22 PROCEDURE — 3288F FALL RISK ASSESSMENT DOCD: CPT | Mod: CPTII,GC,S$GLB, | Performed by: STUDENT IN AN ORGANIZED HEALTH CARE EDUCATION/TRAINING PROGRAM

## 2022-07-22 PROCEDURE — 1125F PR PAIN SEVERITY QUANTIFIED, PAIN PRESENT: ICD-10-PCS | Mod: CPTII,GC,S$GLB, | Performed by: STUDENT IN AN ORGANIZED HEALTH CARE EDUCATION/TRAINING PROGRAM

## 2022-07-22 PROCEDURE — 1157F ADVNC CARE PLAN IN RCRD: CPT | Mod: CPTII,GC,S$GLB, | Performed by: STUDENT IN AN ORGANIZED HEALTH CARE EDUCATION/TRAINING PROGRAM

## 2022-07-22 PROCEDURE — 3288F PR FALLS RISK ASSESSMENT DOCUMENTED: ICD-10-PCS | Mod: CPTII,GC,S$GLB, | Performed by: STUDENT IN AN ORGANIZED HEALTH CARE EDUCATION/TRAINING PROGRAM

## 2022-07-22 PROCEDURE — 1101F PT FALLS ASSESS-DOCD LE1/YR: CPT | Mod: CPTII,GC,S$GLB, | Performed by: STUDENT IN AN ORGANIZED HEALTH CARE EDUCATION/TRAINING PROGRAM

## 2022-07-22 PROCEDURE — 1125F AMNT PAIN NOTED PAIN PRSNT: CPT | Mod: CPTII,GC,S$GLB, | Performed by: STUDENT IN AN ORGANIZED HEALTH CARE EDUCATION/TRAINING PROGRAM

## 2022-07-22 PROCEDURE — 1101F PR PT FALLS ASSESS DOC 0-1 FALLS W/OUT INJ PAST YR: ICD-10-PCS | Mod: CPTII,GC,S$GLB, | Performed by: STUDENT IN AN ORGANIZED HEALTH CARE EDUCATION/TRAINING PROGRAM

## 2022-07-22 PROCEDURE — 1157F PR ADVANCE CARE PLAN OR EQUIV PRESENT IN MEDICAL RECORD: ICD-10-PCS | Mod: CPTII,GC,S$GLB, | Performed by: STUDENT IN AN ORGANIZED HEALTH CARE EDUCATION/TRAINING PROGRAM

## 2022-07-22 PROCEDURE — 99204 PR OFFICE/OUTPT VISIT, NEW, LEVL IV, 45-59 MIN: ICD-10-PCS | Mod: GC,S$GLB,, | Performed by: STUDENT IN AN ORGANIZED HEALTH CARE EDUCATION/TRAINING PROGRAM

## 2022-07-22 PROCEDURE — 99204 OFFICE O/P NEW MOD 45 MIN: CPT | Mod: GC,S$GLB,, | Performed by: STUDENT IN AN ORGANIZED HEALTH CARE EDUCATION/TRAINING PROGRAM

## 2022-07-22 PROCEDURE — 99999 PR PBB SHADOW E&M-EST. PATIENT-LVL III: CPT | Mod: PBBFAC,GC,, | Performed by: STUDENT IN AN ORGANIZED HEALTH CARE EDUCATION/TRAINING PROGRAM

## 2022-07-22 PROCEDURE — 1159F MED LIST DOCD IN RCRD: CPT | Mod: CPTII,GC,S$GLB, | Performed by: STUDENT IN AN ORGANIZED HEALTH CARE EDUCATION/TRAINING PROGRAM

## 2022-07-22 PROCEDURE — 1159F PR MEDICATION LIST DOCUMENTED IN MEDICAL RECORD: ICD-10-PCS | Mod: CPTII,GC,S$GLB, | Performed by: STUDENT IN AN ORGANIZED HEALTH CARE EDUCATION/TRAINING PROGRAM

## 2022-07-22 PROCEDURE — 99999 PR PBB SHADOW E&M-EST. PATIENT-LVL III: ICD-10-PCS | Mod: PBBFAC,GC,, | Performed by: STUDENT IN AN ORGANIZED HEALTH CARE EDUCATION/TRAINING PROGRAM

## 2022-07-22 RX ORDER — METHOCARBAMOL 500 MG/1
500 TABLET, FILM COATED ORAL 4 TIMES DAILY
Qty: 16 TABLET | Refills: 0 | Status: SHIPPED | OUTPATIENT
Start: 2022-07-22 | End: 2022-07-26

## 2022-07-22 RX ORDER — AZELASTINE 1 MG/ML
1 SPRAY, METERED NASAL 2 TIMES DAILY
Qty: 30 ML | Refills: 1 | Status: SHIPPED | OUTPATIENT
Start: 2022-07-22 | End: 2024-02-20

## 2022-07-22 RX ORDER — ALBUTEROL SULFATE 90 UG/1
2 AEROSOL, METERED RESPIRATORY (INHALATION) EVERY 4 HOURS PRN
Qty: 18 G | Refills: 1 | Status: SHIPPED | OUTPATIENT
Start: 2022-07-22 | End: 2022-11-21 | Stop reason: SDUPTHER

## 2022-07-22 NOTE — PROGRESS NOTES
[facility]    SUBJECTIVE:     Chief Complaint: Cough    History of Present Illness:  Mine Wilkins is a 87 y.o. female who presents for cough. 2 months ago patient had a viral illness with profuse rhinorrhea and coughing. She had a number of close contacts who had a similar illness. She was afebrile during that illness, no SOB, tested negative for COVID (home testing kit). COVID vaccinated x3. Since that time she has had a non-productive cough. Sometimes resulting in emesis. Can lay flat, but cannot lay on pillow. When sitting, patient must sit up straight.     Tickling in throat. Drinks hot liquid. OTC cough suppressants provide mild relief. Tessalon pearls provided no relief. Flonase once to twice daily, without any relief. She takes omeprazole daily without any residual symptoms. Had a course of azithromycin and prednisone. Antibiotics provided no relief and prednisone provided some relief until cessation. Was given fluticasone inhaler, but only used once as it induced coughing.     Independent of all ADLs. Used to exercise 3-4 times per week. Since this cough started, she is more limited.     Smoking: never smoker.   Inhalers: Fluticasone, used only once.   Travel: nil  Occupational/environmental exposures: , retired  Pets/hobbies: nil  Recent illness: mild viral illness 2-3 months ago, resolved.   B-Symptoms: nil  Autoimmune symptoms/features: nil  Family Hx: nil      Review of patient's allergies indicates:   Allergen Reactions    Ace inhibitors      Other reaction(s): cough    Codeine      Other reaction(s): Nausea       Past Medical History:   Diagnosis Date    Acute on chronic diastolic heart failure 3/9/2022    After-cataract of both eyes - Left Eye 10/11/2012    Anxiety     Aortic calcification 8/10/2016    Aortic valve stenosis, moderate     AR (allergic rhinitis)     Arthritis of facet joints at multiple vertebral levels 1/14/2016    Seen on lumbar  MRI dated 01/14/16    Cataract     Cholelithiasis     Closed displaced intertrochanteric fracture of right femur with routine healing s/p IM nail on 5/20/2018 5/19/2018    Cystocele 12/1/2013    Esotropia, alternating - Both Eyes 10/11/2012    Essential hypertension     Gastroesophageal reflux disease without esophagitis 4/13/2018    Left ventricular diastolic dysfunction with preserved systolic function 8/10/2016    Lumbar radiculopathy 1/25/2016    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    Nondisplaced fracture of proximal end of humerus 8/7/2016    Nonexudative age-related macular degeneration, bilateral, intermediate dry stage 1/14/2021    Osteoarthritis     Overweight (BMI 25.0-29.9) 4/13/2018    Pure hypercholesterolemia     Right-sided low back pain without sciatica 12/10/2015    Strabismus     Urethral hypermobility 12/1/2013     Past Surgical History:   Procedure Laterality Date    ADENOIDECTOMY      CARDIAC CATH COSURGEON N/A 3/8/2022    Procedure: Cardiac Cath Cosurgeon;  Surgeon: Shahram Lyle MD;  Location: [facility] CATH LAB;  Service: Cardiovascular;  Laterality: N/A;    CARDIAC VALVE SURGERY      CATARACT EXTRACTION Bilateral     BOTH EYES MULTIFOCAL    COLONOSCOPY  2015    CORONARY ANGIOGRAPHY N/A 2/7/2022    Procedure: ANGIOGRAM, CORONARY ARTERY;  Surgeon: Robby Mistry MD;  Location: [facility] CATH LAB;  Service: Cardiology;  Laterality: N/A;    EYE SURGERY      FEMUR FRACTURE SURGERY Right     FRACTURE SURGERY Right     femur    HYSTERECTOMY      INTERTROCHANTERIC HIP FRACTURE SURGERY Right 05/20/2018    IM nail    LEFT HEART CATHETERIZATION Left 3/8/2022    Procedure: Left heart cath;  Surgeon: Constantin Goldberg MD;  Location: [facility] CATH LAB;  Service: Cardiology;  Laterality: Left;    LUMBAR EPIDURAL INJECTION  02/04/2016    L4-l5    PARTIAL HYSTERECTOMY      SKIN GRAFT      left foot     TONSILLECTOMY      TRANSCATHETER AORTIC VALVE  REPLACEMENT (TAVR) N/A 3/8/2022    Procedure: REPLACEMENT, AORTIC VALVE, TRANSCATHETER (TAVR);  Surgeon: Constantin Goldberg MD;  Location: [facility] CATH LAB;  Service: Cardiology;  Laterality: N/A;    WRIST FRACTURE SURGERY Right 2009    YAG CAP      LEFT EYE     Family History   Problem Relation Age of Onset    Colon cancer Brother     Dementia Brother     Hypertension Brother     Osteoporosis Mother     Hypertension Mother     Macular degeneration Mother     Cataracts Mother     Cancer Father     No Known Problems Daughter     Hypertension Brother     Dementia Brother     No Known Problems Daughter     Hypertension Maternal Grandmother     Stroke Maternal Grandmother     Breast cancer Maternal Grandmother     Melanoma Maternal Grandfather     Anesthesia problems Neg Hx     Broken bones Neg Hx     Clotting disorder Neg Hx     Collagen disease Neg Hx     Diabetes Neg Hx     Dislocations Neg Hx     Rheumatologic disease Neg Hx     Scoliosis Neg Hx     Severe sprains Neg Hx     Ovarian cancer Neg Hx     Amblyopia Neg Hx     Blindness Neg Hx     Glaucoma Neg Hx     Retinal detachment Neg Hx     Strabismus Neg Hx     Psoriasis Neg Hx     Lupus Neg Hx     Eczema Neg Hx     Acne Neg Hx      Social History     Socioeconomic History    Marital status:    Occupational History    Occupation: Retired   Tobacco Use    Smoking status: Never Smoker    Smokeless tobacco: Never Used   Substance and Sexual Activity    Alcohol use: Yes     Comment: maybe 6 drinks per year    Drug use: No    Sexual activity: Not Currently     Partners: Male   Other Topics Concern    Are you pregnant or think you may be? No    Breast-feeding No   Social History Narrative    . Two grown daughters       Review of Systems:  Review of Systems   Constitutional: Negative for chills, fever, malaise/fatigue and weight loss.   Respiratory: Positive for cough. Negative for hemoptysis, sputum production,  "shortness of breath and wheezing.    Cardiovascular: Negative for chest pain, palpitations, orthopnea, claudication and leg swelling.   Genitourinary: Positive for frequency.   Musculoskeletal: Negative for back pain, falls, joint pain, myalgias and neck pain.   Neurological: Negative.        OBJECTIVE:     Vital Signs  Vitals:    07/22/22 1358   BP: (!) 142/80   BP Location: Right arm   Patient Position: Sitting   Pulse: 83   SpO2: 96%   Weight: 68.5 kg (151 lb 0.2 oz)   Height: 5' 3" (1.6 m)     Body mass index is 26.75 kg/m².    Physical Exam:  Physical Exam  Vitals and nursing note reviewed.   Constitutional:       General: She is not in acute distress.     Appearance: She is normal weight. She is not ill-appearing, toxic-appearing or diaphoretic.   HENT:      Head: Normocephalic and atraumatic.   Cardiovascular:      Rate and Rhythm: Normal rate and regular rhythm.   Pulmonary:      Effort: No respiratory distress.      Breath sounds: No stridor. No wheezing or rales.   Abdominal:      General: Abdomen is flat. There is no distension.      Palpations: Abdomen is soft.      Tenderness: There is no abdominal tenderness.   Musculoskeletal:      Right lower leg: No edema.      Left lower leg: No edema.   Skin:     General: Skin is warm and dry.      Capillary Refill: Capillary refill takes 2 to 3 seconds.      Coloration: Skin is jaundiced.   Neurological:      General: No focal deficit present.      Mental Status: She is alert and oriented to person, place, and time.   Psychiatric:         Mood and Affect: Mood normal.         Behavior: Behavior normal.       Laboratory:  CBC  Lab Results   Component Value Date    WBC 12.44 07/16/2022    HGB 12.7 07/16/2022    HCT 38.7 07/16/2022     07/16/2022    MCV 85 07/16/2022    RDW 14.8 (H) 07/16/2022     BMP  Lab Results   Component Value Date     (L) 07/16/2022    K 4.7 07/16/2022    CL 97 07/16/2022    CO2 25 07/16/2022    BUN 27 (H) 07/16/2022    CREATININE " 1.2 07/16/2022     07/16/2022    CALCIUM 10.3 07/16/2022    MG 2.0 06/11/2018    PHOS 4.0 06/11/2018     LFTs  Lab Results   Component Value Date    PROT 7.6 07/16/2022    ALBUMIN 4.2 07/16/2022    BILITOT 0.6 07/16/2022    AST 23 07/16/2022    ALKPHOS 73 07/16/2022    ALT 21 07/16/2022       PFTs 7/21/2022       FVC (Pre) 2.59   FVC % (Pre) 117%   FVC (Post) n/a   FVC % Change n/a   FEV1 (Pre) 2.2   FEV1 % (Pre) 132%   FEV1 (Post) n/a   FEV1 % Change n/a   FEV1/FVC 0.85   TLC 4.17   TLC% 90%   RV 1.57   RV% 70.7%   DLCOunc 11.7   DLCOunc% 66.1%   DLCOcor 11.97   DLCOcor% 67.6%   VA 81.9%   .6%     Normal airflow.    Normal spirometry. Significant difficulty performing spirometry due to pain related to coughing.   PUL PFT DIFFUSION CAPACITY: Diffusion capacity is mildly decreased, but considered normal when adjusted for Hb (by LLN criteria)    Chest Imaging, My Impression:   Normal CXR 7/16/2022    ASSESSMENT/PLAN:       Problem List Items Addressed This Visit        Pulmonary    Chronic cough - Primary    Current Assessment & Plan     Likely multifactorial - postviral and PND    Already on fluticasone, will add azelastine. Continue both for PND.     Will add albuterol, q4h PRN. Discontinue if no benefit.            Relevant Medications    albuterol (VENTOLIN HFA) 90 mcg/actuation inhaler    azelastine (ASTELIN) 137 mcg (0.1 %) nasal spray        RTC PRN    GADIEL Tomlinson MD  [employer] [facility] Fellow

## 2022-07-22 NOTE — ASSESSMENT & PLAN NOTE
Likely multifactorial - postviral and PND    Already on fluticasone, will add azelastine. Continue both for PND.     Will add albuterol, q4h PRN. Discontinue if no benefit.

## 2022-07-25 NOTE — PROGRESS NOTES
I have reviewed the notes, assessments, and/or procedures performed by Dr. Tomlinson, I concur with her/his documentation of Mine Wilkins.

## 2022-07-28 ENCOUNTER — TELEPHONE (OUTPATIENT)
Dept: INTERNAL MEDICINE | Facility: CLINIC | Age: 87
End: 2022-07-28
Payer: MEDICARE

## 2022-07-28 RX ORDER — TIZANIDINE 4 MG/1
4 TABLET ORAL EVERY 8 HOURS
Qty: 30 TABLET | Refills: 2 | Status: SHIPPED | OUTPATIENT
Start: 2022-07-28 | End: 2022-08-08

## 2022-07-28 NOTE — TELEPHONE ENCOUNTER
----- Message from Darlene JAGDEEP Sadlertonyjaden sent at 7/28/2022  9:36 AM CDT -----  Contact: [phone]  Pt called to advise that she needs more muscle relaxers. Pt says she is not sneezing nor coughing the inhaler really helped. Please Advise     Requesting an RX refill or new RX.  Is this a refill or new RX: refill  RX name and strength (copy/paste from chart):  tiZANidine (ZANAFLEX) 4 MG tablet  Is this a 30 day or 90 day RX:   Pharmacy name and phone # (copy/paste from chart):    [id number] - [place] - [address] AT [facility] [address]  [address] [address]  Phone: [phone] Fax: [phone]  The doctors have asked that we provide their patients with the following 2 reminders -- prescription refills can take up to 72 hours, and a friendly reminder that in the future you can use your [facility] account to request refills: aware

## 2022-07-28 NOTE — TELEPHONE ENCOUNTER
Spoke with pt who advises that she was seen by Pulmonology and received an albuterol inhaler and nasal spray.    Her cough and sneezing have resolved.    However, she continues to have a stabbing pain to the R side of her back.    She is back to sleeping on a pillow to alleviate the pain.    Pt states that muscle relaxants do help.    She is requesting a refill of Zanaflex.    Please advise.    Thank you.

## 2022-07-28 NOTE — TELEPHONE ENCOUNTER
Spoke with pt to advise.    Verbalized understanding. States that her pharmacy has already contacted her to .

## 2022-08-08 ENCOUNTER — TELEPHONE (OUTPATIENT)
Dept: INTERNAL MEDICINE | Facility: CLINIC | Age: 87
End: 2022-08-08
Payer: MEDICARE

## 2022-08-08 DIAGNOSIS — M54.9 BACK PAIN, UNSPECIFIED BACK LOCATION, UNSPECIFIED BACK PAIN LATERALITY, UNSPECIFIED CHRONICITY: Primary | ICD-10-CM

## 2022-08-08 RX ORDER — TRAMADOL HYDROCHLORIDE 50 MG/1
50 TABLET ORAL EVERY 6 HOURS PRN
Qty: 26 TABLET | Refills: 0 | Status: SHIPPED | OUTPATIENT
Start: 2022-08-08 | End: 2022-08-12

## 2022-08-08 RX ORDER — METHOCARBAMOL 500 MG/1
TABLET, FILM COATED ORAL
Qty: 30 TABLET | Refills: 2 | Status: SHIPPED | OUTPATIENT
Start: 2022-08-08 | End: 2022-09-15 | Stop reason: SDUPTHER

## 2022-08-08 NOTE — TELEPHONE ENCOUNTER
Spoke with Franky to advise.    Verbalized understanding and agreeable to xrays tomorrow (8/9/22) for 2 pm.    Scheduled.

## 2022-08-08 NOTE — TELEPHONE ENCOUNTER
Spoke with pt to who advises that she does need a refill on Tramadol.    Also she feels that the Tizanidine is not working as well as it was previously.    Wonders if she can try ?robaxin (she believes this was given to her in the ER).    Lastly, pt is requesting to see a specialist for her back pain/spasms.    Pt called again to Request call back to discuss getting an mri       Please advise.    Thank you.

## 2022-08-08 NOTE — TELEPHONE ENCOUNTER
Does she still have Rx for tramadol? Is she using such and it is not helping or does she need new Rx

## 2022-08-08 NOTE — TELEPHONE ENCOUNTER
----- Message from Yuliana Tony sent at 8/8/2022  8:35 AM CDT -----  Contact: pt [phone]  1MEDICALADVICE     Patient is calling for Medical Advice regarding: very bad backache    How long has patient had these symptoms: last night really bad, could not sleep in her bed    Pharmacy name and phone#:  [id number] - [place] - [address] AT [facility] [address]  [address] [address]  Phone: [phone] Fax: [phone]          Would like response via mychart:  phone    Comments:

## 2022-08-08 NOTE — TELEPHONE ENCOUNTER
----- Message from Adela Jackson sent at 8/8/2022  1:26 PM CDT -----  Regarding: advice  Contact: daughter  Lakshmi [phone]  1MEDICALADVICE     Patient is calling for Medical Advice regarding: Back pain    How long has patient had these symptoms: 1 month    Pharmacy name and phone#:    Would like response via [employer]:   please call    Comments: Request call back to discuss getting an mri

## 2022-08-09 ENCOUNTER — HOSPITAL ENCOUNTER (OUTPATIENT)
Dept: RADIOLOGY | Facility: HOSPITAL | Age: 87
Discharge: HOME | End: 2022-08-09
Attending: INTERNAL MEDICINE
Payer: MEDICARE

## 2022-08-09 DIAGNOSIS — M54.9 BACK PAIN, UNSPECIFIED BACK LOCATION, UNSPECIFIED BACK PAIN LATERALITY, UNSPECIFIED CHRONICITY: ICD-10-CM

## 2022-08-09 PROCEDURE — 72070 XR THORACIC SPINE AP LATERAL: ICD-10-PCS | Mod: 26,,, | Performed by: RADIOLOGY

## 2022-08-09 PROCEDURE — 72070 X-RAY EXAM THORAC SPINE 2VWS: CPT | Mod: TC,PO

## 2022-08-09 PROCEDURE — 72100 XR LUMBAR SPINE AP AND LATERAL: ICD-10-PCS | Mod: 26,,, | Performed by: RADIOLOGY

## 2022-08-09 PROCEDURE — 72100 X-RAY EXAM L-S SPINE 2/3 VWS: CPT | Mod: 26,,, | Performed by: RADIOLOGY

## 2022-08-09 PROCEDURE — 72070 X-RAY EXAM THORAC SPINE 2VWS: CPT | Mod: 26,,, | Performed by: RADIOLOGY

## 2022-08-09 PROCEDURE — 72100 X-RAY EXAM L-S SPINE 2/3 VWS: CPT | Mod: TC,PO

## 2022-08-11 ENCOUNTER — TELEPHONE (OUTPATIENT)
Dept: CARDIOLOGY | Facility: CLINIC | Age: 87
End: 2022-08-11

## 2022-08-11 ENCOUNTER — TELEPHONE (OUTPATIENT)
Dept: INTERNAL MEDICINE | Facility: CLINIC | Age: 87
End: 2022-08-11
Payer: MEDICARE

## 2022-08-11 DIAGNOSIS — S22.050D COMPRESSION FRACTURE OF T6 VERTEBRA WITH ROUTINE HEALING, SUBSEQUENT ENCOUNTER: Primary | ICD-10-CM

## 2022-08-11 NOTE — TELEPHONE ENCOUNTER
Spoke with pt to advise of an appt for MRI on 8/12/22 for 3:15 pm.    Verbalized understanding of the MRI appt date time and location.    She understands that an email will be sent to pain mgmnt and she will receive a call from the Dept to schedule.    Referral message sent to our referral coord to contact pain mgmnt.

## 2022-08-11 NOTE — TELEPHONE ENCOUNTER
Spoke with pt regarding x-rays--appears to have recent T6 compression fx.  Will order MRI thoracic spine, refer to Pain Management.    Please process referral Pain Management and the order for MRI thoracic spine

## 2022-08-11 NOTE — TELEPHONE ENCOUNTER
With pts verbal permission I spoke with her daughter Africa and discussed results and further evaluation plans.

## 2022-08-11 NOTE — TELEPHONE ENCOUNTER
----- Message from Elsa Giron sent at 8/11/2022  1:55 PM CDT -----  Regarding: please call  The pt is calling to report that she has a compound Fx in her back and she wants to know if she can do a MRI on her back? Please call her back @ [phone]. Thanks, Elsa

## 2022-08-11 NOTE — TELEPHONE ENCOUNTER
----- Message from Sydnee Buck sent at 8/11/2022  8:50 AM CDT -----  Contact: [phone]      Type: Test Results    What test was performed?XR EOS / XR Thoracic Spine    Who ordered the test?Dr Oswald    When and where were the test performed? 08/09 [facility]    Would you like response via [employer]: Call Back ASAP please. Pt stated she would like to know what are the results and her next step.     Comments:

## 2022-08-12 ENCOUNTER — TELEPHONE (OUTPATIENT)
Dept: INTERNAL MEDICINE | Facility: CLINIC | Age: 87
End: 2022-08-12
Payer: MEDICARE

## 2022-08-12 ENCOUNTER — HOSPITAL ENCOUNTER (OUTPATIENT)
Dept: RADIOLOGY | Facility: HOSPITAL | Age: 87
Discharge: HOME | End: 2022-08-12
Attending: INTERNAL MEDICINE
Payer: MEDICARE

## 2022-08-12 DIAGNOSIS — S22.050D COMPRESSION FRACTURE OF T6 VERTEBRA WITH ROUTINE HEALING, SUBSEQUENT ENCOUNTER: ICD-10-CM

## 2022-08-12 PROCEDURE — 72146 MRI CHEST SPINE W/O DYE: CPT | Mod: 26,,, | Performed by: RADIOLOGY

## 2022-08-12 PROCEDURE — 72146 MRI CHEST SPINE W/O DYE: CPT | Mod: TC

## 2022-08-12 PROCEDURE — 72146 MRI THORACIC SPINE WITHOUT CONTRAST: ICD-10-PCS | Mod: 26,,, | Performed by: RADIOLOGY

## 2022-08-12 RX ORDER — HYDROCODONE BITARTRATE AND ACETAMINOPHEN 5; 325 MG/1; MG/1
1 TABLET ORAL EVERY 8 HOURS PRN
Qty: 20 TABLET | Refills: 0 | Status: SHIPPED | OUTPATIENT
Start: 2022-08-12 | End: 2022-08-20 | Stop reason: SDUPTHER

## 2022-08-12 NOTE — TELEPHONE ENCOUNTER
Spoke with pt regarding MRI. Also showed right 8, 9th rib fractures.  Tramadol not helping. Will provide short term norco Rx. Discussed precautions , etc.  She hs appt 8/24 with Pain  Management.

## 2022-08-17 ENCOUNTER — TELEPHONE (OUTPATIENT)
Dept: INTERNAL MEDICINE | Facility: CLINIC | Age: 87
End: 2022-08-17
Payer: MEDICARE

## 2022-08-17 NOTE — TELEPHONE ENCOUNTER
Spoke with pt who advises that she has been getting some relief from the medications prescribed for her back but still unable to sleep in her bed.  She sleeps in her lift chair.    She will see Dr. Robbins in Pain Mgmnt on 8/24/22 and advised that this is a consultation.    Verbalized understanding.

## 2022-08-17 NOTE — TELEPHONE ENCOUNTER
----- Message from Deepali Wright sent at 8/17/2022  9:59 AM CDT -----  Contact: Pt Mobile [phone]  Patient would like a call back in regards to her wanting to get clarification on what is going to happen at her pain management appointment that is schedule don 08/24/2022 please.

## 2022-08-21 NOTE — TELEPHONE ENCOUNTER
----- Message from Norma Benitez sent at 8/20/2022  8:46 AM CDT -----  Contact: [phone]  Requesting an RX refill or new RX.  Is this a refill or new RX: REFILL  RX name and strength (copy/paste from chart):  HYDROcodone-acetaminophen (NORCO) 5-325 mg per tablet  Is this a 30 day or 90 day RX: 20  Pharmacy name and phone # (copy/paste from chart):    [facility] #[id number] - [place] - [address] AT [facility] [address]  [address] [address]  Phone: [phone] Fax: [phone]

## 2022-08-22 ENCOUNTER — TELEPHONE (OUTPATIENT)
Dept: INTERNAL MEDICINE | Facility: CLINIC | Age: 87
End: 2022-08-22
Payer: MEDICARE

## 2022-08-22 RX ORDER — HYDROCODONE BITARTRATE AND ACETAMINOPHEN 5; 325 MG/1; MG/1
1 TABLET ORAL EVERY 8 HOURS PRN
Qty: 20 TABLET | Refills: 0 | Status: SHIPPED | OUTPATIENT
Start: 2022-08-22 | End: 2022-08-29 | Stop reason: SDUPTHER

## 2022-08-22 NOTE — TELEPHONE ENCOUNTER
----- Message from Vansesa Eaton sent at 8/22/2022  9:40 AM CDT -----  Contact: [phone]  Requesting an RX refill or new RX.  Is this a refill or new RX: refill  RX name and strength (copy/paste from chart):  HYDROcodone-acetaminophen (NORCO) 5-325 mg per tablet  Is this a 30 day or 90 day RX: 30  Pharmacy name and phone # (copy/paste from chart):    [facility][id number] - [place] - [address] AT [facility] [address]  [address] [place] [address]  Phone: [phone] Fax: [phone]  The doctors have asked that we provide their patients with the following 2 reminders -- prescription refills can take up to 72 hours, and a friendly reminder that in the future you can use your [facility] account to request refills: yes    Pt states she believes she may have injured another rib.

## 2022-08-24 ENCOUNTER — OFFICE VISIT (OUTPATIENT)
Dept: PAIN MEDICINE | Facility: CLINIC | Age: 87
End: 2022-08-24
Payer: MEDICARE

## 2022-08-24 VITALS
SYSTOLIC BLOOD PRESSURE: 163 MMHG | WEIGHT: 151 LBS | DIASTOLIC BLOOD PRESSURE: 76 MMHG | BODY MASS INDEX: 26.75 KG/M2 | HEART RATE: 84 BPM

## 2022-08-24 DIAGNOSIS — S22.050D COMPRESSION FRACTURE OF T6 VERTEBRA WITH ROUTINE HEALING, SUBSEQUENT ENCOUNTER: Primary | ICD-10-CM

## 2022-08-24 PROCEDURE — 1157F PR ADVANCE CARE PLAN OR EQUIV PRESENT IN MEDICAL RECORD: ICD-10-PCS | Mod: CPTII,S$GLB,, | Performed by: PAIN MEDICINE

## 2022-08-24 PROCEDURE — 99204 OFFICE O/P NEW MOD 45 MIN: CPT | Mod: S$GLB,,, | Performed by: PAIN MEDICINE

## 2022-08-24 PROCEDURE — 1125F PR PAIN SEVERITY QUANTIFIED, PAIN PRESENT: ICD-10-PCS | Mod: CPTII,S$GLB,, | Performed by: PAIN MEDICINE

## 2022-08-24 PROCEDURE — 1157F ADVNC CARE PLAN IN RCRD: CPT | Mod: CPTII,S$GLB,, | Performed by: PAIN MEDICINE

## 2022-08-24 PROCEDURE — 99999 PR PBB SHADOW E&M-EST. PATIENT-LVL III: CPT | Mod: PBBFAC,,, | Performed by: PAIN MEDICINE

## 2022-08-24 PROCEDURE — 1125F AMNT PAIN NOTED PAIN PRSNT: CPT | Mod: CPTII,S$GLB,, | Performed by: PAIN MEDICINE

## 2022-08-24 PROCEDURE — 99999 PR PBB SHADOW E&M-EST. PATIENT-LVL III: ICD-10-PCS | Mod: PBBFAC,,, | Performed by: PAIN MEDICINE

## 2022-08-24 PROCEDURE — 99204 PR OFFICE/OUTPT VISIT, NEW, LEVL IV, 45-59 MIN: ICD-10-PCS | Mod: S$GLB,,, | Performed by: PAIN MEDICINE

## 2022-08-24 NOTE — PROGRESS NOTES
[facility]    Referred by: Dr. ZOEY Oswald   Reason for referral: Compression fracture of T6 vertebra with routine healing, subsequent encounter     CC: No chief complaint on file.    Subjective:   Mine Wilkins is a 87 y.o. female who has a past medical history of Acute on chronic diastolic heart failure, After-cataract of both eyes - Left Eye, Anxiety, Aortic calcification, Aortic valve stenosis, moderate, AR (allergic rhinitis), Arthritis of facet joints at multiple vertebral levels, Cataract, Cholelithiasis, Closed displaced intertrochanteric fracture of right femur with routine healing s/p IM nail on 5/20/2018, Cystocele, Esotropia, alternating - Both Eyes, Essential hypertension, Gastroesophageal reflux disease without esophagitis, Left ventricular diastolic dysfunction with preserved systolic function, Lumbar radiculopathy, 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), Nondisplaced fracture of proximal end of humerus, Nonexudative age-related macular degeneration, bilateral, intermediate dry stage, Osteoarthritis, Overweight (BMI 25.0-29.9), Pure hypercholesterolemia, Right-sided low back pain without sciatica, Strabismus, and Urethral hypermobility. She complains of pain as described below.    Location: Back   Onset: July 2022  Radiation: n/a  Timing: constant  Current Pain Score: 5/10  Weekly Pain Range: 5-6/10  Quality: Aching  Worsened by: sitting, standing for more than 10 minutes and walking for more than 10 minutes  Improved by: heat and medications    Previous Therapies:  PT/OT: No  HEP: Previously very active; less active following compression  TENS: No  Interventions: None  Surgery: Denies spine surgery  Opioids:  Adjuvants:     Current Pain Medications:  1. norco     Assessment & Plan:  Problem List Items Addressed This Visit    None     Visit Diagnoses     Compression fracture of T6 vertebra with routine healing, subsequent encounter             8/26/22 - this is a very pleasant 87-year-old female with history of cardiac disease status post TAVR who presents complaining of midthoracic back pain of acute onset in July 2022 secondary to strong coughing and sneezing.  Patient has a compression fracture at T6, which is above the level of my comfort for performing kyphoplasty.  I believe she would be best served by referral to Dr. Monteiro of Neurosurgery for consideration of this procedure.  I discussed with her other management strategies for this condition such as bracing and physical therapy, but patient states that the idea of wearing a brace continuously for several months was very off putting and she has no intention of doing it.  Therefore, her primary preference is the kyphoplasty.  She is currently managing her pain moderately with Norco 5-325 mg tablets, which is appropriate given the underlying pathology.  There is no evidence of aberrant behavior or abuse in her history, and she may continue with this medication as needed until her procedures performed.  Even if she goes forward with the kyphoplasty, I still believe physical therapy is important, as the MRI of her thoracic spine showed substantial paravertebral soft tissue edema that would likely benefit from rehabilitative interventions.    1. Cont norco prn - ok to send refill to either me or Dr. Owsald.  No evidence of misuse or abuse.  2. OK to augment with Tylenol, not to exceed 3000 mg/day  3. Referral to NSGY b/c of level of compression fracture.  I will typically perform up to T7 or T8 but anything higher is refered to NSGY b/c the risks involved.  Also b/c there is a small degree of retropulsion associated with the fracture which must be appropriately managed to minimize risk of central canal stenosis post-op.  4. Referral to PT placed    Follow Up: as needed    Emre Robbins Jr, MD  Interventional Pain Medicine / Anesthesiology    Disclaimer: This note was partly generated using  dictation software which may occasionally result in transcription errors.    Imaging:  MRI Thoracic Spine Without Contrast 8/12/22  T6-compression fracture with substantial paravertebral soft tissue edema.    Review of Systems:  Review of Systems   Constitutional: Negative for chills and fever.   HENT: Negative for nosebleeds.    Eyes: Negative for blurred vision and pain.   Respiratory: Negative for hemoptysis.    Cardiovascular: Negative for chest pain and palpitations.   Gastrointestinal: Negative for heartburn, nausea and vomiting.   Genitourinary: Negative for dysuria and hematuria.   Musculoskeletal: Positive for back pain. Negative for myalgias.   Skin: Negative for rash.   Neurological: Negative for tingling, focal weakness, seizures and loss of consciousness.   Endo/Heme/Allergies: Does not bruise/bleed easily.   Psychiatric/Behavioral: Negative for hallucinations.       Physical Exam:  Vitals:    08/24/22 1503   BP: (!) 163/76   Pulse: 84   Weight: 68.5 kg (151 lb 0.2 oz)   PainSc:   5     General    Nursing note and vitals reviewed.  Constitutional: She is oriented to person, place, and time. She appears well-developed and well-nourished. No distress.   HENT:   Head: Normocephalic and atraumatic.   Nose: Nose normal.   Eyes: Conjunctivae and EOM are normal. Pupils are equal, round, and reactive to light. Right eye exhibits no discharge. Left eye exhibits no discharge. No scleral icterus.   Neck: No JVD present.   Cardiovascular: Intact distal pulses.    Pulmonary/Chest: Effort normal. No respiratory distress.   Abdominal: She exhibits no distension.   Neurological: She is alert and oriented to person, place, and time. Coordination normal.   Psychiatric: She has a normal mood and affect. Her behavior is normal. Judgment and thought content normal.     General Musculoskeletal Exam   Gait: normal     Back (L-Spine & T-Spine) / Neck (C-Spine) Exam     Tenderness Posterior midline palpation reveals tenderness  of the Upper T-Spine and Lower T-Spine. Right paramedian tenderness of the Upper T-Spine and Lower T-Spine. Left paramedian tenderness of the Upper T-Spine and Lower T-Spine.     Back (L-Spine & T-Spine) Range of Motion   Extension: normal   Flexion: abnormal Back flexion: exacerbates pain.   Lateral bend right: normal   Lateral bend left: normal   Rotation right: normal   Rotation left: normal     Spinal Sensation   Right Side Sensation  L-Spine Level: normal  T-Spine Level: normal  Left Side Sensation  L-Spine Level: normal  T-Spine Level: normal      Muscle Strength   Right Lower Extremity   Quadriceps:  5/5   Hamstrin/5   Gastrocsoleus:  5/5   Left Lower Extremity   Quadriceps:  5/5   Hamstrin/5   Gastrocsoleus:  5/5

## 2022-08-29 RX ORDER — HYDROCODONE BITARTRATE AND ACETAMINOPHEN 5; 325 MG/1; MG/1
1 TABLET ORAL EVERY 8 HOURS PRN
Qty: 20 TABLET | Refills: 0 | Status: SHIPPED | OUTPATIENT
Start: 2022-08-29 | End: 2022-09-06 | Stop reason: SDUPTHER

## 2022-08-29 NOTE — TELEPHONE ENCOUNTER
----- Message from Africa Germain sent at 8/29/2022  9:47 AM CDT -----  Contact: pt [phone]  Requesting an RX refill or new RX.  Is this a refill or new RX: Refill  RX name and strength: HYDROcodone-acetaminophen (NORCO) 5-325 mg per tablet  Is this a 30 day or 90 day RX: 30 day   Patient advised that in the future they can use their [facility] account to request a refill?:  yes  Pharmacy name and phone #:   [id number] - [place] - [address] AT [facility] [address]  [address] [address]  Phone: [phone] Fax: [phone]    Comments:     Thank You

## 2022-09-07 ENCOUNTER — TELEPHONE (OUTPATIENT)
Dept: NEUROSURGERY | Facility: CLINIC | Age: 87
End: 2022-09-07

## 2022-09-07 ENCOUNTER — OFFICE VISIT (OUTPATIENT)
Dept: NEUROSURGERY | Facility: CLINIC | Age: 87
End: 2022-09-07
Payer: MEDICARE

## 2022-09-07 VITALS — TEMPERATURE: 98 F | WEIGHT: 149.94 LBS | HEIGHT: 63 IN | BODY MASS INDEX: 26.57 KG/M2

## 2022-09-07 DIAGNOSIS — S22.050D COMPRESSION FRACTURE OF T6 VERTEBRA WITH ROUTINE HEALING, SUBSEQUENT ENCOUNTER: ICD-10-CM

## 2022-09-07 DIAGNOSIS — M54.14 THORACIC RADICULOPATHY: Primary | ICD-10-CM

## 2022-09-07 PROCEDURE — 1125F AMNT PAIN NOTED PAIN PRSNT: CPT | Mod: CPTII,S$GLB,, | Performed by: PHYSICIAN ASSISTANT

## 2022-09-07 PROCEDURE — 1160F PR REVIEW ALL MEDS BY PRESCRIBER/CLIN PHARMACIST DOCUMENTED: ICD-10-PCS | Mod: CPTII,S$GLB,, | Performed by: PHYSICIAN ASSISTANT

## 2022-09-07 PROCEDURE — 1125F PR PAIN SEVERITY QUANTIFIED, PAIN PRESENT: ICD-10-PCS | Mod: CPTII,S$GLB,, | Performed by: PHYSICIAN ASSISTANT

## 2022-09-07 PROCEDURE — 1159F MED LIST DOCD IN RCRD: CPT | Mod: CPTII,S$GLB,, | Performed by: PHYSICIAN ASSISTANT

## 2022-09-07 PROCEDURE — 1101F PT FALLS ASSESS-DOCD LE1/YR: CPT | Mod: CPTII,S$GLB,, | Performed by: PHYSICIAN ASSISTANT

## 2022-09-07 PROCEDURE — 1157F PR ADVANCE CARE PLAN OR EQUIV PRESENT IN MEDICAL RECORD: ICD-10-PCS | Mod: CPTII,S$GLB,, | Performed by: PHYSICIAN ASSISTANT

## 2022-09-07 PROCEDURE — 99204 OFFICE O/P NEW MOD 45 MIN: CPT | Mod: S$GLB,,, | Performed by: PHYSICIAN ASSISTANT

## 2022-09-07 PROCEDURE — 3288F FALL RISK ASSESSMENT DOCD: CPT | Mod: CPTII,S$GLB,, | Performed by: PHYSICIAN ASSISTANT

## 2022-09-07 PROCEDURE — 3288F PR FALLS RISK ASSESSMENT DOCUMENTED: ICD-10-PCS | Mod: CPTII,S$GLB,, | Performed by: PHYSICIAN ASSISTANT

## 2022-09-07 PROCEDURE — 1101F PR PT FALLS ASSESS DOC 0-1 FALLS W/OUT INJ PAST YR: ICD-10-PCS | Mod: CPTII,S$GLB,, | Performed by: PHYSICIAN ASSISTANT

## 2022-09-07 PROCEDURE — 99999 PR PBB SHADOW E&M-EST. PATIENT-LVL IV: CPT | Mod: PBBFAC,,, | Performed by: PHYSICIAN ASSISTANT

## 2022-09-07 PROCEDURE — 1157F ADVNC CARE PLAN IN RCRD: CPT | Mod: CPTII,S$GLB,, | Performed by: PHYSICIAN ASSISTANT

## 2022-09-07 PROCEDURE — 1159F PR MEDICATION LIST DOCUMENTED IN MEDICAL RECORD: ICD-10-PCS | Mod: CPTII,S$GLB,, | Performed by: PHYSICIAN ASSISTANT

## 2022-09-07 PROCEDURE — 1160F RVW MEDS BY RX/DR IN RCRD: CPT | Mod: CPTII,S$GLB,, | Performed by: PHYSICIAN ASSISTANT

## 2022-09-07 PROCEDURE — 99999 PR PBB SHADOW E&M-EST. PATIENT-LVL IV: ICD-10-PCS | Mod: PBBFAC,,, | Performed by: PHYSICIAN ASSISTANT

## 2022-09-07 PROCEDURE — 99204 PR OFFICE/OUTPT VISIT, NEW, LEVL IV, 45-59 MIN: ICD-10-PCS | Mod: S$GLB,,, | Performed by: PHYSICIAN ASSISTANT

## 2022-09-07 RX ORDER — HYDROCODONE BITARTRATE AND ACETAMINOPHEN 5; 325 MG/1; MG/1
1 TABLET ORAL EVERY 8 HOURS PRN
Qty: 20 TABLET | Refills: 0 | Status: SHIPPED | OUTPATIENT
Start: 2022-09-07 | End: 2022-09-15 | Stop reason: SDUPTHER

## 2022-09-07 NOTE — PROGRESS NOTES
Subjective:     Patient ID:  Mine Wilkins is a 87 y.o. female.    [place]    Chief Complaint:  Severe thoracic back pain    HPI    Mine Wilkins is a 87 y.o. female who presents with the above CC.  Patient referred to Dr. Monteiro from Dr. Robbins for consideration of kyphoplasty at T6.  Patient has osteoporosis.      Patient states at the end of June she was wheezing quite a bit.  Not long after she started having pain in the upper thoracic spine with radiation around both sides to underneath the breast.  The pain is constant and gets worse with walking.  She gets some relief by sitting a recliner chair.  She denies any leg pain or paresthesias.  No weakness.  She denies any arm pain or paresthesias or neck pain.  She denies any difficulty with walking or balance.      She has not been wearing a brace.    Patient has not had PT or ESIs.  No spine surgery.      Patient denies any recent accidents or trauma, no saddle anesthesias, and no bowel or bladder incontinence.    Patient denies any difficulty with balance or gait, no difficulty tying shoes or buttoning clothes, is not dropping things, does not have difficulty opening containers, and has had no change in handwriting.      Review of Systems:    Review of Systems   Constitutional:  Negative for chills, diaphoresis, fever, malaise/fatigue and weight loss.   HENT:  Negative for congestion, ear discharge, ear pain, hearing loss, nosebleeds, sinus pain, sore throat and tinnitus.    Eyes:  Negative for blurred vision, double vision, photophobia, pain, discharge and redness.   Respiratory:  Negative for cough, hemoptysis, sputum production, shortness of breath, wheezing and stridor.    Cardiovascular:  Negative for chest pain, palpitations, orthopnea, leg swelling and PND.   Gastrointestinal:  Negative for abdominal pain, blood in stool, constipation, diarrhea, heartburn, melena, nausea and vomiting.   Genitourinary:  Negative for dysuria, flank  pain, frequency, hematuria and urgency.   Musculoskeletal:  Positive for back pain and myalgias. Negative for falls, joint pain and neck pain.   Skin:  Negative for itching and rash.   Neurological:  Negative for dizziness, tingling, tremors, sensory change, speech change, seizures, loss of consciousness, weakness and headaches.   Endo/Heme/Allergies:  Negative for environmental allergies and polydipsia. Does not bruise/bleed easily.   Psychiatric/Behavioral:  Negative for depression, hallucinations, memory loss and substance abuse. The patient is not nervous/anxious and does not have insomnia.        Past Medical History:   Diagnosis Date    Acute on chronic diastolic heart failure 3/9/2022    After-cataract of both eyes - Left Eye 10/11/2012    Anxiety     Aortic calcification 8/10/2016    Aortic valve stenosis, moderate     AR (allergic rhinitis)     Arthritis of facet joints at multiple vertebral levels 1/14/2016    Seen on lumbar MRI dated 01/14/16    Cataract     Cholelithiasis     Closed displaced intertrochanteric fracture of right femur with routine healing s/p IM nail on 5/20/2018 5/19/2018    Cystocele 12/1/2013    Esotropia, alternating - Both Eyes 10/11/2012    Essential hypertension     Gastroesophageal reflux disease without esophagitis 4/13/2018    Left ventricular diastolic dysfunction with preserved systolic function 8/10/2016    Lumbar radiculopathy 1/25/2016    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    Nondisplaced fracture of proximal end of humerus 8/7/2016    Nonexudative age-related macular degeneration, bilateral, intermediate dry stage 1/14/2021    Osteoarthritis     Overweight (BMI 25.0-29.9) 4/13/2018    Pure hypercholesterolemia     Right-sided low back pain without sciatica 12/10/2015    Strabismus     Urethral hypermobility 12/1/2013     Past Surgical History:   Procedure Laterality Date    ADENOIDECTOMY      CARDIAC CATH COSURGEON N/A 3/8/2022    Procedure:  Cardiac Cath Cosurgeon;  Surgeon: Shahram Lyle MD;  Location: [facility] CATH LAB;  Service: Cardiovascular;  Laterality: N/A;    CARDIAC VALVE SURGERY      CATARACT EXTRACTION Bilateral     BOTH EYES MULTIFOCAL    COLONOSCOPY  2015    CORONARY ANGIOGRAPHY N/A 2/7/2022    Procedure: ANGIOGRAM, CORONARY ARTERY;  Surgeon: Robby Mistry MD;  Location: [facility] CATH LAB;  Service: Cardiology;  Laterality: N/A;    EYE SURGERY      FEMUR FRACTURE SURGERY Right     FRACTURE SURGERY Right     femur    HYSTERECTOMY      INTERTROCHANTERIC HIP FRACTURE SURGERY Right 05/20/2018    IM nail    LEFT HEART CATHETERIZATION Left 3/8/2022    Procedure: Left heart cath;  Surgeon: Constantin Goldberg MD;  Location: [facility] CATH LAB;  Service: Cardiology;  Laterality: Left;    LUMBAR EPIDURAL INJECTION  02/04/2016    L4-l5    PARTIAL HYSTERECTOMY      SKIN GRAFT      left foot     TONSILLECTOMY      TRANSCATHETER AORTIC VALVE REPLACEMENT (TAVR) N/A 3/8/2022    Procedure: REPLACEMENT, AORTIC VALVE, TRANSCATHETER (TAVR);  Surgeon: Constantin Goldberg MD;  Location: [facility] CATH LAB;  Service: Cardiology;  Laterality: N/A;    WRIST FRACTURE SURGERY Right 2009    YAG CAP      LEFT EYE     Current Outpatient Medications on File Prior to Visit   Medication Sig Dispense Refill    acetaminophen (TYLENOL) 325 MG tablet Take 2 tablets (650 mg total) by mouth every 6 (six) hours as needed.  0    albuterol (VENTOLIN HFA) 90 mcg/actuation inhaler Inhale 2 puffs into the lungs every 4 (four) hours as needed for Shortness of Breath (cough). Rescue 18 g 1    alendronate (FOSAMAX) 70 MG tablet TAKE 1 TABLET  EVERY 7 DAYS. HOLD UNTIL SEEN BY YOUR PRIMARY CARE PHYSICIAN 12 tablet 3    aspirin (ECOTRIN) 81 MG EC tablet Take 1 tablet (81 mg total) by mouth once daily. 90 tablet 3    azelastine (ASTELIN) 137 mcg (0.1 %) nasal spray 1 spray (137 mcg total) by Nasal route 2 (two) times daily. 30 mL 1    cetirizine (ZYRTEC) 10 MG tablet Take 10 mg by mouth once daily.       cholecalciferol, vitamin D3, 2,000 unit Tab Take by mouth. 1 Tablet Oral Every day      EScitalopram oxalate (LEXAPRO) 5 MG Tab TAKE 1 TABLET EVERY DAY 90 tablet 0    fluticasone propionate (FLONASE) 50 mcg/actuation nasal spray SHAKE LIQUID AND USE 1 SPRAY(50 MCG) IN EACH NOSTRIL EVERY EVENING 16 g 3    fluticasone propionate (FLOVENT DISKUS) 50 mcg/actuation DsDv Inhale 50 mcg into the lungs 2 (two) times a day. Controller 60 each 1    furosemide (LASIX) 20 MG tablet Take AS NEEDED, daily for 3 lb weight gain overnight or 5 lbs in 5 days. 30 tablet 0    losartan (COZAAR) 50 MG tablet TAKE 1 TABLET TWICE DAILY 180 tablet 3    methocarbamoL (ROBAXIN) 500 MG Tab 1 po bid-tid prn as a muscle relaxer 30 tablet 2    multivit-min/iron/folic/lutein (CENTRUM SILVER WOMEN ORAL) Take by mouth.      omeprazole (PRILOSEC) 40 MG capsule TAKE 1 CAPSULE EVERY MORNING  BEFORE  EATING 90 capsule 3    oxybutynin (DITROPAN-XL) 10 MG 24 hr tablet TAKE 1 TABLET EVERY DAY 90 tablet 3    pravastatin (PRAVACHOL) 20 MG tablet TAKE 1 TABLET EVERY EVENING. 90 tablet 3    predniSONE (DELTASONE) 20 MG tablet 2 po qd x 4d 8 tablet 0    HYDROcodone-acetaminophen (NORCO) 5-325 mg per tablet Take 1 tablet by mouth every 8 (eight) hours as needed for Pain. 20 tablet 0     No current facility-administered medications on file prior to visit.     Review of patient's allergies indicates:   Allergen Reactions    Ace inhibitors      Other reaction(s): cough    Codeine      Other reaction(s): Nausea     Social History     Socioeconomic History    Marital status:    Occupational History    Occupation: Retired   Tobacco Use    Smoking status: Never     Passive exposure: Never    Smokeless tobacco: Never   Substance and Sexual Activity    Alcohol use: Yes     Comment: maybe 6 drinks per year    Drug use: No    Sexual activity: Not Currently     Partners: Male   Other Topics Concern    Are you pregnant or think you may be? No    Breast-feeding No  "  Social History Narrative    . Two grown daughters     Family History   Problem Relation Age of Onset    Colon cancer Brother     Dementia Brother     Hypertension Brother     Osteoporosis Mother     Hypertension Mother     Macular degeneration Mother     Cataracts Mother     Cancer Father     No Known Problems Daughter     Hypertension Brother     Dementia Brother     No Known Problems Daughter     Hypertension Maternal Grandmother     Stroke Maternal Grandmother     Breast cancer Maternal Grandmother     Melanoma Maternal Grandfather     Anesthesia problems Neg Hx     Broken bones Neg Hx     Clotting disorder Neg Hx     Collagen disease Neg Hx     Diabetes Neg Hx     Dislocations Neg Hx     Rheumatologic disease Neg Hx     Scoliosis Neg Hx     Severe sprains Neg Hx     Ovarian cancer Neg Hx     Amblyopia Neg Hx     Blindness Neg Hx     Glaucoma Neg Hx     Retinal detachment Neg Hx     Strabismus Neg Hx     Psoriasis Neg Hx     Lupus Neg Hx     Eczema Neg Hx     Acne Neg Hx        Objective:      Vitals:    09/07/22 1045   Temp: 98 °F (36.7 °C)   Weight: 68 kg (149 lb 14.6 oz)   Height: 5' 3" (1.6 m)   PainSc:   4         Physical Exam: Exam done in the chair      General:  Mine Wilkins is well-developed, well-nourished, appears stated age, in no acute distress, alert and oriented to person, place, and time.    Pulmonary/Chest:  Respiratory effort normal  Abdominal: Exhibits no distension  Psychiatric:  Normal mood and affect.  Behavior is normal.  Judgement and thought content normal      Musculoskeletal:      Lumbar ROM:   Mild pain in lumbar flexion, extension, left lateral bending, and right lateral bending.    Lumbar Spine Inspection:  Normal with no surgical scars and no visible rashes.    Lumbar Spine Palpation:  No tenderness to low back palpation or thoracic spine palpation.    SI Joint Palpation:  No tenderness to SI Joint palpation.      Neurological:      Muscle strength against " resistance:     Right Left   Deltoid  5 / 5 5 / 5   Biceps 5 / 5 5 / 5   Triceps 5 / 5 5 / 5   Wrist flexion  5 / 5 5 / 5   Wrist extension 5 / 5 5 / 5   Finger abduction 5 / 5 5 / 5   Thumb opposition 5 / 5 5 / 5   Handgrip 5 / 5 5 / 5   Hip flexion  5 / 5 5 / 5   Hip extension 5 / 5 5 / 5   Hip abduction 5 / 5 5 / 5   Hip adduction 5/ 5 5 / 5   Knee extension  5 / 5 5 / 5   Knee flexion  5 / 5 5 / 5   Dorsiflexion  5 / 5 5 / 5   EHL  5 / 5 5 / 5   Plantar flexion  5 / 5 5 / 5   Inversion of the feet 5 / 5 5 / 5   Eversion of the feet 5 / 5 5 / 5       Reflexes:     Right Left   Triceps 2+ 2+   Biceps 2+ 2+   Brachioradialis 2+ 2+   Patellar 3+ 3+   Achilles 3+ 3+     Clonus:  Negative on the left.  Positive on the right.  Hodges: Negative bilaterally    On gross examination of the bilateral upper and lower extremities, patient has no signs of clubbing, cyanosis, or edema.     MRI thoracic spine results    Impression:     Recent osteoporotic compression fracture of the T6 vertebral body, with advanced height loss and 3 mm retropulsion of fracture fragments.  No associated spinal canal stenosis.  Mild prevertebral edema.     Chronic L2 vertebral body compression fracture.     Edema of the right T5 through T9 transverse processes favored to represent contusions versus nondisplaced fractures.     Nondisplaced fractures of the right 8th and 9th ribs.     Partially visualized left thyroid lobe nodule.  Consider further evaluation with dedicated thyroid ultrasound if clinically indicated.     This report was flagged in Epic as abnormal.     Electronically signed by resident: Carlos Meneses  Date:                                            08/12/2022  Time:                                           15:25     Electronically signed by: Joshua Schultz MD  Date:                                            08/12/2022  Time:                                           16:53    Assessment:          1. Thoracic radiculopathy    2.  Compression fracture of T6 vertebra with routine healing, subsequent encounter            Plan:          Orders Placed This Encounter    Back/Cervical Brace For Home Use       T6 compression fracture with radiculopathy     -TLSO brace when up and walking   -will discuss further with Dr. Monteiro.  Patient is anxious to have the kyphoplasty procedure done if indicated.    Follow-Up:  Follow up if symptoms worsen or fail to improve. If there are any questions prior to this, the patient was instructed to contact the office.       Becky Gifford [facility], PA-C  Neurosurgery  [facility]  09/07/2022

## 2022-09-07 NOTE — TELEPHONE ENCOUNTER
Dr. Monteiro,     Patient was referred to you from Dr. Robbins for consideration of T6 kyphoplasty since he does not do the higher thoracic spine.  She started having back pain in June after sneezing.  She has osteoporosis.  She has pain that radiates bilaterally around the chest wall to underneath the breast.  Patient has not been wearing a brace although I ordered a TLSO brace.  She is eager to have a kyphoplasty done if you feel that this is indicated.      Do want to see her soon in clinic or anything else that you recommend?    Thanks,  Becky Gifford, [facility], PA-C  Neurosurgery  [facility]  09/07/2022

## 2022-09-07 NOTE — TELEPHONE ENCOUNTER
----- Message from Adela Jackson sent at 9/7/2022  8:51 AM CDT -----  Regarding: refill  Contact: patient [phone]  Requesting an RX refill or new RX.  Is this a refill or new RX: refill  RX name and strength (HYDROcodone-acetaminophen (NORCO) 5-325 mg per tablet  Is this a 30 day or 90 day RX:   Pharmacy name and phone #  [facility] #[id number] - [place] - [address] AT [facility] [address]  [address] [address]  Phone: [phone] Fax: [phone]      The doctors have asked that we provide their patients with the following 2 reminders -- prescription refills can take up to 72 hours, and a friendly reminder that in the future you can use your [facility] account to request refills: yes              
----- Message from Poonam Oreilly sent at 9/6/2022 10:43 AM CDT -----  Contact: pt [phone]  Pt is requesting a refill on two medications: HYDROcodone-acetaminophen (NORCO) 5-325 mg per tablet 20 tablets and methocarbamoL (ROBAXIN) 500 MG Tab 30 tablets. Pt is using   [facility] #[id number] - [place] AT [facility] [address] [address]  Phone: [phone] Fax: [phone]          Thank you     
Received pt to bed intake 4, A+Ox4, ambulatory. C/O SOB, pt states she was at work and could not catch her breath. admits to J&J vaccine in March, also on birth control. Respirations even and unlabored, normal work of breathing, no accessory muscle use, speaking in full clear uninterrupted sentences. ABD is soft, non tender, non distended. Pt denies any chest pain, SOB, headache, dizziness, N/V/D, fever, chills. . 20G to RAC, Labs sent,  will continue to monitor.

## 2022-09-08 NOTE — TELEPHONE ENCOUNTER
Dr. Monteiro,    I called patient to let her know that you will schedule this and you will be calling her to explain the procedure.    Thanks!  Becky Gifford, [facility], PA-C  Neurosurgery  [facility]  09/08/2022

## 2022-09-09 ENCOUNTER — DOCUMENTATION ONLY (OUTPATIENT)
Dept: NEUROSURGERY | Facility: CLINIC | Age: 87
End: 2022-09-09
Payer: MEDICARE

## 2022-09-09 DIAGNOSIS — S22.050G COMPRESSION FRACTURE OF T6 VERTEBRA WITH DELAYED HEALING, SUBSEQUENT ENCOUNTER: Primary | ICD-10-CM

## 2022-09-09 NOTE — PROGRESS NOTES
Patient called.  She report a sneezing bout in June and following the sneezing she started having severe thoracic back pain.  She was diagnosed with a T6 compression fracture in July.  She was treated conservatively with rest, medication.  A TLSO brace has been ordered.  She reports persistent thoracic back pain, severe in intensity, no new onset of motor weakness or numbness.  Pain radiates in the T6 distribution.  She takes vitamin-D and Fosamax.  She is not taking calcium supplements.    MRI of thoracic spine shows T6 compression fracture with mild retropulsion of the posterior wall, no significant stenosis.    I think it would be reasonable to proceed with a T6 kyphoplasty to help with pain and to prevent further collapse of the vertebrae.    Patient was advised to start calcium supplements with Citracal.    Endocrinology consult being placed, consider monoclonal antibody treatment versus teriparatide.    Patient will be scheduled for a T6 kyphoplasty.I explained the natural history of the disease and all treatment options.    We have discussed the risks of surgery including death, coma, bleeding, infection, failure of surgery, CSF leak, nerve root injury, spinal cord injury, ureter injury, weakness, paralysis, peripheral neuropathy, PMMA extravasation or emboli, non-union, need for reoperation. Patient understands the risks and would like to proceed with surgery.

## 2022-09-12 ENCOUNTER — TELEPHONE (OUTPATIENT)
Dept: NEUROSURGERY | Facility: CLINIC | Age: 87
End: 2022-09-12
Payer: MEDICARE

## 2022-09-12 DIAGNOSIS — S22.050G COMPRESSION FRACTURE OF T6 VERTEBRA WITH DELAYED HEALING, SUBSEQUENT ENCOUNTER: Primary | ICD-10-CM

## 2022-09-12 DIAGNOSIS — S22.000A THORACIC COMPRESSION FRACTURE, CLOSED, INITIAL ENCOUNTER: ICD-10-CM

## 2022-09-12 DIAGNOSIS — M54.14 THORACIC RADICULOPATHY: ICD-10-CM

## 2022-09-12 DIAGNOSIS — M80.88XA OSTEOPOROTIC COMPRESSION FRACTURE OF VERTEBRA, INITIAL ENCOUNTER: ICD-10-CM

## 2022-09-12 DIAGNOSIS — M80.08XA AGE-RELATED OSTEOPOROSIS WITH CURRENT PATHOLOGICAL FRACTURE, VERTEBRA(E), INITIAL ENCOUNTER FOR FRACTURE: ICD-10-CM

## 2022-09-15 RX ORDER — HYDROCODONE BITARTRATE AND ACETAMINOPHEN 5; 325 MG/1; MG/1
1 TABLET ORAL EVERY 8 HOURS PRN
Qty: 20 TABLET | Refills: 0 | Status: SHIPPED | OUTPATIENT
Start: 2022-09-15 | End: 2023-04-05

## 2022-09-15 RX ORDER — METHOCARBAMOL 500 MG/1
TABLET, FILM COATED ORAL
Qty: 30 TABLET | Refills: 2 | Status: SHIPPED | OUTPATIENT
Start: 2022-09-15 | End: 2023-04-05

## 2022-09-15 NOTE — TELEPHONE ENCOUNTER
----- Message from Darlene Hilario sent at 9/15/2022 11:54 AM CDT -----  Contact: [phone]  Requesting an RX refill or new RX.  Is this a refill or new RX: refill  RX name and strength (copy/paste from chart):  HYDROcodone-acetaminophen (NORCO) 5-325 mg per tablet  Is this a 30 day or 90 day RX: 30 day   Pharmacy name and phone # (copy/paste from chart):    [facility] #[id number] - [place] - [address] [facility]  [address]  [place] [address]  Phone: [phone] Fax: [phone]  The doctors have asked that we provide their patients with the following 2 reminders -- prescription refills can take up to 72 hours, and a friendly reminder that in the future you can use your [facility] account to request refills:  aware           Requesting an RX refill or new RX.  Is this a refill or new RX: refill  RX name and strength (copy/paste from chart):  methocarbamoL (ROBAXIN) 500 MG Tab  Is this a 30 day or 90 day RX: 30 day   Pharmacy name and phone # (copy/paste from chart):    [facility] #[id number] - [place] - [address] [facility]  [address]  [place] [address]  Phone: [phone] Fax: [phone]  The doctors have asked that we provide their patients with the following 2 reminders -- prescription refills can take up to 72 hours, and a friendly reminder that in the future you can use your [facility] account to request refills: aware

## 2022-09-21 ENCOUNTER — ANESTHESIA EVENT (OUTPATIENT)
Dept: SURGERY | Facility: HOSPITAL | Age: 87
End: 2022-09-21
Payer: MEDICARE

## 2022-09-21 ENCOUNTER — TELEPHONE (OUTPATIENT)
Dept: NEUROSURGERY | Facility: CLINIC | Age: 87
End: 2022-09-21
Payer: MEDICARE

## 2022-09-21 ENCOUNTER — HOSPITAL ENCOUNTER (OUTPATIENT)
Dept: PREADMISSION TESTING | Facility: HOSPITAL | Age: 87
Discharge: HOME | End: 2022-09-21
Attending: NEUROLOGICAL SURGERY
Payer: MEDICARE

## 2022-09-21 ENCOUNTER — TELEPHONE (OUTPATIENT)
Dept: INTERNAL MEDICINE | Facility: CLINIC | Age: 87
End: 2022-09-21
Payer: MEDICARE

## 2022-09-21 VITALS
OXYGEN SATURATION: 97 % | SYSTOLIC BLOOD PRESSURE: 146 MMHG | DIASTOLIC BLOOD PRESSURE: 73 MMHG | TEMPERATURE: 97 F | HEIGHT: 63 IN | WEIGHT: 141.19 LBS | BODY MASS INDEX: 25.02 KG/M2 | RESPIRATION RATE: 16 BRPM | HEART RATE: 89 BPM

## 2022-09-21 RX ORDER — SCOLOPAMINE TRANSDERMAL SYSTEM 1 MG/1
1 PATCH, EXTENDED RELEASE TRANSDERMAL
Status: CANCELLED | OUTPATIENT
Start: 2022-09-21

## 2022-09-21 RX ORDER — LIDOCAINE HYDROCHLORIDE 10 MG/ML
1 INJECTION, SOLUTION EPIDURAL; INFILTRATION; INTRACAUDAL; PERINEURAL ONCE
Status: CANCELLED | OUTPATIENT
Start: 2022-09-21 | End: 2022-09-21

## 2022-09-21 RX ORDER — SODIUM CHLORIDE, SODIUM LACTATE, POTASSIUM CHLORIDE, CALCIUM CHLORIDE 600; 310; 30; 20 MG/100ML; MG/100ML; MG/100ML; MG/100ML
INJECTION, SOLUTION INTRAVENOUS CONTINUOUS
Status: CANCELLED | OUTPATIENT
Start: 2022-09-21

## 2022-09-21 NOTE — ANESTHESIA PREPROCEDURE EVALUATION
"                                                                                                             09/21/2022  Mine Wilkins is a 87 y.o., female scheduled for KYPHOPLASTY, SPINE, THORACIC - KYPHOPLASTY THOPRACIC T6 10/12/22.    Per cardiology Dr. Mistry, "    Per internal medicine Dr. Oswald, "    Past Medical History:   Diagnosis Date    Acute on chronic diastolic heart failure 3/9/2022    After-cataract of both eyes - Left Eye 10/11/2012    Anxiety     Aortic calcification 8/10/2016    Aortic valve stenosis, moderate     AR (allergic rhinitis)     Arthritis of facet joints at multiple vertebral levels 1/14/2016    Seen on lumbar MRI dated 01/14/16    Cataract     Cholelithiasis     Closed displaced intertrochanteric fracture of right femur with routine healing s/p IM nail on 5/20/2018 5/19/2018    Cystocele 12/1/2013    Esotropia, alternating - Both Eyes 10/11/2012    Essential hypertension     Gastroesophageal reflux disease without esophagitis 4/13/2018    Left ventricular diastolic dysfunction with preserved systolic function 8/10/2016    Lumbar radiculopathy 1/25/2016    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    Nondisplaced fracture of proximal end of humerus 8/7/2016    Nonexudative age-related macular degeneration, bilateral, intermediate dry stage 1/14/2021    Osteoarthritis     Overweight (BMI 25.0-29.9) 4/13/2018    Pure hypercholesterolemia     Right-sided low back pain without sciatica 12/10/2015    Strabismus     Urethral hypermobility 12/1/2013     Past Surgical History:   Procedure Laterality Date    ADENOIDECTOMY      CARDIAC CATH COSURGEON N/A 3/8/2022    Procedure: Cardiac Cath Cosurgeon;  Surgeon: Shahram Lyle MD;  [facility] CATH LAB;  Service: Cardiovascular;  Laterality: N/A;    CARDIAC VALVE SURGERY      CATARACT EXTRACTION Bilateral     BOTH EYES MULTIFOCAL    COLONOSCOPY  2015    CORONARY ANGIOGRAPHY N/A " 2/7/2022    Procedure: ANGIOGRAM, CORONARY ARTERY;  Surgeon: Robby Mistry MD;  Location: [facility] CATH LAB;  Service: Cardiology;  Laterality: N/A;    EYE SURGERY      FEMUR FRACTURE SURGERY Right     FRACTURE SURGERY Right     femur    HYSTERECTOMY      INTERTROCHANTERIC HIP FRACTURE SURGERY Right 05/20/2018    IM nail    LEFT HEART CATHETERIZATION Left 3/8/2022    Procedure: Left heart cath;  Surgeon: Constantin Goldberg MD;  Location: [facility] CATH LAB;  Service: Cardiology;  Laterality: Left;    LUMBAR EPIDURAL INJECTION  02/04/2016    L4-l5    PARTIAL HYSTERECTOMY      SKIN GRAFT      left foot     TONSILLECTOMY      TRANSCATHETER AORTIC VALVE REPLACEMENT (TAVR) N/A 3/8/2022    Procedure: REPLACEMENT, AORTIC VALVE, TRANSCATHETER (TAVR);  Surgeon: Constantin Goldberg MD;  Location: [facility] CATH LAB;  Service: Cardiology;  Laterality: N/A;    WRIST FRACTURE SURGERY Right 2009    YAG CAP      LEFT EYE       Pre-op Assessment    I have reviewed the Patient Summary Reports.     I have reviewed the Nursing Notes.    I have reviewed the Medications.   Steroids Taken In Past Year: Prednisone    Review of Systems  Anesthesia Hx:  No problems with previous Anesthesia Denies Hx of Anesthetic complications  History of prior surgery of interest to airway management or planning: heart surgery.  Denies Personal Hx of Anesthesia complications.   Social:  No Alcohol Use, Non-Smoker    Cardiovascular:   Exercise tolerance: good Hypertension, well controlled Valvular problems/Murmurs  Denies Angina. CHF PVD hyperlipidemia S/p TAVR 3/9/22   Pulmonary:  Pulmonary Normal  Denies Shortness of breath.    Hepatic/GI:   GERD, well controlled    Musculoskeletal:   Arthritis   Spine Disorders: lumbar    Neurological:   Denies TIA. Denies CVA. Neuromuscular Disease,  Denies Seizures.    Endocrine:  Endocrine Normal    Psych:   anxiety          Physical Exam  General: Well nourished and Cooperative    Airway:  Mallampati: II   Mouth  Opening: Normal  TM Distance: Normal  Tongue: Normal  Neck ROM: Normal ROM    Dental:  Intact, Caps / Implants    Chest/Lungs:  Clear to auscultation, Normal Respiratory Rate    Heart:  Rate: Normal  Rhythm: Regular Rhythm  Sounds: Normal      Lab Results   Component Value Date    WBC 7.50 09/28/2022    HGB 12.4 09/28/2022    HCT 41.1 09/28/2022     09/28/2022    CHOL 176 01/19/2022    TRIG 62 01/19/2022    HDL 59 01/19/2022    ALT 21 07/16/2022    AST 23 07/16/2022     09/28/2022    K 4.6 09/28/2022     09/28/2022    CREATININE 0.8 09/28/2022    BUN 15 09/28/2022    CO2 24 09/28/2022    TSH 2.442 01/19/2022    INR 1.0 03/08/2022    HGBA1C 5.3 05/19/2018     Results for orders placed or performed in visit on 09/28/22   IN OFFICE EKG 12-LEAD (to [place])    Collection Time: 09/28/22 10:22 AM    Narrative    Test Reason : Z01.818,    Vent. Rate : 072 BPM     Atrial Rate : 072 BPM     P-R Int : 154 ms          QRS Dur : 074 ms      QT Int : 392 ms       P-R-T Axes : 048 062 065 degrees     QTc Int : 429 ms    Normal sinus rhythm  Normal ECG  When compared with ECG of 08-MAR-2022 12:13,  T wave amplitude has decreased in Inferior leads  Confirmed by Quique Anderson MD ([id number]) on 9/28/2022 3:52:22 PM    Referred By:  [id number]           Confirmed By:Quique Anderson MD         Anesthesia Plan  Type of Anesthesia, risks & benefits discussed:    Anesthesia Type: Gen ETT  Intra-op Monitoring Plan: Standard ASA Monitors  Post Op Pain Control Plan: multimodal analgesia  Induction:  IV  ASA Score: 3  Anesthesia Plan Notes: Anesthesia consent to be signed prior to surgery      Ready For Surgery From Anesthesia Perspective.     .

## 2022-09-21 NOTE — PRE-PROCEDURE INSTRUCTIONS
Justine bunn [phone]    Allergies, medical, surgical, family and psychosocial histories reviewed with patient. Periop plan of care reviewed. Preop instructions given, including medications to take and to hold. Hibiclens soap and instructions on use given. Time allotted for questions to be addressed.  Patient verbalized understanding.

## 2022-09-21 NOTE — DISCHARGE INSTRUCTIONS
Your surgery is scheduled for 10/12/22.    Please report to [facility] on the 1st Floor at 1030 a.m.    THIS TIME IS SUBJECT TO CHANGE.  YOU WILL RECEIVE A PHONE CALL THE DAY BEFORE SURGERY BY 3:30 PM TO CONFIRM YOUR TIME OF ARRIVAL.  IF YOU HAVE NOT RECEIVED A PHONE CALL BY 3:30 PM THE DAY BEFORE YOUR SURGERY PLEASE CALL [phone]     INSTRUCTIONS IMPORTANT!!!  ¨ Do not eat or drink after 12 midnight-including water, candy, gum, & mints. OK to brush teeth.      ____  Proceed to [facility] on *** for additional testing.        ____  Do not wear makeup, including mascara.  ____  No powder, lotions or creams to surgical area.  ____  Please remove all jewelry, including piercings and leave at home.  ____  No money or valuables needed. Please leave at home.  ____  Please bring any documents given by your doctor.  ____  If going home the same day, arrange for a ride home. You will not be able to             drive if Anesthesia was used.  ____  Children under 18 years require a parent / guardian present the entire time             they are in surgery / recovery.  ____  Wear loose fitting clothing. Allow for dressings, bandages.  ____  Stop Aspirin, Ibuprofen, Motrin, Aleve, Goody's/BC powders, Excedrine and Naproxen (NSAIDS) at least 3-5 days before surgery, unless otherwise instructed by your doctor, or the nurse.   You MAY use Tylenol/acetaminophen until day of surgery.  ____  Wash the surgical area with Hibiclens the night before surgery, and again the             morning of surgery.  Be sure to rinse hibiclens off completely (if instructed by   nurse).  ____  If you take diabetic medication, do not take am of surgery unless instructed by Doctor.  ____  Call MD for temperature above 101 degrees or any other signs of infection such as Urinary (bladder) infection, Upper respiratory infection, skin boils, etc.  ____ Stop taking any Fish Oil supplement or any Vitamins that contain Vitamin E at  least 5 days prior to surgery.  ____ Do Not wear your contact lenses the day of your procedure.  You may wear your glasses.      ____Do not shave surgical site for 3 days prior to surgery.  ____ Practice Good hand washing before, during, and after procedure.      I have read or had read and explained to me, and understand the above information.  Additional comments or instructions:  For additional questions call [phone]      ANESTHESIA SIDE EFFECTS  -For the first 24 hours after surgery:  Do not drive, use heavy equipment, make important decisions, or drink alcohol  -It is normal to feel sleepy for several hours.  Rest until you are more awake.  -Have someone stay with you, if needed.  They can watch for problems and help keep you safe.  -Some possible post anesthesia side effects include: nausea and vomiting, sore throat and hoarseness, sleepiness, and dizziness.        Pre-Op Bathing Instructions    Before surgery, you can play an important role in your own health.    Because skin is not sterile, we need to be sure that your skin is as free of germs as possible. By following the instructions below, you can reduce the number of germs on your skin before surgery.    IMPORTANT: You will need to shower with a special soap called Hibiclens*, available at any pharmacy.  If you are allergic to Chlorhexidine (the antiseptic in Hibiclens), use an antibacterial soap such as Dial Soap for your preoperative shower.  You will shower with Hibiclens both the night before your surgery and the morning of your surgery.  Do not use Hibiclens on the head, face or genitals to avoid injury to those areas.    STEP #1: THE NIGHT BEFORE YOUR SURGERY     Do not shave the area of your body where your surgery will be performed.  Shower and wash your hair and body as usual with your normal soap and shampoo.  Rinse your hair and body thoroughly after you shower to remove all soap residue.  With your hand, apply one packet of Hibiclens soap to  the surgical site.   Wash the site gently for five (5) minutes. Do not scrub your skin too hard.   Do not wash with your regular soap after Hibiclens is used.  Rinse your body thoroughly.  Pat yourself dry with a clean, soft towel.  Do not use lotion, cream, or powder.  Wear clean clothes.    STEP #2: THE MORNING OF YOUR SURGERY     Repeat Step #1.    * Not to be used by people allergic to Chlorhexidine.

## 2022-09-21 NOTE — TELEPHONE ENCOUNTER
Called pt back in regards of her concerns of her surgery  being rescheduled, stated to the pt that Dr. Monteiro will not be able to do her surgery because of an emergency but she will be able to reschedule. Pt stated she wanted to speak to supervisor, Abby ramirez was given by godwin. Pt voiced understanding.

## 2022-09-21 NOTE — TELEPHONE ENCOUNTER
Pt complaining of surgery getting rescheduled. States her procedure was scheduled for 27 sep and is now 12 Oct, Informed pt  is no longer available original date. Informed her I gave her next available surgery date but there is some flexibility. Pt states she would like to make complaint, gave her supervisor phone number.

## 2022-09-21 NOTE — TELEPHONE ENCOUNTER
----- Message from Harriet Osullivan sent at 9/21/2022  1:24 PM CDT -----  Contact: [phone]  Patient called, requested a courtesy call from Dr Oswald's nurse about patient is going to have surgery to Straight disc - surgery 10/12/22, but would like to talk with nurse before scheduling a preop. Thank you

## 2022-09-21 NOTE — TELEPHONE ENCOUNTER
----- Message from [facility] sent at 9/21/2022 12:07 PM CDT -----  Type:  Needs Medical Advice    Who Called: Pt   Would the patient rather a call back or a response via [facility]? Callback   Best Call Back Number: [phone]  Additional Information: Pt requesting callback from office to see why her surgery has been rescheduled

## 2022-09-21 NOTE — TELEPHONE ENCOUNTER
LVM for the pt to return call to office to discuss her needs for a pre-op appointment before surgery

## 2022-09-22 ENCOUNTER — TELEPHONE (OUTPATIENT)
Dept: PULMONOLOGY | Facility: CLINIC | Age: 87
End: 2022-09-22
Payer: MEDICARE

## 2022-09-22 ENCOUNTER — TELEPHONE (OUTPATIENT)
Dept: INTERNAL MEDICINE | Facility: CLINIC | Age: 87
End: 2022-09-22
Payer: MEDICARE

## 2022-09-22 NOTE — TELEPHONE ENCOUNTER
----- Message from Maggi Tony sent at 9/22/2022  9:58 AM CDT -----  Contact: [phone]  Patient is returning a phone call.  Who left a message for the patient: Orlando Richardson MA  Does patient know what this is regarding:  yes   Would you like a call back, or a response through your [facility] portal?:   call back   Comments:

## 2022-09-22 NOTE — TELEPHONE ENCOUNTER
Spoke with patient informing her that I received her message in regards to scheduling an appointment for her cough. I advised the patient that the next available was October 7,2022 at 2:00 pm with Dr. Tomlinson. Patient verbalized that she understands and accepted the appointment.

## 2022-09-26 ENCOUNTER — PES CALL (OUTPATIENT)
Dept: ADMINISTRATIVE | Facility: CLINIC | Age: 87
End: 2022-09-26
Payer: MEDICARE

## 2022-09-28 ENCOUNTER — LAB VISIT (OUTPATIENT)
Dept: LAB | Facility: HOSPITAL | Age: 87
End: 2022-09-28
Attending: INTERNAL MEDICINE
Payer: MEDICARE

## 2022-09-28 ENCOUNTER — OFFICE VISIT (OUTPATIENT)
Dept: INTERNAL MEDICINE | Facility: CLINIC | Age: 87
End: 2022-09-28
Payer: MEDICARE

## 2022-09-28 VITALS
OXYGEN SATURATION: 96 % | HEART RATE: 77 BPM | BODY MASS INDEX: 25.16 KG/M2 | SYSTOLIC BLOOD PRESSURE: 124 MMHG | HEIGHT: 63 IN | WEIGHT: 142 LBS | RESPIRATION RATE: 16 BRPM | DIASTOLIC BLOOD PRESSURE: 82 MMHG | TEMPERATURE: 97 F

## 2022-09-28 DIAGNOSIS — I51.89 DIASTOLIC DYSFUNCTION: ICD-10-CM

## 2022-09-28 DIAGNOSIS — Z01.818 PREOP EXAM FOR INTERNAL MEDICINE: Primary | ICD-10-CM

## 2022-09-28 DIAGNOSIS — Z95.2 S/P TAVR (TRANSCATHETER AORTIC VALVE REPLACEMENT): Chronic | ICD-10-CM

## 2022-09-28 DIAGNOSIS — S22.050D COMPRESSION FRACTURE OF T6 VERTEBRA WITH ROUTINE HEALING, SUBSEQUENT ENCOUNTER: ICD-10-CM

## 2022-09-28 DIAGNOSIS — I10 ESSENTIAL HYPERTENSION: Chronic | ICD-10-CM

## 2022-09-28 DIAGNOSIS — Z01.818 PREOP EXAM FOR INTERNAL MEDICINE: ICD-10-CM

## 2022-09-28 LAB
ANION GAP SERPL CALC-SCNC: 8 MMOL/L (ref 8–16)
BASOPHILS # BLD AUTO: 0.07 K/UL (ref 0–0.2)
BASOPHILS NFR BLD: 0.9 % (ref 0–1.9)
BUN SERPL-MCNC: 15 MG/DL (ref 8–23)
CALCIUM SERPL-MCNC: 10.1 MG/DL (ref 8.7–10.5)
CHLORIDE SERPL-SCNC: 106 MMOL/L (ref 95–110)
CO2 SERPL-SCNC: 24 MMOL/L (ref 23–29)
CREAT SERPL-MCNC: 0.8 MG/DL (ref 0.5–1.4)
DIFFERENTIAL METHOD: ABNORMAL
EOSINOPHIL # BLD AUTO: 0.2 K/UL (ref 0–0.5)
EOSINOPHIL NFR BLD: 2.8 % (ref 0–8)
ERYTHROCYTE [DISTWIDTH] IN BLOOD BY AUTOMATED COUNT: 14.9 % (ref 11.5–14.5)
EST. GFR  (NO RACE VARIABLE): >60 ML/MIN/1.73 M^2
GLUCOSE SERPL-MCNC: 92 MG/DL (ref 70–110)
HCT VFR BLD AUTO: 41.1 % (ref 37–48.5)
HGB BLD-MCNC: 12.4 G/DL (ref 12–16)
IMM GRANULOCYTES # BLD AUTO: 0.03 K/UL (ref 0–0.04)
IMM GRANULOCYTES NFR BLD AUTO: 0.4 % (ref 0–0.5)
LYMPHOCYTES # BLD AUTO: 1.7 K/UL (ref 1–4.8)
LYMPHOCYTES NFR BLD: 22 % (ref 18–48)
MCH RBC QN AUTO: 27.3 PG (ref 27–31)
MCHC RBC AUTO-ENTMCNC: 30.2 G/DL (ref 32–36)
MCV RBC AUTO: 90 FL (ref 82–98)
MONOCYTES # BLD AUTO: 0.5 K/UL (ref 0.3–1)
MONOCYTES NFR BLD: 6.4 % (ref 4–15)
NEUTROPHILS # BLD AUTO: 5.1 K/UL (ref 1.8–7.7)
NEUTROPHILS NFR BLD: 67.5 % (ref 38–73)
NRBC BLD-RTO: 0 /100 WBC
PLATELET # BLD AUTO: 266 K/UL (ref 150–450)
PMV BLD AUTO: 12.1 FL (ref 9.2–12.9)
POTASSIUM SERPL-SCNC: 4.6 MMOL/L (ref 3.5–5.1)
RBC # BLD AUTO: 4.55 M/UL (ref 4–5.4)
SODIUM SERPL-SCNC: 138 MMOL/L (ref 136–145)
WBC # BLD AUTO: 7.5 K/UL (ref 3.9–12.7)

## 2022-09-28 PROCEDURE — 93005 ELECTROCARDIOGRAM TRACING: CPT | Mod: S$GLB,,, | Performed by: INTERNAL MEDICINE

## 2022-09-28 PROCEDURE — 99999 PR PBB SHADOW E&M-EST. PATIENT-LVL IV: CPT | Mod: PBBFAC,,, | Performed by: INTERNAL MEDICINE

## 2022-09-28 PROCEDURE — 93005 EKG 12-LEAD: ICD-10-PCS | Mod: S$GLB,,, | Performed by: INTERNAL MEDICINE

## 2022-09-28 PROCEDURE — 1159F PR MEDICATION LIST DOCUMENTED IN MEDICAL RECORD: ICD-10-PCS | Mod: CPTII,S$GLB,, | Performed by: INTERNAL MEDICINE

## 2022-09-28 PROCEDURE — 93010 ELECTROCARDIOGRAM REPORT: CPT | Mod: S$GLB,,, | Performed by: INTERNAL MEDICINE

## 2022-09-28 PROCEDURE — 99214 PR OFFICE/OUTPT VISIT, EST, LEVL IV, 30-39 MIN: ICD-10-PCS | Mod: S$GLB,,, | Performed by: INTERNAL MEDICINE

## 2022-09-28 PROCEDURE — 3288F PR FALLS RISK ASSESSMENT DOCUMENTED: ICD-10-PCS | Mod: CPTII,S$GLB,, | Performed by: INTERNAL MEDICINE

## 2022-09-28 PROCEDURE — 1157F PR ADVANCE CARE PLAN OR EQUIV PRESENT IN MEDICAL RECORD: ICD-10-PCS | Mod: CPTII,S$GLB,, | Performed by: INTERNAL MEDICINE

## 2022-09-28 PROCEDURE — 1101F PT FALLS ASSESS-DOCD LE1/YR: CPT | Mod: CPTII,S$GLB,, | Performed by: INTERNAL MEDICINE

## 2022-09-28 PROCEDURE — 36415 COLL VENOUS BLD VENIPUNCTURE: CPT | Mod: PO | Performed by: INTERNAL MEDICINE

## 2022-09-28 PROCEDURE — 3288F FALL RISK ASSESSMENT DOCD: CPT | Mod: CPTII,S$GLB,, | Performed by: INTERNAL MEDICINE

## 2022-09-28 PROCEDURE — 1159F MED LIST DOCD IN RCRD: CPT | Mod: CPTII,S$GLB,, | Performed by: INTERNAL MEDICINE

## 2022-09-28 PROCEDURE — 99214 OFFICE O/P EST MOD 30 MIN: CPT | Mod: S$GLB,,, | Performed by: INTERNAL MEDICINE

## 2022-09-28 PROCEDURE — 1157F ADVNC CARE PLAN IN RCRD: CPT | Mod: CPTII,S$GLB,, | Performed by: INTERNAL MEDICINE

## 2022-09-28 PROCEDURE — 93010 EKG 12-LEAD: ICD-10-PCS | Mod: S$GLB,,, | Performed by: INTERNAL MEDICINE

## 2022-09-28 PROCEDURE — 1101F PR PT FALLS ASSESS DOC 0-1 FALLS W/OUT INJ PAST YR: ICD-10-PCS | Mod: CPTII,S$GLB,, | Performed by: INTERNAL MEDICINE

## 2022-09-28 PROCEDURE — 99999 PR PBB SHADOW E&M-EST. PATIENT-LVL IV: ICD-10-PCS | Mod: PBBFAC,,, | Performed by: INTERNAL MEDICINE

## 2022-09-28 PROCEDURE — 80048 BASIC METABOLIC PNL TOTAL CA: CPT | Performed by: INTERNAL MEDICINE

## 2022-09-28 PROCEDURE — 85025 COMPLETE CBC W/AUTO DIFF WBC: CPT | Performed by: INTERNAL MEDICINE

## 2022-09-28 NOTE — PROGRESS NOTES
History of present illness:   [age] year lady in today for preoperative medical evaluation planned kyphoplasty of T6 scheduled for October 12, 2022 with Dr Cheung.   The patient has a history of hypertension, status post TAVR, diastolic dysfunction.  Currently reports all is generally well other than pain related to her compression fracture.  No current shortness of breath, chest pain of a cardiac nature, palpitations, syncope, claudication.  She takes her medications as directed.  Procedure is apparently being performed under general anesthesia    Current medications:  All medications are noted and reviewed.      Review of systems:  Constitutional:  No fever no chills no generalized body aches.    HEENT:  No hoarseness no dysphagia.    Cardiovascular:  Denies chest pain palpitations syncope claudication.    Respiratory:  No cough shortness of breath.    GI:  No nausea no vomiting no abdominal pain or changes in bowel habits.  No melena no hematochezia    Physical examination:  General:  Pleasant alert appropriately groomed lady no acute distress.    Vital signs:  All noted and reviewed is normal.  Blood pressure taken manually by this examiner is 124/82.    HEENT:  Normocephalic.  Neck supple no masses.  No thyromegaly.  No cervical adenopathy.    Lungs:  Clear to auscultation.    Cardiovascular: Regular rate rhythm.  1-2/6 systolic murmur.  No JVD.  No significant peripheral extremity edema.  Mental status:  Alert oriented affect mood all appropriate.    Data:  CBC metabolic profile noted.    ECG noted       Impression:   Generally healthy 87 year lady with stable hypertension, status post TAVR is a reasonable candidate for the planned kyphoplasty procedure with no significant increased cardiovascular risk.  It is reasonable to proceed with the procedure at this time

## 2022-10-06 ENCOUNTER — OFFICE VISIT (OUTPATIENT)
Dept: CARDIOLOGY | Facility: CLINIC | Age: 87
End: 2022-10-06
Payer: MEDICARE

## 2022-10-06 VITALS
SYSTOLIC BLOOD PRESSURE: 113 MMHG | WEIGHT: 142 LBS | DIASTOLIC BLOOD PRESSURE: 71 MMHG | HEART RATE: 82 BPM | HEIGHT: 63 IN | BODY MASS INDEX: 25.16 KG/M2 | RESPIRATION RATE: 20 BRPM

## 2022-10-06 DIAGNOSIS — I70.0 AORTIC ATHEROSCLEROSIS: ICD-10-CM

## 2022-10-06 DIAGNOSIS — I10 ESSENTIAL HYPERTENSION: Chronic | ICD-10-CM

## 2022-10-06 DIAGNOSIS — I73.9 PVD (PERIPHERAL VASCULAR DISEASE): Chronic | ICD-10-CM

## 2022-10-06 DIAGNOSIS — Z01.810 PREOPERATIVE CARDIOVASCULAR EXAMINATION: ICD-10-CM

## 2022-10-06 DIAGNOSIS — Z95.2 S/P TAVR (TRANSCATHETER AORTIC VALVE REPLACEMENT): Primary | Chronic | ICD-10-CM

## 2022-10-06 DIAGNOSIS — I51.89 LEFT VENTRICULAR DIASTOLIC DYSFUNCTION WITH PRESERVED SYSTOLIC FUNCTION: Chronic | ICD-10-CM

## 2022-10-06 DIAGNOSIS — E78.5 DYSLIPIDEMIA: ICD-10-CM

## 2022-10-06 PROBLEM — I50.32 CHRONIC DIASTOLIC HEART FAILURE: Status: RESOLVED | Noted: 2022-03-09 | Resolved: 2022-10-06

## 2022-10-06 PROCEDURE — 3288F PR FALLS RISK ASSESSMENT DOCUMENTED: ICD-10-PCS | Mod: CPTII,S$GLB,, | Performed by: INTERNAL MEDICINE

## 2022-10-06 PROCEDURE — 1159F MED LIST DOCD IN RCRD: CPT | Mod: CPTII,S$GLB,, | Performed by: INTERNAL MEDICINE

## 2022-10-06 PROCEDURE — 1126F AMNT PAIN NOTED NONE PRSNT: CPT | Mod: CPTII,S$GLB,, | Performed by: INTERNAL MEDICINE

## 2022-10-06 PROCEDURE — 1159F PR MEDICATION LIST DOCUMENTED IN MEDICAL RECORD: ICD-10-PCS | Mod: CPTII,S$GLB,, | Performed by: INTERNAL MEDICINE

## 2022-10-06 PROCEDURE — 1101F PT FALLS ASSESS-DOCD LE1/YR: CPT | Mod: CPTII,S$GLB,, | Performed by: INTERNAL MEDICINE

## 2022-10-06 PROCEDURE — 99999 PR PBB SHADOW E&M-EST. PATIENT-LVL IV: ICD-10-PCS | Mod: PBBFAC,,, | Performed by: INTERNAL MEDICINE

## 2022-10-06 PROCEDURE — 1157F ADVNC CARE PLAN IN RCRD: CPT | Mod: CPTII,S$GLB,, | Performed by: INTERNAL MEDICINE

## 2022-10-06 PROCEDURE — 1160F RVW MEDS BY RX/DR IN RCRD: CPT | Mod: CPTII,S$GLB,, | Performed by: INTERNAL MEDICINE

## 2022-10-06 PROCEDURE — 1157F PR ADVANCE CARE PLAN OR EQUIV PRESENT IN MEDICAL RECORD: ICD-10-PCS | Mod: CPTII,S$GLB,, | Performed by: INTERNAL MEDICINE

## 2022-10-06 PROCEDURE — 1160F PR REVIEW ALL MEDS BY PRESCRIBER/CLIN PHARMACIST DOCUMENTED: ICD-10-PCS | Mod: CPTII,S$GLB,, | Performed by: INTERNAL MEDICINE

## 2022-10-06 PROCEDURE — 99214 PR OFFICE/OUTPT VISIT, EST, LEVL IV, 30-39 MIN: ICD-10-PCS | Mod: S$GLB,,, | Performed by: INTERNAL MEDICINE

## 2022-10-06 PROCEDURE — 99214 OFFICE O/P EST MOD 30 MIN: CPT | Mod: S$GLB,,, | Performed by: INTERNAL MEDICINE

## 2022-10-06 PROCEDURE — 3288F FALL RISK ASSESSMENT DOCD: CPT | Mod: CPTII,S$GLB,, | Performed by: INTERNAL MEDICINE

## 2022-10-06 PROCEDURE — 1101F PR PT FALLS ASSESS DOC 0-1 FALLS W/OUT INJ PAST YR: ICD-10-PCS | Mod: CPTII,S$GLB,, | Performed by: INTERNAL MEDICINE

## 2022-10-06 PROCEDURE — 99999 PR PBB SHADOW E&M-EST. PATIENT-LVL IV: CPT | Mod: PBBFAC,,, | Performed by: INTERNAL MEDICINE

## 2022-10-06 PROCEDURE — 1126F PR PAIN SEVERITY QUANTIFIED, NO PAIN PRESENT: ICD-10-PCS | Mod: CPTII,S$GLB,, | Performed by: INTERNAL MEDICINE

## 2022-10-06 NOTE — PROGRESS NOTES
HISTORY:    88 yo F with a h/o aortic stenosis status post TAVR (March 2022 with a 23 mm S3), hypertension, hyperlipidemia, and DDD presenting for follow-up.     The patient first presented to me in 2021 when we identified severe AS with progressive UMANZOR. She was referred for TAVR, which she underwent successfully in March 2022. She tolerated the procedure without issue and has done well from a CV standpoint.     The patient denies any symptoms of chest pain, shortness of breath, or dyspnea on exertion. Limited in activities by back pain related to DDD. Was doing a lot prior to back injury in June. Now has anticipated back surgery later this month.     She tolerates asa 81x1, losartan 50x2, and pravastatin 20x1. She did not take her losartan this am. She has a home BP log with SBPs ranging from 120s-130s/60-70s.        PHYSICAL EXAM:    Vitals:    10/06/22 1021   BP: 113/71   Pulse: 82   Resp: 20       NAD, A+Ox3.  No jvd, no bruit.  RRR nml s1,s2. No murmurs.  CTA B no wheezes or crackles.  2+ B radial and 1+ B DP/PT. No edema.      LABS/STUDIES (imaging reviewed during clinic visit):    CBC and BMP from September 2022 demonstrate a hemoglobin of 12.4 and a creatinine 0.8 with a BUN of 15. January 2022  LDL is 104 and HDL is 59.   EKG September 2022 demonstrates sinus rhythm with no Q waves or ST changes.  TTE April 2022 demonstrates normal LV size and systolic function with EF 65%.  Normal bioprosthetic aortic valve function is noted with a peak velocity of 2.6 m/sec across the valve.  No prosthetic valve leak.  Mild MR. PASP near 30 with a CVP of 3.   Cardiac catheterization February 2022 demonstrates patent coronary arteries without evidence of obstructive epicardial disease.  CTA TAVR protocol February 2022 demonstrates aortic atherosclerosis.  Ectatic ascending aorta measuring 4.0 cm.  Calcified aortic valve.  Normal abdominal aorta an unremarkable bilateral iliacs/femorals.       ASSESSMENT & PLAN:    1. S/P  TAVR (transcatheter aortic valve replacement)    2. PVD (peripheral vascular disease)    3. Essential hypertension    4. Dyslipidemia    5. Aortic atherosclerosis    6. Left ventricular diastolic dysfunction with preserved systolic function    7. Preoperative cardiovascular examination            Severe AS s/p successful TAVR w an S3 23mm valve. Normal ECG/TTE post implant. UMANZOR resolved post TAVR. Cont asa monotherapy.      Bps controlled on losartan 50x2. LDL near 100 on pravastatin. Asc ao dilation stable. 4.0cm on latest CTA.     No CV contraindication to proceeding with anticipated back surgery. Okay to stop asa 7 days pre-op if necessary.       Robby Mistry MD

## 2022-10-06 NOTE — PROGRESS NOTES
"[facility]    SUBJECTIVE:     Chief Complaint: Cough    History of Present Illness:  Mine Wilkins is a 87 y.o. female who presents for cough follow up. Since her last visit her cough has improved but still persists. Stopped using nasal sprays. Position-dependent cough, cough comes when reclined. Uses albuterol and that provides relief.     GERD symptoms appear controlled with omeprazole. Has a tickle in back of throat when she lies back, which triggers cough. She has stopped taking all her nasal sprays.     From my previous note on 7/22/22   "2 months ago patient had a viral illness with profuse rhinorrhea and coughing. She had a number of close contacts who had a similar illness. She was afebrile during that illness, no SOB, tested negative for COVID (home testing kit). COVID vaccinated x3. Since that time she has had a non-productive cough. Sometimes resulting in emesis. Can lay flat, but cannot lay on pillow. When sitting, patient must sit up straight.     Tickling in throat. Drinks hot liquid. OTC cough suppressants provide mild relief. Tessalon pearls provided no relief. Flonase once to twice daily, without any relief. She takes omeprazole daily without any residual symptoms. Had a course of azithromycin and prednisone. Antibiotics provided no relief and prednisone provided some relief until cessation. Was given fluticasone inhaler, but only used once as it induced coughing.     Independent of all ADLs. Used to exercise 3-4 times per week. Since this cough started, she is more limited."     Smoking: never smoker.   Inhalers: Fluticasone, used only once.   Travel: nil  Occupational/environmental exposures: , retired  Pets/hobbies: nil  Recent illness: mild viral illness 2-3 months ago, resolved.   B-Symptoms: nil  Autoimmune symptoms/features: nil  Family Hx: nil      Review of patient's allergies indicates:   Allergen Reactions    Ace inhibitors      Other " reaction(s): cough    Codeine      Other reaction(s): Nausea       Past Medical History:   Diagnosis Date    Acute on chronic diastolic heart failure 3/9/2022    After-cataract of both eyes - Left Eye 10/11/2012    Anxiety     Aortic calcification 8/10/2016    Aortic valve stenosis, moderate     AR (allergic rhinitis)     Arthritis of facet joints at multiple vertebral levels 1/14/2016    Seen on lumbar MRI dated 01/14/16    Cataract     Cholelithiasis     Closed displaced intertrochanteric fracture of right femur with routine healing s/p IM nail on 5/20/2018 5/19/2018    Cystocele 12/1/2013    Esotropia, alternating - Both Eyes 10/11/2012    Essential hypertension     Gastroesophageal reflux disease without esophagitis 4/13/2018    Left ventricular diastolic dysfunction with preserved systolic function 8/10/2016    Lumbar radiculopathy 1/25/2016    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    Nondisplaced fracture of proximal end of humerus 8/7/2016    Nonexudative age-related macular degeneration, bilateral, intermediate dry stage 1/14/2021    Osteoarthritis     Overweight (BMI 25.0-29.9) 4/13/2018    Pure hypercholesterolemia     Right-sided low back pain without sciatica 12/10/2015    Strabismus     Urethral hypermobility 12/1/2013     Past Surgical History:   Procedure Laterality Date    ADENOIDECTOMY      CARDIAC CATH COSURGEON N/A 3/8/2022    Procedure: Cardiac Cath Cosurgeon;  Surgeon: Shahram Lyle MD;  Location: [facility] CATH LAB;  Service: Cardiovascular;  Laterality: N/A;    CARDIAC VALVE SURGERY      CATARACT EXTRACTION Bilateral     BOTH EYES MULTIFOCAL    COLONOSCOPY  2015    CORONARY ANGIOGRAPHY N/A 2/7/2022    Procedure: ANGIOGRAM, CORONARY ARTERY;  Surgeon: Robby Mistry MD;  Location: [facility] CATH LAB;  Service: Cardiology;  Laterality: N/A;    EYE SURGERY      FEMUR FRACTURE SURGERY Right     FRACTURE SURGERY Right     femur    HYSTERECTOMY      INTERTROCHANTERIC HIP FRACTURE  SURGERY Right 05/20/2018    IM nail    LEFT HEART CATHETERIZATION Left 3/8/2022    Procedure: Left heart cath;  Surgeon: Constantin Goldberg MD;  Location: [facility] CATH LAB;  Service: Cardiology;  Laterality: Left;    LUMBAR EPIDURAL INJECTION  02/04/2016    L4-l5    PARTIAL HYSTERECTOMY      SKIN GRAFT      left foot     TONSILLECTOMY      TRANSCATHETER AORTIC VALVE REPLACEMENT (TAVR) N/A 3/8/2022    Procedure: REPLACEMENT, AORTIC VALVE, TRANSCATHETER (TAVR);  Surgeon: Constantin Goldberg MD;  Location: [facility] CATH LAB;  Service: Cardiology;  Laterality: N/A;    WRIST FRACTURE SURGERY Right 2009    YAG CAP      LEFT EYE     Family History   Problem Relation Age of Onset    Colon cancer Brother     Dementia Brother     Hypertension Brother     Osteoporosis Mother     Hypertension Mother     Macular degeneration Mother     Cataracts Mother     Cancer Father     No Known Problems Daughter     Hypertension Brother     Dementia Brother     No Known Problems Daughter     Hypertension Maternal Grandmother     Stroke Maternal Grandmother     Breast cancer Maternal Grandmother     Melanoma Maternal Grandfather     Anesthesia problems Neg Hx     Broken bones Neg Hx     Clotting disorder Neg Hx     Collagen disease Neg Hx     Diabetes Neg Hx     Dislocations Neg Hx     Rheumatologic disease Neg Hx     Scoliosis Neg Hx     Severe sprains Neg Hx     Ovarian cancer Neg Hx     Amblyopia Neg Hx     Blindness Neg Hx     Glaucoma Neg Hx     Retinal detachment Neg Hx     Strabismus Neg Hx     Psoriasis Neg Hx     Lupus Neg Hx     Eczema Neg Hx     Acne Neg Hx      Social History     Socioeconomic History    Marital status:    Occupational History    Occupation: Retired   Tobacco Use    Smoking status: Never     Passive exposure: Never    Smokeless tobacco: Never   Substance and Sexual Activity    Alcohol use: Yes     Comment: maybe 6 drinks per year    Drug use: No    Sexual activity: Not Currently     Partners: Male   Other Topics  "Concern    Are you pregnant or think you may be? No    Breast-feeding No   Social History Narrative    . Two grown daughters       Review of Systems:  Review of Systems   Constitutional:  Negative for chills, fever, malaise/fatigue and weight loss.   Respiratory:  Positive for cough. Negative for hemoptysis, sputum production, shortness of breath and wheezing.    Cardiovascular:  Negative for chest pain, palpitations, orthopnea, claudication and leg swelling.   Gastrointestinal:  Negative for abdominal pain, heartburn and nausea.   Genitourinary:  Positive for frequency.   Musculoskeletal:  Negative for back pain, falls, joint pain, myalgias and neck pain.   Neurological: Negative.  Negative for dizziness, weakness and headaches.   Psychiatric/Behavioral:  Negative for depression and substance abuse. The patient does not have insomnia.      OBJECTIVE:     Vital Signs  Vitals:    10/07/22 1351   BP: 138/68   Pulse: 72   SpO2: 97%   Weight: 64.1 kg (141 lb 5 oz)   Height: 5' 3" (1.6 m)     Body mass index is 25.03 kg/m².    Physical Exam  Vitals and nursing note reviewed.   Constitutional:       General: She is not in acute distress.     Appearance: She is normal weight. She is not ill-appearing, toxic-appearing or diaphoretic.   HENT:      Head: Normocephalic and atraumatic.   Cardiovascular:      Rate and Rhythm: Normal rate and regular rhythm.   Pulmonary:      Effort: No respiratory distress.      Breath sounds: No stridor. No wheezing or rales.   Abdominal:      General: Abdomen is flat. There is no distension.      Palpations: Abdomen is soft.      Tenderness: There is no abdominal tenderness.   Musculoskeletal:      Right lower leg: No edema.      Left lower leg: No edema.   Skin:     General: Skin is warm and dry.      Capillary Refill: Capillary refill takes 2 to 3 seconds.      Coloration: Skin is pale. Skin is not jaundiced.   Neurological:      General: No focal deficit present.      Mental Status: " She is alert and oriented to person, place, and time.   Psychiatric:         Mood and Affect: Mood normal.         Behavior: Behavior normal.     Laboratory:  CBC  Lab Results   Component Value Date    WBC 7.50 09/28/2022    HGB 12.4 09/28/2022    HCT 41.1 09/28/2022     09/28/2022    MCV 90 09/28/2022    RDW 14.9 (H) 09/28/2022     BMP  Lab Results   Component Value Date     09/28/2022    K 4.6 09/28/2022     09/28/2022    CO2 24 09/28/2022    BUN 15 09/28/2022    CREATININE 0.8 09/28/2022    GLU 92 09/28/2022    CALCIUM 10.1 09/28/2022    MG 2.0 06/11/2018    PHOS 4.0 06/11/2018     LFTs  Lab Results   Component Value Date    PROT 7.6 07/16/2022    ALBUMIN 4.2 07/16/2022    BILITOT 0.6 07/16/2022    AST 23 07/16/2022    ALKPHOS 73 07/16/2022    ALT 21 07/16/2022       PFTs 7/21/2022       FVC (Pre) 2.59   FVC % (Pre) 117%   FVC (Post) n/a   FVC % Change n/a   FEV1 (Pre) 2.2   FEV1 % (Pre) 132%   FEV1 (Post) n/a   FEV1 % Change n/a   FEV1/FVC 0.85   TLC 4.17   TLC% 90%   RV 1.57   RV% 70.7%   DLCOunc 11.7   DLCOunc% 66.1%   DLCOcor 11.97   DLCOcor% 67.6%   VA 81.9%   .6%     Normal airflow.    Normal spirometry. Significant difficulty performing spirometry due to pain related to coughing.   PUL PFT DIFFUSION CAPACITY: Diffusion capacity is mildly decreased, but considered normal when adjusted for Hb (by LLN criteria)    Chest Imaging, My Impression:   Normal CXR 7/16/2022    ASSESSMENT/PLAN:       Problem List Items Addressed This Visit          Pulmonary    Chronic cough - Primary    Current Assessment & Plan     Likely multifactorial - initially postviral and PND, now appears to be primarily PND. GERD is well controlled.     Instructed to restart azelastine nasal spray and used saline rinse prior to use to optimze contact with mucosal surface. Certerizine if that does not work, she had taken it in the past. Will refer to ENT if this does not work and cough persists.     Will add  albuterol, q4h PRN. Discontinue if no benefit.           RTC PRN    GADIEL Tomlinson MD  [employer] [facility]

## 2022-10-07 ENCOUNTER — OFFICE VISIT (OUTPATIENT)
Dept: PULMONOLOGY | Facility: CLINIC | Age: 87
End: 2022-10-07
Payer: MEDICARE

## 2022-10-07 VITALS
HEART RATE: 72 BPM | HEIGHT: 63 IN | DIASTOLIC BLOOD PRESSURE: 68 MMHG | BODY MASS INDEX: 25.04 KG/M2 | WEIGHT: 141.31 LBS | SYSTOLIC BLOOD PRESSURE: 138 MMHG | OXYGEN SATURATION: 97 %

## 2022-10-07 DIAGNOSIS — R05.3 CHRONIC COUGH: Primary | ICD-10-CM

## 2022-10-07 PROCEDURE — 1159F PR MEDICATION LIST DOCUMENTED IN MEDICAL RECORD: ICD-10-PCS | Mod: CPTII,GC,S$GLB, | Performed by: STUDENT IN AN ORGANIZED HEALTH CARE EDUCATION/TRAINING PROGRAM

## 2022-10-07 PROCEDURE — 1101F PT FALLS ASSESS-DOCD LE1/YR: CPT | Mod: CPTII,GC,S$GLB, | Performed by: STUDENT IN AN ORGANIZED HEALTH CARE EDUCATION/TRAINING PROGRAM

## 2022-10-07 PROCEDURE — 1126F PR PAIN SEVERITY QUANTIFIED, NO PAIN PRESENT: ICD-10-PCS | Mod: CPTII,GC,S$GLB, | Performed by: STUDENT IN AN ORGANIZED HEALTH CARE EDUCATION/TRAINING PROGRAM

## 2022-10-07 PROCEDURE — 3288F PR FALLS RISK ASSESSMENT DOCUMENTED: ICD-10-PCS | Mod: CPTII,GC,S$GLB, | Performed by: STUDENT IN AN ORGANIZED HEALTH CARE EDUCATION/TRAINING PROGRAM

## 2022-10-07 PROCEDURE — 1101F PR PT FALLS ASSESS DOC 0-1 FALLS W/OUT INJ PAST YR: ICD-10-PCS | Mod: CPTII,GC,S$GLB, | Performed by: STUDENT IN AN ORGANIZED HEALTH CARE EDUCATION/TRAINING PROGRAM

## 2022-10-07 PROCEDURE — 1159F MED LIST DOCD IN RCRD: CPT | Mod: CPTII,GC,S$GLB, | Performed by: STUDENT IN AN ORGANIZED HEALTH CARE EDUCATION/TRAINING PROGRAM

## 2022-10-07 PROCEDURE — 1126F AMNT PAIN NOTED NONE PRSNT: CPT | Mod: CPTII,GC,S$GLB, | Performed by: STUDENT IN AN ORGANIZED HEALTH CARE EDUCATION/TRAINING PROGRAM

## 2022-10-07 PROCEDURE — 3288F FALL RISK ASSESSMENT DOCD: CPT | Mod: CPTII,GC,S$GLB, | Performed by: STUDENT IN AN ORGANIZED HEALTH CARE EDUCATION/TRAINING PROGRAM

## 2022-10-07 PROCEDURE — 99213 OFFICE O/P EST LOW 20 MIN: CPT | Mod: GC,S$GLB,, | Performed by: STUDENT IN AN ORGANIZED HEALTH CARE EDUCATION/TRAINING PROGRAM

## 2022-10-07 PROCEDURE — 99999 PR PBB SHADOW E&M-EST. PATIENT-LVL III: ICD-10-PCS | Mod: PBBFAC,GC,, | Performed by: STUDENT IN AN ORGANIZED HEALTH CARE EDUCATION/TRAINING PROGRAM

## 2022-10-07 PROCEDURE — 99999 PR PBB SHADOW E&M-EST. PATIENT-LVL III: CPT | Mod: PBBFAC,GC,, | Performed by: STUDENT IN AN ORGANIZED HEALTH CARE EDUCATION/TRAINING PROGRAM

## 2022-10-07 PROCEDURE — 1157F ADVNC CARE PLAN IN RCRD: CPT | Mod: CPTII,GC,S$GLB, | Performed by: STUDENT IN AN ORGANIZED HEALTH CARE EDUCATION/TRAINING PROGRAM

## 2022-10-07 PROCEDURE — 99213 PR OFFICE/OUTPT VISIT, EST, LEVL III, 20-29 MIN: ICD-10-PCS | Mod: GC,S$GLB,, | Performed by: STUDENT IN AN ORGANIZED HEALTH CARE EDUCATION/TRAINING PROGRAM

## 2022-10-07 PROCEDURE — 1157F PR ADVANCE CARE PLAN OR EQUIV PRESENT IN MEDICAL RECORD: ICD-10-PCS | Mod: CPTII,GC,S$GLB, | Performed by: STUDENT IN AN ORGANIZED HEALTH CARE EDUCATION/TRAINING PROGRAM

## 2022-10-07 NOTE — PROGRESS NOTES
Pt with chronic cough after covid 5/22 along with PND. Was on flonase and azelastine. Stopped after significant improvement and has had positional cough caused by tickle in her throat when laying flat. Will start sinus saline rinses prior to azelastin use. Hold off the flonase as patient does not feel like it ever had significant effect.     Francisco Che MD  [facility]

## 2022-10-12 ENCOUNTER — HOSPITAL ENCOUNTER (OUTPATIENT)
Facility: HOSPITAL | Age: 87
Discharge: HOME | End: 2022-10-12
Attending: NEUROLOGICAL SURGERY | Admitting: NEUROLOGICAL SURGERY
Payer: MEDICARE

## 2022-10-12 ENCOUNTER — ANESTHESIA (OUTPATIENT)
Dept: SURGERY | Facility: HOSPITAL | Age: 87
End: 2022-10-12
Payer: MEDICARE

## 2022-10-12 VITALS
HEART RATE: 79 BPM | OXYGEN SATURATION: 96 % | TEMPERATURE: 98 F | SYSTOLIC BLOOD PRESSURE: 139 MMHG | RESPIRATION RATE: 19 BRPM | DIASTOLIC BLOOD PRESSURE: 61 MMHG

## 2022-10-12 DIAGNOSIS — S22.050G COMPRESSION FRACTURE OF T6 VERTEBRA WITH DELAYED HEALING, SUBSEQUENT ENCOUNTER: Primary | ICD-10-CM

## 2022-10-12 DIAGNOSIS — S22.000A THORACIC COMPRESSION FRACTURE: ICD-10-CM

## 2022-10-12 PROCEDURE — 37000009 HC ANESTHESIA EA ADD 15 MINS: Performed by: NEUROLOGICAL SURGERY

## 2022-10-12 PROCEDURE — 71000016 HC POSTOP RECOV ADDL HR: Performed by: NEUROLOGICAL SURGERY

## 2022-10-12 PROCEDURE — 63600175 PHARM REV CODE 636 W HCPCS: Performed by: NURSE ANESTHETIST, CERTIFIED REGISTERED

## 2022-10-12 PROCEDURE — 22513 PERQ VERTEBRAL AUGMENTATION: CPT | Mod: ,,, | Performed by: NEUROLOGICAL SURGERY

## 2022-10-12 PROCEDURE — 63600175 PHARM REV CODE 636 W HCPCS: Performed by: NEUROLOGICAL SURGERY

## 2022-10-12 PROCEDURE — 25000003 PHARM REV CODE 250: Performed by: NEUROLOGICAL SURGERY

## 2022-10-12 PROCEDURE — C1726 CATH, BAL DIL, NON-VASCULAR: HCPCS | Performed by: NEUROLOGICAL SURGERY

## 2022-10-12 PROCEDURE — 36000707: Performed by: NEUROLOGICAL SURGERY

## 2022-10-12 PROCEDURE — 71000015 HC POSTOP RECOV 1ST HR: Performed by: NEUROLOGICAL SURGERY

## 2022-10-12 PROCEDURE — 27201423 OPTIME MED/SURG SUP & DEVICES STERILE SUPPLY: Performed by: NEUROLOGICAL SURGERY

## 2022-10-12 PROCEDURE — 36000706: Performed by: NEUROLOGICAL SURGERY

## 2022-10-12 PROCEDURE — 37000008 HC ANESTHESIA 1ST 15 MINUTES: Performed by: NEUROLOGICAL SURGERY

## 2022-10-12 PROCEDURE — 25000003 PHARM REV CODE 250: Performed by: NURSE PRACTITIONER

## 2022-10-12 PROCEDURE — 25000003 PHARM REV CODE 250: Performed by: NURSE ANESTHETIST, CERTIFIED REGISTERED

## 2022-10-12 PROCEDURE — 71000033 HC RECOVERY, INTIAL HOUR: Performed by: NEUROLOGICAL SURGERY

## 2022-10-12 PROCEDURE — 22513 PR PERQ VERTEBRAL AUGMENTATION UNI/BILAT THORACIC: ICD-10-PCS | Mod: ,,, | Performed by: NEUROLOGICAL SURGERY

## 2022-10-12 PROCEDURE — C1713 ANCHOR/SCREW BN/BN,TIS/BN: HCPCS | Performed by: NEUROLOGICAL SURGERY

## 2022-10-12 PROCEDURE — 63600175 PHARM REV CODE 636 W HCPCS: Performed by: NURSE PRACTITIONER

## 2022-10-12 DEVICE — IMPLANTABLE DEVICE: Type: IMPLANTABLE DEVICE | Site: BACK | Status: FUNCTIONAL

## 2022-10-12 RX ORDER — ONDANSETRON 2 MG/ML
INJECTION INTRAMUSCULAR; INTRAVENOUS
Status: DISCONTINUED | OUTPATIENT
Start: 2022-10-12 | End: 2022-10-12

## 2022-10-12 RX ORDER — FENTANYL CITRATE 50 UG/ML
INJECTION, SOLUTION INTRAMUSCULAR; INTRAVENOUS
Status: DISCONTINUED | OUTPATIENT
Start: 2022-10-12 | End: 2022-10-12

## 2022-10-12 RX ORDER — CYCLOBENZAPRINE HCL 10 MG
10 TABLET ORAL
Status: COMPLETED | OUTPATIENT
Start: 2022-10-12 | End: 2022-10-12

## 2022-10-12 RX ORDER — ONDANSETRON 2 MG/ML
4 INJECTION INTRAMUSCULAR; INTRAVENOUS DAILY PRN
Status: DISCONTINUED | OUTPATIENT
Start: 2022-10-12 | End: 2022-10-12 | Stop reason: HOSPADM

## 2022-10-12 RX ORDER — DEXAMETHASONE SODIUM PHOSPHATE 4 MG/ML
INJECTION, SOLUTION INTRA-ARTICULAR; INTRALESIONAL; INTRAMUSCULAR; INTRAVENOUS; SOFT TISSUE
Status: DISCONTINUED | OUTPATIENT
Start: 2022-10-12 | End: 2022-10-12

## 2022-10-12 RX ORDER — ROCURONIUM BROMIDE 10 MG/ML
INJECTION, SOLUTION INTRAVENOUS
Status: DISCONTINUED | OUTPATIENT
Start: 2022-10-12 | End: 2022-10-12

## 2022-10-12 RX ORDER — SODIUM CHLORIDE, SODIUM LACTATE, POTASSIUM CHLORIDE, CALCIUM CHLORIDE 600; 310; 30; 20 MG/100ML; MG/100ML; MG/100ML; MG/100ML
INJECTION, SOLUTION INTRAVENOUS CONTINUOUS
Status: DISCONTINUED | OUTPATIENT
Start: 2022-10-12 | End: 2022-10-12 | Stop reason: HOSPADM

## 2022-10-12 RX ORDER — HYDROMORPHONE HYDROCHLORIDE 2 MG/ML
0.5 INJECTION, SOLUTION INTRAMUSCULAR; INTRAVENOUS; SUBCUTANEOUS EVERY 5 MIN PRN
Status: DISCONTINUED | OUTPATIENT
Start: 2022-10-12 | End: 2022-10-12 | Stop reason: HOSPADM

## 2022-10-12 RX ORDER — SCOLOPAMINE TRANSDERMAL SYSTEM 1 MG/1
1 PATCH, EXTENDED RELEASE TRANSDERMAL
Status: DISCONTINUED | OUTPATIENT
Start: 2022-10-12 | End: 2022-10-12 | Stop reason: HOSPADM

## 2022-10-12 RX ORDER — OXYCODONE HYDROCHLORIDE 5 MG/1
5 TABLET ORAL
Status: DISCONTINUED | OUTPATIENT
Start: 2022-10-12 | End: 2022-10-12 | Stop reason: HOSPADM

## 2022-10-12 RX ORDER — LIDOCAINE HCL/PF 100 MG/5ML
SYRINGE (ML) INTRAVENOUS
Status: DISCONTINUED | OUTPATIENT
Start: 2022-10-12 | End: 2022-10-12

## 2022-10-12 RX ORDER — BUPIVACAINE HCL/EPINEPHRINE 0.5-1:200K
VIAL (ML) INJECTION
Status: DISCONTINUED | OUTPATIENT
Start: 2022-10-12 | End: 2022-10-12 | Stop reason: HOSPADM

## 2022-10-12 RX ORDER — NEOSTIGMINE METHYLSULFATE 1 MG/ML
INJECTION, SOLUTION INTRAVENOUS
Status: DISCONTINUED | OUTPATIENT
Start: 2022-10-12 | End: 2022-10-12

## 2022-10-12 RX ORDER — LIDOCAINE HYDROCHLORIDE 10 MG/ML
1 INJECTION, SOLUTION EPIDURAL; INFILTRATION; INTRACAUDAL; PERINEURAL ONCE
Status: DISCONTINUED | OUTPATIENT
Start: 2022-10-12 | End: 2022-10-12 | Stop reason: HOSPADM

## 2022-10-12 RX ORDER — CELECOXIB 100 MG/1
200 CAPSULE ORAL
Status: COMPLETED | OUTPATIENT
Start: 2022-10-12 | End: 2022-10-12

## 2022-10-12 RX ORDER — EPHEDRINE SULFATE 50 MG/ML
INJECTION, SOLUTION INTRAVENOUS
Status: DISCONTINUED | OUTPATIENT
Start: 2022-10-12 | End: 2022-10-12

## 2022-10-12 RX ORDER — PROPOFOL 10 MG/ML
VIAL (ML) INTRAVENOUS
Status: DISCONTINUED | OUTPATIENT
Start: 2022-10-12 | End: 2022-10-12

## 2022-10-12 RX ORDER — SUCCINYLCHOLINE CHLORIDE 20 MG/ML
INJECTION INTRAMUSCULAR; INTRAVENOUS
Status: DISCONTINUED | OUTPATIENT
Start: 2022-10-12 | End: 2022-10-12

## 2022-10-12 RX ORDER — CEFAZOLIN SODIUM 2 G/50ML
2 SOLUTION INTRAVENOUS ONCE
Status: COMPLETED | OUTPATIENT
Start: 2022-10-12 | End: 2022-10-12

## 2022-10-12 RX ORDER — ACETAMINOPHEN 325 MG/1
650 TABLET ORAL
Status: COMPLETED | OUTPATIENT
Start: 2022-10-12 | End: 2022-10-12

## 2022-10-12 RX ORDER — PREGABALIN 75 MG/1
75 CAPSULE ORAL
Status: COMPLETED | OUTPATIENT
Start: 2022-10-12 | End: 2022-10-12

## 2022-10-12 RX ORDER — SODIUM CHLORIDE, SODIUM LACTATE, POTASSIUM CHLORIDE, CALCIUM CHLORIDE 600; 310; 30; 20 MG/100ML; MG/100ML; MG/100ML; MG/100ML
INJECTION, SOLUTION INTRAVENOUS CONTINUOUS PRN
Status: DISCONTINUED | OUTPATIENT
Start: 2022-10-12 | End: 2022-10-12

## 2022-10-12 RX ADMIN — NEOSTIGMINE METHYLSULFATE 4 MG: 1 INJECTION INTRAVENOUS at 10:10

## 2022-10-12 RX ADMIN — EPHEDRINE SULFATE 10 MG: 50 INJECTION, SOLUTION INTRAMUSCULAR; INTRAVENOUS; SUBCUTANEOUS at 09:10

## 2022-10-12 RX ADMIN — EPHEDRINE SULFATE 10 MG: 50 INJECTION, SOLUTION INTRAMUSCULAR; INTRAVENOUS; SUBCUTANEOUS at 10:10

## 2022-10-12 RX ADMIN — CEFAZOLIN SODIUM 2 G: 2 SOLUTION INTRAVENOUS at 09:10

## 2022-10-12 RX ADMIN — SODIUM CHLORIDE, SODIUM LACTATE, POTASSIUM CHLORIDE, AND CALCIUM CHLORIDE: .6; .31; .03; .02 INJECTION, SOLUTION INTRAVENOUS at 08:10

## 2022-10-12 RX ADMIN — ROCURONIUM BROMIDE 10 MG: 10 INJECTION, SOLUTION INTRAVENOUS at 09:10

## 2022-10-12 RX ADMIN — ONDANSETRON 4 MG: 2 INJECTION, SOLUTION INTRAMUSCULAR; INTRAVENOUS at 10:10

## 2022-10-12 RX ADMIN — ROCURONIUM BROMIDE 20 MG: 10 INJECTION, SOLUTION INTRAVENOUS at 09:10

## 2022-10-12 RX ADMIN — ACETAMINOPHEN 650 MG: 325 TABLET ORAL at 08:10

## 2022-10-12 RX ADMIN — GLYCOPYRROLATE 0.4 MG: 0.2 INJECTION, SOLUTION INTRAMUSCULAR; INTRAVITREAL at 10:10

## 2022-10-12 RX ADMIN — SUCCINYLCHOLINE CHLORIDE 100 MG: 20 INJECTION, SOLUTION INTRAMUSCULAR; INTRAVENOUS at 09:10

## 2022-10-12 RX ADMIN — PREGABALIN 75 MG: 75 CAPSULE ORAL at 08:10

## 2022-10-12 RX ADMIN — CELECOXIB 200 MG: 100 CAPSULE ORAL at 08:10

## 2022-10-12 RX ADMIN — DEXAMETHASONE SODIUM PHOSPHATE 8 MG: 4 INJECTION, SOLUTION INTRA-ARTICULAR; INTRALESIONAL; INTRAMUSCULAR; INTRAVENOUS; SOFT TISSUE at 09:10

## 2022-10-12 RX ADMIN — LIDOCAINE HYDROCHLORIDE 75 MG: 20 INJECTION, SOLUTION INTRAVENOUS at 09:10

## 2022-10-12 RX ADMIN — PROPOFOL 80 MG: 10 INJECTION, EMULSION INTRAVENOUS at 09:10

## 2022-10-12 RX ADMIN — SCOPALAMINE 1 PATCH: 1 PATCH, EXTENDED RELEASE TRANSDERMAL at 08:10

## 2022-10-12 RX ADMIN — FENTANYL CITRATE 100 MCG: 50 INJECTION, SOLUTION INTRAMUSCULAR; INTRAVENOUS at 09:10

## 2022-10-12 RX ADMIN — CYCLOBENZAPRINE 10 MG: 10 TABLET, FILM COATED ORAL at 08:10

## 2022-10-12 NOTE — H&P
Patient ID:  Mine Wilkins is a 87 y.o. female.     [place]     Chief Complaint:  Severe thoracic back pain     HPI     Mine Wilkins is a 87 y.o. female who presents with the above CC.  Patient referred to Dr. Monteiro from Dr. Robbins for consideration of kyphoplasty at T6.  Patient has osteoporosis.      Patient states at the end of June she was wheezing quite a bit.  Not long after she started having pain in the upper thoracic spine with radiation around both sides to underneath the breast.  The pain is constant and gets worse with walking.  She gets some relief by sitting a recliner chair.  She denies any leg pain or paresthesias.  No weakness.  She denies any arm pain or paresthesias or neck pain.  She denies any difficulty with walking or balance.      She has not been wearing a brace.     Patient has not had PT or ESIs.  No spine surgery.       Patient denies any recent accidents or trauma, no saddle anesthesias, and no bowel or bladder incontinence.     Patient denies any difficulty with balance or gait, no difficulty tying shoes or buttoning clothes, is not dropping things, does not have difficulty opening containers, and has had no change in handwriting.   -----  Interim history    Patient called.  She report a sneezing bout in June and following the sneezing she started having severe thoracic back pain.  She was diagnosed with a T6 compression fracture in July.  She was treated conservatively with rest, medication.  A TLSO brace has been ordered.  She reports persistent thoracic back pain, severe in intensity, no new onset of motor weakness or numbness.  Pain radiates in the T6 distribution.  She takes vitamin-D and Fosamax.  She is not taking calcium supplements.     MRI of thoracic spine shows T6 compression fracture with mild retropulsion of the posterior wall, no significant stenosis.     I think it would be reasonable to proceed with a T6 kyphoplasty to help with pain and to prevent  further collapse of the vertebrae.     Patient was advised to start calcium supplements with Citracal.     Endocrinology consult being placed, consider monoclonal antibody treatment versus teriparatide.     ----  Pain has partially improved since her last visit. 3/10 but becomes more severe with activity. She has not resume PT or her usual workout exercises since June. She is limited in doing her ADLs.           Review of Systems:     Review of Systems   Constitutional:  Negative for chills, diaphoresis, fever, malaise/fatigue and weight loss.   HENT:  Negative for congestion, ear discharge, ear pain, hearing loss, nosebleeds, sinus pain, sore throat and tinnitus.    Eyes:  Negative for blurred vision, double vision, photophobia, pain, discharge and redness.   Respiratory:  Negative for cough, hemoptysis, sputum production, shortness of breath, wheezing and stridor.    Cardiovascular:  Negative for chest pain, palpitations, orthopnea, leg swelling and PND.   Gastrointestinal:  Negative for abdominal pain, blood in stool, constipation, diarrhea, heartburn, melena, nausea and vomiting.   Genitourinary:  Negative for dysuria, flank pain, frequency, hematuria and urgency.   Musculoskeletal:  Positive for back pain and myalgias. Negative for falls, joint pain and neck pain.   Skin:  Negative for itching and rash.   Neurological:  Negative for dizziness, tingling, tremors, sensory change, speech change, seizures, loss of consciousness, weakness and headaches.   Endo/Heme/Allergies:  Negative for environmental allergies and polydipsia. Does not bruise/bleed easily.   Psychiatric/Behavioral:  Negative for depression, hallucinations, memory loss and substance abuse. The patient is not nervous/anxious and does not have insomnia.               Past Medical History:   Diagnosis Date    Acute on chronic diastolic heart failure 3/9/2022    After-cataract of both eyes - Left Eye 10/11/2012    Anxiety      Aortic calcification  8/10/2016    Aortic valve stenosis, moderate      AR (allergic rhinitis)      Arthritis of facet joints at multiple vertebral levels 1/14/2016     Seen on lumbar MRI dated 01/14/16    Cataract      Cholelithiasis      Closed displaced intertrochanteric fracture of right femur with routine healing s/p IM nail on 5/20/2018 5/19/2018    Cystocele 12/1/2013    Esotropia, alternating - Both Eyes 10/11/2012    Essential hypertension      Gastroesophageal reflux disease without esophagitis 4/13/2018    Left ventricular diastolic dysfunction with preserved systolic function 8/10/2016    Lumbar radiculopathy 1/25/2016    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    Nondisplaced fracture of proximal end of humerus 8/7/2016    Nonexudative age-related macular degeneration, bilateral, intermediate dry stage 1/14/2021    Osteoarthritis      Overweight (BMI 25.0-29.9) 4/13/2018    Pure hypercholesterolemia      Right-sided low back pain without sciatica 12/10/2015    Strabismus      Urethral hypermobility 12/1/2013            Past Surgical History:   Procedure Laterality Date    ADENOIDECTOMY        CARDIAC CATH COSURGEON N/A 3/8/2022     Procedure: Cardiac Cath Cosurgeon;  Surgeon: Shahram Lyle MD;  Location: [facility] CATH LAB;  Service: Cardiovascular;  Laterality: N/A;    CARDIAC VALVE SURGERY        CATARACT EXTRACTION Bilateral       BOTH EYES MULTIFOCAL    COLONOSCOPY   2015    CORONARY ANGIOGRAPHY N/A 2/7/2022     Procedure: ANGIOGRAM, CORONARY ARTERY;  Surgeon: Robby Mistry MD;  Location: [facility] CATH LAB;  Service: Cardiology;  Laterality: N/A;    EYE SURGERY        FEMUR FRACTURE SURGERY Right      FRACTURE SURGERY Right       femur    HYSTERECTOMY        INTERTROCHANTERIC HIP FRACTURE SURGERY Right 05/20/2018     IM nail    LEFT HEART CATHETERIZATION Left 3/8/2022     Procedure: Left heart cath;  Surgeon: Constantin Goldberg MD;  Location: [facility] CATH LAB;  Service: Cardiology;  Laterality: Left;     LUMBAR EPIDURAL INJECTION   02/04/2016     L4-l5    PARTIAL HYSTERECTOMY        SKIN GRAFT         left foot     TONSILLECTOMY        TRANSCATHETER AORTIC VALVE REPLACEMENT (TAVR) N/A 3/8/2022     Procedure: REPLACEMENT, AORTIC VALVE, TRANSCATHETER (TAVR);  Surgeon: Constantin Goldberg MD;  Location: [facility] CATH LAB;  Service: Cardiology;  Laterality: N/A;    WRIST FRACTURE SURGERY Right 2009    YAG CAP         LEFT EYE             Current Outpatient Medications on File Prior to Visit   Medication Sig Dispense Refill    acetaminophen (TYLENOL) 325 MG tablet Take 2 tablets (650 mg total) by mouth every 6 (six) hours as needed.   0    albuterol (VENTOLIN HFA) 90 mcg/actuation inhaler Inhale 2 puffs into the lungs every 4 (four) hours as needed for Shortness of Breath (cough). Rescue 18 g 1    alendronate (FOSAMAX) 70 MG tablet TAKE 1 TABLET  EVERY 7 DAYS. HOLD UNTIL SEEN BY YOUR PRIMARY CARE PHYSICIAN 12 tablet 3    aspirin (ECOTRIN) 81 MG EC tablet Take 1 tablet (81 mg total) by mouth once daily. 90 tablet 3    azelastine (ASTELIN) 137 mcg (0.1 %) nasal spray 1 spray (137 mcg total) by Nasal route 2 (two) times daily. 30 mL 1    cetirizine (ZYRTEC) 10 MG tablet Take 10 mg by mouth once daily.        cholecalciferol, vitamin D3, 2,000 unit Tab Take by mouth. 1 Tablet Oral Every day        EScitalopram oxalate (LEXAPRO) 5 MG Tab TAKE 1 TABLET EVERY DAY 90 tablet 0    fluticasone propionate (FLONASE) 50 mcg/actuation nasal spray SHAKE LIQUID AND USE 1 SPRAY(50 MCG) IN EACH NOSTRIL EVERY EVENING 16 g 3    fluticasone propionate (FLOVENT DISKUS) 50 mcg/actuation DsDv Inhale 50 mcg into the lungs 2 (two) times a day. Controller 60 each 1    furosemide (LASIX) 20 MG tablet Take AS NEEDED, daily for 3 lb weight gain overnight or 5 lbs in 5 days. 30 tablet 0    losartan (COZAAR) 50 MG tablet TAKE 1 TABLET TWICE DAILY 180 tablet 3    methocarbamoL (ROBAXIN) 500 MG Tab 1 po bid-tid prn as a muscle relaxer 30 tablet 2     multivit-min/iron/folic/lutein (CENTRUM SILVER WOMEN ORAL) Take by mouth.        omeprazole (PRILOSEC) 40 MG capsule TAKE 1 CAPSULE EVERY MORNING  BEFORE  EATING 90 capsule 3    oxybutynin (DITROPAN-XL) 10 MG 24 hr tablet TAKE 1 TABLET EVERY DAY 90 tablet 3    pravastatin (PRAVACHOL) 20 MG tablet TAKE 1 TABLET EVERY EVENING. 90 tablet 3    predniSONE (DELTASONE) 20 MG tablet 2 po qd x 4d 8 tablet 0    HYDROcodone-acetaminophen (NORCO) 5-325 mg per tablet Take 1 tablet by mouth every 8 (eight) hours as needed for Pain. 20 tablet 0      No current facility-administered medications on file prior to visit.            Review of patient's allergies indicates:   Allergen Reactions    Ace inhibitors         Other reaction(s): cough    Codeine         Other reaction(s): Nausea      Social History               Socioeconomic History    Marital status:    Occupational History    Occupation: Retired   Tobacco Use    Smoking status: Never       Passive exposure: Never    Smokeless tobacco: Never   Substance and Sexual Activity    Alcohol use: Yes       Comment: maybe 6 drinks per year    Drug use: No    Sexual activity: Not Currently       Partners: Male   Other Topics Concern    Are you pregnant or think you may be? No    Breast-feeding No   Social History Narrative     . Two grown daughters               Family History   Problem Relation Age of Onset    Colon cancer Brother      Dementia Brother      Hypertension Brother      Osteoporosis Mother      Hypertension Mother      Macular degeneration Mother      Cataracts Mother      Cancer Father      No Known Problems Daughter      Hypertension Brother      Dementia Brother      No Known Problems Daughter      Hypertension Maternal Grandmother      Stroke Maternal Grandmother      Breast cancer Maternal Grandmother      Melanoma Maternal Grandfather      Anesthesia problems Neg Hx      Broken bones Neg Hx      Clotting disorder Neg Hx      Collagen disease Neg Hx    "   Diabetes Neg Hx      Dislocations Neg Hx      Rheumatologic disease Neg Hx      Scoliosis Neg Hx      Severe sprains Neg Hx      Ovarian cancer Neg Hx      Amblyopia Neg Hx      Blindness Neg Hx      Glaucoma Neg Hx      Retinal detachment Neg Hx      Strabismus Neg Hx      Psoriasis Neg Hx      Lupus Neg Hx      Eczema Neg Hx      Acne Neg Hx           Objective:          Vitals:     09/07/22 1045   Temp: 98 °F (36.7 °C)   Weight: 68 kg (149 lb 14.6 oz)   Height: 5' 3" (1.6 m)   PainSc:   4            Physical Exam: Exam done in the chair        General:  Mine Wilkins is well-developed, well-nourished, appears stated age, in no acute distress, alert and oriented to person, place, and time.     Pulmonary/Chest:  Respiratory effort normal  Abdominal: Exhibits no distension  Psychiatric:  Normal mood and affect.  Behavior is normal.  Judgement and thought content normal        Musculoskeletal:        Lumbar ROM:   Mild pain in lumbar flexion, extension, left lateral bending, and right lateral bending.     Lumbar Spine Inspection:  Normal with no surgical scars and no visible rashes.     Lumbar Spine Palpation:  No tenderness to low back palpation or thoracic spine palpation.     SI Joint Palpation:  No tenderness to SI Joint palpation.        Neurological:       Muscle strength against resistance:       Right Left   Deltoid  5 / 5 5 / 5   Biceps 5 / 5 5 / 5   Triceps 5 / 5 5 / 5   Wrist flexion  5 / 5 5 / 5   Wrist extension 5 / 5 5 / 5   Finger abduction 5 / 5 5 / 5   Thumb opposition 5 / 5 5 / 5   Handgrip 5 / 5 5 / 5   Hip flexion  5 / 5 5 / 5   Hip extension 5 / 5 5 / 5   Hip abduction 5 / 5 5 / 5   Hip adduction 5/ 5 5 / 5   Knee extension  5 / 5 5 / 5   Knee flexion  5 / 5 5 / 5   Dorsiflexion  5 / 5 5 / 5   EHL  5 / 5 5 / 5   Plantar flexion  5 / 5 5 / 5   Inversion of the feet 5 / 5 5 / 5   Eversion of the feet 5 / 5 5 / 5         Reflexes:       Right Left   Triceps 2+ 2+   Biceps 2+ 2+ "   Brachioradialis 2+ 2+   Patellar 3+ 3+   Achilles 3+ 3+      Clonus:  Negative on the left.  Positive on the right.  Hodges: Negative bilaterally     On gross examination of the bilateral upper and lower extremities, patient has no signs of clubbing, cyanosis, or edema.      MRI thoracic spine results     Impression:     Recent osteoporotic compression fracture of the T6 vertebral body, with advanced height loss and 3 mm retropulsion of fracture fragments.  No associated spinal canal stenosis.  Mild prevertebral edema.     Chronic L2 vertebral body compression fracture.     Edema of the right T5 through T9 transverse processes favored to represent contusions versus nondisplaced fractures.     Nondisplaced fractures of the right 8th and 9th ribs.     Partially visualized left thyroid lobe nodule.  Consider further evaluation with dedicated thyroid ultrasound if clinically indicated.     This report was flagged in Epic as abnormal.     Electronically signed by resident: Carlos Meneses  Date:                                            08/12/2022  Time:                                           15:25     Electronically signed by: Joshua Schultz MD  Date:                                            08/12/2022  Time:                                           16:53     Assessment:       1. Thoracic radiculopathy    2. Compression fracture of T6 vertebra with routine healing, subsequent encounter          Plan:           Orders Placed This Encounter    Back/Cervical Brace For Home Use         T6 compression fracture with radiculopathy       Patient is scheduled for a T6 kyphoplasty.I explained the natural history of the disease and all treatment options.     We have discussed the risks of surgery including death, coma, bleeding, infection, failure of surgery, CSF leak, nerve root injury, spinal cord injury, ureter injury, weakness, paralysis, peripheral neuropathy, PMMA extravasation or emboli, non-union, need for  reoperation. Patient understands the risks and would like to proceed with surgery.        Kris Monteiro MD

## 2022-10-12 NOTE — ANESTHESIA PROCEDURE NOTES
Intubation    Date/Time: 10/12/2022 9:21 AM  Performed by: Oswaldo Orozco Jr. CRNA  Authorized by: Fernando Powell MD     Intubation:     Induction:  Intravenous    Intubated:  Postinduction    Mask Ventilation:  Easy mask    Attempts:  1    Attempted By:  Student    Method of Intubation:  Video laryngoscopy    Blade:  Rizo 3    Laryngeal View Grade: Grade I - full view of cords      Difficult Airway Encountered?: No      Complications:  None    Airway Device:  Oral endotracheal tube    Airway Device Size:  7.0    Style/Cuff Inflation:  Cuffed (inflated to minimal occlusive pressure)    Tube secured:  21    Secured at:  The teeth    Placement Verified By:  Capnometry    Complicating Factors:  None    Findings Post-Intubation:  BS equal bilateral and atraumatic/condition of teeth unchanged

## 2022-10-12 NOTE — TRANSFER OF CARE
Anesthesia Transfer of Care Note    Patient: Mine Wilkins    Procedure(s) Performed: Procedure(s) (LRB):  BILATERAL T6 KYPHOPLASTY, SPINE, THORACIC (Bilateral)    Patient location: PACU    Anesthesia Type: general    Transport from OR: Transported from OR on 6-10 L/min O2 by face mask with adequate spontaneous ventilation    Post pain: adequate analgesia    Post assessment: no apparent anesthetic complications and tolerated procedure well    Post vital signs: stable    Level of consciousness: awake    Nausea/Vomiting: no nausea/vomiting    Complications: none    Transfer of care protocol was followed      Last vitals:   Visit Vitals  BP (!) 183/79   Breastfeeding No

## 2022-10-15 NOTE — OP NOTE
Date of surgery 10/12/2022     Preop diagnosis   1. T6 compression fracture  2. Osteoporosis     Postop diagnosis   Same     Surgery   1. Bilateral T6 kyphoplasty using low viscosity PMMA  2. Dual fluoroscopy       Surgeon  Kris Monteiro MD     Indication  Mine Wilkins is a 87 y.o. female who presents with the above CC.  Patient referred to Dr. Monteiro from Dr. Robbins for consideration of kyphoplasty at T6.  Patient has osteoporosis.      Patient states at the end of June she was wheezing quite a bit.  Not long after she started having pain in the upper thoracic spine with radiation around both sides to underneath the breast.  The pain is constant and gets worse with walking.  She gets some relief by sitting a recliner chair.  She denies any leg pain or paresthesias.  No weakness.  She denies any arm pain or paresthesias or neck pain.  She denies any difficulty with walking or balance.      She has not been wearing a brace.     Patient has not had PT or ESIs.  No spine surgery.       Patient denies any recent accidents or trauma, no saddle anesthesias, and no bowel or bladder incontinence.     Patient denies any difficulty with balance or gait, no difficulty tying shoes or buttoning clothes, is not dropping things, does not have difficulty opening containers, and has had no change in handwriting.   -----  Interim history     Patient called.  She report a sneezing bout in June and following the sneezing she started having severe thoracic back pain.  She was diagnosed with a T6 compression fracture in July.  She was treated conservatively with rest, medication.  A TLSO brace has been ordered.  She reports persistent thoracic back pain, severe in intensity, no new onset of motor weakness or numbness.  Pain radiates in the T6 distribution.  She takes vitamin-D and Fosamax.  She is not taking calcium supplements.     MRI of thoracic spine shows T6 compression fracture with mild retropulsion of the posterior  wall, no significant stenosis.     I think it would be reasonable to proceed with a T6 kyphoplasty to help with pain and to prevent further collapse of the vertebrae.     Patient was advised to start calcium supplements with Citracal.     Endocrinology consult being placed, consider monoclonal antibody treatment versus teriparatide.     ----  Pain has partially improved since her last visit. 3/10 but becomes more severe with activity. She has not resume PT or her usual workout exercises since June. She is limited in doing her ADLs.      Procedure  The patient was intubated under general anesthesia in position on a Marco table in prone position.  All pressure points were carefully padded.  The thoracolumbar area was prepped and draped in a typical sterile fashion.  Using AP lateral fluoroscopy we planned bilateral stab incision from T7 to the L1.  Local anesthesia with 0.5 Marcaine with epi.  After stab incisions, we cannulated the pedicles of T6 bilaterally. A balloon was insufflated to create a space in the vertebral body on the right side.   We then injected PMMA inside the Jamshidi needle.  AP lateral fluoroscopy was performed to make sure that there was no extravasation of PMMA.  Good P in the placement was confirmed at all levels.  Some extravasation of PMMA was noted in the disc space above and anteriorly.  The needle were removed and the incisions were closed with 3-0 nylon.  Blood loss was estimated at less than 10 cc.  No complication.

## 2022-10-15 NOTE — DISCHARGE SUMMARY
[facility] ([facility])  Neurosurgery  Discharge Summary      Patient Name: Mine Wilkins  MRN: [id number]  Admission Date: 10/12/2022  Hospital Length of Stay: 0 days  Discharge Date and Time: 10/12/2022 12:40 PM  Attending Physician: No att. providers found   Discharging Provider: Kris Monteiro MD  Primary Care Provider: MARVA Oswald MD     HPI:  Mine Wilkins is a 87 y.o. female who presents with the above CC.  Patient referred to Dr. Monteiro from Dr. Robbins for consideration of kyphoplasty at T6.  Patient has osteoporosis.      Patient states at the end of June she was wheezing quite a bit.  Not long after she started having pain in the upper thoracic spine with radiation around both sides to underneath the breast.  The pain is constant and gets worse with walking.  She gets some relief by sitting a recliner chair.  She denies any leg pain or paresthesias.  No weakness.  She denies any arm pain or paresthesias or neck pain.  She denies any difficulty with walking or balance.      She has not been wearing a brace.     Patient has not had PT or ESIs.  No spine surgery.       Patient denies any recent accidents or trauma, no saddle anesthesias, and no bowel or bladder incontinence.     Patient denies any difficulty with balance or gait, no difficulty tying shoes or buttoning clothes, is not dropping things, does not have difficulty opening containers, and has had no change in handwriting.   -----  Interim history     Patient called.  She report a sneezing bout in June and following the sneezing she started having severe thoracic back pain.  She was diagnosed with a T6 compression fracture in July.  She was treated conservatively with rest, medication.  A TLSO brace has been ordered.  She reports persistent thoracic back pain, severe in intensity, no new onset of motor weakness or numbness.  Pain radiates in the T6 distribution.  She takes vitamin-D and Fosamax.  She is not taking  calcium supplements.     MRI of thoracic spine shows T6 compression fracture with mild retropulsion of the posterior wall, no significant stenosis.     I think it would be reasonable to proceed with a T6 kyphoplasty to help with pain and to prevent further collapse of the vertebrae.     Patient was advised to start calcium supplements with Citracal.     Endocrinology consult being placed, consider monoclonal antibody treatment versus teriparatide.     ----  Pain has partially improved since her last visit. 3/10 but becomes more severe with activity. She has not resume PT or her usual workout exercises since June. She is limited in doing her ADLs.     Procedure(s) (LRB):  BILATERAL T6 KYPHOPLASTY, SPINE, THORACIC (Bilateral)     Hospital Course: The surgery was uncomplicated and postoperative course uneventful.  Patient was able to be discharged after voiding.      Consults: NA    Significant Diagnostic Studies: NA    Pending Diagnostic Studies:       None          There are no hospital problems to display for this patient.     Discharged Condition: good    Disposition: Home or Self Care    Follow Up:    Patient Instructions:   No discharge procedures on file.  Medications:  Reconciled Home Medications:      Medication List        ASK your doctor about these medications      acetaminophen 325 MG tablet  Commonly known as: TYLENOL  Take 2 tablets (650 mg total) by mouth every 6 (six) hours as needed.     albuterol 90 mcg/actuation inhaler  Commonly known as: VENTOLIN HFA  Inhale 2 puffs into the lungs every 4 (four) hours as needed for Shortness of Breath (cough). Rescue     alendronate 70 MG tablet  Commonly known as: FOSAMAX  TAKE 1 TABLET  EVERY 7 DAYS. HOLD UNTIL SEEN BY YOUR PRIMARY CARE PHYSICIAN     aspirin 81 MG EC tablet  Commonly known as: ECOTRIN  Take 1 tablet (81 mg total) by mouth once daily.     azelastine 137 mcg (0.1 %) nasal spray  Commonly known as: ASTELIN  1 spray (137 mcg total) by Nasal route 2  (two) times daily.     CENTRUM SILVER WOMEN ORAL  Take by mouth.     cetirizine 10 MG tablet  Commonly known as: ZYRTEC  Take 10 mg by mouth once daily.     cholecalciferol (vitamin D3) 50 mcg (2,000 unit) Tab  Commonly known as: VITAMIN D3  Take by mouth. 1 Tablet Oral Every day     EScitalopram oxalate 5 MG Tab  Commonly known as: LEXAPRO  TAKE 1 TABLET EVERY DAY     * fluticasone propionate 50 mcg/actuation nasal spray  Commonly known as: FLONASE  SHAKE LIQUID AND USE 1 SPRAY(50 MCG) IN EACH NOSTRIL EVERY EVENING     * fluticasone propionate 50 mcg/actuation Dsdv  Commonly known as: FLOVENT DISKUS  Inhale 50 mcg into the lungs 2 (two) times a day. Controller     HYDROcodone-acetaminophen 5-325 mg per tablet  Commonly known as: NORCO  Take 1 tablet by mouth every 8 (eight) hours as needed for Pain.     losartan 50 MG tablet  Commonly known as: COZAAR  TAKE 1 TABLET TWICE DAILY     methocarbamoL 500 MG Tab  Commonly known as: ROBAXIN  1 po bid-tid prn as a muscle relaxer     omeprazole 40 MG capsule  Commonly known as: PRILOSEC  TAKE 1 CAPSULE EVERY MORNING  BEFORE  EATING     oxybutynin 10 MG 24 hr tablet  Commonly known as: DITROPAN-XL  TAKE 1 TABLET EVERY DAY     pravastatin 20 MG tablet  Commonly known as: PRAVACHOL  TAKE 1 TABLET EVERY EVENING.     predniSONE 20 MG tablet  Commonly known as: DELTASONE  2 po qd x 4d           * This list has 2 medication(s) that are the same as other medications prescribed for you. Read the directions carefully, and ask your doctor or other care provider to review them with you.                  Kris Monteiro MD  Neurosurgery  [facility])

## 2022-10-17 ENCOUNTER — TELEPHONE (OUTPATIENT)
Dept: PULMONOLOGY | Facility: CLINIC | Age: 87
End: 2022-10-17
Payer: MEDICARE

## 2022-10-17 NOTE — TELEPHONE ENCOUNTER
----- Message from Leti Luther sent at 10/17/2022 10:40 AM CDT -----  Regarding: PT calling to notify doctor that she is still coughing  Contact: PT  [phone]      Patient is calling to notify doctor that she is still coughing  Please reach out to patient to advise  She states he told her would refer to ENT    Patient can be contacted @# [phone]

## 2022-10-18 ENCOUNTER — TELEPHONE (OUTPATIENT)
Dept: PULMONOLOGY | Facility: CLINIC | Age: 87
End: 2022-10-18
Payer: MEDICARE

## 2022-10-18 NOTE — TELEPHONE ENCOUNTER
----- Message from Candis Meenu sent at 10/18/2022  8:20 AM CDT -----  Contact: pt  Pt states she would like a call back as soon as possible. I did inform pt of the 24-48 hour window to have calls returned, but pt wanted another message to be sent as she is unable to sleep in her bed at night due to her coughing. She wants to speak directly to provider for more information.     Confirmed contact below:  Contact Name:Mine Wilkins  Phone Number: [phone]

## 2022-10-18 NOTE — TELEPHONE ENCOUNTER
Spoke with patient, informed her that I have received her message. Patient states that she has been experiencing a cough. Patient also states that she has been prescribed medication (Losartan) and have been advised that coughing is a side effect of the medication. Patient wants to speak with Dr Tomlinson in regards to this. I verbalized to patient that I understand and advised patient that I will forward her message to Dr Tomlinson in regards to contacting patient. Patient verbalized that she understand.

## 2022-10-19 ENCOUNTER — TELEPHONE (OUTPATIENT)
Dept: INTERNAL MEDICINE | Facility: CLINIC | Age: 87
End: 2022-10-19
Payer: MEDICARE

## 2022-10-19 ENCOUNTER — TELEPHONE (OUTPATIENT)
Dept: PULMONOLOGY | Facility: CLINIC | Age: 87
End: 2022-10-19
Payer: MEDICARE

## 2022-10-19 RX ORDER — AMLODIPINE BESYLATE 5 MG/1
5 TABLET ORAL DAILY
Qty: 30 TABLET | Refills: 11 | Status: SHIPPED | OUTPATIENT
Start: 2022-10-19 | End: 2023-08-23

## 2022-10-19 NOTE — TELEPHONE ENCOUNTER
Pt had her procedure last Wednesday(10/12). Per pt, she is still coughing and her back is still sore. Pt is wanting to know if she should change her losartan (COZAAR) 50 MG tablet. Pt states that one of the side effects of the medication is the coughing.

## 2022-10-19 NOTE — TELEPHONE ENCOUNTER
Pt advised to stop losartan and start amlodipine . Informed pt to send in bp readings / update on cough in 2 weeks . Pt verbalized understanding .

## 2022-10-19 NOTE — TELEPHONE ENCOUNTER
----- Message from Poonam Oreilly sent at 10/19/2022  2:12 PM CDT -----  Contact: pt [phone]  Pt had her procedure last Wednesday(10/12). Per pt, she is still coughing and her back is still sore. Pt is wanting to know if she should change her losartan (COZAAR) 50 MG tablet. Pt states that one of the side effects of the medication is the coughing. She would like to be advised.        Thank you

## 2022-10-19 NOTE — TELEPHONE ENCOUNTER
Spoke with patient informing her that I received her message. Patient wanted medical advice in regards to medication she is taking. I advised patient that I would send Dr. Tomlinson a message and once he advises me I will contact patient. Patient verbalized that she understands.

## 2022-10-19 NOTE — ANESTHESIA POSTPROCEDURE EVALUATION
Anesthesia Post Evaluation    Patient: Mine Wilkins    Procedure(s) Performed: Procedure(s) (LRB):  BILATERAL T6 KYPHOPLASTY, SPINE, THORACIC (Bilateral)    Final Anesthesia Type: general      Patient location during evaluation: PACU  Patient participation: Yes- Able to Participate  Level of consciousness: awake and alert  Post-procedure vital signs: reviewed and stable  Pain management: adequate  Airway patency: patent    PONV status at discharge: No PONV  Anesthetic complications: no      Cardiovascular status: blood pressure returned to baseline and hemodynamically stable  Respiratory status: unassisted  Hydration status: euvolemic  Follow-up not needed.          Vitals Value Taken Time   /61 10/12/22 1215   Temp 36.5 °C (97.7 °F) 10/12/22 1145   Pulse 79 10/12/22 1215   Resp 19 10/12/22 1215   SpO2 96 % 10/12/22 1215         Event Time   Out of Recovery 11:20:16         Pain/Dorene Score: No data recorded

## 2022-10-20 ENCOUNTER — TELEPHONE (OUTPATIENT)
Dept: NEUROSURGERY | Facility: CLINIC | Age: 87
End: 2022-10-20
Payer: MEDICARE

## 2022-10-20 ENCOUNTER — TELEPHONE (OUTPATIENT)
Dept: INTERNAL MEDICINE | Facility: CLINIC | Age: 87
End: 2022-10-20
Payer: MEDICARE

## 2022-10-20 DIAGNOSIS — R05.3 PERSISTENT COUGH: Primary | ICD-10-CM

## 2022-10-20 NOTE — TELEPHONE ENCOUNTER
----- Message from Guerita Beard MA sent at 10/19/2022  9:37 PM CDT -----  Regarding: FW: procedure    ----- Message -----  From: Aleida Martino  Sent: 10/19/2022   2:22 PM CDT  To: [facility] Staff  Subject: procedure                                        Name of Who is Calling: PERNELL FOX [[id number]]      What is the request in detail:  #patient is requesting a call to discuss how she is feeling since her procedure       Can the clinic reply by [facility]: no      What Number to Call Back if not in [facility]: [phone]

## 2022-10-20 NOTE — TELEPHONE ENCOUNTER
Pt  states she wanted to inform  she still has some post-op discomfort that is exacerbated by coughing. Pt states her current BP medication side effects cause coughing and she has spoken with her pulmonologist to have medication changed. States she will f/u if needed.

## 2022-10-21 NOTE — TELEPHONE ENCOUNTER
Called patient. She will make ENT appointment for follow up after Thanksgiving. She is having improvement in her symptoms with nasal spray and saline rinse. All questions and concerns addressed.     GADIEL Tomlinson MD  [facility]

## 2022-10-28 ENCOUNTER — TELEPHONE (OUTPATIENT)
Dept: NEUROSURGERY | Facility: CLINIC | Age: 87
End: 2022-10-28
Payer: MEDICARE

## 2022-10-28 DIAGNOSIS — Z98.890 S/P KYPHOPLASTY: Primary | ICD-10-CM

## 2022-11-01 ENCOUNTER — HOSPITAL ENCOUNTER (OUTPATIENT)
Dept: RADIOLOGY | Facility: HOSPITAL | Age: 87
Discharge: HOME | End: 2022-11-01
Attending: PHYSICIAN ASSISTANT
Payer: MEDICARE

## 2022-11-01 ENCOUNTER — OFFICE VISIT (OUTPATIENT)
Dept: NEUROSURGERY | Facility: CLINIC | Age: 87
End: 2022-11-01
Payer: MEDICARE

## 2022-11-01 VITALS
BODY MASS INDEX: 25.04 KG/M2 | HEART RATE: 83 BPM | SYSTOLIC BLOOD PRESSURE: 127 MMHG | DIASTOLIC BLOOD PRESSURE: 68 MMHG | HEIGHT: 63 IN | WEIGHT: 141.31 LBS

## 2022-11-01 DIAGNOSIS — Z98.890 S/P KYPHOPLASTY: ICD-10-CM

## 2022-11-01 DIAGNOSIS — S22.050D COMPRESSION FRACTURE OF T6 VERTEBRA WITH ROUTINE HEALING, SUBSEQUENT ENCOUNTER: Primary | ICD-10-CM

## 2022-11-01 PROCEDURE — 99999 PR PBB SHADOW E&M-EST. PATIENT-LVL IV: ICD-10-PCS | Mod: PBBFAC,,, | Performed by: PHYSICIAN ASSISTANT

## 2022-11-01 PROCEDURE — 99024 PR POST-OP FOLLOW-UP VISIT: ICD-10-PCS | Mod: S$GLB,,, | Performed by: PHYSICIAN ASSISTANT

## 2022-11-01 PROCEDURE — 3288F PR FALLS RISK ASSESSMENT DOCUMENTED: ICD-10-PCS | Mod: CPTII,S$GLB,, | Performed by: PHYSICIAN ASSISTANT

## 2022-11-01 PROCEDURE — 99024 POSTOP FOLLOW-UP VISIT: CPT | Mod: S$GLB,,, | Performed by: PHYSICIAN ASSISTANT

## 2022-11-01 PROCEDURE — 99999 PR PBB SHADOW E&M-EST. PATIENT-LVL IV: CPT | Mod: PBBFAC,,, | Performed by: PHYSICIAN ASSISTANT

## 2022-11-01 PROCEDURE — 1159F MED LIST DOCD IN RCRD: CPT | Mod: CPTII,S$GLB,, | Performed by: PHYSICIAN ASSISTANT

## 2022-11-01 PROCEDURE — 72070 X-RAY EXAM THORAC SPINE 2VWS: CPT | Mod: TC,FY

## 2022-11-01 PROCEDURE — 1160F PR REVIEW ALL MEDS BY PRESCRIBER/CLIN PHARMACIST DOCUMENTED: ICD-10-PCS | Mod: CPTII,S$GLB,, | Performed by: PHYSICIAN ASSISTANT

## 2022-11-01 PROCEDURE — 1125F PR PAIN SEVERITY QUANTIFIED, PAIN PRESENT: ICD-10-PCS | Mod: CPTII,S$GLB,, | Performed by: PHYSICIAN ASSISTANT

## 2022-11-01 PROCEDURE — 72070 X-RAY EXAM THORAC SPINE 2VWS: CPT | Mod: 26,,, | Performed by: RADIOLOGY

## 2022-11-01 PROCEDURE — 1157F PR ADVANCE CARE PLAN OR EQUIV PRESENT IN MEDICAL RECORD: ICD-10-PCS | Mod: CPTII,S$GLB,, | Performed by: PHYSICIAN ASSISTANT

## 2022-11-01 PROCEDURE — 1157F ADVNC CARE PLAN IN RCRD: CPT | Mod: CPTII,S$GLB,, | Performed by: PHYSICIAN ASSISTANT

## 2022-11-01 PROCEDURE — 3288F FALL RISK ASSESSMENT DOCD: CPT | Mod: CPTII,S$GLB,, | Performed by: PHYSICIAN ASSISTANT

## 2022-11-01 PROCEDURE — 1159F PR MEDICATION LIST DOCUMENTED IN MEDICAL RECORD: ICD-10-PCS | Mod: CPTII,S$GLB,, | Performed by: PHYSICIAN ASSISTANT

## 2022-11-01 PROCEDURE — 1101F PT FALLS ASSESS-DOCD LE1/YR: CPT | Mod: CPTII,S$GLB,, | Performed by: PHYSICIAN ASSISTANT

## 2022-11-01 PROCEDURE — 1125F AMNT PAIN NOTED PAIN PRSNT: CPT | Mod: CPTII,S$GLB,, | Performed by: PHYSICIAN ASSISTANT

## 2022-11-01 PROCEDURE — 72070 XR THORACIC SPINE AP LATERAL: ICD-10-PCS | Mod: 26,,, | Performed by: RADIOLOGY

## 2022-11-01 PROCEDURE — 1160F RVW MEDS BY RX/DR IN RCRD: CPT | Mod: CPTII,S$GLB,, | Performed by: PHYSICIAN ASSISTANT

## 2022-11-01 PROCEDURE — 1101F PR PT FALLS ASSESS DOC 0-1 FALLS W/OUT INJ PAST YR: ICD-10-PCS | Mod: CPTII,S$GLB,, | Performed by: PHYSICIAN ASSISTANT

## 2022-11-01 NOTE — PROGRESS NOTES
"Postoperative Wound Check:    Date of surgery 10/12/2022     Preop diagnosis   1. T6 compression fracture  2. Osteoporosis     Postop diagnosis   Same     Surgery   1. Bilateral T6 kyphoplasty using low viscosity PMMA  2. Dual fluoroscopy       Surgeon  Kris Monteiro MD      HPI:      Overall she is doing much better.  She has some pain in the right shoulder blade area.  No pain that wraps around her chest wall.  She is getting around okay.  She is using a cane.  She is not taking any medication for pain.    Patient denies any problems with the incisions.  No redness, swelling, or drainage, and no fever, chills, or sweats.      Physical Exam:    Vitals:    11/01/22 1541   BP: 127/68   Pulse: 83   Weight: 64.1 kg (141 lb 5 oz)   Height: 5' 3" (1.6 m)   PainSc:   1   PainLoc: Back         Incision:  Wound edges are approximated.  No redness, swelling, or drainage.      Diagnosis:     1. Compression fracture of T6 vertebra with routine healing, subsequent encounter              Plan:       Orders Placed This Encounter    X-Ray Thoracic Spine AP Lateral    X-Ray Thoracic Spine AP Lateral         -I removed two suture.  Chloraprep applied  -Ok to do home PT exercises she has been doing in the past.  No heavy lifting, twisting, or bending  -Xrays of the thoracic spine stable        The patient will follow up with me in 4 weeks for the 6 week po fu with xrays beforehand.    Becky Gifford, [facility], PA-C  Neurosurgery  [facility]          "

## 2022-11-03 ENCOUNTER — TELEPHONE (OUTPATIENT)
Dept: NEUROSURGERY | Facility: CLINIC | Age: 87
End: 2022-11-03
Payer: MEDICARE

## 2022-11-03 NOTE — TELEPHONE ENCOUNTER
----- Message from Kris Monteiro MD sent at 11/3/2022  7:42 AM CDT -----  Regarding: RE: Flight clearance  yes  ----- Message -----  From: Palma Dangelo MA  Sent: 11/2/2022   3:22 PM CDT  To: Kris Monteiro MD  Subject: FW: Flight clearance                               ----- Message -----  From: Palma Dangelo MA  Sent: 11/2/2022   2:59 PM CDT  To: [facility] Staff  Subject: Flight clearance                                 Is it okay for pt to fly?   ----- Message -----  From: Giovana Fernandes  Sent: 11/2/2022   2:57 PM CDT  To: Ирина BUCK Staff    Type:  Needs Medical Advice    Who Called: pt    Would the patient rather a call back or a response via [facility]? call  Best Call Back Number: [phone]  Additional Information:    Pt wants to know if it is okay for her to fly.

## 2022-11-10 NOTE — Clinical Note
Lab results given to pt   Pt to recheck levels in 3 mos  Order placed  [employer] The sheath was inserted into the right radial artery.

## 2022-11-15 ENCOUNTER — PATIENT MESSAGE (OUTPATIENT)
Dept: INTERNAL MEDICINE | Facility: CLINIC | Age: 87
End: 2022-11-15
Payer: MEDICARE

## 2022-11-15 ENCOUNTER — TELEPHONE (OUTPATIENT)
Dept: INTERNAL MEDICINE | Facility: CLINIC | Age: 87
End: 2022-11-15
Payer: MEDICARE

## 2022-11-15 NOTE — TELEPHONE ENCOUNTER
I have sent Rx Paxlovid to [facility] pharmacy.    She should hold her pravastatin for 1 week.  Should not take any prescription pain medication

## 2022-11-15 NOTE — TELEPHONE ENCOUNTER
Pt tested positive for covid cough and mild feversymptoms started yesterday pt is asking for meds to help with symptoms , she is using the linked pharmacy in [place]

## 2022-11-15 NOTE — TELEPHONE ENCOUNTER
----- Message from Yuliana Tony sent at 11/15/2022 10:06 AM CST -----  Contact: [phone]  [email or web address]     #Pt tested positive for Covid this morning#    Patient is calling for Medical Advice regarding: low grade fever, coughing, fatigue    How long has patient had these symptoms: started 11/14/2022    Pharmacy name and phone#:       [id number] [place] - [address] [place] [address]  [address]  [place] [address]  Phone: [phone] Fax: [phone]      Would like response via [facility]:  phone    Comments:

## 2022-11-15 NOTE — TELEPHONE ENCOUNTER
She does meet criteria for oral antiviral medication.  5 day course.  We can send in if pharm specifics given.  If any issue with that then she should go to urgent care to be prescribed the medication.

## 2022-11-21 ENCOUNTER — PATIENT MESSAGE (OUTPATIENT)
Dept: INTERNAL MEDICINE | Facility: CLINIC | Age: 87
End: 2022-11-21
Payer: MEDICARE

## 2022-11-21 DIAGNOSIS — R05.3 CHRONIC COUGH: ICD-10-CM

## 2022-11-21 DIAGNOSIS — J30.1 SEASONAL ALLERGIC RHINITIS DUE TO POLLEN: Primary | ICD-10-CM

## 2022-11-21 RX ORDER — PROMETHAZINE HYDROCHLORIDE AND DEXTROMETHORPHAN HYDROBROMIDE 6.25; 15 MG/5ML; MG/5ML
5 SYRUP ORAL EVERY 4 HOURS PRN
Qty: 120 ML | Refills: 1 | Status: SHIPPED | OUTPATIENT
Start: 2022-11-21 | End: 2022-12-01

## 2022-11-21 RX ORDER — ALBUTEROL SULFATE 90 UG/1
2 AEROSOL, METERED RESPIRATORY (INHALATION) EVERY 4 HOURS PRN
Qty: 18 G | Refills: 8 | Status: CANCELLED | OUTPATIENT
Start: 2022-11-21

## 2022-11-21 RX ORDER — ALBUTEROL SULFATE 90 UG/1
2 AEROSOL, METERED RESPIRATORY (INHALATION) EVERY 4 HOURS PRN
Qty: 18 G | Refills: 1 | Status: SHIPPED | OUTPATIENT
Start: 2022-11-21 | End: 2022-11-22 | Stop reason: SDUPTHER

## 2022-11-21 NOTE — TELEPHONE ENCOUNTER
Advise that the cough can linger for quite some time.  Will send in Rx albuterol--use only if chest feels tight , or having wheezing.  We can send in cough suppressant for night time use

## 2022-11-21 NOTE — TELEPHONE ENCOUNTER
Pt didn't take paxlovid for covid but has a cough still , asking for inhaler and if she needs anything else for cough

## 2022-11-22 RX ORDER — ALBUTEROL SULFATE 90 UG/1
2 AEROSOL, METERED RESPIRATORY (INHALATION) EVERY 4 HOURS PRN
Qty: 18 G | Refills: 1 | Status: SHIPPED | OUTPATIENT
Start: 2022-11-22 | End: 2024-02-20

## 2022-12-16 ENCOUNTER — PATIENT MESSAGE (OUTPATIENT)
Dept: INTERNAL MEDICINE | Facility: CLINIC | Age: 87
End: 2022-12-16
Payer: MEDICARE

## 2022-12-16 RX ORDER — LIDOCAINE 50 MG/G
PATCH TOPICAL
Qty: 15 PATCH | Refills: 3 | Status: SHIPPED | OUTPATIENT
Start: 2022-12-16 | End: 2023-11-05

## 2022-12-16 NOTE — TELEPHONE ENCOUNTER
Pt had x-ray done in [place] and found out she has another broken rib , she states that a RX for 5% lidocaine patches was given to her and they work . She is asking if you can prescribe this for her . Gabriela in [facility].

## 2022-12-20 ENCOUNTER — TELEPHONE (OUTPATIENT)
Dept: INTERNAL MEDICINE | Facility: CLINIC | Age: 87
End: 2022-12-20
Payer: MEDICARE

## 2022-12-24 ENCOUNTER — PATIENT MESSAGE (OUTPATIENT)
Dept: INTERNAL MEDICINE | Facility: CLINIC | Age: 87
End: 2022-12-24
Payer: MEDICARE

## 2022-12-27 DIAGNOSIS — R05.3 COUGH, PERSISTENT: Primary | ICD-10-CM

## 2022-12-27 NOTE — TELEPHONE ENCOUNTER
Pt also asking for an allergy referral . States cough is back and she has been unsuccessful with scheduling with ent

## 2023-02-01 ENCOUNTER — PATIENT MESSAGE (OUTPATIENT)
Dept: INTERNAL MEDICINE | Facility: CLINIC | Age: 88
End: 2023-02-01
Payer: MEDICARE

## 2023-02-02 NOTE — TELEPHONE ENCOUNTER
Pt went to ER today in [place]. Pt states that she had an x-ray and was told that she has 2 more broken ribs on the right side.in December she broke 2 bones from coughing due to covid. Now she has 4 broken bones.pt concerned as to why this is happening, she has been on fosamax for years . She states she is going to start taking Caltrate for a supplement . She is asking how much calcium she should be taking .

## 2023-02-02 NOTE — TELEPHONE ENCOUNTER
Asking Would it also be a good idea to do vitamin blood panel to see if she has any  deficiencies?  There is a [facility] in [place], [place] that  daughter goes to.   [phone]

## 2023-02-06 DIAGNOSIS — E83.59 OTHER DISORDERS OF CALCIUM METABOLISM: ICD-10-CM

## 2023-02-06 DIAGNOSIS — S72.009S CLOSED FRACTURE OF HIP, UNSPECIFIED LATERALITY, SEQUELA: Primary | ICD-10-CM

## 2023-02-06 NOTE — TELEPHONE ENCOUNTER
Asking Would it also be a good idea to do vitamin blood panel to see if she has any  deficiencies?  There is a [facility] in [place], [place] that  daughter goes to.   [phone]   Also asking how much calcium she should take

## 2023-02-09 DIAGNOSIS — Z00.00 ENCOUNTER FOR MEDICARE ANNUAL WELLNESS EXAM: ICD-10-CM

## 2023-02-27 ENCOUNTER — TELEPHONE (OUTPATIENT)
Dept: NEUROSURGERY | Facility: CLINIC | Age: 88
End: 2023-02-27
Payer: MEDICARE

## 2023-02-27 NOTE — TELEPHONE ENCOUNTER
----- Message from Alessandro Rodriguez sent at 2/27/2023 12:54 PM CST -----  Type:  Patient Returning Call    Who Called:Pt  Who Left Message for Patient:baldev  Does the patient know what this is regarding?:appointment   Would the patient rather a call back or a response via [facility]? call  Best Call Back Number:[phone]  Additional Information:

## 2023-03-07 ENCOUNTER — TELEPHONE (OUTPATIENT)
Dept: ENDOCRINOLOGY | Facility: CLINIC | Age: 88
End: 2023-03-07
Payer: MEDICARE

## 2023-03-10 ENCOUNTER — OFFICE VISIT (OUTPATIENT)
Dept: URGENT CARE | Facility: CLINIC | Age: 88
End: 2023-03-10
Payer: MEDICARE

## 2023-03-10 VITALS
SYSTOLIC BLOOD PRESSURE: 138 MMHG | HEIGHT: 63 IN | BODY MASS INDEX: 24.98 KG/M2 | OXYGEN SATURATION: 96 % | RESPIRATION RATE: 18 BRPM | HEART RATE: 87 BPM | TEMPERATURE: 99 F | WEIGHT: 141 LBS | DIASTOLIC BLOOD PRESSURE: 78 MMHG

## 2023-03-10 DIAGNOSIS — J30.2 SEASONAL ALLERGIES: Primary | ICD-10-CM

## 2023-03-10 DIAGNOSIS — R05.9 COUGH, UNSPECIFIED TYPE: ICD-10-CM

## 2023-03-10 PROCEDURE — 99213 OFFICE O/P EST LOW 20 MIN: CPT | Mod: S$GLB,,, | Performed by: FAMILY MEDICINE

## 2023-03-10 PROCEDURE — 99213 PR OFFICE/OUTPT VISIT, EST, LEVL III, 20-29 MIN: ICD-10-PCS | Mod: S$GLB,,, | Performed by: FAMILY MEDICINE

## 2023-03-10 RX ORDER — BENZONATATE 100 MG/1
100 CAPSULE ORAL EVERY 6 HOURS PRN
Qty: 30 CAPSULE | Refills: 1 | Status: SHIPPED | OUTPATIENT
Start: 2023-03-10 | End: 2023-04-05

## 2023-03-10 NOTE — PROGRESS NOTES
"Subjective:       Patient ID: Mine Wilkins is a 87 y.o. female.    Vitals:  height is 5' 3" (1.6 m) and weight is 64 kg (141 lb). Her oral temperature is 98.7 °F (37.1 °C). Her blood pressure is 138/78 and her pulse is 87. Her respiration is 18 and oxygen saturation is 96%.     Chief Complaint: Cough and Sore Throat    This is a 87 y.o. female who presents today with a chief complaint of  cough and sore throat x 2 days. Pt has been taking mucinex bid. Pt state hae done 2 at home Covid test and both were negative.    Cough  This is a new problem. The current episode started in the past 7 days. The problem has been gradually worsening. The problem occurs constantly. The cough is Productive of sputum. Associated symptoms include headaches, myalgias, nasal congestion, postnasal drip, rhinorrhea and a sore throat. Pertinent negatives include no chest pain, chills, ear congestion, ear pain, fever, heartburn, hemoptysis, shortness of breath, sweats, weight loss or wheezing. The symptoms are aggravated by lying down, cold air, exercise, fumes and other. Treatments tried: mucinex. The treatment provided no relief. There is no history of asthma, bronchiectasis, bronchitis, COPD, emphysema, environmental allergies or pneumonia.   Sore Throat   This is a new problem. The current episode started in the past 7 days. The problem has been unchanged. Neither side of throat is experiencing more pain than the other. There has been no fever. The fever has been present for Less than 1 day. The pain is at a severity of 0/10. The patient is experiencing no pain. Associated symptoms include congestion, coughing and headaches. Pertinent negatives include no abdominal pain, diarrhea, drooling, ear discharge, ear pain, hoarse voice, plugged ear sensation, neck pain, shortness of breath, stridor, swollen glands, trouble swallowing or vomiting.     Constitution: Negative for chills and fever.   HENT:  Positive for congestion, " postnasal drip and sore throat. Negative for ear pain, ear discharge, drooling and trouble swallowing.    Neck: Negative for neck pain.   Cardiovascular:  Negative for chest pain.   Respiratory:  Positive for cough. Negative for bloody sputum, shortness of breath, stridor and wheezing.    Gastrointestinal:  Negative for abdominal pain, vomiting, diarrhea and heartburn.   Musculoskeletal:  Positive for muscle ache.   Allergic/Immunologic: Negative for environmental allergies.   Neurological:  Positive for headaches.     Objective:      Physical Exam   Constitutional: She does not appear ill. No distress. normal  HENT:   Head: Normocephalic and atraumatic.   Nose: Congestion present.   Mouth/Throat: Mucous membranes are moist. No posterior oropharyngeal erythema.   Eyes: Pupils are equal, round, and reactive to light. Extraocular movement intact   Neck: Neck supple.   Cardiovascular: Normal rate, regular rhythm, normal heart sounds and normal pulses.   Pulmonary/Chest: Effort normal and breath sounds normal.   Abdominal: Normal appearance.   Neurological: She is alert.   Nursing note and vitals reviewed.        Assessment:       1. Seasonal allergies    2. Cough, unspecified type          Plan:         Seasonal allergies    Cough, unspecified type  -     benzonatate (TESSALON PERLES) 100 MG capsule; Take 1 capsule (100 mg total) by mouth every 6 (six) hours as needed for Cough.  Dispense: 30 capsule; Refill: 1    Discussed continued use of flonase and OTC allergy relief. RTC prn worsening symptoms

## 2023-03-20 ENCOUNTER — TELEPHONE (OUTPATIENT)
Dept: NEUROSURGERY | Facility: CLINIC | Age: 88
End: 2023-03-20
Payer: MEDICARE

## 2023-03-20 NOTE — PROGRESS NOTES
Subjective:    Patient ID:  Mine Wilkins is a 87 y.o. female who presents for follow-up of TAVR    Referring Physician: Dr Mistry    HPI  Mine Wilkins is a 87 y.o. female with PMH of severe aortic stenosis, HTN, HLD who presents to interventional cardiology clinic for TAVR follow up.     TAVR Hospital Course:  Mine Wilkins was admitted and underwent successful placement of a 23 mm Evita S3 TAVR via TF access under MAC sedation on 3/8/22. Please see full cath report for details. A transthoracic echo was performed immediately post procedure which showed no paravalvular leak. An aortic valve mean gradient of 4 mmHg and a maximum velocity through the aortic valve of 1.1 m/s. She was transported to the CCU in stable condition with a TVP and arterial line in place. No EP consult was warranted. She remained hemodynamically stable overnight. EKG remained stable and her TVP was removed. This morning, she ambulated without difficulty and was eager to go home. It was felt she was stable for discharge and will go home on ASA alone for antithrombotic therapy post TAVR.     Interval History:  Ms Wilkins has done well since her valve replacement. She has a chronic cough that she has been dealing with for several months now. She denies SOB, LE swelling, UMANZOR, orthopnea, and chest pain.  She continues to follow closely with Dr Mistry.       NYHA: I CCS: 0    Review of Systems   Constitutional: Negative for chills and fever.   HENT:  Negative for sore throat.    Eyes:  Negative for blurred vision.   Cardiovascular:  Negative for chest pain, claudication, cyanosis, dyspnea on exertion, irregular heartbeat, leg swelling, near-syncope, orthopnea, palpitations, paroxysmal nocturnal dyspnea and syncope.   Respiratory:  Negative for cough and sputum production.    Hematologic/Lymphatic: Does not bruise/bleed easily.   Skin:  Negative for itching, rash and suspicious lesions.   Musculoskeletal:  Negative  for falls.   Gastrointestinal:  Negative for abdominal pain and change in bowel habit.   Genitourinary:  Negative for dysuria.   Neurological:  Negative for disturbances in coordination, dizziness and loss of balance.   Psychiatric/Behavioral:  Negative for altered mental status.         Past Medical History:   Diagnosis Date    Acute on chronic diastolic heart failure 3/9/2022    After-cataract of both eyes - Left Eye 10/11/2012    Anxiety     Aortic calcification 8/10/2016    Aortic valve stenosis, moderate     AR (allergic rhinitis)     Arthritis of facet joints at multiple vertebral levels 1/14/2016    Seen on lumbar MRI dated 01/14/16    Cataract     Cholelithiasis     Closed displaced intertrochanteric fracture of right femur with routine healing s/p IM nail on 5/20/2018 5/19/2018    Cystocele 12/1/2013    Esotropia, alternating - Both Eyes 10/11/2012    Essential hypertension     Gastroesophageal reflux disease without esophagitis 4/13/2018    Left ventricular diastolic dysfunction with preserved systolic function 8/10/2016    Lumbar radiculopathy 1/25/2016    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    Nondisplaced fracture of proximal end of humerus 8/7/2016    Nonexudative age-related macular degeneration, bilateral, intermediate dry stage 1/14/2021    Osteoarthritis     Overweight (BMI 25.0-29.9) 4/13/2018    Pure hypercholesterolemia     Right-sided low back pain without sciatica 12/10/2015    Strabismus     Urethral hypermobility 12/1/2013       Current Outpatient Medications:     acetaminophen (TYLENOL) 325 MG tablet, Take 2 tablets (650 mg total) by mouth every 6 (six) hours as needed., Disp: , Rfl: 0    albuterol (VENTOLIN HFA) 90 mcg/actuation inhaler, Inhale 2 puffs into the lungs every 4 (four) hours as needed for Shortness of Breath (cough). Rescue, Disp: 18 g, Rfl: 1    alendronate (FOSAMAX) 70 MG tablet, TAKE 1 TABLET  EVERY 7 DAYS. HOLD UNTIL SEEN BY YOUR PRIMARY CARE  PHYSICIAN, Disp: 12 tablet, Rfl: 3    amLODIPine (NORVASC) 5 MG tablet, Take 1 tablet (5 mg total) by mouth once daily., Disp: 30 tablet, Rfl: 11    aspirin (ECOTRIN) 81 MG EC tablet, Take 1 tablet (81 mg total) by mouth once daily., Disp: 90 tablet, Rfl: 3    azelastine (ASTELIN) 137 mcg (0.1 %) nasal spray, 1 spray (137 mcg total) by Nasal route 2 (two) times daily., Disp: 30 mL, Rfl: 1    brompheniramine-pseudoeph-DM (BROMFED DM) 2-30-10 mg/5 mL Syrp, TAKE 5 ML BY MOUTH EVERY 6 HOURS AS NEEDED FOR COUGH AND CONGESTION, Disp: , Rfl:     cetirizine (ZYRTEC) 10 MG tablet, Take 10 mg by mouth once daily., Disp: , Rfl:     cholecalciferol, vitamin D3, 2,000 unit Tab, Take by mouth. 1 Tablet Oral Every day, Disp: , Rfl:     EScitalopram oxalate (LEXAPRO) 5 MG Tab, TAKE 1 TABLET EVERY DAY, Disp: 90 tablet, Rfl: 0    fluticasone propionate (FLONASE) 50 mcg/actuation nasal spray, SHAKE LIQUID AND USE 1 SPRAY(50 MCG) IN EACH NOSTRIL EVERY EVENING, Disp: 16 g, Rfl: 3    fluticasone propionate (FLOVENT DISKUS) 50 mcg/actuation DsDv, Inhale 50 mcg into the lungs 2 (two) times a day. Controller, Disp: 60 each, Rfl: 1    multivit-min/iron/folic/lutein (CENTRUM SILVER WOMEN ORAL), Take by mouth., Disp: , Rfl:     omeprazole (PRILOSEC) 40 MG capsule, TAKE 1 CAPSULE EVERY MORNING  BEFORE  EATING, Disp: 90 capsule, Rfl: 3    oxybutynin (DITROPAN-XL) 10 MG 24 hr tablet, TAKE 1 TABLET EVERY DAY, Disp: 90 tablet, Rfl: 3    pravastatin (PRAVACHOL) 20 MG tablet, TAKE 1 TABLET EVERY EVENING., Disp: 90 tablet, Rfl: 3    promethazine-dextromethorphan (PROMETHAZINE-DM) 6.25-15 mg/5 mL Syrp, TAKE 5 ML BY MOUTH EVERY NIGHT AT BEDTIME AS NEEDED FOR COUGH AND CONGESTION, Disp: , Rfl:     benzonatate (TESSALON PERLES) 100 MG capsule, Take 1 capsule (100 mg total) by mouth every 6 (six) hours as needed for Cough. (Patient not taking: Reported on 3/21/2023), Disp: 30 capsule, Rfl: 1    HYDROcodone-acetaminophen (NORCO) 5-325 mg per tablet, Take 1  "tablet by mouth every 8 (eight) hours as needed for Pain., Disp: 20 tablet, Rfl: 0    LIDOcaine (LIDODERM) 5 %, Apply to affected area daily for 12 hours., Disp: 15 patch, Rfl: 3    methocarbamoL (ROBAXIN) 500 MG Tab, 1 po bid-tid prn as a muscle relaxer, Disp: 30 tablet, Rfl: 2    predniSONE (DELTASONE) 20 MG tablet, 2 po qd x 4d, Disp: 8 tablet, Rfl: 0    Objective:    Physical Exam  Vitals reviewed.   Constitutional:       General: She is not in acute distress.     Appearance: She is well-developed. She is not diaphoretic.   HENT:      Head: Normocephalic and atraumatic.   Neck:      Vascular: No JVD.   Cardiovascular:      Rate and Rhythm: Normal rate and regular rhythm.      Pulses: Intact distal pulses.      Heart sounds: No murmur heard.  Pulmonary:      Effort: Pulmonary effort is normal. No respiratory distress.      Breath sounds: Normal breath sounds.   Musculoskeletal:      Cervical back: Normal range of motion.      Right lower leg: No edema.      Left lower leg: No edema.   Skin:     General: Skin is warm and dry.   Neurological:      Mental Status: She is alert and oriented to person, place, and time.           Vitals:    03/21/23 1112 03/21/23 1113   BP: 132/79 132/65   BP Location: Right arm Left arm   Patient Position: Sitting Sitting   BP Method: Large (Automatic) Large (Automatic)   Pulse: 87 87   SpO2: (!) 94%    Weight: 64.1 kg (141 lb 5 oz)    Height: 5' 3" (1.6 m)        Body mass index is 25.03 kg/m².    Test(s) Reviewed  I have reviewed the following in detail:  [] Stress test   [] Angiography   [] Echocardiogram   [] Labs:   [] Other:     TTE 3/21/23  The left ventricle is normal in size with normal systolic function.  The estimated ejection fraction is 63%.  Indeterminate left ventricular diastolic function.  There is a bioprosthetic aortic valve present. There is no aortic insufficiency present.  The aortic valve mean gradient is 9 mmHg with a dimensionless index of 0.62.  Mild mitral " regurgitation.  Mild tricuspid regurgitation.  Normal central venous pressure (3 mmHg).  The estimated PA systolic pressure is 36 mmHg.  The ascending aorta is mildly dilated.    Assessment:     S/P TAVR (transcatheter aortic valve replacement)  Successful transfemoral aortic valve replacement with a 23 mm Evita S3 valve. TTE today shows a well functioning valve with MG 9 mmhg and no PVL.     Aortic atherosclerosis  See imaging. Follow up with Cardiology.     Chronic diastolic heart failure  Chronic. Controlled. Follow up with Cardiology/PCP.      Plan:       Follow up with [facility] as needed.   ASA indefinitely.   SBE prophylaxis for life.           Tangela Chatman PA-C  Valve and Structural Heart Disease  [facility]

## 2023-03-21 ENCOUNTER — OFFICE VISIT (OUTPATIENT)
Dept: CARDIOLOGY | Facility: CLINIC | Age: 88
End: 2023-03-21
Payer: MEDICARE

## 2023-03-21 ENCOUNTER — HOSPITAL ENCOUNTER (OUTPATIENT)
Dept: CARDIOLOGY | Facility: HOSPITAL | Age: 88
Discharge: HOME | End: 2023-03-21
Attending: PHYSICIAN ASSISTANT
Payer: MEDICARE

## 2023-03-21 VITALS
SYSTOLIC BLOOD PRESSURE: 135 MMHG | WEIGHT: 141 LBS | HEART RATE: 67 BPM | DIASTOLIC BLOOD PRESSURE: 80 MMHG | BODY MASS INDEX: 24.98 KG/M2 | HEIGHT: 63 IN

## 2023-03-21 VITALS
BODY MASS INDEX: 25.04 KG/M2 | WEIGHT: 141.31 LBS | HEIGHT: 63 IN | HEART RATE: 87 BPM | DIASTOLIC BLOOD PRESSURE: 65 MMHG | SYSTOLIC BLOOD PRESSURE: 132 MMHG | OXYGEN SATURATION: 94 %

## 2023-03-21 DIAGNOSIS — I50.32 CHRONIC DIASTOLIC HEART FAILURE: ICD-10-CM

## 2023-03-21 DIAGNOSIS — I70.0 AORTIC ATHEROSCLEROSIS: ICD-10-CM

## 2023-03-21 DIAGNOSIS — Z95.2 S/P TAVR (TRANSCATHETER AORTIC VALVE REPLACEMENT): ICD-10-CM

## 2023-03-21 DIAGNOSIS — Z95.2 S/P TAVR (TRANSCATHETER AORTIC VALVE REPLACEMENT): Chronic | ICD-10-CM

## 2023-03-21 LAB
ASCENDING AORTA: 3.96 CM
AV INDEX (PROSTH): 0.62
AV MEAN GRADIENT: 9 MMHG
AV PEAK GRADIENT: 15 MMHG
AV VALVE AREA: 2.19 CM2
AV VELOCITY RATIO: 0.5
BSA FOR ECHO PROCEDURE: 1.69 M2
CV ECHO LV RWT: 0.28 CM
DOP CALC AO PEAK VEL: 1.91 M/S
DOP CALC AO VTI: 32.7 CM
DOP CALC LVOT AREA: 3.5 CM2
DOP CALC LVOT DIAMETER: 2.12 CM
DOP CALC LVOT PEAK VEL: 0.96 M/S
DOP CALC LVOT STROKE VOLUME: 71.66 CM3
DOP CALCLVOT PEAK VEL VTI: 20.31 CM
E WAVE DECELERATION TIME: 256.85 MSEC
E/A RATIO: 0.74
E/E' RATIO: 12.4 M/S
ECHO LV POSTERIOR WALL: 0.66 CM (ref 0.6–1.1)
EJECTION FRACTION: 63 %
FRACTIONAL SHORTENING: 34 % (ref 28–44)
INTERVENTRICULAR SEPTUM: 0.61 CM (ref 0.6–1.1)
LA MAJOR: 4.27 CM
LA MINOR: 3.72 CM
LA WIDTH: 3.38 CM
LEFT ATRIUM SIZE: 3.57 CM
LEFT ATRIUM VOLUME INDEX MOD: 21.6 ML/M2
LEFT ATRIUM VOLUME INDEX: 24.4 ML/M2
LEFT ATRIUM VOLUME MOD: 36.06 CM3
LEFT ATRIUM VOLUME: 40.78 CM3
LEFT INTERNAL DIMENSION IN SYSTOLE: 3.11 CM (ref 2.1–4)
LEFT VENTRICLE DIASTOLIC VOLUME INDEX: 62.11 ML/M2
LEFT VENTRICLE DIASTOLIC VOLUME: 103.73 ML
LEFT VENTRICLE MASS INDEX: 55 G/M2
LEFT VENTRICLE SYSTOLIC VOLUME INDEX: 23 ML/M2
LEFT VENTRICLE SYSTOLIC VOLUME: 38.34 ML
LEFT VENTRICULAR INTERNAL DIMENSION IN DIASTOLE: 4.73 CM (ref 3.5–6)
LEFT VENTRICULAR MASS: 92.27 G
LV LATERAL E/E' RATIO: 15.5 M/S
LV SEPTAL E/E' RATIO: 10.33 M/S
MV PEAK A VEL: 1.25 M/S
MV PEAK E VEL: 0.93 M/S
MV STENOSIS PRESSURE HALF TIME: 74.49 MS
MV VALVE AREA P 1/2 METHOD: 2.95 CM2
PISA TR MAX VEL: 2.88 M/S
RA MAJOR: 4.44 CM
RA PRESSURE: 3 MMHG
RA WIDTH: 3.21 CM
RIGHT VENTRICULAR END-DIASTOLIC DIMENSION: 3.54 CM
RV TISSUE DOPPLER FREE WALL SYSTOLIC VELOCITY 1 (APICAL 4 CHAMBER VIEW): 11.21 CM/S
SINUS: 3.03 CM
STJ: 2.66 CM
TDI LATERAL: 0.06 M/S
TDI SEPTAL: 0.09 M/S
TDI: 0.08 M/S
TR MAX PG: 33 MMHG
TRICUSPID ANNULAR PLANE SYSTOLIC EXCURSION: 1.75 CM
TV REST PULMONARY ARTERY PRESSURE: 36 MMHG

## 2023-03-21 PROCEDURE — 1157F PR ADVANCE CARE PLAN OR EQUIV PRESENT IN MEDICAL RECORD: ICD-10-PCS | Mod: HCNC,CPTII,S$GLB, | Performed by: PHYSICIAN ASSISTANT

## 2023-03-21 PROCEDURE — 93306 TTE W/DOPPLER COMPLETE: CPT | Mod: HCNC

## 2023-03-21 PROCEDURE — 93306 ECHO (CUPID ONLY): ICD-10-PCS | Mod: 26,HCNC,, | Performed by: INTERNAL MEDICINE

## 2023-03-21 PROCEDURE — 1126F PR PAIN SEVERITY QUANTIFIED, NO PAIN PRESENT: ICD-10-PCS | Mod: HCNC,CPTII,S$GLB, | Performed by: PHYSICIAN ASSISTANT

## 2023-03-21 PROCEDURE — 99214 PR OFFICE/OUTPT VISIT, EST, LEVL IV, 30-39 MIN: ICD-10-PCS | Mod: HCNC,S$GLB,, | Performed by: PHYSICIAN ASSISTANT

## 2023-03-21 PROCEDURE — 93306 TTE W/DOPPLER COMPLETE: CPT | Mod: 26,HCNC,, | Performed by: INTERNAL MEDICINE

## 2023-03-21 PROCEDURE — 99214 OFFICE O/P EST MOD 30 MIN: CPT | Mod: HCNC,S$GLB,, | Performed by: PHYSICIAN ASSISTANT

## 2023-03-21 PROCEDURE — 1157F ADVNC CARE PLAN IN RCRD: CPT | Mod: HCNC,CPTII,S$GLB, | Performed by: PHYSICIAN ASSISTANT

## 2023-03-21 PROCEDURE — 99999 PR PBB SHADOW E&M-EST. PATIENT-LVL IV: ICD-10-PCS | Mod: PBBFAC,HCNC,, | Performed by: PHYSICIAN ASSISTANT

## 2023-03-21 PROCEDURE — 99999 PR PBB SHADOW E&M-EST. PATIENT-LVL IV: CPT | Mod: PBBFAC,HCNC,, | Performed by: PHYSICIAN ASSISTANT

## 2023-03-21 PROCEDURE — 1126F AMNT PAIN NOTED NONE PRSNT: CPT | Mod: HCNC,CPTII,S$GLB, | Performed by: PHYSICIAN ASSISTANT

## 2023-03-21 PROCEDURE — 1159F PR MEDICATION LIST DOCUMENTED IN MEDICAL RECORD: ICD-10-PCS | Mod: HCNC,CPTII,S$GLB, | Performed by: PHYSICIAN ASSISTANT

## 2023-03-21 PROCEDURE — 1159F MED LIST DOCD IN RCRD: CPT | Mod: HCNC,CPTII,S$GLB, | Performed by: PHYSICIAN ASSISTANT

## 2023-03-21 RX ORDER — BROMPHENIRAMINE MALEATE, PSEUDOEPHEDRINE HYDROCHLORIDE, AND DEXTROMETHORPHAN HYDROBROMIDE 2; 30; 10 MG/5ML; MG/5ML; MG/5ML
SYRUP ORAL
COMMUNITY
Start: 2023-03-14 | End: 2024-02-20 | Stop reason: ALTCHOICE

## 2023-03-21 RX ORDER — PROMETHAZINE HYDROCHLORIDE AND DEXTROMETHORPHAN HYDROBROMIDE 6.25; 15 MG/5ML; MG/5ML
SYRUP ORAL
COMMUNITY
Start: 2023-03-14 | End: 2024-02-20 | Stop reason: ALTCHOICE

## 2023-03-23 ENCOUNTER — OFFICE VISIT (OUTPATIENT)
Dept: INTERNAL MEDICINE | Facility: CLINIC | Age: 88
End: 2023-03-23
Payer: MEDICARE

## 2023-03-23 VITALS
HEIGHT: 63 IN | SYSTOLIC BLOOD PRESSURE: 128 MMHG | DIASTOLIC BLOOD PRESSURE: 72 MMHG | TEMPERATURE: 98 F | RESPIRATION RATE: 12 BRPM | HEART RATE: 78 BPM | BODY MASS INDEX: 24.73 KG/M2 | WEIGHT: 139.56 LBS

## 2023-03-23 DIAGNOSIS — E78.5 DYSLIPIDEMIA: ICD-10-CM

## 2023-03-23 DIAGNOSIS — Z95.2 S/P TAVR (TRANSCATHETER AORTIC VALVE REPLACEMENT): Chronic | ICD-10-CM

## 2023-03-23 DIAGNOSIS — I10 ESSENTIAL HYPERTENSION: Primary | Chronic | ICD-10-CM

## 2023-03-23 DIAGNOSIS — M81.0 SENILE OSTEOPOROSIS: ICD-10-CM

## 2023-03-23 PROCEDURE — 1159F MED LIST DOCD IN RCRD: CPT | Mod: HCNC,CPTII,S$GLB, | Performed by: INTERNAL MEDICINE

## 2023-03-23 PROCEDURE — 99999 PR PBB SHADOW E&M-EST. PATIENT-LVL IV: ICD-10-PCS | Mod: PBBFAC,HCNC,, | Performed by: INTERNAL MEDICINE

## 2023-03-23 PROCEDURE — 99214 OFFICE O/P EST MOD 30 MIN: CPT | Mod: HCNC,S$GLB,, | Performed by: INTERNAL MEDICINE

## 2023-03-23 PROCEDURE — 1157F ADVNC CARE PLAN IN RCRD: CPT | Mod: HCNC,CPTII,S$GLB, | Performed by: INTERNAL MEDICINE

## 2023-03-23 PROCEDURE — 1157F PR ADVANCE CARE PLAN OR EQUIV PRESENT IN MEDICAL RECORD: ICD-10-PCS | Mod: HCNC,CPTII,S$GLB, | Performed by: INTERNAL MEDICINE

## 2023-03-23 PROCEDURE — 99999 PR PBB SHADOW E&M-EST. PATIENT-LVL IV: CPT | Mod: PBBFAC,HCNC,, | Performed by: INTERNAL MEDICINE

## 2023-03-23 PROCEDURE — 1159F PR MEDICATION LIST DOCUMENTED IN MEDICAL RECORD: ICD-10-PCS | Mod: HCNC,CPTII,S$GLB, | Performed by: INTERNAL MEDICINE

## 2023-03-23 PROCEDURE — 99214 PR OFFICE/OUTPT VISIT, EST, LEVL IV, 30-39 MIN: ICD-10-PCS | Mod: HCNC,S$GLB,, | Performed by: INTERNAL MEDICINE

## 2023-03-23 PROCEDURE — 1126F AMNT PAIN NOTED NONE PRSNT: CPT | Mod: HCNC,CPTII,S$GLB, | Performed by: INTERNAL MEDICINE

## 2023-03-23 PROCEDURE — 1126F PR PAIN SEVERITY QUANTIFIED, NO PAIN PRESENT: ICD-10-PCS | Mod: HCNC,CPTII,S$GLB, | Performed by: INTERNAL MEDICINE

## 2023-03-23 NOTE — PROGRESS NOTES
History of present illness:   [age] year lady in for follow-up on several chronic medical issues.  The patient has hypertension, dyslipidemia, status post TAVR, osteoporosis and others.  She reports that generally all is doing well.  She takes her medications as directed.  She has had recent cardiology follow-up.  Denies any current chest pain palpitations syncope cough or claudication.    Current medications:  Medications all noted and reviewed.    Review of systems:  Constitutional:  No fever no chills no generalized body aches.    HEENT:  No hoarseness no dysphagia.    Cardiovascular:  No chest pain or palpitations no syncope no edema  Respiratory:  No cough shortness of breath.  GI:  No nausea no vomiting.  Changes in bowel habits.    Physical examination:   General:  Pleasant alert appropriately groomed lady no acute distress.    Vital signs:  All noted and reviewed is normal.    Lungs:  Clear to auscultation.    Cardiovascular:  Regular rate rhythm.  2/6 low to medium pitched systolic murmur.  Carotids full transmitted murmur.  No peripheral extremity edema.  Mental status:  Alert oriented affect mood all appropriate.    Data:  Reviewed EMR and lab data.  Up-to-date with labs other than lipids and TSH.        Impression:  Hypertension controlled.    Dyslipidemia statin therapy.    Status post TAVR.    Osteoporosis on pharmacologic therapy.    Plan:  Update lipid profile and TSH.    Continue other pharmacologic regimens and other subspecialty follow-up.  Return clinic six months

## 2023-03-27 ENCOUNTER — OFFICE VISIT (OUTPATIENT)
Dept: NEUROSURGERY | Facility: CLINIC | Age: 88
End: 2023-03-27
Payer: MEDICARE

## 2023-03-27 ENCOUNTER — HOSPITAL ENCOUNTER (OUTPATIENT)
Dept: RADIOLOGY | Facility: HOSPITAL | Age: 88
Discharge: HOME | End: 2023-03-27
Attending: PHYSICIAN ASSISTANT
Payer: MEDICARE

## 2023-03-27 VITALS
SYSTOLIC BLOOD PRESSURE: 139 MMHG | WEIGHT: 139.56 LBS | DIASTOLIC BLOOD PRESSURE: 78 MMHG | BODY MASS INDEX: 24.73 KG/M2 | HEIGHT: 63 IN | HEART RATE: 84 BPM

## 2023-03-27 DIAGNOSIS — S22.050D COMPRESSION FRACTURE OF T6 VERTEBRA WITH ROUTINE HEALING, SUBSEQUENT ENCOUNTER: ICD-10-CM

## 2023-03-27 DIAGNOSIS — M84.48XS PATHOLOGICAL FRACTURE OF THORACIC VERTEBRA, SEQUELA: Primary | ICD-10-CM

## 2023-03-27 DIAGNOSIS — M80.00XS AGE-RELATED OSTEOPOROSIS WITH CURRENT PATHOLOGICAL FRACTURE, SEQUELA: ICD-10-CM

## 2023-03-27 PROCEDURE — 72070 X-RAY EXAM THORAC SPINE 2VWS: CPT | Mod: TC,HCNC,FY

## 2023-03-27 PROCEDURE — 1126F PR PAIN SEVERITY QUANTIFIED, NO PAIN PRESENT: ICD-10-PCS | Mod: HCNC,CPTII,S$GLB, | Performed by: NEUROLOGICAL SURGERY

## 2023-03-27 PROCEDURE — 1159F PR MEDICATION LIST DOCUMENTED IN MEDICAL RECORD: ICD-10-PCS | Mod: HCNC,CPTII,S$GLB, | Performed by: NEUROLOGICAL SURGERY

## 2023-03-27 PROCEDURE — 99999 PR PBB SHADOW E&M-EST. PATIENT-LVL IV: ICD-10-PCS | Mod: PBBFAC,HCNC,, | Performed by: NEUROLOGICAL SURGERY

## 2023-03-27 PROCEDURE — 1101F PR PT FALLS ASSESS DOC 0-1 FALLS W/OUT INJ PAST YR: ICD-10-PCS | Mod: HCNC,CPTII,S$GLB, | Performed by: NEUROLOGICAL SURGERY

## 2023-03-27 PROCEDURE — 1101F PT FALLS ASSESS-DOCD LE1/YR: CPT | Mod: HCNC,CPTII,S$GLB, | Performed by: NEUROLOGICAL SURGERY

## 2023-03-27 PROCEDURE — 99212 OFFICE O/P EST SF 10 MIN: CPT | Mod: HCNC,S$GLB,, | Performed by: NEUROLOGICAL SURGERY

## 2023-03-27 PROCEDURE — 3288F PR FALLS RISK ASSESSMENT DOCUMENTED: ICD-10-PCS | Mod: HCNC,CPTII,S$GLB, | Performed by: NEUROLOGICAL SURGERY

## 2023-03-27 PROCEDURE — 72070 XR THORACIC SPINE AP LATERAL: ICD-10-PCS | Mod: 26,HCNC,, | Performed by: INTERNAL MEDICINE

## 2023-03-27 PROCEDURE — 3288F FALL RISK ASSESSMENT DOCD: CPT | Mod: HCNC,CPTII,S$GLB, | Performed by: NEUROLOGICAL SURGERY

## 2023-03-27 PROCEDURE — 72070 X-RAY EXAM THORAC SPINE 2VWS: CPT | Mod: 26,HCNC,, | Performed by: INTERNAL MEDICINE

## 2023-03-27 PROCEDURE — 99212 PR OFFICE/OUTPT VISIT, EST, LEVL II, 10-19 MIN: ICD-10-PCS | Mod: HCNC,S$GLB,, | Performed by: NEUROLOGICAL SURGERY

## 2023-03-27 PROCEDURE — 1126F AMNT PAIN NOTED NONE PRSNT: CPT | Mod: HCNC,CPTII,S$GLB, | Performed by: NEUROLOGICAL SURGERY

## 2023-03-27 PROCEDURE — 1159F MED LIST DOCD IN RCRD: CPT | Mod: HCNC,CPTII,S$GLB, | Performed by: NEUROLOGICAL SURGERY

## 2023-03-27 PROCEDURE — 1157F ADVNC CARE PLAN IN RCRD: CPT | Mod: HCNC,CPTII,S$GLB, | Performed by: NEUROLOGICAL SURGERY

## 2023-03-27 PROCEDURE — 1157F PR ADVANCE CARE PLAN OR EQUIV PRESENT IN MEDICAL RECORD: ICD-10-PCS | Mod: HCNC,CPTII,S$GLB, | Performed by: NEUROLOGICAL SURGERY

## 2023-03-27 PROCEDURE — 99999 PR PBB SHADOW E&M-EST. PATIENT-LVL IV: CPT | Mod: PBBFAC,HCNC,, | Performed by: NEUROLOGICAL SURGERY

## 2023-03-27 NOTE — PROGRESS NOTES
NEUROSURGICAL PROGRESS NOTE    DATE OF SERVICE:  03/27/2023    ATTENDING PHYSICIAN:  Kris Monteiro MD    SUBJECTIVE:    INTERIM HISTORY:    This is a very pleasant 87 y.o. female, osteoporosis, chronic L2 superior endplate fracture, 3 months status post T6 kyphoplasty.  Patient is doing well.  Reports complete relief of her thoracic back pain.  Denies having low back pain at this time.  She takes calcium vitamin-D supplement.  She is on Fosamax.  No new onset of motor weakness numbness.  She would like to resume more physical activity at this time.              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) 12/01/2013    Cystocele 12/01/2013    Urethral hypermobility 12/01/2013    Osteoarthritis of multiple joints     AR (allergic rhinitis)     Essential hypertension     Lumbar radiculopathy 01/25/2016    Aortic atherosclerosis 08/10/2016    Left ventricular diastolic dysfunction with preserved systolic function 08/10/2016    Generalized anxiety disorder 05/10/2017    Body mass index (BMI) of 24.0 to 24.9 in adult 04/13/2018    Thrombocytosis 04/13/2018    Gastroesophageal reflux disease without esophagitis 04/13/2018    Xerostomia 05/24/2018    Senile osteoporosis 08/29/2018    Dyslipidemia 08/29/2018    Chronic right shoulder pain 07/15/2020    Nonexudative age-related macular degeneration, bilateral, intermediate dry stage 01/14/2021    Nevus, choroidal, left 01/14/2021    Posterior vitreous detachment, bilateral 01/14/2021    S/P TAVR (transcatheter aortic valve replacement) 03/09/2022    Chronic diastolic heart failure 03/09/2022    PVD (peripheral vascular disease) 04/08/2022    Chronic cough     Preoperative cardiovascular examination 10/06/2022     Resolved Ambulatory Problems     Diagnosis Date Noted    After-cataract of both eyes - Left Eye 10/11/2012    Pure  hypercholesterolemia     Age-related osteoporosis with current pathological fracture with routine healing     Aortic valve stenosis, moderate     Right-sided low back pain without sciatica 12/10/2015    Nondisplaced fracture of proximal end of humerus 08/07/2016    Acute pain of right shoulder 10/26/2016    Decreased range of motion of shoulder, right 10/26/2016    Arthritis of facet joints at multiple vertebral levels 01/14/2016    Closed displaced intertrochanteric fracture of right femur with routine healing s/p IM nail on 5/20/2018 05/19/2018    Encounter for aftercare for healing closed traumatic fracture of right hip 05/22/2018    Urinary tract infection due to Enterococcus faecalis 05/22/2018    Hypophosphatemia 05/22/2018    Closed displaced intertrochanteric fracture of right femur 05/23/2018    Muscular chest pain 05/25/2018    Right leg pain 07/17/2018    Low back pain 11/14/2018    Weakness of right upper extremity 07/15/2020    Severe aortic stenosis 06/11/2021     Past Medical History:   Diagnosis Date    Acute on chronic diastolic heart failure 3/9/2022    Anxiety     Aortic calcification 8/10/2016    Cataract     Cholelithiasis     Osteoarthritis     Overweight (BMI 25.0-29.9) 4/13/2018    Strabismus        PAST SURGICAL HISTORY:  Past Surgical History:   Procedure Laterality Date    ADENOIDECTOMY      CARDIAC CATH COSURGEON N/A 3/8/2022    Procedure: Cardiac Cath Cosurgeon;  Surgeon: Shahram Lyle MD;  Location: [facility] CATH LAB;  Service: Cardiovascular;  Laterality: N/A;    CARDIAC VALVE SURGERY      CATARACT EXTRACTION Bilateral     BOTH EYES MULTIFOCAL    COLONOSCOPY  2015    CORONARY ANGIOGRAPHY N/A 2/7/2022    Procedure: ANGIOGRAM, CORONARY ARTERY;  Surgeon: Robby Mistry MD;  Location: [facility] CATH LAB;  Service: Cardiology;  Laterality: N/A;    EYE SURGERY      FEMUR FRACTURE SURGERY Right     FRACTURE SURGERY Right     femur    HYSTERECTOMY      INTERTROCHANTERIC HIP FRACTURE SURGERY Right  05/20/2018    IM nail    LEFT HEART CATHETERIZATION Left 3/8/2022    Procedure: Left heart cath;  Surgeon: Constantin Goldberg MD;  Location: [facility] C[facility] LAB;  Service: Cardiology;  Laterality: Left;    LUMBAR EPIDURAL INJECTION  02/04/2016    L4-l5    PARTIAL HYSTERECTOMY      SKIN GRAFT      left foot     TONSILLECTOMY      TRANSCATHETER AORTIC VALVE REPLACEMENT (TAVR) N/A 3/8/2022    Procedure: REPLACEMENT, AORTIC VALVE, TRANSCATHETER (TAVR);  Surgeon: Constantin Goldberg MD;  Location: [facility] CATH LAB;  Service: Cardiology;  Laterality: N/A;    WRIST FRACTURE SURGERY Right 2009    YAG CAP      LEFT EYE       SOCIAL HISTORY:   Social History     Socioeconomic History    Marital status:    Occupational History    Occupation: Retired   Tobacco Use    Smoking status: Never     Passive exposure: Never    Smokeless tobacco: Never   Substance and Sexual Activity    Alcohol use: Yes     Comment: maybe 6 drinks per year    Drug use: No    Sexual activity: Not Currently     Partners: Male   Other Topics Concern    Are you pregnant or think you may be? No    Breast-feeding No   Social History Narrative    . Two grown daughters       FAMILY HISTORY:  Family History   Problem Relation Age of Onset    Colon cancer Brother     Dementia Brother     Hypertension Brother     Osteoporosis Mother     Hypertension Mother     Macular degeneration Mother     Cataracts Mother     Cancer Father     No Known Problems Daughter     Hypertension Brother     Dementia Brother     No Known Problems Daughter     Hypertension Maternal Grandmother     Stroke Maternal Grandmother     Breast cancer Maternal Grandmother     Melanoma Maternal Grandfather     Anesthesia problems Neg Hx     Broken bones Neg Hx     Clotting disorder Neg Hx     Collagen disease Neg Hx     Diabetes Neg Hx     Dislocations Neg Hx     Rheumatologic disease Neg Hx     Scoliosis Neg Hx     Severe sprains Neg Hx     Ovarian cancer Neg Hx     Amblyopia Neg Hx      Blindness Neg Hx     Glaucoma Neg Hx     Retinal detachment Neg Hx     Strabismus Neg Hx     Psoriasis Neg Hx     Lupus Neg Hx     Eczema Neg Hx     Acne Neg Hx        CURRENTS MEDICATIONS:  Current Outpatient Medications on File Prior to Visit   Medication Sig Dispense Refill    acetaminophen (TYLENOL) 325 MG tablet Take 2 tablets (650 mg total) by mouth every 6 (six) hours as needed.  0    albuterol (VENTOLIN HFA) 90 mcg/actuation inhaler Inhale 2 puffs into the lungs every 4 (four) hours as needed for Shortness of Breath (cough). Rescue 18 g 1    alendronate (FOSAMAX) 70 MG tablet TAKE 1 TABLET  EVERY 7 DAYS. HOLD UNTIL SEEN BY YOUR PRIMARY CARE PHYSICIAN 12 tablet 3    amLODIPine (NORVASC) 5 MG tablet Take 1 tablet (5 mg total) by mouth once daily. 30 tablet 11    azelastine (ASTELIN) 137 mcg (0.1 %) nasal spray 1 spray (137 mcg total) by Nasal route 2 (two) times daily. 30 mL 1    benzonatate (TESSALON PERLES) 100 MG capsule Take 1 capsule (100 mg total) by mouth every 6 (six) hours as needed for Cough. 30 capsule 1    brompheniramine-pseudoeph-DM (BROMFED DM) 2-30-10 mg/5 mL Syrp TAKE 5 ML BY MOUTH EVERY 6 HOURS AS NEEDED FOR COUGH AND CONGESTION      cetirizine (ZYRTEC) 10 MG tablet Take 10 mg by mouth once daily.      cholecalciferol, vitamin D3, 2,000 unit Tab Take by mouth. 1 Tablet Oral Every day      EScitalopram oxalate (LEXAPRO) 5 MG Tab TAKE 1 TABLET EVERY DAY 90 tablet 0    fluticasone propionate (FLONASE) 50 mcg/actuation nasal spray SHAKE LIQUID AND USE 1 SPRAY(50 MCG) IN EACH NOSTRIL EVERY EVENING 16 g 3    fluticasone propionate (FLOVENT DISKUS) 50 mcg/actuation DsDv Inhale 50 mcg into the lungs 2 (two) times a day. Controller 60 each 1    HYDROcodone-acetaminophen (NORCO) 5-325 mg per tablet Take 1 tablet by mouth every 8 (eight) hours as needed for Pain. 20 tablet 0    LIDOcaine (LIDODERM) 5 % Apply to affected area daily for 12 hours. 15 patch 3    methocarbamoL (ROBAXIN) 500 MG Tab 1 po  bid-tid prn as a muscle relaxer 30 tablet 2    multivit-min/iron/folic/lutein (CENTRUM SILVER WOMEN ORAL) Take by mouth.      omeprazole (PRILOSEC) 40 MG capsule TAKE 1 CAPSULE EVERY MORNING  BEFORE  EATING 90 capsule 3    oxybutynin (DITROPAN-XL) 10 MG 24 hr tablet TAKE 1 TABLET EVERY DAY 90 tablet 3    pravastatin (PRAVACHOL) 20 MG tablet TAKE 1 TABLET EVERY EVENING. 90 tablet 3    predniSONE (DELTASONE) 20 MG tablet 2 po qd x 4d 8 tablet 0    promethazine-dextromethorphan (PROMETHAZINE-DM) 6.25-15 mg/5 mL Syrp TAKE 5 ML BY MOUTH EVERY NIGHT AT BEDTIME AS NEEDED FOR COUGH AND CONGESTION      aspirin (ECOTRIN) 81 MG EC tablet Take 1 tablet (81 mg total) by mouth once daily. 90 tablet 3     No current facility-administered medications on file prior to visit.       ALLERGIES:  Review of patient's allergies indicates:   Allergen Reactions    Ace inhibitors      Other reaction(s): cough    Codeine      Other reaction(s): Nausea       REVIEW OF SYSTEMS:  Review of Systems   Constitutional:  Negative for diaphoresis, fever and weight loss.   Respiratory:  Negative for shortness of breath.    Cardiovascular:  Negative for chest pain.   Gastrointestinal:  Negative for blood in stool.   Genitourinary:  Negative for hematuria.   Endo/Heme/Allergies:  Does not bruise/bleed easily.   All other systems reviewed and are negative.      OBJECTIVE:    PHYSICAL EXAMINATION:   Vitals:    03/27/23 1514   BP: 139/78   Pulse: 84       Physical Exam:  Vitals reviewed.    Constitutional: She appears well-developed and well-nourished.     Eyes: Pupils are equal, round, and reactive to light. Conjunctivae and EOM are normal.     Cardiovascular: Normal distal pulses and no edema.     Abdominal: Soft.     Skin: Skin displays no rash on trunk and no rash on extremities. Skin displays no lesions on trunk and no lesions on extremities.     Psych/Behavior: She is alert. She is oriented to person, place, and time. She has a normal mood and affect.      Musculoskeletal:        Neck: Range of motion is full.     Neurological:        DTRs: Tricep reflexes are 2+ on the right side and 2+ on the left side. Bicep reflexes are 2+ on the right side and 2+ on the left side. Brachioradialis reflexes are 2+ on the right side and 2+ on the left side. Patellar reflexes are 2+ on the right side and 2+ on the left side. Achilles reflexes are 2+ on the right side and 2+ on the left side.     Back Exam     Muscle Strength   Right Quadriceps:  5/5   Left Quadriceps:  5/5   Right Hamstrings:  5/5   Left Hamstrings:  5/5               Neurologic Exam     Mental Status   Oriented to person, place, and time.   Speech: speech is normal   Level of consciousness: alert    Cranial Nerves   Cranial nerves II through XII intact.     CN III, IV, VI   Pupils are equal, round, and reactive to light.  Extraocular motions are normal.     Motor Exam   Muscle bulk: normal  Overall muscle tone: normal    Strength   Right deltoid: 5/5  Left deltoid: 5/5  Right biceps: 5/5  Left biceps: 5/5  Right triceps: 5/5  Left triceps: 5/5  Right wrist flexion: 5/5  Left wrist flexion: 5/5  Right wrist extension: 5/5  Left wrist extension: 5/5  Right interossei: 5/5  Left interossei: 5/5  Right iliopsoas: 5/5  Left iliopsoas: 5/5  Right quadriceps: 5/5  Left quadriceps: 5/5  Right hamstrin/5  Left hamstrin/5  Right anterior tibial: 5/5  Left anterior tibial: 5/5  Right posterior tibial: 5/5  Left posterior tibial: 5/5  Right peroneal: 5/5  Left peroneal: 5/5  Right gastroc: 5/5  Left gastroc: 5/5    Sensory Exam   Light touch normal.   Pinprick normal.     Gait, Coordination, and Reflexes     Gait  Gait: normal    Coordination   Finger to nose coordination: normal  Tandem walking coordination: normal    Reflexes   Right brachioradialis: 2+  Left brachioradialis: 2+  Right biceps: 2+  Left biceps: 2+  Right triceps: 2+  Left triceps: 2+  Right patellar: 2+  Left patellar: 2+  Right achilles: 2+  Left  achilles: 2+  Right plantar: normal  Left plantar: normal  Right Hodges: absent  Left Hodges: absent  Right ankle clonus: absent  Left ankle clonus: absent      DIAGNOSTIC DATA:  I personally interpreted the following imaging:   Thoracic x-ray shows T6 compression fractures status post cement augmentation, stable    ASSESMENT:  This is a 87 y.o. female with     Problem List Items Addressed This Visit    None  Visit Diagnoses       Pathological fracture of thoracic vertebra, sequela    -  Primary    Age-related osteoporosis with current pathological fracture, sequela                  PLAN:  Increase  calcium and vitamin-D supplements twice daily  Consider Prolia treatment instead of Fosamax, will discuss with her primary care  All questions answered.  Follow-up as needed with neurosurgery          Kris Monteiro MD  Cell:[phone]

## 2023-03-28 ENCOUNTER — LAB VISIT (OUTPATIENT)
Dept: LAB | Facility: HOSPITAL | Age: 88
End: 2023-03-28
Attending: INTERNAL MEDICINE
Payer: MEDICARE

## 2023-03-28 ENCOUNTER — TELEPHONE (OUTPATIENT)
Dept: INTERNAL MEDICINE | Facility: CLINIC | Age: 88
End: 2023-03-28
Payer: MEDICARE

## 2023-03-28 DIAGNOSIS — E78.5 DYSLIPIDEMIA: ICD-10-CM

## 2023-03-28 DIAGNOSIS — M81.0 SENILE OSTEOPOROSIS: Primary | ICD-10-CM

## 2023-03-28 DIAGNOSIS — I10 ESSENTIAL HYPERTENSION: Chronic | ICD-10-CM

## 2023-03-28 LAB
CHOLEST SERPL-MCNC: 165 MG/DL (ref 120–199)
CHOLEST/HDLC SERPL: 3 {RATIO} (ref 2–5)
HDLC SERPL-MCNC: 55 MG/DL (ref 40–75)
HDLC SERPL: 33.3 % (ref 20–50)
LDLC SERPL CALC-MCNC: 98.4 MG/DL (ref 63–159)
NONHDLC SERPL-MCNC: 110 MG/DL
TRIGL SERPL-MCNC: 58 MG/DL (ref 30–150)
TSH SERPL DL<=0.005 MIU/L-ACNC: 3.65 UIU/ML (ref 0.4–4)

## 2023-03-28 PROCEDURE — 80061 LIPID PANEL: CPT | Mod: HCNC | Performed by: INTERNAL MEDICINE

## 2023-03-28 PROCEDURE — 36415 COLL VENOUS BLD VENIPUNCTURE: CPT | Mod: HCNC,PO | Performed by: INTERNAL MEDICINE

## 2023-03-28 PROCEDURE — 84443 ASSAY THYROID STIM HORMONE: CPT | Mod: HCNC | Performed by: INTERNAL MEDICINE

## 2023-03-28 NOTE — TELEPHONE ENCOUNTER
Spoke with pt contact given to schedule endo referral and bone density . Pt vu and will call to schedule .

## 2023-03-28 NOTE — TELEPHONE ENCOUNTER
----- Message from Mounika Martino sent at 3/28/2023  3:10 PM CDT -----  Contact: self [phone]  Patient is returning a phone call.  Who left a message for the patient: ZOEY Oswald MD  Does patient know what this is regarding:    Would you like a call back, or a response through your [facility] portal?:   call back  Comments:     Please call and advise

## 2023-03-28 NOTE — TELEPHONE ENCOUNTER
----- Message from Poonam Oreilly sent at 3/28/2023 11:17 AM CDT -----  Contact: [phone]  Per pt, she went to her bone Doctor on yesterday. Pt states that he suggested that she get a shot call prolia(she thinks it is what it is called) for bone density. Pt would like to speak with Dr. Oswald in regards to having this scheduled.            Thank you

## 2023-03-28 NOTE — TELEPHONE ENCOUNTER
Pt states she was seen by neurosurgery on yesterday and was told that the doctor suggest that she get prolia injection for bone density .

## 2023-03-29 ENCOUNTER — HOSPITAL ENCOUNTER (OUTPATIENT)
Dept: RADIOLOGY | Facility: HOSPITAL | Age: 88
Discharge: HOME | End: 2023-03-29
Attending: INTERNAL MEDICINE
Payer: MEDICARE

## 2023-03-29 DIAGNOSIS — M81.0 SENILE OSTEOPOROSIS: ICD-10-CM

## 2023-03-29 PROCEDURE — 77080 DXA BONE DENSITY AXIAL: CPT | Mod: 26,,, | Performed by: INTERNAL MEDICINE

## 2023-03-29 PROCEDURE — 77080 DXA BONE DENSITY AXIAL SKELETON 1 OR MORE SITES: ICD-10-PCS | Mod: 26,,, | Performed by: INTERNAL MEDICINE

## 2023-03-29 PROCEDURE — 77080 DXA BONE DENSITY AXIAL: CPT | Mod: TC

## 2023-04-03 ENCOUNTER — OFFICE VISIT (OUTPATIENT)
Dept: URGENT CARE | Facility: CLINIC | Age: 88
End: 2023-04-03
Payer: MEDICARE

## 2023-04-03 VITALS
BODY MASS INDEX: 24.63 KG/M2 | SYSTOLIC BLOOD PRESSURE: 117 MMHG | RESPIRATION RATE: 18 BRPM | HEART RATE: 85 BPM | DIASTOLIC BLOOD PRESSURE: 80 MMHG | OXYGEN SATURATION: 95 % | TEMPERATURE: 99 F | WEIGHT: 139 LBS | HEIGHT: 63 IN

## 2023-04-03 DIAGNOSIS — M54.6 ACUTE LEFT-SIDED THORACIC BACK PAIN: Primary | ICD-10-CM

## 2023-04-03 PROCEDURE — 71101 X-RAY EXAM UNILAT RIBS/CHEST: CPT | Mod: FY,LT,S$GLB, | Performed by: RADIOLOGY

## 2023-04-03 PROCEDURE — 99213 PR OFFICE/OUTPT VISIT, EST, LEVL III, 20-29 MIN: ICD-10-PCS | Mod: S$GLB,,, | Performed by: NURSE PRACTITIONER

## 2023-04-03 PROCEDURE — 71101 XR RIBS MIN 3 VIEWS W/ PA CHEST LEFT: ICD-10-PCS | Mod: FY,LT,S$GLB, | Performed by: RADIOLOGY

## 2023-04-03 PROCEDURE — 99213 OFFICE O/P EST LOW 20 MIN: CPT | Mod: S$GLB,,, | Performed by: NURSE PRACTITIONER

## 2023-04-03 RX ORDER — NAPROXEN 500 MG/1
500 TABLET ORAL 2 TIMES DAILY WITH MEALS
Qty: 10 TABLET | Refills: 0 | Status: SHIPPED | OUTPATIENT
Start: 2023-04-03 | End: 2023-04-08

## 2023-04-03 NOTE — PROGRESS NOTES
"Subjective:      Patient ID: Mine Wilkins is a 87 y.o. female.    Vitals:  height is 5' 3" (1.6 m) and weight is 63 kg (139 lb). Her temperature is 98.9 °F (37.2 °C). Her blood pressure is 117/80 and her pulse is 85. Her respiration is 18 and oxygen saturation is 95%.     Chief Complaint: Rib Injury    86 yo female presents with left thoracic rib pain for past week. History of chronic cough, seen by pulmonology who referred her to ENT. Recently fractured multiple ribs bilaterally (2/2023), patient states due to her cough (unable to see ER note). Expresses concern that she has fracture another rib. Denies recent fall/trauma.    Back Pain  This is a new problem. The current episode started in the past 7 days. The problem occurs constantly. The problem is unchanged. Pain location: thoracic/rib. The quality of the pain is described as aching. The pain does not radiate. The pain is moderate. The symptoms are aggravated by coughing. Pertinent negatives include no fever. Risk factors include history of osteoporosis.   Constitution: Negative for chills, fatigue and fever.   Respiratory:  Positive for cough. Negative for sputum production, shortness of breath and wheezing.    Musculoskeletal:  Positive for back pain. Negative for trauma.    Objective:     Physical Exam   Constitutional: She does not appear ill. No distress.   Cardiovascular: Normal rate, regular rhythm, normal heart sounds and normal pulses.   Pulmonary/Chest: Effort normal and breath sounds normal. No respiratory distress.   Abdominal: Normal appearance.   Musculoskeletal:      Thoracic back: She exhibits tenderness (thoracic rib ttp). She exhibits no bony tenderness and no deformity.      Lumbar back: Normal.   Neurological: She is alert.   Nursing note and vitals reviewed.    X-Ray Thoracic Spine AP Lateral    Result Date: 3/27/2023  EXAMINATION: XR THORACIC SPINE AP LATERAL CLINICAL HISTORY: Wedge compression fracture of t5-T6 vertebra, " subsequent encounter for fracture with routine healing TECHNIQUE: AP and lateral views of the thoracic spine were performed. COMPARISON: November 1, 2022 FINDINGS: There is osseous demineralization.  The T6 vertebral compression fracture with change of kyphoplasty appears similar to previous imaging.  L2 compression fracture also appears unchanged.  There is no new compression fracture. Degenerative changes within the cervical and thoracic spine are again noted.  There is vascular calcification within the soft tissues.  There is surgical change within the heart. The technique is optimized for evaluation of the thoracic spine.  Allowing for this, there are 2 adjacent healing or remote left rib cage fractures.  There is a incompletely imaged probable healing or remote right rib cage fracture as well.     As above Electronically signed by: Radha Alanis MD Date:    03/27/2023 Time:    15:50    XR RIB LEFT W/ PA CHEST    Result Date: 4/3/2023  EXAMINATION: XR RIBS MIN 3 VIEWS W/ PA CHEST LEFT CLINICAL HISTORY: Pain in thoracic spine TECHNIQUE: Left ribs and PA view of the pelvis COMPARISON: None FINDINGS: There are multiple healing or healed rib fractures identified bilaterally.  No definite acute fractures or dislocation.  Heart size normal.  The lungs are clear.  No pleural effusion.  Thoracic vertebroplasty as before.     See above Electronically signed by: Andrei Vidal MD Date:    04/03/2023 Time:    14:22      Assessment:     1. Acute left-sided thoracic back pain        Plan:       Acute left-sided thoracic back pain  -     XR RIB LEFT W/ PA CHEST; Future; Expected date: 04/03/2023  -     naproxen (NAPROSYN) 500 MG tablet; Take 1 tablet (500 mg total) by mouth 2 (two) times daily with meals. for 5 days  Dispense: 10 tablet; Refill: 0    Appears to have multiple healing rib fractures, no new fracture identified. Appointment with primary care made for follow up. Start naproxen as prescribed.

## 2023-04-03 NOTE — PATIENT INSTRUCTIONS
Start naproxen as prescribed for pain  Follow up as scheduled  Go to the ER for worsening pain, fever, or shortness of breath

## 2023-04-05 ENCOUNTER — OFFICE VISIT (OUTPATIENT)
Dept: INTERNAL MEDICINE | Facility: CLINIC | Age: 88
End: 2023-04-05
Payer: MEDICARE

## 2023-04-05 VITALS
OXYGEN SATURATION: 96 % | DIASTOLIC BLOOD PRESSURE: 78 MMHG | HEART RATE: 74 BPM | BODY MASS INDEX: 25.27 KG/M2 | TEMPERATURE: 97 F | SYSTOLIC BLOOD PRESSURE: 144 MMHG | RESPIRATION RATE: 14 BRPM | WEIGHT: 142.63 LBS

## 2023-04-05 DIAGNOSIS — R05.3 CHRONIC COUGH: ICD-10-CM

## 2023-04-05 DIAGNOSIS — K21.9 GASTROESOPHAGEAL REFLUX DISEASE, UNSPECIFIED WHETHER ESOPHAGITIS PRESENT: Primary | ICD-10-CM

## 2023-04-05 PROCEDURE — 1159F MED LIST DOCD IN RCRD: CPT | Mod: HCNC,CPTII,S$GLB, | Performed by: HOSPITALIST

## 2023-04-05 PROCEDURE — 99213 PR OFFICE/OUTPT VISIT, EST, LEVL III, 20-29 MIN: ICD-10-PCS | Mod: HCNC,S$GLB,, | Performed by: HOSPITALIST

## 2023-04-05 PROCEDURE — 1157F PR ADVANCE CARE PLAN OR EQUIV PRESENT IN MEDICAL RECORD: ICD-10-PCS | Mod: HCNC,CPTII,S$GLB, | Performed by: HOSPITALIST

## 2023-04-05 PROCEDURE — 1157F ADVNC CARE PLAN IN RCRD: CPT | Mod: HCNC,CPTII,S$GLB, | Performed by: HOSPITALIST

## 2023-04-05 PROCEDURE — 1160F RVW MEDS BY RX/DR IN RCRD: CPT | Mod: HCNC,CPTII,S$GLB, | Performed by: HOSPITALIST

## 2023-04-05 PROCEDURE — 99999 PR PBB SHADOW E&M-EST. PATIENT-LVL V: CPT | Mod: PBBFAC,HCNC,, | Performed by: HOSPITALIST

## 2023-04-05 PROCEDURE — 99213 OFFICE O/P EST LOW 20 MIN: CPT | Mod: HCNC,S$GLB,, | Performed by: HOSPITALIST

## 2023-04-05 PROCEDURE — 99999 PR PBB SHADOW E&M-EST. PATIENT-LVL V: ICD-10-PCS | Mod: PBBFAC,HCNC,, | Performed by: HOSPITALIST

## 2023-04-05 PROCEDURE — 1159F PR MEDICATION LIST DOCUMENTED IN MEDICAL RECORD: ICD-10-PCS | Mod: HCNC,CPTII,S$GLB, | Performed by: HOSPITALIST

## 2023-04-05 PROCEDURE — 1160F PR REVIEW ALL MEDS BY PRESCRIBER/CLIN PHARMACIST DOCUMENTED: ICD-10-PCS | Mod: HCNC,CPTII,S$GLB, | Performed by: HOSPITALIST

## 2023-04-05 NOTE — PROGRESS NOTES
Subjective:     @Patient ID: Mine Wilkins is a 87 y.o. female.    Chief Complaint: Cough and Gastroesophageal Reflux (Wants someone to read her bone density)    HPI  86 yo F presents for urgent visit f/u with cough and rib pain.   Went to urgent care 4/3 for rib pain. Xray of rib was normal. No acute issues. Given naproxen reports pain has improved.   Reports cough is chronic since last year.  Reports sees o/s ENT who manages her sinusitis which contributes to her cough. States her ENT doctor also did a scope and noted she has had reflux as well. Reports given gaviscon   Daughter also reports pt coughs when she eats but no trouble with swallowing. Pt would like to have endoscopy  Bone density final report is not back yet     Review of Systems   Constitutional:  Negative for chills and fever.   Respiratory:  Positive for cough.    Musculoskeletal:  Negative for arthralgias.   Psychiatric/Behavioral:  Negative for agitation and confusion.    Past medical history, surgical history, and family medical history reviewed and updated as appropriate.    Medications and allergies reviewed.     Objective:     Vitals:    04/05/23 1058   BP: (!) 144/78   BP Location: Right arm   Patient Position: Sitting   BP Method: Medium (Manual)   Pulse: 74   Resp: 14   Temp: 97.4 °F (36.3 °C)   TempSrc: Temporal   SpO2: 96%   Weight: 64.7 kg (142 lb 10.2 oz)     Body mass index is 25.27 kg/m².  Physical Exam  Constitutional:       Appearance: Normal appearance.   HENT:      Head: Normocephalic and atraumatic.   Eyes:      General:         Right eye: No discharge.         Left eye: No discharge.      Conjunctiva/sclera: Conjunctivae normal.   Cardiovascular:      Rate and Rhythm: Normal rate and regular rhythm.      Heart sounds: Murmur heard.   Pulmonary:      Effort: Pulmonary effort is normal.      Breath sounds: Normal breath sounds.   Musculoskeletal:         General: Normal range of motion.      Cervical back: Normal range  of motion and neck supple.   Skin:     General: Skin is warm and dry.   Neurological:      Mental Status: She is alert and oriented to person, place, and time.   Psychiatric:         Mood and Affect: Mood normal.         Behavior: Behavior normal.       Lab Results   Component Value Date    WBC 9.15 03/21/2023    HGB 13.6 03/21/2023    HCT 40.8 03/21/2023     03/21/2023    CHOL 165 03/28/2023    TRIG 58 03/28/2023    HDL 55 03/28/2023    ALT 21 07/16/2022    AST 23 07/16/2022     03/21/2023    K 3.9 03/21/2023     03/21/2023    CREATININE 0.9 03/21/2023    BUN 25 (H) 03/21/2023    CO2 24 03/21/2023    TSH 3.649 03/28/2023    INR 1.0 03/08/2022    HGBA1C 5.3 05/19/2018       Assessment:     1. Gastroesophageal reflux disease, unspecified whether esophagitis present    2. Chronic cough      Plan:   Mine was seen today for cough and gastroesophageal reflux.    Diagnoses and all orders for this visit:    Gastroesophageal reflux disease, unspecified whether esophagitis present  -     Ambulatory referral/consult to Gastroenterology; Future    Chronic cough  -     Ambulatory referral/consult to Gastroenterology; Future    Continue omeprazole  Referral placed to GI for further evaluation    Valentina Hayes MD  Internal Medicine    4/5/2023

## 2023-04-10 ENCOUNTER — PATIENT MESSAGE (OUTPATIENT)
Dept: INTERNAL MEDICINE | Facility: CLINIC | Age: 88
End: 2023-04-10
Payer: MEDICARE

## 2023-04-10 ENCOUNTER — OFFICE VISIT (OUTPATIENT)
Dept: GASTROENTEROLOGY | Facility: CLINIC | Age: 88
End: 2023-04-10
Payer: MEDICARE

## 2023-04-10 VITALS
HEIGHT: 63 IN | DIASTOLIC BLOOD PRESSURE: 73 MMHG | WEIGHT: 142 LBS | BODY MASS INDEX: 25.16 KG/M2 | HEART RATE: 87 BPM | SYSTOLIC BLOOD PRESSURE: 149 MMHG

## 2023-04-10 DIAGNOSIS — R05.3 CHRONIC COUGH: ICD-10-CM

## 2023-04-10 DIAGNOSIS — K21.9 GASTROESOPHAGEAL REFLUX DISEASE, UNSPECIFIED WHETHER ESOPHAGITIS PRESENT: ICD-10-CM

## 2023-04-10 PROCEDURE — 1159F MED LIST DOCD IN RCRD: CPT | Mod: CPTII,S$GLB,, | Performed by: INTERNAL MEDICINE

## 2023-04-10 PROCEDURE — 99204 OFFICE O/P NEW MOD 45 MIN: CPT | Mod: S$GLB,,, | Performed by: INTERNAL MEDICINE

## 2023-04-10 PROCEDURE — 1101F PT FALLS ASSESS-DOCD LE1/YR: CPT | Mod: CPTII,S$GLB,, | Performed by: INTERNAL MEDICINE

## 2023-04-10 PROCEDURE — 3288F PR FALLS RISK ASSESSMENT DOCUMENTED: ICD-10-PCS | Mod: CPTII,S$GLB,, | Performed by: INTERNAL MEDICINE

## 2023-04-10 PROCEDURE — 99999 PR PBB SHADOW E&M-EST. PATIENT-LVL IV: ICD-10-PCS | Mod: PBBFAC,,, | Performed by: INTERNAL MEDICINE

## 2023-04-10 PROCEDURE — 1126F PR PAIN SEVERITY QUANTIFIED, NO PAIN PRESENT: ICD-10-PCS | Mod: CPTII,S$GLB,, | Performed by: INTERNAL MEDICINE

## 2023-04-10 PROCEDURE — 99999 PR PBB SHADOW E&M-EST. PATIENT-LVL IV: CPT | Mod: PBBFAC,,, | Performed by: INTERNAL MEDICINE

## 2023-04-10 PROCEDURE — 1157F ADVNC CARE PLAN IN RCRD: CPT | Mod: CPTII,S$GLB,, | Performed by: INTERNAL MEDICINE

## 2023-04-10 PROCEDURE — 99204 PR OFFICE/OUTPT VISIT, NEW, LEVL IV, 45-59 MIN: ICD-10-PCS | Mod: S$GLB,,, | Performed by: INTERNAL MEDICINE

## 2023-04-10 PROCEDURE — 1159F PR MEDICATION LIST DOCUMENTED IN MEDICAL RECORD: ICD-10-PCS | Mod: CPTII,S$GLB,, | Performed by: INTERNAL MEDICINE

## 2023-04-10 PROCEDURE — 3288F FALL RISK ASSESSMENT DOCD: CPT | Mod: CPTII,S$GLB,, | Performed by: INTERNAL MEDICINE

## 2023-04-10 PROCEDURE — 1126F AMNT PAIN NOTED NONE PRSNT: CPT | Mod: CPTII,S$GLB,, | Performed by: INTERNAL MEDICINE

## 2023-04-10 PROCEDURE — 1101F PR PT FALLS ASSESS DOC 0-1 FALLS W/OUT INJ PAST YR: ICD-10-PCS | Mod: CPTII,S$GLB,, | Performed by: INTERNAL MEDICINE

## 2023-04-10 PROCEDURE — 1157F PR ADVANCE CARE PLAN OR EQUIV PRESENT IN MEDICAL RECORD: ICD-10-PCS | Mod: CPTII,S$GLB,, | Performed by: INTERNAL MEDICINE

## 2023-04-10 NOTE — PROGRESS NOTES
Reason for visit: Chronic cough and suspected GERD    HPI:  is a 87 year old lady with suspected GERD. Medical history includes Osteoporosis, HTN, Anxiety, PVD, s/p TAVR, OA, GERD and HLP. Reports cough since last year. Had a viral illness with profuse rhinorrhea and coughing in May 2022. She had a number of close contacts who had a similar illness. She was afebrile during that illness, no SOB, tested negative for COVID (home testing kit). COVID vaccinated x3. Since that time she has had a non-productive cough sometimes resulting in emesis. Mostly position-dependent cough, especially while reclined. Uses albuterol and that provides relief. GERD symptoms appear controlled with omeprazole. Has a tickle in back of throat when she lies back which triggers cough. She has stopped taking all her nasal sprays. Reports sees o/s ENT who manages her sinusitis which contributes to her cough. States her ENT doctor also did a scope and noted she has had reflux as well. Was given gaviscon tablets at bedtime. Denies any dysphagia, odynophagia, nausea, vomiting, heartburn or acid regurgitation regularly. No abdominal pains, changes in bowel pattern, blood/ mucus in stool or unintentional weight loss. No melena or maroon stools. No recent changes in diet or medications. No family history of IBD, Celiac disease or GI malignancy. No regular NSAIDs, alcohol or tobacco use. No recent antibiotic use, travels or sick contacts. No prior history of C.diff. Colonoscopy from 2015 was normal.    Past medical, surgical, social and family history reviewed in epic    Medication allergies reviewed in epic    Review of systems:    Constitutional:  No fever, no chills, no weight loss, appetite is normal  Eyes:  No visual changes or red eyes  ENT:  No odynophagia or hoarseness of voice  Cardiovascular:  No angina or palpitation  Respiratory:  No shortness breath or wheezing  Genitourinary:  No dysuria or frequency  Musculoskeletal:  No  myalgias or arthralgias  Skin:  No pruritus or eczema  Neurologic:  No headache or seizures  Psychiatric:  No anxiety depression  Gastrointestinal:  See HPI    Physical exam:  Vitals see epic, awake, alert, oriented x3 in no acute distress    Neck:  Supple, no carotid bruit, no cervical adenopathy  Abdomen:  Flat, soft, nontender, nondistended, no masses palpable, no hepatosplenomegaly detected, bowel sounds are normal, no ascites clinically detectable  Eyes:  Conjunctivae anicteric, not injected  ENT:  Oral mucosa moist  Cardiovascular:  S1, S2 normal, systolic murmur, no gallops, no abdominal bruits heard  Respiratory:  Bilateral air entry equal, no rhonchi, no crackles, normal effort  Skin:  No palmar erythema or spider angiomata  Neurologic:  No asterixis or tremors  Psychiatric:  Affect appropriate, proper judgment, proper insight, oriented to place and time  Lower extremities:  No pedal edema    Recent labs, imaging (CXR) and endoscopy reports reviewed.      Impression: Chronic cough. Unclear if this is GERD related. This could be post viral chronic cough\.    Recommendations:     Schedule EGD to evaluate for HH, esophagitis and Villegas's esophagus.  No further evaluation if endoscopy is unremarkable.  May consider Neurontin for chronic cough.

## 2023-04-11 ENCOUNTER — TELEPHONE (OUTPATIENT)
Dept: ENDOSCOPY | Facility: HOSPITAL | Age: 88
End: 2023-04-11
Payer: MEDICARE

## 2023-04-11 DIAGNOSIS — K21.9 GASTROESOPHAGEAL REFLUX DISEASE, UNSPECIFIED WHETHER ESOPHAGITIS PRESENT: Primary | ICD-10-CM

## 2023-04-11 NOTE — TELEPHONE ENCOUNTER
" Endo Case Request  Received: Yesterday  Derian Ross MD  P [facility]  Procedure: EGD     Diagnosis: GERD     Procedure Timin-4 weeks     *If within 4 weeks selected, please shanti as high priority*     *If greater than 12 weeks, please select "4-12 weeks" and delay sending until 2 months prior to requested date*     Provider: Any GI provider     Location: [address]     Additional Scheduling Information: None     Prep Specifications:N/A     Have you attached a patient to this message: yes   "

## 2023-04-12 ENCOUNTER — ANESTHESIA EVENT (OUTPATIENT)
Dept: ENDOSCOPY | Facility: HOSPITAL | Age: 88
End: 2023-04-12
Payer: MEDICARE

## 2023-04-13 ENCOUNTER — ANESTHESIA (OUTPATIENT)
Dept: ENDOSCOPY | Facility: HOSPITAL | Age: 88
End: 2023-04-13
Payer: MEDICARE

## 2023-04-13 ENCOUNTER — HOSPITAL ENCOUNTER (OUTPATIENT)
Facility: HOSPITAL | Age: 88
Discharge: HOME | End: 2023-04-13
Attending: INTERNAL MEDICINE | Admitting: INTERNAL MEDICINE
Payer: MEDICARE

## 2023-04-13 VITALS
HEART RATE: 66 BPM | TEMPERATURE: 98 F | DIASTOLIC BLOOD PRESSURE: 76 MMHG | BODY MASS INDEX: 24.98 KG/M2 | HEIGHT: 63 IN | WEIGHT: 141 LBS | SYSTOLIC BLOOD PRESSURE: 155 MMHG | OXYGEN SATURATION: 97 % | RESPIRATION RATE: 21 BRPM

## 2023-04-13 DIAGNOSIS — K21.9 GERD (GASTROESOPHAGEAL REFLUX DISEASE): ICD-10-CM

## 2023-04-13 DIAGNOSIS — K21.9 GASTROESOPHAGEAL REFLUX DISEASE WITHOUT ESOPHAGITIS: Primary | ICD-10-CM

## 2023-04-13 PROCEDURE — 43239 EGD BIOPSY SINGLE/MULTIPLE: CPT | Mod: HCNC,,, | Performed by: INTERNAL MEDICINE

## 2023-04-13 PROCEDURE — 37000009 HC ANESTHESIA EA ADD 15 MINS: Mod: HCNC | Performed by: INTERNAL MEDICINE

## 2023-04-13 PROCEDURE — D9220A PRA ANESTHESIA: ICD-10-PCS | Mod: HCNC,ANES,, | Performed by: ANESTHESIOLOGY

## 2023-04-13 PROCEDURE — 27201012 HC FORCEPS, HOT/COLD, DISP: Mod: HCNC | Performed by: INTERNAL MEDICINE

## 2023-04-13 PROCEDURE — 88305 TISSUE EXAM BY PATHOLOGIST: ICD-10-PCS | Mod: 26,HCNC,, | Performed by: PATHOLOGY

## 2023-04-13 PROCEDURE — D9220A PRA ANESTHESIA: Mod: HCNC,ANES,, | Performed by: ANESTHESIOLOGY

## 2023-04-13 PROCEDURE — 25000003 PHARM REV CODE 250: Mod: HCNC | Performed by: NURSE ANESTHETIST, CERTIFIED REGISTERED

## 2023-04-13 PROCEDURE — D9220A PRA ANESTHESIA: Mod: HCNC,CRNA,, | Performed by: NURSE ANESTHETIST, CERTIFIED REGISTERED

## 2023-04-13 PROCEDURE — 63600175 PHARM REV CODE 636 W HCPCS: Mod: HCNC | Performed by: NURSE ANESTHETIST, CERTIFIED REGISTERED

## 2023-04-13 PROCEDURE — 37000008 HC ANESTHESIA 1ST 15 MINUTES: Mod: HCNC | Performed by: INTERNAL MEDICINE

## 2023-04-13 PROCEDURE — 25000003 PHARM REV CODE 250: Mod: HCNC | Performed by: INTERNAL MEDICINE

## 2023-04-13 PROCEDURE — 43239 PR EGD, FLEX, W/BIOPSY, SGL/MULTI: ICD-10-PCS | Mod: HCNC,,, | Performed by: INTERNAL MEDICINE

## 2023-04-13 PROCEDURE — 43239 EGD BIOPSY SINGLE/MULTIPLE: CPT | Mod: HCNC | Performed by: INTERNAL MEDICINE

## 2023-04-13 PROCEDURE — D9220A PRA ANESTHESIA: ICD-10-PCS | Mod: HCNC,CRNA,, | Performed by: NURSE ANESTHETIST, CERTIFIED REGISTERED

## 2023-04-13 PROCEDURE — 88305 TISSUE EXAM BY PATHOLOGIST: CPT | Mod: 26,HCNC,, | Performed by: PATHOLOGY

## 2023-04-13 PROCEDURE — 88305 TISSUE EXAM BY PATHOLOGIST: CPT | Mod: HCNC | Performed by: PATHOLOGY

## 2023-04-13 RX ORDER — SODIUM CHLORIDE 9 MG/ML
INJECTION, SOLUTION INTRAVENOUS CONTINUOUS
Status: DISCONTINUED | OUTPATIENT
Start: 2023-04-13 | End: 2023-04-13 | Stop reason: HOSPADM

## 2023-04-13 RX ORDER — PROPOFOL 10 MG/ML
VIAL (ML) INTRAVENOUS CONTINUOUS PRN
Status: DISCONTINUED | OUTPATIENT
Start: 2023-04-13 | End: 2023-04-13

## 2023-04-13 RX ORDER — SODIUM CHLORIDE 0.9 % (FLUSH) 0.9 %
3 SYRINGE (ML) INJECTION EVERY 30 MIN PRN
Status: DISCONTINUED | OUTPATIENT
Start: 2023-04-13 | End: 2023-04-13 | Stop reason: HOSPADM

## 2023-04-13 RX ORDER — PROPOFOL 10 MG/ML
VIAL (ML) INTRAVENOUS
Status: DISCONTINUED | OUTPATIENT
Start: 2023-04-13 | End: 2023-04-13

## 2023-04-13 RX ORDER — LIDOCAINE HYDROCHLORIDE 20 MG/ML
INJECTION INTRAVENOUS
Status: DISCONTINUED | OUTPATIENT
Start: 2023-04-13 | End: 2023-04-13

## 2023-04-13 RX ADMIN — Medication 40 MG: at 01:04

## 2023-04-13 RX ADMIN — SODIUM CHLORIDE: 0.9 INJECTION, SOLUTION INTRAVENOUS at 01:04

## 2023-04-13 RX ADMIN — PROPOFOL 150 MCG/KG/MIN: 10 INJECTION, EMULSION INTRAVENOUS at 01:04

## 2023-04-13 RX ADMIN — LIDOCAINE HYDROCHLORIDE 80 MG: 20 INJECTION INTRAVENOUS at 01:04

## 2023-04-13 NOTE — PROVATION PATIENT INSTRUCTIONS
Discharge Summary/Instructions after an Endoscopic Procedure  Patient Name: Mine Wilkins  Patient MRN: [id number]  Patient YOB: 1935 Thursday, April 13, 2023  Remy Aguilar MD  Dear patient,  As a result of recent federal legislation (The Federal Cures Act), you may   receive lab or pathology results from your procedure in your [facility]   account before your physician is able to contact you. Your physician or   their representative will relay the results to you with their   recommendations at their soonest availability.  Thank you,  RESTRICTIONS:  During your procedure today, you received medications for sedation.  These   medications may affect your judgment, balance and coordination.  Therefore,   for 24 hours, you have the following restrictions:   - DO NOT drive a car, operate machinery, make legal/financial decisions,   sign important papers or drink alcohol.    ACTIVITY:  Today: no heavy lifting, straining or running due to procedural   sedation/anesthesia.  The following day: return to full activity including work.  DIET:  Eat and drink normally unless instructed otherwise.     TREATMENT FOR COMMON SIDE EFFECTS:  - Mild abdominal pain, nausea, belching, bloating or excessive gas:  rest,   eat lightly and use a heating pad.  - Sore Throat: treat with throat lozenges and/or gargle with warm salt   water.  - Because air was used during the procedure, expelling large amounts of air   from your rectum or belching is normal.  - If a bowel prep was taken, you may not have a bowel movement for 1-3 days.    This is normal.  SYMPTOMS TO WATCH FOR AND REPORT TO YOUR PHYSICIAN:  1. Abdominal pain or bloating, other than gas cramps.  2. Chest pain.  3. Back pain.  4. Signs of infection such as: chills or fever occurring within 24 hours   after the procedure.  5. Rectal bleeding, which would show as bright red, maroon, or black stools.   (A tablespoon of blood from the rectum is not serious, especially  if   hemorrhoids are present.)  6. Vomiting.  7. Weakness or dizziness.  GO DIRECTLY TO THE NEAREST EMERGENCY ROOM IF YOU HAVE ANY OF THE FOLLOWING:      Difficulty breathing              Chills and/or fever over 101 F   Persistent vomiting and/or vomiting blood   Severe abdominal pain   Severe chest pain   Black, tarry stools   Bleeding- more than one tablespoon   Any other symptom or condition that you feel may need urgent attention  Your doctor recommends these additional instructions:  If any biopsies were taken, your doctors clinic will contact you in 1 to 2   weeks with any results.  - Discharge patient to home.   - Patient has a contact number available for emergencies.  The signs and   symptoms of potential delayed complications were discussed with the   patient.  Return to normal activities tomorrow.  Written discharge   instructions were provided to the patient.   - Resume previous diet.   - Continue present medications. Continue omeprazole indefinitely.   - Await pathology results.   - Return to referring physician.   For questions, problems or results please call your physician - Remy Aguilar MD at Work:  [phone].  [facility], EMERGENCY ROOM PHONE NUMBER: [phone]  IF A COMPLICATION OR EMERGENCY SITUATION ARISES AND YOU ARE UNABLE TO REACH   YOUR PHYSICIAN - GO DIRECTLY TO THE EMERGENCY ROOM.  Remy Aguilar MD  4/13/2023 1:29:00 PM  This report has been verified and signed electronically.  Dear patient,  As a result of recent federal legislation (The Federal Cures Act), you may   receive lab or pathology results from your procedure in your [facility]   account before your physician is able to contact you. Your physician or   their representative will relay the results to you with their   recommendations at their soonest availability.  Thank you,  PROVATION

## 2023-04-13 NOTE — ANESTHESIA POSTPROCEDURE EVALUATION
Anesthesia Post Evaluation    Patient: Mine Wilkins    Procedure(s) Performed: Procedure(s) (LRB):  ESOPHAGOGASTRODUODENOSCOPY (EGD) (N/A)    Final Anesthesia Type: general      Patient location during evaluation: PACU  Patient participation: Yes- Able to Participate  Level of consciousness: awake and alert  Post-procedure vital signs: reviewed and stable  Pain management: adequate  Airway patency: patent    PONV status at discharge: No PONV  Anesthetic complications: no      Cardiovascular status: blood pressure returned to baseline  Respiratory status: unassisted, room air and spontaneous ventilation  Hydration status: euvolemic  Follow-up not needed.          Vitals Value Taken Time   /76 04/13/23 1400   Temp 36.6 °C (97.9 °F) 04/13/23 1400   Pulse 66 04/13/23 1400   Resp 21 04/13/23 1400   SpO2 97 % 04/13/23 1400         No case tracking events are documented in the log.      Pain/Dorene Score: Dorene Score: 10 (4/13/2023  1:45 PM)

## 2023-04-13 NOTE — TRANSFER OF CARE
"Anesthesia Transfer of Care Note    Patient: Mine Wilkins    Procedure(s) Performed: Procedure(s) (LRB):  ESOPHAGOGASTRODUODENOSCOPY (EGD) (N/A)    Patient location: [facility]    Anesthesia Type: general    Transport from OR: Transported from OR on room air with adequate spontaneous ventilation    Post pain: adequate analgesia    Post assessment: no apparent anesthetic complications and tolerated procedure well    Post vital signs: stable    Level of consciousness: awake, alert and oriented    Nausea/Vomiting: no nausea/vomiting    Complications: none    Transfer of care protocol was followed      Last vitals:   Visit Vitals  BP (!) 170/73 (BP Location: Left arm, Patient Position: Lying)   Pulse 71   Temp 36.8 °C (98.2 °F) (Temporal)   Resp 16   Ht 5' 3" (1.6 m)   Wt 64 kg (141 lb)   SpO2 100%   Breastfeeding No   BMI 24.98 kg/m²     "

## 2023-04-13 NOTE — INTERVAL H&P NOTE
The patient has been examined and the H&P has been reviewed:    Pre-Procedure H and P Addendum    Patient seen and examined.  History and exam unchanged from prior history and physical.      Procedure: EGD  Indication: GERD, Chronic cough  ASA Class: per anesthesiology  Airway: normal  Neck Mobility: full range of motion  Mallampatti score: per anesthesia  History of anesthesia problems: no  Family history of anesthesia problems: no  Anesthesia Plan: MAC

## 2023-04-13 NOTE — ANESTHESIA PREPROCEDURE EVALUATION
04/13/2023  Mine Wilkins is a 87 y.o., female.    Past Medical History:   Diagnosis Date    Acute on chronic diastolic heart failure 3/9/2022    After-cataract of both eyes - Left Eye 10/11/2012    Anxiety     Aortic calcification 8/10/2016    Aortic valve stenosis, moderate     AR (allergic rhinitis)     Arthritis of facet joints at multiple vertebral levels 1/14/2016    Seen on lumbar MRI dated 01/14/16    Cataract     Cholelithiasis     Closed displaced intertrochanteric fracture of right femur with routine healing s/p IM nail on 5/20/2018 5/19/2018    Cystocele 12/1/2013    Esotropia, alternating - Both Eyes 10/11/2012    Essential hypertension     Gastroesophageal reflux disease without esophagitis 4/13/2018    Left ventricular diastolic dysfunction with preserved systolic function 8/10/2016    Lumbar radiculopathy 1/25/2016    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    Nondisplaced fracture of proximal end of humerus 8/7/2016    Nonexudative age-related macular degeneration, bilateral, intermediate dry stage 1/14/2021    Osteoarthritis     Overweight (BMI 25.0-29.9) 4/13/2018    Pure hypercholesterolemia     Right-sided low back pain without sciatica 12/10/2015    Strabismus     Urethral hypermobility 12/1/2013       Past Surgical History:   Procedure Laterality Date    ADENOIDECTOMY      CARDIAC CATH COSURGEON N/A 3/8/2022    Procedure: Cardiac Cath Cosurgeon;  Surgeon: Shahram Lyle MD;  Location: [facility] CATH LAB;  Service: Cardiovascular;  Laterality: N/A;    CARDIAC VALVE SURGERY      CATARACT EXTRACTION Bilateral     BOTH EYES MULTIFOCAL    COLONOSCOPY  2015    CORONARY ANGIOGRAPHY N/A 2/7/2022    Procedure: ANGIOGRAM, CORONARY ARTERY;  Surgeon: Robby Mistry MD;  Location: [facility] CATH LAB;  Service: Cardiology;  Laterality: N/A;     EYE SURGERY      FEMUR FRACTURE SURGERY Right     FRACTURE SURGERY Right     femur    HYSTERECTOMY      INTERTROCHANTERIC HIP FRACTURE SURGERY Right 05/20/2018    IM nail    LEFT HEART CATHETERIZATION Left 3/8/2022    Procedure: Left heart cath;  Surgeon: Constantin Goldberg MD;  Location: [facility] CATH LAB;  Service: Cardiology;  Laterality: Left;    LUMBAR EPIDURAL INJECTION  02/04/2016    L4-l5    PARTIAL HYSTERECTOMY      SKIN GRAFT      left foot     TONSILLECTOMY      TRANSCATHETER AORTIC VALVE REPLACEMENT (TAVR) N/A 3/8/2022    Procedure: REPLACEMENT, AORTIC VALVE, TRANSCATHETER (TAVR);  Surgeon: Constantin Goldberg MD;  Location: [facility] CATH LAB;  Service: Cardiology;  Laterality: N/A;    WRIST FRACTURE SURGERY Right 2009    YAG CAP      LEFT EYE           Pre-op Assessment          Review of Systems  Anesthesia Hx:  No problems with previous Anesthesia  History of prior surgery of interest to airway management or planning: Denies Family Hx of Anesthesia complications.   Denies Personal Hx of Anesthesia complications.   Cardiovascular:   Exercise tolerance: good Hypertension  Denies Angina.  Denies UMANZOR. H/o TAVR, has done well since.  Echo reviewed.    Pulmonary:   Chronic cough- so violent it has resulted in broken ribs and vertebrae   Hepatic/GI:   GERD    Psych:   anxiety          Physical Exam  General: Alert, Oriented and Well nourished    Airway:  Mallampati: II   Mouth Opening: Normal  TM Distance: Normal  Neck ROM: Normal ROM    Dental:  Intact    Chest/Lungs:  Normal Respiratory Rate    Heart:  Rate: Normal  Rhythm: Regular Rhythm        Anesthesia Plan  Type of Anesthesia, risks & benefits discussed:    Anesthesia Type: Gen ETT, Gen Natural Airway, MAC  Intra-op Monitoring Plan: Standard ASA Monitors  Post Op Pain Control Plan: multimodal analgesia and IV/PO Opioids PRN  Informed Consent: Informed consent signed with the Patient and all parties understand the risks and agree with anesthesia  plan.  All questions answered.   ASA Score: 3  Day of Surgery Review of History & Physical: H&P Update referred to the surgeon/provider.    Ready For Surgery From Anesthesia Perspective.     .

## 2023-04-20 ENCOUNTER — OFFICE VISIT (OUTPATIENT)
Dept: CARDIOLOGY | Facility: CLINIC | Age: 88
End: 2023-04-20
Payer: MEDICARE

## 2023-04-20 VITALS
HEART RATE: 89 BPM | SYSTOLIC BLOOD PRESSURE: 138 MMHG | BODY MASS INDEX: 24.45 KG/M2 | RESPIRATION RATE: 20 BRPM | HEIGHT: 63 IN | WEIGHT: 138 LBS | DIASTOLIC BLOOD PRESSURE: 84 MMHG

## 2023-04-20 DIAGNOSIS — I10 ESSENTIAL HYPERTENSION: Primary | Chronic | ICD-10-CM

## 2023-04-20 DIAGNOSIS — I70.0 AORTIC ATHEROSCLEROSIS: ICD-10-CM

## 2023-04-20 DIAGNOSIS — I51.89 LEFT VENTRICULAR DIASTOLIC DYSFUNCTION WITH PRESERVED SYSTOLIC FUNCTION: Chronic | ICD-10-CM

## 2023-04-20 DIAGNOSIS — Z95.2 S/P TAVR (TRANSCATHETER AORTIC VALVE REPLACEMENT): Chronic | ICD-10-CM

## 2023-04-20 DIAGNOSIS — E78.5 DYSLIPIDEMIA: ICD-10-CM

## 2023-04-20 LAB
FINAL PATHOLOGIC DIAGNOSIS: NORMAL
GROSS: NORMAL
Lab: NORMAL

## 2023-04-20 PROCEDURE — 1159F MED LIST DOCD IN RCRD: CPT | Mod: CPTII,S$GLB,, | Performed by: INTERNAL MEDICINE

## 2023-04-20 PROCEDURE — 1101F PT FALLS ASSESS-DOCD LE1/YR: CPT | Mod: CPTII,S$GLB,, | Performed by: INTERNAL MEDICINE

## 2023-04-20 PROCEDURE — 1101F PR PT FALLS ASSESS DOC 0-1 FALLS W/OUT INJ PAST YR: ICD-10-PCS | Mod: CPTII,S$GLB,, | Performed by: INTERNAL MEDICINE

## 2023-04-20 PROCEDURE — 99999 PR PBB SHADOW E&M-EST. PATIENT-LVL IV: CPT | Mod: PBBFAC,,, | Performed by: INTERNAL MEDICINE

## 2023-04-20 PROCEDURE — 1126F AMNT PAIN NOTED NONE PRSNT: CPT | Mod: CPTII,S$GLB,, | Performed by: INTERNAL MEDICINE

## 2023-04-20 PROCEDURE — 1160F PR REVIEW ALL MEDS BY PRESCRIBER/CLIN PHARMACIST DOCUMENTED: ICD-10-PCS | Mod: CPTII,S$GLB,, | Performed by: INTERNAL MEDICINE

## 2023-04-20 PROCEDURE — 1159F PR MEDICATION LIST DOCUMENTED IN MEDICAL RECORD: ICD-10-PCS | Mod: CPTII,S$GLB,, | Performed by: INTERNAL MEDICINE

## 2023-04-20 PROCEDURE — 1157F ADVNC CARE PLAN IN RCRD: CPT | Mod: CPTII,S$GLB,, | Performed by: INTERNAL MEDICINE

## 2023-04-20 PROCEDURE — 3288F PR FALLS RISK ASSESSMENT DOCUMENTED: ICD-10-PCS | Mod: CPTII,S$GLB,, | Performed by: INTERNAL MEDICINE

## 2023-04-20 PROCEDURE — 1157F PR ADVANCE CARE PLAN OR EQUIV PRESENT IN MEDICAL RECORD: ICD-10-PCS | Mod: CPTII,S$GLB,, | Performed by: INTERNAL MEDICINE

## 2023-04-20 PROCEDURE — 99214 PR OFFICE/OUTPT VISIT, EST, LEVL IV, 30-39 MIN: ICD-10-PCS | Mod: S$GLB,,, | Performed by: INTERNAL MEDICINE

## 2023-04-20 PROCEDURE — 1126F PR PAIN SEVERITY QUANTIFIED, NO PAIN PRESENT: ICD-10-PCS | Mod: CPTII,S$GLB,, | Performed by: INTERNAL MEDICINE

## 2023-04-20 PROCEDURE — 3288F FALL RISK ASSESSMENT DOCD: CPT | Mod: CPTII,S$GLB,, | Performed by: INTERNAL MEDICINE

## 2023-04-20 PROCEDURE — 99999 PR PBB SHADOW E&M-EST. PATIENT-LVL IV: ICD-10-PCS | Mod: PBBFAC,,, | Performed by: INTERNAL MEDICINE

## 2023-04-20 PROCEDURE — 1160F RVW MEDS BY RX/DR IN RCRD: CPT | Mod: CPTII,S$GLB,, | Performed by: INTERNAL MEDICINE

## 2023-04-20 PROCEDURE — 99214 OFFICE O/P EST MOD 30 MIN: CPT | Mod: S$GLB,,, | Performed by: INTERNAL MEDICINE

## 2023-04-20 NOTE — PROGRESS NOTES
HISTORY:    89 yo F with a h/o aortic stenosis status post TAVR (March 2022 with a 23 mm S3), hypertension, hyperlipidemia, and DDD presenting for follow-up.     The patient first presented to me in 2021 when we identified severe AS with progressive UMANZOR. She was referred for TAVR, which she underwent successfully in March 2022. She tolerated the procedure without issue and has done well from a CV standpoint.     The patient denies any symptoms of chest pain, shortness of breath, or dyspnea on exertion.     In 2022 she struggled with back pain and underwent successful surgery without issue. Still has pain intermittently. Also, had some non-traumatic rib fractures related to coughing? This year she has been struggling with a cough of unclear origin.     She tolerates asa 81x1, amlodipine 5x1, and pravastatin 20x1. She did not take her losartan this am. Bps okay at home.        PHYSICAL EXAM:    Vitals:    04/20/23 0951   BP: 138/84   Pulse: 89   Resp: 20       NAD, A+Ox3.  No jvd, no bruit.  RRR nml s1,s2. 2/6 AS murmur  CTA B no wheezes or crackles.  2+ B radial and 1+ B DP/PT. No edema.      LABS/STUDIES (imaging reviewed during clinic visit):    CBC and BMP from March 2023 demonstrate a hemoglobin of 13.4 and a creatinine 0.9 with a BUN of 25. January 2022  LDL is 104 and HDL is 59.   EKG September 2022 demonstrates sinus rhythm with no Q waves or ST changes.  TTE April 2022 demonstrates normal LV size and systolic function with EF 65%.  Normal bioprosthetic aortic valve function is noted with a peak velocity of 2.6 m/sec across the valve.  No prosthetic valve leak.  Mild MR. PASP near 30 with a CVP of 3.   Cardiac catheterization February 2022 demonstrates patent coronary arteries without evidence of obstructive epicardial disease.  CTA TAVR protocol February 2022 demonstrates aortic atherosclerosis.  Ectatic ascending aorta measuring 4.0 cm.  Calcified aortic valve.  Normal abdominal aorta an unremarkable  bilateral iliacs/femorals.       ASSESSMENT & PLAN:    1. Essential hypertension    2. Dyslipidemia    3. Aortic atherosclerosis    4. S/P TAVR (transcatheter aortic valve replacement)    5. Left ventricular diastolic dysfunction with preserved systolic function          Orders Placed This Encounter    amoxicillin (AMOXIL) 500 MG Tab        Severe AS s/p successful TAVR w an S3 23mm valve. Normal ECG/TTE post implant. UMANZOR resolved post TAVR. Cont asa monotherapy.      Bps controlled on amlodipine 5x1. LDL near 100 on pravastatin. Asc ao dilation stable. 4.0cm on latest CTA.     Follow up in about 6 months (around 10/20/2023).      Robby Mistry MD      Addendum:    Okay to undergo dental cleaning. Take oral amoxicillin 2gm po 30-60 minutes prior to.

## 2023-04-24 RX ORDER — AMOXICILLIN 500 MG/1
2000 TABLET, FILM COATED ORAL
Qty: 4 TABLET | Refills: 1 | Status: SHIPPED | OUTPATIENT
Start: 2023-04-24 | End: 2023-11-05

## 2023-05-03 ENCOUNTER — OFFICE VISIT (OUTPATIENT)
Dept: DERMATOLOGY | Facility: CLINIC | Age: 88
End: 2023-05-03
Payer: MEDICARE

## 2023-05-03 VITALS — BODY MASS INDEX: 24.45 KG/M2 | WEIGHT: 138 LBS

## 2023-05-03 DIAGNOSIS — L57.0 ACTINIC KERATOSIS: ICD-10-CM

## 2023-05-03 DIAGNOSIS — D22.9 NEVUS OF MULTIPLE SITES: ICD-10-CM

## 2023-05-03 DIAGNOSIS — Z12.83 SKIN EXAM, SCREENING FOR CANCER: ICD-10-CM

## 2023-05-03 DIAGNOSIS — L81.4 LENTIGINES: ICD-10-CM

## 2023-05-03 DIAGNOSIS — L82.1 SEBORRHEIC KERATOSES: Primary | ICD-10-CM

## 2023-05-03 PROCEDURE — 1160F RVW MEDS BY RX/DR IN RCRD: CPT | Mod: HCNC,CPTII,S$GLB, | Performed by: DERMATOLOGY

## 2023-05-03 PROCEDURE — 17000 PR DESTRUCTION(LASER SURGERY,CRYOSURGERY,CHEMOSURGERY),PREMALIGNANT LESIONS,FIRST LESION: ICD-10-PCS | Mod: HCNC,S$GLB,, | Performed by: DERMATOLOGY

## 2023-05-03 PROCEDURE — 1159F PR MEDICATION LIST DOCUMENTED IN MEDICAL RECORD: ICD-10-PCS | Mod: HCNC,CPTII,S$GLB, | Performed by: DERMATOLOGY

## 2023-05-03 PROCEDURE — 99999 PR PBB SHADOW E&M-EST. PATIENT-LVL II: CPT | Mod: PBBFAC,HCNC,, | Performed by: DERMATOLOGY

## 2023-05-03 PROCEDURE — 1157F ADVNC CARE PLAN IN RCRD: CPT | Mod: HCNC,CPTII,S$GLB, | Performed by: DERMATOLOGY

## 2023-05-03 PROCEDURE — 99999 PR PBB SHADOW E&M-EST. PATIENT-LVL II: ICD-10-PCS | Mod: PBBFAC,HCNC,, | Performed by: DERMATOLOGY

## 2023-05-03 PROCEDURE — 17000 DESTRUCT PREMALG LESION: CPT | Mod: HCNC,S$GLB,, | Performed by: DERMATOLOGY

## 2023-05-03 PROCEDURE — 1160F PR REVIEW ALL MEDS BY PRESCRIBER/CLIN PHARMACIST DOCUMENTED: ICD-10-PCS | Mod: HCNC,CPTII,S$GLB, | Performed by: DERMATOLOGY

## 2023-05-03 PROCEDURE — 1126F PR PAIN SEVERITY QUANTIFIED, NO PAIN PRESENT: ICD-10-PCS | Mod: HCNC,CPTII,S$GLB, | Performed by: DERMATOLOGY

## 2023-05-03 PROCEDURE — 99214 OFFICE O/P EST MOD 30 MIN: CPT | Mod: 25,HCNC,S$GLB, | Performed by: DERMATOLOGY

## 2023-05-03 PROCEDURE — 1126F AMNT PAIN NOTED NONE PRSNT: CPT | Mod: HCNC,CPTII,S$GLB, | Performed by: DERMATOLOGY

## 2023-05-03 PROCEDURE — 1159F MED LIST DOCD IN RCRD: CPT | Mod: HCNC,CPTII,S$GLB, | Performed by: DERMATOLOGY

## 2023-05-03 PROCEDURE — 1157F PR ADVANCE CARE PLAN OR EQUIV PRESENT IN MEDICAL RECORD: ICD-10-PCS | Mod: HCNC,CPTII,S$GLB, | Performed by: DERMATOLOGY

## 2023-05-03 PROCEDURE — 99214 PR OFFICE/OUTPT VISIT, EST, LEVL IV, 30-39 MIN: ICD-10-PCS | Mod: 25,HCNC,S$GLB, | Performed by: DERMATOLOGY

## 2023-05-03 NOTE — PROGRESS NOTES
Subjective:      Patient ID:  Mine Wilkins is a 88 y.o. female who presents for   Chief Complaint   Patient presents with    Skin Check     UBSE, sometimes itching      Would like skin check.       Review of Systems   Constitutional:  Negative for fever, chills, weight loss, weight gain, fatigue, night sweats and malaise.   Skin:  Positive for daily sunscreen use. Negative for wears hat.   Hematologic/Lymphatic: Does not bruise/bleed easily.     Objective:   Physical Exam   Constitutional: She appears well-developed and well-nourished. No distress.   Neurological: She is alert and oriented to person, place, and time. She is not disoriented.   Psychiatric: She has a normal mood and affect.   Skin:   Areas Examined (abnormalities noted in diagram):   Head / Face Inspection Performed  Neck Inspection Performed  Chest / Axilla Inspection Performed  Back Inspection Performed  RUE Inspected  LUE Inspection Performed          Diagram Legend     Erythematous scaling macule/papule c/w actinic keratosis       Vascular papule c/w angioma      Pigmented verrucoid papule/plaque c/w seborrheic keratosis      Yellow umbilicated papule c/w sebaceous hyperplasia      Irregularly shaped tan macule c/w lentigo     1-2 mm smooth white papules consistent with Milia      Movable subcutaneous cyst with punctum c/w epidermal inclusion cyst      Subcutaneous movable cyst c/w pilar cyst      Firm pink to brown papule c/w dermatofibroma      Pedunculated fleshy papule(s) c/w skin tag(s)      Evenly pigmented macule c/w junctional nevus     Mildly variegated pigmented, slightly irregular-bordered macule c/w mildly atypical nevus      Flesh colored to evenly pigmented papule c/w intradermal nevus       Pink pearly papule/plaque c/w basal cell carcinoma      Erythematous hyperkeratotic cursted plaque c/w SCC      Surgical scar with no sign of skin cancer recurrence      Open and closed comedones      Inflammatory papules and  "pustules      Verrucoid papule consistent consistent with wart     Erythematous eczematous patches and plaques     Dystrophic onycholytic nail with subungual debris c/w onychomycosis     Umbilicated papule    Erythematous-base heme-crusted tan verrucoid plaque consistent with inflamed seborrheic keratosis     Erythematous Silvery Scaling Plaque c/w Psoriasis     See annotation      Assessment / Plan:        Seborrheic keratoses  Seborrheic keratosis scattered, told benign no treatment needed.  Brochure provided.      Lentigines  The "ABCD" rules to observe pigmented lesions were reviewed.      Actinic keratosis   Cryosurgery Procedure Note    Verbal consent from the patient is obtained and the patient is aware of the precancerous quality and need for treatment of these lesions. Liquid nitrogen cryosurgery is applied to the 1 actinic keratoses, as detailed in the physical exam, to produce a freeze injury.      Nevus of multiple sites  The "ABCD" rules to observe pigmented lesions were reviewed.  Brochure provided      Skin exam, screening for cancer   No lesions suspicious for malignancy noted.    Recommend daily sun protection/avoidance and use of at least SPF 30, broad spectrum sunscreen (OTC drug).                Follow up in about 1 year (around 5/3/2024).  "

## 2023-05-11 RX ORDER — PRAVASTATIN SODIUM 20 MG/1
TABLET ORAL
Qty: 90 TABLET | Refills: 3 | Status: SHIPPED | OUTPATIENT
Start: 2023-05-11 | End: 2024-02-12 | Stop reason: SDUPTHER

## 2023-05-11 RX ORDER — ALENDRONATE SODIUM 70 MG/1
TABLET ORAL
Qty: 12 TABLET | Refills: 3 | Status: SHIPPED | OUTPATIENT
Start: 2023-05-11 | End: 2023-11-05

## 2023-05-11 RX ORDER — OMEPRAZOLE 40 MG/1
CAPSULE, DELAYED RELEASE ORAL
Qty: 90 CAPSULE | Refills: 3 | Status: SHIPPED | OUTPATIENT
Start: 2023-05-11 | End: 2024-02-12 | Stop reason: SDUPTHER

## 2023-05-11 RX ORDER — OXYBUTYNIN CHLORIDE 10 MG/1
TABLET, EXTENDED RELEASE ORAL
Qty: 90 TABLET | Refills: 3 | Status: SHIPPED | OUTPATIENT
Start: 2023-05-11 | End: 2024-02-12 | Stop reason: SDUPTHER

## 2023-06-09 ENCOUNTER — OFFICE VISIT (OUTPATIENT)
Dept: INTERNAL MEDICINE | Facility: CLINIC | Age: 88
End: 2023-06-09
Payer: MEDICARE

## 2023-06-09 VITALS
WEIGHT: 139.75 LBS | BODY MASS INDEX: 24.76 KG/M2 | RESPIRATION RATE: 16 BRPM | HEIGHT: 63 IN | OXYGEN SATURATION: 96 % | TEMPERATURE: 97 F | HEART RATE: 76 BPM | SYSTOLIC BLOOD PRESSURE: 132 MMHG | DIASTOLIC BLOOD PRESSURE: 88 MMHG

## 2023-06-09 DIAGNOSIS — D31.32 NEVUS, CHOROIDAL, LEFT: ICD-10-CM

## 2023-06-09 DIAGNOSIS — J30.2 SEASONAL ALLERGIC RHINITIS, UNSPECIFIED TRIGGER: ICD-10-CM

## 2023-06-09 DIAGNOSIS — D69.2 SENILE PURPURA: ICD-10-CM

## 2023-06-09 DIAGNOSIS — M54.16 LUMBAR RADICULOPATHY: ICD-10-CM

## 2023-06-09 DIAGNOSIS — Z95.2 S/P TAVR (TRANSCATHETER AORTIC VALVE REPLACEMENT): Chronic | ICD-10-CM

## 2023-06-09 DIAGNOSIS — N81.10 CYSTOCELE, UNSPECIFIED: ICD-10-CM

## 2023-06-09 DIAGNOSIS — E78.5 DYSLIPIDEMIA: ICD-10-CM

## 2023-06-09 DIAGNOSIS — F41.1 GENERALIZED ANXIETY DISORDER: Chronic | ICD-10-CM

## 2023-06-09 DIAGNOSIS — H50.05 ESOTROPIA, ALTERNATING: ICD-10-CM

## 2023-06-09 DIAGNOSIS — K21.9 GASTROESOPHAGEAL REFLUX DISEASE WITHOUT ESOPHAGITIS: ICD-10-CM

## 2023-06-09 DIAGNOSIS — N36.41 URETHRAL HYPERMOBILITY: ICD-10-CM

## 2023-06-09 DIAGNOSIS — I10 ESSENTIAL HYPERTENSION: Chronic | ICD-10-CM

## 2023-06-09 DIAGNOSIS — Z00.00 ENCOUNTER FOR MEDICARE ANNUAL WELLNESS EXAM: Primary | ICD-10-CM

## 2023-06-09 DIAGNOSIS — R05.3 CHRONIC COUGH: ICD-10-CM

## 2023-06-09 DIAGNOSIS — N39.46 MIXED INCONTINENCE URGE AND STRESS (MALE)(FEMALE): ICD-10-CM

## 2023-06-09 DIAGNOSIS — H35.3132 NONEXUDATIVE AGE-RELATED MACULAR DEGENERATION, BILATERAL, INTERMEDIATE DRY STAGE: ICD-10-CM

## 2023-06-09 DIAGNOSIS — M15.9 PRIMARY OSTEOARTHRITIS INVOLVING MULTIPLE JOINTS: ICD-10-CM

## 2023-06-09 DIAGNOSIS — I73.9 PVD (PERIPHERAL VASCULAR DISEASE): Chronic | ICD-10-CM

## 2023-06-09 DIAGNOSIS — H43.813 POSTERIOR VITREOUS DETACHMENT, BILATERAL: ICD-10-CM

## 2023-06-09 DIAGNOSIS — I70.0 AORTIC ATHEROSCLEROSIS: ICD-10-CM

## 2023-06-09 DIAGNOSIS — I51.89 LEFT VENTRICULAR DIASTOLIC DYSFUNCTION WITH PRESERVED SYSTOLIC FUNCTION: Chronic | ICD-10-CM

## 2023-06-09 DIAGNOSIS — M81.0 SENILE OSTEOPOROSIS: ICD-10-CM

## 2023-06-09 PROBLEM — G89.29 CHRONIC RIGHT SHOULDER PAIN: Status: RESOLVED | Noted: 2020-07-15 | Resolved: 2023-06-09

## 2023-06-09 PROBLEM — M25.511 CHRONIC RIGHT SHOULDER PAIN: Status: RESOLVED | Noted: 2020-07-15 | Resolved: 2023-06-09

## 2023-06-09 PROBLEM — D75.839 THROMBOCYTOSIS: Status: RESOLVED | Noted: 2018-04-13 | Resolved: 2023-06-09

## 2023-06-09 PROCEDURE — 1101F PT FALLS ASSESS-DOCD LE1/YR: CPT | Mod: CPTII,S$GLB,, | Performed by: NURSE PRACTITIONER

## 2023-06-09 PROCEDURE — 99999 PR PBB SHADOW E&M-EST. PATIENT-LVL V: ICD-10-PCS | Mod: PBBFAC,,, | Performed by: NURSE PRACTITIONER

## 2023-06-09 PROCEDURE — 3288F FALL RISK ASSESSMENT DOCD: CPT | Mod: CPTII,S$GLB,, | Performed by: NURSE PRACTITIONER

## 2023-06-09 PROCEDURE — 1101F PR PT FALLS ASSESS DOC 0-1 FALLS W/OUT INJ PAST YR: ICD-10-PCS | Mod: CPTII,S$GLB,, | Performed by: NURSE PRACTITIONER

## 2023-06-09 PROCEDURE — G0439 PR MEDICARE ANNUAL WELLNESS SUBSEQUENT VISIT: ICD-10-PCS | Mod: S$GLB,,, | Performed by: NURSE PRACTITIONER

## 2023-06-09 PROCEDURE — 1170F FXNL STATUS ASSESSED: CPT | Mod: CPTII,S$GLB,, | Performed by: NURSE PRACTITIONER

## 2023-06-09 PROCEDURE — 1160F PR REVIEW ALL MEDS BY PRESCRIBER/CLIN PHARMACIST DOCUMENTED: ICD-10-PCS | Mod: CPTII,S$GLB,, | Performed by: NURSE PRACTITIONER

## 2023-06-09 PROCEDURE — 1159F MED LIST DOCD IN RCRD: CPT | Mod: CPTII,S$GLB,, | Performed by: NURSE PRACTITIONER

## 2023-06-09 PROCEDURE — 1157F ADVNC CARE PLAN IN RCRD: CPT | Mod: CPTII,S$GLB,, | Performed by: NURSE PRACTITIONER

## 2023-06-09 PROCEDURE — 99999 PR PBB SHADOW E&M-EST. PATIENT-LVL V: CPT | Mod: PBBFAC,,, | Performed by: NURSE PRACTITIONER

## 2023-06-09 PROCEDURE — 1170F PR FUNCTIONAL STATUS ASSESSED: ICD-10-PCS | Mod: CPTII,S$GLB,, | Performed by: NURSE PRACTITIONER

## 2023-06-09 PROCEDURE — 1126F AMNT PAIN NOTED NONE PRSNT: CPT | Mod: CPTII,S$GLB,, | Performed by: NURSE PRACTITIONER

## 2023-06-09 PROCEDURE — 3288F PR FALLS RISK ASSESSMENT DOCUMENTED: ICD-10-PCS | Mod: CPTII,S$GLB,, | Performed by: NURSE PRACTITIONER

## 2023-06-09 PROCEDURE — 1160F RVW MEDS BY RX/DR IN RCRD: CPT | Mod: CPTII,S$GLB,, | Performed by: NURSE PRACTITIONER

## 2023-06-09 PROCEDURE — 1159F PR MEDICATION LIST DOCUMENTED IN MEDICAL RECORD: ICD-10-PCS | Mod: CPTII,S$GLB,, | Performed by: NURSE PRACTITIONER

## 2023-06-09 PROCEDURE — G0439 PPPS, SUBSEQ VISIT: HCPCS | Mod: S$GLB,,, | Performed by: NURSE PRACTITIONER

## 2023-06-09 PROCEDURE — 1157F PR ADVANCE CARE PLAN OR EQUIV PRESENT IN MEDICAL RECORD: ICD-10-PCS | Mod: CPTII,S$GLB,, | Performed by: NURSE PRACTITIONER

## 2023-06-09 PROCEDURE — 1126F PR PAIN SEVERITY QUANTIFIED, NO PAIN PRESENT: ICD-10-PCS | Mod: CPTII,S$GLB,, | Performed by: NURSE PRACTITIONER

## 2023-06-09 RX ORDER — ASCORBIC ACID 500 MG
1500 TABLET ORAL DAILY
COMMUNITY

## 2023-06-09 NOTE — PROGRESS NOTES
"  Mine Wilkins presented for a follow-up Medicare AWV today. The following components were reviewed and updated:    Medical history  Family History  Social history  Allergies and Current Medications  Health Risk Assessment  Health Maintenance  Care Team    **See Completed Assessments for Annual Wellness visit with in the encounter summary    The following assessments were completed:  Depression Screening  Cognitive function Screening  Timed Get Up Test  Whisper Test    Health Maintenance    MM2018  DXA: 2023  Colonoscopy: 2015  (due in )  Flu Vaccine: 10/20/2022  Pneumonia 23 Vaccine: 2004  Pneumonia 13 Vaccine: 2015  Tetanus/Tdap: 2019  Zoster Vaccine: 2014  Hepatitis C Screen: 2006    Vitals:    23 1104   BP: 132/88   BP Location: Left arm   Patient Position: Sitting   BP Method: Medium (Manual)   Pulse: 76   Resp: 16   Temp: 97.1 °F (36.2 °C)   TempSrc: Skin   SpO2: 96%   Weight: 63.4 kg (139 lb 12.4 oz)   Height: 5' 3" (1.6 m)     BP Readings from Last 3 Encounters:   23 132/88   23 138/84   23 (!) 155/76     Body mass index is 24.76 kg/m².  Wt Readings from Last 3 Encounters:   23 1104 63.4 kg (139 lb 12.4 oz)   23 1103 62.6 kg (138 lb)   23 0951 62.6 kg (138 lb)             Review for Opioid Screening: Patient does not have a prescription for Opioids.   Review of Substance Use Disorders: Patient does not use substances.       Diagnoses and health risks identified today and associated recommendations/orders:  1. Encounter for Medicare annual wellness exam    - Ambulatory Referral/Consult to Enhanced Annual Wellness Visit (eAWV)    2. Senile purpura    Chronic, stable, follow up with PCP     3. Lumbar radiculopathy    Chronic, stable, follow up with PCP     4. Generalized anxiety disorder    Chronic, stable, continue current medication, follow up with PCP     5. Esotropia, alternating - Both Eyes  6. Nonexudative " age-related macular degeneration, bilateral, intermediate dry stage  7. Nevus, choroidal, left  8. Posterior vitreous detachment, bilateral    Chronic, stable, follow up with Ophthalmology      9. Seasonal allergic rhinitis, unspecified trigger  10. Chronic cough    Chronic, stable, continue current medications, follow up with PCP/ENT     11. Essential hypertension  12. Left ventricular diastolic dysfunction with preserved systolic function    Chronic, stable, continue current medications, follow up with PCP     13. Aortic atherosclerosis    Chronic, stable, continue current medications, follow up with PCP     14. S/P TAVR (transcatheter aortic valve replacement)    Chronic.    15. Dyslipidemia    Chronic, stable, continue current medications, follow up with PCP     16. PVD (peripheral vascular disease)    Chronic, stable, continue current medications, follow up with PCP     17. 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 Gastroesophageal reflux disease without esophagitis    Chronic, stable, continue current medications, follow up with PCP     21. Primary osteoarthritis involving multiple joints    Chronic, stable, follow up with PCP     22. Senile osteoporosis    Chronic, stable, continue current medication, follow up with PCP     23. Body mass index (BMI) of 24.0 to 24.9 in adult    Chronic, stable, follow up with PCP       Provided Mine with a 5-10 year written screening schedule and personal prevention plan. Recommendations were developed using the USPSTF age appropriate recommendations. Education, counseling, and referrals were provided as needed.  After Visit Summary printed and given to patient which includes a list of additional screenings\tests needed.    Follow up in about 1 year (around 6/9/2024) for Medicare Annual Wellness Visit.      Marie Peralta NP    I  offered to discuss advanced care planning, including how to pick a person who would make decisions for you if you were unable to make them for yourself, called a health care power of , and what kind of decisions you might make such as use of life sustaining treatments such as ventilators and tube feeding when faced with a life limiting illness recorded on a living will that they will need to know. (How you want to be cared for as you near the end of your natural life)     X  Patient has advanced directives on file, which we reviewed, and they do not wish to make changes.

## 2023-06-09 NOTE — PATIENT INSTRUCTIONS
Counseling and Referral of Other Preventative  (Italic type indicates deductible and co-insurance are waived)    Patient Name: Mine Wilkins  Today's Date: 6/9/2023    Health Maintenance       Date Due Completion Date    Shingles Vaccine (2 of 3) 08/12/2014 6/17/2014    COVID-19 Vaccine (5 - Pfizer series) 08/11/2022 6/16/2022    DEXA Scan 03/29/2025 3/29/2023    Lipid Panel 03/28/2028 3/28/2023    TETANUS VACCINE 03/01/2029 3/1/2019        No orders of the defined types were placed in this encounter.    The following information is provided to all patients.  This information is to help you find resources for any of the problems found today that may be affecting your health:                Living healthy guide: www.[facility].[place].gov      Understanding Diabetes: www.diabetes.org      Eating healthy: www.cdc.gov/healthyweight      [facility] home safety checklist: www.cdc.gov/steadi/patient.html      Agency on Aging: www.goea.[place].[facility]      Alcoholics anonymous (AA): www.aa.org      Physical Activity: www.aisha.nih.gov/uz8mhcl      Tobacco use: www.quitwithusla.org

## 2023-06-19 ENCOUNTER — OFFICE VISIT (OUTPATIENT)
Dept: OPTOMETRY | Facility: CLINIC | Age: 88
End: 2023-06-19
Payer: MEDICARE

## 2023-06-19 DIAGNOSIS — Z13.5 GLAUCOMA SCREENING: ICD-10-CM

## 2023-06-19 DIAGNOSIS — H50.05 ALTERNATING ESOTROPIA: Primary | ICD-10-CM

## 2023-06-19 DIAGNOSIS — H35.3132 NONEXUDATIVE AGE-RELATED MACULAR DEGENERATION, BILATERAL, INTERMEDIATE DRY STAGE: ICD-10-CM

## 2023-06-19 PROCEDURE — 92015 DETERMINE REFRACTIVE STATE: CPT | Mod: S$GLB,,, | Performed by: OPTOMETRIST

## 2023-06-19 PROCEDURE — 99999 PR PBB SHADOW E&M-EST. PATIENT-LVL III: CPT | Mod: PBBFAC,,, | Performed by: OPTOMETRIST

## 2023-06-19 PROCEDURE — 3288F PR FALLS RISK ASSESSMENT DOCUMENTED: ICD-10-PCS | Mod: CPTII,S$GLB,, | Performed by: OPTOMETRIST

## 2023-06-19 PROCEDURE — 1159F PR MEDICATION LIST DOCUMENTED IN MEDICAL RECORD: ICD-10-PCS | Mod: CPTII,S$GLB,, | Performed by: OPTOMETRIST

## 2023-06-19 PROCEDURE — 1160F RVW MEDS BY RX/DR IN RCRD: CPT | Mod: CPTII,S$GLB,, | Performed by: OPTOMETRIST

## 2023-06-19 PROCEDURE — 1159F MED LIST DOCD IN RCRD: CPT | Mod: CPTII,S$GLB,, | Performed by: OPTOMETRIST

## 2023-06-19 PROCEDURE — 1157F PR ADVANCE CARE PLAN OR EQUIV PRESENT IN MEDICAL RECORD: ICD-10-PCS | Mod: CPTII,S$GLB,, | Performed by: OPTOMETRIST

## 2023-06-19 PROCEDURE — 1101F PR PT FALLS ASSESS DOC 0-1 FALLS W/OUT INJ PAST YR: ICD-10-PCS | Mod: CPTII,S$GLB,, | Performed by: OPTOMETRIST

## 2023-06-19 PROCEDURE — 3288F FALL RISK ASSESSMENT DOCD: CPT | Mod: CPTII,S$GLB,, | Performed by: OPTOMETRIST

## 2023-06-19 PROCEDURE — 92015 PR REFRACTION: ICD-10-PCS | Mod: S$GLB,,, | Performed by: OPTOMETRIST

## 2023-06-19 PROCEDURE — 1101F PT FALLS ASSESS-DOCD LE1/YR: CPT | Mod: CPTII,S$GLB,, | Performed by: OPTOMETRIST

## 2023-06-19 PROCEDURE — 99999 PR PBB SHADOW E&M-EST. PATIENT-LVL III: ICD-10-PCS | Mod: PBBFAC,,, | Performed by: OPTOMETRIST

## 2023-06-19 PROCEDURE — 1157F ADVNC CARE PLAN IN RCRD: CPT | Mod: CPTII,S$GLB,, | Performed by: OPTOMETRIST

## 2023-06-19 PROCEDURE — 1126F AMNT PAIN NOTED NONE PRSNT: CPT | Mod: CPTII,S$GLB,, | Performed by: OPTOMETRIST

## 2023-06-19 PROCEDURE — 1160F PR REVIEW ALL MEDS BY PRESCRIBER/CLIN PHARMACIST DOCUMENTED: ICD-10-PCS | Mod: CPTII,S$GLB,, | Performed by: OPTOMETRIST

## 2023-06-19 PROCEDURE — 1126F PR PAIN SEVERITY QUANTIFIED, NO PAIN PRESENT: ICD-10-PCS | Mod: CPTII,S$GLB,, | Performed by: OPTOMETRIST

## 2023-06-19 PROCEDURE — 92012 INTRM OPH EXAM EST PATIENT: CPT | Mod: S$GLB,,, | Performed by: OPTOMETRIST

## 2023-06-19 PROCEDURE — 92012 PR EYE EXAM, EST PATIENT,INTERMED: ICD-10-PCS | Mod: S$GLB,,, | Performed by: OPTOMETRIST

## 2023-06-19 NOTE — PROGRESS NOTES
HPI    Last eye exam was 1/14/21 with Dr. Gautam.  Patient overdue for eye exam. Vision has gotten worse since last exam.   Doesn't drive at night anymore. Was taking PreserVision but would vomit   after so she stopped it.   Patient denies diplopia, headaches, flashes/floaters, and pain.    Last edited by Bertha Leung MA on 6/19/2023 10:48 AM.            Assessment /Plan     For exam results, see Encounter Report.    Alternating esotropia    Nonexudative age-related macular degeneration, bilateral, intermediate dry stage    Glaucoma screening        1. Alt eso w prism in spex--pt wishes new RX (dist only)  2. Mild pco OD sp multifocal iol OU/YAG OS  3. Mild/mod ARMD (dry) OU (see retina notes).  VA stable today--has upcoming retina appt  4. Choroidal nevus OS    PLAN:    Pt will keep upcoming appt w Dr Gautam as sched

## 2023-07-19 ENCOUNTER — OFFICE VISIT (OUTPATIENT)
Dept: OPTOMETRY | Facility: CLINIC | Age: 88
End: 2023-07-19
Payer: MEDICARE

## 2023-07-19 DIAGNOSIS — H35.3132 NONEXUDATIVE AGE-RELATED MACULAR DEGENERATION, BILATERAL, INTERMEDIATE DRY STAGE: Primary | ICD-10-CM

## 2023-07-19 PROCEDURE — 99999 PR PBB SHADOW E&M-EST. PATIENT-LVL III: CPT | Mod: PBBFAC,HCNC,, | Performed by: OPTOMETRIST

## 2023-07-19 PROCEDURE — 99499 UNLISTED E&M SERVICE: CPT | Mod: HCNC,S$GLB,, | Performed by: OPTOMETRIST

## 2023-07-19 PROCEDURE — 99499 NO LOS: ICD-10-PCS | Mod: HCNC,S$GLB,, | Performed by: OPTOMETRIST

## 2023-07-19 PROCEDURE — 99999 PR PBB SHADOW E&M-EST. PATIENT-LVL III: ICD-10-PCS | Mod: PBBFAC,HCNC,, | Performed by: OPTOMETRIST

## 2023-07-19 NOTE — PROGRESS NOTES
HPI    VA OU not good with new glasses. OU double when looking to the right side   images are side by side. She stopped driving because of her vision.  Last edited by Lakshmi Joy on 7/19/2023  1:39 PM.            Assessment /Plan     For exam results, see Encounter Report.    Nonexudative age-related macular degeneration, bilateral, intermediate dry stage      See notes from last month:  1. Alt eso w prism in spex--pt wishes new RX (dist only)  2. Mild pco OD sp multifocal iol OU/YAG OS  3. Mild/mod ARMD (dry) OU (see retina notes).  VA stable today--has upcoming retina appt  4. Choroidal nevus OS    Pt presents TODAY because got new spex and feels she doesn't see as well out of new spex as old.  Re-refracted and found same result.  Demonstrated to patient with new spex she DOES see better than with old!  She still feels something wrong with them and can't wear    PLAN:    Will send to [facility] to check base curves/OC placement.  If still problems OK to remake to OLD herrera  2. Pt will keep upcoming appt w Dr Gautam as sched  3. NOLOS EPRx today

## 2023-08-03 ENCOUNTER — OFFICE VISIT (OUTPATIENT)
Dept: OPHTHALMOLOGY | Facility: CLINIC | Age: 88
End: 2023-08-03
Payer: MEDICARE

## 2023-08-03 DIAGNOSIS — H35.3132 NONEXUDATIVE AGE-RELATED MACULAR DEGENERATION, BILATERAL, INTERMEDIATE DRY STAGE: Primary | ICD-10-CM

## 2023-08-03 DIAGNOSIS — D31.32 NEVUS, CHOROIDAL, LEFT: ICD-10-CM

## 2023-08-03 DIAGNOSIS — H43.813 POSTERIOR VITREOUS DETACHMENT, BILATERAL: ICD-10-CM

## 2023-08-03 PROCEDURE — 3288F FALL RISK ASSESSMENT DOCD: CPT | Mod: CPTII,S$GLB,, | Performed by: OPHTHALMOLOGY

## 2023-08-03 PROCEDURE — 92014 COMPRE OPH EXAM EST PT 1/>: CPT | Mod: S$GLB,,, | Performed by: OPHTHALMOLOGY

## 2023-08-03 PROCEDURE — 3288F PR FALLS RISK ASSESSMENT DOCUMENTED: ICD-10-PCS | Mod: CPTII,S$GLB,, | Performed by: OPHTHALMOLOGY

## 2023-08-03 PROCEDURE — 1101F PR PT FALLS ASSESS DOC 0-1 FALLS W/OUT INJ PAST YR: ICD-10-PCS | Mod: CPTII,S$GLB,, | Performed by: OPHTHALMOLOGY

## 2023-08-03 PROCEDURE — 92201 OPSCPY EXTND RTA DRAW UNI/BI: CPT | Mod: S$GLB,,, | Performed by: OPHTHALMOLOGY

## 2023-08-03 PROCEDURE — 1159F PR MEDICATION LIST DOCUMENTED IN MEDICAL RECORD: ICD-10-PCS | Mod: CPTII,S$GLB,, | Performed by: OPHTHALMOLOGY

## 2023-08-03 PROCEDURE — 92134 CPTRZ OPH DX IMG PST SGM RTA: CPT | Mod: S$GLB,,, | Performed by: OPHTHALMOLOGY

## 2023-08-03 PROCEDURE — 92014 PR EYE EXAM, EST PATIENT,COMPREHESV: ICD-10-PCS | Mod: S$GLB,,, | Performed by: OPHTHALMOLOGY

## 2023-08-03 PROCEDURE — 1157F ADVNC CARE PLAN IN RCRD: CPT | Mod: CPTII,S$GLB,, | Performed by: OPHTHALMOLOGY

## 2023-08-03 PROCEDURE — 99999 PR PBB SHADOW E&M-EST. PATIENT-LVL III: ICD-10-PCS | Mod: PBBFAC,,, | Performed by: OPHTHALMOLOGY

## 2023-08-03 PROCEDURE — 92201 PR OPHTHALMOSCOPY, EXT, W/RET DRAW/SCLERAL DEPR, I&R, UNI/BI: ICD-10-PCS | Mod: S$GLB,,, | Performed by: OPHTHALMOLOGY

## 2023-08-03 PROCEDURE — 1159F MED LIST DOCD IN RCRD: CPT | Mod: CPTII,S$GLB,, | Performed by: OPHTHALMOLOGY

## 2023-08-03 PROCEDURE — 1160F RVW MEDS BY RX/DR IN RCRD: CPT | Mod: CPTII,S$GLB,, | Performed by: OPHTHALMOLOGY

## 2023-08-03 PROCEDURE — 92134 POSTERIOR SEGMENT OCT RETINA (OCULAR COHERENCE TOMOGRAPHY)-BOTH EYES: ICD-10-PCS | Mod: S$GLB,,, | Performed by: OPHTHALMOLOGY

## 2023-08-03 PROCEDURE — 1160F PR REVIEW ALL MEDS BY PRESCRIBER/CLIN PHARMACIST DOCUMENTED: ICD-10-PCS | Mod: CPTII,S$GLB,, | Performed by: OPHTHALMOLOGY

## 2023-08-03 PROCEDURE — 99999 PR PBB SHADOW E&M-EST. PATIENT-LVL III: CPT | Mod: PBBFAC,,, | Performed by: OPHTHALMOLOGY

## 2023-08-03 PROCEDURE — 1101F PT FALLS ASSESS-DOCD LE1/YR: CPT | Mod: CPTII,S$GLB,, | Performed by: OPHTHALMOLOGY

## 2023-08-03 PROCEDURE — 1157F PR ADVANCE CARE PLAN OR EQUIV PRESENT IN MEDICAL RECORD: ICD-10-PCS | Mod: CPTII,S$GLB,, | Performed by: OPHTHALMOLOGY

## 2023-08-03 NOTE — PROGRESS NOTES
HPI     YEARLY  ARMD      Additional comments: ARMD  OU   CHORODIAL NEVUS         OCT TODAY  DFE OU           NO SRF  OU            Comments    DLS  01/14/21      Eye meds   Art  tears ou qd     Pt c/o harder time reading  dryness more often  needed gtts lately         OCT - drusen OU  NO SRF OU      A/P    1. Dry AMD OU  INtermediate  No SRF  AREDS/AG    2. CR nevus OS  Overlying drusen  Minimal thickness  3x2.5 DD    3. PVD OU    4. MFIOL OU      1 year OCT

## 2023-08-11 DIAGNOSIS — F41.9 ANXIETY: ICD-10-CM

## 2023-08-14 RX ORDER — ESCITALOPRAM OXALATE 5 MG/1
TABLET ORAL
Qty: 90 TABLET | Refills: 0 | Status: SHIPPED | OUTPATIENT
Start: 2023-08-14 | End: 2024-02-12 | Stop reason: SDUPTHER

## 2023-08-15 NOTE — HPI
Chief Complaint/Reason for Admission: Closed displaced intertrochanteric fracture of right femur with routine healing    History of Present Illness:  Patient is a 83 y.o. female with osteoporosis, moderate AS, HTN who presents to SNF after hospitalization for fall with resultant right hip fracture requiring right CMN by Dr. Scott on 5/20. The patient was treated for UTI with cipro.  She complains of pain to her right hip rating it as 3/10, exacerbated by walking and relieved with oxycodone.  She has also been experiencing chest soreness with movement and denies pressure, dyspnea or radiation of pain.  She also complains of long-standing dry mouth.  HCTZ was discontinued with no change in symptoms.  She has tried OTC mouthwashes without relief.       The patient has been admitted to SNF for ongoing PT/OT due to insufficient progress to go home safely from the hospital.    Records from last hospital stay reviewed and summarized above.    Anesthesia confirms case reviewed for anesthesia risk alert.

## 2023-08-23 RX ORDER — AMLODIPINE BESYLATE 5 MG/1
5 TABLET ORAL
Qty: 90 TABLET | Refills: 3 | Status: SHIPPED | OUTPATIENT
Start: 2023-08-23

## 2023-08-28 ENCOUNTER — TELEPHONE (OUTPATIENT)
Dept: CARDIOLOGY | Facility: CLINIC | Age: 88
End: 2023-08-28
Payer: MEDICARE

## 2023-08-28 NOTE — TELEPHONE ENCOUNTER
----- Message from Lina Miller sent at 8/28/2023 10:23 AM CDT -----  Regarding: Appt  Patient calling to schedule an appt. Please call back @ [phone]. Thank you Lina

## 2023-08-31 ENCOUNTER — OFFICE VISIT (OUTPATIENT)
Dept: ENDOCRINOLOGY | Facility: CLINIC | Age: 88
End: 2023-08-31
Payer: MEDICARE

## 2023-08-31 VITALS
WEIGHT: 138.25 LBS | DIASTOLIC BLOOD PRESSURE: 82 MMHG | HEIGHT: 63 IN | SYSTOLIC BLOOD PRESSURE: 136 MMHG | BODY MASS INDEX: 24.5 KG/M2 | OXYGEN SATURATION: 95 % | HEART RATE: 74 BPM

## 2023-08-31 DIAGNOSIS — M81.0 SENILE OSTEOPOROSIS: ICD-10-CM

## 2023-08-31 PROCEDURE — 3288F PR FALLS RISK ASSESSMENT DOCUMENTED: ICD-10-PCS | Mod: HCNC,CPTII,S$GLB, | Performed by: INTERNAL MEDICINE

## 2023-08-31 PROCEDURE — 99204 PR OFFICE/OUTPT VISIT, NEW, LEVL IV, 45-59 MIN: ICD-10-PCS | Mod: HCNC,S$GLB,, | Performed by: INTERNAL MEDICINE

## 2023-08-31 PROCEDURE — 99999 PR PBB SHADOW E&M-EST. PATIENT-LVL V: CPT | Mod: PBBFAC,HCNC,, | Performed by: INTERNAL MEDICINE

## 2023-08-31 PROCEDURE — 1159F PR MEDICATION LIST DOCUMENTED IN MEDICAL RECORD: ICD-10-PCS | Mod: HCNC,CPTII,S$GLB, | Performed by: INTERNAL MEDICINE

## 2023-08-31 PROCEDURE — 99204 OFFICE O/P NEW MOD 45 MIN: CPT | Mod: HCNC,S$GLB,, | Performed by: INTERNAL MEDICINE

## 2023-08-31 PROCEDURE — 1100F PR PT FALLS ASSESS DOC 2+ FALLS/FALL W/INJURY/YR: ICD-10-PCS | Mod: HCNC,CPTII,S$GLB, | Performed by: INTERNAL MEDICINE

## 2023-08-31 PROCEDURE — 1126F AMNT PAIN NOTED NONE PRSNT: CPT | Mod: HCNC,CPTII,S$GLB, | Performed by: INTERNAL MEDICINE

## 2023-08-31 PROCEDURE — 1100F PTFALLS ASSESS-DOCD GE2>/YR: CPT | Mod: HCNC,CPTII,S$GLB, | Performed by: INTERNAL MEDICINE

## 2023-08-31 PROCEDURE — 99999 PR PBB SHADOW E&M-EST. PATIENT-LVL V: ICD-10-PCS | Mod: PBBFAC,HCNC,, | Performed by: INTERNAL MEDICINE

## 2023-08-31 PROCEDURE — 3288F FALL RISK ASSESSMENT DOCD: CPT | Mod: HCNC,CPTII,S$GLB, | Performed by: INTERNAL MEDICINE

## 2023-08-31 PROCEDURE — 1157F ADVNC CARE PLAN IN RCRD: CPT | Mod: HCNC,CPTII,S$GLB, | Performed by: INTERNAL MEDICINE

## 2023-08-31 PROCEDURE — 1157F PR ADVANCE CARE PLAN OR EQUIV PRESENT IN MEDICAL RECORD: ICD-10-PCS | Mod: HCNC,CPTII,S$GLB, | Performed by: INTERNAL MEDICINE

## 2023-08-31 PROCEDURE — 1126F PR PAIN SEVERITY QUANTIFIED, NO PAIN PRESENT: ICD-10-PCS | Mod: HCNC,CPTII,S$GLB, | Performed by: INTERNAL MEDICINE

## 2023-08-31 PROCEDURE — 1159F MED LIST DOCD IN RCRD: CPT | Mod: HCNC,CPTII,S$GLB, | Performed by: INTERNAL MEDICINE

## 2023-08-31 NOTE — PATIENT INSTRUCTIONS
HOW TO COLLECT AN ACCURATE   24 HOUR URINE SPECIMEN     I want you to collect EVERY drop of your urine during a 24 hour period. I do not care what the volume of the urine is as long as it represents every drop that you pass during that 24 hour period. If you need to have a bowel movement, you may separate the urine but I want every drop.     Begin the collection on a morning which is convenient for you.      The morning you start the urine collection when you wake up in the morning, pee and flush it down the toilet and note the exact time. Now you have an empty bladder and empty bottle. That starts the collection. Collect every drop after that all during the day and night until exactly the same time the next morning, when you should pass your urine and add it to the bottle. Please store the urine container in the fridge or in an ice bucket.    Bring the closed container back to the laboratory with the provided order slip.      For calcium intake:  I advise you get 1200 mg per day of calcium, split up over the day into 2 to 4 divided doses.  This amount includes calcium from both dietary sources and/or calcium supplements, and it is best to get smaller amounts throughout the day rather than all of the calcium at one time; this allows for better absorption of the calcium.     To estimate calcium content from a nutrition label, the label lists the % calcium in that food.   For example, the label for an 8 oz glass of milk lists the calcium content as 30%.  This is equivalent to 300 mg of calcium (multiply the % by 10 to get the mg of calcium per serving).  It is best to try to get the majority of calcium intake from the diet.  I've included a table below of some calcium-rich foods.    If you are not getting enough calcium in the diet, then you can add low doses of calcium supplements.  For calcium supplements, your body can only absorb about 500-600 mg of calcium at one time.  Thus, it is best to split the tablets  up over the course of a day.  It is also best to avoid taking calcium supplements at the same time as a calcium-rich food to maximize your calcium absorption.  Calcium carbonate is best taken with or after a meal.  Calcium citrate can be taken on an empty stomach or with/after a meal.  Please look at any multivitamin you are taking as these often usually contain calcium as well.    Estimated Calcium Content of Foods:  Produce  Serving Size Estimated Calcium*    Davi greens, frozen 8 oz 360 mg   Broccoli brayan 8 oz 200 mg   Kale, frozen 8 oz 180 mg   Soy Beans, green, boiled 8 oz 175 mg   Bok [place], cooked, boiled 8 oz 160 mg   Figs, dried 2 figs 65 mg   Broccoli, fresh, cooked 8 oz 60 mg   Oranges 1 whole 55 mg   Seafood Serving Size Estimated Calcium*    Sardines, canned with bones 3 oz 325 mg   Laurel, canned with bones 3 oz 180 mg   Shrimp, canned 3 oz 125 mg   Dairy Serving Size Estimated Calcium*    Ricotta, part-skim 4 oz 335 mg   Yogurt, plain, low-fat 6 oz 310 mg   Milk, skim, low-fat, whole 8 oz 300 mg   Yogurt with fruit, low-fat 6 oz 260 mg   Mozzarella, part-skim 1 oz 210 mg   Cheddar 1 oz 205 mg   Yogurt, Greek 6 oz 200 mg   American Cheese 1 oz 195 mg   Feta Cheese 4 oz 140 mg   Cottage Cheese, 2% 4 oz 105 mg   Frozen yogurt, vanilla 8 oz 105 mg   Ice Cream, vanilla 8 oz 85 mg   Parmesan 1 tbsp 55 mg   Fortified Food Serving Size Estimated Calcium*   [place] milk, rice milk or soy milk, fortified 8 oz 300 mg   Orange juice and other fruit juices, fortified 8 oz 300 mg   Tofu, prepared with calcium 4 oz 205 mg   Waffle, frozen, fortified 2 pieces 200 mg   Oatmeal, fortified 1 packet 140 mg   English muffin, fortified 1 muffin 100 mg   Cereal, fortified 8 oz 100-1,000 mg   Other Serving Size Estimated Calcium*   Mac & cheese, frozen 1 package 325 mg   Pizza, cheese, frozen 1 serving 115 mg   Pudding, chocolate, prepared with 2% milk 4 oz 160 mg   Beans, baked, canned 4 oz 160 mg   *The calcium content  listed for most foods is estimated and can vary due to multiple factors. Check the food label to determine how much calcium is in a particular product.  If you read the nutrition label for a food source, it lists the % calcium in that food.  For an 8 oz glass of milk, for example, the label states calcium 30%.  This is equivalent to 300 mg of calcium (multiply the listed number by 10).   **Table from the National Osteoporosis Foundation

## 2023-08-31 NOTE — ASSESSMENT & PLAN NOTE
Risks includeCaucasian race, menopause, fhx     She has been on Fosamax for more than 10 years (initiated in 2012) but has failed therapy based on several fragility fractures on medical management.    Plan work up of accelerated bone loss to include 24 urine calcium, PTH, vit D, renal panel, TTG     RDA of calcium (8131-0860 mg /day in divided doses) and vitamin D 2000iu a day  discussed  Calcium from food sources preferred  Fall precautions emphasized  +weight bearing exercise    We discussed recommendations for using an anabolic agent (Evenity versus teriparatide).  Will await results of secondary workup before recommending a medication.  She is agreeable to either of these medications.  We discussed that after completing 2 years of teriparatide or 1 year of Evenity she will need to switch over to Prolia which will likely be lifelong.

## 2023-08-31 NOTE — PROGRESS NOTES
ENDOCRINOLOGY CLINIC NEW PATIENT NOTE  08/31/2023     Subjective:      Reason for referal: referred by ZOEY Oswald MD for management of osteoporosis     HPI:   Mine Wilkins is a 88 y.o. female with history of hypertension, history of TAVR, PVD, osteoarthritis, GERD, and dyslipidemia who presents for evaluation of  bone health.    She has been treated with weekly Fosamax since 2012 continuously with most recent bone density scan in March 2023 stable compared to previous scan 2 years prior but with T-score still in the osteoporosis range.  While on Fosamax she has also sustained several fractures (arm, ribs, thoracic vertebrae, hip) without significant trauma.    Patient denies any falls/fractures in the past year.    DEXA 03/29/2023     Lumbar spine (L1-L4):               BMD is 0.910 g/cm2, T-score is -1.0, and Z-score is 1.8.  Total hip:                                BMD is 0.572 g/cm2, T-score is -3.0, and Z-score is -0.7.  Femoral neck:                          BMD is 0.510 g/cm2, T-score is -3.1, and Z-score is -0.5.  Distal 1/3 radius:                      Not applicable     FRAX:  29% risk of a major osteoporotic fracture in the next 10 years.  11% risk of hip fracture in the next 10 years.  Impression:  *Osteoporosis based on T-score below -2.5  *Fracture risk is very high due to calculated 10 year risk of hip fracture >4.5% (FRAX)  *Compared with previous DXA, BMD at the lumbar spine has remained stable, and BMD at the total hip has remained stable    Secondary osteoporosis workup:   postmenopausal estrogen deficiency - menopause around age 56      Lab Results   Component Value Date    CALCIUM 10.1 03/21/2023    ALBUMIN 4.2 07/16/2022    ESTGFRAFRICA 47.3 (A) 07/16/2022    EGFRNONAA 41.0 (A) 07/16/2022    PHOS 4.0 06/11/2018    ALKPHOS 73 07/16/2022    HAACTGBC31AM 56 05/19/2018    TSH 3.649 03/28/2023      Previous Treatment:   alendronate (FOSAMAX) since 2012  History of previous fracture: Yes  arm fracture in 2016, right wrist fracture at age 74 (2010) after stepping off curb,   Hx of fractures in toes and feet.  Let fracture roller bladding.  Right hip fracture after LOC in 2018, rib fracture and T5 and T6 fracture from coughing    Parent with fractured hip: no, mother with osteoporosis and back fracture  Smoking status: No  Glucocorticoid use: Yes was on high dose prednisone a year for sarcoidosis in her 20s.  History of RA: No  Alcohol 3 or more/day: No  Nephrolithiasis: No  Dental up to date: no   GERD: + treated with omeprazole  Supplements:   Ca: supplement 600 mg twice a day    VitD:  Over-the-counter 2000 IU daily    Exercise: sometimes walking on treadmill, limited exercise due to back and rib pain       No hx if radiation or bone cancer.   No cardiac hx       Current Outpatient Medications:     acetaminophen (TYLENOL) 325 MG tablet, Take 2 tablets (650 mg total) by mouth every 6 (six) hours as needed., Disp: , Rfl: 0    albuterol (VENTOLIN HFA) 90 mcg/actuation inhaler, Inhale 2 puffs into the lungs every 4 (four) hours as needed for Shortness of Breath (cough). Rescue (Patient not taking: Reported on 6/19/2023), Disp: 18 g, Rfl: 1    alendronate (FOSAMAX) 70 MG tablet, TAKE 1 TABLET  EVERY 7 DAYS. HOLD UNTIL SEEN BY YOUR PRIMARY CARE PHYSICIAN, Disp: 12 tablet, Rfl: 3    amLODIPine (NORVASC) 5 MG tablet, TAKE 1 TABLET(5 MG) BY MOUTH EVERY DAY, Disp: 90 tablet, Rfl: 3    amoxicillin (AMOXIL) 500 MG Tab, Take 4 tablets (2,000 mg total) by mouth as needed (Take 2000 mg by mouth 30-60 minutes before dental procedure.)., Disp: 4 tablet, Rfl: 1    ascorbic acid, vitamin C, (VITAMIN C) 500 MG tablet, Take 1,500 mg by mouth once daily., Disp: , Rfl:     aspirin (ECOTRIN) 81 MG EC tablet, Take 1 tablet (81 mg total) by mouth once daily., Disp: 90 tablet, Rfl: 3    azelastine (ASTELIN) 137 mcg (0.1 %) nasal spray, 1 spray (137 mcg total) by Nasal route 2 (two) times daily. (Patient not taking: Reported  on 6/19/2023), Disp: 30 mL, Rfl: 1    brompheniramine-pseudoeph-DM (BROMFED DM) 2-30-10 mg/5 mL Syrp, TAKE 5 ML BY MOUTH EVERY 6 HOURS AS NEEDED FOR COUGH AND CONGESTION, Disp: , Rfl:     cetirizine (ZYRTEC) 10 MG tablet, Take 10 mg by mouth once daily., Disp: , Rfl:     cholecalciferol, vitamin D3, 2,000 unit Tab, Take by mouth. 1 Tablet Oral Every day, Disp: , Rfl:     EScitalopram oxalate (LEXAPRO) 5 MG Tab, TAKE 1 TABLET EVERY DAY, Disp: 90 tablet, Rfl: 0    fluticasone propionate (FLONASE) 50 mcg/actuation nasal spray, SHAKE LIQUID AND USE 1 SPRAY(50 MCG) IN EACH NOSTRIL EVERY EVENING, Disp: 16 g, Rfl: 3    Lactobac no.41/Bifidobact no.7 (PROBIOTIC-10 ORAL), Take by mouth once daily., Disp: , Rfl:     LIDOcaine (LIDODERM) 5 %, Apply to affected area daily for 12 hours., Disp: 15 patch, Rfl: 3    multivit-min/iron/folic/lutein (CENTRUM SILVER WOMEN ORAL), Take by mouth., Disp: , Rfl:     omeprazole (PRILOSEC) 40 MG capsule, TAKE 1 CAPSULE EVERY MORNING  BEFORE  EATING, Disp: 90 capsule, Rfl: 3    oxybutynin (DITROPAN-XL) 10 MG 24 hr tablet, TAKE 1 TABLET EVERY DAY, Disp: 90 tablet, Rfl: 3    pravastatin (PRAVACHOL) 20 MG tablet, TAKE 1 TABLET EVERY EVENING., Disp: 90 tablet, Rfl: 3    promethazine-dextromethorphan (PROMETHAZINE-DM) 6.25-15 mg/5 mL Syrp, TAKE 5 ML BY MOUTH EVERY NIGHT AT BEDTIME AS NEEDED FOR COUGH AND CONGESTION, Disp: , Rfl:           ROS: see HPI     Objective:   Physical Exam   There were no vitals taken for this visit.  Wt Readings from Last 3 Encounters:   06/09/23 63.4 kg (139 lb 12.4 oz)   05/03/23 62.6 kg (138 lb)   04/20/23 62.6 kg (138 lb)   ]    Constitutional:  Pleasant,  in no acute distress.   HENT:   Eyes:     No scleral icterus.   Respiratory:   Effort normal   Neurological:  normal speech  Psych:  Normal mood and affect.        Assessment/Plan:     Problem List Items Addressed This Visit          1 - High    Senile osteoporosis     Risks includeCaucasian race, menopause, fhx      She has been on Fosamax for more than 10 years (initiated in 2012) but has failed therapy based on several fragility fractures on medical management.    Plan work up of accelerated bone loss to include 24 urine calcium, PTH, vit D, renal panel, TTG     RDA of calcium (5797-2481 mg /day in divided doses) and vitamin D 2000iu a day  discussed  Calcium from food sources preferred  Fall precautions emphasized  +weight bearing exercise    We discussed recommendations for using an anabolic agent (Evenity versus teriparatide).  Will await results of secondary workup before recommending a medication.  She is agreeable to either of these medications.  We discussed that after completing 2 years of teriparatide or 1 year of Evenity she will need to switch over to Prolia which will likely be lifelong.             Relevant Orders    Vitamin D    Renal Function Panel    Tissue Transglutaminase, IgA    PTH, Intact    Creatinine Clearance, Timed    Calcium, Timed Urine       Return to clinic in 12 months  Labs at [place] in the next week - will turn in urine then     Pepe Martinez MD

## 2023-09-07 ENCOUNTER — LAB VISIT (OUTPATIENT)
Dept: LAB | Facility: HOSPITAL | Age: 88
End: 2023-09-07
Attending: INTERNAL MEDICINE
Payer: MEDICARE

## 2023-09-07 DIAGNOSIS — M81.0 SENILE OSTEOPOROSIS: ICD-10-CM

## 2023-09-07 LAB
25(OH)D3+25(OH)D2 SERPL-MCNC: 53 NG/ML (ref 30–96)
ALBUMIN SERPL BCP-MCNC: 3.8 G/DL (ref 3.5–5.2)
ANION GAP SERPL CALC-SCNC: 10 MMOL/L (ref 8–16)
BUN SERPL-MCNC: 20 MG/DL (ref 8–23)
CALCIUM SERPL-MCNC: 9.5 MG/DL (ref 8.7–10.5)
CHLORIDE SERPL-SCNC: 106 MMOL/L (ref 95–110)
CO2 SERPL-SCNC: 22 MMOL/L (ref 23–29)
CREAT SERPL-MCNC: 1 MG/DL (ref 0.5–1.4)
EST. GFR  (NO RACE VARIABLE): 54.5 ML/MIN/1.73 M^2
GLUCOSE SERPL-MCNC: 92 MG/DL (ref 70–110)
PHOSPHATE SERPL-MCNC: 3 MG/DL (ref 2.7–4.5)
POTASSIUM SERPL-SCNC: 3.8 MMOL/L (ref 3.5–5.1)
PTH-INTACT SERPL-MCNC: 77.3 PG/ML (ref 9–77)
SODIUM SERPL-SCNC: 138 MMOL/L (ref 136–145)

## 2023-09-07 PROCEDURE — 83970 ASSAY OF PARATHORMONE: CPT | Mod: HCNC | Performed by: INTERNAL MEDICINE

## 2023-09-07 PROCEDURE — 82306 VITAMIN D 25 HYDROXY: CPT | Mod: HCNC | Performed by: INTERNAL MEDICINE

## 2023-09-07 PROCEDURE — 36415 COLL VENOUS BLD VENIPUNCTURE: CPT | Mod: HCNC,PO | Performed by: INTERNAL MEDICINE

## 2023-09-07 PROCEDURE — 86364 TISS TRNSGLTMNASE EA IG CLAS: CPT | Mod: HCNC | Performed by: INTERNAL MEDICINE

## 2023-09-07 PROCEDURE — 80069 RENAL FUNCTION PANEL: CPT | Mod: HCNC | Performed by: INTERNAL MEDICINE

## 2023-09-11 LAB — TTG IGA SER-ACNC: 0.2 U/ML

## 2023-09-13 ENCOUNTER — HOSPITAL ENCOUNTER (OUTPATIENT)
Dept: RADIOLOGY | Facility: HOSPITAL | Age: 88
Discharge: HOME | End: 2023-09-13
Attending: PHYSICIAN ASSISTANT
Payer: MEDICARE

## 2023-09-13 DIAGNOSIS — S22.050D COMPRESSION FRACTURE OF T6 VERTEBRA WITH ROUTINE HEALING, SUBSEQUENT ENCOUNTER: ICD-10-CM

## 2023-09-13 PROCEDURE — 72070 XR THORACIC SPINE AP LATERAL: ICD-10-PCS | Mod: 26,HCNC,, | Performed by: RADIOLOGY

## 2023-09-13 PROCEDURE — 72070 X-RAY EXAM THORAC SPINE 2VWS: CPT | Mod: 26,HCNC,, | Performed by: RADIOLOGY

## 2023-09-13 PROCEDURE — 72070 X-RAY EXAM THORAC SPINE 2VWS: CPT | Mod: TC,HCNC,FY

## 2023-09-14 ENCOUNTER — PATIENT MESSAGE (OUTPATIENT)
Dept: NEUROSURGERY | Facility: CLINIC | Age: 88
End: 2023-09-14
Payer: MEDICARE

## 2023-09-14 NOTE — TELEPHONE ENCOUNTER
Please make her a fu appt in clinic to discuss her thoracic xray results    ALEAH Anders, PA-C  Neurosurgery  [facility]  09/14/2023

## 2023-10-03 ENCOUNTER — OFFICE VISIT (OUTPATIENT)
Dept: CARDIOLOGY | Facility: CLINIC | Age: 88
End: 2023-10-03
Payer: MEDICARE

## 2023-10-03 VITALS
BODY MASS INDEX: 24.1 KG/M2 | WEIGHT: 136 LBS | HEART RATE: 88 BPM | DIASTOLIC BLOOD PRESSURE: 67 MMHG | SYSTOLIC BLOOD PRESSURE: 96 MMHG | HEIGHT: 63 IN

## 2023-10-03 DIAGNOSIS — I10 ESSENTIAL HYPERTENSION: Chronic | ICD-10-CM

## 2023-10-03 DIAGNOSIS — I70.0 AORTIC ATHEROSCLEROSIS: Primary | ICD-10-CM

## 2023-10-03 DIAGNOSIS — E78.5 DYSLIPIDEMIA: ICD-10-CM

## 2023-10-03 DIAGNOSIS — Z95.2 S/P TAVR (TRANSCATHETER AORTIC VALVE REPLACEMENT): Chronic | ICD-10-CM

## 2023-10-03 DIAGNOSIS — I51.89 LEFT VENTRICULAR DIASTOLIC DYSFUNCTION WITH PRESERVED SYSTOLIC FUNCTION: Chronic | ICD-10-CM

## 2023-10-03 PROCEDURE — 99999 PR PBB SHADOW E&M-EST. PATIENT-LVL IV: ICD-10-PCS | Mod: PBBFAC,,, | Performed by: INTERNAL MEDICINE

## 2023-10-03 PROCEDURE — 1160F RVW MEDS BY RX/DR IN RCRD: CPT | Mod: CPTII,S$GLB,, | Performed by: INTERNAL MEDICINE

## 2023-10-03 PROCEDURE — 1126F AMNT PAIN NOTED NONE PRSNT: CPT | Mod: CPTII,S$GLB,, | Performed by: INTERNAL MEDICINE

## 2023-10-03 PROCEDURE — 3288F FALL RISK ASSESSMENT DOCD: CPT | Mod: CPTII,S$GLB,, | Performed by: INTERNAL MEDICINE

## 2023-10-03 PROCEDURE — 1157F ADVNC CARE PLAN IN RCRD: CPT | Mod: CPTII,S$GLB,, | Performed by: INTERNAL MEDICINE

## 2023-10-03 PROCEDURE — 1126F PR PAIN SEVERITY QUANTIFIED, NO PAIN PRESENT: ICD-10-PCS | Mod: CPTII,S$GLB,, | Performed by: INTERNAL MEDICINE

## 2023-10-03 PROCEDURE — 99999 PR PBB SHADOW E&M-EST. PATIENT-LVL IV: CPT | Mod: PBBFAC,,, | Performed by: INTERNAL MEDICINE

## 2023-10-03 PROCEDURE — 99214 PR OFFICE/OUTPT VISIT, EST, LEVL IV, 30-39 MIN: ICD-10-PCS | Mod: S$GLB,,, | Performed by: INTERNAL MEDICINE

## 2023-10-03 PROCEDURE — 1101F PR PT FALLS ASSESS DOC 0-1 FALLS W/OUT INJ PAST YR: ICD-10-PCS | Mod: CPTII,S$GLB,, | Performed by: INTERNAL MEDICINE

## 2023-10-03 PROCEDURE — 99214 OFFICE O/P EST MOD 30 MIN: CPT | Mod: S$GLB,,, | Performed by: INTERNAL MEDICINE

## 2023-10-03 PROCEDURE — 1101F PT FALLS ASSESS-DOCD LE1/YR: CPT | Mod: CPTII,S$GLB,, | Performed by: INTERNAL MEDICINE

## 2023-10-03 PROCEDURE — 3288F PR FALLS RISK ASSESSMENT DOCUMENTED: ICD-10-PCS | Mod: CPTII,S$GLB,, | Performed by: INTERNAL MEDICINE

## 2023-10-03 PROCEDURE — 1159F PR MEDICATION LIST DOCUMENTED IN MEDICAL RECORD: ICD-10-PCS | Mod: CPTII,S$GLB,, | Performed by: INTERNAL MEDICINE

## 2023-10-03 PROCEDURE — 1157F PR ADVANCE CARE PLAN OR EQUIV PRESENT IN MEDICAL RECORD: ICD-10-PCS | Mod: CPTII,S$GLB,, | Performed by: INTERNAL MEDICINE

## 2023-10-03 PROCEDURE — 1160F PR REVIEW ALL MEDS BY PRESCRIBER/CLIN PHARMACIST DOCUMENTED: ICD-10-PCS | Mod: CPTII,S$GLB,, | Performed by: INTERNAL MEDICINE

## 2023-10-03 PROCEDURE — 1159F MED LIST DOCD IN RCRD: CPT | Mod: CPTII,S$GLB,, | Performed by: INTERNAL MEDICINE

## 2023-10-03 NOTE — PROGRESS NOTES
HISTORY:    89 yo F with a h/o aortic stenosis status post TAVR (March 2022 with a 23 mm S3), hypertension, hyperlipidemia, and DDD presenting for follow-up.     The patient first presented to me in 2021 when we identified severe AS with progressive UMANZOR. She was referred for TAVR, which she underwent successfully in March 2022. She tolerated the procedure without issue and has done well from a CV standpoint.     The patient denies any symptoms of chest pain, shortness of breath, or dyspnea on exertion.     Doing well with activity. 20 minutes on the treadmill 4x/week and some weights/stretching as well.     In 2022 she struggled with back pain and underwent successful surgery without issue. Still has pain intermittently. Also, had some non-traumatic rib fractures related to coughing? Still struggles with a cough.    She tolerates asa 81x1, amlodipine 5x1, and pravastatin 20x1. We stopped losartan and did not effect her cough. Bps remain normal.        PHYSICAL EXAM:    Vitals:    10/03/23 1327   BP: 96/67   Pulse: 88       NAD, A+Ox3.  No jvd, no bruit.  RRR nml s1,s2. 2/6 AS murmur  CTA B no wheezes or crackles.  No edema.    LABS/STUDIES (imaging reviewed during clinic visit):    CBC and BMP from March 2023 demonstrate a hemoglobin of 13.4 and a creatinine 0.9 with a BUN of 25. January 2022  LDL is 104 and HDL is 59.   EKG September 2022 demonstrates sinus rhythm with no Q waves or ST changes.  TTE March 2023 demonstrates normal LV size and systolic function with EF 65%.  Normal bioprosthetic aortic valve function is noted with a peak velocity of 2 m/sec across the valve.  No prosthetic valve leak.  Mild MR. PASP near 30 with a CVP of 3.   Cardiac catheterization February 2022 demonstrates patent coronary arteries without evidence of obstructive epicardial disease.  CTA TAVR protocol February 2022 demonstrates aortic atherosclerosis.  Ectatic ascending aorta measuring 4.0 cm.  Calcified aortic valve.  Normal  abdominal aorta an unremarkable bilateral iliacs/femorals.       ASSESSMENT & PLAN:    1. Aortic atherosclerosis    2. Dyslipidemia    3. Essential hypertension    4. S/P TAVR (transcatheter aortic valve replacement)    5. Left ventricular diastolic dysfunction with preserved systolic function                    Severe AS s/p successful TAVR w an S3 23mm valve. UMANZOR resolved post TAVR w nml valve function on TTE. Cont asa monotherapy. Abx prophylaxis as needed.      Bps controlled on amlodipine 5x1. LDL near 100 on pravastatin. Asc ao dilation stable. 4.0cm on latest CTA.     Follow up in about 6 months (around 4/3/2024).      Robby Mistry MD

## 2023-10-05 ENCOUNTER — TELEPHONE (OUTPATIENT)
Dept: ENDOCRINOLOGY | Facility: CLINIC | Age: 88
End: 2023-10-05
Payer: MEDICARE

## 2023-10-05 NOTE — TELEPHONE ENCOUNTER
----- Message from Pepe Martinez MD sent at 10/5/2023  2:42 PM CDT -----  Please call patient and let her know that her blood in urine test to evaluate bone health were mostly normal.  I recommend starting the medication called Evenity (romozosumab) which we discussed at her last visit.  This is the monthly injection that is given for 1 year at the [facility] to help build new bone.  She should have information about this in her last visit summary.  I have sent in the order to the infusion clinic and they will call her to get this scheduled.

## 2023-10-05 NOTE — PROGRESS NOTES
Please call patient and let her know that her blood in urine test to evaluate bone health were mostly normal.  I recommend starting the medication called Evenity (romozosumab) which we discussed at her last visit.  This is the monthly injection that is given for 1 year at the [facility] to help build new bone.  She should have information about this in her last visit summary.  I have sent in the order to the infusion clinic and they will call her to get this scheduled.

## 2023-10-06 ENCOUNTER — OFFICE VISIT (OUTPATIENT)
Dept: DERMATOLOGY | Facility: CLINIC | Age: 88
End: 2023-10-06
Payer: MEDICARE

## 2023-10-06 VITALS — WEIGHT: 136 LBS | BODY MASS INDEX: 24.09 KG/M2

## 2023-10-06 DIAGNOSIS — L81.4 LENTIGINES: ICD-10-CM

## 2023-10-06 DIAGNOSIS — D48.5 NEOPLASM OF UNCERTAIN BEHAVIOR OF SKIN: Primary | ICD-10-CM

## 2023-10-06 PROCEDURE — 1160F PR REVIEW ALL MEDS BY PRESCRIBER/CLIN PHARMACIST DOCUMENTED: ICD-10-PCS | Mod: HCNC,CPTII,S$GLB, | Performed by: DERMATOLOGY

## 2023-10-06 PROCEDURE — 1159F PR MEDICATION LIST DOCUMENTED IN MEDICAL RECORD: ICD-10-PCS | Mod: HCNC,CPTII,S$GLB, | Performed by: DERMATOLOGY

## 2023-10-06 PROCEDURE — 3288F FALL RISK ASSESSMENT DOCD: CPT | Mod: HCNC,CPTII,S$GLB, | Performed by: DERMATOLOGY

## 2023-10-06 PROCEDURE — 88305 TISSUE EXAM BY PATHOLOGIST: CPT | Mod: HCNC | Performed by: PATHOLOGY

## 2023-10-06 PROCEDURE — 1160F RVW MEDS BY RX/DR IN RCRD: CPT | Mod: HCNC,CPTII,S$GLB, | Performed by: DERMATOLOGY

## 2023-10-06 PROCEDURE — 1101F PT FALLS ASSESS-DOCD LE1/YR: CPT | Mod: HCNC,CPTII,S$GLB, | Performed by: DERMATOLOGY

## 2023-10-06 PROCEDURE — 11102 PR TANGENTIAL BIOPSY, SKIN, SINGLE LESION: ICD-10-PCS | Mod: HCNC,S$GLB,, | Performed by: DERMATOLOGY

## 2023-10-06 PROCEDURE — 3288F PR FALLS RISK ASSESSMENT DOCUMENTED: ICD-10-PCS | Mod: HCNC,CPTII,S$GLB, | Performed by: DERMATOLOGY

## 2023-10-06 PROCEDURE — 1126F AMNT PAIN NOTED NONE PRSNT: CPT | Mod: HCNC,CPTII,S$GLB, | Performed by: DERMATOLOGY

## 2023-10-06 PROCEDURE — 1101F PR PT FALLS ASSESS DOC 0-1 FALLS W/OUT INJ PAST YR: ICD-10-PCS | Mod: HCNC,CPTII,S$GLB, | Performed by: DERMATOLOGY

## 2023-10-06 PROCEDURE — 1159F MED LIST DOCD IN RCRD: CPT | Mod: HCNC,CPTII,S$GLB, | Performed by: DERMATOLOGY

## 2023-10-06 PROCEDURE — 88305 TISSUE EXAM BY PATHOLOGIST: CPT | Mod: 26,HCNC,, | Performed by: PATHOLOGY

## 2023-10-06 PROCEDURE — 99999 PR PBB SHADOW E&M-EST. PATIENT-LVL III: CPT | Mod: PBBFAC,HCNC,, | Performed by: DERMATOLOGY

## 2023-10-06 PROCEDURE — 99999 PR PBB SHADOW E&M-EST. PATIENT-LVL III: ICD-10-PCS | Mod: PBBFAC,HCNC,, | Performed by: DERMATOLOGY

## 2023-10-06 PROCEDURE — 99214 PR OFFICE/OUTPT VISIT, EST, LEVL IV, 30-39 MIN: ICD-10-PCS | Mod: 25,HCNC,S$GLB, | Performed by: DERMATOLOGY

## 2023-10-06 PROCEDURE — 1126F PR PAIN SEVERITY QUANTIFIED, NO PAIN PRESENT: ICD-10-PCS | Mod: HCNC,CPTII,S$GLB, | Performed by: DERMATOLOGY

## 2023-10-06 PROCEDURE — 1157F ADVNC CARE PLAN IN RCRD: CPT | Mod: HCNC,CPTII,S$GLB, | Performed by: DERMATOLOGY

## 2023-10-06 PROCEDURE — 88305 TISSUE EXAM BY PATHOLOGIST: ICD-10-PCS | Mod: 26,HCNC,, | Performed by: PATHOLOGY

## 2023-10-06 PROCEDURE — 99214 OFFICE O/P EST MOD 30 MIN: CPT | Mod: 25,HCNC,S$GLB, | Performed by: DERMATOLOGY

## 2023-10-06 PROCEDURE — 11102 TANGNTL BX SKIN SINGLE LES: CPT | Mod: HCNC,S$GLB,, | Performed by: DERMATOLOGY

## 2023-10-06 PROCEDURE — 1157F PR ADVANCE CARE PLAN OR EQUIV PRESENT IN MEDICAL RECORD: ICD-10-PCS | Mod: HCNC,CPTII,S$GLB, | Performed by: DERMATOLOGY

## 2023-10-06 NOTE — PROGRESS NOTES
Subjective:      Patient ID:  Mine Wilkins is a 88 y.o. female who presents for   Chief Complaint   Patient presents with    Lesion     Right leg     Lesion on right leg for a couple of weeks does not bleed. + PH sunburns     Lesion - Initial  Affected locations: right lower leg      Review of Systems   Constitutional:  Negative for fever, chills, weight loss, weight gain, fatigue, night sweats and malaise.   Skin:  Positive for daily sunscreen use and activity-related sunscreen use. Negative for wears hat.   Hematologic/Lymphatic: Does not bruise/bleed easily.       Objective:   Physical Exam   Constitutional: She appears well-developed and well-nourished.   Neurological: She is alert and oriented to person, place, and time.   Psychiatric: She has a normal mood and affect.   Skin:   Areas Examined (abnormalities noted in diagram):   Head / Face Inspection Performed  Neck Inspection Performed  RUE Inspected  LUE Inspection Performed  RLE Inspected  LLE Inspection Performed            Diagram Legend     Erythematous scaling macule/papule c/w actinic keratosis       Vascular papule c/w angioma      Pigmented verrucoid papule/plaque c/w seborrheic keratosis      Yellow umbilicated papule c/w sebaceous hyperplasia      Irregularly shaped tan macule c/w lentigo     1-2 mm smooth white papules consistent with Milia      Movable subcutaneous cyst with punctum c/w epidermal inclusion cyst      Subcutaneous movable cyst c/w pilar cyst      Firm pink to brown papule c/w dermatofibroma      Pedunculated fleshy papule(s) c/w skin tag(s)      Evenly pigmented macule c/w junctional nevus     Mildly variegated pigmented, slightly irregular-bordered macule c/w mildly atypical nevus      Flesh colored to evenly pigmented papule c/w intradermal nevus       Pink pearly papule/plaque c/w basal cell carcinoma      Erythematous hyperkeratotic cursted plaque c/w SCC      Surgical scar with no sign of skin cancer recurrence     "  Open and closed comedones      Inflammatory papules and pustules      Verrucoid papule consistent consistent with wart     Erythematous eczematous patches and plaques     Dystrophic onycholytic nail with subungual debris c/w onychomycosis     Umbilicated papule    Erythematous-base heme-crusted tan verrucoid plaque consistent with inflamed seborrheic keratosis     Erythematous Silvery Scaling Plaque c/w Psoriasis     See annotation      Assessment / Plan:      Pathology Orders:       Normal Orders This Visit    Specimen to Pathology, Dermatology     Questions:    Procedure Type: Dermatology and skin neoplasms    Number of Specimens: 1    ------------------------: -------------------------    Spec 1 Procedure: Biopsy    Spec 1 Clinical Impression: keratoacanthoma    Spec 1 Source: right lower leg    Release to patient:           Neoplasm of uncertain behavior of skin  -     Specimen to Pathology, Dermatology  Shave biopsy performed after verbal consent including risk of infection, scar, recurrence, need for additional treatment of site. Area prepped with alcohol, anesthetized with 1% lidocaine with epinephrine. . Hemostasis achieved with monsels. No complications. Dressing applied. Wound care explained. Will need further rx if + ca          Lentigines  The "ABCD" rules to observe pigmented lesions were reviewed.               Follow up in about 1 year (around 10/6/2024).  "

## 2023-10-11 LAB
FINAL PATHOLOGIC DIAGNOSIS: NORMAL
GROSS: NORMAL
Lab: NORMAL
MICROSCOPIC EXAM: NORMAL

## 2023-10-11 NOTE — PROGRESS NOTES
The biopsy from her leg was a skin cancer, she will need mohs surgery.     5 d ago  Final Pathologic Diagnosis Skin, right lower leg, shave biopsy:   -SQUAMOUS CELL CARCINOMA, EXTENDING TO THE PERIPHERAL AND DEEP BIOPSY EDGES     This lesion is skin cancer. You will be contacted regarding treatment.

## 2023-10-20 ENCOUNTER — OFFICE VISIT (OUTPATIENT)
Dept: URGENT CARE | Facility: CLINIC | Age: 88
End: 2023-10-20
Payer: MEDICARE

## 2023-10-20 VITALS
RESPIRATION RATE: 14 BRPM | WEIGHT: 136 LBS | TEMPERATURE: 99 F | OXYGEN SATURATION: 95 % | BODY MASS INDEX: 24.1 KG/M2 | HEART RATE: 66 BPM | SYSTOLIC BLOOD PRESSURE: 132 MMHG | HEIGHT: 63 IN | DIASTOLIC BLOOD PRESSURE: 80 MMHG

## 2023-10-20 DIAGNOSIS — R35.0 URINARY FREQUENCY: ICD-10-CM

## 2023-10-20 DIAGNOSIS — N39.0 ACUTE UTI: Primary | ICD-10-CM

## 2023-10-20 LAB
BILIRUB UR QL STRIP: NEGATIVE
GLUCOSE UR QL STRIP: NEGATIVE
KETONES UR QL STRIP: NEGATIVE
LEUKOCYTE ESTERASE UR QL STRIP: POSITIVE
PH, POC UA: 8 (ref 5–8)
POC BLOOD, URINE: POSITIVE
POC NITRATES, URINE: NEGATIVE
PROT UR QL STRIP: NEGATIVE
SP GR UR STRIP: 1 (ref 1–1.03)
UROBILINOGEN UR STRIP-ACNC: NORMAL (ref 0.1–1.1)

## 2023-10-20 PROCEDURE — 81003 POCT URINALYSIS, DIPSTICK, AUTOMATED, W/O SCOPE: ICD-10-PCS | Mod: QW,S$GLB,,

## 2023-10-20 PROCEDURE — 81003 URINALYSIS AUTO W/O SCOPE: CPT | Mod: QW,S$GLB,,

## 2023-10-20 PROCEDURE — 87077 CULTURE AEROBIC IDENTIFY: CPT | Mod: HCNC

## 2023-10-20 PROCEDURE — 87186 SC STD MICRODIL/AGAR DIL: CPT | Mod: HCNC

## 2023-10-20 PROCEDURE — 87086 URINE CULTURE/COLONY COUNT: CPT | Mod: HCNC

## 2023-10-20 PROCEDURE — 87088 URINE BACTERIA CULTURE: CPT | Mod: HCNC

## 2023-10-20 PROCEDURE — 99213 OFFICE O/P EST LOW 20 MIN: CPT | Mod: S$GLB,,,

## 2023-10-20 PROCEDURE — 99213 PR OFFICE/OUTPT VISIT, EST, LEVL III, 20-29 MIN: ICD-10-PCS | Mod: S$GLB,,,

## 2023-10-20 RX ORDER — NITROFURANTOIN 25; 75 MG/1; MG/1
100 CAPSULE ORAL 2 TIMES DAILY
Qty: 10 CAPSULE | Refills: 0 | Status: SHIPPED | OUTPATIENT
Start: 2023-10-20 | End: 2023-10-25

## 2023-10-20 NOTE — PROGRESS NOTES
"Subjective:      Patient ID: Mine Wilkins is a 88 y.o. female.    Vitals:  height is 5' 3" (1.6 m) and weight is 61.7 kg (136 lb). Her oral temperature is 99.1 °F (37.3 °C). Her blood pressure is 132/80 and her pulse is 66. Her respiration is 14 and oxygen saturation is 95%.     Chief Complaint: Back Pain    Patient presents today for potential UTI. She states that symptoms started a few days ago with back pain. Denies dysuria, urinary urgency or frequency. Patients caregiver states that patient urinates in a toilet with a bucket and when she was emptying the bucket she noticed the urine was more cloudy than normal and had a stronger odor to it.     Of note, patient did receive flu vaccination yesterday. 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     Back Pain  This is a new problem. The current episode started in the past 7 days. The problem occurs constantly. The problem has been gradually worsening since onset. The pain is present in the thoracic spine and lumbar spine (R sided mid to low back pain). The quality of the pain is described as aching. The pain does not radiate. The pain is at a severity of 5/10. The pain is moderate. Pertinent negatives include no abdominal pain, bladder incontinence, bowel incontinence, chest pain, dysuria, fever, headaches, leg pain, numbness, paresis, paresthesias, pelvic pain, perianal numbness, tingling, weakness or weight loss. (Positive for: urinary frequency, change in urine odor and color, nausea, fatigue) She has tried nothing for the symptoms.       Constitution: Negative for fever.   Cardiovascular:  Negative for chest pain.   Gastrointestinal:  Negative for abdominal pain and bowel incontinence.   Genitourinary:  Negative for dysuria, frequency, urgency, bladder incontinence and pelvic pain.   Musculoskeletal:  Positive for back pain.   Neurological:  Negative for headaches and numbness.      Objective:     Physical Exam   Constitutional: She is " oriented to person, place, and time. She appears well-developed.   HENT:   Head: Normocephalic and atraumatic.   Ears:   Right Ear: External ear normal.   Left Ear: External ear normal.   Nose: Nose normal. No nasal deformity. No epistaxis.   Mouth/Throat: Oropharynx is clear and moist and mucous membranes are normal.   Eyes: Lids are normal.   Neck: Trachea normal and phonation normal. Neck supple.   Cardiovascular: Normal pulses.   Pulmonary/Chest: Effort normal.   Abdominal: Normal appearance. There is no left CVA tenderness and no right CVA tenderness.   Neurological: She is alert and oriented to person, place, and time.   Skin: Skin is warm, dry and intact.   Psychiatric: Her speech is normal and behavior is normal.   Nursing note and vitals reviewed.    Results for orders placed or performed in visit on 10/20/23   POCT Urinalysis, Dipstick, Automated, W/O Scope   Result Value Ref Range    POC Blood, Urine Positive (A) Negative    POC Bilirubin, Urine Negative Negative    POC Urobilinogen, Urine normal 0.1 - 1.1    POC Ketones, Urine Negative Negative    POC Protein, Urine Negative Negative    POC Nitrates, Urine Negative Negative    POC Glucose, Urine Negative Negative    pH, UA 8.0 5 - 8    POC Specific Gravity, Urine 1.005 1.003 - 1.029    POC Leukocytes, Urine Positive (A) Negative       Assessment:     1. Acute UTI    2. Urinary frequency        Plan:       Acute UTI  -     nitrofurantoin, macrocrystal-monohydrate, (MACROBID) 100 MG capsule; Take 1 capsule (100 mg total) by mouth 2 (two) times daily. for 5 days  Dispense: 10 capsule; Refill: 0  -     CULTURE, URINE    Urinary frequency  -     POCT Urinalysis, Dipstick, Automated, W/O Scope                Patient Instructions   - Today your urine test shows that you have a UTI.   - Take pyridium or AZO for your symptoms. Beware, this will turn your urine orange and can turn tears orange.      - Rest.    - Drink plenty of fluids.      - You must understand  that you have received an Urgent Care treatment only and that you may be released before all of your medical problems are known or treated.   - You, the patient, will arrange for follow up care as instructed.   - If your condition worsens or fails to improve we recommend that you receive another evaluation at the ER immediately or contact your PCP to discuss your concerns or return here.   - Follow up with your PCP or specialty clinic as directed in the next 1-2 weeks if not improved or as needed.  You can call [phone] to schedule an appointment with the appropriate provider.    If your symptoms do not improve or worsen, go to the emergency room immediately.

## 2023-10-20 NOTE — PATIENT INSTRUCTIONS
- Today your urine test shows that you have a UTI.   - Take pyridium or AZO for your symptoms. Beware, this will turn your urine orange and can turn tears orange.      - Rest.    - Drink plenty of fluids.      - You must understand that you have received an Urgent Care treatment only and that you may be released before all of your medical problems are known or treated.   - You, the patient, will arrange for follow up care as instructed.   - If your condition worsens or fails to improve we recommend that you receive another evaluation at the ER immediately or contact your PCP to discuss your concerns or return here.   - Follow up with your PCP or specialty clinic as directed in the next 1-2 weeks if not improved or as needed.  You can call [phone] to schedule an appointment with the appropriate provider.    If your symptoms do not improve or worsen, go to the emergency room immediately.

## 2023-10-22 LAB — BACTERIA UR CULT: ABNORMAL

## 2023-10-23 ENCOUNTER — TELEPHONE (OUTPATIENT)
Dept: URGENT CARE | Facility: CLINIC | Age: 88
End: 2023-10-23
Payer: MEDICARE

## 2023-10-23 NOTE — TELEPHONE ENCOUNTER
Called patient and had no answer.  Left voicemail for patient to call provider's office back.  We will try again later.  Patient is being treated appropriately with Macrobid.  Continue antibiotics as directed.

## 2023-10-24 ENCOUNTER — TELEPHONE (OUTPATIENT)
Dept: URGENT CARE | Facility: CLINIC | Age: 88
End: 2023-10-24
Payer: MEDICARE

## 2023-10-25 ENCOUNTER — TELEPHONE (OUTPATIENT)
Dept: URGENT CARE | Facility: CLINIC | Age: 88
End: 2023-10-25
Payer: MEDICARE

## 2023-10-25 NOTE — TELEPHONE ENCOUNTER
Left VM for patient to return call to discuss abnormal culture. Patient is treated appropriately with Macrobid and should continue treatment.

## 2023-10-31 ENCOUNTER — INFUSION (OUTPATIENT)
Dept: INFECTIOUS DISEASES | Facility: HOSPITAL | Age: 88
End: 2023-10-31
Payer: MEDICARE

## 2023-10-31 VITALS
WEIGHT: 139.13 LBS | HEIGHT: 63 IN | TEMPERATURE: 98 F | HEART RATE: 82 BPM | OXYGEN SATURATION: 97 % | RESPIRATION RATE: 18 BRPM | BODY MASS INDEX: 24.65 KG/M2 | DIASTOLIC BLOOD PRESSURE: 68 MMHG | SYSTOLIC BLOOD PRESSURE: 134 MMHG

## 2023-10-31 DIAGNOSIS — M81.0 SENILE OSTEOPOROSIS: Primary | ICD-10-CM

## 2023-10-31 PROCEDURE — 96372 THER/PROPH/DIAG INJ SC/IM: CPT | Mod: HCNC

## 2023-10-31 PROCEDURE — 63600175 PHARM REV CODE 636 W HCPCS: Mod: JZ,JG,HCNC | Performed by: INTERNAL MEDICINE

## 2023-10-31 RX ADMIN — ROMOSOZUMAB-AQQG 210 MG: 105 INJECTION, SOLUTION SUBCUTANEOUS at 02:10

## 2023-10-31 NOTE — PROGRESS NOTES
Patient arrives for evenity injections - confirms use of calcium and vitamin D supplements and denies dental procedures over past 3 months - administered per guideline. Patient observed for 15 minutes after injections.          Limited head-to-toe assessment due to privacy issues and visit reason though the opportunity was given for patient to express any concerns

## 2023-11-05 ENCOUNTER — OFFICE VISIT (OUTPATIENT)
Dept: URGENT CARE | Facility: CLINIC | Age: 88
End: 2023-11-05
Payer: MEDICARE

## 2023-11-05 VITALS
RESPIRATION RATE: 16 BRPM | OXYGEN SATURATION: 98 % | TEMPERATURE: 98 F | HEIGHT: 63 IN | WEIGHT: 139 LBS | BODY MASS INDEX: 24.63 KG/M2 | HEART RATE: 91 BPM | DIASTOLIC BLOOD PRESSURE: 86 MMHG | SYSTOLIC BLOOD PRESSURE: 126 MMHG

## 2023-11-05 DIAGNOSIS — J34.89 NOSE PAIN: ICD-10-CM

## 2023-11-05 DIAGNOSIS — J34.0 CELLULITIS OF EXTERNAL NOSE: Primary | ICD-10-CM

## 2023-11-05 PROCEDURE — 99213 PR OFFICE/OUTPT VISIT, EST, LEVL III, 20-29 MIN: ICD-10-PCS | Mod: S$GLB,,, | Performed by: PHYSICIAN ASSISTANT

## 2023-11-05 PROCEDURE — 70160 X-RAY EXAM OF NASAL BONES: CPT | Mod: FY,S$GLB,, | Performed by: RADIOLOGY

## 2023-11-05 PROCEDURE — 70160 XR NASAL BONES: ICD-10-PCS | Mod: FY,S$GLB,, | Performed by: RADIOLOGY

## 2023-11-05 PROCEDURE — 99213 OFFICE O/P EST LOW 20 MIN: CPT | Mod: S$GLB,,, | Performed by: PHYSICIAN ASSISTANT

## 2023-11-05 RX ORDER — METHOCARBAMOL 500 MG/1
500 TABLET, FILM COATED ORAL
COMMUNITY
Start: 2023-02-01 | End: 2024-02-20 | Stop reason: SDUPTHER

## 2023-11-05 RX ORDER — HYDROCODONE BITARTRATE AND ACETAMINOPHEN 5; 325 MG/1; MG/1
1 TABLET ORAL EVERY 6 HOURS PRN
COMMUNITY
Start: 2023-02-01 | End: 2024-02-20

## 2023-11-05 RX ORDER — CEPHALEXIN 500 MG/1
500 CAPSULE ORAL EVERY 8 HOURS
Qty: 15 CAPSULE | Refills: 0 | Status: SHIPPED | OUTPATIENT
Start: 2023-11-05 | End: 2023-11-10

## 2023-11-05 RX ORDER — DEXBROMPHENIRAMINE MALEATE, DEXTROMETHORPHAN HBR, PHENYLEPHRINE HCL 2; 15; 7.5 MG/5ML; MG/5ML; MG/5ML
LIQUID ORAL
COMMUNITY
Start: 2023-08-02 | End: 2024-02-20 | Stop reason: ALTCHOICE

## 2023-11-05 NOTE — PROGRESS NOTES
"Subjective:      Patient ID: Mine Wilkins is a 88 y.o. female.    Vitals:  height is 5' 3" (1.6 m) and weight is 63 kg (139 lb). Her temperature is 98.1 °F (36.7 °C). Her blood pressure is 126/86 and her pulse is 91. Her respiration is 16 and oxygen saturation is 98%.     Chief Complaint: Facial Swelling (Pt is coming in today for possible broken nose. She has been sneezing/blowing her nose repeatedly this past week. Noticeable swelling/small lump/redness on bridge of nose. It hurts to touch/wear glasses. Can't take aspirin ankle surgery on nov 15. )    Pt is coming in today for possible broken nose. She has been sneezing/blowing her nose repeatedly this past week. It hurts to touch/wear glasses. Can't take aspirin ankle surgery on nov 15. *pt says she does not feel sick    Constitution: Negative for chills, fatigue and fever.   HENT:  Positive for postnasal drip. Negative for ear pain, ear discharge, dental problem, drooling, mouth sores, tongue pain, tongue lesion, congestion, nosebleeds, foreign body in nose, sinus pain, sinus pressure, sore throat, trouble swallowing and voice change.    Neck: Negative for neck pain and neck stiffness.   Cardiovascular:  Negative for chest pain.   Eyes:  Negative for eye trauma, foreign body in eye, eye discharge, eye itching, eye pain, eye redness, photophobia, vision loss, double vision, blurred vision and eyelid swelling.   Respiratory:  Negative for shortness of breath.    Musculoskeletal:  Positive for pain and muscle ache. Negative for trauma and muscle cramps.   Skin:  Positive for erythema. Negative for rash, wound and bruising.   Neurological:  Negative for dizziness, headaches and altered mental status.   Psychiatric/Behavioral:  Negative for altered mental status.       Past Medical History:   Diagnosis Date    Acute on chronic diastolic heart failure 3/9/2022    After-cataract of both eyes - Left Eye 10/11/2012    Anxiety     Aortic calcification 8/10/2016 "    Aortic valve stenosis, moderate     AR (allergic rhinitis)     Arthritis of facet joints at multiple vertebral levels 1/14/2016    Seen on lumbar MRI dated 01/14/16    Cataract     Cholelithiasis     Closed displaced intertrochanteric fracture of right femur with routine healing s/p IM nail on 5/20/2018 5/19/2018    Cystocele 12/1/2013    Esotropia, alternating - Both Eyes 10/11/2012    Essential hypertension     Gastroesophageal reflux disease without esophagitis 4/13/2018    Left ventricular diastolic dysfunction with preserved systolic function 8/10/2016    Lumbar radiculopathy 1/25/2016    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    Nondisplaced fracture of proximal end of humerus 8/7/2016    Nonexudative age-related macular degeneration, bilateral, intermediate dry stage 1/14/2021    Osteoarthritis     Overweight (BMI 25.0-29.9) 4/13/2018    Pure hypercholesterolemia     Right-sided low back pain without sciatica 12/10/2015    Strabismus     Urethral hypermobility 12/1/2013       Past Surgical History:   Procedure Laterality Date    ADENOIDECTOMY      CARDIAC CATH COSURGEON N/A 3/8/2022    Procedure: Cardiac Cath Cosurgeon;  Surgeon: Shahram Lyle MD;  Location: [facility] CATH LAB;  Service: Cardiovascular;  Laterality: N/A;    CARDIAC VALVE SURGERY      CATARACT EXTRACTION Bilateral     BOTH EYES MULTIFOCAL    COLONOSCOPY  2015    CORONARY ANGIOGRAPHY N/A 2/7/2022    Procedure: ANGIOGRAM, CORONARY ARTERY;  Surgeon: Robby Mistry MD;  Location: [facility] CATH LAB;  Service: Cardiology;  Laterality: N/A;    ESOPHAGOGASTRODUODENOSCOPY N/A 4/13/2023    Procedure: ESOPHAGOGASTRODUODENOSCOPY (EGD);  Surgeon: Remy Aguilar MD;  Location: [facility] ENDO (2ND FLR);  Service: Endoscopy;  Laterality: N/A;  2nd floor-hx of tavr-inst portal-tb    EYE SURGERY      FEMUR FRACTURE SURGERY Right     FRACTURE SURGERY Right     femur    HYSTERECTOMY      INTERTROCHANTERIC HIP FRACTURE SURGERY Right 05/20/2018     IM nail    LEFT HEART CATHETERIZATION Left 3/8/2022    Procedure: Left heart cath;  Surgeon: Constantin Goldberg MD;  Location: [facility] CATH LAB;  Service: Cardiology;  Laterality: Left;    LUMBAR EPIDURAL INJECTION  02/04/2016    L4-l5    PARTIAL HYSTERECTOMY      SKIN GRAFT      left foot     TONSILLECTOMY      TRANSCATHETER AORTIC VALVE REPLACEMENT (TAVR) N/A 3/8/2022    Procedure: REPLACEMENT, AORTIC VALVE, TRANSCATHETER (TAVR);  Surgeon: Constantin Goldberg MD;  Location: [facility] CATH LAB;  Service: Cardiology;  Laterality: N/A;    WRIST FRACTURE SURGERY Right 2009    YAG CAP      LEFT EYE       Family History   Problem Relation Age of Onset    Colon cancer Brother     Dementia Brother     Hypertension Brother     Osteoporosis Mother     Hypertension Mother     Macular degeneration Mother     Cataracts Mother     Cancer Father     No Known Problems Daughter     Hypertension Brother     Dementia Brother     No Known Problems Daughter     Hypertension Maternal Grandmother     Stroke Maternal Grandmother     Breast cancer Maternal Grandmother     Melanoma Maternal Grandfather     Anesthesia problems Neg Hx     Broken bones Neg Hx     Clotting disorder Neg Hx     Collagen disease Neg Hx     Diabetes Neg Hx     Dislocations Neg Hx     Rheumatologic disease Neg Hx     Scoliosis Neg Hx     Severe sprains Neg Hx     Ovarian cancer Neg Hx     Amblyopia Neg Hx     Blindness Neg Hx     Glaucoma Neg Hx     Retinal detachment Neg Hx     Strabismus Neg Hx     Psoriasis Neg Hx     Lupus Neg Hx     Eczema Neg Hx     Acne Neg Hx        Social History     Socioeconomic History    Marital status:    Occupational History    Occupation: Retired   Tobacco Use    Smoking status: Never     Passive exposure: Never    Smokeless tobacco: Never   Substance and Sexual Activity    Alcohol use: Yes     Comment: maybe 6 drinks per year    Drug use: No    Sexual activity: Not Currently     Partners: Male   Other Topics Concern    Are you  pregnant or think you may be? No    Breast-feeding No   Social History Narrative    . Two grown daughters     Social Determinants of Health     Financial Resource Strain: Low Risk  (5/28/2021)    Overall Financial Resource Strain (CARDIA)     Difficulty of Paying Living Expenses: Not hard at all   Food Insecurity: No Food Insecurity (5/28/2021)    Hunger Vital Sign     Worried About Running Out of Food in the Last Year: Never true     Ran Out of Food in the Last Year: Never true   Transportation Needs: No Transportation Needs (5/28/2021)    PRAPARE - Transportation     Lack of Transportation (Medical): No     Lack of Transportation (Non-Medical): No   Physical Activity: Sufficiently Active (5/28/2021)    Exercise Vital Sign     Days of Exercise per Week: 3 days     Minutes of Exercise per Session: 70 min   Stress: No Stress Concern Present (5/28/2021)    [place] [employer] - Occupational Stress Questionnaire     Feeling of Stress : Not at all   Social Connections: Moderately Isolated (5/28/2021)    Social Connection and Isolation Panel [NHANES]     Frequency of Communication with Friends and Family: More than three times a week     Frequency of Social Gatherings with Friends and Family: More than three times a week     Attends [employer]ism Services: More than 4 times per year     Active Member of Clubs or Organizations: No     Attends Club or Organization Meetings: Never     Marital Status:    Housing Stability: Low Risk  (5/28/2021)    Housing Stability Vital Sign     Unable to Pay for Housing in the Last Year: No     Number of Places Lived in the Last Year: 1     Unstable Housing in the Last Year: No       Current Outpatient Medications   Medication Sig Dispense Refill    albuterol (VENTOLIN HFA) 90 mcg/actuation inhaler Inhale 2 puffs into the lungs every 4 (four) hours as needed for Shortness of Breath (cough). Rescue 18 g 1    amLODIPine (NORVASC) 5 MG tablet TAKE 1 TABLET(5 MG)  BY MOUTH EVERY DAY 90 tablet 3    ascorbic acid, vitamin C, (VITAMIN C) 500 MG tablet Take 1,500 mg by mouth once daily.      cetirizine (ZYRTEC) 10 MG tablet Take 10 mg by mouth once daily.      cholecalciferol, vitamin D3, 2,000 unit Tab Take by mouth. 1 Tablet Oral Every day      EScitalopram oxalate (LEXAPRO) 5 MG Tab TAKE 1 TABLET EVERY DAY 90 tablet 0    fluticasone propionate (FLONASE) 50 mcg/actuation nasal spray SHAKE LIQUID AND USE 1 SPRAY(50 MCG) IN EACH NOSTRIL EVERY EVENING 16 g 3    multivit-min/iron/folic/lutein (CENTRUM SILVER WOMEN ORAL) Take by mouth.      omeprazole (PRILOSEC) 40 MG capsule TAKE 1 CAPSULE EVERY MORNING  BEFORE  EATING 90 capsule 3    oxybutynin (DITROPAN-XL) 10 MG 24 hr tablet TAKE 1 TABLET EVERY DAY 90 tablet 3    POLYTUSSIN DM,DEXBROMPHENIRMN, 2-7.5-15 mg/5 mL Liqd SMARTSI teaspoon By Mouth Every 6 Hours PRN      pravastatin (PRAVACHOL) 20 MG tablet TAKE 1 TABLET EVERY EVENING. 90 tablet 3    promethazine-dextromethorphan (PROMETHAZINE-DM) 6.25-15 mg/5 mL Syrp TAKE 5 ML BY MOUTH EVERY NIGHT AT BEDTIME AS NEEDED FOR COUGH AND CONGESTION      aspirin (ECOTRIN) 81 MG EC tablet Take 1 tablet (81 mg total) by mouth once daily. 90 tablet 3    azelastine (ASTELIN) 137 mcg (0.1 %) nasal spray 1 spray (137 mcg total) by Nasal route 2 (two) times daily. 30 mL 1    brompheniramine-pseudoeph-DM (BROMFED DM) 2-30-10 mg/5 mL Syrp TAKE 5 ML BY MOUTH EVERY 6 HOURS AS NEEDED FOR COUGH AND CONGESTION      cephALEXin (KEFLEX) 500 MG capsule Take 1 capsule (500 mg total) by mouth every 8 (eight) hours. for 5 days 15 capsule 0    HYDROcodone-acetaminophen (NORCO) 5-325 mg per tablet Take 1 tablet by mouth every 6 (six) hours as needed.      Lactobac no.41/Bifidobact no.7 (PROBIOTIC-10 ORAL) Take by mouth once daily.      methocarbamoL (ROBAXIN) 500 MG Tab Take 500 mg by mouth.       No current facility-administered medications for this visit.       Review of patient's allergies indicates:    Allergen Reactions    Ace inhibitors      Other reaction(s): cough    Codeine      Other reaction(s): Nausea    Preservision [vit c-e-cupric-zinc-lutein] Nausea And Vomiting       Objective:     Physical Exam   Constitutional: She is oriented to person, place, and time. She appears well-developed. She is cooperative.  Non-toxic appearance. She does not appear ill. No distress.   HENT:   Head: Normocephalic and atraumatic. Head is without raccoon's eyes, without Elaine's sign, without abrasion, without contusion, without laceration, without right periorbital erythema and without left periorbital erythema.   Ears:   Right Ear: Hearing, tympanic membrane, external ear and ear canal normal. impacted cerumen  Left Ear: Hearing, tympanic membrane, external ear and ear canal normal. impacted cerumen  Nose: Sinus tenderness present. No mucosal edema, rhinorrhea, purulent discharge, nose lacerations, nasal deformity, septal deviation, nasal septal hematoma or congestion. No epistaxis.  No foreign bodies. Right sinus exhibits no maxillary sinus tenderness and no frontal sinus tenderness. Left sinus exhibits no maxillary sinus tenderness and no frontal sinus tenderness.       Mouth/Throat: Uvula is midline, oropharynx is clear and moist and mucous membranes are normal. No trismus in the jaw. Normal dentition. No uvula swelling. No oropharyngeal exudate, posterior oropharyngeal edema or posterior oropharyngeal erythema.   Eyes: Conjunctivae and lids are normal. Pupils are equal, round, and reactive to light. Right eye exhibits no discharge. Left eye exhibits no discharge. No scleral icterus.   Neck: Trachea normal and phonation normal. Neck supple. No edema present. No erythema present. No neck rigidity present.   Cardiovascular: Normal rate, regular rhythm, normal heart sounds and normal pulses.   No murmur heard.Exam reveals no gallop and no friction rub.   Pulmonary/Chest: Effort normal and breath sounds normal. No  stridor. No respiratory distress. She has no decreased breath sounds. She has no wheezes. She has no rhonchi. She has no rales.   Abdominal: Normal appearance.   Musculoskeletal:      Cervical back: She exhibits no tenderness.   Lymphadenopathy:     She has no cervical adenopathy.   Neurological: She is alert and oriented to person, place, and time. She displays no weakness. She exhibits normal muscle tone. Coordination normal.   Skin: Skin is warm, dry, intact, not diaphoretic, not pale and no rash. erythema No bruising   Psychiatric: Her speech is normal and behavior is normal. Mood, judgment and thought content normal.   Nursing note and vitals reviewed.      XR NASAL BONES    Result Date: 11/5/2023  EXAMINATION: XR NASAL BONES CLINICAL HISTORY: Other specified disorders of nose and nasal sinuses TECHNIQUE: Three views nasal bones. COMPARISON: None FINDINGS: Noting markedly limited sensitivity of radiographs for facial bone fractures, no definite grossly displaced nasal bone fracture suggested. Question mild soft tissue swelling of the nasal arch. No definite radiopaque foreign body. Pneumatized paranasal sinuses interval bones grossly without large fluid levels. Multiple dental extractions. No definite radiopaque foreign body.     As above. Electronically signed by: Shahram Decker Date:    11/05/2023 Time:    13:54     Assessment:     1. Cellulitis of external nose    2. Nose pain        Plan:       Cellulitis of external nose  -     cephALEXin (KEFLEX) 500 MG capsule; Take 1 capsule (500 mg total) by mouth every 8 (eight) hours. for 5 days  Dispense: 15 capsule; Refill: 0    Nose pain  -     XR NASAL BONES; Future; Expected date: 11/05/2023        Results reviewed  I have reviewed the patient chart and pertinent past imaging/labs.  Patient Instructions   Take antibiotic as prescribed with food 3 times a day.  Use ice to help with swelling use for 15 minutes at a time 3 to 4 times a day.  If the x-ray results  are different than what we discussed I will call you and let you know.  If her symptoms do not improve in the next 2-3 days please return for evaluation.  Tylenol as needed for pain relief

## 2023-11-05 NOTE — PATIENT INSTRUCTIONS
Take antibiotic as prescribed with food 3 times a day.  Use ice to help with swelling use for 15 minutes at a time 3 to 4 times a day.  If the x-ray results are different than what we discussed I will call you and let you know.  If her symptoms do not improve in the next 2-3 days please return for evaluation.  Tylenol as needed for pain relief

## 2023-11-15 ENCOUNTER — PROCEDURE VISIT (OUTPATIENT)
Dept: DERMATOLOGY | Facility: CLINIC | Age: 88
End: 2023-11-15
Payer: MEDICARE

## 2023-11-15 VITALS
SYSTOLIC BLOOD PRESSURE: 164 MMHG | HEIGHT: 63 IN | DIASTOLIC BLOOD PRESSURE: 84 MMHG | WEIGHT: 138.88 LBS | HEART RATE: 73 BPM | BODY MASS INDEX: 24.61 KG/M2

## 2023-11-15 DIAGNOSIS — C44.722 SQUAMOUS CELL CARCINOMA OF RIGHT LOWER LEG: Primary | ICD-10-CM

## 2023-11-15 PROCEDURE — 13121 PR RECMPL WND SCALP,EXTR 2.6-7.5 CM: ICD-10-PCS | Mod: HCNC,S$GLB,, | Performed by: DERMATOLOGY

## 2023-11-15 PROCEDURE — 13121 CMPLX RPR S/A/L 2.6-7.5 CM: CPT | Mod: HCNC,S$GLB,, | Performed by: DERMATOLOGY

## 2023-11-15 PROCEDURE — 17313 MOHS 1 STAGE T/A/L: CPT | Mod: HCNC,51,S$GLB, | Performed by: DERMATOLOGY

## 2023-11-15 PROCEDURE — 17313: ICD-10-PCS | Mod: HCNC,51,S$GLB, | Performed by: DERMATOLOGY

## 2023-11-15 PROCEDURE — 17314 MOHS ADDL STAGE T/A/L: CPT | Mod: HCNC,S$GLB,, | Performed by: DERMATOLOGY

## 2023-11-15 PROCEDURE — 99499 NO LOS: ICD-10-PCS | Mod: HCNC,S$GLB,, | Performed by: DERMATOLOGY

## 2023-11-15 PROCEDURE — 17314: ICD-10-PCS | Mod: HCNC,S$GLB,, | Performed by: DERMATOLOGY

## 2023-11-15 PROCEDURE — 99499 UNLISTED E&M SERVICE: CPT | Mod: HCNC,S$GLB,, | Performed by: DERMATOLOGY

## 2023-11-15 RX ORDER — CEPHALEXIN 500 MG/1
500 CAPSULE ORAL 3 TIMES DAILY
Qty: 30 CAPSULE | Refills: 0 | Status: SHIPPED | OUTPATIENT
Start: 2023-11-15 | End: 2023-11-25

## 2023-11-15 NOTE — TELEPHONE ENCOUNTER
----- Message from Sydnee Buck sent at 10/20/2022  4:19 PM CDT -----  Contact: [phone] Patient  Patient would like to get a referral.  Referral to what specialty:  ENT  Does the patient want the referral with a specific physician:  No would like if DR Oswald would recommend one please  Is the specialist an [facility]:    Reason (be specific):  Cough  Does the patient already have the specialty clinic appointment scheduled:  No  If yes, what date is the appointment scheduled:     Is the insurance listed in Epic correct? (this is important for a referral):  yes  Advised patient that once provider approves this either a nurse or  will return their call?: Yes  Would the patient like a call back, or a response through their [facility] portal?:   Call Back  Comments:       
Ent referral sent to coordinators for scheduling.  
Pt has sent in a request for an ENT referral due to a chronic cough x2 weeks .  
No

## 2023-11-15 NOTE — PROGRESS NOTES
PROCEDURE: Mohs' Micrographic Surgery    INDICATION: Biopsy-proven skin cancer of cosmetically and functionally important areas, including head, neck, genital, hand, foot, or areas known for having difficulty in healing, such as the lower anterior legs. Tumor with ill-defined borders.    REFERRING PROVIDER: Ines Carter M.D.    CASE NUMBER:     ANESTHETIC: 4 cc 0.5% Lidocaine with Epi 1:200,000 mixed 1:1 with 0.5% Bupivacaine    SURGICAL PREP: Hibiclens    SURGEON: Marlo Cabello MD    ASSISTANTS: Essie Poole PA-C, Lore Apple MA, and Muriel Westbrook, Surg Tech    PREOPERATIVE DIAGNOSIS: squamous cell carcinoma    POSTOPERATIVE DIAGNOSIS: squamous cell carcinoma- invasive, well differentiated    PATHOLOGIC DIAGNOSIS: squamous cell carcinoma- invasive, well differentiated    HISTOLOGY OF SPECIMENS IN FIRST STAGE:   Debulking tumor confirms invasive, well differentiated squamous cell carcinoma.  Tumor Type: Tumor seen. Invasive squamous cell carcinoma: Proliferation of squamous cells exhibiting atypia and infiltrating within the dermis.  Well-differentiated squamous cell carcinoma: Proliferation of squamous cells exhibiting atypia and infiltrating within the dermis.   Depth of Invasion: epidermis and dermis  Perineural Invasion: No    HISTOLOGY OF SPECIMENS IN SUBSEQUENT STAGES:  Tumor Type:  No tumor seen.    STAGES OF MOHS' SURGERY PERFORMED: 2    TUMOR-FREE PLANE ACHIEVED: Yes    HEMOSTASIS: electrocoagulation     SPECIMENS: 3 (2 in stage A and 1 in stage B)    LOCATION: right lower leg. Location verified with Dr. Carter's clinical photograph. Patient also verified location.    INITIAL LESION SIZE: 0.7 x 1.0 cm    FINAL DEFECT SIZE: 1.6 x 1.9 cm    WOUND REPAIR/DISPOSITION: The patient tolerated Mohs' Micrographic Surgery for a squamous cell carcinoma very well. When the tumor was completely removed, a repair of the surgical defect was undertaken.    PROCEDURE: Complex Linear Repair    INDICATION: Status post  "Mohs' Micrographic Surgery for squamous cell carcinoma.    CASE NUMBER:     SURGEON: Marlo Cabello MD    ASSISTANTS: Essie Poole PA-C and Bridgette Nayak    ANESTHETIC: 2 cc 0.5% Lidocaine with Epi 1:200,000 mixed 1:1 with 0.5% Bupivacaine    SURGICAL PREP: Hibiclens, prepped by Bridgette Nayak    LOCATION: right lower leg    DEFECT SIZE: 1.6 x 1.9 cm    WOUND REPAIR/DISPOSITION:  After the patient's carcinoma had been completely removed with Mohs' Micrographic Surgery, a repair of the surgical defect was undertaken. The patient was returned to the operating suite where the area of right lower leg was prepped, draped, and anesthetized in the usual sterile fashion. The wound was widely undermined in all directions. The wound was undermined to a distance at least the maximum width of the defect as measured perpendicular to the closure line along at least one entire edge of the defect, in this case 2 cm. Then, electrocoagulation was used to obtain meticulous hemostasis. 3-0 Vicryl buried vertical mattress sutures were placed into the subcutaneous and dermal plane to close the wound and russell the cutaneous wound edge. Bilateral dog ears were identified and were removed by a standard Burow's triangle technique. The cutaneous wound edges were closed using interrupted 3-0 Prolene suture.    The patient tolerated the procedure well.    The area was cleaned and dressed appropriately and the patient was given wound care instructions, as well as appointment for follow-up evaluation and suture removal in 14 days. Patient was placed on Keflex 500 mg TID x 10 days.    LENGTH OF REPAIR: 5.2 cm    Vitals:    11/15/23 1242 11/15/23 1457   BP: (!) 144/85 (!) 164/84   BP Location: Left arm Right arm   Patient Position: Sitting Sitting   BP Method: Medium (Automatic) Medium (Automatic)   Pulse: 78 73   Weight: 63 kg (138 lb 14.2 oz)    Height: 5' 3" (1.6 m)          "

## 2023-11-29 ENCOUNTER — OFFICE VISIT (OUTPATIENT)
Dept: DERMATOLOGY | Facility: CLINIC | Age: 88
End: 2023-11-29
Payer: MEDICARE

## 2023-11-29 DIAGNOSIS — Z09 POSTOP CHECK: Primary | ICD-10-CM

## 2023-11-29 PROCEDURE — 1159F PR MEDICATION LIST DOCUMENTED IN MEDICAL RECORD: ICD-10-PCS | Mod: HCNC,CPTII,S$GLB, | Performed by: DERMATOLOGY

## 2023-11-29 PROCEDURE — 99024 PR POST-OP FOLLOW-UP VISIT: ICD-10-PCS | Mod: HCNC,S$GLB,, | Performed by: DERMATOLOGY

## 2023-11-29 PROCEDURE — 99999 PR PBB SHADOW E&M-EST. PATIENT-LVL III: CPT | Mod: PBBFAC,HCNC,, | Performed by: DERMATOLOGY

## 2023-11-29 PROCEDURE — 1157F PR ADVANCE CARE PLAN OR EQUIV PRESENT IN MEDICAL RECORD: ICD-10-PCS | Mod: HCNC,CPTII,S$GLB, | Performed by: DERMATOLOGY

## 2023-11-29 PROCEDURE — 1101F PT FALLS ASSESS-DOCD LE1/YR: CPT | Mod: HCNC,CPTII,S$GLB, | Performed by: DERMATOLOGY

## 2023-11-29 PROCEDURE — 3288F PR FALLS RISK ASSESSMENT DOCUMENTED: ICD-10-PCS | Mod: HCNC,CPTII,S$GLB, | Performed by: DERMATOLOGY

## 2023-11-29 PROCEDURE — 99024 POSTOP FOLLOW-UP VISIT: CPT | Mod: HCNC,S$GLB,, | Performed by: DERMATOLOGY

## 2023-11-29 PROCEDURE — 99999 PR PBB SHADOW E&M-EST. PATIENT-LVL III: ICD-10-PCS | Mod: PBBFAC,HCNC,, | Performed by: DERMATOLOGY

## 2023-11-29 PROCEDURE — 1126F AMNT PAIN NOTED NONE PRSNT: CPT | Mod: HCNC,CPTII,S$GLB, | Performed by: DERMATOLOGY

## 2023-11-29 PROCEDURE — 1159F MED LIST DOCD IN RCRD: CPT | Mod: HCNC,CPTII,S$GLB, | Performed by: DERMATOLOGY

## 2023-11-29 PROCEDURE — 1157F ADVNC CARE PLAN IN RCRD: CPT | Mod: HCNC,CPTII,S$GLB, | Performed by: DERMATOLOGY

## 2023-11-29 PROCEDURE — 1126F PR PAIN SEVERITY QUANTIFIED, NO PAIN PRESENT: ICD-10-PCS | Mod: HCNC,CPTII,S$GLB, | Performed by: DERMATOLOGY

## 2023-11-29 PROCEDURE — 3288F FALL RISK ASSESSMENT DOCD: CPT | Mod: HCNC,CPTII,S$GLB, | Performed by: DERMATOLOGY

## 2023-11-29 PROCEDURE — 1101F PR PT FALLS ASSESS DOC 0-1 FALLS W/OUT INJ PAST YR: ICD-10-PCS | Mod: HCNC,CPTII,S$GLB, | Performed by: DERMATOLOGY

## 2023-11-29 NOTE — PROGRESS NOTES
88 y.o. female patient is here for suture removal following Mohs' surgery.    Patient reports no problems.    WOUND PE:  The right lower leg sutures intact. Wound healing well. Good skin edges. No signs or symptoms of infection.      IMPRESSION:  Healing operative site from Mohs' surgery SCC right lower leg s/p Mohs with CLC, postop day #14.    PLAN:  Sutures removed today by  Isi Castro RN . Steri-strips applied.  Continue wound care.  Keep moist with Aquaphor.  Call if any issues arise    RTC:  In 3-6 months with Ines Carter M.D. for skin check or sooner if new concern arises.

## 2023-12-01 ENCOUNTER — INFUSION (OUTPATIENT)
Dept: INFECTIOUS DISEASES | Facility: HOSPITAL | Age: 88
End: 2023-12-01
Payer: MEDICARE

## 2023-12-01 VITALS
HEIGHT: 63 IN | WEIGHT: 139 LBS | SYSTOLIC BLOOD PRESSURE: 156 MMHG | TEMPERATURE: 99 F | BODY MASS INDEX: 24.63 KG/M2 | DIASTOLIC BLOOD PRESSURE: 78 MMHG | OXYGEN SATURATION: 95 %

## 2023-12-01 DIAGNOSIS — M81.0 SENILE OSTEOPOROSIS: Primary | ICD-10-CM

## 2023-12-01 PROCEDURE — 96372 THER/PROPH/DIAG INJ SC/IM: CPT | Mod: HCNC

## 2023-12-01 PROCEDURE — 63600175 PHARM REV CODE 636 W HCPCS: Mod: JZ,JG,HCNC | Performed by: INTERNAL MEDICINE

## 2023-12-01 RX ADMIN — ROMOSOZUMAB-AQQG 210 MG: 105 INJECTION, SOLUTION SUBCUTANEOUS at 01:12

## 2023-12-14 ENCOUNTER — OFFICE VISIT (OUTPATIENT)
Dept: URGENT CARE | Facility: CLINIC | Age: 88
End: 2023-12-14
Payer: MEDICARE

## 2023-12-14 VITALS
RESPIRATION RATE: 19 BRPM | TEMPERATURE: 98 F | HEART RATE: 74 BPM | OXYGEN SATURATION: 97 % | HEIGHT: 63 IN | SYSTOLIC BLOOD PRESSURE: 128 MMHG | WEIGHT: 138.88 LBS | DIASTOLIC BLOOD PRESSURE: 72 MMHG | BODY MASS INDEX: 24.61 KG/M2

## 2023-12-14 DIAGNOSIS — R30.0 DYSURIA: ICD-10-CM

## 2023-12-14 DIAGNOSIS — N30.01 ACUTE CYSTITIS WITH HEMATURIA: Primary | ICD-10-CM

## 2023-12-14 LAB
BILIRUB UR QL STRIP: NEGATIVE
GLUCOSE UR QL STRIP: NEGATIVE
KETONES UR QL STRIP: NEGATIVE
LEUKOCYTE ESTERASE UR QL STRIP: POSITIVE
PH, POC UA: 7 (ref 5–8)
POC BLOOD, URINE: POSITIVE
POC NITRATES, URINE: NEGATIVE
PROT UR QL STRIP: POSITIVE
SP GR UR STRIP: 1.01 (ref 1–1.03)
UROBILINOGEN UR STRIP-ACNC: NORMAL (ref 0.1–1.1)

## 2023-12-14 PROCEDURE — 99214 PR OFFICE/OUTPT VISIT, EST, LEVL IV, 30-39 MIN: ICD-10-PCS | Mod: S$GLB,,, | Performed by: FAMILY MEDICINE

## 2023-12-14 PROCEDURE — 99214 OFFICE O/P EST MOD 30 MIN: CPT | Mod: S$GLB,,, | Performed by: FAMILY MEDICINE

## 2023-12-14 PROCEDURE — 81003 POCT URINALYSIS, DIPSTICK, AUTOMATED, W/O SCOPE: ICD-10-PCS | Mod: QW,S$GLB,, | Performed by: FAMILY MEDICINE

## 2023-12-14 PROCEDURE — 81003 URINALYSIS AUTO W/O SCOPE: CPT | Mod: QW,S$GLB,, | Performed by: FAMILY MEDICINE

## 2023-12-14 RX ORDER — PHENAZOPYRIDINE HYDROCHLORIDE 200 MG/1
200 TABLET, FILM COATED ORAL
Qty: 10 TABLET | Refills: 0 | Status: SHIPPED | OUTPATIENT
Start: 2023-12-14 | End: 2023-12-18

## 2023-12-14 RX ORDER — NITROFURANTOIN 25; 75 MG/1; MG/1
100 CAPSULE ORAL 2 TIMES DAILY
Qty: 10 CAPSULE | Refills: 0 | Status: SHIPPED | OUTPATIENT
Start: 2023-12-14 | End: 2023-12-19

## 2023-12-14 NOTE — PROGRESS NOTES
"Subjective:      Patient ID: Mine Wilkins is a 88 y.o. female.    Vitals:  height is 5' 3" (1.6 m) and weight is 63 kg (138 lb 14.2 oz). Her oral temperature is 98 °F (36.7 °C). Her blood pressure is 128/72 and her pulse is 74. Her respiration is 19 and oxygen saturation is 97%.     Chief Complaint: Dysuria    This is a 88 y.o. female who presents today with a chief complaint of urgency, frequency, nausea that began 3 days ago. Pt states that they have not taken anything to help with symptoms.     Dysuria   This is a new problem. The current episode started in the past 7 days. The problem occurs every urination. The problem has been gradually worsening. The quality of the pain is described as burning. The pain is at a severity of 3/10 (lower back right side pains). The pain is mild. There has been no fever. She is Not sexually active. There is No history of pyelonephritis. Associated symptoms include hesitancy, nausea and urgency. Pertinent negatives include no behavior changes, chills, discharge, flank pain, frequency, hematuria, possible pregnancy, sweats, vomiting, weight loss, bubble bath use, constipation, rash or withholding. She has tried nothing for the symptoms. The treatment provided no relief. Her past medical history is significant for hypertension and recurrent UTIs. There is no history of catheterization, diabetes insipidus, diabetes mellitus, genitourinary reflux, kidney stones, a single kidney, STD, urinary stasis or a urological procedure.       Constitution: Negative for chills.   Gastrointestinal:  Positive for nausea. Negative for vomiting and constipation.   Genitourinary:  Positive for dysuria and urgency. Negative for frequency, flank pain and hematuria.   Skin:  Negative for rash.      Objective:     Physical Exam   Constitutional: She is oriented to person, place, and time. She appears well-developed.   HENT:   Head: Normocephalic and atraumatic.   Ears:   Right Ear: External ear " normal.   Left Ear: External ear normal.   Nose: Nose normal. No nasal deformity. No epistaxis.   Mouth/Throat: Oropharynx is clear and moist and mucous membranes are normal.   Eyes: Lids are normal.   Neck: Trachea normal and phonation normal. Neck supple.   Cardiovascular: Normal pulses.   Pulmonary/Chest: Effort normal.   Abdominal: Normal appearance and bowel sounds are normal. She exhibits no distension. Soft. There is no abdominal tenderness.   Neurological: She is alert and oriented to person, place, and time.   Skin: Skin is warm, dry and intact.   Psychiatric: Her speech is normal and behavior is normal.   Nursing note and vitals reviewed.    Results for orders placed or performed in visit on 12/14/23   POCT Urinalysis, Dipstick, Automated, W/O Scope   Result Value Ref Range    POC Blood, Urine Positive (A) Negative, Positive Slide, Positive Tube    POC Bilirubin, Urine Negative Negative, Positive Slide, Positive Tube    POC Urobilinogen, Urine normal 0.1 - 1.1    POC Ketones, Urine Negative Negative, Positive Slide, Positive Tube    POC Protein, Urine Positive (A) Negative, Positive Slide, Positive Tube    POC Nitrates, Urine Negative Negative, Positive Slide, Positive Tube    POC Glucose, Urine Negative Negative, Positive Slide, Positive Tube    pH, UA 7.0 5 - 8    POC Specific Gravity, Urine 1.015 1.003 - 1.029    POC Leukocytes, Urine Positive (A) Negative, Positive Slide, Positive Tube       Assessment:     1. Acute cystitis with hematuria    2. Dysuria        Plan:       Acute cystitis with hematuria  -     phenazopyridine (PYRIDIUM) 200 MG tablet; Take 1 tablet (200 mg total) by mouth 3 (three) times daily with meals. for 10 doses  Dispense: 10 tablet; Refill: 0  -     nitrofurantoin, macrocrystal-monohydrate, (MACROBID) 100 MG capsule; Take 1 capsule (100 mg total) by mouth 2 (two) times daily. for 5 days  Dispense: 10 capsule; Refill: 0    Dysuria  -     POCT Urinalysis, Dipstick, Automated, W/O  Scope    Thank you for choosing [facility]!     Our goal in the Urgent Care is to always provide outstanding medical care. You may receive a survey by mail or e-mail in the next week regarding your experience today. We would greatly appreciate you completing and returning the survey. Your feedback provides us with a way to recognize our staff who provide very good care, and it helps us learn how to improve when your experience was below our aspiration of excellence.       We appreciate you trusting us with your medical care. We hope you feel better soon. We will be happy to take care of you for all of your future medical needs.  You must understand that you've received an Urgent Care treatment only and that you may be released before all your medical problems are known or treated. You, the patient, will arrange for follow up care as instructed.  Follow up with your PCP or specialty clinic as directed in the next 1-2 weeks if not improved or as needed.  You can call [phone] to schedule an appointment with the appropriate provider.  Another option is to follow up with [facility] ([email or web address]) virtually for quick simple medical advice.  If your condition worsens we recommend that you receive another evaluation at the emergency room immediately or contact your primary medical clinics after hours call service to discuss your concerns.  Please return here or go to the Emergency Department for any concerns or worsening of condition.      *If you were prescribed a narcotic or controlled medication, do not drive or operate heavy equipment or machinery while taking these medications.

## 2024-01-03 ENCOUNTER — INFUSION (OUTPATIENT)
Dept: INFECTIOUS DISEASES | Facility: HOSPITAL | Age: 89
End: 2024-01-03
Payer: MEDICARE

## 2024-01-03 VITALS
HEART RATE: 81 BPM | OXYGEN SATURATION: 96 % | TEMPERATURE: 98 F | RESPIRATION RATE: 19 BRPM | DIASTOLIC BLOOD PRESSURE: 73 MMHG | WEIGHT: 141.63 LBS | BODY MASS INDEX: 25.09 KG/M2 | SYSTOLIC BLOOD PRESSURE: 164 MMHG | HEIGHT: 63 IN

## 2024-01-03 DIAGNOSIS — M81.0 SENILE OSTEOPOROSIS: Primary | ICD-10-CM

## 2024-01-03 PROCEDURE — 63600175 PHARM REV CODE 636 W HCPCS: Mod: JZ,JG,HCNC | Performed by: INTERNAL MEDICINE

## 2024-01-03 PROCEDURE — 96372 THER/PROPH/DIAG INJ SC/IM: CPT | Mod: HCNC

## 2024-01-03 RX ADMIN — ROMOSOZUMAB-AQQG 210 MG: 105 INJECTION, SOLUTION SUBCUTANEOUS at 02:01

## 2024-01-03 NOTE — PROGRESS NOTES
Patient arrives for evenity injections - confirms use of calcium and vitamin D supplements and denies dental procedures over past 3 months - administered per guideline.        Limited head-to-toe assessment due to privacy issues and visit reason though the opportunity was given for patient to express any concerns

## 2024-01-18 ENCOUNTER — OFFICE VISIT (OUTPATIENT)
Dept: URGENT CARE | Facility: CLINIC | Age: 89
End: 2024-01-18
Payer: MEDICARE

## 2024-01-18 VITALS
HEIGHT: 63 IN | TEMPERATURE: 98 F | WEIGHT: 141 LBS | DIASTOLIC BLOOD PRESSURE: 73 MMHG | SYSTOLIC BLOOD PRESSURE: 137 MMHG | HEART RATE: 80 BPM | OXYGEN SATURATION: 97 % | RESPIRATION RATE: 18 BRPM | BODY MASS INDEX: 24.98 KG/M2

## 2024-01-18 DIAGNOSIS — S29.012A MUSCLE STRAIN OF RIGHT UPPER BACK, INITIAL ENCOUNTER: Primary | ICD-10-CM

## 2024-01-18 PROCEDURE — 96372 THER/PROPH/DIAG INJ SC/IM: CPT | Mod: S$GLB,,, | Performed by: FAMILY MEDICINE

## 2024-01-18 PROCEDURE — 99213 OFFICE O/P EST LOW 20 MIN: CPT | Mod: 25,S$GLB,,

## 2024-01-18 RX ORDER — METHOCARBAMOL 500 MG/1
500 TABLET, FILM COATED ORAL 4 TIMES DAILY
Qty: 40 TABLET | Refills: 0 | Status: SHIPPED | OUTPATIENT
Start: 2024-01-18 | End: 2024-02-12 | Stop reason: SDUPTHER

## 2024-01-18 RX ORDER — KETOROLAC TROMETHAMINE 30 MG/ML
30 INJECTION, SOLUTION INTRAMUSCULAR; INTRAVENOUS
Status: COMPLETED | OUTPATIENT
Start: 2024-01-18 | End: 2024-01-18

## 2024-01-18 RX ADMIN — KETOROLAC TROMETHAMINE 30 MG: 30 INJECTION, SOLUTION INTRAMUSCULAR; INTRAVENOUS at 04:01

## 2024-01-18 NOTE — PROGRESS NOTES
"Subjective:      Patient ID: Mine Wilkins is a 88 y.o. female.    Vitals:  height is 5' 3" (1.6 m) and weight is 64 kg (141 lb). Her oral temperature is 98.1 °F (36.7 °C). Her blood pressure is 137/73 and her pulse is 80. Her respiration is 18 and oxygen saturation is 97%.     Chief Complaint: Back Pain    This is a 88 y.o. female who presents today with a chief complaint of back pain. Patient been having in her upper right back.     Provider note starts below:  Patient presents to clinic for evaluation of right upper back pain. Pain onset suddenly 10 days ago. She reports bending over and reaching to get clothes out of the dryer when she felt a "pop" and her back started hurting. On the first day, she took Norco 5-325 which she had at home for the pain. The next days she took methocarbamol which she also had at home which provided moderate relief. Patient ran out of methocarbamol so she has been applying heating pad and taking Aleve. Patient states her pain has slowly improved but still persistent. Pain is worse with twisting movements such as getting out of bed. She does not have pain with deep inspiration. She denies any shortness of breath, cough, fever, chills, chest pain, chest tightness.    Back Pain  This is a new problem. The current episode started in the past 7 days. The quality of the pain is described as aching. The pain does not radiate. The pain is at a severity of 5/10. The pain is The same all the time. The symptoms are aggravated by position. Pertinent negatives include no abdominal pain, chest pain, dysuria, fever, headaches or numbness. Treatments tried: aleve, tylenol. The treatment provided no relief.       Constitution: Negative for chills, fatigue and fever.   HENT:  Negative for congestion and sore throat.    Neck: Negative for neck pain and neck stiffness.   Cardiovascular:  Negative for chest pain, palpitations and sob on exertion.   Eyes:  Negative for double vision and blurred " vision.   Respiratory:  Negative for chest tightness, cough, sputum production, bloody sputum, shortness of breath and wheezing.    Gastrointestinal:  Negative for abdominal pain, nausea and vomiting.   Genitourinary:  Negative for dysuria and flank pain.   Musculoskeletal:  Positive for back pain. Negative for joint pain, joint swelling and abnormal ROM of joint.   Skin:  Negative for pale, erythema and bruising.   Neurological:  Negative for dizziness, light-headedness, loss of balance, headaches, numbness and tingling.      Objective:     Physical Exam   Constitutional: She is oriented to person, place, and time. normal  HENT:   Head: Normocephalic and atraumatic.   Eyes: Conjunctivae are normal. Extraocular movement intact   Neck: Neck supple. No neck rigidity present.   Cardiovascular: Normal rate, regular rhythm, normal heart sounds and normal pulses.   Pulmonary/Chest: Effort normal and breath sounds normal. No respiratory distress. She has no wheezes. She has no rhonchi. She has no rales.   Abdominal: Normal appearance. There is no abdominal tenderness. There is no left CVA tenderness and no right CVA tenderness.   Musculoskeletal: Normal range of motion.         General: Normal range of motion.      Cervical back: She exhibits no tenderness.      Thoracic back: She exhibits spasm (right). She exhibits normal range of motion and no bony tenderness.      Lumbar back: Normal.   Neurological: She is alert, oriented to person, place, and time and at baseline.   Skin: Skin is warm and dry. No bruising and No erythema   Psychiatric: Her behavior is normal. Mood normal.   Nursing note and vitals reviewed.      Assessment:     1. Muscle strain of right upper back, initial encounter      Plan:     Muscle strain of right upper back, initial encounter  -     methocarbamoL (ROBAXIN) 500 MG Tab; Take 1 tablet (500 mg total) by mouth 4 (four) times daily. for 10 days  Dispense: 40 tablet; Refill: 0  -     ketorolac  injection 30 mg      Patient Instructions   You received an injection of a powerful NSAID today (Toradol).  It's effects will last up to 24 hours. Please do not take another NSAID (ie aspirin, ibuprofen, Aleve, Advil or Motrin) until this time tomorrow.  If you continue to have pain, you may take Tylenol (acetaminophen) if you are not allergic to this medication.    A strain is an injury to muscles or tendons. Immediate treatment of sprains or strains includes RICE - Rest, ice, compression and elevation to the affected joint or limb as needed.    Put a heating pad on your back for 20 minutes at a time a few times each day. Never go to sleep with heat or ice on your back.  Stay as active as you can without causing too much pain. It is OK to rest your back for a day or so. Be sure to get up and move around gently during the day as you are able. After a few days, slowly start to increase your activity level as you are able to. If something causes your pain to come back or get worse, stop and go back to doing easier activities that did not hurt.  Protect your back. Limit sports, twisting, and heavy lifting until you are fully recovered.  Do not sit or  one position for a long time. You may want to sleep with a pillow under or between your knees if this eases your pain.    You were prescribed a muscle relaxer (Robaxin), do not drive or operate heavy equipment or machinery while taking these medications. These medications can make you drowsy. If you are driving, it is recommended to take ibuprofen TID prn pain and take robaxin at night.    Please drink plenty of fluids.  Please get plenty of rest.    Please remember that you have received care at an urgent care today. Urgent cares are not emergency rooms and are not equipped to handle life threatening emergencies and cannot rule in or out certain medical conditions and you may be released before all of your medical problems are known or treated. Please arrange  follow up with your primary care physician or speciality clinic (orthopedics) within 2-5 days if your signs and symptoms have not resolved or worsen.     Patient can call our Referral Hotline at [phone] to make an appointment.    Please return here or go to the Emergency Department for any concerns or worsening of condition.Patient was educated on signs/symptoms that would warrant emergent medical attention.   You have sudden shortness of breath or a sudden chest pain.  You have very bad belly pain, especially if it is worse when you try to get up or walk.  You start to have very bad pain in your chest, back, or head.  You feel like you might pass out when you try to sit up or stand.  You are very unsteady when you try to walk.  You are throwing up a lot.  You become confused or very sleepy or cannot wake up.  You have a wound that opens up and you can see muscle or other tissue below the skin.  You have a wound that is draining thick yellow, green, or bad-smelling discharge.  You have weakness or numbness in your arms or legs.  You have blood in your urine or bowel movements.  You have a fever of 100.4°F (38°C) or higher.  You have pain that does not get better with pain medicine.  You have a wound that is not healing.  You have a headache or stiff neck that does not get better in 2 to 3 days.

## 2024-01-18 NOTE — PATIENT INSTRUCTIONS
You received an injection of a powerful NSAID today (Toradol).  It's effects will last up to 24 hours. Please do not take another NSAID (ie aspirin, ibuprofen, Aleve, Advil or Motrin) until this time tomorrow.  If you continue to have pain, you may take Tylenol (acetaminophen) if you are not allergic to this medication.    A strain is an injury to muscles or tendons. Immediate treatment of sprains or strains includes RICE - Rest, ice, compression and elevation to the affected joint or limb as needed.    Put a heating pad on your back for 20 minutes at a time a few times each day. Never go to sleep with heat or ice on your back.  Stay as active as you can without causing too much pain. It is OK to rest your back for a day or so. Be sure to get up and move around gently during the day as you are able. After a few days, slowly start to increase your activity level as you are able to. If something causes your pain to come back or get worse, stop and go back to doing easier activities that did not hurt.  Protect your back. Limit sports, twisting, and heavy lifting until you are fully recovered.  Do not sit or  one position for a long time. You may want to sleep with a pillow under or between your knees if this eases your pain.    You were prescribed a muscle relaxer (Robaxin), do not drive or operate heavy equipment or machinery while taking these medications. These medications can make you drowsy. If you are driving, it is recommended to take ibuprofen TID prn pain and take robaxin at night.    Please drink plenty of fluids.  Please get plenty of rest.    Please remember that you have received care at an urgent care today. Urgent cares are not emergency rooms and are not equipped to handle life threatening emergencies and cannot rule in or out certain medical conditions and you may be released before all of your medical problems are known or treated. Please arrange follow up with your primary care physician or  speciality clinic (orthopedics) within 2-5 days if your signs and symptoms have not resolved or worsen.     Patient can call our Referral Hotline at [phone] to make an appointment.    Please return here or go to the Emergency Department for any concerns or worsening of condition.Patient was educated on signs/symptoms that would warrant emergent medical attention.   You have sudden shortness of breath or a sudden chest pain.  You have very bad belly pain, especially if it is worse when you try to get up or walk.  You start to have very bad pain in your chest, back, or head.  You feel like you might pass out when you try to sit up or stand.  You are very unsteady when you try to walk.  You are throwing up a lot.  You become confused or very sleepy or cannot wake up.  You have a wound that opens up and you can see muscle or other tissue below the skin.  You have a wound that is draining thick yellow, green, or bad-smelling discharge.  You have weakness or numbness in your arms or legs.  You have blood in your urine or bowel movements.  You have a fever of 100.4°F (38°C) or higher.  You have pain that does not get better with pain medicine.  You have a wound that is not healing.  You have a headache or stiff neck that does not get better in 2 to 3 days.

## 2024-02-06 ENCOUNTER — INFUSION (OUTPATIENT)
Dept: INFECTIOUS DISEASES | Facility: HOSPITAL | Age: 89
End: 2024-02-06
Attending: INTERNAL MEDICINE
Payer: MEDICARE

## 2024-02-06 VITALS
HEIGHT: 63 IN | WEIGHT: 138.25 LBS | OXYGEN SATURATION: 92 % | RESPIRATION RATE: 21 BRPM | TEMPERATURE: 98 F | DIASTOLIC BLOOD PRESSURE: 66 MMHG | BODY MASS INDEX: 24.5 KG/M2 | SYSTOLIC BLOOD PRESSURE: 139 MMHG | HEART RATE: 83 BPM

## 2024-02-06 DIAGNOSIS — M81.0 SENILE OSTEOPOROSIS: Primary | ICD-10-CM

## 2024-02-06 PROCEDURE — 96372 THER/PROPH/DIAG INJ SC/IM: CPT | Mod: HCNC

## 2024-02-06 PROCEDURE — 63600175 PHARM REV CODE 636 W HCPCS: Mod: JZ,JG,HCNC | Performed by: INTERNAL MEDICINE

## 2024-02-06 RX ADMIN — ROMOSOZUMAB-AQQG 210 MG: 105 INJECTION, SOLUTION SUBCUTANEOUS at 03:02

## 2024-02-12 ENCOUNTER — OFFICE VISIT (OUTPATIENT)
Dept: INTERNAL MEDICINE | Facility: CLINIC | Age: 89
End: 2024-02-12
Payer: MEDICARE

## 2024-02-12 VITALS
SYSTOLIC BLOOD PRESSURE: 132 MMHG | HEART RATE: 78 BPM | TEMPERATURE: 98 F | BODY MASS INDEX: 23.2 KG/M2 | DIASTOLIC BLOOD PRESSURE: 84 MMHG | HEIGHT: 63 IN | WEIGHT: 130.94 LBS | OXYGEN SATURATION: 98 % | RESPIRATION RATE: 18 BRPM

## 2024-02-12 DIAGNOSIS — K21.9 GASTROESOPHAGEAL REFLUX DISEASE WITHOUT ESOPHAGITIS: ICD-10-CM

## 2024-02-12 DIAGNOSIS — Z00.00 PREVENTATIVE HEALTH CARE: Primary | ICD-10-CM

## 2024-02-12 DIAGNOSIS — S29.012A MUSCLE STRAIN OF RIGHT UPPER BACK, INITIAL ENCOUNTER: ICD-10-CM

## 2024-02-12 DIAGNOSIS — I73.9 PVD (PERIPHERAL VASCULAR DISEASE): Chronic | ICD-10-CM

## 2024-02-12 DIAGNOSIS — M81.0 SENILE OSTEOPOROSIS: ICD-10-CM

## 2024-02-12 DIAGNOSIS — I10 ESSENTIAL HYPERTENSION: Chronic | ICD-10-CM

## 2024-02-12 DIAGNOSIS — Z95.2 S/P TAVR (TRANSCATHETER AORTIC VALVE REPLACEMENT): Chronic | ICD-10-CM

## 2024-02-12 DIAGNOSIS — N39.46 MIXED INCONTINENCE URGE AND STRESS (MALE)(FEMALE): ICD-10-CM

## 2024-02-12 DIAGNOSIS — I51.89 LEFT VENTRICULAR DIASTOLIC DYSFUNCTION WITH PRESERVED SYSTOLIC FUNCTION: Chronic | ICD-10-CM

## 2024-02-12 DIAGNOSIS — E78.5 DYSLIPIDEMIA: ICD-10-CM

## 2024-02-12 DIAGNOSIS — F41.1 GENERALIZED ANXIETY DISORDER: Chronic | ICD-10-CM

## 2024-02-12 DIAGNOSIS — I70.0 AORTIC ATHEROSCLEROSIS: ICD-10-CM

## 2024-02-12 PROCEDURE — 1159F MED LIST DOCD IN RCRD: CPT | Mod: HCNC,CPTII,S$GLB, | Performed by: FAMILY MEDICINE

## 2024-02-12 PROCEDURE — 1157F ADVNC CARE PLAN IN RCRD: CPT | Mod: HCNC,CPTII,S$GLB, | Performed by: FAMILY MEDICINE

## 2024-02-12 PROCEDURE — 1126F AMNT PAIN NOTED NONE PRSNT: CPT | Mod: HCNC,CPTII,S$GLB, | Performed by: FAMILY MEDICINE

## 2024-02-12 PROCEDURE — 99999 PR PBB SHADOW E&M-EST. PATIENT-LVL V: CPT | Mod: PBBFAC,HCNC,, | Performed by: FAMILY MEDICINE

## 2024-02-12 PROCEDURE — 99397 PER PM REEVAL EST PAT 65+ YR: CPT | Mod: HCNC,S$GLB,, | Performed by: FAMILY MEDICINE

## 2024-02-12 PROCEDURE — 1160F RVW MEDS BY RX/DR IN RCRD: CPT | Mod: HCNC,CPTII,S$GLB, | Performed by: FAMILY MEDICINE

## 2024-02-12 PROCEDURE — 1101F PT FALLS ASSESS-DOCD LE1/YR: CPT | Mod: HCNC,CPTII,S$GLB, | Performed by: FAMILY MEDICINE

## 2024-02-12 PROCEDURE — 3288F FALL RISK ASSESSMENT DOCD: CPT | Mod: HCNC,CPTII,S$GLB, | Performed by: FAMILY MEDICINE

## 2024-02-12 RX ORDER — PRAVASTATIN SODIUM 20 MG/1
20 TABLET ORAL NIGHTLY
Qty: 90 TABLET | Refills: 3 | Status: SHIPPED | OUTPATIENT
Start: 2024-02-12

## 2024-02-12 RX ORDER — OXYBUTYNIN CHLORIDE 10 MG/1
10 TABLET, EXTENDED RELEASE ORAL DAILY
Qty: 90 TABLET | Refills: 3 | Status: SHIPPED | OUTPATIENT
Start: 2024-02-12

## 2024-02-12 RX ORDER — METHOCARBAMOL 500 MG/1
500 TABLET, FILM COATED ORAL 4 TIMES DAILY
Qty: 40 TABLET | Refills: 6 | Status: SHIPPED | OUTPATIENT
Start: 2024-02-12 | End: 2024-02-22

## 2024-02-12 RX ORDER — ESCITALOPRAM OXALATE 5 MG/1
5 TABLET ORAL DAILY
Qty: 90 TABLET | Refills: 3 | Status: SHIPPED | OUTPATIENT
Start: 2024-02-12

## 2024-02-12 RX ORDER — OMEPRAZOLE 40 MG/1
40 CAPSULE, DELAYED RELEASE ORAL EVERY MORNING
Qty: 90 CAPSULE | Refills: 3 | Status: SHIPPED | OUTPATIENT
Start: 2024-02-12

## 2024-02-12 NOTE — PROGRESS NOTES
Subjective     Patient ID: Mine Wilkins is a 88 y.o. female.    Chief Complaint: Establish Care    HPI 88-year-old white female with diastolic dysfunction, aortic valve replacement, hypertension, peripheral vascular disease, dyslipidemia, aortic atherosclerosis ptosis, GERD, urinary incontinence, generalized anxiety disorder, osteoporosis, and previous vertebral compression fracture presents to clinic today for annual physical exam and in order to establish care.  She continues to be followed by Cardiology for diastolic dysfunction and is status post aortic valve repair.  At this time hypertension is well controlled on amlodipine 5 mg daily.  Dyslipidemia remains well controlled on pravastatin 20 mg nightly.  Aortic atherosclerosis versus remained stable.  GERD is well controlled on omeprazole 40 mg daily.  Urinary incontinence remains stable on oxybutynin 10 mg daily.  Osteoporosis remained stable on Evenity monthly infusions.  She has had a previous compression fracture and continues to note occasional back pain for which he uses over-the-counter Tylenol and Aspercreme with mild relief.  I have also prescribed Robaxin for further treatment.  Anxiety remains stable on Lexapro 5 mg daily.  She reports a past surgical history of tonsil/adenoidectomy, cataract extraction, hysterectomy, right femur fracture with repair, aortic valve replacement, Mohs surgery, right wrist fracture repair, and T6 kyphoplasty.  She reports a family history of hypertension, colon cancer, and dementia.  She is up-to-date with all vaccinations and screening exams.  Review of Systems   Constitutional:  Negative for appetite change, chills, fatigue and fever.   HENT:  Negative for nasal congestion, ear pain, hearing loss, postnasal drip, rhinorrhea, sinus pressure/congestion, sore throat and tinnitus.    Eyes:  Negative for redness, itching and visual disturbance.   Respiratory:  Negative for cough, chest tightness and shortness of  breath.    Cardiovascular:  Negative for chest pain and palpitations.   Gastrointestinal:  Negative for abdominal pain, constipation, diarrhea, nausea and vomiting.   Genitourinary:  Negative for decreased urine volume, difficulty urinating, dysuria, frequency, hematuria and urgency.   Musculoskeletal:  Negative for back pain, myalgias, neck pain and neck stiffness.   Integumentary:  Negative for rash.   Neurological:  Negative for dizziness, light-headedness and headaches.   Psychiatric/Behavioral: Negative.            Objective     Physical Exam  Vitals and nursing note reviewed.   Constitutional:       General: She is not in acute distress.     Appearance: She is well-developed. She is not diaphoretic.   HENT:      Head: Normocephalic and atraumatic.      Right Ear: External ear normal.      Left Ear: External ear normal.      Nose: Nose normal.      Mouth/Throat:      Pharynx: No oropharyngeal exudate.   Eyes:      General: No scleral icterus.        Right eye: No discharge.         Left eye: No discharge.      Conjunctiva/sclera: Conjunctivae normal.      Pupils: Pupils are equal, round, and reactive to light.   Neck:      Thyroid: No thyromegaly.      Vascular: No JVD.      Trachea: No tracheal deviation.   Cardiovascular:      Rate and Rhythm: Normal rate and regular rhythm.      Heart sounds: Normal heart sounds. No murmur heard.     No friction rub. No gallop.   Pulmonary:      Effort: Pulmonary effort is normal. No respiratory distress.      Breath sounds: Normal breath sounds. No stridor. No wheezing or rales.   Abdominal:      General: Bowel sounds are normal. There is no distension.      Palpations: Abdomen is soft. There is no mass.      Tenderness: There is no abdominal tenderness. There is no guarding or rebound.   Musculoskeletal:         General: No tenderness. Normal range of motion.      Cervical back: Normal range of motion and neck supple.      Comments: Walks with assistance of a cane    Lymphadenopathy:      Cervical: No cervical adenopathy.   Skin:     General: Skin is warm and dry.      Coloration: Skin is not pale.      Findings: No erythema or rash.   Neurological:      Mental Status: She is alert and oriented to person, place, and time.   Psychiatric:         Behavior: Behavior normal.         Thought Content: Thought content normal.         Judgment: Judgment normal.            Assessment and Plan     1. Preventative health care  -     CBC Auto Differential; Future; Expected date: 02/12/2024  -     Comprehensive Metabolic Panel; Future; Expected date: 02/12/2024  -     Lipid Panel; Future; Expected date: 02/12/2024  -     T4, Free; Future; Expected date: 02/12/2024  -     TSH; Future; Expected date: 02/12/2024    2. Left ventricular diastolic dysfunction with preserved systolic function  -     CBC Auto Differential; Future; Expected date: 02/12/2024  -     Comprehensive Metabolic Panel; Future; Expected date: 02/12/2024  -     Lipid Panel; Future; Expected date: 02/12/2024  -     T4, Free; Future; Expected date: 02/12/2024  -     TSH; Future; Expected date: 02/12/2024    3. S/P TAVR (transcatheter aortic valve replacement)  -     CBC Auto Differential; Future; Expected date: 02/12/2024  -     Comprehensive Metabolic Panel; Future; Expected date: 02/12/2024  -     Lipid Panel; Future; Expected date: 02/12/2024  -     T4, Free; Future; Expected date: 02/12/2024  -     TSH; Future; Expected date: 02/12/2024    4. Essential hypertension  -     CBC Auto Differential; Future; Expected date: 02/12/2024  -     Comprehensive Metabolic Panel; Future; Expected date: 02/12/2024  -     Lipid Panel; Future; Expected date: 02/12/2024  -     T4, Free; Future; Expected date: 02/12/2024  -     TSH; Future; Expected date: 02/12/2024    5. PVD (peripheral vascular disease)  -     CBC Auto Differential; Future; Expected date: 02/12/2024  -     Comprehensive Metabolic Panel; Future; Expected date: 02/12/2024  -      Lipid Panel; Future; Expected date: 02/12/2024  -     T4, Free; Future; Expected date: 02/12/2024  -     TSH; Future; Expected date: 02/12/2024    6. Dyslipidemia  -     pravastatin (PRAVACHOL) 20 MG tablet; Take 1 tablet (20 mg total) by mouth every evening.  Dispense: 90 tablet; Refill: 3  -     CBC Auto Differential; Future; Expected date: 02/12/2024  -     Comprehensive Metabolic Panel; Future; Expected date: 02/12/2024  -     Lipid Panel; Future; Expected date: 02/12/2024  -     T4, Free; Future; Expected date: 02/12/2024  -     TSH; Future; Expected date: 02/12/2024    7. Aortic atherosclerosis  -     CBC Auto Differential; Future; Expected date: 02/12/2024  -     Comprehensive Metabolic Panel; Future; Expected date: 02/12/2024  -     Lipid Panel; Future; Expected date: 02/12/2024  -     T4, Free; Future; Expected date: 02/12/2024  -     TSH; Future; Expected date: 02/12/2024    8. Gastroesophageal reflux disease without esophagitis  -     omeprazole (PRILOSEC) 40 MG capsule; Take 1 capsule (40 mg total) by mouth every morning.  Dispense: 90 capsule; Refill: 3  -     CBC Auto Differential; Future; Expected date: 02/12/2024  -     Comprehensive Metabolic Panel; Future; Expected date: 02/12/2024  -     Lipid Panel; Future; Expected date: 02/12/2024  -     T4, Free; Future; Expected date: 02/12/2024  -     TSH; Future; Expected date: 02/12/2024    9. 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)  -     oxybutynin (DITROPAN-XL) 10 MG 24 hr tablet; Take 1 tablet (10 mg total) by mouth once daily.  Dispense: 90 tablet; Refill: 3  -     CBC Auto Differential; Future; Expected date: 02/12/2024  -     Comprehensive Metabolic Panel; Future; Expected date: 02/12/2024  -     Lipid Panel; Future; Expected date: 02/12/2024  -     T4, Free; Future; Expected date: 02/12/2024  -     TSH; Future; Expected date: 02/12/2024    10. Generalized anxiety disorder  -     EScitalopram oxalate (LEXAPRO) 5 MG Tab; Take 1 tablet (5  mg total) by mouth once daily.  Dispense: 90 tablet; Refill: 3  -     CBC Auto Differential; Future; Expected date: 02/12/2024  -     Comprehensive Metabolic Panel; Future; Expected date: 02/12/2024  -     Lipid Panel; Future; Expected date: 02/12/2024  -     T4, Free; Future; Expected date: 02/12/2024  -     TSH; Future; Expected date: 02/12/2024    11. Senile osteoporosis  -     CBC Auto Differential; Future; Expected date: 02/12/2024  -     Comprehensive Metabolic Panel; Future; Expected date: 02/12/2024  -     Lipid Panel; Future; Expected date: 02/12/2024  -     T4, Free; Future; Expected date: 02/12/2024  -     TSH; Future; Expected date: 02/12/2024    12. Muscle strain of right upper back, initial encounter  -     methocarbamoL (ROBAXIN) 500 MG Tab; Take 1 tablet (500 mg total) by mouth 4 (four) times daily. for 10 days  Dispense: 40 tablet; Refill: 6  -     CBC Auto Differential; Future; Expected date: 02/12/2024  -     Comprehensive Metabolic Panel; Future; Expected date: 02/12/2024  -     Lipid Panel; Future; Expected date: 02/12/2024  -     T4, Free; Future; Expected date: 02/12/2024  -     TSH; Future; Expected date: 02/12/2024        1. CBC, CMP, TSH, free T4, and fasting lipids.  2. Continue aspirin 81 mg daily, amlodipine 5 mg daily, and pravastatin 20 mg nightly.  Diastolic dysfunction status post aortic valve replacement, hypertension, peripheral vascular disease, dyslipidemia, and aortic atherosclerosis versus remained stable.  Continue follow-up with cardiology as scheduled.  3. Continue omeprazole 40 mg daily.  GERD is well controlled.    4. Continue oxybutynin 10 mg daily.  Urinary incontinence is stable.  5. Continue Lexapro 5 mg daily.  Generalized anxiety disorder is stable.  6. Continue Evenity once monthly.  Osteoporosis is stable.    7. Stretching exercises discussed and handout given.  Also prescription of Robaxin 500 mg q.i.d. p.r.n. muscle strain prescribed.    8. Return to clinic as  needed or in 1 year for annual physical exam.             Follow up in about 1 year (around 2/12/2025), or if symptoms worsen or fail to improve, for Annual exam.

## 2024-02-16 ENCOUNTER — LAB VISIT (OUTPATIENT)
Dept: LAB | Facility: HOSPITAL | Age: 89
End: 2024-02-16
Attending: FAMILY MEDICINE
Payer: MEDICARE

## 2024-02-16 DIAGNOSIS — E78.5 DYSLIPIDEMIA: ICD-10-CM

## 2024-02-16 DIAGNOSIS — I73.9 PVD (PERIPHERAL VASCULAR DISEASE): Chronic | ICD-10-CM

## 2024-02-16 DIAGNOSIS — N39.46 MIXED INCONTINENCE URGE AND STRESS (MALE)(FEMALE): ICD-10-CM

## 2024-02-16 DIAGNOSIS — F41.1 GENERALIZED ANXIETY DISORDER: Chronic | ICD-10-CM

## 2024-02-16 DIAGNOSIS — Z00.00 PREVENTATIVE HEALTH CARE: ICD-10-CM

## 2024-02-16 DIAGNOSIS — K21.9 GASTROESOPHAGEAL REFLUX DISEASE WITHOUT ESOPHAGITIS: ICD-10-CM

## 2024-02-16 DIAGNOSIS — M81.0 SENILE OSTEOPOROSIS: ICD-10-CM

## 2024-02-16 DIAGNOSIS — Z95.2 S/P TAVR (TRANSCATHETER AORTIC VALVE REPLACEMENT): Chronic | ICD-10-CM

## 2024-02-16 DIAGNOSIS — I51.89 LEFT VENTRICULAR DIASTOLIC DYSFUNCTION WITH PRESERVED SYSTOLIC FUNCTION: Chronic | ICD-10-CM

## 2024-02-16 DIAGNOSIS — I10 ESSENTIAL HYPERTENSION: Chronic | ICD-10-CM

## 2024-02-16 DIAGNOSIS — S29.012A MUSCLE STRAIN OF RIGHT UPPER BACK, INITIAL ENCOUNTER: ICD-10-CM

## 2024-02-16 DIAGNOSIS — I70.0 AORTIC ATHEROSCLEROSIS: ICD-10-CM

## 2024-02-16 LAB
ALBUMIN SERPL BCP-MCNC: 3.8 G/DL (ref 3.5–5.2)
ALP SERPL-CCNC: 91 U/L (ref 55–135)
ALT SERPL W/O P-5'-P-CCNC: 20 U/L (ref 10–44)
ANION GAP SERPL CALC-SCNC: 7 MMOL/L (ref 8–16)
AST SERPL-CCNC: 25 U/L (ref 10–40)
BASOPHILS # BLD AUTO: 0.08 K/UL (ref 0–0.2)
BASOPHILS NFR BLD: 1.2 % (ref 0–1.9)
BILIRUB SERPL-MCNC: 0.5 MG/DL (ref 0.1–1)
BUN SERPL-MCNC: 19 MG/DL (ref 8–23)
CALCIUM SERPL-MCNC: 9.8 MG/DL (ref 8.7–10.5)
CHLORIDE SERPL-SCNC: 105 MMOL/L (ref 95–110)
CHOLEST SERPL-MCNC: 180 MG/DL (ref 120–199)
CHOLEST/HDLC SERPL: 3.3 {RATIO} (ref 2–5)
CO2 SERPL-SCNC: 26 MMOL/L (ref 23–29)
CREAT SERPL-MCNC: 1.1 MG/DL (ref 0.5–1.4)
DIFFERENTIAL METHOD BLD: ABNORMAL
EOSINOPHIL # BLD AUTO: 0.3 K/UL (ref 0–0.5)
EOSINOPHIL NFR BLD: 4.7 % (ref 0–8)
ERYTHROCYTE [DISTWIDTH] IN BLOOD BY AUTOMATED COUNT: 16.2 % (ref 11.5–14.5)
EST. GFR  (NO RACE VARIABLE): 48.6 ML/MIN/1.73 M^2
GLUCOSE SERPL-MCNC: 94 MG/DL (ref 70–110)
HCT VFR BLD AUTO: 41.2 % (ref 37–48.5)
HDLC SERPL-MCNC: 54 MG/DL (ref 40–75)
HDLC SERPL: 30 % (ref 20–50)
HGB BLD-MCNC: 12.8 G/DL (ref 12–16)
IMM GRANULOCYTES # BLD AUTO: 0.01 K/UL (ref 0–0.04)
IMM GRANULOCYTES NFR BLD AUTO: 0.2 % (ref 0–0.5)
LDLC SERPL CALC-MCNC: 109.6 MG/DL (ref 63–159)
LYMPHOCYTES # BLD AUTO: 1.9 K/UL (ref 1–4.8)
LYMPHOCYTES NFR BLD: 28.4 % (ref 18–48)
MCH RBC QN AUTO: 26.9 PG (ref 27–31)
MCHC RBC AUTO-ENTMCNC: 31.1 G/DL (ref 32–36)
MCV RBC AUTO: 87 FL (ref 82–98)
MONOCYTES # BLD AUTO: 0.5 K/UL (ref 0.3–1)
MONOCYTES NFR BLD: 7.2 % (ref 4–15)
NEUTROPHILS # BLD AUTO: 3.8 K/UL (ref 1.8–7.7)
NEUTROPHILS NFR BLD: 58.3 % (ref 38–73)
NONHDLC SERPL-MCNC: 126 MG/DL
NRBC BLD-RTO: 0 /100 WBC
PLATELET # BLD AUTO: 286 K/UL (ref 150–450)
PMV BLD AUTO: 12.1 FL (ref 9.2–12.9)
POTASSIUM SERPL-SCNC: 4.4 MMOL/L (ref 3.5–5.1)
PROT SERPL-MCNC: 7.1 G/DL (ref 6–8.4)
RBC # BLD AUTO: 4.76 M/UL (ref 4–5.4)
SODIUM SERPL-SCNC: 138 MMOL/L (ref 136–145)
T4 FREE SERPL-MCNC: 1.04 NG/DL (ref 0.71–1.51)
TRIGL SERPL-MCNC: 82 MG/DL (ref 30–150)
TSH SERPL DL<=0.005 MIU/L-ACNC: 3.54 UIU/ML (ref 0.4–4)
WBC # BLD AUTO: 6.54 K/UL (ref 3.9–12.7)

## 2024-02-16 PROCEDURE — 80053 COMPREHEN METABOLIC PANEL: CPT | Mod: HCNC | Performed by: FAMILY MEDICINE

## 2024-02-16 PROCEDURE — 85025 COMPLETE CBC W/AUTO DIFF WBC: CPT | Mod: HCNC | Performed by: FAMILY MEDICINE

## 2024-02-16 PROCEDURE — 80061 LIPID PANEL: CPT | Mod: HCNC | Performed by: FAMILY MEDICINE

## 2024-02-16 PROCEDURE — 84443 ASSAY THYROID STIM HORMONE: CPT | Mod: HCNC | Performed by: FAMILY MEDICINE

## 2024-02-16 PROCEDURE — 36415 COLL VENOUS BLD VENIPUNCTURE: CPT | Mod: HCNC,PO | Performed by: FAMILY MEDICINE

## 2024-02-16 PROCEDURE — 84439 ASSAY OF FREE THYROXINE: CPT | Mod: HCNC | Performed by: FAMILY MEDICINE

## 2024-02-19 RX ORDER — AMLODIPINE BESYLATE 5 MG/1
TABLET ORAL
Qty: 90 TABLET | Refills: 0 | OUTPATIENT
Start: 2024-02-19

## 2024-02-19 NOTE — TELEPHONE ENCOUNTER
No care due was identified.  [facility] Embedded Care Due Messages. Reference number: [id number].   2/19/2024 2:37:24 PM CST

## 2024-02-20 ENCOUNTER — OFFICE VISIT (OUTPATIENT)
Dept: FAMILY MEDICINE | Facility: CLINIC | Age: 89
End: 2024-02-20
Payer: MEDICARE

## 2024-02-20 VITALS
HEIGHT: 63 IN | DIASTOLIC BLOOD PRESSURE: 78 MMHG | SYSTOLIC BLOOD PRESSURE: 128 MMHG | WEIGHT: 136.56 LBS | HEART RATE: 76 BPM | OXYGEN SATURATION: 99 % | RESPIRATION RATE: 20 BRPM | BODY MASS INDEX: 24.2 KG/M2

## 2024-02-20 DIAGNOSIS — D69.2 SENILE PURPURA: ICD-10-CM

## 2024-02-20 DIAGNOSIS — Z00.00 ENCOUNTER FOR PREVENTIVE HEALTH EXAMINATION: Primary | ICD-10-CM

## 2024-02-20 DIAGNOSIS — I70.0 AORTIC ATHEROSCLEROSIS: ICD-10-CM

## 2024-02-20 DIAGNOSIS — E21.3 HYPERPARATHYROIDISM: ICD-10-CM

## 2024-02-20 DIAGNOSIS — Z23 NEED FOR COVID-19 VACCINE: ICD-10-CM

## 2024-02-20 DIAGNOSIS — N18.31 CHRONIC KIDNEY DISEASE, STAGE 3A: ICD-10-CM

## 2024-02-20 DIAGNOSIS — I73.9 PVD (PERIPHERAL VASCULAR DISEASE): Chronic | ICD-10-CM

## 2024-02-20 PROCEDURE — 3288F FALL RISK ASSESSMENT DOCD: CPT | Mod: HCNC,CPTII,S$GLB, | Performed by: NURSE PRACTITIONER

## 2024-02-20 PROCEDURE — 1160F RVW MEDS BY RX/DR IN RCRD: CPT | Mod: HCNC,CPTII,S$GLB, | Performed by: NURSE PRACTITIONER

## 2024-02-20 PROCEDURE — 1170F FXNL STATUS ASSESSED: CPT | Mod: HCNC,CPTII,S$GLB, | Performed by: NURSE PRACTITIONER

## 2024-02-20 PROCEDURE — 90480 ADMN SARSCOV2 VAC 1/ONLY CMP: CPT | Mod: HCNC,S$GLB,, | Performed by: NURSE PRACTITIONER

## 2024-02-20 PROCEDURE — 1159F MED LIST DOCD IN RCRD: CPT | Mod: HCNC,CPTII,S$GLB, | Performed by: NURSE PRACTITIONER

## 2024-02-20 PROCEDURE — G0439 PPPS, SUBSEQ VISIT: HCPCS | Mod: HCNC,S$GLB,, | Performed by: NURSE PRACTITIONER

## 2024-02-20 PROCEDURE — 1101F PT FALLS ASSESS-DOCD LE1/YR: CPT | Mod: HCNC,CPTII,S$GLB, | Performed by: NURSE PRACTITIONER

## 2024-02-20 PROCEDURE — 91322 SARSCOV2 VAC 50 MCG/0.5ML IM: CPT | Mod: HCNC,S$GLB,, | Performed by: NURSE PRACTITIONER

## 2024-02-20 PROCEDURE — 99999 PR PBB SHADOW E&M-EST. PATIENT-LVL IV: CPT | Mod: PBBFAC,HCNC,, | Performed by: NURSE PRACTITIONER

## 2024-02-20 PROCEDURE — 1157F ADVNC CARE PLAN IN RCRD: CPT | Mod: HCNC,CPTII,S$GLB, | Performed by: NURSE PRACTITIONER

## 2024-02-20 PROCEDURE — 1126F AMNT PAIN NOTED NONE PRSNT: CPT | Mod: HCNC,CPTII,S$GLB, | Performed by: NURSE PRACTITIONER

## 2024-02-20 NOTE — PROGRESS NOTES
"  Mine Wilkins presented for a  Medicare AWV and comprehensive Health Risk Assessment today. The following components were reviewed and updated:    Medical history  Family History  Social history  Allergies and Current Medications  Health Risk Assessment  Health Maintenance  Care Team         ** See Completed Assessments for Annual Wellness Visit within the encounter summary.**         The following assessments were completed:  Living Situation  CAGE  Depression Screening  Timed Get Up and Go  Whisper Test  Cognitive Function Screening      Nutrition Screening  ADL Screening  PAQ Screening        Vitals:    02/20/24 1103   BP: 128/78   BP Location: Left arm   Patient Position: Sitting   Pulse: 76   Resp: 20   SpO2: 99%   Weight: 62 kg (136 lb 9.2 oz)   Height: 5' 3" (1.6 m)     Body mass index is 24.19 kg/m².    Physical Exam  Vitals reviewed.   Constitutional:       General: She is not in acute distress.     Appearance: Normal appearance. She is well-developed, well-groomed and normal weight.   HENT:      Head: Normocephalic.   Eyes:      General:         Right eye: No discharge.         Left eye: No discharge.      Comments: Wearing glasses   Cardiovascular:      Rate and Rhythm: Normal rate.   Pulmonary:      Effort: Pulmonary effort is normal. No respiratory distress.   Abdominal:      General: There is no distension.   Skin:     Coloration: Skin is not pale.      Comments: Purpura to BUE   Neurological:      Mental Status: She is alert and oriented to person, place, and time.      Coordination: Coordination normal.      Gait: Gait normal.   Psychiatric:         Attention and Perception: Attention normal.         Mood and Affect: Mood and affect normal.         Speech: Speech normal.         Behavior: Behavior normal. Behavior is cooperative.         Thought Content: Thought content normal.         Cognition and Memory: Cognition and memory normal.             Diagnoses and health risks identified today and " associated recommendations/orders:    1. Encounter for preventive health examination  Pain level assessed and reviewed. Not taking any opioids; at low risk for opioid use disorder. Follow up with PCP.    2. Aortic atherosclerosis  Chronic; stable on medication. Follow up with PCP.    3. PVD (peripheral vascular disease)  Chronic; stable on medication. Follow up with PCP.    4. Hyperparathyroidism  As seen on previous PTH lab; follow up with PCP.    5. Senile purpura  As seen on previous physical exam. Follow up with PCP.    6. Chronic kidney disease, stage 3a  Chronic; stable on medication. Follow up with PCP.    7. Need for COVID-19 vaccine  - COVID-19 VAC, MRNA 2023 (MODERNA)(PF) 50 MCG/0.5 ML IM SUSR (12 YRS AND UP)    8. Body mass index (BMI) of 24.0 to 24.9 in adult  Patient's BMI is WNL. Eat a low salt/low fat diet and discussed importance of engaging in physical activity at least 5x/week for a minimum of 30 min/day.      Provided Mine with a 5-10 year written screening schedule and personal prevention plan. Recommendations were developed using the USPSTF age appropriate recommendations. Education, counseling, and referrals were provided as needed. After Visit Summary printed and given to patient which includes a list of additional screenings/tests needed.    Follow up for your next annual wellness visit.    Harriet Gibbons NP      I offered to discuss advanced care planning, including how to pick a person who would make decisions for you if you were unable to make them for yourself, called a health care power of , and what kind of decisions you might make such as use of life sustaining treatments such as ventilators and tube feeding when faced with a life limiting illness recorded on a living will that they will need to know. (How you want to be cared for as you near the end of your natural life)     X  Patient has advanced directives on file, which we reviewed, and they do not wish to make  changes.

## 2024-02-20 NOTE — TELEPHONE ENCOUNTER
Refill Decision Note   Mine Wilkins  is requesting a refill authorization.  Brief Assessment and Rationale for Refill:  Quick Discontinue     Medication Therapy Plan:    Pharmacy is requesting new scripts for the following medications without required information, (sig/ frequency/qty/etc)      Medication Reconciliation Completed: No     Comments: Pharmacies have been requesting medications for patients without required information, (sig, frequency, qty, etc.). In addition, requests are sent for medication(s) pt. are currently not taking, and medications patients have never taken.    We have spoken to the pharmacies about these request types and advised their teams previously that we are unable to assess these New Script requests and require all details for these requests. This is a known issue and has been reported.     Note composed:6:35 PM 02/19/2024

## 2024-02-20 NOTE — PATIENT INSTRUCTIONS
Counseling and Referral of Other Preventative  (Italic type indicates deductible and co-insurance are waived)    Patient Name: Mine Wilkins  Today's Date: 2/20/2024    Health Maintenance       Date Due Completion Date    COVID-19 Vaccine (6 - 2023-24 season) 09/01/2023 6/16/2022    Shingles Vaccine (2 of 3) 06/09/2024 (Originally 8/12/2014) 6/17/2014    DEXA Scan 03/29/2025 3/29/2023    Lipid Panel 02/16/2029 2/16/2024    TETANUS VACCINE 03/01/2029 3/1/2019        Orders Placed This Encounter   Procedures    COVID-19 VAC, MRNA 2023 (MODERNA)(PF) 50 MCG/0.5 ML IM SUSR (12 YRS AND UP)     The following information is provided to all patients.  This information is to help you find resources for any of the problems found today that may be affecting your health:                  Living healthy guide: www.[facility].[place].gov      Understanding Diabetes: www.diabetes.org      Eating healthy: www.cdc.gov/healthyweight      CDC home safety checklist: www.cdc.gov/steadi/patient.html      Agency on Aging: www.goea.[place].[facility]      Alcoholics anonymous (AA): www.aa.org      Physical Activity: www.aisha.nih.gov/nl8thfb      Tobacco use: www.quitwithusla.org

## 2024-05-09 RX ORDER — AMLODIPINE BESYLATE 5 MG/1
5 TABLET ORAL DAILY
Qty: 90 TABLET | Refills: 3 | Status: SHIPPED | OUTPATIENT
Start: 2024-05-09

## 2024-05-09 NOTE — TELEPHONE ENCOUNTER
No care due was identified.  [facility] Embedded Care Due Messages. Reference number: [id number].   5/09/2024 2:47:04 PM CDT

## 2024-05-09 NOTE — TELEPHONE ENCOUNTER
----- Message from Cristofer David sent at 5/9/2024  2:42 PM CDT -----  Contact: [phone]  Requesting an RX refill or new RX.  Is this a refill or new RX: refill   RX name and strength (copy/paste from chart):  amLODIPine (NORVASC) 5 MG tablet  Is this a 30 day or 90 day RX:   Pharmacy name and phone # (copy/paste from chart):    [facility] - [facility] [address]  [address] [facility] [address]  Phone: [phone] Fax: [phone]  The doctors have asked that we provide their patients with the following 2 reminders -- prescription refills can take up to 72 hours, and a friendly reminder that in the future you can use your [facility] account to request refills: call back

## 2024-05-09 NOTE — TELEPHONE ENCOUNTER
Refill Routing Note   Medication(s) are not appropriate for processing by [facility] for the following reason(s):        No active prescription written by provider    [facility] action(s):  Defer               Appointments  past 12m or future 3m with PCP    Date Provider   Last Visit   2/12/2024 Daryl Foster MD   Next Visit   Visit date not found Daryl Foster MD   ED visits in past 90 days: 0        Note composed:4:01 PM 05/09/2024

## 2024-06-18 ENCOUNTER — OFFICE VISIT (OUTPATIENT)
Dept: CARDIOLOGY | Facility: CLINIC | Age: 89
End: 2024-06-18
Payer: MEDICARE

## 2024-06-18 VITALS
HEIGHT: 63 IN | HEART RATE: 73 BPM | WEIGHT: 135 LBS | SYSTOLIC BLOOD PRESSURE: 141 MMHG | BODY MASS INDEX: 23.92 KG/M2 | DIASTOLIC BLOOD PRESSURE: 77 MMHG | RESPIRATION RATE: 20 BRPM

## 2024-06-18 DIAGNOSIS — I73.9 PVD (PERIPHERAL VASCULAR DISEASE): Chronic | ICD-10-CM

## 2024-06-18 DIAGNOSIS — Z95.2 S/P TAVR (TRANSCATHETER AORTIC VALVE REPLACEMENT): Chronic | ICD-10-CM

## 2024-06-18 DIAGNOSIS — I10 ESSENTIAL HYPERTENSION: Chronic | ICD-10-CM

## 2024-06-18 DIAGNOSIS — E78.5 DYSLIPIDEMIA: ICD-10-CM

## 2024-06-18 DIAGNOSIS — I70.0 AORTIC ATHEROSCLEROSIS: Primary | ICD-10-CM

## 2024-06-18 PROCEDURE — 3288F FALL RISK ASSESSMENT DOCD: CPT | Mod: CPTII,S$GLB,, | Performed by: INTERNAL MEDICINE

## 2024-06-18 PROCEDURE — 99214 OFFICE O/P EST MOD 30 MIN: CPT | Mod: S$GLB,,, | Performed by: INTERNAL MEDICINE

## 2024-06-18 PROCEDURE — 1157F ADVNC CARE PLAN IN RCRD: CPT | Mod: CPTII,S$GLB,, | Performed by: INTERNAL MEDICINE

## 2024-06-18 PROCEDURE — 1101F PT FALLS ASSESS-DOCD LE1/YR: CPT | Mod: CPTII,S$GLB,, | Performed by: INTERNAL MEDICINE

## 2024-06-18 PROCEDURE — 1160F RVW MEDS BY RX/DR IN RCRD: CPT | Mod: CPTII,S$GLB,, | Performed by: INTERNAL MEDICINE

## 2024-06-18 PROCEDURE — 1159F MED LIST DOCD IN RCRD: CPT | Mod: CPTII,S$GLB,, | Performed by: INTERNAL MEDICINE

## 2024-06-18 PROCEDURE — 99999 PR PBB SHADOW E&M-EST. PATIENT-LVL III: CPT | Mod: PBBFAC,,, | Performed by: INTERNAL MEDICINE

## 2024-06-18 PROCEDURE — 1126F AMNT PAIN NOTED NONE PRSNT: CPT | Mod: CPTII,S$GLB,, | Performed by: INTERNAL MEDICINE

## 2024-06-18 NOTE — PROGRESS NOTES
HISTORY:    88 yo F with a h/o aortic stenosis status post TAVR (March 2022 with a 23 mm S3), hypertension, hyperlipidemia, and DDD presenting for follow-up.     The patient first presented to me in 2021 when we identified severe AS with progressive UMANZOR. She was referred for TAVR, which she underwent successfully in March 2022. She tolerated the procedure without issue and has done well from a CV standpoint.     The patient denies any symptoms of chest pain, shortness of breath, or dyspnea on exertion.     Struggles with orthopedic issues. Tries to stretch and do light weights. Utilizes a cane for stability.    Lives by herself and completes ADLs. Has a daughter in [place] and a daughter in [place], [place].     She tolerates asa 81x1, amlodipine 5x1, and pravastatin 20x1.        PHYSICAL EXAM:    Vitals:    06/18/24 1316   BP: (!) 141/77   Pulse: 73   Resp: 20         NAD, A+Ox3.  No jvd, no bruit.  RRR nml s1,s2. 2/6 AS murmur  CTA B no wheezes or crackles.  No edema.    LABS/STUDIES (imaging reviewed during clinic visit):    February 2024 CBC normal.  Creatinine 1.1/BUN 19/GFR 49.  Albumin 3.8.  /HDL 54//TG 82.  TSH normal.  EKG September 2022 demonstrates sinus rhythm with no Q waves or ST changes.  TTE March 2023 demonstrates normal LV size and systolic function with EF 65%.  Normal bioprosthetic aortic valve function is noted with a peak velocity of 2 m/sec across the valve.  No prosthetic valve leak.  Mild MR. PASP near 30 with a CVP of 3.   Cardiac catheterization February 2022 demonstrates patent coronary arteries without evidence of obstructive epicardial disease.  CTA TAVR protocol February 2022 demonstrates aortic atherosclerosis.  Ectatic ascending aorta measuring 4.0 cm.  Calcified aortic valve.  Normal abdominal aorta an unremarkable bilateral iliacs/femorals.       ASSESSMENT & PLAN:    1. Aortic atherosclerosis    2. Dyslipidemia    3. Essential hypertension    4. S/P TAVR (transcatheter aortic  valve replacement)    5. PVD (peripheral vascular disease)              Orders Placed This Encounter    Echo          Severe AS s/p successful TAVR w an S3 23mm valve. UMANZOR resolved post TAVR w nml valve function on TTE. Cont asa monotherapy. Abx prophylaxis as needed.      Bps controlled on amlodipine 5x1. LDL near 100 on pravastatin. Asc ao dilation stable. 4.0cm on latest CTA.     Recheck TTE.    Follow up in about 1 year (around 6/18/2025).      Robby Mistry MD

## 2024-07-16 NOTE — PROGRESS NOTES
ENDOCRINOLOGY CLINIC NEW PATIENT NOTE  07/18/2024     Subjective:      Reason for referal: referred by No ref. provider found for management of osteoporosis     HPI:   iMne Wilkins is a 89 y.o. female with history of hypertension, history of TAVR, PVD, osteoarthritis, GERD, and dyslipidemia who presents for evaluation of  bone health. Previous patient of Dr Martinez. Last visit in Aug 2023.    She has been treated with weekly Fosamax since 2012 continuously with most recent bone density scan in March 2023 stable compared to previous scan 2 years prior but with T-score still in the osteoporosis range.  While on Fosamax she has also sustained several fractures (arm, ribs, thoracic vertebrae, hip) without significant trauma.    Failed fosamax therapy.   Started evenity in Oct 2023   Last dose in Feb 2024  Unable to afford and she self stopped   Has not been on medications since   She up'ed her calcium to 2000 mg daily when she stopped above     Denies falls or fractures since her last visit     DEXA 03/29/2023   Lumbar spine (L1-L4):               BMD is 0.910 g/cm2, T-score is -1.0, and Z-score is 1.8.  Total hip:                                BMD is 0.572 g/cm2, T-score is -3.0, and Z-score is -0.7.  Femoral neck:                          BMD is 0.510 g/cm2, T-score is -3.1, and Z-score is -0.5.  Distal 1/3 radius:                      Not applicable     FRAX:  29% risk of a major osteoporotic fracture in the next 10 years.  11% risk of hip fracture in the next 10 years.  Impression:  *Osteoporosis based on T-score below -2.5  *Fracture risk is very high due to calculated 10 year risk of hip fracture >4.5% (FRAX)  *Compared with previous DXA, BMD at the lumbar spine has remained stable, and BMD at the total hip has remained stable    Secondary osteoporosis workup:   postmenopausal estrogen deficiency - menopause around age 56    Lab Results   Component Value Date    CALCIUM 9.8 02/16/2024    ALBUMIN 3.8 02/16/2024     ESTGFRAFRICA 47.3 (A) 07/16/2022    EGFRNONAA 41.0 (A) 07/16/2022    PHOS 3.0 09/07/2023    ALKPHOS 91 02/16/2024    [id number] 53 09/07/2023    PTH 77.3 (H) 09/07/2023    TSH 3.545 02/16/2024      Previous Treatment:   alendronate (FOSAMAX) since 2012  History of previous fracture: Yes arm fracture in 2016, right wrist fracture at age 74 (2010) after stepping off curb,   Hx of fractures in toes and feet.  Let fracture roller bladding.  Right hip fracture after LOC in 2018, rib fracture and T5 and T6 fracture from coughing    Failed fosamax therapy.   Started evenity in Oct 2023   Last dose in Feb 2024  Unable to afford and she self stopped   Has not been on medications since   She up'ed her calcium to 2000 mg daily when she stopped above     Parent with fractured hip: no, mother with osteoporosis and back fracture  Smoking status: No  Glucocorticoid use: Yes was on high dose prednisone a year for sarcoidosis in her 20s.  History of RA: No  Alcohol 3 or more/day: No  Nephrolithiasis: No  Dental up to date: no   GERD: + treated with omeprazole  Supplements:   Ca: supplement 2000 mg daily   VitD:  Over-the-counter 2000 IU daily    Exercise: sometimes walking on treadmill, limited exercise due to back and rib pain     No hx if radiation or bone cancer.   No cardiac hx       Current Outpatient Medications:     amLODIPine (NORVASC) 5 MG tablet, Take 1 tablet (5 mg total) by mouth once daily., Disp: 90 tablet, Rfl: 3    ascorbic acid, vitamin C, (VITAMIN C) 500 MG tablet, Take 1,500 mg by mouth once daily., Disp: , Rfl:     cetirizine (ZYRTEC) 10 MG tablet, Take 10 mg by mouth once daily., Disp: , Rfl:     cholecalciferol, vitamin D3, 2,000 unit Tab, Take by mouth. 1 Tablet Oral Every day, Disp: , Rfl:     EScitalopram oxalate (LEXAPRO) 5 MG Tab, Take 1 tablet (5 mg total) by mouth once daily., Disp: 90 tablet, Rfl: 3    fluticasone propionate (FLONASE) 50 mcg/actuation nasal spray, SHAKE LIQUID AND USE 1 SPRAY(50  "MCG) IN EACH NOSTRIL EVERY EVENING, Disp: 16 g, Rfl: 3    Lactobac no.41/Bifidobact no.7 (PROBIOTIC-10 ORAL), Take by mouth once daily., Disp: , Rfl:     multivit-min/iron/folic/lutein (CENTRUM SILVER WOMEN ORAL), Take by mouth., Disp: , Rfl:     omeprazole (PRILOSEC) 40 MG capsule, Take 1 capsule (40 mg total) by mouth every morning., Disp: 90 capsule, Rfl: 3    oxybutynin (DITROPAN-XL) 10 MG 24 hr tablet, Take 1 tablet (10 mg total) by mouth once daily., Disp: 90 tablet, Rfl: 3    pravastatin (PRAVACHOL) 20 MG tablet, Take 1 tablet (20 mg total) by mouth every evening., Disp: 90 tablet, Rfl: 3    abaloparatide (TYMLOS) 80 mcg (3,120 mcg/1.56 mL) PnIj, Inject 0.04 mLs (80 mcg total) into the skin once daily., Disp: 1 each, Rfl: 9    aspirin (ECOTRIN) 81 MG EC tablet, Take 1 tablet (81 mg total) by mouth once daily., Disp: 90 tablet, Rfl: 3  Review of Systems   Musculoskeletal:  Positive for gait problem (using cane in clinic). Negative for arthralgias, back pain and myalgias.       Objective:   Physical Exam   /82 (BP Location: Right arm, Patient Position: Sitting, BP Method: Medium (Manual))   Pulse 74   Ht 5' 3" (1.6 m)   Wt 61.1 kg (134 lb 13 oz)   BMI 23.88 kg/m²   Wt Readings from Last 3 Encounters:   07/18/24 61.1 kg (134 lb 13 oz)   06/18/24 61.2 kg (135 lb)   02/20/24 62 kg (136 lb 9.2 oz)   ]    Constitutional:  Pleasant,  in no acute distress.   HENT:   Eyes:     No scleral icterus.   Respiratory:   Effort normal   Neurological:  normal speech  Psych:  Normal mood and affect.        Assessment/Plan:     1. Senile osteoporosis  abaloparatide (TYMLOS) 80 mcg (3,120 mcg/1.56 mL) PnIj    Renal Function Panel      2. Hyperparathyroidism          Senile osteoporosis  Risks includeCaucasian race, menopause, fhx     She has been on Fosamax for more than 10 years (initiated in 2012) but has failed therapy based on several fragility fractures on medical management.    Failed fosamax therapy.   Started " evenity in Oct 2023   Last dose in Feb 2024  Unable to afford and she self stopped   Has not been on medications since   She up'ed her calcium to 2000 mg daily when she stopped above     Decrease calcium back to 1200 mg daily   Discussed options, will try for tymlos first and if not covered then will try for prolia   Check RFP today and after tymlos treatment.    RDA of calcium (8087-8086 mg /day in divided doses) and vitamin D 2000iu a day  discussed  Calcium from food sources preferred  Fall precautions emphasized  +weight bearing exercise      Hyperparathyroidism  Likely due to CKD    Visit today included increased complexity associated with the care of episodic problems osteoporosis, and high PTH addressed and managing longitudinal care of the patient due to serious managed problems osteoporosis, and high PTH    Follow up in about 1 year (around 7/18/2025).

## 2024-07-18 ENCOUNTER — LAB VISIT (OUTPATIENT)
Dept: LAB | Facility: HOSPITAL | Age: 89
End: 2024-07-18
Payer: MEDICARE

## 2024-07-18 ENCOUNTER — OFFICE VISIT (OUTPATIENT)
Dept: ENDOCRINOLOGY | Facility: CLINIC | Age: 89
End: 2024-07-18
Payer: MEDICARE

## 2024-07-18 VITALS
HEART RATE: 74 BPM | BODY MASS INDEX: 23.89 KG/M2 | HEIGHT: 63 IN | WEIGHT: 134.81 LBS | DIASTOLIC BLOOD PRESSURE: 82 MMHG | SYSTOLIC BLOOD PRESSURE: 130 MMHG

## 2024-07-18 DIAGNOSIS — M81.0 SENILE OSTEOPOROSIS: Primary | ICD-10-CM

## 2024-07-18 DIAGNOSIS — M81.0 SENILE OSTEOPOROSIS: ICD-10-CM

## 2024-07-18 DIAGNOSIS — E21.3 HYPERPARATHYROIDISM: ICD-10-CM

## 2024-07-18 LAB
ALBUMIN SERPL BCP-MCNC: 3.9 G/DL (ref 3.5–5.2)
ANION GAP SERPL CALC-SCNC: 8 MMOL/L (ref 8–16)
BUN SERPL-MCNC: 26 MG/DL (ref 8–23)
CALCIUM SERPL-MCNC: 10.7 MG/DL (ref 8.7–10.5)
CHLORIDE SERPL-SCNC: 101 MMOL/L (ref 95–110)
CO2 SERPL-SCNC: 28 MMOL/L (ref 23–29)
CREAT SERPL-MCNC: 1.2 MG/DL (ref 0.5–1.4)
EST. GFR  (NO RACE VARIABLE): 43.5 ML/MIN/1.73 M^2
GLUCOSE SERPL-MCNC: 86 MG/DL (ref 70–110)
PHOSPHATE SERPL-MCNC: 3.7 MG/DL (ref 2.7–4.5)
POTASSIUM SERPL-SCNC: 4.7 MMOL/L (ref 3.5–5.1)
SODIUM SERPL-SCNC: 137 MMOL/L (ref 136–145)

## 2024-07-18 PROCEDURE — 99999 PR PBB SHADOW E&M-EST. PATIENT-LVL III: CPT | Mod: PBBFAC,HCNC,, | Performed by: NURSE PRACTITIONER

## 2024-07-18 PROCEDURE — G2211 COMPLEX E/M VISIT ADD ON: HCPCS | Mod: HCNC,S$GLB,, | Performed by: NURSE PRACTITIONER

## 2024-07-18 PROCEDURE — 1160F RVW MEDS BY RX/DR IN RCRD: CPT | Mod: HCNC,CPTII,S$GLB, | Performed by: NURSE PRACTITIONER

## 2024-07-18 PROCEDURE — 80069 RENAL FUNCTION PANEL: CPT | Mod: HCNC | Performed by: NURSE PRACTITIONER

## 2024-07-18 PROCEDURE — 1159F MED LIST DOCD IN RCRD: CPT | Mod: HCNC,CPTII,S$GLB, | Performed by: NURSE PRACTITIONER

## 2024-07-18 PROCEDURE — 1126F AMNT PAIN NOTED NONE PRSNT: CPT | Mod: HCNC,CPTII,S$GLB, | Performed by: NURSE PRACTITIONER

## 2024-07-18 PROCEDURE — 3288F FALL RISK ASSESSMENT DOCD: CPT | Mod: HCNC,CPTII,S$GLB, | Performed by: NURSE PRACTITIONER

## 2024-07-18 PROCEDURE — 1101F PT FALLS ASSESS-DOCD LE1/YR: CPT | Mod: HCNC,CPTII,S$GLB, | Performed by: NURSE PRACTITIONER

## 2024-07-18 PROCEDURE — 99214 OFFICE O/P EST MOD 30 MIN: CPT | Mod: HCNC,S$GLB,, | Performed by: NURSE PRACTITIONER

## 2024-07-18 PROCEDURE — 1157F ADVNC CARE PLAN IN RCRD: CPT | Mod: HCNC,CPTII,S$GLB, | Performed by: NURSE PRACTITIONER

## 2024-07-18 PROCEDURE — 36415 COLL VENOUS BLD VENIPUNCTURE: CPT | Mod: HCNC | Performed by: NURSE PRACTITIONER

## 2024-07-18 RX ORDER — ABALOPARATIDE 2000 UG/ML
80 INJECTION, SOLUTION SUBCUTANEOUS DAILY
Qty: 1 EACH | Refills: 9 | Status: ACTIVE | OUTPATIENT
Start: 2024-07-18 | End: 2025-07-18

## 2024-07-18 NOTE — PATIENT INSTRUCTIONS
Failed fosamax therapy.   Started evenity in Oct 2023   Last dose in Feb 2024  Unable to afford and she self stopped   Has not been on medications since   She up'ed her calcium to 2000 mg daily when she stopped above     Decrease calcium back to 1200 mg daily   Discussed options, will try for tymlos first and if not covered then will try for prolia   Check RFP today and after tymlos treatment.    DEXA 03/29/2023   Lumbar spine (L1-L4):               BMD is 0.910 g/cm2, T-score is -1.0, and Z-score is 1.8.  Total hip:                                BMD is 0.572 g/cm2, T-score is -3.0, and Z-score is -0.7.  Femoral neck:                          BMD is 0.510 g/cm2, T-score is -3.1, and Z-score is -0.5.  Distal 1/3 radius:                      Not applicable     FRAX:  29% risk of a major osteoporotic fracture in the next 10 years.  11% risk of hip fracture in the next 10 years.  Impression:  *Osteoporosis based on T-score below -2.5  *Fracture risk is very high due to calculated 10 year risk of hip fracture >4.5% (FRAX)  *Compared with previous DXA, BMD at the lumbar spine has remained stable, and BMD at the total hip has remained stable      Tscore  -1.0 osteopenia  -2.5 osteoporosis  -3.0 is severe osteoporosis     FRAX score   >20% major bone in body   >3% hip    Recommended daily allowance of calcium is 0480-0173 mg daily, preferably from food. See below  Recommended daily allowance of vitamin D is 0715-0146 IU daily    Encouraged weight bearing exercise  Encouraged to avoid falls  Avoid alcohol and tobacco    Estimated Calcium Content of Foods:  Produce  Serving Size Estimated Calcium*    Davi greens, frozen 8 oz 360 mg   Broccoli brayan 8 oz 200 mg   Kale, frozen 8 oz 180 mg   Soy Beans, green, boiled 8 oz 175 mg   Bok [place], cooked, boiled 8 oz 160 mg   Figs, dried 2 figs 65 mg   Broccoli, fresh, cooked 8 oz 60 mg   Oranges 1 whole 55 mg   Seafood Serving Size Estimated Calcium*    Sardines, canned with bones  3 oz 325 mg   [place], canned with bones 3 oz 180 mg   Shrimp, canned 3 oz 125 mg   Dairy Serving Size Estimated Calcium*    Ricotta, part-skim 4 oz 335 mg   Yogurt, plain, low-fat 6 oz 310 mg   Milk, skim, low-fat, whole 8 oz 300 mg   Yogurt with fruit, low-fat 6 oz 260 mg   Mozzarella, part-skim 1 oz 210 mg   Cheddar 1 oz 205 mg   Yogurt, Greek 6 oz 200 mg   American Cheese 1 oz 195 mg   Feta Cheese 4 oz 140 mg   Cottage Cheese, 2% 4 oz 105 mg   Frozen yogurt, vanilla 8 oz 105 mg   Ice Cream, vanilla 8 oz 85 mg   Parmesan 1 tbsp 55 mg   Fortified Food Serving Size Estimated Calcium*   Sandy milk, rice milk or soy milk, fortified 8 oz 300 mg   Orange juice and other fruit juices, fortified 8 oz 300 mg   Tofu, prepared with calcium 4 oz 205 mg   Waffle, frozen, fortified 2 pieces 200 mg   Oatmeal, fortified 1 packet 140 mg   English muffin, fortified 1 muffin 100 mg   Cereal, fortified 8 oz 100-1,000 mg   Other Serving Size Estimated Calcium*   Mac & cheese, frozen 1 package 325 mg   Pizza, cheese, frozen 1 serving 115 mg   Pudding, chocolate, prepared with 2% milk 4 oz 160 mg   Beans, baked, canned 4 oz 160 mg   *The calcium content listed for most foods is estimated and can vary due to multiple factors. Check the food label to determine how much calcium is in a particular product.  If you read the nutrition label for a food source, it lists the % calcium in that food.  For an 8 oz glass of milk, for example, the label states calcium 30%.  This is equivalent to 300 mg of calcium (multiply the listed number by 10).   **Table from the National Osteoporosis Foundation      Osteoporosis medications  These are the medications we discussed for osteoporosis treatment:    - Bisphosphonates:         - these slow down bone loss         - oral forms (Fosamax and Actonel are examples) - taken once weekly or once monthly         - IV form (Reclast) - given intravenously once yearly    - Prolia (denosumab):          - slows down  "bone loss           - it is a subcutaneous injection (under the skin) every 6 months, given in the office    Side effects of Prolia reviewed, including risk of hypocalcemia, eczema/skin complications and possible increased risk of infections.  Also reviewed association with ONJ and rare atypical femur fractures.  Advise to see the dentist regularly, notify dentist of using this medication and avoid non-emergent dental implants and extractions.  Also advise to contact us if develops new, persistent thigh or groin pain.    Discussed ongoing concern and accumulating data describing rebound-associated vertebral fractures (RAVFs) after denosumab discontinuation.  We now know that discontinuation of denosumab is associated with up to 50% reduction in BMD that is only partially blocked by Reclast.  Current recommendations are to either continue indefinitely or to switch to oral bisphosphonate for at least a year.    - Forteo (teriparatide) or Tymlos (abaloparatide):           - medications that "build bone" or increases bone formation           - subcutaneous injection (under the skin) taken once daily that you self-administer at home for 18-24 months    -Evenity (romosozumab)   -medications that "build bone" or increases bone formation   - subcutaneous injection (under the skin) taken once monthly for one year    For more information on medications: https://www.nof.org/patients/treatment/medicationadherence/    "

## 2024-07-18 NOTE — ASSESSMENT & PLAN NOTE
Risks includeCaucasian race, menopause, fhx     She has been on Fosamax for more than 10 years (initiated in 2012) but has failed therapy based on several fragility fractures on medical management.    Failed fosamax therapy.   Started evenity in Oct 2023   Last dose in Feb 2024  Unable to afford and she self stopped   Has not been on medications since   She up'ed her calcium to 2000 mg daily when she stopped above     Decrease calcium back to 1200 mg daily   Discussed options, will try for tymlos first and if not covered then will try for prolia   Check RFP today and after tymlos treatment.    RDA of calcium (0944-6014 mg /day in divided doses) and vitamin D 2000iu a day  discussed  Calcium from food sources preferred  Fall precautions emphasized  +weight bearing exercise

## 2024-07-19 DIAGNOSIS — E83.52 HYPERCALCEMIA: ICD-10-CM

## 2024-07-19 DIAGNOSIS — E21.3 HYPERPARATHYROIDISM: Primary | ICD-10-CM

## 2024-07-19 DIAGNOSIS — M81.0 SENILE OSTEOPOROSIS: ICD-10-CM

## 2024-07-24 ENCOUNTER — TELEPHONE (OUTPATIENT)
Dept: ENDOCRINOLOGY | Facility: CLINIC | Age: 89
End: 2024-07-24
Payer: MEDICARE

## 2024-08-02 ENCOUNTER — INFUSION (OUTPATIENT)
Dept: INFECTIOUS DISEASES | Facility: HOSPITAL | Age: 89
End: 2024-08-02
Payer: MEDICARE

## 2024-08-02 VITALS
BODY MASS INDEX: 26.51 KG/M2 | RESPIRATION RATE: 21 BRPM | WEIGHT: 135.06 LBS | TEMPERATURE: 99 F | DIASTOLIC BLOOD PRESSURE: 69 MMHG | HEART RATE: 72 BPM | OXYGEN SATURATION: 94 % | SYSTOLIC BLOOD PRESSURE: 152 MMHG | HEIGHT: 60 IN

## 2024-08-02 DIAGNOSIS — N18.31 CHRONIC KIDNEY DISEASE, STAGE 3A: ICD-10-CM

## 2024-08-02 DIAGNOSIS — M81.0 SENILE OSTEOPOROSIS: Primary | ICD-10-CM

## 2024-08-02 PROCEDURE — 63600175 PHARM REV CODE 636 W HCPCS: Mod: JZ,JG,HCNC | Performed by: NURSE PRACTITIONER

## 2024-08-02 PROCEDURE — 96372 THER/PROPH/DIAG INJ SC/IM: CPT | Mod: HCNC

## 2024-08-02 RX ORDER — DENOSUMAB 60 MG/ML
60 INJECTION SUBCUTANEOUS
COMMUNITY

## 2024-08-02 RX ADMIN — DENOSUMAB 60 MG: 60 INJECTION SUBCUTANEOUS at 11:08

## 2024-08-02 NOTE — PROGRESS NOTES
Pt received Prolia, 1st injection, to left arm, subQ;  Pt taking Vit D/Ca+; Pt denies dental sx in last 3 months; Pt monitored for 15 mins post injection; Pt tolerated well, and left in NAD;  pt given next appt;    Limited head-to-toe assessment due to privacy issues and visit reason though the opportunity was given for patient to express any concerns

## 2024-08-08 RX ORDER — AMLODIPINE BESYLATE 5 MG/1
5 TABLET ORAL
Qty: 90 TABLET | Refills: 2 | Status: SHIPPED | OUTPATIENT
Start: 2024-08-08

## 2024-11-05 DIAGNOSIS — E21.3 HYPERPARATHYROIDISM: Primary | ICD-10-CM

## 2024-12-01 DIAGNOSIS — N39.46 MIXED INCONTINENCE URGE AND STRESS (MALE)(FEMALE): ICD-10-CM

## 2024-12-01 DIAGNOSIS — F41.1 GENERALIZED ANXIETY DISORDER: Chronic | ICD-10-CM

## 2024-12-01 DIAGNOSIS — K21.9 GASTROESOPHAGEAL REFLUX DISEASE WITHOUT ESOPHAGITIS: ICD-10-CM

## 2024-12-01 DIAGNOSIS — E78.5 DYSLIPIDEMIA: ICD-10-CM

## 2024-12-01 NOTE — TELEPHONE ENCOUNTER
Care Due:                  Date            Visit Type   Department     Provider  --------------------------------------------------------------------------------                                             [facility]  Last Visit: 02-      [facility]          RIKA Foster  Next Visit: None Scheduled  None         None Found                                                            Last  Test          Frequency    Reason                     Performed    Due Date  --------------------------------------------------------------------------------    CMP.........  12 months..  pravastatin..............  02- 02-    Lipid Panel.  12 months..  pravastatin..............  02- 02-    [employer] Embedded Care Due Messages. Reference number: [id number].   12/01/2024 9:13:54 AM CST

## 2024-12-02 RX ORDER — OMEPRAZOLE 40 MG/1
40 CAPSULE, DELAYED RELEASE ORAL EVERY MORNING
Qty: 90 CAPSULE | Refills: 0 | Status: SHIPPED | OUTPATIENT
Start: 2024-12-02

## 2024-12-02 RX ORDER — ESCITALOPRAM OXALATE 5 MG/1
5 TABLET ORAL
Qty: 90 TABLET | Refills: 0 | Status: SHIPPED | OUTPATIENT
Start: 2024-12-02

## 2024-12-02 RX ORDER — OXYBUTYNIN CHLORIDE 10 MG/1
10 TABLET, EXTENDED RELEASE ORAL
Qty: 90 TABLET | Refills: 0 | Status: SHIPPED | OUTPATIENT
Start: 2024-12-02

## 2024-12-02 RX ORDER — PRAVASTATIN SODIUM 20 MG/1
20 TABLET ORAL NIGHTLY
Qty: 90 TABLET | Refills: 0 | Status: SHIPPED | OUTPATIENT
Start: 2024-12-02

## 2024-12-02 NOTE — TELEPHONE ENCOUNTER
Provider Staff:  Action required for this patient    Requires labs      Please see care gap opportunities below in Care Due Message.    Thanks!  [facility]     Appointments      Date Provider   Last Visit   2/12/2024 Daryl Foster MD   Next Visit   Visit date not found Daryl Foster MD     Refill Decision Note   Mine Wilkins  is requesting a refill authorization.    Brief Assessment and Rationale for Refill:  Approve       Medication Therapy Plan:         Comments:     Note composed:12:13 AM 12/02/2024

## 2025-01-16 ENCOUNTER — HOSPITAL ENCOUNTER (OUTPATIENT)
Dept: CARDIOLOGY | Facility: HOSPITAL | Age: 89
Discharge: HOME | End: 2025-01-16
Attending: INTERNAL MEDICINE
Payer: MEDICARE

## 2025-01-16 ENCOUNTER — OFFICE VISIT (OUTPATIENT)
Dept: URGENT CARE | Facility: CLINIC | Age: 89
End: 2025-01-16
Payer: MEDICARE

## 2025-01-16 VITALS
WEIGHT: 135 LBS | HEIGHT: 60 IN | RESPIRATION RATE: 16 BRPM | BODY MASS INDEX: 26.5 KG/M2 | DIASTOLIC BLOOD PRESSURE: 70 MMHG | OXYGEN SATURATION: 96 % | TEMPERATURE: 98 F | SYSTOLIC BLOOD PRESSURE: 156 MMHG | HEART RATE: 72 BPM

## 2025-01-16 VITALS
HEART RATE: 69 BPM | DIASTOLIC BLOOD PRESSURE: 80 MMHG | WEIGHT: 135 LBS | SYSTOLIC BLOOD PRESSURE: 140 MMHG | BODY MASS INDEX: 26.5 KG/M2 | HEIGHT: 60 IN

## 2025-01-16 DIAGNOSIS — Z95.2 S/P TAVR (TRANSCATHETER AORTIC VALVE REPLACEMENT): Chronic | ICD-10-CM

## 2025-01-16 DIAGNOSIS — M25.561 PAIN AND SWELLING OF RIGHT KNEE: Primary | ICD-10-CM

## 2025-01-16 DIAGNOSIS — M25.461 PAIN AND SWELLING OF RIGHT KNEE: Primary | ICD-10-CM

## 2025-01-16 DIAGNOSIS — M25.561 ACUTE PAIN OF RIGHT KNEE: ICD-10-CM

## 2025-01-16 PROCEDURE — 73564 X-RAY EXAM KNEE 4 OR MORE: CPT | Mod: FY,RT,S$GLB, | Performed by: RADIOLOGY

## 2025-01-16 PROCEDURE — 93306 TTE W/DOPPLER COMPLETE: CPT | Mod: HCNC,PO

## 2025-01-16 PROCEDURE — 99213 OFFICE O/P EST LOW 20 MIN: CPT | Mod: S$GLB,,,

## 2025-01-16 PROCEDURE — 93306 TTE W/DOPPLER COMPLETE: CPT | Mod: 26,HCNC,, | Performed by: INTERNAL MEDICINE

## 2025-01-16 RX ORDER — DICLOFENAC SODIUM 10 MG/G
2 GEL TOPICAL DAILY
Qty: 20 G | Refills: 0 | Status: SHIPPED | OUTPATIENT
Start: 2025-01-16 | End: 2025-01-23

## 2025-01-16 NOTE — PATIENT INSTRUCTIONS
Knee Pain: Alternate Tylenol and Ibuprofen every 4-6 hours as needed. May apply topical Voltaren to area as needed up to 3 times daily  Ice and heat therapy as tolerated  Wear wrap while mobile  Please drink plenty of fluids.  Please get plenty of rest.  Please return here or go to the Emergency Department for any concerns or worsening of condition.  If you were prescribed a narcotic medication, do not drive or operate heavy equipment or machinery while taking these medications.  If you were not prescribed an anti-inflammatory medication, and if you do not have any history of stomach/intestinal ulcers, or kidney disease, or are not taking a blood thinner such as Coumadin, Plavix, Pradaxa, Eloquis, or Xaralta for example, it is OK to take over the counter Ibuprofen or Advil or Motrin or Aleve as directed.  Do not take these medications on an empty stomach.  Rest, ice, compression and elevation to the affected joint or limb as needed.  Please follow up with your primary care doctor or specialist as needed.    If you  smoke, please stop smoking.

## 2025-01-16 NOTE — PROGRESS NOTES
Subjective:      Patient ID: Mine Wilkins is a 89 y.o. female.    Vitals:  height is 5' (1.524 m) and weight is 61.2 kg (135 lb). Her oral temperature is 98.2 °F (36.8 °C). Her blood pressure is 156/70 (abnormal) and her pulse is 72. Her respiration is 16 and oxygen saturation is 96%.     Chief Complaint: Knee Injury    Pt is a 89 yo female with PMHx of HTN, CKD, osteoarthritis. She is presenting with knee swelling. Onset of symptoms was yesterday after attempting to stand using the hose fixture for support when it detached from the wall causing her to fall backwards while in the kneeling position. History of R femur fracture with replacement. Pt reports using ice to help swelling.    Knee Injury  This is a new problem. The current episode started yesterday. The problem has been unchanged. Associated symptoms include joint swelling. Pertinent negatives include no abdominal pain, chest pain, chills, congestion, coughing, fever, headaches, myalgias, nausea, neck pain, rash, sore throat or vomiting. The symptoms are aggravated by standing. She has tried ice for the symptoms.     Constitution: Negative for activity change, appetite change, chills and fever.   HENT:  Negative for ear pain, congestion, postnasal drip, sinus pain, sinus pressure and sore throat.    Neck: Negative for neck pain.   Cardiovascular:  Negative for chest pain and sob on exertion.   Eyes:  Negative for eye trauma, eye discharge, eye itching, eye redness, photophobia and blurred vision.   Respiratory:  Negative for cough, shortness of breath, wheezing and asthma.    Gastrointestinal:  Negative for abdominal pain, nausea, vomiting, constipation and diarrhea.   Genitourinary:  Negative for dysuria, frequency, urgency, urine decreased and hematuria.   Musculoskeletal:  Positive for pain and joint swelling. Negative for trauma and muscle ache.   Skin:  Negative for color change, rash and hives.   Allergic/Immunologic: Negative for seasonal  allergies, asthma, hives and sneezing.   Neurological:  Negative for dizziness, light-headedness, headaches and altered mental status.   Psychiatric/Behavioral:  Negative for altered mental status and confusion.       Objective:     Physical Exam   Constitutional: She is oriented to person, place, and time. She appears well-developed. She is cooperative.      Comments:Pt sitting erect on examination table. No acute respiratory distress, no use of accessory muscles, no notice of nasal flaring.        HENT:   Head: Normocephalic and atraumatic.   Ears:   Right Ear: Hearing and external ear normal.   Left Ear: Hearing and external ear normal.   Nose: Nose normal. No mucosal edema or nasal deformity. No epistaxis. Right sinus exhibits no maxillary sinus tenderness and no frontal sinus tenderness. Left sinus exhibits no maxillary sinus tenderness and no frontal sinus tenderness.   Mouth/Throat: Uvula is midline, oropharynx is clear and moist and mucous membranes are normal. No trismus in the jaw. Normal dentition. No uvula swelling.   Eyes: Conjunctivae and lids are normal.   Neck: Trachea normal and phonation normal. Neck supple.   Cardiovascular: Normal rate, regular rhythm, normal heart sounds and normal pulses.   Pulmonary/Chest: Effort normal.   Abdominal: Normal appearance.   Musculoskeletal: Normal range of motion.         General: Normal range of motion.      Right knee: She exhibits swelling.        Legs:       Comments: Nontender to palpation of R knee   Neurological: She is alert and oriented to person, place, and time. She exhibits normal muscle tone.   Skin: Skin is warm, dry and intact.   Psychiatric: Her speech is normal and behavior is normal. Judgment and thought content normal.   Nursing note and vitals reviewed.  X-Ray Knee Complete 4 Or More Views Right    Result Date: 1/16/2025  EXAMINATION: XR KNEE COMP 4 OR MORE VIEWS RIGHT CLINICAL HISTORY: Pain in right knee TECHNIQUE: For more views of the right  knee COMPARISON: None. FINDINGS: Bony structures are intact. Intramedullary jose is seen in the right femur and there is some mild degenerative changes present. No joint effusion is seen     See above Electronically signed by: Quique Kyle MD Date:    01/16/2025 Time:    15:04       Assessment:     1. Pain and swelling of right knee    2. Acute pain of right knee        Plan:   I have reviewed the patient chart and pertinent past imaging/labs.      Pain and swelling of right knee  -     BANDAGE ELASTIC 4IN ACE NS    Acute pain of right knee  -     X-Ray Knee Complete 4 Or More Views Right; Future; Expected date: 01/16/2025  -     Ambulatory referral/consult to Orthopedics  -     diclofenac sodium (VOLTAREN ARTHRITIS PAIN) 1 % Gel; Apply 2 g topically once daily. for 7 days  Dispense: 20 g; Refill: 0

## 2025-01-17 LAB
ASCENDING AORTA: 4 CM
AV AREA BY CONTINUOUS VTI: 1.9 CM2
AV INDEX (PROSTH): 0.55
AV LVOT MEAN GRADIENT: 6 MMHG
AV LVOT PEAK GRADIENT: 11 MMHG
AV MEAN GRADIENT: 18 MMHG
AV PEAK GRADIENT: 34 MMHG
AV VALVE AREA BY VELOCITY RATIO: 1.8 CM²
AV VALVE AREA: 1.7 CM²
AV VELOCITY RATIO: 0.59
BSA FOR ECHO PROCEDURE: 1.61 M2
CV ECHO LV RWT: 0.36 CM
DOP CALC AO PEAK VEL: 2.9 M/S
DOP CALC AO VTI: 61.3 CM
DOP CALC LVOT AREA: 3.1 CM2
DOP CALC LVOT DIAMETER: 2 CM
DOP CALC LVOT PEAK VEL: 1.7 M/S
DOP CALC LVOT STROKE VOLUME: 105.5 CM3
DOP CALC MV VTI: 30.03 CM
DOP CALC RVOT AREA: 3.23 CM2
DOP CALC RVOT DIAMETER: 2.03 CM
DOP CALCLVOT PEAK VEL VTI: 33.6 CM
E WAVE DECELERATION TIME: 287 MSEC
E/A RATIO: 0.65
E/E' RATIO: 14 M/S
ECHO EF ESTIMATED: 67 %
ECHO LV POSTERIOR WALL: 0.8 CM (ref 0.6–1.1)
EJECTION FRACTION: 60 %
FRACTIONAL SHORTENING: 35.6 % (ref 28–44)
INTERVENTRICULAR SEPTUM: 0.7 CM (ref 0.6–1.1)
LA MAJOR: 5.6 CM
LA MINOR: 5.6 CM
LA WIDTH: 4.7 CM
LEFT ATRIUM SIZE: 3.3 CM
LEFT ATRIUM VOLUME INDEX MOD: 41 ML/M2
LEFT ATRIUM VOLUME INDEX: 47 ML/M2
LEFT ATRIUM VOLUME MOD: 64 ML
LEFT ATRIUM VOLUME: 74 CM3
LEFT INTERNAL DIMENSION IN SYSTOLE: 2.9 CM (ref 2.1–4)
LEFT VENTRICLE DIASTOLIC VOLUME INDEX: 58.77 ML/M2
LEFT VENTRICLE DIASTOLIC VOLUME: 92.85 ML
LEFT VENTRICLE MASS INDEX: 66.1 G/M2
LEFT VENTRICLE SYSTOLIC VOLUME INDEX: 19.7 ML/M2
LEFT VENTRICLE SYSTOLIC VOLUME: 31.09 ML
LEFT VENTRICULAR INTERNAL DIMENSION IN DIASTOLE: 4.5 CM (ref 3.5–6)
LEFT VENTRICULAR MASS: 104.5 G
LV LATERAL E/E' RATIO: 13.2 M/S
LV SEPTAL E/E' RATIO: 15.8 M/S
MV A" WAVE DURATION": 15.7 MSEC
MV MEAN GRADIENT: 3 MMHG
MV PEAK A VEL: 1.22 M/S
MV PEAK E VEL: 0.79 M/S
MV PEAK GRADIENT: 6 MMHG
MV STENOSIS PRESSURE HALF TIME: 83.34 MS
MV VALVE AREA BY CONTINUITY EQUATION: 3.51 CM2
MV VALVE AREA BY PLANIMETRY: 5.03 CM2
MV VALVE AREA P 1/2 METHOD: 2.64 CM2
OHS CV RV/LV RATIO: 0.44 CM
PISA TR MAX VEL: 2.7 M/S
PULM VEIN A" WAVE DURATION": 156.99 MS
PULM VEIN S/D RATIO: 1.43
PULMONIC VEIN PEAK A VELOCITY: 0.3 M/S
PV PEAK D VEL: 0.4 M/S
PV PEAK S VEL: 0.57 M/S
RA MAJOR: 4.6 CM
RA PRESSURE ESTIMATED: 3 MMHG
RA WIDTH: 2.73 CM
RIGHT ATRIAL AREA: 10.8 CM2
RIGHT ATRIAL AREA: 7.7 CM2
RIGHT VENTRICLE DIASTOLIC BASEL DIMENSION: 2 CM
RV TB RVSP: 6 MMHG
SINUS: 3.5 CM
STJ: 3.37 CM
TDI LATERAL: 0.06 M/S
TDI SEPTAL: 0.05 M/S
TDI: 0.06 M/S
TR MAX PG: 29 MMHG
TRICUSPID ANNULAR PLANE SYSTOLIC EXCURSION: 2.2 CM
TV PEAK GRADIENT: 28 MMHG
TV REST PULMONARY ARTERY PRESSURE: 32 MMHG
Z-SCORE OF LEFT VENTRICULAR DIMENSION IN END DIASTOLE: -0.06
Z-SCORE OF LEFT VENTRICULAR DIMENSION IN END SYSTOLE: 0.26

## 2025-01-28 ENCOUNTER — OFFICE VISIT (OUTPATIENT)
Dept: DERMATOLOGY | Facility: CLINIC | Age: 89
End: 2025-01-28
Payer: MEDICARE

## 2025-01-28 DIAGNOSIS — L81.4 LENTIGINES: ICD-10-CM

## 2025-01-28 DIAGNOSIS — Z85.828 HISTORY OF SKIN CANCER: ICD-10-CM

## 2025-01-28 DIAGNOSIS — L57.0 MULTIPLE ACTINIC KERATOSES: Primary | ICD-10-CM

## 2025-01-28 PROCEDURE — 17000 DESTRUCT PREMALG LESION: CPT | Mod: HCNC,S$GLB,, | Performed by: DERMATOLOGY

## 2025-01-28 PROCEDURE — 99214 OFFICE O/P EST MOD 30 MIN: CPT | Mod: 25,HCNC,S$GLB, | Performed by: DERMATOLOGY

## 2025-01-28 PROCEDURE — 1101F PT FALLS ASSESS-DOCD LE1/YR: CPT | Mod: HCNC,CPTII,S$GLB, | Performed by: DERMATOLOGY

## 2025-01-28 PROCEDURE — 1160F RVW MEDS BY RX/DR IN RCRD: CPT | Mod: HCNC,CPTII,S$GLB, | Performed by: DERMATOLOGY

## 2025-01-28 PROCEDURE — 99999 PR PBB SHADOW E&M-EST. PATIENT-LVL III: CPT | Mod: PBBFAC,HCNC,, | Performed by: DERMATOLOGY

## 2025-01-28 PROCEDURE — 17003 DESTRUCT PREMALG LES 2-14: CPT | Mod: HCNC,S$GLB,, | Performed by: DERMATOLOGY

## 2025-01-28 PROCEDURE — 1126F AMNT PAIN NOTED NONE PRSNT: CPT | Mod: HCNC,CPTII,S$GLB, | Performed by: DERMATOLOGY

## 2025-01-28 PROCEDURE — 1159F MED LIST DOCD IN RCRD: CPT | Mod: HCNC,CPTII,S$GLB, | Performed by: DERMATOLOGY

## 2025-01-28 PROCEDURE — 3288F FALL RISK ASSESSMENT DOCD: CPT | Mod: HCNC,CPTII,S$GLB, | Performed by: DERMATOLOGY

## 2025-01-28 PROCEDURE — 1157F ADVNC CARE PLAN IN RCRD: CPT | Mod: HCNC,CPTII,S$GLB, | Performed by: DERMATOLOGY

## 2025-01-28 NOTE — PROGRESS NOTES
Subjective:      Patient ID:  Mine Wilkins is a 89 y.o. female who presents for   Chief Complaint   Patient presents with    Skin Check     UBSE     She had the scc removed from her right leg mohs surgery.  Now new spots on nose and right arm.        Review of Systems   Constitutional:  Negative for fever, chills, weight loss, weight gain, fatigue, night sweats and malaise.   Skin:  Positive for daily sunscreen use and activity-related sunscreen use. Negative for wears hat.   Hematologic/Lymphatic: Does not bruise/bleed easily.       Objective:   Physical Exam   Constitutional: She appears well-developed and well-nourished.   Neurological: She is alert and oriented to person, place, and time.   Psychiatric: She has a normal mood and affect.   Skin:   Areas Examined (abnormalities noted in diagram):   Head / Face Inspection Performed  Neck Inspection Performed  RUE Inspected  LUE Inspection Performed  RLE Inspected  LLE Inspection Performed                 Diagram Legend     Erythematous scaling macule/papule c/w actinic keratosis       Vascular papule c/w angioma      Pigmented verrucoid papule/plaque c/w seborrheic keratosis      Yellow umbilicated papule c/w sebaceous hyperplasia      Irregularly shaped tan macule c/w lentigo     1-2 mm smooth white papules consistent with Milia      Movable subcutaneous cyst with punctum c/w epidermal inclusion cyst      Subcutaneous movable cyst c/w pilar cyst      Firm pink to brown papule c/w dermatofibroma      Pedunculated fleshy papule(s) c/w skin tag(s)      Evenly pigmented macule c/w junctional nevus     Mildly variegated pigmented, slightly irregular-bordered macule c/w mildly atypical nevus      Flesh colored to evenly pigmented papule c/w intradermal nevus       Pink pearly papule/plaque c/w basal cell carcinoma      Erythematous hyperkeratotic cursted plaque c/w SCC      Surgical scar with no sign of skin cancer recurrence      Open and closed comedones  "     Inflammatory papules and pustules      Verrucoid papule consistent consistent with wart     Erythematous eczematous patches and plaques     Dystrophic onycholytic nail with subungual debris c/w onychomycosis     Umbilicated papule    Erythematous-base heme-crusted tan verrucoid plaque consistent with inflamed seborrheic keratosis     Erythematous Silvery Scaling Plaque c/w Psoriasis     See annotation      Assessment / Plan:        Multiple actinic keratoses   Cryosurgery Procedure Note    Verbal consent from the patient is obtained and the patient is aware of the precancerous quality and need for treatment of these lesions. Liquid nitrogen cryosurgery is applied to the 2 actinic keratoses, as detailed in the physical exam, to produce a freeze injury.      Lentigines  The "ABCD" rules to observe pigmented lesions were reviewed.      History of skin cancer  Comments:  scc right leg removed mohs surgery  Area(s) of previous NMSC evaluated with no signs of recurrence.    . No lesions suspicious for malignancy noted.    Recommend daily sun protection/avoidance and use of at least SPF 30, broad spectrum sunscreen (OTC drug).                Follow up in about 1 year (around 1/28/2026).  "

## 2025-02-04 ENCOUNTER — INFUSION (OUTPATIENT)
Dept: INFECTIOUS DISEASES | Facility: HOSPITAL | Age: 89
End: 2025-02-04
Attending: FAMILY MEDICINE
Payer: MEDICARE

## 2025-02-04 ENCOUNTER — OFFICE VISIT (OUTPATIENT)
Dept: ORTHOPEDICS | Facility: CLINIC | Age: 89
End: 2025-02-04
Payer: MEDICARE

## 2025-02-04 VITALS
WEIGHT: 136.81 LBS | DIASTOLIC BLOOD PRESSURE: 73 MMHG | RESPIRATION RATE: 21 BRPM | HEIGHT: 61 IN | BODY MASS INDEX: 25.95 KG/M2 | HEART RATE: 76 BPM | HEIGHT: 61 IN | SYSTOLIC BLOOD PRESSURE: 140 MMHG | WEIGHT: 137.44 LBS | BODY MASS INDEX: 25.83 KG/M2 | OXYGEN SATURATION: 95 % | TEMPERATURE: 99 F

## 2025-02-04 DIAGNOSIS — M17.11 PRIMARY OSTEOARTHRITIS OF RIGHT KNEE: Primary | ICD-10-CM

## 2025-02-04 DIAGNOSIS — N18.31 CHRONIC KIDNEY DISEASE, STAGE 3A: ICD-10-CM

## 2025-02-04 DIAGNOSIS — M81.0 SENILE OSTEOPOROSIS: Primary | ICD-10-CM

## 2025-02-04 PROCEDURE — 1101F PT FALLS ASSESS-DOCD LE1/YR: CPT | Mod: HCNC,CPTII,S$GLB,

## 2025-02-04 PROCEDURE — 3288F FALL RISK ASSESSMENT DOCD: CPT | Mod: HCNC,CPTII,S$GLB,

## 2025-02-04 PROCEDURE — 96372 THER/PROPH/DIAG INJ SC/IM: CPT | Mod: HCNC

## 2025-02-04 PROCEDURE — 1160F RVW MEDS BY RX/DR IN RCRD: CPT | Mod: HCNC,CPTII,S$GLB,

## 2025-02-04 PROCEDURE — 1125F AMNT PAIN NOTED PAIN PRSNT: CPT | Mod: HCNC,CPTII,S$GLB,

## 2025-02-04 PROCEDURE — 99999 PR PBB SHADOW E&M-EST. PATIENT-LVL III: CPT | Mod: PBBFAC,HCNC,,

## 2025-02-04 PROCEDURE — 63600175 PHARM REV CODE 636 W HCPCS: Mod: JZ,TB,HCNC | Performed by: NURSE PRACTITIONER

## 2025-02-04 PROCEDURE — 1159F MED LIST DOCD IN RCRD: CPT | Mod: HCNC,CPTII,S$GLB,

## 2025-02-04 PROCEDURE — 1157F ADVNC CARE PLAN IN RCRD: CPT | Mod: HCNC,CPTII,S$GLB,

## 2025-02-04 PROCEDURE — 99213 OFFICE O/P EST LOW 20 MIN: CPT | Mod: HCNC,S$GLB,,

## 2025-02-04 RX ADMIN — DENOSUMAB 60 MG: 60 INJECTION SUBCUTANEOUS at 01:02

## 2025-02-04 NOTE — PROGRESS NOTES
Pt arrived for Prolia injection. Pt confirms taking Vit D/Ca+; Pt denies dental sx in last 3 months; pt given next appt.    Limited head-to-toe assessment due to privacy issues and visit reason though the opportunity was given for patient to express any concerns

## 2025-02-04 NOTE — PROGRESS NOTES
SUBJECTIVE:     Chief Complaint & History of Present Illness:  History of Present Illness    CHIEF COMPLAINT:  - Right knee pain following a fall.    HPI:  Ms. Wilkins presents with right knee pain following an incident on the Wednesday before a snowfall in 2025. While attempting to cover water pipes outside, she knelt down and had difficulty standing up. In an attempt to rise, she grabbed a plastic hose rack on the wall, which detached, causing her to fall backward. She reports getting a brush burn but did not hit her head. A neighbor witnessed the incident and helped her up.    The pain is localized to the posterior aspect of the right knee. She reports clicking when moving her legs in bed at night. By the evening of the incident, she noticed swelling in the knee, which was significantly worse the following morning. She describes significant fluid accumulation that seemed to subside.     For pain management, she has been using an Aspercreme with lidocaine, elevating the leg in a lift chair, and applying ice for 20-minute intervals.    Ms. Wilkins typically exercises 5 times per week but has only managed to do so twice in the current week due to the knee issue. She performs floor exercises, stand-up exercises, and bed exercises, which are based on therapy routines from previous injuries. She has stopped using a treadmill due to discomfort in her contralateral knee.    She visited a walk-in clinic, where she was seen by Maxim Li PA-C, who advised her to seek further medical attention if the problem persisted.    Ms. Wilkins mentions having a jose in the leg from a previous surgery in 2017 or 2018. She denies any issues after surgery or hip pain today.     She denies any hip problems or hitting her head during the fall. She also denies any fractures or dislocations from the recent incident.    PREVIOUS TREATMENTS:  - Ms. Wilkins applies ice to the knee when it's swollen, uses a lift chair to elevate  the leg and places a pillow under it.  - Ms. Wilkisn uses an aspirin cream with lidocaine on the affected area.  - Ms. Wilkins performs exercises, including floor exercises, stand-up exercises, and bed exercises, typically 5 times per week.    MEDICATIONS:  - Aspirin cream with lidocaine: Topical, as needed    SURGICAL HISTORY:  - Richard placement in le or     FAMILY HISTORY:  - Son-in-law: Heart problem, hospitalized      ROS:  Musculoskeletal: +joint pain, +joint swelling          Past Medical History:   Diagnosis Date    Acute on chronic diastolic heart failure 2022    After-cataract of both eyes - Left Eye 10/11/2012    Anxiety     Aortic calcification 08/10/2016    Aortic valve stenosis, moderate     AR (allergic rhinitis)     Arthritis of facet joints at multiple vertebral levels 2016    Seen on lumbar MRI dated 16    Cataract     Cholelithiasis     Closed displaced intertrochanteric fracture of right femur with routine healing s/p IM nail on 2018    Cystocele 2013    Esotropia, alternating - Both Eyes 10/11/2012    Essential hypertension     Gastroesophageal reflux disease without esophagitis 2018    Left ventricular diastolic dysfunction with preserved systolic function 08/10/2016    Lumbar radiculopathy 2016    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    Nondisplaced fracture of proximal end of humerus 2016    Nonexudative age-related macular degeneration, bilateral, intermediate dry stage 2021    Osteoarthritis     Overweight (BMI 25.0-29.9) 2018    Pure hypercholesterolemia     Right-sided low back pain without sciatica 12/10/2015    Squamous cell carcinoma, 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    right lower leg    Strabismus     Urethral hypermobility 2013       Past Surgical History:   Procedure Laterality Date    ADENOIDECTOMY      CARDIAC CATH COSURGEON N/A 3/8/2022    Procedure: Cardiac Cath Cosurgeon;  Surgeon:  Shahram Lyle MD;  Location: [facility] CATH LAB;  Service: Cardiovascular;  Laterality: N/A;    CARDIAC VALVE SURGERY      CATARACT EXTRACTION Bilateral     BOTH EYES MULTIFOCAL    COLONOSCOPY  2015    CORONARY ANGIOGRAPHY N/A 2/7/2022    Procedure: ANGIOGRAM, CORONARY ARTERY;  Surgeon: Robby Mistry MD;  Location: [facility] CATH LAB;  Service: Cardiology;  Laterality: N/A;    ESOPHAGOGASTRODUODENOSCOPY N/A 4/13/2023    Procedure: ESOPHAGOGASTRODUODENOSCOPY (EGD);  Surgeon: Remy Aguilar MD;  Location: [facility] ENDO (2ND FLR);  Service: Endoscopy;  Laterality: N/A;  2nd floor-hx of tavr-inst portal-tb    EYE SURGERY      FEMUR FRACTURE SURGERY Right     FRACTURE SURGERY Right     femur    HYSTERECTOMY      INTERTROCHANTERIC HIP FRACTURE SURGERY Right 05/20/2018    IM nail    LEFT HEART CATHETERIZATION Left 3/8/2022    Procedure: Left heart cath;  Surgeon: Constantin Goldberg MD;  Location: [facility] CATH LAB;  Service: Cardiology;  Laterality: Left;    LUMBAR EPIDURAL INJECTION  02/04/2016    L4-l5    PARTIAL HYSTERECTOMY      SKIN GRAFT      left foot     TONSILLECTOMY      TRANSCATHETER AORTIC VALVE REPLACEMENT (TAVR) N/A 3/8/2022    Procedure: REPLACEMENT, AORTIC VALVE, TRANSCATHETER (TAVR);  Surgeon: Constantin Goldberg MD;  Location: [facility] CATH LAB;  Service: Cardiology;  Laterality: N/A;    WRIST FRACTURE SURGERY Right 2009    YAG CAP      LEFT EYE       Family History   Problem Relation Name Age of Onset    Colon cancer Brother      Dementia Brother      Hypertension Brother      Osteoporosis Mother      Hypertension Mother      Macular degeneration Mother      Cataracts Mother      Cancer Father      No Known Problems Daughter Nella     Hypertension Brother      Dementia Brother      No Known Problems Daughter Aleah     Hypertension Maternal Grandmother      Stroke Maternal Grandmother      Breast cancer Maternal Grandmother      Melanoma Maternal Grandfather      Anesthesia problems Neg Hx      Broken bones Neg Hx       Clotting disorder Neg Hx      Collagen disease Neg Hx      Diabetes Neg Hx      Dislocations Neg Hx      Rheumatologic disease Neg Hx      Scoliosis Neg Hx      Severe sprains Neg Hx      Ovarian cancer Neg Hx      Amblyopia Neg Hx      Blindness Neg Hx      Glaucoma Neg Hx      Retinal detachment Neg Hx      Strabismus Neg Hx      Psoriasis Neg Hx      Lupus Neg Hx      Eczema Neg Hx      Acne Neg Hx         Review of patient's allergies indicates:   Allergen Reactions    Ace inhibitors      Other reaction(s): cough    Codeine      Other reaction(s): Nausea    Preservision [vit c-e-cupric-zinc-lutein] Nausea And Vomiting           Current Outpatient Medications:     amLODIPine (NORVASC) 5 MG tablet, TAKE 1 TABLET(5 MG) BY MOUTH EVERY DAY, Disp: 90 tablet, Rfl: 2    ascorbic acid, vitamin C, (VITAMIN C) 500 MG tablet, Take 1,500 mg by mouth once daily., Disp: , Rfl:     cetirizine (ZYRTEC) 10 MG tablet, Take 10 mg by mouth once daily., Disp: , Rfl:     cholecalciferol, vitamin D3, 2,000 unit Tab, Take by mouth. 1 Tablet Oral Every day, Disp: , Rfl:     denosumab (PROLIA) 60 mg/mL Syrg, Inject 60 mg into the skin every 6 (six) months., Disp: , Rfl:     EScitalopram oxalate (LEXAPRO) 5 MG Tab, TAKE 1 TABLET ONE TIME DAILY, Disp: 90 tablet, Rfl: 0    fluticasone propionate (FLONASE) 50 mcg/actuation nasal spray, SHAKE LIQUID AND USE 1 SPRAY(50 MCG) IN EACH NOSTRIL EVERY EVENING, Disp: 16 g, Rfl: 3    Lactobac no.41/Bifidobact no.7 (PROBIOTIC-10 ORAL), Take by mouth once daily., Disp: , Rfl:     multivit-min/iron/folic/lutein (CENTRUM SILVER WOMEN ORAL), Take by mouth., Disp: , Rfl:     omeprazole (PRILOSEC) 40 MG capsule, TAKE 1 CAPSULE EVERY MORNING, Disp: 90 capsule, Rfl: 0    oxybutynin (DITROPAN-XL) 10 MG 24 hr tablet, TAKE 1 TABLET ONE TIME DAILY, Disp: 90 tablet, Rfl: 0    pravastatin (PRAVACHOL) 20 MG tablet, TAKE 1 TABLET EVERY EVENING, Disp: 90 tablet, Rfl: 0    aspirin (ECOTRIN) 81 MG EC tablet, Take 1 tablet  "(81 mg total) by mouth once daily., Disp: 90 tablet, Rfl: 3    diclofenac sodium (VOLTAREN ARTHRITIS PAIN) 1 % Gel, Apply 2 g topically once daily. for 7 days, Disp: 20 g, Rfl: 0  No current facility-administered medications for this visit.      OBJECTIVE:     PHYSICAL EXAM:  Ht 5' 1" (1.549 m)   Wt 62.4 kg (137 lb 7.3 oz)   BMI 25.97 kg/m²   General: Pleasant, cooperative, NAD.  HEENT: NCAT, sclera nonicteric.  Lungs: Respirations are equal and unlabored.   Abdomen: Soft and non-tender.  CV: 2+ bilateral upper and lower extremity pulses.  Neuro: Sensation intact to light touch.  Skin: Intact throughout LE with no rashes, erythema, or lesions.  Extremities: No LE edema, NVI lower extremities. normal gait using cane.    left Knee Exam:  Knee Range of Motion: 0-120   Effusion: none  Condition of skin: intact  Location of tenderness: Medial joint line, posterior  Strength: 5/5 quadriceps strength, 5/5 gastroc-soleus strength, 5/5 hamstring strength, and 5/5 tibialis anterior strength  Stability: stable to testing  Knee Alignment: normal  Roz: negative    RADIOGRAPHS:  X-rays of the right knee taken previously were personally reviewed. Imaging reveals no acute fractures or dislocations.  Mild to moderate tricompartmental osteoarthritic changes.  Evidence of long femoral intramedullary jose in satisfactory placement and alignment without evidence of loosening.      ASSESSMENT:       ICD-10-CM ICD-9-CM   1. Primary osteoarthritis of right knee  M17.11 715.16       PLAN:     We discussed with the patient at length all the different treatment options available including anti-inflammatories, acetaminophen, rest, ice, knee strengthening exercise, occasional cortisone injections for temporary relief, Viscosupplimentation injections, arthroscopic debridement, osteotomy, and finally knee arthroplasty.     Assessment & Plan    - Discussed potential cortisone injection for the knee if pain worsens.  - Contact the office if " considering cortisone injection for the knee.  - Apply ice to the right knee for 20 minutes at a time when experiencing pain or swelling.  - Elevate the right lower extremity, especially when icing.  - Continue with current exercise routine, including floor, stand-up, and bed exercises.  - Keep moving and maintain activity level as tolerated.  - Take ibuprofen or NSAIDs as needed for pain and inflammation.  - Follow up if knee pain worsens or does not improve.          This note was generated with the assistance of ambient listening technology. Verbal consent was obtained by the patient and accompanying visitor(s) for the recording of patient appointment to facilitate this note. I attest to having reviewed and edited the generated note for accuracy, though some syntax or spelling errors may persist. Please contact the author of this note for any clarification.         Akshat Estrada PA-C

## 2025-02-14 DIAGNOSIS — E78.5 DYSLIPIDEMIA: ICD-10-CM

## 2025-02-14 DIAGNOSIS — K21.9 GASTROESOPHAGEAL REFLUX DISEASE WITHOUT ESOPHAGITIS: ICD-10-CM

## 2025-02-14 DIAGNOSIS — F41.1 GENERALIZED ANXIETY DISORDER: Chronic | ICD-10-CM

## 2025-02-14 DIAGNOSIS — N39.46 MIXED INCONTINENCE URGE AND STRESS (MALE)(FEMALE): ICD-10-CM

## 2025-02-14 RX ORDER — ESCITALOPRAM OXALATE 5 MG/1
5 TABLET ORAL
Qty: 90 TABLET | Refills: 0 | Status: SHIPPED | OUTPATIENT
Start: 2025-02-14

## 2025-02-14 RX ORDER — OXYBUTYNIN CHLORIDE 10 MG/1
10 TABLET, EXTENDED RELEASE ORAL
Qty: 90 TABLET | Refills: 0 | Status: SHIPPED | OUTPATIENT
Start: 2025-02-14

## 2025-02-14 RX ORDER — OMEPRAZOLE 40 MG/1
40 CAPSULE, DELAYED RELEASE ORAL EVERY MORNING
Qty: 90 CAPSULE | Refills: 0 | Status: SHIPPED | OUTPATIENT
Start: 2025-02-14

## 2025-02-14 RX ORDER — PRAVASTATIN SODIUM 20 MG/1
20 TABLET ORAL NIGHTLY
Qty: 90 TABLET | Refills: 0 | Status: SHIPPED | OUTPATIENT
Start: 2025-02-14

## 2025-02-14 NOTE — TELEPHONE ENCOUNTER
Care Due:                  Date            Visit Type   Department     Provider  --------------------------------------------------------------------------------                                             [facility]  Last Visit: 02-      [facility]          RIKA Foster  Next Visit: None Scheduled  None         None Found                                                            Last  Test          Frequency    Reason                     Performed    Due Date  --------------------------------------------------------------------------------    Office Visit  15 months..  amLODIPine...............  02- 05-    [facility] Embedded Care Due Messages. Reference number: [id number].   2/14/2025 3:31:30 AM CST

## 2025-02-14 NOTE — TELEPHONE ENCOUNTER
Refill Routing Note   Medication(s) are not appropriate for processing by [facility] for the following reason(s):        Required labs outdated    [facility] action(s):  Defer  Approve   Requires labs : Yes - lipid panel            Appointments  past 12m or future 3m with PCP    Date Provider   Last Visit   2/12/2024 Daryl Foster MD   Next Visit   Visit date not found Daryl Foster MD   ED visits in past 90 days: 0        Note composed:8:35 AM 02/14/2025

## 2025-03-24 ENCOUNTER — OFFICE VISIT (OUTPATIENT)
Dept: URGENT CARE | Facility: CLINIC | Age: 89
End: 2025-03-24
Payer: MEDICARE

## 2025-03-24 VITALS
BODY MASS INDEX: 25.86 KG/M2 | HEIGHT: 61 IN | HEART RATE: 88 BPM | WEIGHT: 137 LBS | OXYGEN SATURATION: 99 % | DIASTOLIC BLOOD PRESSURE: 71 MMHG | TEMPERATURE: 98 F | RESPIRATION RATE: 20 BRPM | SYSTOLIC BLOOD PRESSURE: 111 MMHG

## 2025-03-24 DIAGNOSIS — M79.671 RIGHT FOOT PAIN: Primary | ICD-10-CM

## 2025-03-24 DIAGNOSIS — M77.8 EXTENSOR TENDONITIS OF FOOT: ICD-10-CM

## 2025-03-24 DIAGNOSIS — R25.2 FOOT CRAMPS: ICD-10-CM

## 2025-03-24 PROCEDURE — 99213 OFFICE O/P EST LOW 20 MIN: CPT | Mod: S$GLB,,, | Performed by: NURSE PRACTITIONER

## 2025-03-24 RX ORDER — METHYLPREDNISOLONE 4 MG/1
TABLET ORAL
Qty: 21 EACH | Refills: 0 | Status: SHIPPED | OUTPATIENT
Start: 2025-03-24 | End: 2025-04-14

## 2025-03-24 NOTE — PROGRESS NOTES
"Subjective:      Patient ID: Mine Wilkins is a 89 y.o. female.    Vitals:  height is 5' 1" (1.549 m) and weight is 62.1 kg (137 lb). Her temperature is 98.3 °F (36.8 °C). Her blood pressure is 111/71 and her pulse is 88. Her respiration is 20 and oxygen saturation is 99%.     Chief Complaint: Foot Injury    This is a 89 y.o. female   who presents today with a chief complaint of right foot pain that began a week ago. The pain is located on the top of her foot. She states that she had a adelina horse and says that her pain has not improived. She's been using asper cream and biofreeze to help relieve her symptoms.     Foot Injury   The incident occurred 5 to 7 days ago. The incident occurred at home. There was no injury mechanism. The pain is present in the right foot. The quality of the pain is described as cramping and aching. The pain is at a severity of 5/10. The pain is moderate. The pain has been Fluctuating since onset. Associated symptoms include an inability to bear weight. Pertinent negatives include no loss of motion, loss of sensation, muscle weakness, numbness or tingling. She reports no foreign bodies present. The symptoms are aggravated by weight bearing. Treatments tried: asper cream and biofreeze. The treatment provided no relief.     Musculoskeletal:  Positive for pain. Negative for trauma.   Skin:  Negative for erythema.   Neurological:  Negative for numbness.      Objective:     Physical Exam   Constitutional: She is oriented to person, place, and time. She appears well-developed.   HENT:   Head: Normocephalic and atraumatic. Head is without abrasion, without contusion and without laceration.   Ears:   Right Ear: External ear normal.   Left Ear: External ear normal.   Nose: Nose normal.   Mouth/Throat: Oropharynx is clear and moist and mucous membranes are normal.   Eyes: Conjunctivae, EOM and lids are normal. Pupils are equal, round, and reactive to light.   Neck: Trachea normal and " phonation normal. Neck supple.   Musculoskeletal: Normal range of motion.         General: Normal range of motion.        Feet:    Neurological: She is alert and oriented to person, place, and time.   Skin: Skin is warm, dry, intact and no rash. Capillary refill takes less than 2 seconds. No abrasion, No burn, No bruising, No erythema and No ecchymosis   Psychiatric: Her speech is normal and behavior is normal. Judgment and thought content normal.   Nursing note and vitals reviewed.      Assessment:     1. Right foot pain    2. Extensor tendonitis of foot    3. Foot cramps        Plan:       Right foot pain  -     Ambulatory referral/consult to Podiatry    Extensor tendonitis of foot  -     methylPREDNISolone (MEDROL DOSEPACK) 4 mg tablet; use as directed  Dispense: 21 each; Refill: 0    Foot cramps        Patient Instructions   Medrol dose pack- take as directed on packaging    Referral ordered for Podiatry.  Call [phone] to schedule appt     Rest. Allow your injury to heal before you do slow movements.  Warm compresses or heating pad to the foot  Please drink plenty of fluids.  Please get plenty of rest.      Pain Control  If not allergic, please take over the counter Tylenol (Acetaminophen) 650 mg every 4-6 hours  pain. Do not take NSAIDs such as Aleve or Advil due your kidneys    Please remember that you have received care at an urgent care today. Urgent cares are not emergency rooms and are not equipped to handle life threatening emergencies and cannot rule in or out certain medical conditions and you may be released before all of your medical problems are known or treated. Please arrange follow up with your primary care physician or speciality clinic   (orthopedics) within 2-5 days if your signs and symptoms have not resolved or worsen.     Please return here or go to the Emergency Department for any concerns or worsening of condition.Patient was educated on signs/symptoms that would warrant emergent  medical attention. Patient verbalized understanding.

## 2025-03-24 NOTE — PATIENT INSTRUCTIONS
Medrol dose pack- take as directed on packaging    Referral ordered for Podiatry.  Call [phone] to schedule appt     Rest. Allow your injury to heal before you do slow movements.  Warm compresses or heating pad to the foot  Please drink plenty of fluids.  Please get plenty of rest.      Pain Control  If not allergic, please take over the counter Tylenol (Acetaminophen) 650 mg every 4-6 hours  pain. Do not take NSAIDs such as Aleve or Advil due your kidneys    Please remember that you have received care at an urgent care today. Urgent cares are not emergency rooms and are not equipped to handle life threatening emergencies and cannot rule in or out certain medical conditions and you may be released before all of your medical problems are known or treated. Please arrange follow up with your primary care physician or speciality clinic   (orthopedics) within 2-5 days if your signs and symptoms have not resolved or worsen.     Please return here or go to the Emergency Department for any concerns or worsening of condition.Patient was educated on signs/symptoms that would warrant emergent medical attention. Patient verbalized understanding.

## 2025-03-31 ENCOUNTER — HOSPITAL ENCOUNTER (OUTPATIENT)
Dept: RADIOLOGY | Facility: HOSPITAL | Age: 89
Discharge: HOME | End: 2025-03-31
Attending: NURSE PRACTITIONER
Payer: MEDICARE

## 2025-03-31 DIAGNOSIS — E21.3 HYPERPARATHYROIDISM: ICD-10-CM

## 2025-03-31 PROCEDURE — 77080 DXA BONE DENSITY AXIAL: CPT | Mod: TC

## 2025-03-31 PROCEDURE — 77081 DXA BONE DENSITY APPENDICULR: CPT | Mod: 26,,, | Performed by: INTERNAL MEDICINE

## 2025-03-31 PROCEDURE — 77081 DXA BONE DENSITY APPENDICULR: CPT | Mod: TC

## 2025-06-25 NOTE — Clinical Note
The balloon was removed from the right common femoral artery. 
 in the right ventricle. The pacer was paced at 180 BPM 10 mA 0.8 mV.
 left ventricle. Hemodynamics were performed.   EDP= 20
14fr Siu Sheath inserted into the Right CFA.
14fr Siu Sheath removed from the Right CFA.
30 ml of contrast were injected throughout the case. 270 mL of contrast was the total wasted during the case. 300 mL was the total amount used during the case.  
A Blln Cath True Dilation 18mm was inflated in the aortic valve. 
A Blln Cath True Dilation 18mm was inserted into the aortic valve. 
A Blln Cath True Dilation 18mm was removed from the aortic valve. 
A percutaneous stick to the left femoral artery was performed. 
A percutaneous stick to the right femoral artery was performed. 
All wires were removed.   
An angiography of the  left femoral artery was performed to evaluate for the placement of a closure device. 
An angiography of the  right femoral artery was performed to evaluate for the placement of a closure device. 
An angiography was performed of the Right CFA. 
An angiography was performed of the Right CFA. 
An angiography was performed of the aorta. 
An angiography was performed of the aorta. 
An angiography was performed of the aorta. The angiography was performed via power injection. The injected amount was 10 mL contrast at 20 mL/s. The PSI from the power injection was 1000. 
An angiography was performed of the right common femoral artery
Bedside report was given to ICU RN by anesthesia  
See anesthesia charting for all vitals, assessments, and meds given.
The catheter was inserted in the right ventricle. The transvenous pacing lead was secured in place in the RV and was placed and capture was confirmed. 
The catheter was inserted into the aorta. 
The catheter was inserted into the left ventricle. 
The catheter was removed from the Right CFA. 
The catheter was removed from the aorta. 
The catheter was removed from the left ventricle. 
The catheter was repositioned into the Right CFA. 
The closure device was deployed in the left femoral artery. 
The closure device was deployed in the right femoral artery. 
The closure device was deployed in the right femoral artery. 
The closure device was inserted into the left femoral artery. 
The closure device was inserted into the right femoral artery. 
The closure device was inserted into the right femoral artery. 
The groin was prepped. The site was prepped with ChloraPrep. The site was clipped. The patient was draped. 
The physician was paged.  
The right neck was prepped. The site was prepped with ChloraPrep. The patient was draped. 
The sheath was inserted into the left femoral artery. 
The sheath was inserted into the right femoral artery. 
The sheath was inserted into the right internal jugular vein. 
The sheath was removed from the left femoral artery. 
The sheath was removed from the right femoral artery. 
The sheath was sutured in place in the right internal jugular vein. 
The wire was inserted into the Right SFA.
The wire was inserted into the aorta.
The wire was inserted into the left ventricle.
The wire was removed from the Right CFA.
The wire was removed from the Right SFA.
The wire was removed from the aorta.
The wire was removed from the aorta.
The wire was removed from the left ventricle.
The wire was removed from the left ventricle.
The wire was repositioned to the Right CFA.
The wire was repositioned to the left ventricle.
Valve Evita 3 Ult Commnd 23mm was deployed to the aortic valve. 
Valve Evita 3 Ult Commnd 23mm was inserted into the aortic valve. 
dry, intact, no bleeding and no hematoma. 
Her/She

## (undated) DEVICE — SPIKE CONTRAST CONTROLLER

## (undated) DEVICE — PROTECTION STATION PLUS

## (undated) DEVICE — GUIDEWIRE STF .035X180CM ANG

## (undated) DEVICE — CATH DXTERITY JR40 100CM 6FR

## (undated) DEVICE — SHEATH INTRODUCER 6FR 11CM

## (undated) DEVICE — LINE INJECTION 30IN 25/BX

## (undated) DEVICE — KIT CUSTOM MANIFOLD

## (undated) DEVICE — CATH DXTERITY AL-I 100CM 6FR

## (undated) DEVICE — BOWL STERILE LARGE 32OZ

## (undated) DEVICE — WIRE GUIDE 3.2MM 400MM
Type: IMPLANTABLE DEVICE | Site: FEMUR | Status: NON-FUNCTIONAL
Removed: 2018-05-20

## (undated) DEVICE — APPLICATOR CHLORAPREP ORN 26ML

## (undated) DEVICE — SYR MED RAD 150ML

## (undated) DEVICE — CATH ULTRAVERSE 035 8X40X75

## (undated) DEVICE — DEVICE PERCLOSE SUT CLSR 6FR

## (undated) DEVICE — SHEATH INTRODUCER 5F 10CM

## (undated) DEVICE — OMNIPAQUE 350 200ML

## (undated) DEVICE — DRAPE STERI U-SHAPED 47X51IN

## (undated) DEVICE — SYR 10CC LUER LOCK

## (undated) DEVICE — DRAPE C-ARMOR EQUIPMENT COVER

## (undated) DEVICE — GLOVE BIOGEL PI MICRO SZ 7.5

## (undated) DEVICE — GOWN POLY REINF BRTH SLV LG

## (undated) DEVICE — GUIDEWIRE AMPLATZ .035X260

## (undated) DEVICE — GAUZE SPONGE 4X4 12PLY

## (undated) DEVICE — GLOVE BIOGEL ECLIPSE SZ 7

## (undated) DEVICE — CABLE PACER

## (undated) DEVICE — COVER BAND BAG 40 X 40

## (undated) DEVICE — SYR 3CC LUER LOC

## (undated) DEVICE — SHEATH INTRODUCER 4FR 11CM

## (undated) DEVICE — GUIDEWIRE EMERALD 150CM PTFE

## (undated) DEVICE — DRAPE T TRNSVRS LAP 102X78X121

## (undated) DEVICE — CATH DXTERITY AL20 100CM 6FR

## (undated) DEVICE — COVER OVERHEAD SURG LT BLUE

## (undated) DEVICE — CATH MPA2 INFINITI 4FR 100CM

## (undated) DEVICE — Device

## (undated) DEVICE — COVER PROXIMA MAYO STAND

## (undated) DEVICE — WIRE GUIDE SAFE-T-J .035 260CM

## (undated) DEVICE — DRAPE C-ARM/MOBILE XRAY 44X80

## (undated) DEVICE — DRAPE C ARM 42 X 120 10/BX

## (undated) DEVICE — CATH TEMP PACER 5.0FR

## (undated) DEVICE — KIT GLIDESHEATH SLEND 6FR 10CM

## (undated) DEVICE — TRAY MINOR ORTHO

## (undated) DEVICE — DRAPE INCISE IOBAN 2 23X23IN

## (undated) DEVICE — LINE 60IN PRESSURE MON.

## (undated) DEVICE — HEMOSTAT VASC BAND REG 24CM

## (undated) DEVICE — WIRE AMPLATZ X-STIFF 035X300

## (undated) DEVICE — PACK SURGERY START

## (undated) DEVICE — SYR 30CC LUER LOCK

## (undated) DEVICE — SUT ETHILON 3-0 PS2 18 BLK

## (undated) DEVICE — SUT ETHILON 3/0 18IN PS-1

## (undated) DEVICE — CATH DXTERITY PG145 110CM 6FR

## (undated) DEVICE — PAD DEFIB CADENCE ADULT R2

## (undated) DEVICE — NDL SPINAL SPINOCAN 22GX3.5

## (undated) DEVICE — SEE MEDLINE ITEM 157187

## (undated) DEVICE — DRAPE OPTIMA MAJOR PEDIATRIC

## (undated) DEVICE — SUT VICRYL PLUS 3-0 SH 18IN

## (undated) DEVICE — TAPE SURG DURAPORE 2 X10YD

## (undated) DEVICE — SEE MEDLINE ITEM 157150

## (undated) DEVICE — DRAPE IOBAN 2 STERI

## (undated) DEVICE — SEE MEDLINE ITEM 156894

## (undated) DEVICE — DRESSING AQUACEL AG ADV 3.5X6

## (undated) DEVICE — DRESSING TEGADERM 2 3/8 X 2.75

## (undated) DEVICE — CATH DXTERITY IMA 100CM 6FR

## (undated) DEVICE — DRAPE TOP 53X102IN

## (undated) DEVICE — DRESSING TELFA N ADH 3X8

## (undated) DEVICE — CATH RADIAL TIG 6FR 4.0 110CM

## (undated) DEVICE — KIT MICROINTRO 4F .018X40X7CM

## (undated) DEVICE — KIT CONFIDENCE W/O NEEDLES

## (undated) DEVICE — PACK BASIC

## (undated) DEVICE — KIT SPINAL PATIENT CARE JACK

## (undated) DEVICE — SEE MEDLINE ITEM 107746

## (undated) DEVICE — KIT PERCUTANEOUS SHEATH

## (undated) DEVICE — BIT DRILL QC 3FLUTED 4.2X145 S

## (undated) DEVICE — INFLATOR ENCORE 26 BLLN INFL

## (undated) DEVICE — CLOSURE SKIN STERI STRIP 1/2X4

## (undated) DEVICE — TOWEL OR DISP STRL BLUE 4/PK

## (undated) DEVICE — ADHESIVE MASTISOL VIAL 48/BX

## (undated) DEVICE — GUIDEWIRE STD .035X180CM ANG

## (undated) DEVICE — GUIDEWIRE STF .035X260CM STR

## (undated) DEVICE — DEVICE CLOSURE MYNX GRIP 5FR

## (undated) DEVICE — GUIDEWIRE SUPRA CORE 035 190CM

## (undated) DEVICE — SHEATH INTRODUCER 8FR 11CM

## (undated) DEVICE — STOPCOCK 3-WAY

## (undated) DEVICE — ALCOHOL 70% ISOP W/GREEN 16OZ

## (undated) DEVICE — BLLN CATH TRUE DILATION 18MM

## (undated) DEVICE — DRESSING TRANS 4X4 3/4

## (undated) DEVICE — NDL HYPO REG 25G X 1 1/2

## (undated) DEVICE — DRAPE STERI-DRAPE 1000 17X11IN

## (undated) DEVICE — SUT MCRYL PLUS 4-0 PS2 27IN

## (undated) DEVICE — SYS INFLATION BLLN KYPHON

## (undated) DEVICE — SUT 0 VICRYL / CT-1

## (undated) DEVICE — DRAPE PLASTIC U 60X72

## (undated) DEVICE — CATH INFINITI 4F 3DRC 100CM